# Patient Record
Sex: MALE | Race: WHITE | NOT HISPANIC OR LATINO | Employment: OTHER | URBAN - NONMETROPOLITAN AREA
[De-identification: names, ages, dates, MRNs, and addresses within clinical notes are randomized per-mention and may not be internally consistent; named-entity substitution may affect disease eponyms.]

---

## 2018-02-07 ENCOUNTER — OCCUPATIONAL MEDICINE (OUTPATIENT)
Dept: URGENT CARE | Facility: PHYSICIAN GROUP | Age: 53
End: 2018-02-07

## 2018-02-07 ENCOUNTER — APPOINTMENT (OUTPATIENT)
Dept: RADIOLOGY | Facility: IMAGING CENTER | Age: 53
End: 2018-02-07
Attending: PHYSICIAN ASSISTANT

## 2018-02-07 ENCOUNTER — HOSPITAL ENCOUNTER (OUTPATIENT)
Facility: MEDICAL CENTER | Age: 53
End: 2018-02-07
Attending: PHYSICIAN ASSISTANT
Payer: COMMERCIAL

## 2018-02-07 VITALS
HEART RATE: 92 BPM | HEIGHT: 75 IN | DIASTOLIC BLOOD PRESSURE: 72 MMHG | RESPIRATION RATE: 16 BRPM | WEIGHT: 270 LBS | OXYGEN SATURATION: 99 % | BODY MASS INDEX: 33.57 KG/M2 | SYSTOLIC BLOOD PRESSURE: 128 MMHG | TEMPERATURE: 97.9 F

## 2018-02-07 DIAGNOSIS — Z02.4 ENCOUNTER FOR CDL (COMMERCIAL DRIVING LICENSE) EXAM: ICD-10-CM

## 2018-02-07 DIAGNOSIS — Z02.1 PRE-EMPLOYMENT EXAMINATION: ICD-10-CM

## 2018-02-07 LAB
ALBUMIN SERPL BCP-MCNC: 4.1 G/DL (ref 3.2–4.9)
ALBUMIN/GLOB SERPL: 1.2 G/DL
ALP SERPL-CCNC: 68 U/L (ref 30–99)
ALT SERPL-CCNC: 30 U/L (ref 2–50)
ANION GAP SERPL CALC-SCNC: 9 MMOL/L (ref 0–11.9)
AST SERPL-CCNC: 32 U/L (ref 12–45)
BILIRUB SERPL-MCNC: 0.5 MG/DL (ref 0.1–1.5)
BUN SERPL-MCNC: 14 MG/DL (ref 8–22)
CALCIUM SERPL-MCNC: 9.3 MG/DL (ref 8.5–10.5)
CHLORIDE SERPL-SCNC: 103 MMOL/L (ref 96–112)
CO2 SERPL-SCNC: 28 MMOL/L (ref 20–33)
CREAT SERPL-MCNC: 0.8 MG/DL (ref 0.5–1.4)
ERYTHROCYTE [DISTWIDTH] IN BLOOD BY AUTOMATED COUNT: 43.8 FL (ref 35.9–50)
GLOBULIN SER CALC-MCNC: 3.3 G/DL (ref 1.9–3.5)
GLUCOSE SERPL-MCNC: 116 MG/DL (ref 65–99)
HCT VFR BLD AUTO: 43.4 % (ref 42–52)
HGB BLD-MCNC: 14.7 G/DL (ref 14–18)
MCH RBC QN AUTO: 31.3 PG (ref 27–33)
MCHC RBC AUTO-ENTMCNC: 33.9 G/DL (ref 33.7–35.3)
MCV RBC AUTO: 92.5 FL (ref 81.4–97.8)
PLATELET # BLD AUTO: 266 K/UL (ref 164–446)
PMV BLD AUTO: 11 FL (ref 9–12.9)
POTASSIUM SERPL-SCNC: 3.9 MMOL/L (ref 3.6–5.5)
PROT SERPL-MCNC: 7.4 G/DL (ref 6–8.2)
RBC # BLD AUTO: 4.69 M/UL (ref 4.7–6.1)
SODIUM SERPL-SCNC: 140 MMOL/L (ref 135–145)
WBC # BLD AUTO: 8.7 K/UL (ref 4.8–10.8)

## 2018-02-07 PROCEDURE — 7100 PR DOT PHYSICAL: Performed by: PHYSICIAN ASSISTANT

## 2018-02-07 PROCEDURE — 71046 X-RAY EXAM CHEST 2 VIEWS: CPT | Mod: TC,FY | Performed by: PHYSICIAN ASSISTANT

## 2018-02-07 ASSESSMENT — VISUAL ACUITY
OS_CC: 20/20
OD_CC: 20/20

## 2018-02-07 NOTE — PROGRESS NOTES
Patient is here for an employee physical, including a CDL exam. Patient was diagnosed with sleep apnea 6-8 months ago and has been on a CPAP since. He denies any excessive daytime somnolence and has not fallen asleep a little. He does not take any prescription medication. He denies chest pain, shortness of breath, orthopnea, PND, leg swelling, and syncopal episodes. All paperwork was reviewed. Chest x-ray within normal limits. Cleared for work. CDL for 2 years.

## 2018-05-14 ENCOUNTER — OFFICE VISIT (OUTPATIENT)
Dept: URGENT CARE | Facility: PHYSICIAN GROUP | Age: 53
End: 2018-05-14
Payer: COMMERCIAL

## 2018-05-14 VITALS
WEIGHT: 257 LBS | DIASTOLIC BLOOD PRESSURE: 90 MMHG | TEMPERATURE: 97.5 F | HEART RATE: 72 BPM | HEIGHT: 75 IN | OXYGEN SATURATION: 96 % | BODY MASS INDEX: 31.95 KG/M2 | SYSTOLIC BLOOD PRESSURE: 142 MMHG | RESPIRATION RATE: 14 BRPM

## 2018-05-14 DIAGNOSIS — J22 LOWER RESP. TRACT INFECTION: ICD-10-CM

## 2018-05-14 DIAGNOSIS — R06.02 SOB (SHORTNESS OF BREATH): ICD-10-CM

## 2018-05-14 PROCEDURE — 99214 OFFICE O/P EST MOD 30 MIN: CPT | Performed by: PHYSICIAN ASSISTANT

## 2018-05-14 RX ORDER — CODEINE PHOSPHATE AND GUAIFENESIN 10; 100 MG/5ML; MG/5ML
5 SOLUTION ORAL EVERY 12 HOURS PRN
Qty: 100 ML | Refills: 0 | Status: SHIPPED | OUTPATIENT
Start: 2018-05-14 | End: 2018-05-24

## 2018-05-14 RX ORDER — ALBUTEROL SULFATE 90 UG/1
2 AEROSOL, METERED RESPIRATORY (INHALATION) EVERY 6 HOURS PRN
Qty: 8.5 G | Refills: 0 | Status: SHIPPED | OUTPATIENT
Start: 2018-05-14 | End: 2018-11-13

## 2018-05-14 RX ORDER — DOXYCYCLINE HYCLATE 100 MG
100 TABLET ORAL 2 TIMES DAILY
Qty: 14 TAB | Refills: 0 | Status: SHIPPED | OUTPATIENT
Start: 2018-05-14 | End: 2018-05-21

## 2018-05-14 NOTE — PROGRESS NOTES
Chief Complaint   Patient presents with   • Congestion       HISTORY OF PRESENT ILLNESS: Patient is a 53 y.o. male who presents today for the following:    Cough x 1 week  + nasal congestion, new onset sweats, SOB, ST  Denies ear pain  OTC meds: dayquil/nyquil - helping only a little bit  Currently feeling a little worse - body aches     There are no active problems to display for this patient.      Allergies:Nkda [no known drug allergy]    Current Outpatient Prescriptions Ordered in [facility]   Medication Sig Dispense Refill   • doxycycline (VIBRAMYCIN) 100 MG Tab Take 1 Tab by mouth 2 times a day for 7 days. 14 Tab 0   • guaifenesin-codeine (ROBITUSSIN AC) Solution oral solution Take 5 mL by mouth every 12 hours as needed for Cough for up to 10 days. 100 mL 0   • albuterol 108 (90 Base) MCG/ACT Aero Soln inhalation aerosol Inhale 2 Puffs by mouth every 6 hours as needed. 8.5 g 0     No current Epic-ordered facility-administered medications on file.        No past medical history on file.    Social History   Substance Use Topics   • Smoking status: Former Smoker   • Smokeless tobacco: Never Used   • Alcohol use Yes      Comment: occasion       No family status information on file.   No family history on file.    Review of Systems:   Constitutional ROS: No unexpected change in weight, No weakness, No fatigue  Eye ROS: No recent significant change in vision, No eye pain, redness, discharge  Ear ROS: No drainage, No tinnitus or vertigo, No recent change in hearing  Mouth/Throat ROS: No teeth or gum problems, No bleeding gums, No tongue complaints  Neck ROS: No swollen glands, No significant pain in neck  Cardiovascular ROS: No diaphoresis, No edema, No palpitations  Gastrointestinal ROS: No change in bowel habits, No significant change in appetite, No nausea, vomiting, diarrhea, or constipation  Musculoskeletal/Extremities ROS: No peripheral edema, No pain, redness or swelling on the joints  Hematologic/Lymphatic ROS: No  "chills, No night sweats, No weight loss  Skin/Integumentary ROS: No edema, No evidence of rash, No itching      Exam:  Blood pressure 142/90, pulse 72, temperature 36.4 °C (97.5 °F), resp. rate 14, height 1.905 m (6' 3\"), weight 116.6 kg (257 lb), SpO2 96 %.  General: Well developed, well nourished. No distress.  Eye: PERRL/EOMI; conjunctivae clear, lids normal.  ENMT: Lips without lesions, good dentition. Oropharynx is clear. Bilateral TMs are within normal limits.  Neck: Trachea midline, no masses. No thyromegaly.  Pulmonary: Unlabored respiratory effort. Lungs clear to auscultation, no wheezes, no rhonchi.  Cardiovascular: Regular rate and rhythm without murmur. No edema.   Neurologic: Grossly nonfocal. No facial asymmetry noted.  Lymph: No cervical lymphadenopathy noted.  Skin: Warm, dry, good turgor. No rashes in visible areas.   Psych: Normal mood. Alert and oriented x3. Judgment and insight is normal.    Assessment/Plan:  Discussed possible viral etiology. Discussed appropriate over-the-counter symptomatic medication, and when to return to clinic. Take all medications as instructed. Contingent antibiotic prescription given to patient to fill upon meeting criteria of guidelines discussed.   1. Lower resp. tract infection  doxycycline (VIBRAMYCIN) 100 MG Tab    guaifenesin-codeine (ROBITUSSIN AC) Solution oral solution   2. SOB (shortness of breath)  albuterol 108 (90 Base) MCG/ACT Aero Soln inhalation aerosol       "

## 2018-05-14 NOTE — LETTER
May 14, 2018         Patient: Gurmeet Jo   YOB: 1965   Date of Visit: 5/14/2018           To Whom it May Concern:    Gurmeet Jo was seen in my clinic on 5/14/2018. He may return to work on 5/17/18.    If you have any questions or concerns, please don't hesitate to call.        Sincerely,           Nissa Adames P.A.-C.  Electronically Signed

## 2018-10-28 ENCOUNTER — OFFICE VISIT (OUTPATIENT)
Dept: URGENT CARE | Facility: PHYSICIAN GROUP | Age: 53
End: 2018-10-28
Payer: MEDICAID

## 2018-10-28 VITALS
SYSTOLIC BLOOD PRESSURE: 180 MMHG | OXYGEN SATURATION: 95 % | WEIGHT: 240 LBS | HEIGHT: 75 IN | TEMPERATURE: 98 F | RESPIRATION RATE: 18 BRPM | BODY MASS INDEX: 29.84 KG/M2 | DIASTOLIC BLOOD PRESSURE: 90 MMHG | HEART RATE: 92 BPM

## 2018-10-28 DIAGNOSIS — R16.0 HEPATOMEGALY: ICD-10-CM

## 2018-10-28 DIAGNOSIS — R10.13 EPIGASTRIC PAIN: ICD-10-CM

## 2018-10-28 DIAGNOSIS — K62.5 RECTAL BLEEDING: ICD-10-CM

## 2018-10-28 PROCEDURE — 99214 OFFICE O/P EST MOD 30 MIN: CPT | Performed by: PHYSICIAN ASSISTANT

## 2018-10-28 NOTE — LETTER
October 28, 2018         Patient: Gurmeet Jo   YOB: 1965   Date of Visit: 10/28/2018           To Whom it May Concern:    Gurmeet Jo was seen in my clinic on 10/28/2018. He may return to work on 10/30/18. Shruthi Jo will need to be with him. She may return 10/30/18.    If you have any questions or concerns, please don't hesitate to call.        Sincerely,           Nissa Adames P.A.-C.  Electronically Signed

## 2018-10-28 NOTE — PROGRESS NOTES
Chief Complaint   Patient presents with   • Abdominal Pain     Pt states he is feeling upper abdominal pain, Pt states he has noticed bleeding from rectum, unsure if it is due to hemorrhoid       HISTORY OF PRESENT ILLNESS: Patient is a 53 y.o. 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      Allergies:Nkda [no known drug allergy]    Current Outpatient Prescriptions Ordered in [facility]   Medication Sig Dispense Refill   • albuterol 108 (90 Base) MCG/ACT Aero Soln inhalation aerosol Inhale 2 Puffs by mouth every 6 hours as needed. 8.5 g 0     No current Epic-ordered facility-administered medications on file.        History reviewed. No pertinent past medical history.    Social History   Substance Use Topics   • Smoking status: Former Smoker   • Smokeless tobacco: Never Used   • Alcohol use Yes      Comment: occasion       No family status information on file.   History reviewed. No pertinent family history.    Review of Systems:   Constitutional ROS: No unexpected change in weight, No weakness, No fatigue  Eye ROS: No recent significant change in vision, No eye pain, redness, discharge  Ear ROS: No drainage, No tinnitus or vertigo, No recent change in hearing  Mouth/Throat ROS: No teeth or gum problems, No bleeding gums, No tongue complaints  Neck ROS: No swollen glands, No  "significant pain in neck  Pulmonary ROS: No chronic cough, sputum, or hemoptysis, No dyspnea on exertion, No wheezing  Cardiovascular ROS: No diaphoresis, No edema, No palpitations  Musculoskeletal/Extremities ROS: No peripheral edema, No pain, redness or swelling on the joints  Hematologic/Lymphatic ROS: No chills, No night sweats, No weight loss  Skin/Integumentary ROS: No edema, No evidence of rash, No itching      Exam:  Blood pressure (!) 180/90, pulse 92, temperature 36.7 °C (98 °F), temperature source Temporal, resp. rate 18, height 1.905 m (6' 3\"), weight 108.9 kg (240 lb), SpO2 95 %.  General: Well developed, well nourished. No distress.  Pulmonary: Unlabored respiratory effort. Lungs clear to auscultation, no wheezes, no rhonchi.  Cardiovascular: Regular rate and rhythm without murmur.    Abdomen: Soft, nondistended.  Tenderness noted in the epigastrium without guarding or rebound.  Abdominal wall veins are very noticeable but are not enlarged.  Hepatomegaly is noted.  Bowel sounds within normal limits.  Neurologic: Grossly nonfocal. No facial asymmetry noted.  Lymph: No cervical lymphadenopathy noted.  Skin: Warm, dry, good turgor. No rashes in visible areas.   Psych: Normal mood. Alert and oriented x3. Judgment and insight is normal.    Assessment/Plan:  Patient has been set up for ultrasound and labs tomorrow.  Will contact patient with results.  Referring patient to GI for rectal bleeding.  1. Hepatomegaly  US-ABDOMEN COMPLETE SURVEY    COMP METABOLIC PANEL    CBC WITH DIFFERENTIAL   2. Rectal bleeding  REFERRAL TO GASTROENTEROLOGY   3. Epigastric pain  US-ABDOMEN COMPLETE SURVEY       "

## 2018-10-29 ENCOUNTER — HOSPITAL ENCOUNTER (OUTPATIENT)
Dept: RADIOLOGY | Facility: MEDICAL CENTER | Age: 53
End: 2018-10-29
Attending: PHYSICIAN ASSISTANT
Payer: MEDICAID

## 2018-10-29 ENCOUNTER — TELEPHONE (OUTPATIENT)
Dept: URGENT CARE | Facility: PHYSICIAN GROUP | Age: 53
End: 2018-10-29

## 2018-10-29 ENCOUNTER — HOSPITAL ENCOUNTER (OUTPATIENT)
Dept: LAB | Facility: MEDICAL CENTER | Age: 53
End: 2018-10-29
Attending: PHYSICIAN ASSISTANT
Payer: MEDICAID

## 2018-10-29 DIAGNOSIS — R16.0 HEPATOMEGALY: ICD-10-CM

## 2018-10-29 DIAGNOSIS — R93.89 ABNORMAL ULTRASOUND: ICD-10-CM

## 2018-10-29 DIAGNOSIS — R93.89 ABNORMAL CT SCAN: ICD-10-CM

## 2018-10-29 DIAGNOSIS — R10.13 EPIGASTRIC PAIN: ICD-10-CM

## 2018-10-29 LAB
ALBUMIN SERPL BCP-MCNC: 3.7 G/DL (ref 3.2–4.9)
ALBUMIN/GLOB SERPL: 0.9 G/DL
ALP SERPL-CCNC: 186 U/L (ref 30–99)
ALT SERPL-CCNC: 36 U/L (ref 2–50)
ANION GAP SERPL CALC-SCNC: 9 MMOL/L (ref 0–11.9)
AST SERPL-CCNC: 85 U/L (ref 12–45)
BASOPHILS # BLD AUTO: 0.6 % (ref 0–1.8)
BASOPHILS # BLD: 0.07 K/UL (ref 0–0.12)
BILIRUB SERPL-MCNC: 1.4 MG/DL (ref 0.1–1.5)
BUN SERPL-MCNC: 13 MG/DL (ref 8–22)
CALCIUM SERPL-MCNC: 9.6 MG/DL (ref 8.5–10.5)
CHLORIDE SERPL-SCNC: 100 MMOL/L (ref 96–112)
CO2 SERPL-SCNC: 27 MMOL/L (ref 20–33)
CREAT SERPL-MCNC: 0.72 MG/DL (ref 0.5–1.4)
EOSINOPHIL # BLD AUTO: 0.06 K/UL (ref 0–0.51)
EOSINOPHIL NFR BLD: 0.5 % (ref 0–6.9)
ERYTHROCYTE [DISTWIDTH] IN BLOOD BY AUTOMATED COUNT: 45.2 FL (ref 35.9–50)
FASTING STATUS PATIENT QL REPORTED: NORMAL
GLOBULIN SER CALC-MCNC: 4.2 G/DL (ref 1.9–3.5)
GLUCOSE SERPL-MCNC: 115 MG/DL (ref 65–99)
HCT VFR BLD AUTO: 37.2 % (ref 42–52)
HGB BLD-MCNC: 12 G/DL (ref 14–18)
IMM GRANULOCYTES # BLD AUTO: 0.04 K/UL (ref 0–0.11)
IMM GRANULOCYTES NFR BLD AUTO: 0.4 % (ref 0–0.9)
LYMPHOCYTES # BLD AUTO: 1.38 K/UL (ref 1–4.8)
LYMPHOCYTES NFR BLD: 12.5 % (ref 22–41)
MCH RBC QN AUTO: 29 PG (ref 27–33)
MCHC RBC AUTO-ENTMCNC: 32.3 G/DL (ref 33.7–35.3)
MCV RBC AUTO: 89.9 FL (ref 81.4–97.8)
MONOCYTES # BLD AUTO: 1.08 K/UL (ref 0–0.85)
MONOCYTES NFR BLD AUTO: 9.8 % (ref 0–13.4)
NEUTROPHILS # BLD AUTO: 8.38 K/UL (ref 1.82–7.42)
NEUTROPHILS NFR BLD: 76.2 % (ref 44–72)
NRBC # BLD AUTO: 0 K/UL
NRBC BLD-RTO: 0 /100 WBC
PLATELET # BLD AUTO: 335 K/UL (ref 164–446)
PMV BLD AUTO: 10.6 FL (ref 9–12.9)
POTASSIUM SERPL-SCNC: 4.1 MMOL/L (ref 3.6–5.5)
PROT SERPL-MCNC: 7.9 G/DL (ref 6–8.2)
RBC # BLD AUTO: 4.14 M/UL (ref 4.7–6.1)
SODIUM SERPL-SCNC: 136 MMOL/L (ref 135–145)
WBC # BLD AUTO: 11 K/UL (ref 4.8–10.8)

## 2018-10-29 PROCEDURE — 36415 COLL VENOUS BLD VENIPUNCTURE: CPT

## 2018-10-29 PROCEDURE — 76700 US EXAM ABDOM COMPLETE: CPT

## 2018-10-29 PROCEDURE — 80053 COMPREHEN METABOLIC PANEL: CPT

## 2018-10-29 PROCEDURE — 85025 COMPLETE CBC W/AUTO DIFF WBC: CPT

## 2018-10-29 PROCEDURE — 700117 HCHG RX CONTRAST REV CODE 255: Performed by: PHYSICIAN ASSISTANT

## 2018-10-29 PROCEDURE — 74177 CT ABD & PELVIS W/CONTRAST: CPT

## 2018-10-29 RX ADMIN — IOHEXOL 50 ML: 240 INJECTION, SOLUTION INTRATHECAL; INTRAVASCULAR; INTRAVENOUS; ORAL at 16:45

## 2018-10-29 RX ADMIN — IOHEXOL 100 ML: 350 INJECTION, SOLUTION INTRAVENOUS at 16:45

## 2018-11-08 ENCOUNTER — HOSPITAL ENCOUNTER (OUTPATIENT)
Dept: LAB | Facility: MEDICAL CENTER | Age: 53
End: 2018-11-08
Attending: INTERNAL MEDICINE
Payer: MEDICAID

## 2018-11-08 LAB
CANCER AG19-9 SERPL-ACNC: 2411.6 U/ML (ref 0–35)
HBV SURFACE AG SER QL: NEGATIVE
HCV AB SER QL: NEGATIVE
INR PPP: 1.22 (ref 0.87–1.13)
PROTHROMBIN TIME: 15.5 SEC (ref 12–14.6)

## 2018-11-08 PROCEDURE — 87340 HEPATITIS B SURFACE AG IA: CPT

## 2018-11-08 PROCEDURE — 36415 COLL VENOUS BLD VENIPUNCTURE: CPT

## 2018-11-08 PROCEDURE — 86301 IMMUNOASSAY TUMOR CA 19-9: CPT

## 2018-11-08 PROCEDURE — 82378 CARCINOEMBRYONIC ANTIGEN: CPT

## 2018-11-08 PROCEDURE — 82784 ASSAY IGA/IGD/IGG/IGM EACH: CPT

## 2018-11-08 PROCEDURE — 86803 HEPATITIS C AB TEST: CPT

## 2018-11-08 PROCEDURE — 82105 ALPHA-FETOPROTEIN SERUM: CPT

## 2018-11-08 PROCEDURE — 85610 PROTHROMBIN TIME: CPT

## 2018-11-08 PROCEDURE — 83516 IMMUNOASSAY NONANTIBODY: CPT

## 2018-11-09 LAB — CEA SERPL-MCNC: 1592.7 NG/ML (ref 0–3)

## 2018-11-10 LAB — AFP-TM SERPL-MCNC: 4 NG/ML (ref 0–9)

## 2018-11-11 LAB
IGA SERPL-MCNC: 662 MG/DL (ref 68–408)
TTG IGA SER IA-ACNC: 1 U/ML (ref 0–3)

## 2018-11-13 ENCOUNTER — APPOINTMENT (OUTPATIENT)
Dept: ADMISSIONS | Facility: MEDICAL CENTER | Age: 53
End: 2018-11-13
Attending: INTERNAL MEDICINE
Payer: MEDICAID

## 2018-11-13 ENCOUNTER — APPOINTMENT (OUTPATIENT)
Dept: ADMISSIONS | Facility: MEDICAL CENTER | Age: 53
End: 2018-11-13
Payer: MEDICAID

## 2018-11-14 ENCOUNTER — APPOINTMENT (OUTPATIENT)
Dept: RADIOLOGY | Facility: MEDICAL CENTER | Age: 53
End: 2018-11-14
Attending: INTERNAL MEDICINE
Payer: MEDICAID

## 2018-11-14 ENCOUNTER — HOSPITAL ENCOUNTER (OUTPATIENT)
Facility: MEDICAL CENTER | Age: 53
End: 2018-11-14
Attending: INTERNAL MEDICINE | Admitting: INTERNAL MEDICINE
Payer: MEDICAID

## 2018-11-14 VITALS
TEMPERATURE: 98.9 F | WEIGHT: 232.81 LBS | SYSTOLIC BLOOD PRESSURE: 133 MMHG | HEIGHT: 74 IN | DIASTOLIC BLOOD PRESSURE: 86 MMHG | RESPIRATION RATE: 18 BRPM | HEART RATE: 83 BPM | OXYGEN SATURATION: 91 % | BODY MASS INDEX: 29.88 KG/M2

## 2018-11-14 DIAGNOSIS — K64.9 BLEEDING HEMORRHOIDS: ICD-10-CM

## 2018-11-14 DIAGNOSIS — R16.0 HEPATOMEGALY: ICD-10-CM

## 2018-11-14 DIAGNOSIS — R93.5 ABNORMAL ABDOMINAL CT SCAN: ICD-10-CM

## 2018-11-14 DIAGNOSIS — R12 HEARTBURN: ICD-10-CM

## 2018-11-14 DIAGNOSIS — I70.0 ATHEROSCLEROSIS OF AORTA (HCC): ICD-10-CM

## 2018-11-14 DIAGNOSIS — K76.9 LESION OF LIVER: ICD-10-CM

## 2018-11-14 DIAGNOSIS — Z87.891 EX-SMOKER: ICD-10-CM

## 2018-11-14 DIAGNOSIS — Z86.010 PERSONAL HISTORY OF COLONIC POLYPS: ICD-10-CM

## 2018-11-14 DIAGNOSIS — R11.0 NAUSEA: ICD-10-CM

## 2018-11-14 DIAGNOSIS — R10.13 EPIGASTRIC PAIN: ICD-10-CM

## 2018-11-14 DIAGNOSIS — N28.1 ACQUIRED CYST OF KIDNEY: ICD-10-CM

## 2018-11-14 DIAGNOSIS — R79.89 ABNORMAL LIVER FUNCTION TEST: ICD-10-CM

## 2018-11-14 DIAGNOSIS — D64.89 OTHER SPECIFIED ANEMIAS: ICD-10-CM

## 2018-11-14 DIAGNOSIS — R63.4 LOSS OF WEIGHT: ICD-10-CM

## 2018-11-14 DIAGNOSIS — I10 ESSENTIAL HYPERTENSION, MALIGNANT: ICD-10-CM

## 2018-11-14 LAB — PATHOLOGY CONSULT NOTE: NORMAL

## 2018-11-14 PROCEDURE — 700111 HCHG RX REV CODE 636 W/ 250 OVERRIDE (IP)

## 2018-11-14 PROCEDURE — 88313 SPECIAL STAINS GROUP 2: CPT

## 2018-11-14 PROCEDURE — 99153 MOD SED SAME PHYS/QHP EA: CPT

## 2018-11-14 PROCEDURE — 160002 HCHG RECOVERY MINUTES (STAT)

## 2018-11-14 PROCEDURE — 700111 HCHG RX REV CODE 636 W/ 250 OVERRIDE (IP): Performed by: RADIOLOGY

## 2018-11-14 PROCEDURE — 88341 IMHCHEM/IMCYTCHM EA ADD ANTB: CPT | Mod: 91

## 2018-11-14 PROCEDURE — 88307 TISSUE EXAM BY PATHOLOGIST: CPT

## 2018-11-14 PROCEDURE — 88342 IMHCHEM/IMCYTCHM 1ST ANTB: CPT

## 2018-11-14 RX ORDER — MIDAZOLAM HYDROCHLORIDE 1 MG/ML
.5-2 INJECTION INTRAMUSCULAR; INTRAVENOUS PRN
Status: DISCONTINUED | OUTPATIENT
Start: 2018-11-14 | End: 2018-11-14 | Stop reason: HOSPADM

## 2018-11-14 RX ORDER — SODIUM CHLORIDE 9 MG/ML
500 INJECTION, SOLUTION INTRAVENOUS
Status: DISCONTINUED | OUTPATIENT
Start: 2018-11-14 | End: 2018-11-14 | Stop reason: HOSPADM

## 2018-11-14 RX ORDER — MIDAZOLAM HYDROCHLORIDE 1 MG/ML
INJECTION INTRAMUSCULAR; INTRAVENOUS
Status: COMPLETED
Start: 2018-11-14 | End: 2018-11-14

## 2018-11-14 RX ORDER — OXYCODONE HYDROCHLORIDE 10 MG/1
10 TABLET ORAL
Status: DISCONTINUED | OUTPATIENT
Start: 2018-11-14 | End: 2018-11-14 | Stop reason: HOSPADM

## 2018-11-14 RX ORDER — NALOXONE HYDROCHLORIDE 0.4 MG/ML
INJECTION, SOLUTION INTRAMUSCULAR; INTRAVENOUS; SUBCUTANEOUS
Status: COMPLETED
Start: 2018-11-14 | End: 2018-11-14

## 2018-11-14 RX ORDER — MORPHINE SULFATE 4 MG/ML
4 INJECTION, SOLUTION INTRAMUSCULAR; INTRAVENOUS
Status: DISCONTINUED | OUTPATIENT
Start: 2018-11-14 | End: 2018-11-14 | Stop reason: HOSPADM

## 2018-11-14 RX ORDER — ONDANSETRON 2 MG/ML
4 INJECTION INTRAMUSCULAR; INTRAVENOUS PRN
Status: DISCONTINUED | OUTPATIENT
Start: 2018-11-14 | End: 2018-11-14 | Stop reason: HOSPADM

## 2018-11-14 RX ORDER — ONDANSETRON 2 MG/ML
4 INJECTION INTRAMUSCULAR; INTRAVENOUS EVERY 8 HOURS PRN
Status: DISCONTINUED | OUTPATIENT
Start: 2018-11-14 | End: 2018-11-14 | Stop reason: HOSPADM

## 2018-11-14 RX ORDER — OXYCODONE HYDROCHLORIDE 5 MG/1
5 TABLET ORAL
Status: DISCONTINUED | OUTPATIENT
Start: 2018-11-14 | End: 2018-11-14 | Stop reason: HOSPADM

## 2018-11-14 RX ADMIN — MIDAZOLAM 2 MG: 1 INJECTION INTRAMUSCULAR; INTRAVENOUS at 11:24

## 2018-11-14 RX ADMIN — MIDAZOLAM 2 MG: 1 INJECTION INTRAMUSCULAR; INTRAVENOUS at 11:29

## 2018-11-14 RX ADMIN — FENTANYL CITRATE 50 MCG: 50 INJECTION, SOLUTION INTRAMUSCULAR; INTRAVENOUS at 11:24

## 2018-11-14 RX ADMIN — MIDAZOLAM HYDROCHLORIDE 2 MG: 1 INJECTION, SOLUTION INTRAMUSCULAR; INTRAVENOUS at 11:24

## 2018-11-14 ASSESSMENT — PAIN SCALES - GENERAL
PAINLEVEL_OUTOF10: 0

## 2018-11-14 NOTE — DISCHARGE INSTRUCTIONS
ACTIVITY: Rest and take it easy for the first 24 hours.  A responsible adult is recommended to remain with you during that time.  It is normal to feel sleepy.  We encourage you to not do anything that requires balance, judgment or coordination.    MILD FLU-LIKE SYMPTOMS ARE NORMAL. YOU MAY EXPERIENCE GENERALIZED MUSCLE ACHES, THROAT IRRITATION, HEADACHE AND/OR SOME NAUSEA.    FOR 24 HOURS DO NOT:  Drive, operate machinery or run household appliances.  Drink beer or alcoholic beverages.   Make important decisions or sign legal documents.    DIET: To avoid nausea, slowly advance diet as tolerated, avoiding spicy or greasy foods for the first day.  Add more substantial food to your diet according to your physician's instructions.  Babies can be fed formula or breast milk as soon as they are hungry.  INCREASE FLUIDS AND FIBER TO AVOID CONSTIPATION.    SURGICAL DRESSING/BATHING: Keep dressing and incision dry and intact for 24 hours, may remove dressing and shower after 12 PM on 11/15, do not need to replace    FOLLOW-UP APPOINTMENT:  A follow-up appointment should be arranged with your doctor [phone]; call to schedule.    You should CALL YOUR PHYSICIAN if you develop:  Fever greater than 101 degrees F.  Pain not relieved by medication, or persistent nausea or vomiting.  Excessive bleeding (blood soaking through dressing) or unexpected drainage from the wound.  Extreme redness or swelling around the incision site, drainage of pus or foul smelling drainage.  Inability to urinate or empty your bladder within 8 hours.  Problems with breathing or chest pain.    You should call 911 if you develop problems with breathing or chest pain.  If you are unable to contact your doctor or surgical center, you should go to the nearest emergency room or urgent care center.  Physician's telephone #: [phone]    If any questions arise, call your doctor.  If your doctor is not available, please feel free to call the Surgical Center  at [phone].  The Center is open Monday through Friday from 7AM to 7PM.  You can also call the HEALTH HOTLINE open 24 hours/day, 7 days/week and speak to a nurse at [phone], or toll free at [phone].    A registered nurse may call you a few days after your surgery to see how you are doing after your procedure.    MEDICATIONS: Resume taking daily medication.  Take prescribed pain medication with food.  If no medication is prescribed, you may take non-aspirin pain medication if needed.  PAIN MEDICATION CAN BE VERY CONSTIPATING.  Take a stool softener or laxative such as senokot, pericolace, or milk of magnesia if needed.    If your physician has prescribed pain medication that includes Acetaminophen (Tylenol), do not take additional Acetaminophen (Tylenol) while taking the prescribed medication.    Depression / Suicide Risk    As you are discharged from this [facility] facility, it is important to learn how to keep safe from harming yourself.    Recognize the warning signs:  · Abrupt changes in personality, positive or negative- including increase in energy   · Giving away possessions  · Change in eating patterns- significant weight changes-  positive or negative  · Change in sleeping patterns- unable to sleep or sleeping all the time   · Unwillingness or inability to communicate  · Depression  · Unusual sadness, discouragement and loneliness  · Talk of wanting to die  · Neglect of personal appearance   · Rebelliousness- reckless behavior  · Withdrawal from people/activities they love  · Confusion- inability to concentrate     If you or a loved one observes any of these behaviors or has concerns about self-harm, here's what you can do:  · Talk about it- your feelings and reasons for harming yourself  · Remove any means that you might use to hurt yourself (examples: pills, rope, extension cords, firearm)  · Get professional help from the community (Mental Health, Substance Abuse, psychological  counseling)  · Do not be alone:Call your Safe Contact- someone whom you trust who will be there for you.  · Call your local CRISIS HOTLINE 831-8249 or 377-802-2383  · Call your local Children's Mobile Crisis Response Team [place] (240) 027-9293 or www.[employer]  · Call the toll free National Suicide Prevention Hotlines   · National Suicide Prevention Lifeline 694-973-AODT (6483)  · [employer] Line Network 800-SUICIDE (694-1068)      Liver Biopsy, Care After  These instructions give you information on caring for yourself after your procedure. Your doctor may also give you more specific instructions. Call your doctor if you have any problems or questions after your procedure.  HOME CARE  · Rest at home for 1-2 days or as told by your doctor.  · Have someone stay with you for at least 24 hours.  · Do not do these things in the first 24 hours:  ¨ Drive.  ¨ Use machinery.  ¨ Take care of other people.  ¨ Sign legal documents.  ¨ Take a bath or shower.  · There are many different ways to close and cover a cut (incision). For example, a cut can be closed with stitches, skin glue, or adhesive strips. Follow your doctor's instructions on:  ¨ Taking care of your cut.  ¨ Changing and removing your bandage (dressing).  ¨ Removing whatever was used to close your cut.  · Do not drink alcohol in the first week.  · Do not lift more than 5 pounds or play contact sports for the first 2 weeks.  · Take medicines only as told by your doctor. For 1 week, do not take medicine that has aspirin in it or medicines like ibuprofen.  · Get your test results.  GET HELP IF:  · A cut bleeds and leaves more than just a small spot of blood.  · A cut is red, puffs up (swells), or hurts more than before.  · Fluid or something else comes from a cut.  · A cut smells bad.  · You have a fever or chills.  GET HELP RIGHT AWAY IF:  · You have swelling, bloating, or pain in your belly (abdomen).  · You get dizzy or faint.  · You have a  rash.  · You feel sick to your stomach (nauseous) or throw up (vomit).  · You have trouble breathing, feel short of breath, or feel faint.  · Your chest hurts.  · You have problems talking or seeing.  · You have trouble balancing or moving your arms or legs.     This information is not intended to replace advice given to you by your health care provider. Make sure you discuss any questions you have with your health care provider.     Document Released: 09/26/2009 Document Revised: 01/08/2016 Document Reviewed: 02/13/2015  [employer] Interactive Patient Education ©2016 [employer]

## 2018-11-14 NOTE — OR SURGEON
Immediate Post- Operative Note        PostOp Diagnosis: Liver masses.       Procedure(s): CT guided liver biopsy.       Estimated Blood Loss: Less than 5 ml        Complications: None            11/14/2018     1142 AM     Dev Gorman

## 2018-11-14 NOTE — PROGRESS NOTES
IR CT RN note:    Site Confirmed with MD, patient and RN pre procedure   Ct guided liver BX by MD Gorman assisted by CT Tech Nader,Right mid abdomen access site;  x3  Cores sample sent to lab      End Tidal CO2 range 33-38 during procedure   Patient tolerated procedure, hemodynamically stable; pt awake and talking post procedure; report given to PPU LINDSAY sesay;   patient transported back to PPU via IR RN monitored then transferred care to report RN

## 2018-11-14 NOTE — OR NURSING
1154 Patient arrived to unit, arouses to voice.  Biopsy site to right mid abdomen clean, dry and soft.  Patient denies pain at this time.  Updated on plan of care, verbalized understanding.  1200 Wife to bedside, updated on plan of care.  1245 Access site clean, dry and soft.  Patient tolerating oral intake.  1340 Access site clean, dry and soft.  All lines and monitors discontinued. Reviewed discharge paperwork with pt and wife. Discussed diet, activity, medications, follow up care and worsening symptoms. No questions at this time.   1355 Pt discharged home with wife via wheelchair by volunteer.

## 2018-11-16 ENCOUNTER — TELEPHONE (OUTPATIENT)
Dept: SCHEDULING | Facility: IMAGING CENTER | Age: 53
End: 2018-11-16

## 2018-11-27 ENCOUNTER — PATIENT OUTREACH (OUTPATIENT)
Dept: OTHER | Facility: MEDICAL CENTER | Age: 53
End: 2018-11-27

## 2018-11-27 NOTE — PROGRESS NOTES
On 11/27/2018 at 1540 this ONN called patient. Introduced self and explained role of nurse navigator. Patient states he is currently at oncologist office, patient states he does not know the name of his oncologist, patient requesting that this ONN speak with patient's wife Joan. Patient handed phone to patient's wife Joan. Again, this ONN introduced self and explained role of nurse navigator. Patient's wife states they are currently waiting to see Dr. Ross. Patient's wife states they do not have a plan of care at this moment. Patient's wife took this ONN's name and number and this ONN agreed to call back later in the week. Joan requested this ONN call Joan's listed phone number.

## 2018-11-28 ENCOUNTER — HOSPITAL ENCOUNTER (OUTPATIENT)
Dept: LAB | Facility: MEDICAL CENTER | Age: 53
End: 2018-11-28
Attending: INTERNAL MEDICINE
Payer: MEDICAID

## 2018-11-28 LAB
ALBUMIN SERPL BCP-MCNC: 3.4 G/DL (ref 3.2–4.9)
ALBUMIN/GLOB SERPL: 0.8 G/DL
ALP SERPL-CCNC: 253 U/L (ref 30–99)
ALT SERPL-CCNC: 34 U/L (ref 2–50)
ANION GAP SERPL CALC-SCNC: 11 MMOL/L (ref 0–11.9)
AST SERPL-CCNC: 84 U/L (ref 12–45)
BASOPHILS # BLD AUTO: 0.6 % (ref 0–1.8)
BASOPHILS # BLD: 0.07 K/UL (ref 0–0.12)
BILIRUB SERPL-MCNC: 1 MG/DL (ref 0.1–1.5)
BUN SERPL-MCNC: 13 MG/DL (ref 8–22)
CALCIUM SERPL-MCNC: 9.2 MG/DL (ref 8.5–10.5)
CHLORIDE SERPL-SCNC: 100 MMOL/L (ref 96–112)
CO2 SERPL-SCNC: 24 MMOL/L (ref 20–33)
CREAT SERPL-MCNC: 0.63 MG/DL (ref 0.5–1.4)
EOSINOPHIL # BLD AUTO: 0.11 K/UL (ref 0–0.51)
EOSINOPHIL NFR BLD: 1 % (ref 0–6.9)
ERYTHROCYTE [DISTWIDTH] IN BLOOD BY AUTOMATED COUNT: 48.5 FL (ref 35.9–50)
GLOBULIN SER CALC-MCNC: 4.3 G/DL (ref 1.9–3.5)
GLUCOSE SERPL-MCNC: 208 MG/DL (ref 65–99)
HCT VFR BLD AUTO: 34.8 % (ref 42–52)
HGB BLD-MCNC: 10.8 G/DL (ref 14–18)
IMM GRANULOCYTES # BLD AUTO: 0.04 K/UL (ref 0–0.11)
IMM GRANULOCYTES NFR BLD AUTO: 0.4 % (ref 0–0.9)
LYMPHOCYTES # BLD AUTO: 1.43 K/UL (ref 1–4.8)
LYMPHOCYTES NFR BLD: 12.9 % (ref 22–41)
MCH RBC QN AUTO: 27.7 PG (ref 27–33)
MCHC RBC AUTO-ENTMCNC: 31 G/DL (ref 33.7–35.3)
MCV RBC AUTO: 89.2 FL (ref 81.4–97.8)
MONOCYTES # BLD AUTO: 1.03 K/UL (ref 0–0.85)
MONOCYTES NFR BLD AUTO: 9.3 % (ref 0–13.4)
NEUTROPHILS # BLD AUTO: 8.4 K/UL (ref 1.82–7.42)
NEUTROPHILS NFR BLD: 75.8 % (ref 44–72)
NRBC # BLD AUTO: 0 K/UL
NRBC BLD-RTO: 0 /100 WBC
PLATELET # BLD AUTO: 400 K/UL (ref 164–446)
PMV BLD AUTO: 10.8 FL (ref 9–12.9)
POTASSIUM SERPL-SCNC: 3.5 MMOL/L (ref 3.6–5.5)
PROT SERPL-MCNC: 7.7 G/DL (ref 6–8.2)
RBC # BLD AUTO: 3.9 M/UL (ref 4.7–6.1)
SODIUM SERPL-SCNC: 135 MMOL/L (ref 135–145)
WBC # BLD AUTO: 11.1 K/UL (ref 4.8–10.8)

## 2018-11-28 PROCEDURE — 36415 COLL VENOUS BLD VENIPUNCTURE: CPT

## 2018-11-28 PROCEDURE — 80053 COMPREHEN METABOLIC PANEL: CPT

## 2018-11-28 PROCEDURE — 85025 COMPLETE CBC W/AUTO DIFF WBC: CPT

## 2018-11-28 PROCEDURE — 82378 CARCINOEMBRYONIC ANTIGEN: CPT

## 2018-11-30 ENCOUNTER — PATIENT OUTREACH (OUTPATIENT)
Dept: OTHER | Facility: MEDICAL CENTER | Age: 53
End: 2018-11-30

## 2018-11-30 LAB — CEA SERPL-MCNC: >957 NG/ML (ref 0–3)

## 2018-11-30 NOTE — PROGRESS NOTES
On 11/30/2018 at 1528 this ONN called patient and patient's wife. Patient's wife stated that patient was uncomfortable in his abdominal area. Patient's wife stated patient was on flexeril and using a heating pad. This ONN encouraged smaller frequent meals. Patient agreeable to try. Patient states he is worried he may need a liver transplant. This ONN explained that right now liver transplant is not in patient's plan of care. Patient asking if during the colonoscopy cancer can be removed. This ONN explained that the colonoscopy would be diagnostic not a treatment modality. Patient and patient's wife had questions regarding chemotherapy medications and schedules and when to expect medications to arrive. This ONN explained that those questions should be directed to Dr. Ross's office. Patient states he has an upcoming chemo education class. Patient and patient's wife deny financial concerns and state that they have friends and family willing to help with transportation. Patient and patient's wife agreeable to receiving via email [employer] information on Road to Recovery. Patient denies other questions or concerns at this time.

## 2018-12-03 ENCOUNTER — HOSPITAL ENCOUNTER (OUTPATIENT)
Dept: LAB | Facility: MEDICAL CENTER | Age: 53
End: 2018-12-03
Attending: NURSE PRACTITIONER
Payer: MEDICAID

## 2018-12-03 LAB
INR PPP: 1.28 (ref 0.87–1.13)
PROTHROMBIN TIME: 16.1 SEC (ref 12–14.6)

## 2018-12-03 PROCEDURE — 85610 PROTHROMBIN TIME: CPT

## 2018-12-03 PROCEDURE — 36415 COLL VENOUS BLD VENIPUNCTURE: CPT

## 2018-12-03 RX ORDER — METHOCARBAMOL 500 MG/1
500 TABLET, FILM COATED ORAL 3 TIMES DAILY PRN
COMMUNITY
End: 2020-05-27

## 2018-12-03 RX ORDER — M-VIT,TX,IRON,MINS/CALC/FOLIC 27MG-0.4MG
1 TABLET ORAL DAILY
COMMUNITY

## 2018-12-05 ENCOUNTER — OFFICE VISIT (OUTPATIENT)
Dept: MEDICAL GROUP | Facility: CLINIC | Age: 53
End: 2018-12-05
Payer: MEDICAID

## 2018-12-05 ENCOUNTER — PATIENT OUTREACH (OUTPATIENT)
Dept: HEALTH INFORMATION MANAGEMENT | Facility: OTHER | Age: 53
End: 2018-12-05

## 2018-12-05 VITALS
SYSTOLIC BLOOD PRESSURE: 155 MMHG | TEMPERATURE: 99.7 F | OXYGEN SATURATION: 95 % | HEIGHT: 74 IN | DIASTOLIC BLOOD PRESSURE: 98 MMHG | WEIGHT: 231 LBS | HEART RATE: 102 BPM | BODY MASS INDEX: 29.65 KG/M2

## 2018-12-05 DIAGNOSIS — C78.7 METASTATIC COLON CANCER TO LIVER (HCC): ICD-10-CM

## 2018-12-05 DIAGNOSIS — Z23 NEED FOR VACCINATION: ICD-10-CM

## 2018-12-05 DIAGNOSIS — R03.0 ELEVATED BLOOD PRESSURE READING: ICD-10-CM

## 2018-12-05 DIAGNOSIS — C18.9 METASTATIC COLON CANCER TO LIVER (HCC): ICD-10-CM

## 2018-12-05 PROBLEM — C22.0 HEPATOCELLULAR CARCINOMA (HCC): Status: ACTIVE | Noted: 2018-12-05

## 2018-12-05 PROCEDURE — 90670 PCV13 VACCINE IM: CPT | Performed by: FAMILY MEDICINE

## 2018-12-05 PROCEDURE — 99213 OFFICE O/P EST LOW 20 MIN: CPT | Mod: 25 | Performed by: FAMILY MEDICINE

## 2018-12-05 PROCEDURE — 90471 IMMUNIZATION ADMIN: CPT | Performed by: FAMILY MEDICINE

## 2018-12-05 RX ORDER — BACLOFEN 10 MG/1
10 TABLET ORAL 3 TIMES DAILY PRN
COMMUNITY
End: 2023-05-31 | Stop reason: SDUPTHER

## 2018-12-05 RX ORDER — POLYETHYLENE GLYCOL-3350 AND ELECTROLYTES 236; 6.74; 5.86; 2.97; 22.74 G/274.31G; G/274.31G; G/274.31G; G/274.31G; G/274.31G
1 POWDER, FOR SOLUTION ORAL ONCE
Refills: 0 | COMMUNITY
Start: 2018-11-19 | End: 2018-12-17

## 2018-12-05 NOTE — PROGRESS NOTES
Complaint: Establish, requests vaccine     Subjective:     Gurmeet Jo is a 53 y.o. male here today to establish care. Used to nbe followed by Dr. Hawkins at [facility] clinic in [place].    Metastatic colon cancer to liver (HCC)  Diagnosed with metastatic colon cancer, set to start chemo this next week. Also scheduled to undergo colonoscopy to find primary tumor. Doing ok for time being. Wife very involved in his care plan. 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      No other concerns or complaints today.    Current medicines (including changes today)  Current Outpatient Prescriptions   Medication Sig Dispense Refill   • Esomeprazole Magnesium (NEXIUM PO) Take  by mouth.     • baclofen (LIORESAL) 10 MG Tab Take 10 mg by mouth 3 times a day.     • therapeutic multivitamin-minerals (THERAGRAN-M) Tab Take 1 Tab by mouth every day.     • methocarbamol (ROBAXIN) 500 MG Tab Take 500 mg by mouth 4 times a day.       No current facility-administered medications for this visit.      He  has a past medical history of Bowel habit changes; Cancer (HCC) (11/2018); Dental disorder; Hiatus hernia syndrome; Hypertension; Indigestion; Pain; Sleep apnea; and Snoring.    Health Maintenance: needs vaccinations      Allergies: Patient has no active allergies.    Current Outpatient Prescriptions Ordered in [facility]   Medication Sig Dispense Refill   • Esomeprazole Magnesium (NEXIUM PO) Take  by mouth.     • baclofen (LIORESAL) 10 MG Tab Take 10 mg by mouth 3 times a day.     • therapeutic multivitamin-minerals (THERAGRAN-M) Tab Take 1 Tab by mouth every day.     • methocarbamol (ROBAXIN) 500 MG Tab Take 500 mg by mouth 4 times a day.       No current [facility]-ordered facility-administered medications on file.        Past Medical History:   Diagnosis Date   • Bowel habit changes     constipation   • Cancer (HCC) 11/2018    Colon CA with Liver Mets   • Dental disorder     dentures    •  "Hiatus hernia syndrome    • Hypertension     No meds at this time   • Indigestion    • Pain     liver    • Sleep apnea     cpap    • Snoring        Past Surgical History:   Procedure Laterality Date   • COLONOSCOPY  2015       Social History   Substance Use Topics   • Smoking status: Former Smoker     Packs/day: 1.00     Years: 15.00     Quit date: 1/1/1998   • Smokeless tobacco: Never Used   • Alcohol use No       Social History     Social History Narrative   • No narrative on file       Family History   Problem Relation Age of Onset   • Hypertension Mother    • Hypertension Father    • Diabetes Father    • Diabetes Brother          ROS   Patient denies any fever, chills, unintentional weight gain/loss, fatigue, stroke symptoms, dizziness, headache, nasal congestion, sore-throat, cough, heartburn, chest pain, difficulty breathing, abdominal discomfort, diarrhea/constipation, burning with urination or frequency, joint or back pain, skin rashes, depression or anxiety.       Objective:     Blood pressure 155/98, pulse (!) 102, temperature 37.6 °C (99.7 °F), temperature source Temporal, height 1.88 m (6' 2\"), weight 104.8 kg (231 lb), SpO2 95 %. Body mass index is 29.66 kg/m².   Physical Exam:  Constitutional: Alert, no distress.  No formal exam  Psych: Alert and oriented x3, appropriate affect and mood.        Assessment and Plan:   The following treatment plan was discussed    1. Metastatic colon cancer to liver (HCC)  New problem to me. Due to social situation, will se if MSW can help. Also suggested [facility] account.  - REFERRAL TO COMPLEX CARE MANAGEMENT Services Requested: Care Manager to Evaluate and Recommend    2. Need for vaccination  Recommended by Dr. Thomas. Do not know if it will take due to proximity of chemo start.  - Pneumococcal Conjugate Vaccine 13-Valent    3. Elevated blood pressure reading  To discuss with oncologist, need med that does not interact with chemo.      Followup: Return if symptoms " worsen or fail to improve.    Please note that this dictation was created using voice recognition software. I have made every reasonable attempt to correct obvious errors, but I expect that there are errors of grammar and possibly content that I did not discover before finalizing the note.

## 2018-12-05 NOTE — ASSESSMENT & PLAN NOTE
Diagnosed with metastatic colon cancer, set to start chemo this next week. Also scheduled to undergo colonoscopy to find primary tumor. Doing ok for time being. Wife very invovled in his care plan. 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 Will

## 2018-12-07 ENCOUNTER — HOSPITAL ENCOUNTER (OUTPATIENT)
Dept: RADIOLOGY | Facility: MEDICAL CENTER | Age: 53
End: 2018-12-07
Attending: INTERNAL MEDICINE
Payer: MEDICAID

## 2018-12-07 DIAGNOSIS — C18.9 MALIGNANT NEOPLASM OF COLON, UNSPECIFIED PART OF COLON (HCC): ICD-10-CM

## 2018-12-07 PROCEDURE — 700117 HCHG RX CONTRAST REV CODE 255: Performed by: INTERNAL MEDICINE

## 2018-12-07 PROCEDURE — 71260 CT THORAX DX C+: CPT

## 2018-12-07 RX ADMIN — IOHEXOL 100 ML: 350 INJECTION, SOLUTION INTRAVENOUS at 09:01

## 2018-12-11 ENCOUNTER — HOSPITAL ENCOUNTER (OUTPATIENT)
Facility: MEDICAL CENTER | Age: 53
End: 2018-12-11
Attending: INTERNAL MEDICINE | Admitting: INTERNAL MEDICINE
Payer: MEDICAID

## 2018-12-11 ENCOUNTER — APPOINTMENT (OUTPATIENT)
Dept: RADIOLOGY | Facility: MEDICAL CENTER | Age: 53
End: 2018-12-11
Attending: INTERNAL MEDICINE
Payer: MEDICAID

## 2018-12-11 VITALS
WEIGHT: 233 LBS | DIASTOLIC BLOOD PRESSURE: 85 MMHG | BODY MASS INDEX: 29.9 KG/M2 | HEIGHT: 74 IN | OXYGEN SATURATION: 96 % | RESPIRATION RATE: 16 BRPM | HEART RATE: 88 BPM | SYSTOLIC BLOOD PRESSURE: 138 MMHG

## 2018-12-11 DIAGNOSIS — C18.9 MALIGNANT NEOPLASM OF COLON, UNSPECIFIED PART OF COLON (HCC): ICD-10-CM

## 2018-12-11 PROCEDURE — 700101 HCHG RX REV CODE 250

## 2018-12-11 PROCEDURE — 700111 HCHG RX REV CODE 636 W/ 250 OVERRIDE (IP): Performed by: RADIOLOGY

## 2018-12-11 PROCEDURE — 99153 MOD SED SAME PHYS/QHP EA: CPT

## 2018-12-11 PROCEDURE — 160002 HCHG RECOVERY MINUTES (STAT)

## 2018-12-11 PROCEDURE — 700111 HCHG RX REV CODE 636 W/ 250 OVERRIDE (IP)

## 2018-12-11 RX ORDER — SODIUM CHLORIDE 9 MG/ML
500 INJECTION, SOLUTION INTRAVENOUS
Status: DISCONTINUED | OUTPATIENT
Start: 2018-12-11 | End: 2018-12-11 | Stop reason: HOSPADM

## 2018-12-11 RX ORDER — CEFAZOLIN SODIUM 2 G/100ML
2 INJECTION, SOLUTION INTRAVENOUS ONCE
Status: COMPLETED | OUTPATIENT
Start: 2018-12-11 | End: 2018-12-11

## 2018-12-11 RX ORDER — 0.9 % SODIUM CHLORIDE 0.9 %
VIAL (ML) INJECTION PRN
Status: CANCELLED | OUTPATIENT
Start: 2018-12-18

## 2018-12-11 RX ORDER — ONDANSETRON 8 MG/1
8 TABLET, ORALLY DISINTEGRATING ORAL
Status: CANCELLED | OUTPATIENT
Start: 2018-12-18

## 2018-12-11 RX ORDER — ONDANSETRON 2 MG/ML
4 INJECTION INTRAMUSCULAR; INTRAVENOUS EVERY 8 HOURS PRN
Status: CANCELLED | OUTPATIENT
Start: 2018-12-11 | End: 2018-12-12

## 2018-12-11 RX ORDER — LIDOCAINE HYDROCHLORIDE AND EPINEPHRINE BITARTRATE 20; .01 MG/ML; MG/ML
INJECTION, SOLUTION SUBCUTANEOUS
Status: COMPLETED
Start: 2018-12-11 | End: 2018-12-11

## 2018-12-11 RX ORDER — PROCHLORPERAZINE MALEATE 10 MG
10 TABLET ORAL EVERY 6 HOURS PRN
Status: CANCELLED | OUTPATIENT
Start: 2018-12-18

## 2018-12-11 RX ORDER — CEFAZOLIN SODIUM 1 G/3ML
INJECTION, POWDER, FOR SOLUTION INTRAMUSCULAR; INTRAVENOUS
Status: COMPLETED
Start: 2018-12-11 | End: 2018-12-11

## 2018-12-11 RX ORDER — ONDANSETRON 2 MG/ML
4 INJECTION INTRAMUSCULAR; INTRAVENOUS
Status: CANCELLED | OUTPATIENT
Start: 2018-12-18

## 2018-12-11 RX ORDER — SODIUM CHLORIDE 9 MG/ML
INJECTION, SOLUTION INTRAVENOUS CONTINUOUS
Status: CANCELLED | OUTPATIENT
Start: 2018-12-18

## 2018-12-11 RX ORDER — MIDAZOLAM HYDROCHLORIDE 1 MG/ML
.5-2 INJECTION INTRAMUSCULAR; INTRAVENOUS PRN
Status: DISCONTINUED | OUTPATIENT
Start: 2018-12-11 | End: 2018-12-11 | Stop reason: HOSPADM

## 2018-12-11 RX ORDER — ONDANSETRON 2 MG/ML
4 INJECTION INTRAMUSCULAR; INTRAVENOUS PRN
Status: DISCONTINUED | OUTPATIENT
Start: 2018-12-11 | End: 2018-12-11 | Stop reason: HOSPADM

## 2018-12-11 RX ORDER — DEXTROSE MONOHYDRATE 50 MG/ML
INJECTION, SOLUTION INTRAVENOUS CONTINUOUS
Status: CANCELLED | OUTPATIENT
Start: 2018-12-18

## 2018-12-11 RX ORDER — 0.9 % SODIUM CHLORIDE 0.9 %
5 VIAL (ML) INJECTION PRN
Status: CANCELLED | OUTPATIENT
Start: 2018-12-18

## 2018-12-11 RX ORDER — OXYCODONE HYDROCHLORIDE 5 MG/1
2.5 TABLET ORAL
Status: CANCELLED | OUTPATIENT
Start: 2018-12-11 | End: 2018-12-12

## 2018-12-11 RX ORDER — HYDROMORPHONE HYDROCHLORIDE 2 MG/ML
0.25 INJECTION, SOLUTION INTRAMUSCULAR; INTRAVENOUS; SUBCUTANEOUS
Status: CANCELLED | OUTPATIENT
Start: 2018-12-11 | End: 2018-12-12

## 2018-12-11 RX ORDER — MIDAZOLAM HYDROCHLORIDE 1 MG/ML
INJECTION INTRAMUSCULAR; INTRAVENOUS
Status: COMPLETED
Start: 2018-12-11 | End: 2018-12-11

## 2018-12-11 RX ADMIN — HEPARIN 500 UNITS: 100 SYRINGE at 08:53

## 2018-12-11 RX ADMIN — CEFAZOLIN 2 G: 1 INJECTION, POWDER, FOR SOLUTION INTRAMUSCULAR; INTRAVENOUS at 08:25

## 2018-12-11 RX ADMIN — LIDOCAINE HYDROCHLORIDE,EPINEPHRINE BITARTRATE: 20; .01 INJECTION, SOLUTION INFILTRATION; PERINEURAL at 08:33

## 2018-12-11 RX ADMIN — MIDAZOLAM 2 MG: 1 INJECTION INTRAMUSCULAR; INTRAVENOUS at 08:32

## 2018-12-11 RX ADMIN — MIDAZOLAM 2 MG: 1 INJECTION INTRAMUSCULAR; INTRAVENOUS at 08:25

## 2018-12-11 RX ADMIN — FENTANYL CITRATE 50 MCG: 50 INJECTION, SOLUTION INTRAMUSCULAR; INTRAVENOUS at 08:24

## 2018-12-11 ASSESSMENT — PAIN SCALES - GENERAL: PAINLEVEL_OUTOF10: 0

## 2018-12-11 NOTE — PROGRESS NOTES
Discharge instructions reviewed with patient and family, all questions answered. IV removed and belongings returned.  Patient ambulated out in a stable condition.       ACTIVITY: Rest and take it easy for the first 24 hours.  A responsible adult is recommended to remain with you during that time.  It is normal to feel sleepy.  We encourage you to not do anything that requires balance, judgment or coordination.    MILD FLU-LIKE SYMPTOMS ARE NORMAL. YOU MAY EXPERIENCE GENERALIZED MUSCLE ACHES, THROAT IRRITATION, HEADACHE AND/OR SOME NAUSEA.    FOR 24 HOURS DO NOT:  Drive, operate machinery or run household appliances.  Drink beer or alcoholic beverages.   Make important decisions or sign legal documents.    SPECIAL INSTRUCTIONS: No shower or bath for 1 week. Sponge Bath only.  Keep surgical site clean dry and covered until fully healed. Do not lift right arm above head for 1 week, Do not lift over 10 lbs for 1 week with right arm.     DIET: To avoid nausea, slowly advance diet as tolerated, avoiding spicy or greasy foods for the first day.  Add more substantial food to your diet according to your physician's instructions.  Babies can be fed formula or breast milk as soon as they are hungry.  INCREASE FLUIDS AND FIBER TO AVOID CONSTIPATION.    SURGICAL DRESSING/BATHING: No shower or bath for 1 week. Sponge Bath only.  Keep surgical site clean dry and covered until fully healed.    FOLLOW-UP APPOINTMENT:  A follow-up appointment should be arranged with your doctor in 1 week; call to schedule.    You should CALL YOUR PHYSICIAN if you develop:  Fever greater than 101 degrees F.  Pain not relieved by medication, or persistent nausea or vomiting.  Excessive bleeding (blood soaking through dressing) or unexpected drainage from the wound.  Extreme redness or swelling around the incision site, drainage of pus or foul smelling drainage.  Inability to urinate or empty your bladder within 8 hours.  Problems with breathing or  chest pain.    You should call 911 if you develop problems with breathing or chest pain.  If you are unable to contact your doctor or surgical center, you should go to the nearest emergency room or urgent care center.     If any questions arise, call your doctor.  If your doctor is not available, please feel free to call the Surgical Center at [phone].  The Center is open Monday through Friday from 7AM to 7PM.  You can also call the HEALTH HOTLINE open 24 hours/day, 7 days/week and speak to a nurse at [phone], or toll free at [phone].    A registered nurse may call you a few days after your surgery to see how you are doing after your procedure.    MEDICATIONS: Resume taking daily medication.  Take prescribed pain medication with food.  If no medication is prescribed, you may take non-aspirin pain medication if needed.  PAIN MEDICATION CAN BE VERY CONSTIPATING.  Take a stool softener or laxative such as senokot, pericolace, or milk of magnesia if needed.      If your physician has prescribed pain medication that includes Acetaminophen (Tylenol), do not take additional Acetaminophen (Tylenol) while taking the prescribed medication.    Depression / Suicide Risk    As you are discharged from this [facility] facility, it is important to learn how to keep safe from harming yourself.    Recognize the warning signs:  · Abrupt changes in personality, positive or negative- including increase in energy   · Giving away possessions  · Change in eating patterns- significant weight changes-  positive or negative  · Change in sleeping patterns- unable to sleep or sleeping all the time   · Unwillingness or inability to communicate  · Depression  · Unusual sadness, discouragement and loneliness  · Talk of wanting to die  · Neglect of personal appearance   · Rebelliousness- reckless behavior  · Withdrawal from people/activities they love  · Confusion- inability to concentrate     If you or a loved one observes  any of these behaviors or has concerns about self-harm, here's what you can do:  · Talk about it- your feelings and reasons for harming yourself  · Remove any means that you might use to hurt yourself (examples: pills, rope, extension cords, firearm)  · Get professional help from the community (Mental Health, Substance Abuse, psychological counseling)  · Do not be alone:Call your Safe Contact- someone whom you trust who will be there for you.  · Call your local CRISIS HOTLINE 081-3192 or 312-180-4333  · Call your local Children's Mobile Crisis Response Team [place] (279) 518-5942 or www.[employer]  · Call the toll free National Suicide Prevention Hotlines   · National Suicide Prevention Lifeline 693-101-YZJF (4719)  · National Hope Line Network 800-SUICIDE (545-1646)      Implanted Port Home Guide  An implanted port is a type of central line that is placed under the skin. Central lines are used to provide IV access when treatment or nutrition needs to be given through a person's veins. Implanted ports are used for long-term IV access. An implanted port may be placed because:   · You need IV medicine that would be irritating to the small veins in your hands or arms.    · You need long-term IV medicines, such as antibiotics.    · You need IV nutrition for a long period.    · You need frequent blood draws for lab tests.    · You need dialysis.    Implanted ports are usually placed in the chest area, but they can also be placed in the upper arm, the abdomen, or the leg. An implanted port has two main parts:   · [place]. The reservoir is round and will appear as a small, raised area under your skin. The reservoir is the part where a needle is inserted to give medicines or draw blood.    · Catheter. The catheter is a thin, flexible tube that extends from the reservoir. The catheter is placed into a large vein. Medicine that is inserted into the reservoir goes into the catheter and then into the vein.    HOW  "WILL I CARE FOR MY INCISION SITE?  Do not get the incision site wet. Bathe or shower as directed by your health care provider.   HOW IS MY PORT ACCESSED?  Special steps must be taken to access the port:   · Before the port is accessed, a numbing cream can be placed on the skin. This helps numb the skin over the port site.    · Your health care provider uses a sterile technique to access the port.  ¨ Your health care provider must put on a mask and sterile gloves.  ¨ The skin over your port is cleaned carefully with an antiseptic and allowed to dry.  ¨ The port is gently pinched between sterile gloves, and a needle is inserted into the port.  · Only \"non-coring\" port needles should be used to access the port. Once the port is accessed, a blood return should be checked. This helps ensure that the port is in the vein and is not clogged.    · If your port needs to remain accessed for a constant infusion, a clear (transparent) bandage will be placed over the needle site. The bandage and needle will need to be changed every week, or as directed by your health care provider.    · Keep the bandage covering the needle clean and dry. Do not get it wet. Follow your health care provider's instructions on how to take a shower or bath while the port is accessed.    · If your port does not need to stay accessed, no bandage is needed over the port.    WHAT IS FLUSHING?  Flushing helps keep the port from getting clogged. Follow your health care provider's instructions on how and when to flush the port. Ports are usually flushed with saline solution or a medicine called heparin. The need for flushing will depend on how the port is used.   · If the port is used for intermittent medicines or blood draws, the port will need to be flushed:    ¨ After medicines have been given.    ¨ After blood has been drawn.    ¨ As part of routine maintenance.    · If a constant infusion is running, the port may not need to be flushed.    HOW LONG WILL " MY PORT STAY IMPLANTED?  The port can stay in for as long as your health care provider thinks it is needed. When it is time for the port to come out, surgery will be done to remove it. The procedure is similar to the one performed when the port was put in.   WHEN SHOULD I SEEK IMMEDIATE MEDICAL CARE?  When you have an implanted port, you should seek immediate medical care if:   · You notice a bad smell coming from the incision site.    · You have swelling, redness, or drainage at the incision site.    · You have more swelling or pain at the port site or the surrounding area.    · You have a fever that is not controlled with medicine.     This information is not intended to replace advice given to you by your health care provider. Make sure you discuss any questions you have with your health care provider.     Document Released: 12/18/2006 Document Revised: 10/08/2014 Document Reviewed: 08/25/2014  [employer] Interactive Patient Education ©2016 [employer]    Implanted Port Insertion, Care After  Refer to this sheet in the next few weeks. These instructions provide you with information on caring for yourself after your procedure. Your health care provider may also give you more specific instructions. Your treatment has been planned according to current medical practices, but problems sometimes occur. Call your health care provider if you have any problems or questions after your procedure.  WHAT TO EXPECT AFTER THE PROCEDURE  After your procedure, it is typical to have the following:   · Discomfort at the port insertion site. Ice packs to the area will help.  · Bruising on the skin over the port. This will subside in 3-4 days.  HOME CARE INSTRUCTIONS  · After your port is placed, you will get a 's information card. The card has information about your port. Keep this card with you at all times.    · Know what kind of port you have. There are many types of ports available.    · Wear a medical alert bracelet  in case of an emergency. This can help alert health care workers that you have a port.    · The port can stay in for as long as your health care provider believes it is necessary.    · A home health care nurse may give medicines and take care of the port.    · You or a family member can get special training and directions for giving medicine and taking care of the port at home.    SEEK MEDICAL CARE IF:   · Your port does not flush or you are unable to get a blood return.    · You have a fever or chills.  SEEK IMMEDIATE MEDICAL CARE IF:  · You have new fluid or pus coming from your incision.    · You notice a bad smell coming from your incision site.    · You have swelling, pain, or more redness at the incision or port site.    · You have chest pain or shortness of breath.     This information is not intended to replace advice given to you by your health care provider. Make sure you discuss any questions you have with your health care provider.     Document Released: 10/08/2014 Document Revised: 12/23/2014 Document Reviewed: 10/08/2014  [employer]vier Interactive Patient Education ©2016 [employer]

## 2018-12-11 NOTE — OR SURGEON
Immediate Post- Operative Note    PostOp Diagnosis: VENOUS ACCESS REQUIRED FOR CHEMOTHERAPY      Procedure(s): RIGHT INTERNAL JUGULAR POWER PORT VENOUS ACCESS PLACEMENT WITH U/S AND FLUOROSCOPIC GUIDANCE      Estimated Blood Loss: <5CC        Complications: NONE          12/11/2018     8:54 AM     Luis Goode

## 2018-12-11 NOTE — DISCHARGE INSTRUCTIONS
ACTIVITY: Rest and take it easy for the first 24 hours.  A responsible adult is recommended to remain with you during that time.  It is normal to feel sleepy.  We encourage you to not do anything that requires balance, judgment or coordination.    MILD FLU-LIKE SYMPTOMS ARE NORMAL. YOU MAY EXPERIENCE GENERALIZED MUSCLE ACHES, THROAT IRRITATION, HEADACHE AND/OR SOME NAUSEA.    FOR 24 HOURS DO NOT:  Drive, operate machinery or run household appliances.  Drink beer or alcoholic beverages.   Make important decisions or sign legal documents.    SPECIAL INSTRUCTIONS: No shower or bath for 1 week. Sponge Bath only.  Keep surgical site clean dry and covered until fully healed. Do not lift right arm above head for 1 week, Do not lift over 10 lbs for 1 week with right arm.     DIET: To avoid nausea, slowly advance diet as tolerated, avoiding spicy or greasy foods for the first day.  Add more substantial food to your diet according to your physician's instructions.  Babies can be fed formula or breast milk as soon as they are hungry.  INCREASE FLUIDS AND FIBER TO AVOID CONSTIPATION.    SURGICAL DRESSING/BATHING: No shower or bath for 1 week. Sponge Bath only.  Keep surgical site clean dry and covered until fully healed.    FOLLOW-UP APPOINTMENT:  A follow-up appointment should be arranged with your doctor in 1 week; call to schedule.    You should CALL YOUR PHYSICIAN if you develop:  Fever greater than 101 degrees F.  Pain not relieved by medication, or persistent nausea or vomiting.  Excessive bleeding (blood soaking through dressing) or unexpected drainage from the wound.  Extreme redness or swelling around the incision site, drainage of pus or foul smelling drainage.  Inability to urinate or empty your bladder within 8 hours.  Problems with breathing or chest pain.    You should call 911 if you develop problems with breathing or chest pain.  If you are unable to contact your doctor or surgical center, you should go to the  nearest emergency room or urgent care center.     If any questions arise, call your doctor.  If your doctor is not available, please feel free to call the Surgical Center at [phone].  The Center is open Monday through Friday from 7AM to 7PM.  You can also call the HEALTH HOTLINE open 24 hours/day, 7 days/week and speak to a nurse at [phone], or toll free at [phone].    A registered nurse may call you a few days after your surgery to see how you are doing after your procedure.    MEDICATIONS: Resume taking daily medication.  Take prescribed pain medication with food.  If no medication is prescribed, you may take non-aspirin pain medication if needed.  PAIN MEDICATION CAN BE VERY CONSTIPATING.  Take a stool softener or laxative such as senokot, pericolace, or milk of magnesia if needed.      If your physician has prescribed pain medication that includes Acetaminophen (Tylenol), do not take additional Acetaminophen (Tylenol) while taking the prescribed medication.    Depression / Suicide Risk    As you are discharged from this [facility] facility, it is important to learn how to keep safe from harming yourself.    Recognize the warning signs:  · Abrupt changes in personality, positive or negative- including increase in energy   · Giving away possessions  · Change in eating patterns- significant weight changes-  positive or negative  · Change in sleeping patterns- unable to sleep or sleeping all the time   · Unwillingness or inability to communicate  · Depression  · Unusual sadness, discouragement and loneliness  · Talk of wanting to die  · Neglect of personal appearance   · Rebelliousness- reckless behavior  · Withdrawal from people/activities they love  · Confusion- inability to concentrate     If you or a loved one observes any of these behaviors or has concerns about self-harm, here's what you can do:  · Talk about it- your feelings and reasons for harming yourself  · Remove any means that you  might use to hurt yourself (examples: pills, rope, extension cords, firearm)  · Get professional help from the community (Mental Health, Substance Abuse, psychological counseling)  · Do not be alone:Call your Safe Contact- someone whom you trust who will be there for you.  · Call your local CRISIS HOTLINE 520-7119 or 697-671-0038  · Call your local Children's Mobile Crisis Response Team [place] (527) 480-6557 or www.[employer]self  · Call the toll free National Suicide Prevention Hotlines   · National Suicide Prevention Lifeline 982-474-BRFH (5655)  · National Hope Line Network 800-SUICIDE (956-5950)      Implanted Port Home Guide  An implanted port is a type of central line that is placed under the skin. Central lines are used to provide IV access when treatment or nutrition needs to be given through a person's veins. Implanted ports are used for long-term IV access. An implanted port may be placed because:   · You need IV medicine that would be irritating to the small veins in your hands or arms.    · You need long-term IV medicines, such as antibiotics.    · You need IV nutrition for a long period.    · You need frequent blood draws for lab tests.    · You need dialysis.    Implanted ports are usually placed in the chest area, but they can also be placed in the upper arm, the abdomen, or the leg. An implanted port has two main parts:   · [place]. The reservoir is round and will appear as a small, raised area under your skin. The reservoir is the part where a needle is inserted to give medicines or draw blood.    · Catheter. The catheter is a thin, flexible tube that extends from the reservoir. The catheter is placed into a large vein. Medicine that is inserted into the reservoir goes into the catheter and then into the vein.    HOW WILL I CARE FOR MY INCISION SITE?  Do not get the incision site wet. Bathe or shower as directed by your health care provider.   HOW IS MY PORT ACCESSED?  Special steps must  "be taken to access the port:   · Before the port is accessed, a numbing cream can be placed on the skin. This helps numb the skin over the port site.    · Your health care provider uses a sterile technique to access the port.  ¨ Your health care provider must put on a mask and sterile gloves.  ¨ The skin over your port is cleaned carefully with an antiseptic and allowed to dry.  ¨ The port is gently pinched between sterile gloves, and a needle is inserted into the port.  · Only \"non-coring\" port needles should be used to access the port. Once the port is accessed, a blood return should be checked. This helps ensure that the port is in the vein and is not clogged.    · If your port needs to remain accessed for a constant infusion, a clear (transparent) bandage will be placed over the needle site. The bandage and needle will need to be changed every week, or as directed by your health care provider.    · Keep the bandage covering the needle clean and dry. Do not get it wet. Follow your health care provider's instructions on how to take a shower or bath while the port is accessed.    · If your port does not need to stay accessed, no bandage is needed over the port.    WHAT IS FLUSHING?  Flushing helps keep the port from getting clogged. Follow your health care provider's instructions on how and when to flush the port. Ports are usually flushed with saline solution or a medicine called heparin. The need for flushing will depend on how the port is used.   · If the port is used for intermittent medicines or blood draws, the port will need to be flushed:    ¨ After medicines have been given.    ¨ After blood has been drawn.    ¨ As part of routine maintenance.    · If a constant infusion is running, the port may not need to be flushed.    HOW LONG WILL MY PORT STAY IMPLANTED?  The port can stay in for as long as your health care provider thinks it is needed. When it is time for the port to come out, surgery will be done to " remove it. The procedure is similar to the one performed when the port was put in.   WHEN SHOULD I SEEK IMMEDIATE MEDICAL CARE?  When you have an implanted port, you should seek immediate medical care if:   · You notice a bad smell coming from the incision site.    · You have swelling, redness, or drainage at the incision site.    · You have more swelling or pain at the port site or the surrounding area.    · You have a fever that is not controlled with medicine.     This information is not intended to replace advice given to you by your health care provider. Make sure you discuss any questions you have with your health care provider.     Document Released: 12/18/2006 Document Revised: 10/08/2014 Document Reviewed: 08/25/2014  [employer] Interactive Patient Education ©2016 [employer]    Implanted Port Insertion, Care After  Refer to this sheet in the next few weeks. These instructions provide you with information on caring for yourself after your procedure. Your health care provider may also give you more specific instructions. Your treatment has been planned according to current medical practices, but problems sometimes occur. Call your health care provider if you have any problems or questions after your procedure.  WHAT TO EXPECT AFTER THE PROCEDURE  After your procedure, it is typical to have the following:   · Discomfort at the port insertion site. Ice packs to the area will help.  · Bruising on the skin over the port. This will subside in 3-4 days.  HOME CARE INSTRUCTIONS  · After your port is placed, you will get a 's information card. The card has information about your port. Keep this card with you at all times.    · Know what kind of port you have. There are many types of ports available.    · Wear a medical alert bracelet in case of an emergency. This can help alert health care workers that you have a port.    · The port can stay in for as long as your health care provider believes it is  necessary.    · A home health care nurse may give medicines and take care of the port.    · You or a family member can get special training and directions for giving medicine and taking care of the port at home.    SEEK MEDICAL CARE IF:   · Your port does not flush or you are unable to get a blood return.    · You have a fever or chills.  SEEK IMMEDIATE MEDICAL CARE IF:  · You have new fluid or pus coming from your incision.    · You notice a bad smell coming from your incision site.    · You have swelling, pain, or more redness at the incision or port site.    · You have chest pain or shortness of breath.     This information is not intended to replace advice given to you by your health care provider. Make sure you discuss any questions you have with your health care provider.     Document Released: 10/08/2014 Document Revised: 12/23/2014 Document Reviewed: 10/08/2014  [employer] Interactive Patient Education ©2016 [employer]

## 2018-12-11 NOTE — PROGRESS NOTES
OP IR RN note:    Site Confirmed with MD, patient and RN pre procedure   Tunneled Port Placement with Moderate Sedation by MD Goode assisted by RT Stevens, Right chest and IJ access site;  Port placement    BARD PowerPort implantable port 8 fr 21 cm open ended single lumen REF# [id number] LOT# [id number]   End tidal CO2 range 22-28 during procedure   Patient tolerated procedure, hemodynamically stable; pt awake and talking post procedure; see flow sheet for vitals and post op print out    patient transported back to OP IR pod 1 via IR RN monitored      Fluro time 0.3 min    sedation time 30 min      No bleeding or complications noted at this time

## 2018-12-16 NOTE — PROGRESS NOTES
"Pharmacy Chemotherapy calculation:    DX: metastatic colorectal cancer    Cycle 1  Previous treatment = none    Regimen: capeOx + bevacizumab  · Capecitabine (Xeloda) 850-1000 mg/m2 PO BID from the evening of day 1 to the morning of day 15 (28 doses per cycle)-  home prescription  ? Note: In Chad et al. 2008--the same study as Aura jaime al. 2008--capecitabine was described as being given 1000 mg/m2 PO BID on days 1 to 14  · Oxaliplatin (Eloxatin) 130 mg/m2 IV over 2 hours once on day 1, given second  · Bevacizumab (Avastin) 7.5 mg/kg IV over 30 to 90 minutes once on day 1, given first  ? HOLD cycle 1 due to recent port placement (12/11/18)  21-day cycles until DP/UT  NCCN guidelines for Colon Cancer V.2.2018  Aura PATRICK, et al - J Clin Oncol. 2008 Apr 20;26(12):2006-12.  Chad YUAN et al - J Clin Oncol. 2008 Apr 20;26(12):2013-9.     /88   Pulse (!) 103   Temp 36.4 °C (97.5 °F) (Temporal)   Resp 18   Ht 1.867 m (6' 1.5\")   Wt 101.8 kg (224 lb 6.9 oz)   SpO2 96%   BMI 29.21 kg/m²   Body surface area is 2.3 meters squared.     ANC~ 8700 Plt = 248k   Hgb = 10.2     SCr = 0.54mg/dL CrCl ~ >125mL/min (min Scr = 0.7)   LFT's = 107/30/316 TBili = 2.1 (no hepatic adj required)      Oxaliplatin (Eloxatin) 130 mg/m2 X 2.3m2 = 299mg    <5% difference, ok to treat with final dose = 299mg IV      RAHEEM Amador, Pharm.D.      "

## 2018-12-17 ENCOUNTER — OUTPATIENT INFUSION SERVICES (OUTPATIENT)
Dept: ONCOLOGY | Facility: MEDICAL CENTER | Age: 53
End: 2018-12-17
Attending: INTERNAL MEDICINE
Payer: MEDICAID

## 2018-12-17 ENCOUNTER — DOCUMENTATION (OUTPATIENT)
Dept: HEMATOLOGY ONCOLOGY | Facility: MEDICAL CENTER | Age: 53
End: 2018-12-17

## 2018-12-17 VITALS
TEMPERATURE: 97.5 F | RESPIRATION RATE: 18 BRPM | OXYGEN SATURATION: 96 % | SYSTOLIC BLOOD PRESSURE: 148 MMHG | BODY MASS INDEX: 28.8 KG/M2 | DIASTOLIC BLOOD PRESSURE: 88 MMHG | HEIGHT: 74 IN | WEIGHT: 224.43 LBS | HEART RATE: 103 BPM

## 2018-12-17 DIAGNOSIS — C78.7 METASTATIC COLON CANCER TO LIVER (HCC): ICD-10-CM

## 2018-12-17 DIAGNOSIS — C18.9 METASTATIC COLON CANCER TO LIVER (HCC): ICD-10-CM

## 2018-12-17 LAB
ALBUMIN SERPL BCP-MCNC: 3.3 G/DL (ref 3.2–4.9)
ALBUMIN/GLOB SERPL: 0.8 G/DL
ALP SERPL-CCNC: 316 U/L (ref 30–99)
ALT SERPL-CCNC: 30 U/L (ref 2–50)
ANION GAP SERPL CALC-SCNC: 9 MMOL/L (ref 0–11.9)
AST SERPL-CCNC: 107 U/L (ref 12–45)
BASOPHILS # BLD AUTO: 0.6 % (ref 0–1.8)
BASOPHILS # BLD: 0.07 K/UL (ref 0–0.12)
BILIRUB SERPL-MCNC: 2.1 MG/DL (ref 0.1–1.5)
BUN SERPL-MCNC: 9 MG/DL (ref 8–22)
CALCIUM SERPL-MCNC: 9.2 MG/DL (ref 8.5–10.5)
CEA SERPL-MCNC: 1783.2 NG/ML (ref 0–3)
CHLORIDE SERPL-SCNC: 97 MMOL/L (ref 96–112)
CO2 SERPL-SCNC: 27 MMOL/L (ref 20–33)
CREAT SERPL-MCNC: 0.54 MG/DL (ref 0.5–1.4)
EOSINOPHIL # BLD AUTO: 0.08 K/UL (ref 0–0.51)
EOSINOPHIL NFR BLD: 0.7 % (ref 0–6.9)
ERYTHROCYTE [DISTWIDTH] IN BLOOD BY AUTOMATED COUNT: 52.3 FL (ref 35.9–50)
GLOBULIN SER CALC-MCNC: 4.4 G/DL (ref 1.9–3.5)
GLUCOSE SERPL-MCNC: 117 MG/DL (ref 65–99)
HCT VFR BLD AUTO: 32.3 % (ref 42–52)
HGB BLD-MCNC: 10.2 G/DL (ref 14–18)
IMM GRANULOCYTES # BLD AUTO: 0.06 K/UL (ref 0–0.11)
IMM GRANULOCYTES NFR BLD AUTO: 0.5 % (ref 0–0.9)
LYMPHOCYTES # BLD AUTO: 1.48 K/UL (ref 1–4.8)
LYMPHOCYTES NFR BLD: 12.9 % (ref 22–41)
MAGNESIUM SERPL-MCNC: 1.7 MG/DL (ref 1.5–2.5)
MCH RBC QN AUTO: 27.7 PG (ref 27–33)
MCHC RBC AUTO-ENTMCNC: 31.6 G/DL (ref 33.7–35.3)
MCV RBC AUTO: 87.8 FL (ref 81.4–97.8)
MONOCYTES # BLD AUTO: 1.12 K/UL (ref 0–0.85)
MONOCYTES NFR BLD AUTO: 9.7 % (ref 0–13.4)
NEUTROPHILS # BLD AUTO: 8.7 K/UL (ref 1.82–7.42)
NEUTROPHILS NFR BLD: 75.6 % (ref 44–72)
NRBC # BLD AUTO: 0 K/UL
NRBC BLD-RTO: 0 /100 WBC
PLATELET # BLD AUTO: 348 K/UL (ref 164–446)
PMV BLD AUTO: 10.1 FL (ref 9–12.9)
POTASSIUM SERPL-SCNC: 3.3 MMOL/L (ref 3.6–5.5)
PROT SERPL-MCNC: 7.7 G/DL (ref 6–8.2)
RBC # BLD AUTO: 3.68 M/UL (ref 4.7–6.1)
SODIUM SERPL-SCNC: 133 MMOL/L (ref 135–145)
WBC # BLD AUTO: 11.5 K/UL (ref 4.8–10.8)

## 2018-12-17 PROCEDURE — 96367 TX/PROPH/DG ADDL SEQ IV INF: CPT

## 2018-12-17 PROCEDURE — 700105 HCHG RX REV CODE 258

## 2018-12-17 PROCEDURE — 96375 TX/PRO/DX INJ NEW DRUG ADDON: CPT

## 2018-12-17 PROCEDURE — A4212 NON CORING NEEDLE OR STYLET: HCPCS

## 2018-12-17 PROCEDURE — 96413 CHEMO IV INFUSION 1 HR: CPT

## 2018-12-17 PROCEDURE — 85025 COMPLETE CBC W/AUTO DIFF WBC: CPT

## 2018-12-17 PROCEDURE — 80053 COMPREHEN METABOLIC PANEL: CPT

## 2018-12-17 PROCEDURE — 82378 CARCINOEMBRYONIC ANTIGEN: CPT

## 2018-12-17 PROCEDURE — 96368 THER/DIAG CONCURRENT INF: CPT

## 2018-12-17 PROCEDURE — 700105 HCHG RX REV CODE 258: Performed by: INTERNAL MEDICINE

## 2018-12-17 PROCEDURE — 700102 HCHG RX REV CODE 250 W/ 637 OVERRIDE(OP): Performed by: INTERNAL MEDICINE

## 2018-12-17 PROCEDURE — 700111 HCHG RX REV CODE 636 W/ 250 OVERRIDE (IP): Mod: JW

## 2018-12-17 PROCEDURE — 83735 ASSAY OF MAGNESIUM: CPT

## 2018-12-17 PROCEDURE — A9270 NON-COVERED ITEM OR SERVICE: HCPCS | Performed by: INTERNAL MEDICINE

## 2018-12-17 PROCEDURE — 96415 CHEMO IV INFUSION ADDL HR: CPT

## 2018-12-17 PROCEDURE — 700111 HCHG RX REV CODE 636 W/ 250 OVERRIDE (IP): Performed by: INTERNAL MEDICINE

## 2018-12-17 RX ORDER — POTASSIUM CHLORIDE 20 MEQ/1
40 TABLET, EXTENDED RELEASE ORAL DAILY
Status: DISCONTINUED | OUTPATIENT
Start: 2018-12-17 | End: 2018-12-17 | Stop reason: HOSPADM

## 2018-12-17 RX ORDER — CAPECITABINE 500 MG/1
1250 TABLET, FILM COATED ORAL 2 TIMES DAILY
COMMUNITY
End: 2019-10-12

## 2018-12-17 RX ORDER — PROCHLORPERAZINE MALEATE 10 MG
10 TABLET ORAL EVERY 6 HOURS PRN
COMMUNITY
End: 2019-08-24

## 2018-12-17 RX ORDER — MAGNESIUM SULFATE 1 G/100ML
1 INJECTION INTRAVENOUS ONCE
Status: COMPLETED | OUTPATIENT
Start: 2018-12-17 | End: 2018-12-17

## 2018-12-17 RX ORDER — ONDANSETRON 8 MG/1
8 TABLET, ORALLY DISINTEGRATING ORAL EVERY 8 HOURS PRN
COMMUNITY
End: 2019-12-07

## 2018-12-17 RX ADMIN — POTASSIUM CHLORIDE 40 MEQ: 1500 TABLET, EXTENDED RELEASE ORAL at 14:09

## 2018-12-17 RX ADMIN — ONDANSETRON HYDROCHLORIDE 16 MG: 2 SOLUTION INTRAMUSCULAR; INTRAVENOUS at 12:00

## 2018-12-17 RX ADMIN — DEXTROSE MONOHYDRATE 299 MG: 50 INJECTION, SOLUTION INTRAVENOUS at 13:08

## 2018-12-17 RX ADMIN — SODIUM CHLORIDE 150 MG: 9 INJECTION, SOLUTION INTRAVENOUS at 12:28

## 2018-12-17 RX ADMIN — HEPARIN 500 UNITS: 100 SYRINGE at 15:28

## 2018-12-17 RX ADMIN — MAGNESIUM SULFATE HEPTAHYDRATE 1 G: 1 INJECTION, SOLUTION INTRAVENOUS at 14:09

## 2018-12-17 RX ADMIN — DEXAMETHASONE SODIUM PHOSPHATE 12 MG: 4 INJECTION, SOLUTION INTRAMUSCULAR; INTRAVENOUS at 11:49

## 2018-12-17 ASSESSMENT — PAIN SCALES - GENERAL: PAINLEVEL_OUTOF10: 0

## 2018-12-17 NOTE — PROGRESS NOTES
Chemotherapy Verification - SECONDARY RN       Height = 186.7 cm  Weight = 101.8 kg  BSA = 2.3 m2       Medication: Oxaliplatin  Dose: 130 mg/m2  Calculated Dose: 299 mg                             (In mg/m2, AUC, mg/kg)       I confirm that this process was performed independently.

## 2018-12-17 NOTE — PROGRESS NOTES
Chemotherapy Verification - PRIMARY RN      Height = 186.7cm  Weight = 101.8kg  BSA = 2.3m2       Medication: Oxaliplatin  Dose: 130mg/m2  Calculated Dose: 299mg                             (In mg/m2, AUC, mg/kg)     I confirm this process was performed independently with the BSA and all final chemotherapy dosing calculations congruent.  Any discrepancies of 5% or greater have been addressed with the chemotherapy pharmacist. The resolution of the discrepancy has been documented in the EPIC progress notes.

## 2018-12-17 NOTE — PROGRESS NOTES
"Nutrition Services: RD Consultation/ New Chemo Start  Met with pt during chemo infusion, accompanied by his significant other, to assess current intake, appetite, and nutritional status. Pt appears overall adequately nourished.  Pt reports his appetite has been good though he reports sandee satiety, stating \"when I eat a few bites, it feels like I ate an entire turkey dinner\". Pt reports he discussed this with his MD who said it is likely r/t hepatomegaly. He also reports constipation, stating he typically does 2x daily but lately he has to take stool softeners and his stools are typically dark brown and hard. Pt reports he drinks 2 glasses of water per day, occasionally has a powerade and iced tea. Pt report unintentional weight loss over the past 4 months, stating his UBW is ~250#. Pt did not express any further nutrition-related questions or concerns at this time.     Assessment:  - Primary Dx: Colon Cancer, Liver Mets  - PMHx: Hiatus Hernia Syndrome, HTN, Indigestion  - Height: 6' 1.5\"   -   Weight: 224#   -   BMI: 29.2 (overweight classification)  -Recent Weight Change: Pt has experienced 26#(10%) unintentional weight loss in the past 4 months, classified as Severe unintentional weight loss    Plan/Recommendations:  1. Provided and discussed handout regarding general nutrition guidelines as well as nutritional recommendations for patient's with Colon Cancer, including tips/tricks should side effects of tx occur.   2. Discussed importance of PO intake/ nutrition with increased protein/kcal needs 2' to the hypermetabolic effects of cancer in order to maintain weight and preserve lean body mass.   3. Provided and went over food lists including healthy snack ideas as well as foods high in protein. Encouraged protein at all meals and snacks.  4. We discussed and stressed the importance of hydration as pt's stools indicate dehydration and he is not drinking much fluids at baseline. We set goals for fluid intake and " went over tips to help pt reach these goals  5. Discussed goals to help with early satiety, went over nutrient-dense snack and small meal ideas      Pt reports understanding and was receptive. RD following and to make further recommendations as indicated.

## 2018-12-17 NOTE — PROGRESS NOTES
"Pharmacy Chemotherapy Verification Note:    Patient Name: Gurmeet Jo      Dx: metastatic colon cancer         Protocol: CapeOx + Avastin     Bevacizumab (Avastin) 7.5 mg/kg IV on day 1  -Holding Avastin for C1 for recent port placement  Oxaliplatin 130 mg/m2 IV over 2 hrs on Day 1  Capecitabine 850-1000 mg/m2 PO twice daily x 28 doses   21-day cycle until disease progression or unacceptable toxicity  NCCN Guidelines for Colon Cancer. V.2.2018.  Aura PATRICK, et al. JCO. 2008;26(12):2006-12.  Chad YUAN et al. JCO. 2008;26(12):2013-9.    Allergies:  Patient has no known allergies.     /88   Pulse (!) 103   Temp 36.4 °C (97.5 °F) (Temporal)   Resp 18   Ht 1.867 m (6' 1.5\")   Wt 101.8 kg (224 lb 6.9 oz)   SpO2 96%   BMI 29.21 kg/m²  Body surface area is 2.3 meters squared.    ANC~ 8700 Plt = 348 k Hgb = 10.2 SCr = 0.54 mg/dL CrCl >125 ml/min  AST/ALT/AP = 107/30/316  TBili = 2.1     Drug Order   (Drug name, dose, route, IV Fluid & volume, frequency, number of doses) Cycle: 1      Previous treatment: n/a     Medication = oxaliplatin  Base Dose = 130 mg/m2  Calc Dose: Base Dose x 2.3 m2 = 299 mg  Final Dose = 299 mg  Route = IV  Fluid & Volume = D5W 250 mL  Admin Duration = Over 2 hrs          <5% difference, okay to treat with final dose     By my signature below, I confirm this process was performed independently with the BSA and all final chemotherapy dosing calculations congruent. I have reviewed the above chemotherapy order and that my calculation of the final dose and BSA (when applicable) corroborate those calculations of the  pharmacist.     Elsy Londono, PharmD, BCOP     "

## 2018-12-17 NOTE — PROGRESS NOTES
"Patient arrived for Cycle 1 Day 1 Oxaliplatin; Avastin to begin Cycle 2.  Reviewed plan of care; medication information sheets given and reviewed to patient and significant other.  Emotional support provided.  Emla cream removed, port accessed in sterile field. Flushed and patent.  Labs drawn per order.  Results reviewed, and within parameters.  Additional electrolye replacement ordered per Dr. Ross.  Pre-meds given; oxaliplatin infused over 2 hours.  Pt reported feeling \"sweaty;\" did not display any s/s intolerance, or pain.  Pt states he is sweaty when he takes tylenol as well.  Believes may be due to liver mets.  Pt completed treatment without issue.  Reminded patient of cold sensitivity.  Port flushed per protocol, de-accessed.  Additional information given regarding foods with potassium/magnesium.  Confirmed pt's next appt. Reviewed worsening symptoms and chemo precautions. Confirmed pt has started his daily Xeloda.  Pt and family had no further questions at this time.  Discharged home in great spirits under no apparent distress.   "

## 2019-01-02 RX ORDER — 0.9 % SODIUM CHLORIDE 0.9 %
VIAL (ML) INJECTION PRN
Status: CANCELLED | OUTPATIENT
Start: 2019-01-08

## 2019-01-02 RX ORDER — ONDANSETRON 2 MG/ML
4 INJECTION INTRAMUSCULAR; INTRAVENOUS
Status: CANCELLED | OUTPATIENT
Start: 2019-01-08

## 2019-01-02 RX ORDER — PROCHLORPERAZINE MALEATE 10 MG
10 TABLET ORAL EVERY 6 HOURS PRN
Status: CANCELLED | OUTPATIENT
Start: 2019-01-08

## 2019-01-02 RX ORDER — DEXTROSE MONOHYDRATE 50 MG/ML
INJECTION, SOLUTION INTRAVENOUS CONTINUOUS
Status: CANCELLED | OUTPATIENT
Start: 2019-01-08

## 2019-01-02 RX ORDER — ONDANSETRON 8 MG/1
8 TABLET, ORALLY DISINTEGRATING ORAL
Status: CANCELLED | OUTPATIENT
Start: 2019-01-08

## 2019-01-02 RX ORDER — SODIUM CHLORIDE 9 MG/ML
INJECTION, SOLUTION INTRAVENOUS CONTINUOUS
Status: CANCELLED | OUTPATIENT
Start: 2019-01-08

## 2019-01-02 RX ORDER — 0.9 % SODIUM CHLORIDE 0.9 %
5 VIAL (ML) INJECTION PRN
Status: CANCELLED | OUTPATIENT
Start: 2019-01-08

## 2019-01-07 ENCOUNTER — HOSPITAL ENCOUNTER (OUTPATIENT)
Dept: LAB | Facility: MEDICAL CENTER | Age: 54
End: 2019-01-07
Attending: INTERNAL MEDICINE
Payer: MEDICAID

## 2019-01-07 ENCOUNTER — OUTPATIENT INFUSION SERVICES (OUTPATIENT)
Dept: ONCOLOGY | Facility: MEDICAL CENTER | Age: 54
End: 2019-01-07
Attending: INTERNAL MEDICINE
Payer: MEDICAID

## 2019-01-07 VITALS
RESPIRATION RATE: 18 BRPM | SYSTOLIC BLOOD PRESSURE: 140 MMHG | WEIGHT: 216.49 LBS | HEART RATE: 106 BPM | TEMPERATURE: 98.5 F | HEIGHT: 74 IN | BODY MASS INDEX: 27.78 KG/M2 | DIASTOLIC BLOOD PRESSURE: 84 MMHG | OXYGEN SATURATION: 95 %

## 2019-01-07 DIAGNOSIS — C18.9 METASTATIC COLON CANCER TO LIVER (HCC): ICD-10-CM

## 2019-01-07 DIAGNOSIS — C78.7 METASTATIC COLON CANCER TO LIVER (HCC): ICD-10-CM

## 2019-01-07 LAB
ALBUMIN SERPL BCP-MCNC: 3.3 G/DL (ref 3.2–4.9)
ALBUMIN/GLOB SERPL: 0.8 G/DL
ALP SERPL-CCNC: 198 U/L (ref 30–99)
ALT SERPL-CCNC: 19 U/L (ref 2–50)
ANION GAP SERPL CALC-SCNC: 12 MMOL/L (ref 0–11.9)
AST SERPL-CCNC: 46 U/L (ref 12–45)
BILIRUB SERPL-MCNC: 1 MG/DL (ref 0.1–1.5)
BUN SERPL-MCNC: 10 MG/DL (ref 8–22)
CALCIUM SERPL-MCNC: 9.5 MG/DL (ref 8.5–10.5)
CHLORIDE SERPL-SCNC: 101 MMOL/L (ref 96–112)
CO2 SERPL-SCNC: 23 MMOL/L (ref 20–33)
CREAT SERPL-MCNC: 0.47 MG/DL (ref 0.5–1.4)
FASTING STATUS PATIENT QL REPORTED: NORMAL
GLOBULIN SER CALC-MCNC: 4.3 G/DL (ref 1.9–3.5)
GLUCOSE SERPL-MCNC: 173 MG/DL (ref 65–99)
POTASSIUM SERPL-SCNC: 3.9 MMOL/L (ref 3.6–5.5)
PROT SERPL-MCNC: 7.6 G/DL (ref 6–8.2)
SODIUM SERPL-SCNC: 136 MMOL/L (ref 135–145)

## 2019-01-07 PROCEDURE — 700111 HCHG RX REV CODE 636 W/ 250 OVERRIDE (IP)

## 2019-01-07 PROCEDURE — 96413 CHEMO IV INFUSION 1 HR: CPT

## 2019-01-07 PROCEDURE — 96415 CHEMO IV INFUSION ADDL HR: CPT

## 2019-01-07 PROCEDURE — 96375 TX/PRO/DX INJ NEW DRUG ADDON: CPT

## 2019-01-07 PROCEDURE — 700105 HCHG RX REV CODE 258: Performed by: INTERNAL MEDICINE

## 2019-01-07 PROCEDURE — 82378 CARCINOEMBRYONIC ANTIGEN: CPT

## 2019-01-07 PROCEDURE — 96367 TX/PROPH/DG ADDL SEQ IV INF: CPT

## 2019-01-07 PROCEDURE — 36415 COLL VENOUS BLD VENIPUNCTURE: CPT

## 2019-01-07 PROCEDURE — 80053 COMPREHEN METABOLIC PANEL: CPT

## 2019-01-07 PROCEDURE — A4212 NON CORING NEEDLE OR STYLET: HCPCS

## 2019-01-07 PROCEDURE — 700105 HCHG RX REV CODE 258

## 2019-01-07 PROCEDURE — 700111 HCHG RX REV CODE 636 W/ 250 OVERRIDE (IP): Performed by: INTERNAL MEDICINE

## 2019-01-07 RX ADMIN — SODIUM CHLORIDE 150 MG: 9 INJECTION, SOLUTION INTRAVENOUS at 16:28

## 2019-01-07 RX ADMIN — HEPARIN 500 UNITS: 100 SYRINGE at 19:16

## 2019-01-07 RX ADMIN — DEXAMETHASONE SODIUM PHOSPHATE 12 MG: 4 INJECTION, SOLUTION INTRAMUSCULAR; INTRAVENOUS at 15:56

## 2019-01-07 RX ADMIN — ONDANSETRON HYDROCHLORIDE 16 MG: 2 SOLUTION INTRAMUSCULAR; INTRAVENOUS at 16:15

## 2019-01-07 RX ADMIN — DEXTROSE MONOHYDRATE 293.8 MG: 50 INJECTION, SOLUTION INTRAVENOUS at 16:54

## 2019-01-07 ASSESSMENT — PAIN SCALES - GENERAL: PAINLEVEL_OUTOF10: 5

## 2019-01-07 NOTE — PROGRESS NOTES
"Pharmacy Chemotherapy calculation:    DX: metastatic colorectal cancer    Cycle 2  Previous treatment = 12/17/18    Regimen: capeOx + bevacizumab  · Capecitabine (Xeloda) 850-1000 mg/m2 PO BID from the evening of day 1 to the morning of day 15 (28 doses per cycle)-  home prescription  ? Note: In Chad et al. 2008--the same study as Aura et al. 2008--capecitabine was described as being given 1000 mg/m2 PO BID on days 1 to 14  · Oxaliplatin (Eloxatin) 130 mg/m2 IV over 2 hours once on day 1, given second  · Bevacizumab (Avastin) 7.5 mg/kg IV over 30 to 90 minutes once on day 1, given first  ? HOLD cycle 1 due to recent port placement (12/11/18)  ? Continue to HOLD Cycle 2 per MD  21-day cycles until DP/UT  NCCN guidelines for Colon Cancer V.2.2018  Aura PATRICK et al - J Clin Oncol. 2008 Apr 20;26(12):2006-12.  Chad YUAN et al - J Clin Oncol. 2008 Apr 20;26(12):2013-9.     /84   Pulse (!) 106   Temp 36.9 °C (98.5 °F) (Temporal)   Resp 18   Ht 1.867 m (6' 1.5\")   Wt 98.2 kg (216 lb 7.9 oz)   SpO2 95%   BMI 28.17 kg/m²   Body surface area is 2.26 meters squared.     1/7/19- [facility]  ANC~ 6200 Plt = 303k   Hgb = 11.5    1/7/19- [facility]     SCr = 0.47mg/dL CrCl ~ >125mL/min (min Scr = 0.7)   LFT's = 46/19/198 TBili = 1       Oxaliplatin (Eloxatin) 130 mg/m2 X  2.26m2 = 293.8mg    <5% difference, ok to treat with final dose = 293.8mg IV      RAHEEM Amador Pharm.D.      "

## 2019-01-07 NOTE — PROGRESS NOTES
Chemotherapy Verification - PRIMARY RN      Height = 186.7 cm  Weight = 98.2 kg  BSA = 2.26 m2       Medication: oxaliplatin  Dose: 130 mg/m2  Calculated Dose: 293.37 mg                             (In mg/m2, AUC, mg/kg)     I confirm this process was performed independently with the BSA and all final chemotherapy dosing calculations congruent.  Any discrepancies of 5% or greater have been addressed with the chemotherapy pharmacist. The resolution of the discrepancy has been documented in the EPIC progress notes.

## 2019-01-07 NOTE — PROGRESS NOTES
"Pharmacy Chemotherapy Calculation Verification:    Patient Name: Gurmeet Jo      Dx: metastatic colon cancer         Protocol: CapeOx + Avastin     Bevacizumab (Avastin) 7.5 mg/kg IV on day 1   -Holding Avastin for C1 for recent port placement   -Continue to hold Avastin for C2 per Dr. Ross   Oxaliplatin 130 mg/m2 IV over 2 hrs on Day 1  Capecitabine 850-1000 mg/m2 PO twice daily x 28 doses   21-day cycle until disease progression or unacceptable toxicity  NCCN Guidelines for Colon Cancer. V.2.2018.  Aura PATRICK, et al. JCO. 2008;26(12):2006-12.  Chad YUAN et al. JCO. 2008;26(12):2013-9.    Allergies:  Patient has no known allergies.       /84   Pulse (!) 106   Temp 36.9 °C (98.5 °F) (Temporal)   Resp 18   Ht 1.867 m (6' 1.5\")   Wt 98.2 kg (216 lb 7.9 oz)   SpO2 95%   BMI 28.17 kg/m²  Body surface area is 2.26 meters squared.    Labs 01/07/19 [facility]  ANC~ 6100 Plt = 303k Hgb = 11.5     Labs 01/07/19 [facility]   SCr = 0.47 mg/dL CrCl >125 ml/min AST/ALT/AP = 46/19/198 TBili = 1     Drug Order   (Drug name, dose, route, IV Fluid & volume, frequency, number of doses) Cycle 2    Previous treatment: C1 = 12/18/18     Medication = oxaliplatin (Eloxatin)  Base Dose = 130 mg/m2  Calc Dose: Base Dose x 2.26 m2 = 293.8 mg  Final Dose = 293.8 mg  Route = IV  Fluid & Volume = D5W 250 mL  Admin Duration = Over 2 hrs          <5% difference, okay to treat with final dose     By my signature below, I confirm this process was performed independently with the BSA and all final chemotherapy dosing calculations congruent. I have reviewed the above chemotherapy order and that my calculation of the final dose and BSA (when applicable) corroborate those calculations of the  pharmacist.     Yosef Hook, PharmD, BCOP      "

## 2019-01-08 LAB — CEA SERPL-MCNC: 1518.8 NG/ML (ref 0–3)

## 2019-01-08 NOTE — PROGRESS NOTES
Chemotherapy Verification - SECONDARY RN       Height = 73.5in  Weight = 98.2kg  BSA = 2.25m2       Medication: Oxaliplatin  Dose: 130mg/m2  Calculated Dose: 292.5mg                             (In mg/m2, AUC, mg/kg)       I confirm that this process was performed independently.

## 2019-01-08 NOTE — PROGRESS NOTES
Pt scheduled for oxaliplatin D1 C2 (bevacizumab cancelled this cycle per Dr. Ross to ensure proper port healing). Pt arrived ambulatory, independent; accompanied by family. Pt endorsed moderate pain/discomfort to R-shoulder; denied s/sx infection, or other health changes. R-chest port accessed using sterile technique; easily flushed, brisk blood return noted. CMP & CEA completed 1/4/19 at [facility]; CBC completed 1/4/19 at MD office (results scanned into media); results w/i treatment parameters. Premeds given per orders. Infusion completed w/o adverse events. Port flushed, brisk blood return; hep locked, de-accessed. Treatment plan reviewed; pt verbalized knowledge of next appt; denied any unmet needs. Pt discharged ambulatory, independent, NAD.

## 2019-01-25 ENCOUNTER — HOSPITAL ENCOUNTER (OUTPATIENT)
Dept: LAB | Facility: MEDICAL CENTER | Age: 54
End: 2019-01-25
Attending: NURSE PRACTITIONER
Payer: MEDICAID

## 2019-01-25 LAB
ALBUMIN SERPL BCP-MCNC: 3.8 G/DL (ref 3.2–4.9)
ALBUMIN/GLOB SERPL: 1 G/DL
ALP SERPL-CCNC: 182 U/L (ref 30–99)
ALT SERPL-CCNC: 17 U/L (ref 2–50)
ANION GAP SERPL CALC-SCNC: 10 MMOL/L (ref 0–11.9)
AST SERPL-CCNC: 36 U/L (ref 12–45)
BILIRUB SERPL-MCNC: 0.6 MG/DL (ref 0.1–1.5)
BUN SERPL-MCNC: 10 MG/DL (ref 8–22)
CALCIUM SERPL-MCNC: 10.5 MG/DL (ref 8.5–10.5)
CHLORIDE SERPL-SCNC: 98 MMOL/L (ref 96–112)
CO2 SERPL-SCNC: 25 MMOL/L (ref 20–33)
CREAT SERPL-MCNC: 0.56 MG/DL (ref 0.5–1.4)
GLOBULIN SER CALC-MCNC: 4 G/DL (ref 1.9–3.5)
GLUCOSE SERPL-MCNC: 224 MG/DL (ref 65–99)
POTASSIUM SERPL-SCNC: 3.8 MMOL/L (ref 3.6–5.5)
PROT SERPL-MCNC: 7.8 G/DL (ref 6–8.2)
SODIUM SERPL-SCNC: 133 MMOL/L (ref 135–145)

## 2019-01-25 PROCEDURE — 36415 COLL VENOUS BLD VENIPUNCTURE: CPT

## 2019-01-25 PROCEDURE — 82378 CARCINOEMBRYONIC ANTIGEN: CPT

## 2019-01-25 PROCEDURE — 80053 COMPREHEN METABOLIC PANEL: CPT

## 2019-01-25 RX ORDER — PROCHLORPERAZINE MALEATE 10 MG
10 TABLET ORAL EVERY 6 HOURS PRN
Status: CANCELLED | OUTPATIENT
Start: 2019-01-26

## 2019-01-25 RX ORDER — 0.9 % SODIUM CHLORIDE 0.9 %
VIAL (ML) INJECTION PRN
Status: CANCELLED | OUTPATIENT
Start: 2019-01-26

## 2019-01-25 RX ORDER — SODIUM CHLORIDE 9 MG/ML
INJECTION, SOLUTION INTRAVENOUS CONTINUOUS
Status: CANCELLED | OUTPATIENT
Start: 2019-01-26

## 2019-01-25 RX ORDER — 0.9 % SODIUM CHLORIDE 0.9 %
5 VIAL (ML) INJECTION PRN
Status: CANCELLED | OUTPATIENT
Start: 2019-01-26

## 2019-01-25 RX ORDER — DEXTROSE MONOHYDRATE 50 MG/ML
INJECTION, SOLUTION INTRAVENOUS CONTINUOUS
Status: CANCELLED | OUTPATIENT
Start: 2019-01-26

## 2019-01-25 RX ORDER — ONDANSETRON 2 MG/ML
4 INJECTION INTRAMUSCULAR; INTRAVENOUS
Status: CANCELLED | OUTPATIENT
Start: 2019-01-26

## 2019-01-25 RX ORDER — ONDANSETRON 8 MG/1
8 TABLET, ORALLY DISINTEGRATING ORAL
Status: CANCELLED | OUTPATIENT
Start: 2019-01-26

## 2019-01-26 ENCOUNTER — OUTPATIENT INFUSION SERVICES (OUTPATIENT)
Dept: ONCOLOGY | Facility: MEDICAL CENTER | Age: 54
End: 2019-01-26
Attending: INTERNAL MEDICINE
Payer: MEDICAID

## 2019-01-26 VITALS
WEIGHT: 218.92 LBS | TEMPERATURE: 98.5 F | HEART RATE: 92 BPM | RESPIRATION RATE: 18 BRPM | SYSTOLIC BLOOD PRESSURE: 122 MMHG | BODY MASS INDEX: 28.1 KG/M2 | OXYGEN SATURATION: 95 % | HEIGHT: 74 IN | DIASTOLIC BLOOD PRESSURE: 68 MMHG

## 2019-01-26 DIAGNOSIS — C78.7 METASTATIC COLON CANCER TO LIVER (HCC): ICD-10-CM

## 2019-01-26 DIAGNOSIS — C18.9 METASTATIC COLON CANCER TO LIVER (HCC): ICD-10-CM

## 2019-01-26 LAB
ANISOCYTOSIS BLD QL SMEAR: ABNORMAL
APPEARANCE UR: CLEAR
BASOPHILS # BLD AUTO: 1.7 % (ref 0–1.8)
BASOPHILS # BLD: 0.14 K/UL (ref 0–0.12)
BILIRUB UR QL STRIP.AUTO: NEGATIVE
CEA SERPL-MCNC: 951.6 NG/ML (ref 0–3)
COLOR UR: YELLOW
EOSINOPHIL # BLD AUTO: 0.21 K/UL (ref 0–0.51)
EOSINOPHIL NFR BLD: 2.6 % (ref 0–6.9)
ERYTHROCYTE [DISTWIDTH] IN BLOOD BY AUTOMATED COUNT: 76.4 FL (ref 35.9–50)
GLUCOSE UR STRIP.AUTO-MCNC: 500 MG/DL
HCT VFR BLD AUTO: 35.4 % (ref 42–52)
HGB BLD-MCNC: 11.3 G/DL (ref 14–18)
KETONES UR STRIP.AUTO-MCNC: NEGATIVE MG/DL
LEUKOCYTE ESTERASE UR QL STRIP.AUTO: NEGATIVE
LG PLATELETS BLD QL SMEAR: NORMAL
LYMPHOCYTES # BLD AUTO: 1.13 K/UL (ref 1–4.8)
LYMPHOCYTES NFR BLD: 13.9 % (ref 22–41)
MACROCYTES BLD QL SMEAR: ABNORMAL
MAGNESIUM SERPL-MCNC: 1.9 MG/DL (ref 1.5–2.5)
MANUAL DIFF BLD: NORMAL
MCH RBC QN AUTO: 30.6 PG (ref 27–33)
MCHC RBC AUTO-ENTMCNC: 31.9 G/DL (ref 33.7–35.3)
MCV RBC AUTO: 95.9 FL (ref 81.4–97.8)
MICRO URNS: ABNORMAL
MICROCYTES BLD QL SMEAR: ABNORMAL
MONOCYTES # BLD AUTO: 0.63 K/UL (ref 0–0.85)
MONOCYTES NFR BLD AUTO: 7.8 % (ref 0–13.4)
MORPHOLOGY BLD-IMP: NORMAL
NEUTROPHILS # BLD AUTO: 5.99 K/UL (ref 1.82–7.42)
NEUTROPHILS NFR BLD: 73.1 % (ref 44–72)
NEUTS BAND NFR BLD MANUAL: 0.9 % (ref 0–10)
NITRITE UR QL STRIP.AUTO: NEGATIVE
NRBC # BLD AUTO: 0 K/UL
NRBC BLD-RTO: 0 /100 WBC
PH UR STRIP.AUTO: 5 [PH]
PLATELET # BLD AUTO: 257 K/UL (ref 164–446)
PLATELET BLD QL SMEAR: NORMAL
PMV BLD AUTO: 9.8 FL (ref 9–12.9)
PROT UR QL STRIP: NEGATIVE MG/DL
RBC # BLD AUTO: 3.69 M/UL (ref 4.7–6.1)
RBC BLD AUTO: PRESENT
RBC UR QL AUTO: NEGATIVE
SP GR UR STRIP.AUTO: 1.01
UROBILINOGEN UR STRIP.AUTO-MCNC: 0.2 MG/DL
WBC # BLD AUTO: 8.1 K/UL (ref 4.8–10.8)

## 2019-01-26 PROCEDURE — 96417 CHEMO IV INFUS EACH ADDL SEQ: CPT

## 2019-01-26 PROCEDURE — 700111 HCHG RX REV CODE 636 W/ 250 OVERRIDE (IP): Performed by: INTERNAL MEDICINE

## 2019-01-26 PROCEDURE — 81003 URINALYSIS AUTO W/O SCOPE: CPT

## 2019-01-26 PROCEDURE — 85027 COMPLETE CBC AUTOMATED: CPT

## 2019-01-26 PROCEDURE — 700105 HCHG RX REV CODE 258: Performed by: INTERNAL MEDICINE

## 2019-01-26 PROCEDURE — 85007 BL SMEAR W/DIFF WBC COUNT: CPT

## 2019-01-26 PROCEDURE — 96367 TX/PROPH/DG ADDL SEQ IV INF: CPT

## 2019-01-26 PROCEDURE — 83735 ASSAY OF MAGNESIUM: CPT

## 2019-01-26 PROCEDURE — A4212 NON CORING NEEDLE OR STYLET: HCPCS

## 2019-01-26 PROCEDURE — 700111 HCHG RX REV CODE 636 W/ 250 OVERRIDE (IP)

## 2019-01-26 PROCEDURE — 96413 CHEMO IV INFUSION 1 HR: CPT

## 2019-01-26 PROCEDURE — 96415 CHEMO IV INFUSION ADDL HR: CPT

## 2019-01-26 PROCEDURE — 96375 TX/PRO/DX INJ NEW DRUG ADDON: CPT

## 2019-01-26 PROCEDURE — 700105 HCHG RX REV CODE 258

## 2019-01-26 RX ORDER — LIDOCAINE HYDROCHLORIDE 10 MG/ML
INJECTION, SOLUTION EPIDURAL; INFILTRATION; INTRACAUDAL; PERINEURAL
Status: COMPLETED
Start: 2019-01-26 | End: 2019-01-26

## 2019-01-26 RX ORDER — DEXTROSE MONOHYDRATE 50 MG/ML
INJECTION, SOLUTION INTRAVENOUS CONTINUOUS
Status: DISCONTINUED | OUTPATIENT
Start: 2019-01-26 | End: 2019-01-26 | Stop reason: HOSPADM

## 2019-01-26 RX ORDER — SODIUM CHLORIDE 9 MG/ML
INJECTION, SOLUTION INTRAVENOUS CONTINUOUS
Status: DISCONTINUED | OUTPATIENT
Start: 2019-01-26 | End: 2019-01-26 | Stop reason: HOSPADM

## 2019-01-26 RX ADMIN — LIDOCAINE HYDROCHLORIDE 2 ML: 10 INJECTION, SOLUTION EPIDURAL; INFILTRATION; INTRACAUDAL; PERINEURAL at 11:24

## 2019-01-26 RX ADMIN — ONDANSETRON HYDROCHLORIDE 16 MG: 2 SOLUTION INTRAMUSCULAR; INTRAVENOUS at 12:43

## 2019-01-26 RX ADMIN — SODIUM CHLORIDE 150 MG: 9 INJECTION, SOLUTION INTRAVENOUS at 13:20

## 2019-01-26 RX ADMIN — DEXTROSE MONOHYDRATE: 50 INJECTION, SOLUTION INTRAVENOUS at 11:25

## 2019-01-26 RX ADMIN — DEXAMETHASONE SODIUM PHOSPHATE 12 MG: 4 INJECTION, SOLUTION INTRAMUSCULAR; INTRAVENOUS at 13:00

## 2019-01-26 RX ADMIN — SODIUM CHLORIDE: 9 INJECTION, SOLUTION INTRAVENOUS at 16:22

## 2019-01-26 RX ADMIN — Medication 500 UNITS: at 18:16

## 2019-01-26 RX ADMIN — BEVACIZUMAB 800 MG: 400 INJECTION, SOLUTION INTRAVENOUS at 16:25

## 2019-01-26 RX ADMIN — DEXTROSE MONOHYDRATE 295.1 MG: 50 INJECTION, SOLUTION INTRAVENOUS at 13:58

## 2019-01-26 NOTE — PROGRESS NOTES
"Pharmacy Chemotherapy Calculation Verification:    Patient Name: Gurmeet Jo      Dx: metastatic colon cancer         Protocol: CapeOx + Avastin     Bevacizumab (Avastin) 7.5 mg/kg IV on day 1   -Holding Avastin for C1 for recent port placement   -Continue to hold Avastin for C2 per Dr. Ross   Oxaliplatin 130 mg/m2 IV over 2 hrs on Day 1  Capecitabine 850-1000 mg/m2 PO twice daily x 28 doses   21-day cycle until disease progression or unacceptable toxicity  NCCN Guidelines for Colon Cancer. V.2.2018.  Aura PATRICK et al. JCO. 2008;26(12):2006-12.  barbara Padilla. JCO. 2008;26(12):2013-9.    Allergies:  Patient has no known allergies.     /68   Pulse 92   Temp 36.9 °C (98.5 °F) (Temporal)   Resp 18   Ht 1.867 m (6' 1.5\")   Wt 99.3 kg (218 lb 14.7 oz)   SpO2 95%   BMI 28.49 kg/m²  Body surface area is 2.27 meters squared.    Labs 1/26/19  ANC~ 5990 Plt = 257k   Hgb = 11.3   Urine protein = Negative BP = 122/68 Mg = 1.9    Labs 1/25/19    SCr = 0.56 mg/dL CrCl ~ >125 mL/min (minimum SCr of 0.7)   LFT's = 36/17/182 TBili = 0.6      Drug Order   (Drug name, dose, route, IV Fluid & volume, frequency, number of doses) Cycle: C3      Previous treatment: C2 on 1/7/19     Medication = Oxaliplatin  Base Dose= 130 mg/m2  Calc Dose: Base Dose x 2.27 m2 = 295.1 mg  Final Dose = 295.1 mg  Route = IV  Fluid & Volume = D5W 250 mL  Admin Duration = Over 2 hours          <5% difference, okay to treat with final dose   Medication = Bevacizumab  Base Dose= 7.5 mg/kg  Calc Dose: Base Dose x 99.3 kg = 744.75 mg  Final Dose = 800 mg  Route = IV  Fluid & Volume =  mL  Admin Duration = Over 90 minutes          <10% difference, okay to round to nearest vial size per P&T MAB protocol     By my signature below, I confirm this process was performed independently with the BSA and all final chemotherapy dosing calculations congruent. I have reviewed the above chemotherapy order and that my calculation of the final dose " and BSA (when applicable) corroborate those calculations of the  pharmacist.     Ruben Santamaria, PharmD

## 2019-01-26 NOTE — PROGRESS NOTES
"Pharmacy Chemotherapy calculation:    DX: metastatic colorectal cancer    Cycle 3  Previous treatment = 2019    Regimen: capeOx + bevacizumab  · Capecitabine (Xeloda) 850-1000 mg/m2 PO BID from the evening of day 1 to the morning of day 15 (28 doses per cycle)-  home prescription  ? Note: In Chad et al. 2008--the same study as Aura jaime al. 2008--capecitabine was described as being given 1000 mg/m2 PO BID on days 1 to 14  · Oxaliplatin (Eloxatin) 130 mg/m2 IV over 2 hours once on day 1, given second  · Bevacizumab (Avastin) 7.5 mg/kg IV over 30 to 90 minutes once on day 1, given first  ? HOLD cycle 1 due to recent port placement (18)  ? Continue to HOLD Cycle 2 per MD  21-day cycles until DP/UT  NCCN guidelines for Colon Cancer V.2.2018  Aura PATRICK et al - J Clin Oncol. 2008;26(12):-12.  Chad YUAN et al - J Clin Oncol. 2008;26(12):-.     /68   Pulse 92   Temp 36.9 °C (98.5 °F) (Temporal)   Resp 18   Ht 1.867 m (6' 1.5\")   Wt 99.3 kg (218 lb 14.7 oz)   SpO2 95%   BMI 28.49 kg/m²   Body surface area is 2.27 meters squared.     LABS 19-[facility]  ANC~ 5990 Plt = 257 k   Hgb = 11.3 M.9  BP: 122/68 Urine Protein: Negative    LABS 19- [facility]     SCr = 0.56mg/dL CrCl ~ >125mL/min (min Scr = 0.7)   LFT's = 36/17/182 TBili = 0.6       Oxaliplatin 130 mg/m2 x Body surface area is 2.27 meters squared. m2 = 295.1                        <5% difference, OK to treat with final dose = 295.1 mg IV     Bevacizumab (Avastin) 7.5 mg/kg x 99.3 kg = 744.75                       <10% difference, rounded to vial size per protocol, ok to treat with final written dose= 800 mg     DANIEL Gonzales, PharmD        "

## 2019-01-26 NOTE — PROGRESS NOTES
Chemotherapy Verification - SECONDARY RN       Height = 73.5 in  Weight = 99.3 kg  BSA = 2.27 m2       Medication: Oxaliplatin  Dose: 130 mg/m2  Calculated Dose: 295.1 mg                             (In mg/m2, AUC, mg/kg)     Medication: Avastin  Dose: 7.5 mg/kg  Calculated Dose: 744.75 mg                             (In mg/m2, AUC, mg/kg)    I confirm that this process was performed independently.

## 2019-01-27 NOTE — PROGRESS NOTES
Returns for Oxaliplatin and first Avastin.  Labs not sent from MD office.  Port accessed using sterile technique and lab drawn and sent along with urine.  Reports some fatigue, especially around day 3.  Did have nausea as well and he reviewed with MD and will stay on antiemetics.  States Compazine was out of stock at his pharmacy and on call MD contacted and called in to [facility].  Labs back and chemotherapy infused, does get some voice changes at end of oxaliplatin.  Avastin infusing.

## 2019-01-27 NOTE — PROGRESS NOTES
Avastin completed without issue.  Port saline and hep lock flushed post before de access.  DC to care of s.o.

## 2019-02-07 RX ORDER — 0.9 % SODIUM CHLORIDE 0.9 %
VIAL (ML) INJECTION PRN
Status: CANCELLED | OUTPATIENT
Start: 2019-02-16

## 2019-02-07 RX ORDER — DEXTROSE MONOHYDRATE 50 MG/ML
INJECTION, SOLUTION INTRAVENOUS CONTINUOUS
Status: CANCELLED | OUTPATIENT
Start: 2019-02-16

## 2019-02-07 RX ORDER — 0.9 % SODIUM CHLORIDE 0.9 %
5 VIAL (ML) INJECTION PRN
Status: CANCELLED | OUTPATIENT
Start: 2019-02-16

## 2019-02-07 RX ORDER — SODIUM CHLORIDE 9 MG/ML
INJECTION, SOLUTION INTRAVENOUS CONTINUOUS
Status: CANCELLED | OUTPATIENT
Start: 2019-02-16

## 2019-02-07 RX ORDER — PROCHLORPERAZINE MALEATE 10 MG
10 TABLET ORAL EVERY 6 HOURS PRN
Status: CANCELLED | OUTPATIENT
Start: 2019-02-16

## 2019-02-07 RX ORDER — ONDANSETRON 2 MG/ML
4 INJECTION INTRAMUSCULAR; INTRAVENOUS
Status: CANCELLED | OUTPATIENT
Start: 2019-02-16

## 2019-02-07 RX ORDER — ONDANSETRON 8 MG/1
8 TABLET, ORALLY DISINTEGRATING ORAL
Status: CANCELLED | OUTPATIENT
Start: 2019-02-16

## 2019-02-15 ENCOUNTER — HOSPITAL ENCOUNTER (OUTPATIENT)
Dept: LAB | Facility: MEDICAL CENTER | Age: 54
End: 2019-02-15
Attending: NURSE PRACTITIONER
Payer: MEDICAID

## 2019-02-15 LAB
ALBUMIN SERPL BCP-MCNC: 3.8 G/DL (ref 3.2–4.9)
ALBUMIN/GLOB SERPL: 1 G/DL
ALP SERPL-CCNC: 153 U/L (ref 30–99)
ALT SERPL-CCNC: 15 U/L (ref 2–50)
ANION GAP SERPL CALC-SCNC: 8 MMOL/L (ref 0–11.9)
ANISOCYTOSIS BLD QL SMEAR: ABNORMAL
AST SERPL-CCNC: 28 U/L (ref 12–45)
BASOPHILS # BLD AUTO: 0 % (ref 0–1.8)
BASOPHILS # BLD: 0 K/UL (ref 0–0.12)
BILIRUB SERPL-MCNC: 1.4 MG/DL (ref 0.1–1.5)
BUN SERPL-MCNC: 11 MG/DL (ref 8–22)
CALCIUM SERPL-MCNC: 9.6 MG/DL (ref 8.5–10.5)
CEA SERPL-MCNC: 300.5 NG/ML (ref 0–3)
CHLORIDE SERPL-SCNC: 99 MMOL/L (ref 96–112)
CO2 SERPL-SCNC: 25 MMOL/L (ref 20–33)
CREAT SERPL-MCNC: 0.62 MG/DL (ref 0.5–1.4)
EOSINOPHIL # BLD AUTO: 0 K/UL (ref 0–0.51)
EOSINOPHIL NFR BLD: 0 % (ref 0–6.9)
ERYTHROCYTE [DISTWIDTH] IN BLOOD BY AUTOMATED COUNT: 81 FL (ref 35.9–50)
GLOBULIN SER CALC-MCNC: 4 G/DL (ref 1.9–3.5)
GLUCOSE SERPL-MCNC: 152 MG/DL (ref 65–99)
HCT VFR BLD AUTO: 39.8 % (ref 42–52)
HGB BLD-MCNC: 12.8 G/DL (ref 14–18)
LYMPHOCYTES # BLD AUTO: 1.57 K/UL (ref 1–4.8)
LYMPHOCYTES NFR BLD: 16.4 % (ref 22–41)
MACROCYTES BLD QL SMEAR: ABNORMAL
MAGNESIUM SERPL-MCNC: 2.1 MG/DL (ref 1.5–2.5)
MANUAL DIFF BLD: NORMAL
MCH RBC QN AUTO: 33 PG (ref 27–33)
MCHC RBC AUTO-ENTMCNC: 32.2 G/DL (ref 33.7–35.3)
MCV RBC AUTO: 102.6 FL (ref 81.4–97.8)
MONOCYTES # BLD AUTO: 1.24 K/UL (ref 0–0.85)
MONOCYTES NFR BLD AUTO: 12.9 % (ref 0–13.4)
MORPHOLOGY BLD-IMP: NORMAL
NEUTROPHILS # BLD AUTO: 6.79 K/UL (ref 1.82–7.42)
NEUTROPHILS NFR BLD: 70.7 % (ref 44–72)
NRBC # BLD AUTO: 0 K/UL
NRBC BLD-RTO: 0 /100 WBC
PLATELET # BLD AUTO: 278 K/UL (ref 164–446)
PLATELET BLD QL SMEAR: NORMAL
PMV BLD AUTO: 11.1 FL (ref 9–12.9)
POTASSIUM SERPL-SCNC: 4.2 MMOL/L (ref 3.6–5.5)
PROT SERPL-MCNC: 7.8 G/DL (ref 6–8.2)
RBC # BLD AUTO: 3.88 M/UL (ref 4.7–6.1)
RBC BLD AUTO: PRESENT
SODIUM SERPL-SCNC: 132 MMOL/L (ref 135–145)
WBC # BLD AUTO: 9.6 K/UL (ref 4.8–10.8)

## 2019-02-15 PROCEDURE — 85007 BL SMEAR W/DIFF WBC COUNT: CPT

## 2019-02-15 PROCEDURE — 82378 CARCINOEMBRYONIC ANTIGEN: CPT

## 2019-02-15 PROCEDURE — 36415 COLL VENOUS BLD VENIPUNCTURE: CPT

## 2019-02-15 PROCEDURE — 80053 COMPREHEN METABOLIC PANEL: CPT

## 2019-02-15 PROCEDURE — 85027 COMPLETE CBC AUTOMATED: CPT

## 2019-02-15 PROCEDURE — 83735 ASSAY OF MAGNESIUM: CPT

## 2019-02-16 ENCOUNTER — OUTPATIENT INFUSION SERVICES (OUTPATIENT)
Dept: ONCOLOGY | Facility: MEDICAL CENTER | Age: 54
End: 2019-02-16
Attending: INTERNAL MEDICINE
Payer: MEDICAID

## 2019-02-16 VITALS
WEIGHT: 216.27 LBS | BODY MASS INDEX: 28.66 KG/M2 | RESPIRATION RATE: 18 BRPM | DIASTOLIC BLOOD PRESSURE: 76 MMHG | OXYGEN SATURATION: 97 % | SYSTOLIC BLOOD PRESSURE: 126 MMHG | HEART RATE: 105 BPM | TEMPERATURE: 97 F | HEIGHT: 73 IN

## 2019-02-16 DIAGNOSIS — C78.7 METASTATIC COLON CANCER TO LIVER (HCC): ICD-10-CM

## 2019-02-16 DIAGNOSIS — C18.9 METASTATIC COLON CANCER TO LIVER (HCC): ICD-10-CM

## 2019-02-16 LAB
APPEARANCE UR: CLEAR
BILIRUB UR QL STRIP.AUTO: NEGATIVE
COLOR UR: YELLOW
GLUCOSE UR STRIP.AUTO-MCNC: 250 MG/DL
KETONES UR STRIP.AUTO-MCNC: NEGATIVE MG/DL
LEUKOCYTE ESTERASE UR QL STRIP.AUTO: NEGATIVE
MICRO URNS: ABNORMAL
NITRITE UR QL STRIP.AUTO: NEGATIVE
PH UR STRIP.AUTO: 5.5 [PH]
PROT UR QL STRIP: NEGATIVE MG/DL
RBC UR QL AUTO: NEGATIVE
SP GR UR STRIP.AUTO: 1.02
UROBILINOGEN UR STRIP.AUTO-MCNC: 1 MG/DL

## 2019-02-16 PROCEDURE — 700105 HCHG RX REV CODE 258: Performed by: INTERNAL MEDICINE

## 2019-02-16 PROCEDURE — 700111 HCHG RX REV CODE 636 W/ 250 OVERRIDE (IP)

## 2019-02-16 PROCEDURE — A4212 NON CORING NEEDLE OR STYLET: HCPCS

## 2019-02-16 PROCEDURE — 96413 CHEMO IV INFUSION 1 HR: CPT

## 2019-02-16 PROCEDURE — 81003 URINALYSIS AUTO W/O SCOPE: CPT

## 2019-02-16 PROCEDURE — 700111 HCHG RX REV CODE 636 W/ 250 OVERRIDE (IP): Performed by: INTERNAL MEDICINE

## 2019-02-16 PROCEDURE — 96375 TX/PRO/DX INJ NEW DRUG ADDON: CPT

## 2019-02-16 PROCEDURE — 96415 CHEMO IV INFUSION ADDL HR: CPT

## 2019-02-16 PROCEDURE — 96367 TX/PROPH/DG ADDL SEQ IV INF: CPT

## 2019-02-16 PROCEDURE — 96417 CHEMO IV INFUS EACH ADDL SEQ: CPT

## 2019-02-16 PROCEDURE — 700105 HCHG RX REV CODE 258

## 2019-02-16 RX ORDER — DEXTROSE MONOHYDRATE 50 MG/ML
INJECTION, SOLUTION INTRAVENOUS CONTINUOUS
Status: DISCONTINUED | OUTPATIENT
Start: 2019-02-16 | End: 2019-02-16 | Stop reason: HOSPADM

## 2019-02-16 RX ORDER — SODIUM CHLORIDE 9 MG/ML
INJECTION, SOLUTION INTRAVENOUS CONTINUOUS
Status: DISCONTINUED | OUTPATIENT
Start: 2019-02-16 | End: 2019-02-16 | Stop reason: HOSPADM

## 2019-02-16 RX ADMIN — SODIUM CHLORIDE 150 MG: 9 INJECTION, SOLUTION INTRAVENOUS at 09:19

## 2019-02-16 RX ADMIN — ONDANSETRON HYDROCHLORIDE 16 MG: 2 SOLUTION INTRAMUSCULAR; INTRAVENOUS at 08:57

## 2019-02-16 RX ADMIN — DEXTROSE MONOHYDRATE: 50 INJECTION, SOLUTION INTRAVENOUS at 08:39

## 2019-02-16 RX ADMIN — DEXAMETHASONE SODIUM PHOSPHATE 12 MG: 4 INJECTION, SOLUTION INTRAMUSCULAR; INTRAVENOUS at 08:39

## 2019-02-16 RX ADMIN — DEXTROSE MONOHYDRATE 292.5 MG: 50 INJECTION, SOLUTION INTRAVENOUS at 10:00

## 2019-02-16 RX ADMIN — HEPARIN 500 UNITS: 100 SYRINGE at 13:39

## 2019-02-16 RX ADMIN — BEVACIZUMAB 800 MG: 400 INJECTION, SOLUTION INTRAVENOUS at 12:15

## 2019-02-16 RX ADMIN — SODIUM CHLORIDE: 9 INJECTION, SOLUTION INTRAVENOUS at 12:15

## 2019-02-16 NOTE — PROGRESS NOTES
"Pharmacy Chemotherapy calculation:    DX: metastatic colorectal cancer    Cycle 4  Previous treatment = 1/26/2019    Regimen: capeOx + bevacizumab  · Capecitabine (Xeloda) 850-1000 mg/m2 PO BID from the evening of day 1 to the morning of day 15 (28 doses per cycle)-  home prescription  ? Note: In Chad et al. 2008--the same study as Aura jaime al. 2008--capecitabine was described as being given 1000 mg/m2 PO BID on days 1 to 14  · Oxaliplatin (Eloxatin) 130 mg/m2 IV over 2 hours once on day 1, given second  · Bevacizumab (Avastin) 7.5 mg/kg IV over 30 to 90 minutes once on day 1, given first  ? HOLD cycle 1 due to recent port placement (12/11/18)  ? Continue to HOLD Cycle 2 per MD  21-day cycles until DP/UT  NCCN guidelines for Colon Cancer V.2.2018  Aura PATRICK et al - J Clin Oncol. 2008 Apr 20;26(12):2006-12.  Chad YUAN et al - J Clin Oncol. 2008 Apr 20;26(12):2013-9.     /76   Pulse (!) 105   Temp 36.1 °C (97 °F) (Temporal)   Resp 18   Ht 1.86 m (6' 1.23\")   Wt 98.1 kg (216 lb 4.3 oz)   SpO2 97%   BMI 28.36 kg/m²   Body surface area is 2.25 meters squared.     LABS 2/15/2019:  ANC: 03909      WBC: 9.6     Plt: 278k   Hgb/Hct: 12.8/39.8     SCr: 0.62mg/dL CrCl: >125 mL/min       K: 4.2  Na: 132          Glu: 152  LFT's: 28/15/153 TBili = 1.4     LABS 2/16/2019:  BP: 126/76  Urine Protein:Negative    Oxaliplatin 130 mg/m2 x Body surface area is 2.25 meters squared. m2 = 292.5                        <5% difference, OK to treat with final dose = 292.5 mg IV     Bevacizumab (Avastin) 7.5 mg/kg x 98.1 kg = 744.75                       <10% difference, rounded to vial size per protocol, ok to treat with final written dose= 800 mg     DANIEL Gonzales, PharmD        "

## 2019-02-16 NOTE — PROGRESS NOTES
Chemotherapy Verification - SECONDARY RN       Height = 73.23 in  Weight = 98.1 kg  BSA = 2.25 m2       Medication: Oxaliplatin  Dose: 130 mg/m2  Calculated Dose: 292.5 mg                             (In mg/m2, AUC, mg/kg)     Medication: Avastin  Dose: 7.5 mg/kg  Calculated Dose: 735.75 mg                             (In mg/m2, AUC, mg/kg)    I confirm that this process was performed independently.

## 2019-02-16 NOTE — PROGRESS NOTES
"Pharmacy Chemotherapy Calculation Verification:    Patient Name: Gurmeet Jo      Dx: metastatic colon cancer         Protocol: CapeOx + Avastin     Bevacizumab (Avastin) 7.5 mg/kg IV on day 1   -Holding Avastin for C1 for recent port placement   -Continue to hold Avastin for C2 per Dr. Ross   Oxaliplatin 130 mg/m2 IV over 2 hrs on Day 1  Capecitabine 850-1000 mg/m2 PO twice daily x 28 doses   21-day cycle until disease progression or unacceptable toxicity  NCCN Guidelines for Colon Cancer. V.2.2018.  Aura PATRICK et al. JCO. 2008;26(12):2006-12.  barbara Padilla. JCO. 2008;26(12):2013-9.    Allergies:  Patient has no known allergies.     /76   Pulse (!) 105   Temp 36.1 °C (97 °F) (Temporal)   Resp 18   Ht 1.86 m (6' 1.23\")   Wt 98.1 kg (216 lb 4.3 oz)   SpO2 97%   BMI 28.36 kg/m²  Body surface area is 2.25 meters squared.    Labs 2/15/19  ANC~ 6790 Plt = 278k   Hgb = 12.8     SCr = 0.62 mg/dL CrCl ~ >125 mL/min (minimum SCr of 0.7)   LFT's = 28/15/153 TBili = 1.4      Labs 2/16/19  Urine Protein = Negative BP = 126/76     Drug Order   (Drug name, dose, route, IV Fluid & volume, frequency, number of doses) Cycle: C4      Previous treatment: C3 on 1/26/19     Medication = Oxaliplatin  Base Dose= 130 mg/m2  Calc Dose: Base Dose x 2.25 m2 = 292.5 mg  Final Dose = 292.5 mg  Route = IV  Fluid & Volume = D5W 250 mL  Admin Duration = Over 2 hours          <5% difference, okay to treat with final dose   Medication = Bevacizumab  Base Dose= 7.5 mg/kg  Calc Dose: Base Dose x 98.1 kg = 735.75 mg  Final Dose = 800 mg  Route = IV  Fluid & Volume =  mL  Admin Duration = Over 60 minutes          <10% difference, okay to round to nearest vial size per P&T MAB protocol     By my signature below, I confirm this process was performed independently with the BSA and all final chemotherapy dosing calculations congruent. I have reviewed the above chemotherapy order and that my calculation of the final dose and BSA " (when applicable) corroborate those calculations of the  pharmacist.     Ruben Santamaria, PharmD

## 2019-02-16 NOTE — PROGRESS NOTES
Chemotherapy Verification - PRIMARY RN      Height = 73.23in  Weight = 98.1kg  BSA = 2.24m2       Medication: Oxaliplatin  Dose: 130mg/m2  Calculated Dose: 292.07mg                             (In mg/m2, AUC, mg/kg)     Medication: Avastin  Dose: 7.5mg/kg  Calculated Dose: 735.7mg                            (In mg/m2, AUC, mg/kg)      I confirm this process was performed independently with the BSA and all final chemotherapy dosing calculations congruent.  Any discrepancies of 5% or greater have been addressed with the chemotherapy pharmacist. The resolution of the discrepancy has been documented in the EPIC progress notes.

## 2019-02-17 NOTE — PROGRESS NOTES
Patient arrived ambulatory to the [facility] for Oxaliplatin/Avastin. Reviewed vital signs, labs, and physician order. UA collected and reviewed per MD order. Patient denies S&S of infection, or bleeding. Pt arrives with Emla cream applied to right chest port. Port accessed with sterile technique, visualized brisk blood return. Pre-treatment medications administered. Oxaliplatin administered, no adverse reaction observed. Avastin administered over 60 min's, no adverse reaction observed. Port flushed per protocol, glover needle removed with tip intact, gauze and coban dressing placed. Confirmed upcoming appointment date and time with patient. Patient left the [facility] ambulatory in no sign of distress.

## 2019-02-19 ENCOUNTER — TELEPHONE (OUTPATIENT)
Dept: HEMATOLOGY ONCOLOGY | Facility: MEDICAL CENTER | Age: 54
End: 2019-02-19

## 2019-02-19 NOTE — TELEPHONE ENCOUNTER
Nutrition Services: Follow-Up Attempt  Attempted follow-up phone call as pt's chemo's are on Saturday's Pt did not answer, left voicemail with callback information. Will re-attempt call should pt not call back. Available PRN [phone].

## 2019-03-07 ENCOUNTER — HOSPITAL ENCOUNTER (OUTPATIENT)
Dept: LAB | Facility: MEDICAL CENTER | Age: 54
End: 2019-03-07
Attending: INTERNAL MEDICINE
Payer: MEDICAID

## 2019-03-07 LAB
ALBUMIN SERPL BCP-MCNC: 3.3 G/DL (ref 3.2–4.9)
ALBUMIN/GLOB SERPL: 0.7 G/DL
ALP SERPL-CCNC: 152 U/L (ref 30–99)
ALT SERPL-CCNC: 15 U/L (ref 2–50)
ANION GAP SERPL CALC-SCNC: 7 MMOL/L (ref 0–11.9)
ANISOCYTOSIS BLD QL SMEAR: ABNORMAL
APPEARANCE UR: CLEAR
AST SERPL-CCNC: 28 U/L (ref 12–45)
BASOPHILS # BLD AUTO: 0.5 % (ref 0–1.8)
BASOPHILS # BLD: 0.04 K/UL (ref 0–0.12)
BILIRUB SERPL-MCNC: 0.6 MG/DL (ref 0.1–1.5)
BILIRUB UR QL STRIP.AUTO: NEGATIVE
BUN SERPL-MCNC: 9 MG/DL (ref 8–22)
CALCIUM SERPL-MCNC: 9.3 MG/DL (ref 8.5–10.5)
CHLORIDE SERPL-SCNC: 99 MMOL/L (ref 96–112)
CO2 SERPL-SCNC: 24 MMOL/L (ref 20–33)
COLOR UR: YELLOW
COMMENT 1642: NORMAL
CREAT SERPL-MCNC: 0.56 MG/DL (ref 0.5–1.4)
EOSINOPHIL # BLD AUTO: 0.06 K/UL (ref 0–0.51)
EOSINOPHIL NFR BLD: 0.7 % (ref 0–6.9)
ERYTHROCYTE [DISTWIDTH] IN BLOOD BY AUTOMATED COUNT: 73.5 FL (ref 35.9–50)
GLOBULIN SER CALC-MCNC: 4.7 G/DL (ref 1.9–3.5)
GLUCOSE SERPL-MCNC: 228 MG/DL (ref 65–99)
GLUCOSE UR STRIP.AUTO-MCNC: 500 MG/DL
HCT VFR BLD AUTO: 35.9 % (ref 42–52)
HGB BLD-MCNC: 11.5 G/DL (ref 14–18)
IMM GRANULOCYTES # BLD AUTO: 0.05 K/UL (ref 0–0.11)
IMM GRANULOCYTES NFR BLD AUTO: 0.6 % (ref 0–0.9)
KETONES UR STRIP.AUTO-MCNC: NEGATIVE MG/DL
LEUKOCYTE ESTERASE UR QL STRIP.AUTO: NEGATIVE
LYMPHOCYTES # BLD AUTO: 1.79 K/UL (ref 1–4.8)
LYMPHOCYTES NFR BLD: 21.7 % (ref 22–41)
MACROCYTES BLD QL SMEAR: ABNORMAL
MAGNESIUM SERPL-MCNC: 1.9 MG/DL (ref 1.5–2.5)
MCH RBC QN AUTO: 33.1 PG (ref 27–33)
MCHC RBC AUTO-ENTMCNC: 32 G/DL (ref 33.7–35.3)
MCV RBC AUTO: 103.5 FL (ref 81.4–97.8)
MICRO URNS: ABNORMAL
MICROCYTES BLD QL SMEAR: ABNORMAL
MONOCYTES # BLD AUTO: 1.34 K/UL (ref 0–0.85)
MONOCYTES NFR BLD AUTO: 16.3 % (ref 0–13.4)
MORPHOLOGY BLD-IMP: NORMAL
NEUTROPHILS # BLD AUTO: 4.96 K/UL (ref 1.82–7.42)
NEUTROPHILS NFR BLD: 60.2 % (ref 44–72)
NITRITE UR QL STRIP.AUTO: NEGATIVE
NRBC # BLD AUTO: 0 K/UL
NRBC BLD-RTO: 0 /100 WBC
PH UR STRIP.AUTO: 6 [PH]
PLATELET # BLD AUTO: 337 K/UL (ref 164–446)
PLATELET BLD QL SMEAR: NORMAL
PMV BLD AUTO: 10.1 FL (ref 9–12.9)
POTASSIUM SERPL-SCNC: 4.1 MMOL/L (ref 3.6–5.5)
PROT SERPL-MCNC: 8 G/DL (ref 6–8.2)
PROT UR QL STRIP: NEGATIVE MG/DL
RBC # BLD AUTO: 3.47 M/UL (ref 4.7–6.1)
RBC BLD AUTO: PRESENT
RBC UR QL AUTO: NEGATIVE
SODIUM SERPL-SCNC: 130 MMOL/L (ref 135–145)
SP GR UR STRIP.AUTO: 1.02
UROBILINOGEN UR STRIP.AUTO-MCNC: 1 MG/DL
WBC # BLD AUTO: 8.2 K/UL (ref 4.8–10.8)

## 2019-03-07 PROCEDURE — 80053 COMPREHEN METABOLIC PANEL: CPT

## 2019-03-07 PROCEDURE — 83735 ASSAY OF MAGNESIUM: CPT

## 2019-03-07 PROCEDURE — 36415 COLL VENOUS BLD VENIPUNCTURE: CPT

## 2019-03-07 PROCEDURE — 85025 COMPLETE CBC W/AUTO DIFF WBC: CPT

## 2019-03-07 PROCEDURE — 81003 URINALYSIS AUTO W/O SCOPE: CPT

## 2019-03-08 RX ORDER — ONDANSETRON 2 MG/ML
4 INJECTION INTRAMUSCULAR; INTRAVENOUS
Status: CANCELLED | OUTPATIENT
Start: 2019-03-09

## 2019-03-08 RX ORDER — 0.9 % SODIUM CHLORIDE 0.9 %
VIAL (ML) INJECTION PRN
Status: CANCELLED | OUTPATIENT
Start: 2019-03-30

## 2019-03-08 RX ORDER — 0.9 % SODIUM CHLORIDE 0.9 %
VIAL (ML) INJECTION PRN
Status: CANCELLED | OUTPATIENT
Start: 2019-03-09

## 2019-03-08 RX ORDER — SODIUM CHLORIDE 9 MG/ML
INJECTION, SOLUTION INTRAVENOUS CONTINUOUS
Status: CANCELLED | OUTPATIENT
Start: 2019-03-09

## 2019-03-08 RX ORDER — ONDANSETRON 2 MG/ML
4 INJECTION INTRAMUSCULAR; INTRAVENOUS
Status: CANCELLED | OUTPATIENT
Start: 2019-03-30

## 2019-03-08 RX ORDER — PROCHLORPERAZINE MALEATE 10 MG
10 TABLET ORAL EVERY 6 HOURS PRN
Status: CANCELLED | OUTPATIENT
Start: 2019-03-30

## 2019-03-08 RX ORDER — ONDANSETRON 8 MG/1
8 TABLET, ORALLY DISINTEGRATING ORAL
Status: CANCELLED | OUTPATIENT
Start: 2019-03-30

## 2019-03-08 RX ORDER — DEXTROSE MONOHYDRATE 50 MG/ML
INJECTION, SOLUTION INTRAVENOUS CONTINUOUS
Status: CANCELLED | OUTPATIENT
Start: 2019-03-09

## 2019-03-08 RX ORDER — SODIUM CHLORIDE 9 MG/ML
INJECTION, SOLUTION INTRAVENOUS CONTINUOUS
Status: CANCELLED | OUTPATIENT
Start: 2019-03-30

## 2019-03-08 RX ORDER — 0.9 % SODIUM CHLORIDE 0.9 %
5 VIAL (ML) INJECTION PRN
Status: CANCELLED | OUTPATIENT
Start: 2019-03-30

## 2019-03-08 RX ORDER — 0.9 % SODIUM CHLORIDE 0.9 %
5 VIAL (ML) INJECTION PRN
Status: CANCELLED | OUTPATIENT
Start: 2019-03-09

## 2019-03-08 RX ORDER — ONDANSETRON 8 MG/1
8 TABLET, ORALLY DISINTEGRATING ORAL
Status: CANCELLED | OUTPATIENT
Start: 2019-03-09

## 2019-03-08 RX ORDER — DEXTROSE MONOHYDRATE 50 MG/ML
INJECTION, SOLUTION INTRAVENOUS CONTINUOUS
Status: CANCELLED | OUTPATIENT
Start: 2019-03-30

## 2019-03-08 RX ORDER — PROCHLORPERAZINE MALEATE 10 MG
10 TABLET ORAL EVERY 6 HOURS PRN
Status: CANCELLED | OUTPATIENT
Start: 2019-03-09

## 2019-03-09 ENCOUNTER — OUTPATIENT INFUSION SERVICES (OUTPATIENT)
Dept: ONCOLOGY | Facility: MEDICAL CENTER | Age: 54
End: 2019-03-09
Attending: INTERNAL MEDICINE
Payer: MEDICAID

## 2019-03-09 VITALS
RESPIRATION RATE: 18 BRPM | WEIGHT: 214.07 LBS | HEART RATE: 98 BPM | DIASTOLIC BLOOD PRESSURE: 70 MMHG | HEIGHT: 73 IN | SYSTOLIC BLOOD PRESSURE: 123 MMHG | BODY MASS INDEX: 28.37 KG/M2 | TEMPERATURE: 97.7 F | OXYGEN SATURATION: 98 %

## 2019-03-09 DIAGNOSIS — C78.7 METASTATIC COLON CANCER TO LIVER (HCC): ICD-10-CM

## 2019-03-09 DIAGNOSIS — C18.9 METASTATIC COLON CANCER TO LIVER (HCC): ICD-10-CM

## 2019-03-09 PROCEDURE — 96375 TX/PRO/DX INJ NEW DRUG ADDON: CPT

## 2019-03-09 PROCEDURE — 96413 CHEMO IV INFUSION 1 HR: CPT

## 2019-03-09 PROCEDURE — 700105 HCHG RX REV CODE 258: Performed by: INTERNAL MEDICINE

## 2019-03-09 PROCEDURE — A4212 NON CORING NEEDLE OR STYLET: HCPCS

## 2019-03-09 PROCEDURE — 700111 HCHG RX REV CODE 636 W/ 250 OVERRIDE (IP): Mod: JW

## 2019-03-09 PROCEDURE — 700111 HCHG RX REV CODE 636 W/ 250 OVERRIDE (IP): Performed by: INTERNAL MEDICINE

## 2019-03-09 PROCEDURE — 96367 TX/PROPH/DG ADDL SEQ IV INF: CPT

## 2019-03-09 PROCEDURE — 700105 HCHG RX REV CODE 258

## 2019-03-09 PROCEDURE — 96415 CHEMO IV INFUSION ADDL HR: CPT

## 2019-03-09 PROCEDURE — 96417 CHEMO IV INFUS EACH ADDL SEQ: CPT

## 2019-03-09 PROCEDURE — 700111 HCHG RX REV CODE 636 W/ 250 OVERRIDE (IP)

## 2019-03-09 RX ORDER — DEXTROSE MONOHYDRATE 50 MG/ML
INJECTION, SOLUTION INTRAVENOUS CONTINUOUS
Status: DISCONTINUED | OUTPATIENT
Start: 2019-03-09 | End: 2019-03-09 | Stop reason: HOSPADM

## 2019-03-09 RX ADMIN — DEXTROSE MONOHYDRATE: 50 INJECTION, SOLUTION INTRAVENOUS at 10:36

## 2019-03-09 RX ADMIN — DEXAMETHASONE SODIUM PHOSPHATE 12 MG: 4 INJECTION, SOLUTION INTRAMUSCULAR; INTRAVENOUS at 10:36

## 2019-03-09 RX ADMIN — SODIUM CHLORIDE 700 MG: 9 INJECTION, SOLUTION INTRAVENOUS at 14:18

## 2019-03-09 RX ADMIN — HEPARIN 500 UNITS: 100 SYRINGE at 14:53

## 2019-03-09 RX ADMIN — ONDANSETRON HYDROCHLORIDE 16 MG: 2 SOLUTION INTRAMUSCULAR; INTRAVENOUS at 10:53

## 2019-03-09 RX ADMIN — DEXTROSE MONOHYDRATE 289.9 MG: 50 INJECTION, SOLUTION INTRAVENOUS at 11:53

## 2019-03-09 RX ADMIN — SODIUM CHLORIDE 150 MG: 9 INJECTION, SOLUTION INTRAVENOUS at 11:13

## 2019-03-09 NOTE — PROGRESS NOTES
Chemotherapy Verification - SECONDARY RN       Height = 185 cm  Weight = 97.1 kg  BSA = 2.233 m2       Medication: Oxaliplatin  Dose: 130 mg/m2  Calculated Dose: 290.39 mg                             (In mg/m2, AUC, mg/kg)     Medication: Avastin  Dose: 7.5 mg/kg  Calculated Dose: 728.25 mg round to vial                             (In mg/m2, AUC, mg/kg)    I confirm that this process was performed independently.

## 2019-03-09 NOTE — PROGRESS NOTES
"Pharmacy Chemotherapy Calculation Verification:    Patient Name: Gurmeet Jo      Dx: metastatic colon cancer         Protocol: CapeOx + Avastin     Bevacizumab (Avastin) 7.5 mg/kg IV on day 1   -Holding Avastin for C1 for recent port placement   -Continue to hold Avastin for C2 per Dr. Ross   Oxaliplatin 130 mg/m2 IV over 2 hrs on Day 1  Capecitabine 850-1000 mg/m2 PO twice daily x 28 doses   21-day cycle until disease progression or unacceptable toxicity  NCCN Guidelines for Colon Cancer. V.2.2018.  barbara Reyez. JCO. 2008;26(12):2006-12.  barbara Padilla. JCO. 2008;26(12):2013-9.    Allergies:  Patient has no known allergies.     /70   Pulse 98   Temp 36.5 °C (97.7 °F) (Temporal)   Resp 18   Ht 1.85 m (6' 0.83\")   Wt 97.1 kg (214 lb 1.1 oz)   SpO2 98%   BMI 28.37 kg/m²  Body surface area is 2.23 meters squared.    LABS 3/7/2019:  ANC: 4960     WBC: 8.2    Plt: 337k   Hgb/Hct: 11.5/35.9     SCr: 0.56 mg/dL CrCl: >125 mL/min (min SCr of 0.7)   K: 4.1  Na: 130          Glu: 228  LFT's: 28/15/152 TBili = 0.6   Urine Protein: Negative    3/9/2019:  BP:123/70       Drug Order   (Drug name, dose, route, IV Fluid & volume, frequency, number of doses) Cycle: C5     Previous treatment: C4 on 2/16/2019     Medication = Oxaliplatin  Base Dose= 130 mg/m2  Calc Dose: Base Dose x Body surface area is 2.23 meters squared. m2 = 289.9 mg  Final Dose = 289.9 mg  Route = IV  Fluid & Volume = D5W 250 mL  Admin Duration = Over 2 hours          <5% difference, okay to treat with final dose   Medication = Bevacizumab  Base Dose= 7.5 mg/kg  Calc Dose: Base Dose x 97.1 kg = 728.25 mg  Final Dose = 700 mg  Route = IV  Fluid & Volume =  mL  Admin Duration = Over 30 minutes          <10% difference, okay to round to nearest vial size per P&T MAB protocol     By my signature below, I confirm this process was performed independently with the BSA and all final chemotherapy dosing calculations congruent. I have " reviewed the above chemotherapy order and that my calculation of the final dose and BSA (when applicable) corroborate those calculations of the  pharmacist.     DANIEL Gonzales, PharmD

## 2019-03-09 NOTE — PROGRESS NOTES
Chemotherapy Verification - PRIMARY RN      Height = 72.83in  Weight = 97.1kg  BSA = 2.22m2       Medication: Oxaliplatin  Dose: 130mg/m2  Calculated Dose: 289.7mg                             (In mg/m2, AUC, mg/kg)     Medication: Avastin  Dose: 7.5mg/kg  Calculated Dose: 725.2mg                             (In mg/m2, AUC, mg/kg)      I confirm this process was performed independently with the BSA and all final chemotherapy dosing calculations congruent.  Any discrepancies of 5% or greater have been addressed with the chemotherapy pharmacist. The resolution of the discrepancy has been documented in the EPIC progress notes.

## 2019-03-09 NOTE — PROGRESS NOTES
Patient arrived ambulatory to the [facility] for Oxaliplatin/Avastin. Reviewed vital signs, labs, and physician order. Patient denies S&S of infection, or bleeding. Pt arrives with Emla cream applied to right chest port, port accessed with sterile technique,  visualized brisk blood return. Pre-treatment medications administered. Oxaliplatin administered, no adverse reaction observed. Tubing/fluids changed, Avastin administered over 30 min, no adverse reaction observed. Port flushed per protocol, glover needle  removed with tip intact, gauze and tape dressing placed. Confirmed upcoming appointment date and time with patient. Patient left the [facility] ambulatory in no sign of distress.

## 2019-03-09 NOTE — PROGRESS NOTES
"Pharmacy Chemotherapy calculation:    DX: metastatic colorectal cancer    Cycle 5  Previous treatment = C4 on 2/16/2019    Regimen: capeOx + bevacizumab  · Capecitabine (Xeloda) 850-1000 mg/m2 PO BID from the evening of day 1 to the morning of day 15 (28 doses per cycle)-  home prescription  ? Note: In Chad et al. 2008--the same study as Aura jaime al. 2008--capecitabine was described as being given 1000 mg/m2 PO BID on days 1 to 14  · Oxaliplatin (Eloxatin) 130 mg/m2 IV over 2 hours once on day 1, given second  · Bevacizumab (Avastin) 7.5 mg/kg IV over 30 to 90 minutes once on day 1, given first  ? HOLD cycle 1 due to recent port placement (12/11/18)  ? Continue to HOLD Cycle 2 per MD  21-day cycles until DP/UT  NCCN guidelines for Colon Cancer V.2.2018  Aura PATRICK et al - J Clin Oncol. 2008 Apr 20;26(12):2006-12.  Chad YUAN et al - J Clin Oncol. 2008 Apr 20;26(12):2013-9.     /70   Pulse 98   Temp 36.5 °C (97.7 °F) (Temporal)   Resp 18   Ht 1.85 m (6' 0.83\")   Wt 97.1 kg (214 lb 1.1 oz)   SpO2 98%   BMI 28.37 kg/m²   Body surface area is 2.23 meters squared.     All labs, urine protein, and BP within treatment plan parameters.      Oxaliplatin (Eloxatin) 130 mg/m2 x 2.23 m2 = 289.9 mg    <5% difference, OK to treat with final dose = 289.9 mg IV     Bevacizumab (Avastin) 7.5 mg/kg x 97.1 kg = 728 mg   Rounded to vial size (within 10%) per dose rounding protocol = 700 mg IV      Aura Guzman, PharmD  "

## 2019-03-11 ENCOUNTER — DOCUMENTATION (OUTPATIENT)
Dept: HEMATOLOGY ONCOLOGY | Facility: MEDICAL CENTER | Age: 54
End: 2019-03-11

## 2019-03-11 NOTE — PROGRESS NOTES
Encounter via telephone interview:     Nutrition Progress Note:  Patient's weight per stand up scale = 214 lbs on 3/9/19 (% wt change x3 months = 4.4, which is classified as insignificant loss, per guidelines) - weight continues to trend downward in the context of strong appetite, adequate PO intake likely r/t hypermetabolic disease state.   % wt change x7 months = 14.4, severe loss.    Labs (3/7): sodium 130, glucose 228, alk phos 152  GI: patient denies change in bowel habits, N/V at this time.    Current Interventions:   Per interview with dietitian, patient states very good appetite- tolerating all types and textures of PO diet.  He also states drinking plenty of water daily to prevent constipation.    Recommendations:   1) RD reviewed specific foods and snacks containing high protein for weight maintenance and preservation of LBM, optimal nutrition throughout cancer treatment.    -nutrition supplement worth minimum 350 kcal, 14 grams protein/8 oz serving   2) Reviewed with patient remedies to try for changes in taste, if this becomes an issue throughout treatment.    RD available at [phone] prn.

## 2019-03-28 ENCOUNTER — HOSPITAL ENCOUNTER (OUTPATIENT)
Dept: LAB | Facility: MEDICAL CENTER | Age: 54
End: 2019-03-28
Attending: INTERNAL MEDICINE
Payer: MEDICAID

## 2019-03-28 LAB
ALBUMIN SERPL BCP-MCNC: 3.3 G/DL (ref 3.2–4.9)
ALBUMIN/GLOB SERPL: 0.6 G/DL
ALP SERPL-CCNC: 147 U/L (ref 30–99)
ALT SERPL-CCNC: 23 U/L (ref 2–50)
ANION GAP SERPL CALC-SCNC: 11 MMOL/L (ref 0–11.9)
ANISOCYTOSIS BLD QL SMEAR: ABNORMAL
APPEARANCE UR: CLEAR
AST SERPL-CCNC: 32 U/L (ref 12–45)
BASOPHILS # BLD AUTO: 0 % (ref 0–1.8)
BASOPHILS # BLD: 0 K/UL (ref 0–0.12)
BILIRUB SERPL-MCNC: 1 MG/DL (ref 0.1–1.5)
BILIRUB UR QL STRIP.AUTO: NEGATIVE
BUN SERPL-MCNC: 8 MG/DL (ref 8–22)
CALCIUM SERPL-MCNC: 9.2 MG/DL (ref 8.5–10.5)
CEA SERPL-MCNC: 135.1 NG/ML (ref 0–3)
CHLORIDE SERPL-SCNC: 101 MMOL/L (ref 96–112)
CO2 SERPL-SCNC: 23 MMOL/L (ref 20–33)
COLOR UR: YELLOW
CREAT SERPL-MCNC: 0.56 MG/DL (ref 0.5–1.4)
EOSINOPHIL # BLD AUTO: 0.07 K/UL (ref 0–0.51)
EOSINOPHIL NFR BLD: 0.9 % (ref 0–6.9)
ERYTHROCYTE [DISTWIDTH] IN BLOOD BY AUTOMATED COUNT: 73.4 FL (ref 35.9–50)
GLOBULIN SER CALC-MCNC: 5.3 G/DL (ref 1.9–3.5)
GLUCOSE SERPL-MCNC: 190 MG/DL (ref 65–99)
GLUCOSE UR STRIP.AUTO-MCNC: NEGATIVE MG/DL
HCT VFR BLD AUTO: 38.5 % (ref 42–52)
HGB BLD-MCNC: 12.5 G/DL (ref 14–18)
KETONES UR STRIP.AUTO-MCNC: NEGATIVE MG/DL
LEUKOCYTE ESTERASE UR QL STRIP.AUTO: NEGATIVE
LYMPHOCYTES # BLD AUTO: 1.68 K/UL (ref 1–4.8)
LYMPHOCYTES NFR BLD: 22.4 % (ref 22–41)
MACROCYTES BLD QL SMEAR: ABNORMAL
MAGNESIUM SERPL-MCNC: 1.7 MG/DL (ref 1.5–2.5)
MANUAL DIFF BLD: NORMAL
MCH RBC QN AUTO: 32.8 PG (ref 27–33)
MCHC RBC AUTO-ENTMCNC: 32.5 G/DL (ref 33.7–35.3)
MCV RBC AUTO: 101 FL (ref 81.4–97.8)
MICRO URNS: NORMAL
MICROCYTES BLD QL SMEAR: ABNORMAL
MONOCYTES # BLD AUTO: 1.29 K/UL (ref 0–0.85)
MONOCYTES NFR BLD AUTO: 17.2 % (ref 0–13.4)
MORPHOLOGY BLD-IMP: NORMAL
NEUTROPHILS # BLD AUTO: 4.46 K/UL (ref 1.82–7.42)
NEUTROPHILS NFR BLD: 59.5 % (ref 44–72)
NITRITE UR QL STRIP.AUTO: NEGATIVE
NRBC # BLD AUTO: 0 K/UL
NRBC BLD-RTO: 0 /100 WBC
PH UR STRIP.AUTO: 6 [PH]
PLATELET # BLD AUTO: 210 K/UL (ref 164–446)
PLATELET BLD QL SMEAR: NORMAL
PMV BLD AUTO: 10.7 FL (ref 9–12.9)
POTASSIUM SERPL-SCNC: 4.1 MMOL/L (ref 3.6–5.5)
PROT SERPL-MCNC: 8.6 G/DL (ref 6–8.2)
PROT UR QL STRIP: NEGATIVE MG/DL
RBC # BLD AUTO: 3.81 M/UL (ref 4.7–6.1)
RBC BLD AUTO: PRESENT
RBC UR QL AUTO: NEGATIVE
SODIUM SERPL-SCNC: 135 MMOL/L (ref 135–145)
SP GR UR STRIP.AUTO: 1.02
UROBILINOGEN UR STRIP.AUTO-MCNC: 0.2 MG/DL
WBC # BLD AUTO: 7.5 K/UL (ref 4.8–10.8)

## 2019-03-28 PROCEDURE — 85007 BL SMEAR W/DIFF WBC COUNT: CPT

## 2019-03-28 PROCEDURE — 81003 URINALYSIS AUTO W/O SCOPE: CPT

## 2019-03-28 PROCEDURE — 83735 ASSAY OF MAGNESIUM: CPT

## 2019-03-28 PROCEDURE — 85027 COMPLETE CBC AUTOMATED: CPT

## 2019-03-28 PROCEDURE — 82378 CARCINOEMBRYONIC ANTIGEN: CPT

## 2019-03-28 PROCEDURE — 36415 COLL VENOUS BLD VENIPUNCTURE: CPT

## 2019-03-28 PROCEDURE — 80053 COMPREHEN METABOLIC PANEL: CPT

## 2019-03-30 ENCOUNTER — OUTPATIENT INFUSION SERVICES (OUTPATIENT)
Dept: ONCOLOGY | Facility: MEDICAL CENTER | Age: 54
End: 2019-03-30
Attending: INTERNAL MEDICINE
Payer: MEDICAID

## 2019-03-30 VITALS
BODY MASS INDEX: 28.98 KG/M2 | SYSTOLIC BLOOD PRESSURE: 118 MMHG | WEIGHT: 218.7 LBS | OXYGEN SATURATION: 98 % | HEART RATE: 100 BPM | RESPIRATION RATE: 18 BRPM | TEMPERATURE: 97.7 F | HEIGHT: 73 IN | DIASTOLIC BLOOD PRESSURE: 75 MMHG

## 2019-03-30 DIAGNOSIS — C78.7 METASTATIC COLON CANCER TO LIVER (HCC): ICD-10-CM

## 2019-03-30 DIAGNOSIS — C18.9 METASTATIC COLON CANCER TO LIVER (HCC): ICD-10-CM

## 2019-03-30 PROCEDURE — 700111 HCHG RX REV CODE 636 W/ 250 OVERRIDE (IP): Mod: JW

## 2019-03-30 PROCEDURE — 96415 CHEMO IV INFUSION ADDL HR: CPT

## 2019-03-30 PROCEDURE — 96368 THER/DIAG CONCURRENT INF: CPT

## 2019-03-30 PROCEDURE — 96417 CHEMO IV INFUS EACH ADDL SEQ: CPT

## 2019-03-30 PROCEDURE — 96413 CHEMO IV INFUSION 1 HR: CPT

## 2019-03-30 PROCEDURE — 700111 HCHG RX REV CODE 636 W/ 250 OVERRIDE (IP): Performed by: INTERNAL MEDICINE

## 2019-03-30 PROCEDURE — 96375 TX/PRO/DX INJ NEW DRUG ADDON: CPT

## 2019-03-30 PROCEDURE — 700105 HCHG RX REV CODE 258

## 2019-03-30 PROCEDURE — 700111 HCHG RX REV CODE 636 W/ 250 OVERRIDE (IP)

## 2019-03-30 PROCEDURE — A4212 NON CORING NEEDLE OR STYLET: HCPCS

## 2019-03-30 PROCEDURE — 700105 HCHG RX REV CODE 258: Performed by: INTERNAL MEDICINE

## 2019-03-30 PROCEDURE — 96367 TX/PROPH/DG ADDL SEQ IV INF: CPT

## 2019-03-30 RX ORDER — DEXTROSE MONOHYDRATE 50 MG/ML
INJECTION, SOLUTION INTRAVENOUS CONTINUOUS
Status: DISCONTINUED | OUTPATIENT
Start: 2019-03-30 | End: 2019-03-30 | Stop reason: HOSPADM

## 2019-03-30 RX ORDER — MAGNESIUM SULFATE 1 G/100ML
1 INJECTION INTRAVENOUS ONCE
Status: COMPLETED | OUTPATIENT
Start: 2019-03-30 | End: 2019-03-30

## 2019-03-30 RX ADMIN — SODIUM CHLORIDE 150 MG: 9 INJECTION, SOLUTION INTRAVENOUS at 10:00

## 2019-03-30 RX ADMIN — HEPARIN 500 UNITS: 100 SYRINGE at 13:38

## 2019-03-30 RX ADMIN — ONDANSETRON HYDROCHLORIDE 16 MG: 2 SOLUTION INTRAMUSCULAR; INTRAVENOUS at 09:40

## 2019-03-30 RX ADMIN — DEXTROSE MONOHYDRATE 293.8 MG: 50 INJECTION, SOLUTION INTRAVENOUS at 10:41

## 2019-03-30 RX ADMIN — DEXTROSE MONOHYDRATE: 50 INJECTION, SOLUTION INTRAVENOUS at 09:23

## 2019-03-30 RX ADMIN — MAGNESIUM SULFATE IN DEXTROSE 1 G: 10 INJECTION, SOLUTION INTRAVENOUS at 10:41

## 2019-03-30 RX ADMIN — SODIUM CHLORIDE 700 MG: 9 INJECTION, SOLUTION INTRAVENOUS at 12:59

## 2019-03-30 RX ADMIN — DEXAMETHASONE SODIUM PHOSPHATE 12 MG: 4 INJECTION, SOLUTION INTRAMUSCULAR; INTRAVENOUS at 09:23

## 2019-03-30 NOTE — PROGRESS NOTES
"Pharmacy Chemotherapy Verification  Patient Name: Gurmeet Jo  DX: metastatic colorectal cancer    Cycle 6  Previous treatment = C5 3/9/19    Regimen: CapeOx + Bevacizumab  · Capecitabine (Xeloda) 850-1000 mg/m2 PO BID from the evening of day 1 to the morning of day 15 (28 doses per cycle)-  home prescription  ? Note: In Chad et al. 2008--the same study as Aura jaime al. 2008--capecitabine was described as being given 1000 mg/m2 PO BID on days 1 to 14  · Oxaliplatin (Eloxatin) 130 mg/m2 IV over 2 hours once on day 1, given second  · Bevacizumab (Avastin) 7.5 mg/kg IV over 30 to 90 minutes once on day 1, given first  ? HOLD cycle 1 due to recent port placement (12/11/18)  ? HOLD Cycle 2 per MD  21-day cycles until DP/UT  NCCN guidelines for Colon Cancer V.2.2018  Aura PATRICK et al - J Clin Oncol. 2008 Apr 20;26(12):2006-12.  Chad YUAN et al - J Clin Oncol. 2008 Apr 20;26(12):2013-9.    Allergies:Patient has no known allergies.  /75   Pulse 100   Temp 36.5 °C (97.7 °F) (Temporal)   Resp 18   Ht 1.855 m (6' 1.03\") Comment: TOOK OFF SHOES   Wt 99.2 kg (218 lb 11.1 oz)   SpO2 98%   BMI 28.83 kg/m²   Body surface area is 2.26 meters squared.     Labs 3/28/19  ANC 4460 Plt 210k Hgb 12.5  SCr 0.56 CrCl >125 mL/min  AST/ALT/AP = 32/23/147 Tbili = 1  Mg 1.7 K 4.1  Urine Protein = Negative    OXALIplatin (Eloxatin) 130 mg/m2 x 2.26 m2 = 293.8 mg    <5% difference, OK to treat with final dose = 293.8 mg IV     Bevacizumab (Avastin) 7.5 mg/kg x 99.2 kg = 744 mg   Rounded to vial size (within 10%) per dose rounding protocol = 700 mg IV    Jeni Ponce, PharmD, BCOP    "

## 2019-03-30 NOTE — PROGRESS NOTES
Chemotherapy Verification - PRIMARY RN      Height = 73.03in  Weight = 99.2kg  BSA = 2.25m2       Medication: Oxaliplatin  Dose: 130mg/m2  Calculated Dose: 293.3mg                             (In mg/m2, AUC, mg/kg)     Medication: Avastin  Dose: 7.5mg/m2  Calculated Dose: 744mg                             (In mg/m2, AUC, mg/kg)        I confirm this process was performed independently with the BSA and all final chemotherapy dosing calculations congruent.  Any discrepancies of 5% or greater have been addressed with the chemotherapy pharmacist. The resolution of the discrepancy has been documented in the EPIC progress notes.

## 2019-03-30 NOTE — PROGRESS NOTES
"Pharmacy Chemotherapy Calculation Verification:    Patient Name: Gurmeet Jo      Dx: metastatic colon cancer         Protocol: CapeOx + Avastin     Bevacizumab (Avastin) 7.5 mg/kg IV on day 1   -Holding Avastin for C1 for recent port placement   -Continue to hold Avastin for C2 per Dr. Ross   Oxaliplatin 130 mg/m2 IV over 2 hrs on Day 1  Capecitabine 850-1000 mg/m2 PO twice daily x 28 doses   21-day cycle until disease progression or unacceptable toxicity  NCCN Guidelines for Colon Cancer. V.2.2018.  barbara Reyez. JCO. 2008;26(12):2006-12.  barbara Padilla. JCO. 2008;26(12):2013-9.    Allergies:  Patient has no known allergies.     /75   Pulse 100   Temp 36.5 °C (97.7 °F) (Temporal)   Resp 18   Ht 1.855 m (6' 1.03\") Comment: TOOK OFF SHOES   Wt 99.2 kg (218 lb 11.1 oz)   SpO2 98%   BMI 28.83 kg/m²  Body surface area is 2.26 meters squared.    LABS 3/28/2019:  ANC: 4460     WBC: 7.5    Plt: 210k   Hgb/Hct: 12.5/38.5     SCr: 0.56 mg/dL CrCl: >125 mL/min (min SCr of 0.7)   K: 4.1  Na: 135          Glu: 190  LFT's: 32/23/147 TBili = 1.0   Urine Protein: Negative    3/30/2019:  BP:123/70       Drug Order   (Drug name, dose, route, IV Fluid & volume, frequency, number of doses) Cycle: C6     Previous treatment: C5 on 3/9/2019     Medication = Oxaliplatin  Base Dose= 130 mg/m2  Calc Dose: Base Dose x Body surface area is 2.26 meters squared. m2 = 293.8 mg  Final Dose = 293.8 mg  Route = IV  Fluid & Volume = D5W 250 mL  Admin Duration = Over 2 hours          <5% difference, okay to treat with final dose   Medication = Bevacizumab  Base Dose= 7.5 mg/kg  Calc Dose: Base Dose x 99.2 kg = 744 mg  Final Dose = 700 mg  Route = IV  Fluid & Volume =  mL  Admin Duration = Over 30 minutes          <10% difference, okay to round to nearest vial size per P&T MAB protocol     By jian signature below, I confirm this process was performed independently with the BSA and all final chemotherapy dosing " calculations congruent. I have reviewed the above chemotherapy order and that my calculation of the final dose and BSA (when applicable) corroborate those calculations of the  pharmacist.     DANIEL Gonzales, PharmD

## 2019-03-30 NOTE — PROGRESS NOTES
Chemotherapy Verification - SECONDARY RN       Height = 185.5cm  Weight = 99.2kg  BSA = 2.26m2       Medication: Oxaliplatin  Dose: 130mg/m2  Calculated Dose: 293.8mg <5% difference                            (In mg/m2, AUC, mg/kg)     Medication: Bevacizumab  Dose: 7.5mg/kg  Calculated Dose: 744mg <10% difference                            (In mg/m2, AUC, mg/kg)      I confirm that this process was performed independently.

## 2019-03-30 NOTE — PROGRESS NOTES
Patient arrived ambulatory to the [facility] for C1D1 of Oxaliplatin/Avastin. Reviewed vital signs, labs, UA, BP and physician order. Patient denies S&S of infection, or bleeding. Magnesium level 1.7, call placed to Dr Rodriguez (MD on call for Dr Ross), order received to replace electrolytes per protocol.  Pt arrives with Emla cream applied to right chest port,  port accessed with sterile technique, visualized brisk blood return. Pre-treatment medications administered. Oxaliplatin and Magnesium administered, no adverse reaction observed. Avastin administered, no adverse reaction observed.  Port  flushed per protocol, glover needle removed with tip intact, gauze and tape dressing placed. Email sent to scheduling for upcoming apt, spouse provided scheduling phone number to confirm. Patient left the [facility] ambulatory in no sign of distress.

## 2019-04-12 ENCOUNTER — HOSPITAL ENCOUNTER (OUTPATIENT)
Dept: RADIOLOGY | Facility: MEDICAL CENTER | Age: 54
End: 2019-04-12
Attending: INTERNAL MEDICINE
Payer: MEDICAID

## 2019-04-12 DIAGNOSIS — C18.9 MALIGNANT NEOPLASM OF COLON, UNSPECIFIED PART OF COLON (HCC): ICD-10-CM

## 2019-04-12 PROCEDURE — 700117 HCHG RX CONTRAST REV CODE 255: Performed by: INTERNAL MEDICINE

## 2019-04-12 PROCEDURE — 71260 CT THORAX DX C+: CPT

## 2019-04-12 RX ADMIN — IOHEXOL 50 ML: 240 INJECTION, SOLUTION INTRATHECAL; INTRAVASCULAR; INTRAVENOUS; ORAL at 15:00

## 2019-04-12 RX ADMIN — IOHEXOL 100 ML: 350 INJECTION, SOLUTION INTRAVENOUS at 15:00

## 2019-04-18 ENCOUNTER — HOSPITAL ENCOUNTER (OUTPATIENT)
Dept: LAB | Facility: MEDICAL CENTER | Age: 54
End: 2019-04-18
Attending: INTERNAL MEDICINE
Payer: MEDICAID

## 2019-04-18 LAB
ALBUMIN SERPL BCP-MCNC: 3.4 G/DL (ref 3.2–4.9)
ALBUMIN/GLOB SERPL: 0.7 G/DL
ALP SERPL-CCNC: 128 U/L (ref 30–99)
ALT SERPL-CCNC: 21 U/L (ref 2–50)
ANION GAP SERPL CALC-SCNC: 5 MMOL/L (ref 0–11.9)
ANISOCYTOSIS BLD QL SMEAR: ABNORMAL
APPEARANCE UR: CLEAR
AST SERPL-CCNC: 37 U/L (ref 12–45)
BASOPHILS # BLD AUTO: 0.7 % (ref 0–1.8)
BASOPHILS # BLD: 0.04 K/UL (ref 0–0.12)
BILIRUB SERPL-MCNC: 0.4 MG/DL (ref 0.1–1.5)
BILIRUB UR QL STRIP.AUTO: NEGATIVE
BUN SERPL-MCNC: 9 MG/DL (ref 8–22)
CALCIUM SERPL-MCNC: 9 MG/DL (ref 8.5–10.5)
CEA SERPL-MCNC: 191.1 NG/ML (ref 0–3)
CHLORIDE SERPL-SCNC: 104 MMOL/L (ref 96–112)
CO2 SERPL-SCNC: 25 MMOL/L (ref 20–33)
COLOR UR: YELLOW
COMMENT 1642: NORMAL
CREAT SERPL-MCNC: 0.54 MG/DL (ref 0.5–1.4)
EOSINOPHIL # BLD AUTO: 0.07 K/UL (ref 0–0.51)
EOSINOPHIL NFR BLD: 1.3 % (ref 0–6.9)
ERYTHROCYTE [DISTWIDTH] IN BLOOD BY AUTOMATED COUNT: 78.3 FL (ref 35.9–50)
GLOBULIN SER CALC-MCNC: 4.6 G/DL (ref 1.9–3.5)
GLUCOSE SERPL-MCNC: 207 MG/DL (ref 65–99)
GLUCOSE UR STRIP.AUTO-MCNC: 500 MG/DL
HCT VFR BLD AUTO: 35.6 % (ref 42–52)
HGB BLD-MCNC: 11.7 G/DL (ref 14–18)
IMM GRANULOCYTES # BLD AUTO: 0.02 K/UL (ref 0–0.11)
IMM GRANULOCYTES NFR BLD AUTO: 0.4 % (ref 0–0.9)
KETONES UR STRIP.AUTO-MCNC: NEGATIVE MG/DL
LEUKOCYTE ESTERASE UR QL STRIP.AUTO: NEGATIVE
LYMPHOCYTES # BLD AUTO: 1.66 K/UL (ref 1–4.8)
LYMPHOCYTES NFR BLD: 30.7 % (ref 22–41)
MACROCYTES BLD QL SMEAR: ABNORMAL
MAGNESIUM SERPL-MCNC: 1.9 MG/DL (ref 1.5–2.5)
MCH RBC QN AUTO: 33.9 PG (ref 27–33)
MCHC RBC AUTO-ENTMCNC: 32.9 G/DL (ref 33.7–35.3)
MCV RBC AUTO: 103.2 FL (ref 81.4–97.8)
MICRO URNS: ABNORMAL
MONOCYTES # BLD AUTO: 0.81 K/UL (ref 0–0.85)
MONOCYTES NFR BLD AUTO: 15 % (ref 0–13.4)
MORPHOLOGY BLD-IMP: NORMAL
NEUTROPHILS # BLD AUTO: 2.81 K/UL (ref 1.82–7.42)
NEUTROPHILS NFR BLD: 51.9 % (ref 44–72)
NITRITE UR QL STRIP.AUTO: NEGATIVE
NRBC # BLD AUTO: 0 K/UL
NRBC BLD-RTO: 0 /100 WBC
PH UR STRIP.AUTO: 6 [PH]
PLATELET # BLD AUTO: 181 K/UL (ref 164–446)
PLATELET BLD QL SMEAR: NORMAL
PMV BLD AUTO: 9.9 FL (ref 9–12.9)
POTASSIUM SERPL-SCNC: 4 MMOL/L (ref 3.6–5.5)
PROT SERPL-MCNC: 8 G/DL (ref 6–8.2)
PROT UR QL STRIP: NEGATIVE MG/DL
RBC # BLD AUTO: 3.45 M/UL (ref 4.7–6.1)
RBC BLD AUTO: PRESENT
RBC UR QL AUTO: NEGATIVE
SODIUM SERPL-SCNC: 134 MMOL/L (ref 135–145)
SP GR UR STRIP.AUTO: 1.02
UROBILINOGEN UR STRIP.AUTO-MCNC: 0.2 MG/DL
WBC # BLD AUTO: 5.4 K/UL (ref 4.8–10.8)

## 2019-04-18 PROCEDURE — 81003 URINALYSIS AUTO W/O SCOPE: CPT

## 2019-04-18 PROCEDURE — 36415 COLL VENOUS BLD VENIPUNCTURE: CPT

## 2019-04-18 PROCEDURE — 80053 COMPREHEN METABOLIC PANEL: CPT

## 2019-04-18 PROCEDURE — 82378 CARCINOEMBRYONIC ANTIGEN: CPT

## 2019-04-18 PROCEDURE — 83735 ASSAY OF MAGNESIUM: CPT

## 2019-04-18 PROCEDURE — 85025 COMPLETE CBC W/AUTO DIFF WBC: CPT

## 2019-04-19 RX ORDER — 0.9 % SODIUM CHLORIDE 0.9 %
VIAL (ML) INJECTION PRN
Status: CANCELLED | OUTPATIENT
Start: 2019-04-20

## 2019-04-19 RX ORDER — SODIUM CHLORIDE 9 MG/ML
INJECTION, SOLUTION INTRAVENOUS CONTINUOUS
Status: CANCELLED | OUTPATIENT
Start: 2019-04-20

## 2019-04-19 RX ORDER — ONDANSETRON 2 MG/ML
4 INJECTION INTRAMUSCULAR; INTRAVENOUS
Status: CANCELLED | OUTPATIENT
Start: 2019-04-20

## 2019-04-19 RX ORDER — 0.9 % SODIUM CHLORIDE 0.9 %
5 VIAL (ML) INJECTION PRN
Status: CANCELLED | OUTPATIENT
Start: 2019-04-20

## 2019-04-19 RX ORDER — DEXTROSE MONOHYDRATE 50 MG/ML
INJECTION, SOLUTION INTRAVENOUS CONTINUOUS
Status: CANCELLED | OUTPATIENT
Start: 2019-04-20

## 2019-04-19 RX ORDER — ONDANSETRON 8 MG/1
8 TABLET, ORALLY DISINTEGRATING ORAL
Status: CANCELLED | OUTPATIENT
Start: 2019-04-20

## 2019-04-19 RX ORDER — PROCHLORPERAZINE MALEATE 10 MG
10 TABLET ORAL EVERY 6 HOURS PRN
Status: CANCELLED | OUTPATIENT
Start: 2019-04-20

## 2019-04-20 ENCOUNTER — OUTPATIENT INFUSION SERVICES (OUTPATIENT)
Dept: ONCOLOGY | Facility: MEDICAL CENTER | Age: 54
End: 2019-04-20
Attending: INTERNAL MEDICINE
Payer: MEDICAID

## 2019-04-20 VITALS
RESPIRATION RATE: 18 BRPM | TEMPERATURE: 97.8 F | BODY MASS INDEX: 29.83 KG/M2 | DIASTOLIC BLOOD PRESSURE: 84 MMHG | SYSTOLIC BLOOD PRESSURE: 148 MMHG | HEART RATE: 101 BPM | WEIGHT: 225.09 LBS | HEIGHT: 73 IN | OXYGEN SATURATION: 97 %

## 2019-04-20 DIAGNOSIS — C18.9 METASTATIC COLON CANCER TO LIVER (HCC): ICD-10-CM

## 2019-04-20 DIAGNOSIS — C78.7 METASTATIC COLON CANCER TO LIVER (HCC): ICD-10-CM

## 2019-04-20 PROCEDURE — 700111 HCHG RX REV CODE 636 W/ 250 OVERRIDE (IP)

## 2019-04-20 PROCEDURE — 96375 TX/PRO/DX INJ NEW DRUG ADDON: CPT

## 2019-04-20 PROCEDURE — 96415 CHEMO IV INFUSION ADDL HR: CPT

## 2019-04-20 PROCEDURE — 700105 HCHG RX REV CODE 258: Performed by: INTERNAL MEDICINE

## 2019-04-20 PROCEDURE — 96417 CHEMO IV INFUS EACH ADDL SEQ: CPT

## 2019-04-20 PROCEDURE — A4212 NON CORING NEEDLE OR STYLET: HCPCS

## 2019-04-20 PROCEDURE — 96367 TX/PROPH/DG ADDL SEQ IV INF: CPT

## 2019-04-20 PROCEDURE — 700105 HCHG RX REV CODE 258

## 2019-04-20 PROCEDURE — 96413 CHEMO IV INFUSION 1 HR: CPT

## 2019-04-20 PROCEDURE — 700111 HCHG RX REV CODE 636 W/ 250 OVERRIDE (IP): Performed by: INTERNAL MEDICINE

## 2019-04-20 RX ORDER — DEXTROSE MONOHYDRATE 50 MG/ML
INJECTION, SOLUTION INTRAVENOUS CONTINUOUS
Status: DISCONTINUED | OUTPATIENT
Start: 2019-04-20 | End: 2019-04-20 | Stop reason: HOSPADM

## 2019-04-20 RX ADMIN — DEXTROSE MONOHYDRATE: 50 INJECTION, SOLUTION INTRAVENOUS at 10:19

## 2019-04-20 RX ADMIN — ONDANSETRON HYDROCHLORIDE 16 MG: 2 SOLUTION INTRAMUSCULAR; INTRAVENOUS at 09:30

## 2019-04-20 RX ADMIN — SODIUM CHLORIDE 150 MG: 9 INJECTION, SOLUTION INTRAVENOUS at 09:46

## 2019-04-20 RX ADMIN — BEVACIZUMAB 800 MG: 400 INJECTION, SOLUTION INTRAVENOUS at 12:19

## 2019-04-20 RX ADMIN — DEXTROSE MONOHYDRATE 299 MG: 50 INJECTION, SOLUTION INTRAVENOUS at 10:19

## 2019-04-20 RX ADMIN — Medication 500 UNITS: at 13:05

## 2019-04-20 RX ADMIN — DEXAMETHASONE SODIUM PHOSPHATE 12 MG: 4 INJECTION, SOLUTION INTRAMUSCULAR; INTRAVENOUS at 09:15

## 2019-04-20 NOTE — PROGRESS NOTES
"Pt is here for his scheduled chemotherapy. Port accessed in sterile fashion (EMLA in use). Pt reports neuropathy - challenges with walking sometimes \"feels like rocks under my feet\" - he has reported this to MD Ross. Pt encouraged to communicate with his MD if the neuropathy affects QOL (quality of life) as this might be a permanent change. Pt verbalizes understanding. Premedicated as ordered after port accessed in sterile fashion (EMLA in use) followed by Oxaliplatin/Avastin. Treatment completed without an incident. Pt is aware of cold sensitivity precautions. discharged home to self care after port heparin locked and de-accessed. Discharged home to self care.  "

## 2019-04-20 NOTE — PROGRESS NOTES
Chemotherapy Verification - PRIMARY RN      Height = 186cm  Weight = 102.1kg  BSA = 2.3m2       Medication: Oxaliplatin  Dose: 130mg/m2  Calculated Dose: 299 mg                             (In mg/m2, AUC, mg/kg)     Medication: Avastin  Dose: 7.5mg/kg  Calculated Dose: 765.75mg                             (In mg/m2, AUC, mg/kg)        I confirm this process was performed independently with the BSA and all final chemotherapy dosing calculations congruent.  Any discrepancies of 5% or greater have been addressed with the chemotherapy pharmacist. The resolution of the discrepancy has been documented in the EPIC progress notes.

## 2019-04-20 NOTE — PROGRESS NOTES
"Pharmacy Chemotherapy Calculation Verification:    Patient Name: Gurmeet Jo      Dx: metastatic colon cancer         Protocol: CapeOx + Avastin     Bevacizumab (Avastin) 7.5 mg/kg IV on day 1   -Holding Avastin for C1 for recent port placement   -Continue to hold Avastin for C2 per Dr. Ross   Oxaliplatin 130 mg/m2 IV over 2 hrs on Day 1  Capecitabine 850-1000 mg/m2 PO twice daily x 28 doses   21-day cycle until disease progression or unacceptable toxicity  NCCN Guidelines for Colon Cancer. V.2.2018.  Aura PATRICK, et al. JCO. 2008;26(12):2006-12.  Chad YUAN et al. JCO. 2008;26(12):2013-9.    Allergies:  Patient has no known allergies.     /84   Pulse (!) 101   Temp 36.6 °C (97.8 °F) (Temporal)   Resp 18   Ht 1.86 m (6' 1.23\")   Wt 102.1 kg (225 lb 1.4 oz)   SpO2 97%   BMI 29.51 kg/m²  Body surface area is 2.3 meters squared.    Labs from 4/18/19 reviewed - all within treatment parameters including urine protein and BP.    Drug Order   (Drug name, dose, route, IV Fluid & volume, frequency, number of doses) Cycle: 7    Previous treatment: 3/30/19     Medication = Oxaliplatin  Base Dose= 130 mg/m2  Calc Dose: Base Dose x 2.3 m2 = 299 mg  Final Dose = 299 mg  Route = IV  Fluid & Volume = D5W 250 mL  Admin Duration = Over 2 hours          <5% difference, okay to treat with final dose   Medication = Bevacizumab  Base Dose= 7.5 mg/kg  Calc Dose: Base Dose x 102.1 kg = 765.8 mg  Final Dose = 800 mg  Route = IV  Fluid & Volume =  mL  Admin Duration = Over 30 minutes          rounded to vial size (within 10%) per RX protocol, okay for final dose       By my signature below, I confirm this process was performed independently with the BSA and all final chemotherapy dosing calculations congruent. I have reviewed the above chemotherapy order and that my calculation of the final dose and BSA (when applicable) corroborate those calculations of the  pharmacist.     Elsy Londono, PharmD, BCOP    "

## 2019-04-20 NOTE — PROGRESS NOTES
Chemotherapy Verification - SECONDARY RN       Height = 186 cm  Weight = 102.1 kg  BSA = 2.29 m2       Medication: Oxaliplatin  Dose: 130 mg/m2  Calculated Dose: 298.5 mg                             (In mg/m2, AUC, mg/kg)     Medication: Avastin  Dose: 7.5 mg/kg  Calculated Dose: 765.75 mg round to vial                              (In mg/m2, AUC, mg/kg)      I confirm that this process was performed independently.

## 2019-04-20 NOTE — PROGRESS NOTES
"Pharmacy Chemotherapy Verification    DX: metastatic colorectal cancer    Cycle 7  Previous treatment = C6 on 3/30/19    Regimen: CapeOx + Bevacizumab  · Capecitabine (Xeloda) 850-1000 mg/m2 PO BID from the evening of day 1 to the morning of day 15 (28 doses per cycle)-  home prescription  ? Note: In Chad et al. 2008--the same study as Aura jaime al. 2008--capecitabine was described as being given 1000 mg/m2 PO BID on days 1 to 14  · Oxaliplatin (Eloxatin) 130 mg/m2 IV over 2 hours once on day 1, given second  · Bevacizumab (Avastin) 7.5 mg/kg IV over 30 to 90 minutes once on day 1, given first  ? HOLD cycle 1 due to recent port placement (12/11/18)  ? HOLD Cycle 2 per MD  21-day cycles until DP/UT  NCCN guidelines for Colon Cancer V.2.2018  Aura PATRICK et al - J Clin Oncol. 2008 Apr 20;26(12):2006-12.  Chad YUAN et al - J Clin Oncol. 2008 Apr 20;26(12):2013-9.    Allergies:Patient has no known allergies.  /84   Pulse (!) 101   Temp 36.6 °C (97.8 °F) (Temporal)   Resp 18   Ht 1.86 m (6' 1.23\")   Wt 102.1 kg (225 lb 1.4 oz)   SpO2 97%   BMI 29.51 kg/m²   Body surface area is 2.3 meters squared.     All labs, urine protein, and BP within treatment plan parameters.      OXALIplatin (Eloxatin) 130 mg/m2 x 2.3 m2 = 299 mg    <5% difference, OK to treat with final dose = 299 mg IV     Bevacizumab (Avastin) 7.5 mg/kg x 102.1 kg = 766 mg   Rounded to vial size (within 10%) per dose rounding protocol = 800 mg IV      Aura Guzman, PharmD  "

## 2019-05-09 ENCOUNTER — HOSPITAL ENCOUNTER (OUTPATIENT)
Dept: LAB | Facility: MEDICAL CENTER | Age: 54
End: 2019-05-09
Attending: INTERNAL MEDICINE
Payer: MEDICAID

## 2019-05-09 LAB
ALBUMIN SERPL BCP-MCNC: 3.7 G/DL (ref 3.2–4.9)
ALBUMIN/GLOB SERPL: 0.9 G/DL
ALP SERPL-CCNC: 132 U/L (ref 30–99)
ALT SERPL-CCNC: 27 U/L (ref 2–50)
ANION GAP SERPL CALC-SCNC: 9 MMOL/L (ref 0–11.9)
ANISOCYTOSIS BLD QL SMEAR: ABNORMAL
APPEARANCE UR: CLEAR
AST SERPL-CCNC: 40 U/L (ref 12–45)
BASOPHILS # BLD AUTO: 0.9 % (ref 0–1.8)
BASOPHILS # BLD: 0.05 K/UL (ref 0–0.12)
BILIRUB SERPL-MCNC: 0.5 MG/DL (ref 0.1–1.5)
BILIRUB UR QL STRIP.AUTO: NEGATIVE
BUN SERPL-MCNC: 9 MG/DL (ref 8–22)
CALCIUM SERPL-MCNC: 9.3 MG/DL (ref 8.5–10.5)
CHLORIDE SERPL-SCNC: 104 MMOL/L (ref 96–112)
CO2 SERPL-SCNC: 25 MMOL/L (ref 20–33)
COLOR UR: YELLOW
CREAT SERPL-MCNC: 0.56 MG/DL (ref 0.5–1.4)
EOSINOPHIL # BLD AUTO: 0.1 K/UL (ref 0–0.51)
EOSINOPHIL NFR BLD: 1.8 % (ref 0–6.9)
ERYTHROCYTE [DISTWIDTH] IN BLOOD BY AUTOMATED COUNT: 79.7 FL (ref 35.9–50)
GLOBULIN SER CALC-MCNC: 4.3 G/DL (ref 1.9–3.5)
GLUCOSE SERPL-MCNC: 119 MG/DL (ref 65–99)
GLUCOSE UR STRIP.AUTO-MCNC: NEGATIVE MG/DL
HCT VFR BLD AUTO: 37.8 % (ref 42–52)
HGB BLD-MCNC: 12.2 G/DL (ref 14–18)
KETONES UR STRIP.AUTO-MCNC: NEGATIVE MG/DL
LEUKOCYTE ESTERASE UR QL STRIP.AUTO: NEGATIVE
LYMPHOCYTES # BLD AUTO: 1.34 K/UL (ref 1–4.8)
LYMPHOCYTES NFR BLD: 25.2 % (ref 22–41)
MACROCYTES BLD QL SMEAR: ABNORMAL
MAGNESIUM SERPL-MCNC: 1.7 MG/DL (ref 1.5–2.5)
MANUAL DIFF BLD: NORMAL
MCH RBC QN AUTO: 33.6 PG (ref 27–33)
MCHC RBC AUTO-ENTMCNC: 32.3 G/DL (ref 33.7–35.3)
MCV RBC AUTO: 104.1 FL (ref 81.4–97.8)
MICRO URNS: NORMAL
MICROCYTES BLD QL SMEAR: ABNORMAL
MONOCYTES # BLD AUTO: 0.57 K/UL (ref 0–0.85)
MONOCYTES NFR BLD AUTO: 10.8 % (ref 0–13.4)
MORPHOLOGY BLD-IMP: NORMAL
NEUTROPHILS # BLD AUTO: 3.25 K/UL (ref 1.82–7.42)
NEUTROPHILS NFR BLD: 61.3 % (ref 44–72)
NITRITE UR QL STRIP.AUTO: NEGATIVE
NRBC # BLD AUTO: 0 K/UL
NRBC BLD-RTO: 0 /100 WBC
PH UR STRIP.AUTO: 6 [PH]
PLATELET # BLD AUTO: 155 K/UL (ref 164–446)
PLATELET BLD QL SMEAR: NORMAL
PMV BLD AUTO: 9.6 FL (ref 9–12.9)
POTASSIUM SERPL-SCNC: 3.9 MMOL/L (ref 3.6–5.5)
PROT SERPL-MCNC: 8 G/DL (ref 6–8.2)
PROT UR QL STRIP: NEGATIVE MG/DL
RBC # BLD AUTO: 3.63 M/UL (ref 4.7–6.1)
RBC BLD AUTO: PRESENT
RBC UR QL AUTO: NEGATIVE
SODIUM SERPL-SCNC: 138 MMOL/L (ref 135–145)
SP GR UR STRIP.AUTO: 1.01
UROBILINOGEN UR STRIP.AUTO-MCNC: 0.2 MG/DL
WBC # BLD AUTO: 5.3 K/UL (ref 4.8–10.8)

## 2019-05-09 PROCEDURE — 85027 COMPLETE CBC AUTOMATED: CPT

## 2019-05-09 PROCEDURE — 83735 ASSAY OF MAGNESIUM: CPT

## 2019-05-09 PROCEDURE — 36415 COLL VENOUS BLD VENIPUNCTURE: CPT

## 2019-05-09 PROCEDURE — 81003 URINALYSIS AUTO W/O SCOPE: CPT

## 2019-05-09 PROCEDURE — 80053 COMPREHEN METABOLIC PANEL: CPT

## 2019-05-09 PROCEDURE — 85007 BL SMEAR W/DIFF WBC COUNT: CPT

## 2019-05-09 RX ORDER — 0.9 % SODIUM CHLORIDE 0.9 %
VIAL (ML) INJECTION PRN
Status: CANCELLED | OUTPATIENT
Start: 2019-05-11

## 2019-05-09 RX ORDER — PROCHLORPERAZINE MALEATE 10 MG
10 TABLET ORAL EVERY 6 HOURS PRN
Status: CANCELLED | OUTPATIENT
Start: 2019-05-11

## 2019-05-09 RX ORDER — 0.9 % SODIUM CHLORIDE 0.9 %
5 VIAL (ML) INJECTION PRN
Status: CANCELLED | OUTPATIENT
Start: 2019-05-11

## 2019-05-09 RX ORDER — ONDANSETRON 8 MG/1
8 TABLET, ORALLY DISINTEGRATING ORAL
Status: CANCELLED | OUTPATIENT
Start: 2019-05-11

## 2019-05-09 RX ORDER — ONDANSETRON 2 MG/ML
4 INJECTION INTRAMUSCULAR; INTRAVENOUS
Status: CANCELLED | OUTPATIENT
Start: 2019-05-11

## 2019-05-09 RX ORDER — DEXTROSE MONOHYDRATE 50 MG/ML
INJECTION, SOLUTION INTRAVENOUS CONTINUOUS
Status: CANCELLED | OUTPATIENT
Start: 2019-05-11

## 2019-05-09 RX ORDER — SODIUM CHLORIDE 9 MG/ML
INJECTION, SOLUTION INTRAVENOUS CONTINUOUS
Status: CANCELLED | OUTPATIENT
Start: 2019-05-11

## 2019-05-11 ENCOUNTER — OUTPATIENT INFUSION SERVICES (OUTPATIENT)
Dept: ONCOLOGY | Facility: MEDICAL CENTER | Age: 54
End: 2019-05-11
Attending: INTERNAL MEDICINE
Payer: MEDICAID

## 2019-05-11 VITALS
OXYGEN SATURATION: 99 % | WEIGHT: 233.47 LBS | HEIGHT: 73 IN | TEMPERATURE: 98.2 F | SYSTOLIC BLOOD PRESSURE: 133 MMHG | RESPIRATION RATE: 18 BRPM | DIASTOLIC BLOOD PRESSURE: 75 MMHG | HEART RATE: 80 BPM | BODY MASS INDEX: 30.94 KG/M2

## 2019-05-11 DIAGNOSIS — C18.9 METASTATIC COLON CANCER TO LIVER (HCC): ICD-10-CM

## 2019-05-11 DIAGNOSIS — C78.7 METASTATIC COLON CANCER TO LIVER (HCC): ICD-10-CM

## 2019-05-11 DIAGNOSIS — E83.42 HYPOMAGNESEMIA: ICD-10-CM

## 2019-05-11 LAB — CEA SERPL-MCNC: 216.2 NG/ML (ref 0–3)

## 2019-05-11 PROCEDURE — 96375 TX/PRO/DX INJ NEW DRUG ADDON: CPT | Performed by: CLINICAL NURSE SPECIALIST

## 2019-05-11 PROCEDURE — 700111 HCHG RX REV CODE 636 W/ 250 OVERRIDE (IP)

## 2019-05-11 PROCEDURE — 700105 HCHG RX REV CODE 258

## 2019-05-11 PROCEDURE — 700111 HCHG RX REV CODE 636 W/ 250 OVERRIDE (IP): Performed by: INTERNAL MEDICINE

## 2019-05-11 PROCEDURE — 96415 CHEMO IV INFUSION ADDL HR: CPT | Performed by: CLINICAL NURSE SPECIALIST

## 2019-05-11 PROCEDURE — 96367 TX/PROPH/DG ADDL SEQ IV INF: CPT | Performed by: CLINICAL NURSE SPECIALIST

## 2019-05-11 PROCEDURE — 96368 THER/DIAG CONCURRENT INF: CPT | Performed by: CLINICAL NURSE SPECIALIST

## 2019-05-11 PROCEDURE — 700105 HCHG RX REV CODE 258: Performed by: INTERNAL MEDICINE

## 2019-05-11 PROCEDURE — 96417 CHEMO IV INFUS EACH ADDL SEQ: CPT | Performed by: CLINICAL NURSE SPECIALIST

## 2019-05-11 PROCEDURE — 96413 CHEMO IV INFUSION 1 HR: CPT | Performed by: CLINICAL NURSE SPECIALIST

## 2019-05-11 PROCEDURE — 82378 CARCINOEMBRYONIC ANTIGEN: CPT

## 2019-05-11 PROCEDURE — A4212 NON CORING NEEDLE OR STYLET: HCPCS | Performed by: CLINICAL NURSE SPECIALIST

## 2019-05-11 RX ORDER — MAGNESIUM SULFATE 1 G/100ML
1 INJECTION INTRAVENOUS ONCE
Status: COMPLETED | OUTPATIENT
Start: 2019-05-11 | End: 2019-05-11

## 2019-05-11 RX ORDER — SODIUM CHLORIDE 9 MG/ML
INJECTION, SOLUTION INTRAVENOUS CONTINUOUS
Status: DISCONTINUED | OUTPATIENT
Start: 2019-05-11 | End: 2019-05-11 | Stop reason: HOSPADM

## 2019-05-11 RX ORDER — DEXTROSE MONOHYDRATE 50 MG/ML
INJECTION, SOLUTION INTRAVENOUS CONTINUOUS
Status: DISCONTINUED | OUTPATIENT
Start: 2019-05-11 | End: 2019-05-11 | Stop reason: HOSPADM

## 2019-05-11 RX ADMIN — SODIUM CHLORIDE: 9 INJECTION, SOLUTION INTRAVENOUS at 08:46

## 2019-05-11 RX ADMIN — DEXAMETHASONE SODIUM PHOSPHATE 12 MG: 4 INJECTION, SOLUTION INTRAMUSCULAR; INTRAVENOUS at 09:05

## 2019-05-11 RX ADMIN — DEXTROSE MONOHYDRATE: 50 INJECTION, SOLUTION INTRAVENOUS at 10:06

## 2019-05-11 RX ADMIN — HEPARIN 500 UNITS: 100 SYRINGE at 13:09

## 2019-05-11 RX ADMIN — BEVACIZUMAB 800 MG: 400 INJECTION, SOLUTION INTRAVENOUS at 12:26

## 2019-05-11 RX ADMIN — DEXTROSE MONOHYDRATE 243.36 MG: 50 INJECTION, SOLUTION INTRAVENOUS at 10:07

## 2019-05-11 RX ADMIN — SODIUM CHLORIDE 150 MG: 9 INJECTION, SOLUTION INTRAVENOUS at 09:27

## 2019-05-11 RX ADMIN — ONDANSETRON HYDROCHLORIDE 16 MG: 2 SOLUTION INTRAMUSCULAR; INTRAVENOUS at 08:47

## 2019-05-11 RX ADMIN — MAGNESIUM SULFATE 1 G: 1 INJECTION INTRAVENOUS at 10:06

## 2019-05-11 NOTE — PROGRESS NOTES
Chemotherapy Verification - PRIMARY RN      Height = 1.855kg  Weight = 105.9kg  BSA = 2.34m2       Medication: Oxaliplatin  Dose: 104mg/m2  Calculated Dose: 243.36mg (ordered 243.36)                             (In mg/m2, AUC, mg/kg)     Medication: Avastin  Dose: 7.5mg/kg  Calculated Dose:  794.25mg (rounded to 800mg)                            (In mg/m2, AUC, mg/kg)      I confirm this process was performed independently with the BSA and all final chemotherapy dosing calculations congruent.  Any discrepancies of 5% or greater have been addressed with the chemotherapy pharmacist. The resolution of the discrepancy has been documented in the EPIC progress notes.

## 2019-05-11 NOTE — PROGRESS NOTES
Chemotherapy Verification - SECONDARY RN       Height = 185.5cm  Weight = 105.9kg  BSA = 2.34m2       Medication: Oxaliplatin Dose: 104mg/m2  Calculated Dose: 243.36mg                             (In mg/m2, AUC, mg/kg)     Medication: Avastin  Dose: 7.5mg/kg  Calculated Dose: 794.25mg                             (In mg/m2, AUC, mg/kg)        I confirm that this process was performed independently.

## 2019-05-11 NOTE — PROGRESS NOTES
Patient to infusion for oxaliplatin and Avastin.  Patient taking Xeloda at home.  Magnesium 1.7.  Replaced per protocol per telephone order by Dr. Ross.  Patient states he has been experiencing increasing neuropathy.  Oxaliplatin dose reduced by 20% due to toxicity.  Patient has also been experiencing a runny nose.  Denies signs of active bleeding currently.  No other concerns or complaints.  CEA drawn and 216.2, elevated from 191.1 three weeks ago.  Patient to discuss these numbers with Dr. Ross.  Patient educated on chemotherapy and immunotherapy.      Port accessed with brisk blood return.  Patient tolerated all premedication, oxaliplatin and bevacizumab without incident. Port deaccessed and patient discharged to home in stable condition with supportive wife.  Provided patient with printout of schedule.

## 2019-05-11 NOTE — PROGRESS NOTES
"Pharmacy Chemotherapy Calculation Verification:    Patient Name: Gurmeet Jo      Dx: metastatic colon cancer         Protocol: CapeOx + Avastin     Bevacizumab (Avastin) 7.5 mg/kg IV on day 1   -Holding Avastin for C1 for recent port placement   -Continue to hold Avastin for C2 per Dr. Ross   Oxaliplatin  mg/m2 IV over 2 hrs on Day 1  5/11/2019: Dose reduced to 104 mg/m2 for neuropathy    Capecitabine 850-1000 mg/m2 PO twice daily x 28 doses   21-day cycle until disease progression or unacceptable toxicity  NCCN Guidelines for Colon Cancer. V.2.2018.  Aura PATRICK, et al. JCO. 2008;26(12):2006-12.  Chad YUAN, et al. JCO. 2008;26(12):2013-9.    Allergies:  Patient has no known allergies.     /75   Pulse 80   Temp 36.8 °C (98.2 °F) (Temporal)   Resp 18   Ht 1.855 m (6' 1.03\") Comment: took 3 cm off of shoes  Wt 105.9 kg (233 lb 7.5 oz)   SpO2 99%   BMI 30.78 kg/m²  Body surface area is 2.34 meters squared.    LABS:  WBC   Date Value Ref Range Status   05/09/2019 5.3 4.8 - 10.8 K/uL Final     Hemoglobin   Date Value Ref Range Status   05/09/2019 12.2 (L) 14.0 - 18.0 g/dL Final     Hematocrit   Date Value Ref Range Status   05/09/2019 37.8 (L) 42.0 - 52.0 % Final     Platelet Count   Date Value Ref Range Status   05/09/2019 155 (L) 164 - 446 K/uL Final     Neutrophils (Absolute)   Date Value Ref Range Status   05/09/2019 3.25 1.82 - 7.42 K/uL Final     Comment:     Includes immature neutrophils, if present.     Sodium   Date Value Ref Range Status   05/09/2019 138 135 - 145 mmol/L Final     Potassium   Date Value Ref Range Status   05/09/2019 3.9 3.6 - 5.5 mmol/L Final     Bun   Date Value Ref Range Status   05/09/2019 9 8 - 22 mg/dL Final     Creatinine   Date Value Ref Range Status   05/09/2019 0.56 0.50 - 1.40 mg/dL Final     Calcium   Date Value Ref Range Status   05/09/2019 9.3 8.5 - 10.5 mg/dL Final     Magnesium   Date Value Ref Range Status   05/09/2019 1.7 1.5 - 2.5 mg/dL Final     Glucose "   Date Value Ref Range Status   05/09/2019 119 (H) 65 - 99 mg/dL Final     AST(SGOT)   Date Value Ref Range Status   05/09/2019 40 12 - 45 U/L Final     ALT(SGPT)   Date Value Ref Range Status   05/09/2019 27 2 - 50 U/L Final     Alkaline Phosphatase   Date Value Ref Range Status   05/09/2019 132 (H) 30 - 99 U/L Final     Total Bilirubin   Date Value Ref Range Status   05/09/2019 0.5 0.1 - 1.5 mg/dL Final       estimated creatinine clearance is 190.6 mL/min (by C-G formula based on SCr of 0.56 mg/dL).    URINALYSIS:  Protein   Date Value Ref Range Status   05/09/2019 Negative Negative mg/dL Final     Impression:  BP ok, protein negative, all other labs within treatment parameters    Drug Order   (Drug name, dose, route, IV Fluid & volume, frequency, number of doses) Cycle: 8    Previous treatment: 4/20/2019     Medication = Oxaliplatin  Base Dose= 104 mg/m2  Calc Dose: Base Dose x Body surface area is 2.34 meters squared. m2 = 243.36 mg  Final Dose = 243.6 mg  Route = IV  Fluid & Volume = D5W 250 mL  Admin Duration = Over 2 hours          <5% difference, okay to treat with final dose   Medication = Bevacizumab  Base Dose= 7.5 mg/kg  Calc Dose: Base Dose x 105.9 kg = 794.25 mg  Final Dose = 800 mg  Route = IV  Fluid & Volume =  mL  Admin Duration = Over 30 minutes          rounded to vial size (within 10%) per RX protocol, okay for final dose       By my signature below, I confirm this process was performed independently with the BSA and all final chemotherapy dosing calculations congruent. I have reviewed the above chemotherapy order and that my calculation of the final dose and BSA (when applicable) corroborate those calculations of the  pharmacist.     DANIEL Gonzales, PharmD

## 2019-05-11 NOTE — PROGRESS NOTES
"Pharmacy Chemotherapy Verification    DX: metastatic colorectal cancer    Cycle 8  Previous treatment = C7 on 4/20/19    Regimen: CapeOx + Bevacizumab  · Capecitabine (Xeloda) 850-1000 mg/m2 PO BID from the evening of day 1 to the morning of day 15 (28 doses per cycle)-  home prescription  ? Note: In Chad et al. 2008--the same study as Aura jaime al. 2008--capecitabine was described as being given 1000 mg/m2 PO BID on days 1 to 14  · Oxaliplatin (Eloxatin)  IV over 2 hours once on day 1, given second  ? 5/11/19: dose decreased to 104 mg/m2 d/t neuropathy  · Bevacizumab (Avastin) 7.5 mg/kg IV over 30 to 90 minutes once on day 1, given first  ? HOLD cycle 1 due to recent port placement (12/11/18)  ? HOLD Cycle 2 per MD  21-day cycles until DP/UT  NCCN guidelines for Colon Cancer V.2.2018  Aura PATRICK et al - J Clin Oncol. 2008 Apr 20;26(12):2006-12.  Chad YUAN et al - J Clin Oncol. 2008 Apr 20;26(12):2013-9.    Allergies:Patient has no known allergies.  /75   Pulse 80   Temp 36.8 °C (98.2 °F) (Temporal)   Resp 18   Ht 1.855 m (6' 1.03\") Comment: took 3 cm off of shoes  Wt 105.9 kg (233 lb 7.5 oz)   SpO2 99%   BMI 30.78 kg/m²   Body surface area is 2.34 meters squared.     All labs (5/9/19), urine protein, and BP within treatment plan parameters.      OXALIplatin (Eloxatin) 104 mg/m2 x 2.34 m2 = 243.4 mg    <5% difference, OK to treat with final dose = 243.36 mg IV     Bevacizumab (Avastin) 7.5 mg/kg x 105.9 kg = 794 mg   Rounded to vial size (within 10%) per dose rounding protocol = 800 mg IV      Aura Guzman, PharmD  "

## 2019-05-30 ENCOUNTER — HOSPITAL ENCOUNTER (OUTPATIENT)
Dept: LAB | Facility: MEDICAL CENTER | Age: 54
End: 2019-05-30
Attending: INTERNAL MEDICINE
Payer: MEDICAID

## 2019-05-30 LAB
ALBUMIN SERPL BCP-MCNC: 3.7 G/DL (ref 3.2–4.9)
ALBUMIN/GLOB SERPL: 0.8 G/DL
ALP SERPL-CCNC: 140 U/L (ref 30–99)
ALT SERPL-CCNC: 24 U/L (ref 2–50)
ANION GAP SERPL CALC-SCNC: 6 MMOL/L (ref 0–11.9)
ANISOCYTOSIS BLD QL SMEAR: ABNORMAL
APPEARANCE UR: CLEAR
AST SERPL-CCNC: 34 U/L (ref 12–45)
BASOPHILS # BLD AUTO: 0.9 % (ref 0–1.8)
BASOPHILS # BLD: 0.05 K/UL (ref 0–0.12)
BILIRUB SERPL-MCNC: 0.5 MG/DL (ref 0.1–1.5)
BILIRUB UR QL STRIP.AUTO: NEGATIVE
BUN SERPL-MCNC: 13 MG/DL (ref 8–22)
CALCIUM SERPL-MCNC: 10 MG/DL (ref 8.5–10.5)
CEA SERPL-MCNC: 237.7 NG/ML (ref 0–3)
CHLORIDE SERPL-SCNC: 102 MMOL/L (ref 96–112)
CO2 SERPL-SCNC: 26 MMOL/L (ref 20–33)
COLOR UR: YELLOW
COMMENT 1642: NORMAL
CREAT SERPL-MCNC: 0.66 MG/DL (ref 0.5–1.4)
EOSINOPHIL # BLD AUTO: 0.07 K/UL (ref 0–0.51)
EOSINOPHIL NFR BLD: 1.2 % (ref 0–6.9)
ERYTHROCYTE [DISTWIDTH] IN BLOOD BY AUTOMATED COUNT: 74.6 FL (ref 35.9–50)
GLOBULIN SER CALC-MCNC: 4.6 G/DL (ref 1.9–3.5)
GLUCOSE SERPL-MCNC: 229 MG/DL (ref 65–99)
GLUCOSE UR STRIP.AUTO-MCNC: NEGATIVE MG/DL
HCT VFR BLD AUTO: 38.7 % (ref 42–52)
HGB BLD-MCNC: 13 G/DL (ref 14–18)
IMM GRANULOCYTES # BLD AUTO: 0.03 K/UL (ref 0–0.11)
IMM GRANULOCYTES NFR BLD AUTO: 0.5 % (ref 0–0.9)
KETONES UR STRIP.AUTO-MCNC: NEGATIVE MG/DL
LEUKOCYTE ESTERASE UR QL STRIP.AUTO: NEGATIVE
LYMPHOCYTES # BLD AUTO: 1.76 K/UL (ref 1–4.8)
LYMPHOCYTES NFR BLD: 31.2 % (ref 22–41)
MACROCYTES BLD QL SMEAR: ABNORMAL
MAGNESIUM SERPL-MCNC: 1.8 MG/DL (ref 1.5–2.5)
MCH RBC QN AUTO: 35.1 PG (ref 27–33)
MCHC RBC AUTO-ENTMCNC: 33.6 G/DL (ref 33.7–35.3)
MCV RBC AUTO: 104.6 FL (ref 81.4–97.8)
MICRO URNS: NORMAL
MONOCYTES # BLD AUTO: 0.64 K/UL (ref 0–0.85)
MONOCYTES NFR BLD AUTO: 11.3 % (ref 0–13.4)
MORPHOLOGY BLD-IMP: NORMAL
NEUTROPHILS # BLD AUTO: 3.1 K/UL (ref 1.82–7.42)
NEUTROPHILS NFR BLD: 54.9 % (ref 44–72)
NITRITE UR QL STRIP.AUTO: NEGATIVE
NRBC # BLD AUTO: 0 K/UL
NRBC BLD-RTO: 0 /100 WBC
PH UR STRIP.AUTO: 6 [PH]
PLATELET # BLD AUTO: 180 K/UL (ref 164–446)
PLATELET BLD QL SMEAR: NORMAL
PMV BLD AUTO: 10.3 FL (ref 9–12.9)
POTASSIUM SERPL-SCNC: 4.1 MMOL/L (ref 3.6–5.5)
PROT SERPL-MCNC: 8.3 G/DL (ref 6–8.2)
PROT UR QL STRIP: NEGATIVE MG/DL
RBC # BLD AUTO: 3.7 M/UL (ref 4.7–6.1)
RBC BLD AUTO: PRESENT
RBC UR QL AUTO: NEGATIVE
SODIUM SERPL-SCNC: 134 MMOL/L (ref 135–145)
SP GR UR STRIP.AUTO: 1.02
UROBILINOGEN UR STRIP.AUTO-MCNC: 0.2 MG/DL
WBC # BLD AUTO: 5.7 K/UL (ref 4.8–10.8)

## 2019-05-30 PROCEDURE — 83735 ASSAY OF MAGNESIUM: CPT

## 2019-05-30 PROCEDURE — 36415 COLL VENOUS BLD VENIPUNCTURE: CPT

## 2019-05-30 PROCEDURE — 81003 URINALYSIS AUTO W/O SCOPE: CPT

## 2019-05-30 PROCEDURE — 80053 COMPREHEN METABOLIC PANEL: CPT

## 2019-05-30 PROCEDURE — 82378 CARCINOEMBRYONIC ANTIGEN: CPT

## 2019-05-30 PROCEDURE — 85025 COMPLETE CBC W/AUTO DIFF WBC: CPT

## 2019-05-30 RX ORDER — DEXTROSE MONOHYDRATE 50 MG/ML
INJECTION, SOLUTION INTRAVENOUS CONTINUOUS
Status: CANCELLED | OUTPATIENT
Start: 2019-06-01

## 2019-05-30 RX ORDER — 0.9 % SODIUM CHLORIDE 0.9 %
VIAL (ML) INJECTION PRN
Status: CANCELLED | OUTPATIENT
Start: 2019-06-01

## 2019-05-30 RX ORDER — ONDANSETRON 8 MG/1
8 TABLET, ORALLY DISINTEGRATING ORAL
Status: CANCELLED | OUTPATIENT
Start: 2019-06-01

## 2019-05-30 RX ORDER — ONDANSETRON 2 MG/ML
4 INJECTION INTRAMUSCULAR; INTRAVENOUS
Status: CANCELLED | OUTPATIENT
Start: 2019-06-01

## 2019-05-30 RX ORDER — SODIUM CHLORIDE 9 MG/ML
INJECTION, SOLUTION INTRAVENOUS CONTINUOUS
Status: CANCELLED | OUTPATIENT
Start: 2019-06-01

## 2019-05-30 RX ORDER — 0.9 % SODIUM CHLORIDE 0.9 %
5 VIAL (ML) INJECTION PRN
Status: CANCELLED | OUTPATIENT
Start: 2019-06-01

## 2019-05-30 RX ORDER — PROCHLORPERAZINE MALEATE 10 MG
10 TABLET ORAL EVERY 6 HOURS PRN
Status: CANCELLED | OUTPATIENT
Start: 2019-06-01

## 2019-06-01 ENCOUNTER — OUTPATIENT INFUSION SERVICES (OUTPATIENT)
Dept: ONCOLOGY | Facility: MEDICAL CENTER | Age: 54
End: 2019-06-01
Attending: NURSE PRACTITIONER
Payer: MEDICAID

## 2019-06-01 VITALS
HEIGHT: 73 IN | SYSTOLIC BLOOD PRESSURE: 128 MMHG | RESPIRATION RATE: 18 BRPM | OXYGEN SATURATION: 98 % | HEART RATE: 95 BPM | DIASTOLIC BLOOD PRESSURE: 88 MMHG | TEMPERATURE: 97.3 F | WEIGHT: 236.33 LBS | BODY MASS INDEX: 31.32 KG/M2

## 2019-06-01 DIAGNOSIS — C18.9 METASTATIC COLON CANCER TO LIVER (HCC): ICD-10-CM

## 2019-06-01 DIAGNOSIS — C78.7 METASTATIC COLON CANCER TO LIVER (HCC): ICD-10-CM

## 2019-06-01 PROCEDURE — 96413 CHEMO IV INFUSION 1 HR: CPT

## 2019-06-01 PROCEDURE — A4212 NON CORING NEEDLE OR STYLET: HCPCS

## 2019-06-01 PROCEDURE — 700111 HCHG RX REV CODE 636 W/ 250 OVERRIDE (IP): Performed by: INTERNAL MEDICINE

## 2019-06-01 PROCEDURE — 96415 CHEMO IV INFUSION ADDL HR: CPT

## 2019-06-01 PROCEDURE — 700111 HCHG RX REV CODE 636 W/ 250 OVERRIDE (IP)

## 2019-06-01 PROCEDURE — 700105 HCHG RX REV CODE 258

## 2019-06-01 PROCEDURE — 96417 CHEMO IV INFUS EACH ADDL SEQ: CPT

## 2019-06-01 PROCEDURE — 96375 TX/PRO/DX INJ NEW DRUG ADDON: CPT

## 2019-06-01 PROCEDURE — 96367 TX/PROPH/DG ADDL SEQ IV INF: CPT

## 2019-06-01 PROCEDURE — 700105 HCHG RX REV CODE 258: Performed by: INTERNAL MEDICINE

## 2019-06-01 RX ADMIN — BEVACIZUMAB 800 MG: 400 INJECTION, SOLUTION INTRAVENOUS at 10:36

## 2019-06-01 RX ADMIN — DEXAMETHASONE SODIUM PHOSPHATE 12 MG: 4 INJECTION, SOLUTION INTRAMUSCULAR; INTRAVENOUS at 09:25

## 2019-06-01 RX ADMIN — DEXTROSE MONOHYDRATE 244.4 MG: 50 INJECTION, SOLUTION INTRAVENOUS at 11:24

## 2019-06-01 RX ADMIN — ONDANSETRON HYDROCHLORIDE 16 MG: 2 SOLUTION INTRAMUSCULAR; INTRAVENOUS at 09:37

## 2019-06-01 RX ADMIN — HEPARIN 500 UNITS: 100 SYRINGE at 13:35

## 2019-06-01 RX ADMIN — SODIUM CHLORIDE 150 MG: 9 INJECTION, SOLUTION INTRAVENOUS at 09:53

## 2019-06-01 NOTE — PROGRESS NOTES
"Pharmacy Chemotherapy Verification:    Patient Name: Gurmeet Jo      Dx: metastatic colon cancer         Protocol: CapeOx + Avastin     Bevacizumab (Avastin) 7.5 mg/kg IV on day 1   -Holding Avastin for C1 for recent port placement   -Continue to hold Avastin for C2 per Dr. Ross   Oxaliplatin 130 mg/m2 IV over 2 hrs on Day 1   -05/11/19: dose reduced to 104 mg/m2 for neuropathy per Dr. Ross   Capecitabine 850-1000 mg/m2 PO twice daily x 28 doses   21-day cycle until disease progression or unacceptable toxicity  NCCN Guidelines for Colon Cancer. V.2.2018.  Aura PATRICK et al. JCO. 2008;26(12):2006-12.  Chad YUAN et al. JCO. 2008;26(12):2013-9.    Allergies:  Patient has no known allergies.       /88   Pulse 95   Temp 36.3 °C (97.3 °F) (Temporal)   Resp 18   Ht 1.855 m (6' 1.03\") Comment: took 1inch off of shoes  Wt 107.2 kg (236 lb 5.3 oz)   SpO2 98%   BMI 31.15 kg/m²  Body surface area is 2.35 meters squared.    Labs 05/30/19:  ANC~3100 Plt = 180k Hgb = 13 SCr = 0.66 mg/dL CrCl>125 mL/min LFTs = WNL except alk phos 140 K = 4.1  Mg = 1.8 Urine protein = Negative  BP = 128/88    Drug Order   (Drug name, dose, route, IV Fluid & volume, frequency, number of doses) Cycle 9   Previous treatment: 05/11/19     Medication = Oxaliplatin (Eloxatin)  Base Dose= 104 mg/m2  Calc Dose: Base Dose x 2.35 m2 = 244.4 mg  Final Dose = 244.4 mg  Route = IV  Fluid & Volume = D5W 250 mL  Admin Duration = Over 2 hours          <10% difference, okay to treat with final dose   Medication = Bevacizumab (Avastin)  Base Dose= 7.5 mg/kg  Calc Dose: Base Dose x 107.2 kg = 804 mg  Final Dose = 800 mg  Route = IV  Fluid & Volume =  mL  Admin Duration = Over 30 minutes          Dose rounded to vial size (within 10%) per RX protocol       By my signature below, I confirm this process was performed independently with the BSA and all final chemotherapy dosing calculations congruent. I have reviewed the above chemotherapy " order and that my calculation of the final dose and BSA (when applicable) corroborate those calculations of the  pharmacist.     Yosef Hook, PharmD, BCOP

## 2019-06-01 NOTE — PROGRESS NOTES
"Pharmacy Chemotherapy Verification    Dx: metastatic colorectal cancer    Cycle 9  Previous treatment = C8 on 5/11/19    Regimen: CapeOx + Bevacizumab  · Capecitabine (Xeloda) 850-1000 mg/m2 PO BID from the evening of day 1 to the morning of day 15 (28 doses per cycle)-  home prescription  ? Note: In Chad et al. 2008--the same study as Aura jaime al. 2008--capecitabine was described as being given 1000 mg/m2 PO BID on days 1 to 14  · Oxaliplatin (Eloxatin)  IV over 2 hours once on day 1, given second  ? 5/11/19: dose decreased to 104 mg/m2 d/t neuropathy  · Bevacizumab (Avastin) 7.5 mg/kg IV over 30 to 90 minutes once on day 1, given first  ? HOLD cycle 1 due to recent port placement (12/11/18)  ? HOLD Cycle 2 per MD  21-day cycles until DP/UT  NCCN guidelines for Colon Cancer V.2.2018  Aura PATRICK et al - J Clin Oncol. 2008 Apr 20;26(12):2006-12.  Chad YUAN et al - J Clin Oncol. 2008 Apr 20;26(12):2013-9.    Allergies:Patient has no known allergies.  /88   Pulse 95   Temp 36.3 °C (97.3 °F) (Temporal)   Resp 18   Ht 1.855 m (6' 1.03\") Comment: took 1inch off of shoes  Wt 107.2 kg (236 lb 5.3 oz)   SpO2 98%   BMI 31.15 kg/m²   Body surface area is 2.35 meters squared.     All labs (5/30/19), urine protein, and BP within treatment plan parameters.      OXALIplatin (Eloxatin) 104 mg/m2 x 2.35 m2 = 244.4 mg    <10% difference, OK to treat with final dose = 244.4 mg IV     Bevacizumab (Avastin) 7.5 mg/kg x 107.2 kg = 804 mg   Rounded to vial size (within 10%) per dose rounding protocol = 800 mg IV      Aura Guzman, PharmD  "

## 2019-06-01 NOTE — PROGRESS NOTES
Chemotherapy Verification - PRIMARY RN      Height = 185.5 cm  Weight = 107.2 kg  BSA = 2.35 m^2       Medication: Bevacizumab  Dose: 7.5 mg/kg  Calculated Dose: 804 mg                             (In mg/m2, AUC, mg/kg)     Medication: Oxaliplatin  Dose: 104 mg/m^2  Calculated Dose: 244.4 mg                             (In mg/m2, AUC, mg/kg)        I confirm this process was performed independently with the BSA and all final chemotherapy dosing calculations congruent.  Any discrepancies of 10% or greater have been addressed with the chemotherapy pharmacist. The resolution of the discrepancy has been documented in the EPIC progress notes.

## 2019-06-01 NOTE — PROGRESS NOTES
Chemotherapy Verification - SECONDARY RN       Height = 185.5cm  Weight = 107.2kg  BSA = 2.35m2       Medication: Oxaliplatin  Dose: 104mg/m2  Calculated Dose: 244.4 mg                             (In mg/m2, AUC, mg/kg)     Medication: Avastin  Dose: 7.5mg/kg  Calculated Dose: 804mg                             (In mg/m2, AUC, mg/kg)      I confirm that this process was performed independently.

## 2019-06-01 NOTE — PROGRESS NOTES
Pt presented for cycle 9 Oxaliplatin/Avastin. Labs previously drawn and reviewed, OK for treatment. BP WNL, no recent surgery. Pt denied any s/s of infection or open wounds. Right chest port in place, EMLA removed. Accessed in sterile fashion, brisk blood return observed. Pre-meds given. Avastin and oxaliplatin infused without issue. Port flushed, heparinized and de-accessed with needle intact, gauze drsg placed. Left on foot with wife in good spirits. Next appt scheduled for 28 days, cycle should be 21, left message for pt to see if appt made in error if if delay is for a reason.

## 2019-06-17 RX ORDER — PROCHLORPERAZINE MALEATE 10 MG
10 TABLET ORAL EVERY 6 HOURS PRN
Status: CANCELLED | OUTPATIENT
Start: 2019-06-22

## 2019-06-17 RX ORDER — 0.9 % SODIUM CHLORIDE 0.9 %
VIAL (ML) INJECTION PRN
Status: CANCELLED | OUTPATIENT
Start: 2019-06-22

## 2019-06-17 RX ORDER — ONDANSETRON 8 MG/1
8 TABLET, ORALLY DISINTEGRATING ORAL
Status: CANCELLED | OUTPATIENT
Start: 2019-06-22

## 2019-06-17 RX ORDER — ONDANSETRON 2 MG/ML
4 INJECTION INTRAMUSCULAR; INTRAVENOUS
Status: CANCELLED | OUTPATIENT
Start: 2019-06-22

## 2019-06-17 RX ORDER — SODIUM CHLORIDE 9 MG/ML
INJECTION, SOLUTION INTRAVENOUS CONTINUOUS
Status: CANCELLED | OUTPATIENT
Start: 2019-06-22

## 2019-06-17 RX ORDER — DEXTROSE MONOHYDRATE 50 MG/ML
INJECTION, SOLUTION INTRAVENOUS CONTINUOUS
Status: CANCELLED | OUTPATIENT
Start: 2019-06-22

## 2019-06-17 RX ORDER — 0.9 % SODIUM CHLORIDE 0.9 %
5 VIAL (ML) INJECTION PRN
Status: CANCELLED | OUTPATIENT
Start: 2019-06-22

## 2019-06-21 ENCOUNTER — HOSPITAL ENCOUNTER (OUTPATIENT)
Dept: LAB | Facility: MEDICAL CENTER | Age: 54
End: 2019-06-21
Attending: INTERNAL MEDICINE
Payer: MEDICAID

## 2019-06-21 LAB
ALBUMIN SERPL BCP-MCNC: 3.6 G/DL (ref 3.2–4.9)
ALBUMIN/GLOB SERPL: 0.8 G/DL
ALP SERPL-CCNC: 124 U/L (ref 30–99)
ALT SERPL-CCNC: 29 U/L (ref 2–50)
ANION GAP SERPL CALC-SCNC: 9 MMOL/L (ref 0–11.9)
ANISOCYTOSIS BLD QL SMEAR: ABNORMAL
APPEARANCE UR: ABNORMAL
AST SERPL-CCNC: 38 U/L (ref 12–45)
BACTERIA #/AREA URNS HPF: NEGATIVE /HPF
BASOPHILS # BLD AUTO: 0 % (ref 0–1.8)
BASOPHILS # BLD: 0 K/UL (ref 0–0.12)
BILIRUB SERPL-MCNC: 0.6 MG/DL (ref 0.1–1.5)
BILIRUB UR QL STRIP.AUTO: NEGATIVE
BUN SERPL-MCNC: 12 MG/DL (ref 8–22)
CALCIUM SERPL-MCNC: 9.6 MG/DL (ref 8.5–10.5)
CEA SERPL-MCNC: 254.7 NG/ML (ref 0–3)
CHLORIDE SERPL-SCNC: 102 MMOL/L (ref 96–112)
CO2 SERPL-SCNC: 26 MMOL/L (ref 20–33)
COLOR UR: YELLOW
CREAT SERPL-MCNC: 0.79 MG/DL (ref 0.5–1.4)
EOSINOPHIL # BLD AUTO: 0.07 K/UL (ref 0–0.51)
EOSINOPHIL NFR BLD: 1.8 % (ref 0–6.9)
EPI CELLS #/AREA URNS HPF: NEGATIVE /HPF
ERYTHROCYTE [DISTWIDTH] IN BLOOD BY AUTOMATED COUNT: 69.8 FL (ref 35.9–50)
GLOBULIN SER CALC-MCNC: 4.3 G/DL (ref 1.9–3.5)
GLUCOSE SERPL-MCNC: 249 MG/DL (ref 65–99)
GLUCOSE UR STRIP.AUTO-MCNC: >=1000 MG/DL
HCT VFR BLD AUTO: 37.2 % (ref 42–52)
HGB BLD-MCNC: 12.5 G/DL (ref 14–18)
HYALINE CASTS #/AREA URNS LPF: ABNORMAL /LPF
KETONES UR STRIP.AUTO-MCNC: NEGATIVE MG/DL
LEUKOCYTE ESTERASE UR QL STRIP.AUTO: NEGATIVE
LYMPHOCYTES # BLD AUTO: 1.28 K/UL (ref 1–4.8)
LYMPHOCYTES NFR BLD: 33.6 % (ref 22–41)
MACROCYTES BLD QL SMEAR: ABNORMAL
MAGNESIUM SERPL-MCNC: 1.9 MG/DL (ref 1.5–2.5)
MANUAL DIFF BLD: NORMAL
MCH RBC QN AUTO: 35.5 PG (ref 27–33)
MCHC RBC AUTO-ENTMCNC: 33.6 G/DL (ref 33.7–35.3)
MCV RBC AUTO: 105.7 FL (ref 81.4–97.8)
MICRO URNS: ABNORMAL
MICROCYTES BLD QL SMEAR: ABNORMAL
MONOCYTES # BLD AUTO: 0.38 K/UL (ref 0–0.85)
MONOCYTES NFR BLD AUTO: 10 % (ref 0–13.4)
MORPHOLOGY BLD-IMP: NORMAL
NEUTROPHILS # BLD AUTO: 2.07 K/UL (ref 1.82–7.42)
NEUTROPHILS NFR BLD: 54.6 % (ref 44–72)
NITRITE UR QL STRIP.AUTO: NEGATIVE
NRBC # BLD AUTO: 0 K/UL
NRBC BLD-RTO: 0 /100 WBC
PH UR STRIP.AUTO: 5.5 [PH]
PLATELET # BLD AUTO: 106 K/UL (ref 164–446)
PLATELET BLD QL SMEAR: NORMAL
PMV BLD AUTO: 10.4 FL (ref 9–12.9)
POTASSIUM SERPL-SCNC: 3.8 MMOL/L (ref 3.6–5.5)
PROT SERPL-MCNC: 7.9 G/DL (ref 6–8.2)
PROT UR QL STRIP: NEGATIVE MG/DL
RBC # BLD AUTO: 3.52 M/UL (ref 4.7–6.1)
RBC # URNS HPF: ABNORMAL /HPF
RBC BLD AUTO: PRESENT
RBC UR QL AUTO: NEGATIVE
SODIUM SERPL-SCNC: 137 MMOL/L (ref 135–145)
SP GR UR STRIP.AUTO: >=1.045
UROBILINOGEN UR STRIP.AUTO-MCNC: 0.2 MG/DL
WBC # BLD AUTO: 3.8 K/UL (ref 4.8–10.8)
WBC #/AREA URNS HPF: ABNORMAL /HPF

## 2019-06-21 PROCEDURE — 83735 ASSAY OF MAGNESIUM: CPT

## 2019-06-21 PROCEDURE — 36415 COLL VENOUS BLD VENIPUNCTURE: CPT

## 2019-06-21 PROCEDURE — 85027 COMPLETE CBC AUTOMATED: CPT

## 2019-06-21 PROCEDURE — 82378 CARCINOEMBRYONIC ANTIGEN: CPT

## 2019-06-21 PROCEDURE — 80053 COMPREHEN METABOLIC PANEL: CPT

## 2019-06-21 PROCEDURE — 81001 URINALYSIS AUTO W/SCOPE: CPT

## 2019-06-21 PROCEDURE — 85007 BL SMEAR W/DIFF WBC COUNT: CPT

## 2019-06-22 ENCOUNTER — OUTPATIENT INFUSION SERVICES (OUTPATIENT)
Dept: ONCOLOGY | Facility: MEDICAL CENTER | Age: 54
End: 2019-06-22
Attending: NURSE PRACTITIONER
Payer: MEDICAID

## 2019-06-22 VITALS
WEIGHT: 245.15 LBS | TEMPERATURE: 98.1 F | RESPIRATION RATE: 18 BRPM | SYSTOLIC BLOOD PRESSURE: 135 MMHG | BODY MASS INDEX: 32.49 KG/M2 | HEART RATE: 83 BPM | DIASTOLIC BLOOD PRESSURE: 83 MMHG | OXYGEN SATURATION: 96 % | HEIGHT: 73 IN

## 2019-06-22 DIAGNOSIS — C18.9 METASTATIC COLON CANCER TO LIVER (HCC): ICD-10-CM

## 2019-06-22 DIAGNOSIS — C78.7 METASTATIC COLON CANCER TO LIVER (HCC): ICD-10-CM

## 2019-06-22 PROCEDURE — 700105 HCHG RX REV CODE 258

## 2019-06-22 PROCEDURE — 96415 CHEMO IV INFUSION ADDL HR: CPT

## 2019-06-22 PROCEDURE — 96417 CHEMO IV INFUS EACH ADDL SEQ: CPT

## 2019-06-22 PROCEDURE — A4212 NON CORING NEEDLE OR STYLET: HCPCS

## 2019-06-22 PROCEDURE — 700111 HCHG RX REV CODE 636 W/ 250 OVERRIDE (IP): Performed by: INTERNAL MEDICINE

## 2019-06-22 PROCEDURE — 96413 CHEMO IV INFUSION 1 HR: CPT

## 2019-06-22 PROCEDURE — 96367 TX/PROPH/DG ADDL SEQ IV INF: CPT

## 2019-06-22 PROCEDURE — 700111 HCHG RX REV CODE 636 W/ 250 OVERRIDE (IP)

## 2019-06-22 PROCEDURE — 700105 HCHG RX REV CODE 258: Performed by: INTERNAL MEDICINE

## 2019-06-22 PROCEDURE — 96375 TX/PRO/DX INJ NEW DRUG ADDON: CPT

## 2019-06-22 RX ORDER — SODIUM CHLORIDE 9 MG/ML
INJECTION, SOLUTION INTRAVENOUS CONTINUOUS
Status: DISCONTINUED | OUTPATIENT
Start: 2019-06-22 | End: 2019-06-22 | Stop reason: HOSPADM

## 2019-06-22 RX ORDER — DEXTROSE MONOHYDRATE 50 MG/ML
INJECTION, SOLUTION INTRAVENOUS CONTINUOUS
Status: DISCONTINUED | OUTPATIENT
Start: 2019-06-22 | End: 2019-06-22 | Stop reason: HOSPADM

## 2019-06-22 RX ADMIN — DEXAMETHASONE SODIUM PHOSPHATE 12 MG: 4 INJECTION, SOLUTION INTRAMUSCULAR; INTRAVENOUS at 11:05

## 2019-06-22 RX ADMIN — BEVACIZUMAB 800 MG: 400 INJECTION, SOLUTION INTRAVENOUS at 14:47

## 2019-06-22 RX ADMIN — DEXTROSE MONOHYDRATE: 50 INJECTION, SOLUTION INTRAVENOUS at 11:05

## 2019-06-22 RX ADMIN — HEPARIN 500 UNITS: 100 SYRINGE at 15:34

## 2019-06-22 RX ADMIN — SODIUM CHLORIDE 150 MG: 9 INJECTION, SOLUTION INTRAVENOUS at 11:45

## 2019-06-22 RX ADMIN — DEXTROSE MONOHYDRATE 248.56 MG: 50 INJECTION, SOLUTION INTRAVENOUS at 12:24

## 2019-06-22 RX ADMIN — ONDANSETRON HYDROCHLORIDE 16 MG: 2 SOLUTION INTRAMUSCULAR; INTRAVENOUS at 11:22

## 2019-06-22 RX ADMIN — SODIUM CHLORIDE: 9 INJECTION, SOLUTION INTRAVENOUS at 14:45

## 2019-06-22 NOTE — PROGRESS NOTES
"Pharmacy Chemotherapy Verification:    Patient Name: Gurmeet Jo      Dx: metastatic colon cancer         Protocol: CapeOx + Avastin     Bevacizumab (Avastin) 7.5 mg/kg IV on day 1   -Holding Avastin for C1 for recent port placement   -Continue to hold Avastin for C2 per Dr. Ross   Oxaliplatin 130 mg/m2 IV over 2 hrs on Day 1   -05/11/19: dose reduced to 104 mg/m2 for neuropathy per Dr. Ross   Capecitabine 850-1000 mg/m2 PO twice daily x 28 doses   21-day cycle until disease progression or unacceptable toxicity  NCCN Guidelines for Colon Cancer. V.2.2018.  Aura PATRICK et al. JCO. 2008;26(12):2006-12.  Chad YUAN et al. JCO. 2008;26(12):2013-9.    Allergies:  Patient has no known allergies.       /83   Pulse 83   Temp 36.7 °C (98.1 °F) (Temporal)   Resp 18   Ht 1.855 m (6' 1.03\") Comment: took 1inch off of shoes  Wt 111.2 kg (245 lb 2.4 oz)   SpO2 96%   BMI 32.32 kg/m²  Body surface area is 2.39 meters squared.    Labs 06/21/19:  ANC~2100 Plt = 106k Hgb = 12.5 SCr = 0.79 mg/dL CrCl>125 mL/min LFTs = WNL except alk phos 124 K = 3.8  Mg = 1.9 Urine protein = Negative  BP = 135/83    Drug Order   (Drug name, dose, route, IV Fluid & volume, frequency, number of doses) Cycle 10   Previous treatment: 06/01/19     Medication = Oxaliplatin (Eloxatin)  Base Dose= 104 mg/m2  Calc Dose: Base Dose x 2.39 m2 = 248.6 mg  Final Dose = 248.56 mg  Route = IV  Fluid & Volume = D5W 250 mL  Admin Duration = Over 2 hours          <10% difference, okay to treat with final dose   Medication = Bevacizumab (Avastin)  Base Dose= 7.5 mg/kg  Calc Dose: Base Dose x 111.2 kg = 834 mg  Final Dose = 800 mg  Route = IV  Fluid & Volume =  mL  Admin Duration = Over 30 minutes          Dose rounded to vial size (within 10%) per RX protocol       By my signature below, I confirm this process was performed independently with the BSA and all final chemotherapy dosing calculations congruent. I have reviewed the above chemotherapy " order and that my calculation of the final dose and BSA (when applicable) corroborate those calculations of the  pharmacist.     Yosef Hook, PharmD, BCOP

## 2019-06-22 NOTE — PROGRESS NOTES
"Pharmacy Chemotherapy Verification    Dx: metastatic colorectal cancer    Cycle 10  Previous treatment = C9 on 6/1/19    Regimen: CapeOx + Bevacizumab  · Capecitabine (Xeloda) 850-1000 mg/m2 PO BID from the evening of day 1 to the morning of day 15 (28 doses per cycle)-  home prescription  ? Note: In Chad et al. 2008--the same study as Aura jaime al. 2008--capecitabine was described as being given 1000 mg/m2 PO BID on days 1 to 14  · Oxaliplatin (Eloxatin)  IV over 2 hours once on day 1, given second  ? 5/11/19: dose decreased to 104 mg/m2 d/t neuropathy  · Bevacizumab (Avastin) 7.5 mg/kg IV over 30 to 90 minutes once on day 1, given first  ? HOLD cycle 1 due to recent port placement (12/11/18)  ? HOLD Cycle 2 per MD  21-day cycles until DP/UT  NCCN guidelines for Colon Cancer V.2.2018  Aura PATRICK et al - J Clin Oncol. 2008 Apr 20;26(12):2006-12.  Chad YUAN et al - J Clin Oncol. 2008 Apr 20;26(12):2013-9.    Allergies:Patient has no known allergies.  /83   Pulse 83   Temp 36.7 °C (98.1 °F) (Temporal)   Resp 18   Ht 1.855 m (6' 1.03\") Comment: took 1inch off of shoes  Wt 111.2 kg (245 lb 2.4 oz)   SpO2 96%   BMI 32.32 kg/m²   Body surface area is 2.39 meters squared.     All labs (6/21/19), urine protein, and BP within treatment plan parameters.      OXALIplatin (Eloxatin) 104 mg/m2 x 2.39 m2 = 248.6 mg    <10% difference, OK to treat with final dose = 248.56 mg IV     Bevacizumab (Avastin) 7.5 mg/kg x 111.2 kg = 834 mg   Rounded to vial size (within 10%) per dose rounding protocol = 800 mg IV      Aura Guzman, PharmD  "

## 2019-06-22 NOTE — PROGRESS NOTES
Patient arrived ambulatory to the [facility] for C10D1 of Oxaliplatin/Avastin. Reviewed vital signs, labs, and physician order. Patient denies S&S of infection, or bleeding. Pt arrives with Emla cream applied to right chest port, port accessed with sterile technique, visualized brisk blood return. Pre-treatment medications administered. Oxaliplatin administered over 2 hours, no adverse reaction observed. Avastin administered over 30 min, no adverse reaction observed. Port flushed per protocol, glover needle removed with tip intact, gauze and tape dressing placed. Confirmed upcoming appointment date and time with patient. Patient left the [facility] ambulatory in no sign of distress.

## 2019-06-22 NOTE — PROGRESS NOTES
Chemotherapy Verification - SECONDARY RN       Height = 73.03 in  Weight = 245 lb  BSA = 2.39 m2       Medication: Oxaliplatin  Dose: 104 mg/m2  Calculated Dose: 248.56 mg                             (In mg/m2, AUC, mg/kg)     Medication: Avastin  Dose: 7.5 mg/kg  Calculated Dose: 834 mg                             (In mg/m2, AUC, mg/kg)      I confirm that this process was performed independently.

## 2019-06-22 NOTE — PROGRESS NOTES
Chemotherapy Verification - PRIMARY RN      Height = 73.03in  Weight = 111.2kg  BSA = 2.38m2       Medication: Oxaliplatin  Dose: 104mg/m2  Calculated Dose: 248.4mg                             (In mg/m2, AUC, mg/kg)     Medication: Avastin  Dose: 7.5mg/kg  Calculated Dose: 834mg                             (In mg/m2, AUC, mg/kg)    I confirm this process was performed independently with the BSA and all final chemotherapy dosing calculations congruent.  Any discrepancies of 10% or greater have been addressed with the chemotherapy pharmacist. The resolution of the discrepancy has been documented in the EPIC progress notes.

## 2019-07-08 ENCOUNTER — HOSPITAL ENCOUNTER (OUTPATIENT)
Dept: RADIOLOGY | Facility: MEDICAL CENTER | Age: 54
End: 2019-07-08
Attending: INTERNAL MEDICINE
Payer: MEDICAID

## 2019-07-08 DIAGNOSIS — C78.7 SECONDARY MALIGNANT NEOPLASM OF LIVER (HCC): ICD-10-CM

## 2019-07-08 DIAGNOSIS — C18.9 MALIGNANT NEOPLASM OF COLON, UNSPECIFIED PART OF COLON (HCC): ICD-10-CM

## 2019-07-08 PROCEDURE — 71260 CT THORAX DX C+: CPT

## 2019-07-08 PROCEDURE — 700117 HCHG RX CONTRAST REV CODE 255: Performed by: INTERNAL MEDICINE

## 2019-07-08 RX ADMIN — IOHEXOL 50 ML: 240 INJECTION, SOLUTION INTRATHECAL; INTRAVASCULAR; INTRAVENOUS; ORAL at 11:33

## 2019-07-08 RX ADMIN — IOHEXOL 100 ML: 350 INJECTION, SOLUTION INTRAVENOUS at 11:33

## 2019-07-11 ENCOUNTER — HOSPITAL ENCOUNTER (OUTPATIENT)
Dept: LAB | Facility: MEDICAL CENTER | Age: 54
End: 2019-07-11
Attending: INTERNAL MEDICINE
Payer: MEDICAID

## 2019-07-11 LAB
ALBUMIN SERPL BCP-MCNC: 3.6 G/DL (ref 3.2–4.9)
ALBUMIN/GLOB SERPL: 0.8 G/DL
ALP SERPL-CCNC: 131 U/L (ref 30–99)
ALT SERPL-CCNC: 21 U/L (ref 2–50)
ANION GAP SERPL CALC-SCNC: 8 MMOL/L (ref 0–11.9)
ANISOCYTOSIS BLD QL SMEAR: ABNORMAL
APPEARANCE UR: CLEAR
AST SERPL-CCNC: 31 U/L (ref 12–45)
BASOPHILS # BLD AUTO: 0.7 % (ref 0–1.8)
BASOPHILS # BLD: 0.03 K/UL (ref 0–0.12)
BILIRUB SERPL-MCNC: 0.7 MG/DL (ref 0.1–1.5)
BILIRUB UR QL STRIP.AUTO: NEGATIVE
BUN SERPL-MCNC: 7 MG/DL (ref 8–22)
CALCIUM SERPL-MCNC: 9.1 MG/DL (ref 8.5–10.5)
CEA SERPL-MCNC: 265.3 NG/ML (ref 0–3)
CHLORIDE SERPL-SCNC: 102 MMOL/L (ref 96–112)
CO2 SERPL-SCNC: 27 MMOL/L (ref 20–33)
COLOR UR: YELLOW
COMMENT 1642: NORMAL
CREAT SERPL-MCNC: 0.7 MG/DL (ref 0.5–1.4)
EOSINOPHIL # BLD AUTO: 0.07 K/UL (ref 0–0.51)
EOSINOPHIL NFR BLD: 1.6 % (ref 0–6.9)
ERYTHROCYTE [DISTWIDTH] IN BLOOD BY AUTOMATED COUNT: 67.2 FL (ref 35.9–50)
GLOBULIN SER CALC-MCNC: 4.7 G/DL (ref 1.9–3.5)
GLUCOSE SERPL-MCNC: 204 MG/DL (ref 65–99)
GLUCOSE UR STRIP.AUTO-MCNC: NEGATIVE MG/DL
HCT VFR BLD AUTO: 37.4 % (ref 42–52)
HGB BLD-MCNC: 13.1 G/DL (ref 14–18)
IMM GRANULOCYTES # BLD AUTO: 0.01 K/UL (ref 0–0.11)
IMM GRANULOCYTES NFR BLD AUTO: 0.2 % (ref 0–0.9)
KETONES UR STRIP.AUTO-MCNC: NEGATIVE MG/DL
LEUKOCYTE ESTERASE UR QL STRIP.AUTO: NEGATIVE
LYMPHOCYTES # BLD AUTO: 1.26 K/UL (ref 1–4.8)
LYMPHOCYTES NFR BLD: 28.4 % (ref 22–41)
MACROCYTES BLD QL SMEAR: ABNORMAL
MAGNESIUM SERPL-MCNC: 1.9 MG/DL (ref 1.5–2.5)
MCH RBC QN AUTO: 36.7 PG (ref 27–33)
MCHC RBC AUTO-ENTMCNC: 35 G/DL (ref 33.7–35.3)
MCV RBC AUTO: 104.8 FL (ref 81.4–97.8)
MICRO URNS: NORMAL
MONOCYTES # BLD AUTO: 0.46 K/UL (ref 0–0.85)
MONOCYTES NFR BLD AUTO: 10.4 % (ref 0–13.4)
MORPHOLOGY BLD-IMP: NORMAL
NEUTROPHILS # BLD AUTO: 2.6 K/UL (ref 1.82–7.42)
NEUTROPHILS NFR BLD: 58.7 % (ref 44–72)
NITRITE UR QL STRIP.AUTO: NEGATIVE
NRBC # BLD AUTO: 0 K/UL
NRBC BLD-RTO: 0 /100 WBC
PH UR STRIP.AUTO: 6.5 [PH]
PLATELET # BLD AUTO: 143 K/UL (ref 164–446)
PLATELET BLD QL SMEAR: NORMAL
PMV BLD AUTO: 9.8 FL (ref 9–12.9)
POLYCHROMASIA BLD QL SMEAR: NORMAL
POTASSIUM SERPL-SCNC: 3.9 MMOL/L (ref 3.6–5.5)
PROT SERPL-MCNC: 8.3 G/DL (ref 6–8.2)
PROT UR QL STRIP: NEGATIVE MG/DL
RBC # BLD AUTO: 3.57 M/UL (ref 4.7–6.1)
RBC BLD AUTO: PRESENT
RBC UR QL AUTO: NEGATIVE
SODIUM SERPL-SCNC: 137 MMOL/L (ref 135–145)
SP GR UR STRIP.AUTO: 1.01
UROBILINOGEN UR STRIP.AUTO-MCNC: 0.2 MG/DL
WBC # BLD AUTO: 4.4 K/UL (ref 4.8–10.8)

## 2019-07-11 PROCEDURE — 83735 ASSAY OF MAGNESIUM: CPT

## 2019-07-11 PROCEDURE — 36415 COLL VENOUS BLD VENIPUNCTURE: CPT

## 2019-07-11 PROCEDURE — 80053 COMPREHEN METABOLIC PANEL: CPT

## 2019-07-11 PROCEDURE — 81003 URINALYSIS AUTO W/O SCOPE: CPT

## 2019-07-11 PROCEDURE — 85025 COMPLETE CBC W/AUTO DIFF WBC: CPT

## 2019-07-11 PROCEDURE — 82378 CARCINOEMBRYONIC ANTIGEN: CPT

## 2019-07-11 RX ORDER — ONDANSETRON 8 MG/1
8 TABLET, ORALLY DISINTEGRATING ORAL
Status: CANCELLED | OUTPATIENT
Start: 2019-08-03

## 2019-07-11 RX ORDER — SODIUM CHLORIDE 9 MG/ML
INJECTION, SOLUTION INTRAVENOUS CONTINUOUS
Status: CANCELLED | OUTPATIENT
Start: 2019-07-13

## 2019-07-11 RX ORDER — ONDANSETRON 2 MG/ML
4 INJECTION INTRAMUSCULAR; INTRAVENOUS
Status: CANCELLED | OUTPATIENT
Start: 2019-08-03

## 2019-07-11 RX ORDER — SODIUM CHLORIDE 9 MG/ML
INJECTION, SOLUTION INTRAVENOUS CONTINUOUS
Status: CANCELLED | OUTPATIENT
Start: 2019-08-03

## 2019-07-11 RX ORDER — PROCHLORPERAZINE MALEATE 10 MG
10 TABLET ORAL EVERY 6 HOURS PRN
Status: CANCELLED | OUTPATIENT
Start: 2019-08-03

## 2019-07-11 RX ORDER — PROCHLORPERAZINE MALEATE 10 MG
10 TABLET ORAL EVERY 6 HOURS PRN
Status: CANCELLED | OUTPATIENT
Start: 2019-07-13

## 2019-07-11 RX ORDER — 0.9 % SODIUM CHLORIDE 0.9 %
5 VIAL (ML) INJECTION PRN
Status: CANCELLED | OUTPATIENT
Start: 2019-07-13

## 2019-07-11 RX ORDER — 0.9 % SODIUM CHLORIDE 0.9 %
VIAL (ML) INJECTION PRN
Status: CANCELLED | OUTPATIENT
Start: 2019-08-03

## 2019-07-11 RX ORDER — ONDANSETRON 8 MG/1
8 TABLET, ORALLY DISINTEGRATING ORAL
Status: CANCELLED | OUTPATIENT
Start: 2019-07-13

## 2019-07-11 RX ORDER — DEXTROSE MONOHYDRATE 50 MG/ML
INJECTION, SOLUTION INTRAVENOUS CONTINUOUS
Status: CANCELLED | OUTPATIENT
Start: 2019-07-13

## 2019-07-11 RX ORDER — 0.9 % SODIUM CHLORIDE 0.9 %
VIAL (ML) INJECTION PRN
Status: CANCELLED | OUTPATIENT
Start: 2019-07-13

## 2019-07-11 RX ORDER — DEXTROSE MONOHYDRATE 50 MG/ML
INJECTION, SOLUTION INTRAVENOUS CONTINUOUS
Status: CANCELLED | OUTPATIENT
Start: 2019-08-03

## 2019-07-11 RX ORDER — 0.9 % SODIUM CHLORIDE 0.9 %
5 VIAL (ML) INJECTION PRN
Status: CANCELLED | OUTPATIENT
Start: 2019-08-03

## 2019-07-11 RX ORDER — ONDANSETRON 2 MG/ML
4 INJECTION INTRAMUSCULAR; INTRAVENOUS
Status: CANCELLED | OUTPATIENT
Start: 2019-07-13

## 2019-07-13 ENCOUNTER — OUTPATIENT INFUSION SERVICES (OUTPATIENT)
Dept: ONCOLOGY | Facility: MEDICAL CENTER | Age: 54
End: 2019-07-13
Attending: INTERNAL MEDICINE
Payer: MEDICAID

## 2019-07-13 VITALS
RESPIRATION RATE: 18 BRPM | HEIGHT: 72 IN | OXYGEN SATURATION: 97 % | BODY MASS INDEX: 32.76 KG/M2 | DIASTOLIC BLOOD PRESSURE: 86 MMHG | SYSTOLIC BLOOD PRESSURE: 135 MMHG | HEART RATE: 85 BPM | WEIGHT: 241.84 LBS | TEMPERATURE: 98.1 F

## 2019-07-13 DIAGNOSIS — C78.7 METASTATIC COLON CANCER TO LIVER (HCC): ICD-10-CM

## 2019-07-13 DIAGNOSIS — C18.9 METASTATIC COLON CANCER TO LIVER (HCC): ICD-10-CM

## 2019-07-13 PROCEDURE — 96417 CHEMO IV INFUS EACH ADDL SEQ: CPT

## 2019-07-13 PROCEDURE — 700111 HCHG RX REV CODE 636 W/ 250 OVERRIDE (IP): Mod: JW

## 2019-07-13 PROCEDURE — 700105 HCHG RX REV CODE 258: Performed by: INTERNAL MEDICINE

## 2019-07-13 PROCEDURE — 96367 TX/PROPH/DG ADDL SEQ IV INF: CPT

## 2019-07-13 PROCEDURE — A4212 NON CORING NEEDLE OR STYLET: HCPCS

## 2019-07-13 PROCEDURE — 96375 TX/PRO/DX INJ NEW DRUG ADDON: CPT

## 2019-07-13 PROCEDURE — 96415 CHEMO IV INFUSION ADDL HR: CPT

## 2019-07-13 PROCEDURE — 700105 HCHG RX REV CODE 258

## 2019-07-13 PROCEDURE — 96413 CHEMO IV INFUSION 1 HR: CPT

## 2019-07-13 PROCEDURE — 700111 HCHG RX REV CODE 636 W/ 250 OVERRIDE (IP)

## 2019-07-13 PROCEDURE — 700111 HCHG RX REV CODE 636 W/ 250 OVERRIDE (IP): Performed by: INTERNAL MEDICINE

## 2019-07-13 RX ORDER — DEXTROSE MONOHYDRATE 50 MG/ML
INJECTION, SOLUTION INTRAVENOUS CONTINUOUS
Status: DISCONTINUED | OUTPATIENT
Start: 2019-07-13 | End: 2019-07-13 | Stop reason: HOSPADM

## 2019-07-13 RX ORDER — SODIUM CHLORIDE 9 MG/ML
INJECTION, SOLUTION INTRAVENOUS CONTINUOUS
Status: DISCONTINUED | OUTPATIENT
Start: 2019-07-13 | End: 2019-07-13 | Stop reason: HOSPADM

## 2019-07-13 RX ADMIN — OXALIPLATIN 246.48 MG: 100 INJECTION, SOLUTION, CONCENTRATE INTRAVENOUS at 09:55

## 2019-07-13 RX ADMIN — BEVACIZUMAB 800 MG: 400 INJECTION, SOLUTION INTRAVENOUS at 12:21

## 2019-07-13 RX ADMIN — ONDANSETRON HYDROCHLORIDE 16 MG: 2 SOLUTION INTRAMUSCULAR; INTRAVENOUS at 08:56

## 2019-07-13 RX ADMIN — DEXTROSE MONOHYDRATE: 50 INJECTION, SOLUTION INTRAVENOUS at 08:37

## 2019-07-13 RX ADMIN — HEPARIN 500 UNITS: 100 SYRINGE at 13:02

## 2019-07-13 RX ADMIN — SODIUM CHLORIDE: 9 INJECTION, SOLUTION INTRAVENOUS at 12:20

## 2019-07-13 RX ADMIN — SODIUM CHLORIDE 150 MG: 9 INJECTION, SOLUTION INTRAVENOUS at 09:17

## 2019-07-13 RX ADMIN — DEXAMETHASONE SODIUM PHOSPHATE 12 MG: 4 INJECTION, SOLUTION INTRAMUSCULAR; INTRAVENOUS at 08:37

## 2019-07-13 NOTE — PROGRESS NOTES
Pt arrived ambulatory to [facility] for Chemo D1C11. Labs reviewed form 7/11/19, OK to proceed with treatment. Port accessed in sterile fashion, brisk blood return, flushed with NS. POC discussed with pt and he agrees with POC. Pt medicated per MAR. Pt tolerated treatment without s/s adverse reaction. Port de-accessed after infusion completed, brisk blood return, flushed with NS then heparin. Pt discharged to self care. Pt accompanied by family.

## 2019-07-13 NOTE — PROGRESS NOTES
Chemotherapy Verification - SECONDARY RN       Height = 72.44in  Weight = 109.7kg  BSA = 2.36m2       Medication: Oxaliplatin  Dose: 104mg/m2 (80% of original dose of 130mg/m2)  Calculated Dose: 245.44mg                             (In mg/m2, AUC, mg/kg)     Medication: Avastin  Dose: 7.5mg/kg  Calculated Dose: 822.75mg                             (In mg/m2, AUC, mg/kg)    Urine protein negative  /86    I confirm that this process was performed independently.

## 2019-07-13 NOTE — PROGRESS NOTES
"Pharmacy Chemotherapy Verification    Patient Name: Gurmeet Jo      Dx: metastatic colon cancer         Protocol: CapeOx + Avastin     Bevacizumab (Avastin) 7.5 mg/kg IV on day 1   -Holding Avastin for C1 for recent port placement   -Continue to hold Avastin for C2 per Dr. Ross   OXALIplatin 130 mg/m2 IV over 2 hrs on Day 1   -05/11/19: dose reduced to 104 mg/m2 for neuropathy per Dr. Ross   Capecitabine 850-1000 mg/m2 PO twice daily x 28 doses   21-day cycle until disease progression or unacceptable toxicity  NCCN Guidelines for Colon Cancer. V.2.2018.  Aura PATRICK et al. JCO. 2008;26(12):2006-12.  Chad YUAN et al. JCO. 2008;26(12):2013-9.    Allergies:  Patient has no known allergies.    /86   Pulse 85   Temp 36.7 °C (98.1 °F) (Temporal)   Resp 18   Ht 1.84 m (6' 0.44\") Comment: took 1 inch off of shoes   Wt 109.7 kg (241 lb 13.5 oz)   SpO2 97%   BMI 32.40 kg/m²  Body surface area is 2.37 meters squared.    Labs 7/11/19  ANC 2600 Hgb 13.1 Plt 143k  SCr 0.7 CrCl 55.2 mL/min   AST/ALT/AP = 31/21/131 Tbili = 0.7  Mg = 1.9  K = 3.9     7/11/2019 11:46   Protein Negative     Drug Order   (Drug name, dose, route, IV Fluid & volume, frequency, number of doses) Cycle 11   Previous treatment: 06/22/19     Medication = OXALIplatin (Eloxatin)  Base Dose = 104 mg/m2  Calc Dose: Base Dose x 2.37 m2 = 246.48 mg  Final Dose = 246.48 mg  Route = IV  Fluid & Volume = D5W 250 mL  Admin Duration = Over 2 hours          <10% difference, okay to treat with final dose   Medication = Bevacizumab (Avastin)  Base Dose = 7.5 mg/kg  Calc Dose: Base Dose x 109.7 kg = 822.75 mg  Final Dose = 800 mg  Route = IV  Fluid & Volume =  mL  Admin Duration = Over 30 minutes          Dose rounded to vial size (within 10%) per RX protocol       By my signature below, I confirm this process was performed independently with the BSA and all final chemotherapy dosing calculations congruent. I have reviewed the above chemotherapy " order and that my calculation of the final dose and BSA (when applicable) corroborate those calculations of the  pharmacist.     Jeni Ponce, PharmD, BCOP

## 2019-07-13 NOTE — PROGRESS NOTES
Chemotherapy Verification - PRIMARY RN    D1C11    Height = 184cm  Weight = 109.7  BSA = 2.37m2       Medication: Oxaliplatin  Dose: 104mg/m2  Calculated Dose: 246.48mg                                  Medication: Avastin  Dose: 7.5mg/kg  Calculated Dose: 822.75mg                                I confirm this process was performed independently with the BSA and all final chemotherapy dosing calculations congruent.  Any discrepancies of 10% or greater have been addressed with the chemotherapy pharmacist. The resolution of the discrepancy has been documented in the EPIC progress notes.

## 2019-07-13 NOTE — PROGRESS NOTES
"Pharmacy Chemotherapy Verification    Dx: metastatic colorectal cancer    Cycle 11  Previous treatment = C10 on 6/22/19    Regimen: CapeOx + Bevacizumab  · Capecitabine (Xeloda) 850-1000 mg/m2 PO BID from the evening of day 1 to the morning of day 15 (28 doses per cycle)-  home prescription  ? Note: In Chad et al. 2008--the same study as Aura jaime al. 2008--capecitabine was described as being given 1000 mg/m2 PO BID on days 1 to 14  · Oxaliplatin (Eloxatin)  IV over 2 hours once on day 1, given second  ? 5/11/19: dose decreased to 104 mg/m2 d/t neuropathy  · Bevacizumab (Avastin) 7.5 mg/kg IV over 30 to 90 minutes once on day 1, given first  ? HOLD cycle 1 due to recent port placement (12/11/18)  ? HOLD Cycle 2 per MD  21-day cycles until DP/UT  NCCN guidelines for Colon Cancer V.2.2018  Aura PATRICK et al - J Clin Oncol. 2008 Apr 20;26(12):2006-12.  Chad YUAN et al - J Clin Oncol. 2008 Apr 20;26(12):2013-9.    Allergies:Patient has no known allergies.  /86   Pulse 85   Temp 36.7 °C (98.1 °F) (Temporal)   Resp 18   Ht 1.84 m (6' 0.44\") Comment: took 1 inch off of shoes   Wt 109.7 kg (241 lb 13.5 oz)   SpO2 97%   BMI 32.40 kg/m²   Body surface area is 2.37 meters squared.     Labs 7/11/19  ANC~ 2600 Plt = 143k   Hgb = 13.1     SCr = 0.7 mg/dL CrCl ~ >125 mL/min   LFT's = 31/21/131 TBili = 0.7  Urine Protein = Negative    Labs 7/13/19  BP = 135/86    OXALIplatin (Eloxatin) 104 mg/m2 x 2.37 m2 = 246.48 mg    <10% difference, OK to treat with final dose = 246.48 mg IV     Bevacizumab (Avastin) 7.5 mg/kg x 109.7 kg = 822.75 mg   Rounded to vial size (within 10%) per dose rounding protocol = 800 mg IV    Ruben Santamaria, PharmD  "

## 2019-08-02 ENCOUNTER — HOSPITAL ENCOUNTER (OUTPATIENT)
Dept: LAB | Facility: MEDICAL CENTER | Age: 54
End: 2019-08-02
Attending: INTERNAL MEDICINE
Payer: MEDICAID

## 2019-08-02 LAB
ALBUMIN SERPL BCP-MCNC: 3.7 G/DL (ref 3.2–4.9)
ALBUMIN/GLOB SERPL: 0.8 G/DL
ALP SERPL-CCNC: 145 U/L (ref 30–99)
ALT SERPL-CCNC: 47 U/L (ref 2–50)
ANION GAP SERPL CALC-SCNC: 9 MMOL/L (ref 0–11.9)
ANISOCYTOSIS BLD QL SMEAR: ABNORMAL
APPEARANCE UR: CLEAR
AST SERPL-CCNC: 56 U/L (ref 12–45)
BASOPHILS # BLD AUTO: 0.7 % (ref 0–1.8)
BASOPHILS # BLD: 0.03 K/UL (ref 0–0.12)
BILIRUB SERPL-MCNC: 0.9 MG/DL (ref 0.1–1.5)
BILIRUB UR QL STRIP.AUTO: NEGATIVE
BUN SERPL-MCNC: 9 MG/DL (ref 8–22)
CALCIUM SERPL-MCNC: 9.1 MG/DL (ref 8.5–10.5)
CEA SERPL-MCNC: 374.5 NG/ML (ref 0–3)
CHLORIDE SERPL-SCNC: 103 MMOL/L (ref 96–112)
CO2 SERPL-SCNC: 23 MMOL/L (ref 20–33)
COLOR UR: ABNORMAL
COMMENT 1642: NORMAL
CREAT SERPL-MCNC: 0.68 MG/DL (ref 0.5–1.4)
EOSINOPHIL # BLD AUTO: 0.06 K/UL (ref 0–0.51)
EOSINOPHIL NFR BLD: 1.3 % (ref 0–6.9)
ERYTHROCYTE [DISTWIDTH] IN BLOOD BY AUTOMATED COUNT: 68.8 FL (ref 35.9–50)
GLOBULIN SER CALC-MCNC: 4.4 G/DL (ref 1.9–3.5)
GLUCOSE SERPL-MCNC: 203 MG/DL (ref 65–99)
GLUCOSE UR STRIP.AUTO-MCNC: 500 MG/DL
HCT VFR BLD AUTO: 38.1 % (ref 42–52)
HGB BLD-MCNC: 13 G/DL (ref 14–18)
IMM GRANULOCYTES # BLD AUTO: 0.02 K/UL (ref 0–0.11)
IMM GRANULOCYTES NFR BLD AUTO: 0.4 % (ref 0–0.9)
KETONES UR STRIP.AUTO-MCNC: NEGATIVE MG/DL
LEUKOCYTE ESTERASE UR QL STRIP.AUTO: NEGATIVE
LYMPHOCYTES # BLD AUTO: 1.27 K/UL (ref 1–4.8)
LYMPHOCYTES NFR BLD: 27.9 % (ref 22–41)
MACROCYTES BLD QL SMEAR: ABNORMAL
MAGNESIUM SERPL-MCNC: 1.8 MG/DL (ref 1.5–2.5)
MCH RBC QN AUTO: 35.7 PG (ref 27–33)
MCHC RBC AUTO-ENTMCNC: 34.1 G/DL (ref 33.7–35.3)
MCV RBC AUTO: 104.7 FL (ref 81.4–97.8)
MICRO URNS: ABNORMAL
MONOCYTES # BLD AUTO: 0.76 K/UL (ref 0–0.85)
MONOCYTES NFR BLD AUTO: 16.7 % (ref 0–13.4)
MORPHOLOGY BLD-IMP: NORMAL
NEUTROPHILS # BLD AUTO: 2.42 K/UL (ref 1.82–7.42)
NEUTROPHILS NFR BLD: 53 % (ref 44–72)
NITRITE UR QL STRIP.AUTO: NEGATIVE
NRBC # BLD AUTO: 0 K/UL
NRBC BLD-RTO: 0 /100 WBC
PH UR STRIP.AUTO: 6 [PH] (ref 5–8)
PLATELET # BLD AUTO: 102 K/UL (ref 164–446)
PLATELET BLD QL SMEAR: NORMAL
PMV BLD AUTO: 10.6 FL (ref 9–12.9)
POTASSIUM SERPL-SCNC: 3.9 MMOL/L (ref 3.6–5.5)
PROT SERPL-MCNC: 8.1 G/DL (ref 6–8.2)
PROT UR QL STRIP: NEGATIVE MG/DL
RBC # BLD AUTO: 3.64 M/UL (ref 4.7–6.1)
RBC BLD AUTO: PRESENT
RBC UR QL AUTO: NEGATIVE
SODIUM SERPL-SCNC: 135 MMOL/L (ref 135–145)
SP GR UR STRIP.AUTO: 1.02
UROBILINOGEN UR STRIP.AUTO-MCNC: 1 MG/DL
WBC # BLD AUTO: 4.6 K/UL (ref 4.8–10.8)

## 2019-08-02 PROCEDURE — 36415 COLL VENOUS BLD VENIPUNCTURE: CPT

## 2019-08-02 PROCEDURE — 80053 COMPREHEN METABOLIC PANEL: CPT

## 2019-08-02 PROCEDURE — 85025 COMPLETE CBC W/AUTO DIFF WBC: CPT

## 2019-08-02 PROCEDURE — 82378 CARCINOEMBRYONIC ANTIGEN: CPT

## 2019-08-02 PROCEDURE — 81003 URINALYSIS AUTO W/O SCOPE: CPT

## 2019-08-02 PROCEDURE — 83735 ASSAY OF MAGNESIUM: CPT

## 2019-08-03 ENCOUNTER — OUTPATIENT INFUSION SERVICES (OUTPATIENT)
Dept: ONCOLOGY | Facility: MEDICAL CENTER | Age: 54
End: 2019-08-03
Attending: INTERNAL MEDICINE
Payer: MEDICAID

## 2019-08-03 VITALS
OXYGEN SATURATION: 98 % | SYSTOLIC BLOOD PRESSURE: 135 MMHG | RESPIRATION RATE: 18 BRPM | TEMPERATURE: 97.1 F | DIASTOLIC BLOOD PRESSURE: 84 MMHG | WEIGHT: 245.15 LBS | HEART RATE: 79 BPM | BODY MASS INDEX: 33.2 KG/M2 | HEIGHT: 72 IN

## 2019-08-03 DIAGNOSIS — C78.7 METASTATIC COLON CANCER TO LIVER (HCC): ICD-10-CM

## 2019-08-03 DIAGNOSIS — C18.9 METASTATIC COLON CANCER TO LIVER (HCC): ICD-10-CM

## 2019-08-03 PROCEDURE — 96417 CHEMO IV INFUS EACH ADDL SEQ: CPT

## 2019-08-03 PROCEDURE — 700111 HCHG RX REV CODE 636 W/ 250 OVERRIDE (IP): Performed by: INTERNAL MEDICINE

## 2019-08-03 PROCEDURE — 700105 HCHG RX REV CODE 258

## 2019-08-03 PROCEDURE — 700105 HCHG RX REV CODE 258: Performed by: INTERNAL MEDICINE

## 2019-08-03 PROCEDURE — A4212 NON CORING NEEDLE OR STYLET: HCPCS

## 2019-08-03 PROCEDURE — 96375 TX/PRO/DX INJ NEW DRUG ADDON: CPT

## 2019-08-03 PROCEDURE — 700111 HCHG RX REV CODE 636 W/ 250 OVERRIDE (IP)

## 2019-08-03 PROCEDURE — 96415 CHEMO IV INFUSION ADDL HR: CPT

## 2019-08-03 PROCEDURE — 96413 CHEMO IV INFUSION 1 HR: CPT

## 2019-08-03 PROCEDURE — 96367 TX/PROPH/DG ADDL SEQ IV INF: CPT

## 2019-08-03 RX ORDER — DEXTROSE MONOHYDRATE 50 MG/ML
INJECTION, SOLUTION INTRAVENOUS CONTINUOUS
Status: DISCONTINUED | OUTPATIENT
Start: 2019-08-03 | End: 2019-08-03 | Stop reason: HOSPADM

## 2019-08-03 RX ORDER — SODIUM CHLORIDE 9 MG/ML
INJECTION, SOLUTION INTRAVENOUS CONTINUOUS
Status: DISCONTINUED | OUTPATIENT
Start: 2019-08-03 | End: 2019-08-03 | Stop reason: HOSPADM

## 2019-08-03 RX ADMIN — OXALIPLATIN 247.52 MG: 100 INJECTION, SOLUTION, CONCENTRATE INTRAVENOUS at 12:52

## 2019-08-03 RX ADMIN — ONDANSETRON HYDROCHLORIDE 16 MG: 2 SOLUTION INTRAMUSCULAR; INTRAVENOUS at 11:31

## 2019-08-03 RX ADMIN — SODIUM CHLORIDE 150 MG: 9 INJECTION, SOLUTION INTRAVENOUS at 12:06

## 2019-08-03 RX ADMIN — HEPARIN 500 UNITS: 100 SYRINGE at 15:50

## 2019-08-03 RX ADMIN — DEXTROSE MONOHYDRATE: 50 INJECTION, SOLUTION INTRAVENOUS at 12:51

## 2019-08-03 RX ADMIN — DEXAMETHASONE SODIUM PHOSPHATE 12 MG: 4 INJECTION, SOLUTION INTRAMUSCULAR; INTRAVENOUS at 11:49

## 2019-08-03 RX ADMIN — BEVACIZUMAB 800 MG: 400 INJECTION, SOLUTION INTRAVENOUS at 15:11

## 2019-08-03 RX ADMIN — SODIUM CHLORIDE: 9 INJECTION, SOLUTION INTRAVENOUS at 11:33

## 2019-08-03 NOTE — PROGRESS NOTES
Pt arrived to [facility] ambulatory, here with wife for C12 Avastin/Oxaliplatin for colon cancer. Pt with R chest port, EMLA applied at home. EMLA wiped from site and port accessed in sterile field. Brisk blood return observed. Pt had labs done ahead of appt, results reviewed and WNL for chemo to proceed. Premeds given as ordered. All chemo infused per MAR. Pt harriet well. Line flushed clear. Port flushed and blood return observed. Heparin instilled and needle removed, tip intact, gauze dressing applied. Pt knows when to return. Discharged home under care of wife in no apparent distress.

## 2019-08-03 NOTE — PROGRESS NOTES
Chemotherapy Verification - SECONDARY RN       Height = 72.05in  Weight = 111.2kg  BSA = 2.37m2       Medication: Oxaliplatin  Dose: 104mg/m2  Calculated Dose: 246.48mg                             (In mg/m2, AUC, mg/kg)     Medication: Avastin  Dose: 7.5mg/kg  Calculated Dose: 834mg                             (In mg/m2, AUC, mg/kg)    Urine protein negative     confirm that this process was performed independently.

## 2019-08-03 NOTE — PROGRESS NOTES
"Pharmacy Chemotherapy Verification    Patient Name: Gurmeet Jo      Dx: metastatic colon cancer         Protocol: CapeOx + Avastin     Bevacizumab (Avastin) 7.5 mg/kg IV on day 1   -Holding Avastin for C1 for recent port placement   -Continue to hold Avastin for C2 per Dr. Ross   OXALIplatin 130 mg/m2 IV over 2 hrs on Day 1   -05/11/19: dose reduced to 104 mg/m2 for neuropathy per Dr. Ross   Capecitabine 850-1000 mg/m2 PO twice daily x 28 doses   21-day cycle until disease progression or unacceptable toxicity  NCCN Guidelines for Colon Cancer. V.2.2018.  barbara Reyez. JCO. 2008;26(12):2006-12.  Chad YUAN et al. JCO. 2008;26(12):2013-9.    Allergies:  Patient has no known allergies.    /84   Pulse 79   Temp 36.2 °C (97.1 °F) (Temporal)   Resp 18   Ht 1.83 m (6' 0.05\") Comment: took 1 inch off of shoes   Wt 111.2 kg (245 lb 2.4 oz)   SpO2 98%   BMI 33.21 kg/m²  Body surface area is 2.38 meters squared.    Labs 8/2/19  ANC~ 2420 Plt = 102k   Hgb = 13     SCr = 0.68 mg/dL CrCl ~ >125 mL/min (minimum SCr of 0.7)   LFT's = 56/47/145 TBili = 0.9  Urine Protein = Negative    Vitals 8/3/19   BP = 135/84    Drug Order   (Drug name, dose, route, IV Fluid & volume, frequency, number of doses) Cycle 12   Previous treatment: C11 on 7/13/19     Medication = OXALIplatin (Eloxatin)  Base Dose = 104 mg/m2  Calc Dose: Base Dose x 2.38 m2 = 247.52 mg  Final Dose = 247.52 mg  Route = IV  Fluid & Volume = D5W 250 mL  Admin Duration = Over 2 hours          <10% difference, okay to treat with final dose   Medication = Bevacizumab (Avastin)  Base Dose = 7.5 mg/kg  Calc Dose: Base Dose x 111.2 kg = 834 mg  Final Dose = 800 mg  Route = IV  Fluid & Volume =  mL  Admin Duration = Over 30 minutes          Dose rounded to vial size (within 10%) per RX protocol       By my signature below, I confirm this process was performed independently with the BSA and all final chemotherapy dosing calculations congruent. I " have reviewed the above chemotherapy order and that my calculation of the final dose and BSA (when applicable) corroborate those calculations of the  pharmacist.     Ruben Santamaria, PharmD

## 2019-08-03 NOTE — PROGRESS NOTES
Chemotherapy Verification - PRIMARY RN    C12    Height = 183 cm  Weight = 111.2 kg  BSA = 2.38 m2       Medication: Oxaliplatin  Dose: 104 mg/m2  Calculated Dose: 247.52 mg                              Medication: Bevacizumab (Avastin)  Dose: 7.5 mg/kg  Calculated Dose: 834 mg - dose rounded to 800 mg per [facility] protocol                                I confirm this process was performed independently with the BSA and all final chemotherapy dosing calculations congruent.  Any discrepancies of 10% or greater have been addressed with the chemotherapy pharmacist. The resolution of the discrepancy has been documented in the EPIC progress notes.

## 2019-08-03 NOTE — PROGRESS NOTES
"Pharmacy Chemotherapy Verification  Patient Name: Gurmeet Jo  Dx: metastatic colorectal cancer    Cycle 12  Previous treatment = C11 on 7/13/19    Regimen: CapeOx + Bevacizumab  · Capecitabine (Xeloda) 850-1000 mg/m2 PO BID from the evening of day 1 to the morning of day 15 (28 doses per cycle)-  home prescription  ? Note: In Chad et al. 2008--the same study as Aura jaime al. 2008--capecitabine was described as being given 1000 mg/m2 PO BID on days 1 to 14  · Oxaliplatin (Eloxatin)  IV over 2 hours once on day 1, given second  ? 5/11/19: dose decreased to 104 mg/m2 d/t neuropathy  · Bevacizumab (Avastin) 7.5 mg/kg IV over 30 to 90 minutes once on day 1, given first  ? HOLD cycle 1 due to recent port placement (12/11/18)  ? HOLD Cycle 2 per MD  21-day cycles until DP/UT  NCCN guidelines for Colon Cancer V.2.2018  Aura PATRICK, et al - J Clin Oncol. 2008 Apr 20;26(12):2006-12.  barbara Padilla - J Clin Oncol. 2008 Apr 20;26(12):2013-9.    Allergies:Patient has no known allergies.  /84   Pulse 79   Temp 36.2 °C (97.1 °F) (Temporal)   Resp 18   Ht 1.83 m (6' 0.05\") Comment: took 1 inch off of shoes   Wt 111.2 kg (245 lb 2.4 oz)   SpO2 98%   BMI 33.21 kg/m²   Body surface area is 2.38 meters squared.     Labs 8/2/19  ANC 2420 Hgb 13  Plt 102k  SCr 0.68 CrCL >125 mL/min   AST/ALT/AP = 56/47/145 Tbili = 0.9  Mg = 1.8 K = 3.9     8/2/2019 09:12   Protein Negative     OXALIplatin (Eloxatin) 104 mg/m2 x 2.38 m2 = 247.52 mg    <10% difference, OK to treat with final dose = 247.52 mg IV     Bevacizumab (Avastin) 7.5 mg/kg x 111.2 kg = 834 mg   Rounded to vial size (within 10%) per dose rounding protocol = 800 mg IV    Jeni Ponce, PharmD, BCOP    "

## 2019-08-07 ENCOUNTER — OFFICE VISIT (OUTPATIENT)
Dept: MEDICAL GROUP | Facility: CLINIC | Age: 54
End: 2019-08-07
Payer: MEDICAID

## 2019-08-07 VITALS
OXYGEN SATURATION: 96 % | DIASTOLIC BLOOD PRESSURE: 84 MMHG | TEMPERATURE: 99.3 F | HEIGHT: 74 IN | SYSTOLIC BLOOD PRESSURE: 120 MMHG | HEART RATE: 94 BPM | WEIGHT: 243 LBS | BODY MASS INDEX: 31.18 KG/M2

## 2019-08-07 DIAGNOSIS — G24.01 TARDIVE DYSKINESIA: ICD-10-CM

## 2019-08-07 PROBLEM — R03.0 ELEVATED BLOOD PRESSURE READING: Status: RESOLVED | Noted: 2018-12-05 | Resolved: 2019-08-07

## 2019-08-07 PROBLEM — R25.3 TWITCHING: Status: ACTIVE | Noted: 2019-08-07

## 2019-08-07 PROCEDURE — 99213 OFFICE O/P EST LOW 20 MIN: CPT | Performed by: FAMILY MEDICINE

## 2019-08-07 ASSESSMENT — PATIENT HEALTH QUESTIONNAIRE - PHQ9: CLINICAL INTERPRETATION OF PHQ2 SCORE: 0

## 2019-08-07 NOTE — ASSESSMENT & PLAN NOTE
Currently undergoing chemotherapy for metastatic colon cancer. Has gone thru 12 course. Tumors have shrunk.   Telephone Encounter by Loraine Lima at 06/20/17 08:21 AM     Author:  Loraine Lima Service:  (none) Author Type:       Filed:  06/20/17 08:34 AM Encounter Date:  6/13/2017 Status:  Signed     :  Loraine Lima ()            Last OV notes from 11-2-16 faxed to Radha at [facility][KV1.1M]      Revision History        User Key Date/Time User Provider Type Action    > KV1.1 06/20/17 08:34 AM Loraine Lima  Sign    M - Manual

## 2019-08-07 NOTE — ASSESSMENT & PLAN NOTE
Fo past couple of months wife has noticed his mouth twitching, tongue sticking out. Reviewed meds list shows that he is taking Compazine 2x/day. Med know for causing such symptoms.    Concerned about Parkinson's, like his father.

## 2019-08-07 NOTE — PROGRESS NOTES
Complaint: Involuntary movements     Subjective:     Gurmeet Jo is a 54 y.o. male here today accompanied by his wife.    Metastatic colon cancer to liver (HCC)  Currently undergoing chemotherapy for metastatic colon cancer. Has gone thru 12 course. Tumors have shrunk.    Twitching  Fo past couple of months wife has noticed his mouth twitching, tongue sticking out. Reviewed meds list shows that he is taking Compazine 2x/day. Med know for causing such symptoms.    Concerned about Parkinson's, like his father.     No other concerns or complaints today.    On disability.    Current medicines (including changes today)  Current Outpatient Medications   Medication Sig Dispense Refill   • Bevacizumab (AVASTIN IV) by Intravenous route.     • OXALIPLATIN IV by Intravenous route.     • capecitabine (XELODA) 500 MG tablet Take 1,250 mg/m2 by mouth 2 Times a Day.     • ondansetron (ZOFRAN ODT) 8 MG TABLET DISPERSIBLE Take 8 mg by mouth every 8 hours as needed for Nausea.     • prochlorperazine (COMPAZINE) 10 MG Tab Take 10 mg by mouth every 6 hours as needed.     • baclofen (LIORESAL) 10 MG Tab Take 10 mg by mouth 3 times a day.     • methocarbamol (ROBAXIN) 500 MG Tab Take 500 mg by mouth 4 times a day.     • therapeutic multivitamin-minerals (THERAGRAN-M) Tab Take 1 Tab by mouth every day.       No current facility-administered medications for this visit.      He  has a past medical history of Bowel habit changes, Cancer (HCC) (11/2018), Dental disorder, Hiatus hernia syndrome, Hypertension, Indigestion, Pain, Sleep apnea, and Snoring.    Health Maintenance:       Allergies: Patient has no known allergies.    Current Outpatient Medications Ordered in Epic   Medication Sig Dispense Refill   • Bevacizumab (AVASTIN IV) by Intravenous route.     • OXALIPLATIN IV by Intravenous route.     • capecitabine (XELODA) 500 MG tablet Take 1,250 mg/m2 by mouth 2 Times a Day.     • ondansetron (ZOFRAN ODT) 8 MG TABLET DISPERSIBLE Take  "8 mg by mouth every 8 hours as needed for Nausea.     • prochlorperazine (COMPAZINE) 10 MG Tab Take 10 mg by mouth every 6 hours as needed.     • baclofen (LIORESAL) 10 MG Tab Take 10 mg by mouth 3 times a day.     • methocarbamol (ROBAXIN) 500 MG Tab Take 500 mg by mouth 4 times a day.     • therapeutic multivitamin-minerals (THERAGRAN-M) Tab Take 1 Tab by mouth every day.       No current [facility]-ordered facility-administered medications on file.        Past Medical History:   Diagnosis Date   • Bowel habit changes     constipation   • Cancer (HCC) 2018    Colon CA with Liver Mets   • Dental disorder     dentures    • Hiatus hernia syndrome    • Hypertension     No meds at this time   • Indigestion    • Pain     liver    • Sleep apnea     cpap    • Snoring        Past Surgical History:   Procedure Laterality Date   • COLONOSCOPY         Social History     Tobacco Use   • Smoking status: Former Smoker     Packs/day: 1.00     Years: 15.00     Pack years: 15.00     Last attempt to quit: 1998     Years since quittin.6   • Smokeless tobacco: Never Used   Substance Use Topics   • Alcohol use: No   • Drug use: No       Social History     Social History Narrative   • Not on file       Family History   Problem Relation Age of Onset   • Hypertension Mother    • Hypertension Father    • Diabetes Father    • Diabetes Brother          ROS Positive for involuntary movements of tongue and mouth.  Patient denies any fever, chills, unintentional weight gain/loss, fatigue, stroke symptoms, dizziness, headache, nasal congestion, sore-throat, cough, heartburn, chest pain, difficulty breathing, abdominal discomfort, diarrhea/constipation, burning with urination or frequency, joint or back pain, skin rashes, depression or anxiety.       Objective:     /84 (BP Location: Left arm, Patient Position: Sitting, BP Cuff Size: Large adult)   Pulse 94   Temp 37.4 °C (99.3 °F) (Temporal)   Ht 1.88 m (6' 2\")   Wt 110.2 kg " (243 lb)   SpO2 96%  Body mass index is 31.2 kg/m².   Physical Exam:  Constitutional: Alert, no distress.  No formal exam      Assessment and Plan:   The following treatment plan was discussed    1. Tardive dyskinesia  Explained to patient my suspicion. I sent email to Dr. Kay to see if we can D/c Compazine. Told patient sxs may be irreversible.      Followup: Return if symptoms worsen or fail to improve.    Please note that this dictation was created using voice recognition software. I have made every reasonable attempt to correct obvious errors, but I expect that there are errors of grammar and possibly content that I did not discover before finalizing the note.

## 2019-08-22 ENCOUNTER — HOSPITAL ENCOUNTER (OUTPATIENT)
Dept: LAB | Facility: MEDICAL CENTER | Age: 54
End: 2019-08-22
Attending: INTERNAL MEDICINE
Payer: MEDICAID

## 2019-08-22 LAB
ALBUMIN SERPL BCP-MCNC: 3.5 G/DL (ref 3.2–4.9)
ALBUMIN/GLOB SERPL: 0.8 G/DL
ALP SERPL-CCNC: 124 U/L (ref 30–99)
ALT SERPL-CCNC: 25 U/L (ref 2–50)
ANION GAP SERPL CALC-SCNC: 9 MMOL/L (ref 0–11.9)
ANISOCYTOSIS BLD QL SMEAR: ABNORMAL
APPEARANCE UR: CLEAR
AST SERPL-CCNC: 37 U/L (ref 12–45)
BASOPHILS # BLD AUTO: 0.9 % (ref 0–1.8)
BASOPHILS # BLD: 0.04 K/UL (ref 0–0.12)
BILIRUB SERPL-MCNC: 1.1 MG/DL (ref 0.1–1.5)
BILIRUB UR QL STRIP.AUTO: NEGATIVE
BUN SERPL-MCNC: 7 MG/DL (ref 8–22)
CALCIUM SERPL-MCNC: 9.2 MG/DL (ref 8.5–10.5)
CEA SERPL-MCNC: 364.5 NG/ML (ref 0–3)
CHLORIDE SERPL-SCNC: 101 MMOL/L (ref 96–112)
CO2 SERPL-SCNC: 25 MMOL/L (ref 20–33)
COLOR UR: ABNORMAL
COMMENT 1642: NORMAL
CREAT SERPL-MCNC: 0.65 MG/DL (ref 0.5–1.4)
EOSINOPHIL # BLD AUTO: 0.06 K/UL (ref 0–0.51)
EOSINOPHIL NFR BLD: 1.3 % (ref 0–6.9)
ERYTHROCYTE [DISTWIDTH] IN BLOOD BY AUTOMATED COUNT: 70.6 FL (ref 35.9–50)
GLOBULIN SER CALC-MCNC: 4.6 G/DL (ref 1.9–3.5)
GLUCOSE SERPL-MCNC: 196 MG/DL (ref 65–99)
GLUCOSE UR STRIP.AUTO-MCNC: 250 MG/DL
HCT VFR BLD AUTO: 37.2 % (ref 42–52)
HGB BLD-MCNC: 12.9 G/DL (ref 14–18)
IMM GRANULOCYTES # BLD AUTO: 0.01 K/UL (ref 0–0.11)
IMM GRANULOCYTES NFR BLD AUTO: 0.2 % (ref 0–0.9)
KETONES UR STRIP.AUTO-MCNC: NEGATIVE MG/DL
LEUKOCYTE ESTERASE UR QL STRIP.AUTO: NEGATIVE
LYMPHOCYTES # BLD AUTO: 1.43 K/UL (ref 1–4.8)
LYMPHOCYTES NFR BLD: 30.6 % (ref 22–41)
MACROCYTES BLD QL SMEAR: ABNORMAL
MAGNESIUM SERPL-MCNC: 1.8 MG/DL (ref 1.5–2.5)
MCH RBC QN AUTO: 36.4 PG (ref 27–33)
MCHC RBC AUTO-ENTMCNC: 34.7 G/DL (ref 33.7–35.3)
MCV RBC AUTO: 105.1 FL (ref 81.4–97.8)
MICRO URNS: ABNORMAL
MONOCYTES # BLD AUTO: 0.72 K/UL (ref 0–0.85)
MONOCYTES NFR BLD AUTO: 15.4 % (ref 0–13.4)
MORPHOLOGY BLD-IMP: NORMAL
NEUTROPHILS # BLD AUTO: 2.42 K/UL (ref 1.82–7.42)
NEUTROPHILS NFR BLD: 51.6 % (ref 44–72)
NITRITE UR QL STRIP.AUTO: NEGATIVE
NRBC # BLD AUTO: 0 K/UL
NRBC BLD-RTO: 0 /100 WBC
PH UR STRIP.AUTO: 5.5 [PH] (ref 5–8)
PLATELET # BLD AUTO: 127 K/UL (ref 164–446)
PLATELET BLD QL SMEAR: NORMAL
PMV BLD AUTO: 10.3 FL (ref 9–12.9)
POTASSIUM SERPL-SCNC: 3.9 MMOL/L (ref 3.6–5.5)
PROT SERPL-MCNC: 8.1 G/DL (ref 6–8.2)
PROT UR QL STRIP: NEGATIVE MG/DL
RBC # BLD AUTO: 3.54 M/UL (ref 4.7–6.1)
RBC BLD AUTO: PRESENT
RBC UR QL AUTO: NEGATIVE
SODIUM SERPL-SCNC: 135 MMOL/L (ref 135–145)
SP GR UR STRIP.AUTO: 1.02
UROBILINOGEN UR STRIP.AUTO-MCNC: 1 MG/DL
WBC # BLD AUTO: 4.7 K/UL (ref 4.8–10.8)

## 2019-08-22 PROCEDURE — 80053 COMPREHEN METABOLIC PANEL: CPT

## 2019-08-22 PROCEDURE — 81003 URINALYSIS AUTO W/O SCOPE: CPT

## 2019-08-22 PROCEDURE — 83735 ASSAY OF MAGNESIUM: CPT

## 2019-08-22 PROCEDURE — 36415 COLL VENOUS BLD VENIPUNCTURE: CPT

## 2019-08-22 PROCEDURE — 82378 CARCINOEMBRYONIC ANTIGEN: CPT

## 2019-08-22 PROCEDURE — 85025 COMPLETE CBC W/AUTO DIFF WBC: CPT

## 2019-08-22 RX ORDER — DEXTROSE MONOHYDRATE 50 MG/ML
INJECTION, SOLUTION INTRAVENOUS CONTINUOUS
Status: CANCELLED | OUTPATIENT
Start: 2019-08-24

## 2019-08-22 RX ORDER — ONDANSETRON 8 MG/1
8 TABLET, ORALLY DISINTEGRATING ORAL
Status: CANCELLED | OUTPATIENT
Start: 2019-08-24

## 2019-08-22 RX ORDER — ONDANSETRON 2 MG/ML
4 INJECTION INTRAMUSCULAR; INTRAVENOUS
Status: CANCELLED | OUTPATIENT
Start: 2019-08-24

## 2019-08-22 RX ORDER — 0.9 % SODIUM CHLORIDE 0.9 %
VIAL (ML) INJECTION PRN
Status: CANCELLED | OUTPATIENT
Start: 2019-08-24

## 2019-08-22 RX ORDER — PROCHLORPERAZINE MALEATE 10 MG
10 TABLET ORAL EVERY 6 HOURS PRN
Status: CANCELLED | OUTPATIENT
Start: 2019-08-24

## 2019-08-22 RX ORDER — SODIUM CHLORIDE 9 MG/ML
INJECTION, SOLUTION INTRAVENOUS CONTINUOUS
Status: CANCELLED | OUTPATIENT
Start: 2019-08-24

## 2019-08-22 RX ORDER — 0.9 % SODIUM CHLORIDE 0.9 %
5 VIAL (ML) INJECTION PRN
Status: CANCELLED | OUTPATIENT
Start: 2019-08-24

## 2019-08-24 ENCOUNTER — OUTPATIENT INFUSION SERVICES (OUTPATIENT)
Dept: ONCOLOGY | Facility: MEDICAL CENTER | Age: 54
End: 2019-08-24
Attending: INTERNAL MEDICINE
Payer: MEDICAID

## 2019-08-24 VITALS
TEMPERATURE: 97.2 F | HEIGHT: 72 IN | SYSTOLIC BLOOD PRESSURE: 134 MMHG | OXYGEN SATURATION: 97 % | DIASTOLIC BLOOD PRESSURE: 78 MMHG | RESPIRATION RATE: 18 BRPM | WEIGHT: 244.49 LBS | BODY MASS INDEX: 33.12 KG/M2 | HEART RATE: 97 BPM

## 2019-08-24 DIAGNOSIS — C18.9 METASTATIC COLON CANCER TO LIVER (HCC): ICD-10-CM

## 2019-08-24 DIAGNOSIS — C78.7 METASTATIC COLON CANCER TO LIVER (HCC): ICD-10-CM

## 2019-08-24 PROCEDURE — 700105 HCHG RX REV CODE 258

## 2019-08-24 PROCEDURE — 96375 TX/PRO/DX INJ NEW DRUG ADDON: CPT

## 2019-08-24 PROCEDURE — 700105 HCHG RX REV CODE 258: Performed by: INTERNAL MEDICINE

## 2019-08-24 PROCEDURE — 700111 HCHG RX REV CODE 636 W/ 250 OVERRIDE (IP)

## 2019-08-24 PROCEDURE — 96367 TX/PROPH/DG ADDL SEQ IV INF: CPT

## 2019-08-24 PROCEDURE — 700111 HCHG RX REV CODE 636 W/ 250 OVERRIDE (IP): Performed by: INTERNAL MEDICINE

## 2019-08-24 PROCEDURE — A4212 NON CORING NEEDLE OR STYLET: HCPCS

## 2019-08-24 PROCEDURE — 96415 CHEMO IV INFUSION ADDL HR: CPT

## 2019-08-24 PROCEDURE — 96413 CHEMO IV INFUSION 1 HR: CPT

## 2019-08-24 PROCEDURE — 96417 CHEMO IV INFUS EACH ADDL SEQ: CPT

## 2019-08-24 RX ORDER — DEXTROSE MONOHYDRATE 50 MG/ML
INJECTION, SOLUTION INTRAVENOUS CONTINUOUS
Status: DISCONTINUED | OUTPATIENT
Start: 2019-08-24 | End: 2019-08-24 | Stop reason: HOSPADM

## 2019-08-24 RX ORDER — LORAZEPAM 1 MG/1
1 TABLET ORAL
COMMUNITY

## 2019-08-24 RX ADMIN — SODIUM CHLORIDE 150 MG: 9 INJECTION, SOLUTION INTRAVENOUS at 08:58

## 2019-08-24 RX ADMIN — HEPARIN 500 UNITS: 100 SYRINGE at 13:00

## 2019-08-24 RX ADMIN — DEXAMETHASONE SODIUM PHOSPHATE 12 MG: 4 INJECTION, SOLUTION INTRAMUSCULAR; INTRAVENOUS at 08:18

## 2019-08-24 RX ADMIN — OXALIPLATIN 247.52 MG: 100 INJECTION, SOLUTION, CONCENTRATE INTRAVENOUS at 09:42

## 2019-08-24 RX ADMIN — BEVACIZUMAB 800 MG: 400 INJECTION, SOLUTION INTRAVENOUS at 12:14

## 2019-08-24 RX ADMIN — DEXTROSE MONOHYDRATE: 50 INJECTION, SOLUTION INTRAVENOUS at 08:17

## 2019-08-24 RX ADMIN — ONDANSETRON HYDROCHLORIDE 16 MG: 2 SOLUTION INTRAMUSCULAR; INTRAVENOUS at 08:35

## 2019-08-24 NOTE — PROGRESS NOTES
Chemotherapy Verification - PRIMARY RN      Height = 72.44in  Weight = 110.9kg  BSA = 2.37m2       Medication: Oxaliplatin  Dose: 104mg/m2  Calculated Dose: 247mg                             (In mg/m2, AUC, mg/kg)     Medication: Avastin  Dose: 7.5mg/kg  Calculated Dose: 831.7mg                             (In mg/m2, AUC, mg/kg)    I confirm this process was performed independently with the BSA and all final chemotherapy dosing calculations congruent.  Any discrepancies of 10% or greater have been addressed with the chemotherapy pharmacist. The resolution of the discrepancy has been documented in the EPIC progress notes.

## 2019-08-24 NOTE — PROGRESS NOTES
Chemotherapy Verification - SECONDARY RN       Height = 184 cm  Weight = 110.9 kg  BSA = 2.38 m2       Medication: Oxaliplatin  Dose: 104 mg/m2 dr  Calculated Dose: 247.52 mg                             (In mg/m2, AUC, mg/kg)     Medication: Avastin  Dose: 7.5 mg/kg  Calculated Dose: 831.75 mg round to vial                             (In mg/m2, AUC, mg/kg)    I confirm that this process was performed independently.

## 2019-08-24 NOTE — PROGRESS NOTES
Patient arrived ambulatory to the [facility] for C13D1 of Oxaliplatin/Avastin. Reviewed vital signs, labs, and physician order. Patient denies S&S of infection, or bleeding. Reports taking Xeloda at home. Pt arrives with Emla cream applied to right chest port, port accessed with sterile technique, visualized brisk blood return. Labs and vital signs within parameters to receive treatment. Pre-treament medications administered. Oxaliplatin administered, no adverse reaction observed. Avastin administered over 30 mins, no adverse reaction observed. Port flushed per protocol, glover needle removed with tip intact, gauze and tape dressing placed. Confirmed upcoming appointment date and time with patient. Patient left the [facility] ambulatory in no sign of distress.

## 2019-08-24 NOTE — PROGRESS NOTES
"Pharmacy Chemotherapy Verification  Patient Name: Gurmeet Jo  Dx: metastatic colorectal cancer    Cycle 13  Previous treatment = 8/3/19    Regimen: CapeOx + Bevacizumab  · Capecitabine (Xeloda) 850-1000 mg/m2 PO BID from the evening of day 1 to the morning of day 15 (28 doses per cycle)-  home prescription  ? Note: In Chad et al. 2008--the same study as Aura jaime al. 2008--capecitabine was described as being given 1000 mg/m2 PO BID on days 1 to 14  · Oxaliplatin (Eloxatin)  IV over 2 hours once on day 1, given second  ? 5/11/19: dose decreased to 104 mg/m2 d/t neuropathy  · Bevacizumab (Avastin) 7.5 mg/kg IV over 30 to 90 minutes once on day 1, given first  ? HOLD cycle 1 due to recent port placement (12/11/18)  ? HOLD Cycle 2 per MD  21-day cycles until DP/UT  NCCN guidelines for Colon Cancer V.2.2018  Aura PATRICK, et al - J Clin Oncol. 2008 Apr 20;26(12):2006-12.  barbara Padilla - J Clin Oncol. 2008 Apr 20;26(12):2013-9.    Allergies:Patient has no known allergies.  /78   Pulse 97   Temp 36.2 °C (97.2 °F) (Temporal)   Resp 18   Ht 1.84 m (6' 0.44\") Comment: took 1 inch off of shoes  Wt 110.9 kg (244 lb 7.8 oz)   SpO2 97%   BMI 32.76 kg/m²   Body surface area is 2.38 meters squared.     Labs from 8/22/19 reviewed - all within treatment parameters, including UA and BP.      OXALIplatin (Eloxatin) 104 mg/m2 x 2.38 m2 = 247.52 mg    <10% difference, OK to treat with final dose = 247.52 mg IV     Bevacizumab (Avastin) 7.5 mg/kg x 110.9 kg = 831.75 mg   Rounded to vial size (within 10%) per dose rounding protocol = 800 mg IV      Elsy Londono, PharmD, BCOP      "

## 2019-08-24 NOTE — PROGRESS NOTES
"Pharmacy Chemotherapy Verification    Patient Name: Gurmeet Jo      Dx: metastatic colon cancer         Protocol: CapeOx + Avastin     Bevacizumab (Avastin) 7.5 mg/kg IV on day 1   -Holding Avastin for C1 for recent port placement   -Continue to hold Avastin for C2 per Dr. Ross   OXALIplatin 130 mg/m2 IV over 2 hrs on Day 1   -05/11/19: dose reduced to 104 mg/m2 for neuropathy per Dr. Ross   Capecitabine 850-1000 mg/m2 PO twice daily x 28 doses   21-day cycle until disease progression or unacceptable toxicity  NCCN Guidelines for Colon Cancer. V.2.2018.  Aura PATRICK et al. JCO. 2008;26(12):2006-12.  Chad YUAN et al. JCO. 2008;26(12):2013-9.    Allergies:  Patient has no known allergies.    /78   Pulse 97   Temp 36.2 °C (97.2 °F) (Temporal)   Resp 18   Ht 1.84 m (6' 0.44\") Comment: took 1 inch off of shoes  Wt 110.9 kg (244 lb 7.8 oz)   SpO2 97%   BMI 32.76 kg/m²  Body surface area is 2.38 meters squared.    Labs 08/22/19  ANC~ 2400 Plt = 127k Hgb = 12.9   SCr = 0.65 mg/dL CrCl ~ >125 mL/min (minimum SCr of 0.7)  LFT's = 37/25/124 TBili = 1.1  Urine Protein = Negative      Drug Order   (Drug name, dose, route, IV Fluid & volume, frequency, number of doses) Cycle 13   Previous treatment: C12 = 08/03/19     Medication = OXALIplatin (Eloxatin)  Base Dose = 104 mg/m2  Calc Dose: Base Dose x 2.38 m2 = 247.52 mg  Final Dose = 247.52 mg  Route = IV  Fluid & Volume = D5W 250 mL  Admin Duration = Over 2 hours          <10% difference, okay to treat with final dose   Medication = Bevacizumab (Avastin)  Base Dose = 7.5 mg/kg  Calc Dose: Base Dose x 110.9 kg = 831.7 mg  Final Dose = 800 mg  Route = IV  Fluid & Volume =  mL  Admin Duration = Over 30 minutes          Dose rounded to vial size (within 10%) per RX protocol       By my signature below, I confirm this process was performed independently with the BSA and all final chemotherapy dosing calculations congruent. I have reviewed the above " chemotherapy order and that my calculation of the final dose and BSA (when applicable) corroborate those calculations of the  pharmacist.     Yosef Hook, PharmD, BCOP

## 2019-09-12 ENCOUNTER — HOSPITAL ENCOUNTER (OUTPATIENT)
Dept: LAB | Facility: MEDICAL CENTER | Age: 54
End: 2019-09-12
Attending: INTERNAL MEDICINE
Payer: MEDICAID

## 2019-09-12 LAB
ALBUMIN SERPL BCP-MCNC: 3.5 G/DL (ref 3.2–4.9)
ALBUMIN/GLOB SERPL: 0.7 G/DL
ALP SERPL-CCNC: 135 U/L (ref 30–99)
ALT SERPL-CCNC: 36 U/L (ref 2–50)
ANION GAP SERPL CALC-SCNC: 7 MMOL/L (ref 0–11.9)
ANISOCYTOSIS BLD QL SMEAR: ABNORMAL
APPEARANCE UR: CLEAR
AST SERPL-CCNC: 44 U/L (ref 12–45)
BASOPHILS # BLD AUTO: 0.8 % (ref 0–1.8)
BASOPHILS # BLD: 0.04 K/UL (ref 0–0.12)
BILIRUB SERPL-MCNC: 1 MG/DL (ref 0.1–1.5)
BILIRUB UR QL STRIP.AUTO: NEGATIVE
BUN SERPL-MCNC: 8 MG/DL (ref 8–22)
CALCIUM SERPL-MCNC: 9.5 MG/DL (ref 8.5–10.5)
CEA SERPL-MCNC: 513.4 NG/ML (ref 0–3)
CHLORIDE SERPL-SCNC: 103 MMOL/L (ref 96–112)
CO2 SERPL-SCNC: 25 MMOL/L (ref 20–33)
COLOR UR: YELLOW
COMMENT 1642: NORMAL
CREAT SERPL-MCNC: 0.66 MG/DL (ref 0.5–1.4)
EOSINOPHIL # BLD AUTO: 0.06 K/UL (ref 0–0.51)
EOSINOPHIL NFR BLD: 1.1 % (ref 0–6.9)
ERYTHROCYTE [DISTWIDTH] IN BLOOD BY AUTOMATED COUNT: 74.7 FL (ref 35.9–50)
GLOBULIN SER CALC-MCNC: 5.1 G/DL (ref 1.9–3.5)
GLUCOSE SERPL-MCNC: 176 MG/DL (ref 65–99)
GLUCOSE UR STRIP.AUTO-MCNC: 500 MG/DL
HCT VFR BLD AUTO: 40.7 % (ref 42–52)
HGB BLD-MCNC: 13.3 G/DL (ref 14–18)
IMM GRANULOCYTES # BLD AUTO: 0.01 K/UL (ref 0–0.11)
IMM GRANULOCYTES NFR BLD AUTO: 0.2 % (ref 0–0.9)
KETONES UR STRIP.AUTO-MCNC: NEGATIVE MG/DL
LEUKOCYTE ESTERASE UR QL STRIP.AUTO: NEGATIVE
LYMPHOCYTES # BLD AUTO: 1.51 K/UL (ref 1–4.8)
LYMPHOCYTES NFR BLD: 28.7 % (ref 22–41)
MACROCYTES BLD QL SMEAR: ABNORMAL
MAGNESIUM SERPL-MCNC: 1.9 MG/DL (ref 1.5–2.5)
MCH RBC QN AUTO: 35.4 PG (ref 27–33)
MCHC RBC AUTO-ENTMCNC: 32.7 G/DL (ref 33.7–35.3)
MCV RBC AUTO: 108.2 FL (ref 81.4–97.8)
MICRO URNS: ABNORMAL
MONOCYTES # BLD AUTO: 0.77 K/UL (ref 0–0.85)
MONOCYTES NFR BLD AUTO: 14.6 % (ref 0–13.4)
MORPHOLOGY BLD-IMP: NORMAL
NEUTROPHILS # BLD AUTO: 2.87 K/UL (ref 1.82–7.42)
NEUTROPHILS NFR BLD: 54.6 % (ref 44–72)
NITRITE UR QL STRIP.AUTO: NEGATIVE
NRBC # BLD AUTO: 0 K/UL
NRBC BLD-RTO: 0 /100 WBC
PH UR STRIP.AUTO: 6 [PH] (ref 5–8)
PLATELET # BLD AUTO: 109 K/UL (ref 164–446)
PLATELET BLD QL SMEAR: NORMAL
PMV BLD AUTO: 10.1 FL (ref 9–12.9)
POTASSIUM SERPL-SCNC: 4 MMOL/L (ref 3.6–5.5)
PROT SERPL-MCNC: 8.6 G/DL (ref 6–8.2)
PROT UR QL STRIP: NEGATIVE MG/DL
RBC # BLD AUTO: 3.76 M/UL (ref 4.7–6.1)
RBC BLD AUTO: PRESENT
RBC UR QL AUTO: NEGATIVE
SODIUM SERPL-SCNC: 135 MMOL/L (ref 135–145)
SP GR UR STRIP.AUTO: 1.02
UROBILINOGEN UR STRIP.AUTO-MCNC: 0.2 MG/DL
WBC # BLD AUTO: 5.3 K/UL (ref 4.8–10.8)

## 2019-09-12 PROCEDURE — 36415 COLL VENOUS BLD VENIPUNCTURE: CPT

## 2019-09-12 PROCEDURE — 85025 COMPLETE CBC W/AUTO DIFF WBC: CPT

## 2019-09-12 PROCEDURE — 82378 CARCINOEMBRYONIC ANTIGEN: CPT

## 2019-09-12 PROCEDURE — 80053 COMPREHEN METABOLIC PANEL: CPT

## 2019-09-12 PROCEDURE — 81003 URINALYSIS AUTO W/O SCOPE: CPT

## 2019-09-12 PROCEDURE — 83735 ASSAY OF MAGNESIUM: CPT

## 2019-09-13 RX ORDER — DEXTROSE MONOHYDRATE 50 MG/ML
INJECTION, SOLUTION INTRAVENOUS CONTINUOUS
Status: CANCELLED | OUTPATIENT
Start: 2019-09-14

## 2019-09-13 RX ORDER — SODIUM CHLORIDE 9 MG/ML
INJECTION, SOLUTION INTRAVENOUS CONTINUOUS
Status: CANCELLED | OUTPATIENT
Start: 2019-09-14

## 2019-09-13 RX ORDER — 0.9 % SODIUM CHLORIDE 0.9 %
5 VIAL (ML) INJECTION PRN
Status: CANCELLED | OUTPATIENT
Start: 2019-09-14

## 2019-09-13 RX ORDER — ONDANSETRON 8 MG/1
8 TABLET, ORALLY DISINTEGRATING ORAL
Status: CANCELLED | OUTPATIENT
Start: 2019-09-14

## 2019-09-13 RX ORDER — 0.9 % SODIUM CHLORIDE 0.9 %
VIAL (ML) INJECTION PRN
Status: CANCELLED | OUTPATIENT
Start: 2019-09-14

## 2019-09-13 RX ORDER — PROCHLORPERAZINE MALEATE 10 MG
10 TABLET ORAL EVERY 6 HOURS PRN
Status: CANCELLED | OUTPATIENT
Start: 2019-09-14

## 2019-09-13 RX ORDER — ONDANSETRON 2 MG/ML
4 INJECTION INTRAMUSCULAR; INTRAVENOUS
Status: CANCELLED | OUTPATIENT
Start: 2019-09-14

## 2019-09-14 ENCOUNTER — OUTPATIENT INFUSION SERVICES (OUTPATIENT)
Dept: ONCOLOGY | Facility: MEDICAL CENTER | Age: 54
End: 2019-09-14
Attending: INTERNAL MEDICINE
Payer: MEDICAID

## 2019-09-14 VITALS
BODY MASS INDEX: 32.25 KG/M2 | WEIGHT: 243.34 LBS | RESPIRATION RATE: 18 BRPM | HEART RATE: 93 BPM | HEIGHT: 73 IN | DIASTOLIC BLOOD PRESSURE: 83 MMHG | OXYGEN SATURATION: 98 % | TEMPERATURE: 97.4 F | SYSTOLIC BLOOD PRESSURE: 141 MMHG

## 2019-09-14 DIAGNOSIS — C78.7 METASTATIC COLON CANCER TO LIVER (HCC): ICD-10-CM

## 2019-09-14 DIAGNOSIS — C18.9 METASTATIC COLON CANCER TO LIVER (HCC): ICD-10-CM

## 2019-09-14 PROCEDURE — 96375 TX/PRO/DX INJ NEW DRUG ADDON: CPT

## 2019-09-14 PROCEDURE — 700105 HCHG RX REV CODE 258: Performed by: INTERNAL MEDICINE

## 2019-09-14 PROCEDURE — 96413 CHEMO IV INFUSION 1 HR: CPT

## 2019-09-14 PROCEDURE — 96367 TX/PROPH/DG ADDL SEQ IV INF: CPT

## 2019-09-14 PROCEDURE — 96415 CHEMO IV INFUSION ADDL HR: CPT

## 2019-09-14 PROCEDURE — A4212 NON CORING NEEDLE OR STYLET: HCPCS

## 2019-09-14 PROCEDURE — 700111 HCHG RX REV CODE 636 W/ 250 OVERRIDE (IP): Performed by: INTERNAL MEDICINE

## 2019-09-14 PROCEDURE — 700111 HCHG RX REV CODE 636 W/ 250 OVERRIDE (IP)

## 2019-09-14 PROCEDURE — 700105 HCHG RX REV CODE 258

## 2019-09-14 PROCEDURE — 96417 CHEMO IV INFUS EACH ADDL SEQ: CPT

## 2019-09-14 RX ADMIN — OXALIPLATIN 247.52 MG: 100 INJECTION, SOLUTION, CONCENTRATE INTRAVENOUS at 09:25

## 2019-09-14 RX ADMIN — ONDANSETRON HYDROCHLORIDE 16 MG: 2 SOLUTION INTRAMUSCULAR; INTRAVENOUS at 08:30

## 2019-09-14 RX ADMIN — BEVACIZUMAB 800 MG: 400 INJECTION, SOLUTION INTRAVENOUS at 11:50

## 2019-09-14 RX ADMIN — HEPARIN 500 UNITS: 100 SYRINGE at 12:35

## 2019-09-14 RX ADMIN — DEXAMETHASONE SODIUM PHOSPHATE 12 MG: 4 INJECTION, SOLUTION INTRAMUSCULAR; INTRAVENOUS at 08:15

## 2019-09-14 RX ADMIN — SODIUM CHLORIDE 150 MG: 9 INJECTION, SOLUTION INTRAVENOUS at 08:50

## 2019-09-14 NOTE — PROGRESS NOTES
Chemotherapy Verification - PRIMARY RN      Height = 72.84 in  Weight = 243 lb  BSA = 2.38 m2       Medication: Oxaliplatin  Dose: 104 mg/m2  Calculated Dose: 247.52 mg                             (In mg/m2, AUC, mg/kg)     Medication: Avastin  Dose: 7.5 mg/kg  Calculated Dose: 828 mg                             (In mg/m2, AUC, mg/kg)    I confirm this process was performed independently with the BSA and all final chemotherapy dosing calculations congruent.  Any discrepancies of 10% or greater have been addressed with the chemotherapy pharmacist. The resolution of the discrepancy has been documented in the EPIC progress notes.

## 2019-09-14 NOTE — PROGRESS NOTES
"Pharmacy Chemotherapy Verification Note:    Patient Name: Gurmeet Jo      Dx: Metastatic Colon CA      Protocol: CapeOX+bevacizumab       *Dosing Reference*  Bevacizumab (Avastin) 7.5 mg/kg IV on day 1              - Held for C1 and C2  OXALIplatin 130 mg/m² IV over 2 hrs on Day 1              - 05/11/19: dose reduced to 104 mg/m² for neuropathy per Dr. Ross   Capecitabine 850-1000 mg/m² PO twice daily x28 doses   21-day cycle until disease progression or unacceptable toxicity    NCCN Guidelines for Colon Cancer. V.2.2018.  Aura PATRICK, et al. JCO. 2008;26(12):2006-12.  Chad YUAN et al. JCO. 2008;26(12):2013-9.    Allergies:  Patient has no known allergies.     /83   Pulse 93   Temp 36.3 °C (97.4 °F) (Temporal)   Resp 18   Ht 1.85 m (6' 0.84\") Comment: took 1 inch off of shoes  Wt 110.4 kg (243 lb 5.5 oz)   SpO2 98%   BMI 32.25 kg/m²  Body surface area is 2.38 meters squared.   Labs from 9/12/19:  ANC~ 2870  Plt = 109 k  SCr = 0.66 mg/dL  CrCl = >125 mL/min (using min SCr 0.7 mg/dL and current weight)  LFT = WNL  TBili = 1  Mg = 1.9  Urine protein = negative     Drug Order   (Drug name, dose, route, IV Fluid & volume, frequency, number of doses) Cycle: 14      Previous treatment: C13 = 8/24/19     Medication = OXALIplatin (Eloxitan)  Base Dose = 104 mg/m²  Calc Dose: Base Dose x 2.38 m² = 248 mg  Final Dose = 247.52 mg  Route = IV  Fluid & Volume = D5W 250 mL  Admin Duration = Over 2 hours          <10% difference, ok to treat with final written dose   Medication = Bevacizu,ab (Avastin)   Base Dose = 7.5 mg/kg  Calc Dose: Base Dose x 110.4 kg = 828 mg  Final Dose = 800 mg  Route = IV  Fluid & Volume =  mL  Admin Duration = Over 30 minutes          <10% difference, rounded to vial size per protocol, ok to treat with final written dose     By my signature below, I confirm this process was performed independently with the BSA and all final chemotherapy dosing calculations congruent. I have " reviewed the above chemotherapy order and that my calculation of the final dose and BSA (when applicable) corroborate those calculations of the  pharmacist.     Ernesto Portillo, PharmD, BCPS

## 2019-09-14 NOTE — PROGRESS NOTES
into [facility] Cycle 14 of Oxaliplatin / Avastin for Colorectal Cancer. Pt denied having any new or acute complaints today, reports tolerating past treatments well. Port accessed in a sterile manner, had + blood return, flushed briskly. Pt given Chemotherapy as prescribed, tolerated well, denied having any complaints during or after infusion. Port had + blood return after, flushed per [facility] policy, de-accessed, needle intact, insertion site covered with sterile gauze and paper tape. Discharge home to self care in [facility]. Appointment confirm for next treatment.

## 2019-09-14 NOTE — PROGRESS NOTES
"Pharmacy Chemotherapy Verification  Patient Name: Gurmeet Jo  Dx: metastatic colorectal cancer    Cycle 14  Previous treatment = 8/24/19    Regimen: CapeOx + Bevacizumab  · Capecitabine (Xeloda) 850-1000 mg/m2 PO BID from the evening of day 1 to the morning of day 15 (28 doses per cycle)-  home prescription  ? Note: In Chad et al. 2008--the same study as Aura jaime al. 2008--capecitabine was described as being given 1000 mg/m2 PO BID on days 1 to 14  · Oxaliplatin (Eloxatin)  IV over 2 hours once on day 1, given second  ? 5/11/19: dose decreased to 104 mg/m2 d/t neuropathy  · Bevacizumab (Avastin) 7.5 mg/kg IV over 30 to 90 minutes once on day 1, given first  ? HOLD cycle 1 due to recent port placement (12/11/18)  ? HOLD Cycle 2 per MD  21-day cycles until DP/UT  NCCN guidelines for Colon Cancer V.2.2018  Aura PATRICK, et al - J Clin Oncol. 2008 Apr 20;26(12):2006-12.  barbara Padilla - J Clin Oncol. 2008 Apr 20;26(12):2013-9.    Allergies:Patient has no known allergies.  /83   Pulse 93   Temp 36.3 °C (97.4 °F) (Temporal)   Resp 18   Ht 1.85 m (6' 0.84\") Comment: took 1 inch off of shoes  Wt 110.4 kg (243 lb 5.5 oz)   SpO2 98%   BMI 32.25 kg/m²   Body surface area is 2.38 meters squared.     Labs from 9/12/19 reviewed - all within treatment parameters, including UA and BP.      OXALIplatin (Eloxatin) 104 mg/m2 x 2.38 m2 = 247.52 mg    <10% difference, OK to treat with final dose = 247.52 mg IV     Bevacizumab (Avastin) 7.5 mg/kg x 110.4 kg = 828 mg   Rounded to vial size (within 10%) per dose rounding protocol = 800 mg IV      Elsy Londono, PharmD, BCOP      "

## 2019-09-14 NOTE — PROGRESS NOTES
Chemotherapy Verification - SECONDARY RN       Height = 185 cm  Weight = 110.4 kg  BSA = 2.38 m2       Medication: Oxaliplatin  Dose: 104 mg/m2 dr Calculated Dose: 247.7 mg                             (In mg/m2, AUC, mg/kg)     Medication: Avastin  Dose: 7.5 mg/m2  Calculated Dose: 828 mg                             (In mg/m2, AUC, mg/kg)      I confirm that this process was performed independently.

## 2019-09-30 ENCOUNTER — HOSPITAL ENCOUNTER (OUTPATIENT)
Dept: RADIOLOGY | Facility: MEDICAL CENTER | Age: 54
End: 2019-09-30
Attending: INTERNAL MEDICINE
Payer: MEDICAID

## 2019-09-30 DIAGNOSIS — C18.9 MALIGNANT NEOPLASM OF COLON, UNSPECIFIED PART OF COLON (HCC): ICD-10-CM

## 2019-09-30 PROCEDURE — 71260 CT THORAX DX C+: CPT

## 2019-09-30 PROCEDURE — 700117 HCHG RX CONTRAST REV CODE 255: Performed by: INTERNAL MEDICINE

## 2019-09-30 RX ADMIN — IOHEXOL 25 ML: 240 INJECTION, SOLUTION INTRATHECAL; INTRAVASCULAR; INTRAVENOUS; ORAL at 13:22

## 2019-09-30 RX ADMIN — IOHEXOL 100 ML: 350 INJECTION, SOLUTION INTRAVENOUS at 13:22

## 2019-10-04 RX ORDER — 0.9 % SODIUM CHLORIDE 0.9 %
VIAL (ML) INJECTION PRN
Status: CANCELLED | OUTPATIENT
Start: 2019-10-19

## 2019-10-04 RX ORDER — ONDANSETRON 8 MG/1
8 TABLET, ORALLY DISINTEGRATING ORAL PRN
Status: CANCELLED | OUTPATIENT
Start: 2019-10-19

## 2019-10-04 RX ORDER — 0.9 % SODIUM CHLORIDE 0.9 %
VIAL (ML) INJECTION PRN
Status: CANCELLED | OUTPATIENT
Start: 2019-10-11

## 2019-10-04 RX ORDER — 0.9 % SODIUM CHLORIDE 0.9 %
5 VIAL (ML) INJECTION PRN
Status: CANCELLED | OUTPATIENT
Start: 2019-10-11

## 2019-10-04 RX ORDER — 0.9 % SODIUM CHLORIDE 0.9 %
20 VIAL (ML) INJECTION PRN
Status: CANCELLED | OUTPATIENT
Start: 2019-10-21

## 2019-10-04 RX ORDER — DEXTROSE MONOHYDRATE 50 MG/ML
INJECTION, SOLUTION INTRAVENOUS CONTINUOUS
Status: CANCELLED | OUTPATIENT
Start: 2019-10-19

## 2019-10-04 RX ORDER — 0.9 % SODIUM CHLORIDE 0.9 %
5 VIAL (ML) INJECTION PRN
Status: CANCELLED | OUTPATIENT
Start: 2019-10-19

## 2019-10-04 RX ORDER — LOPERAMIDE HYDROCHLORIDE 2 MG/1
2 CAPSULE ORAL
Status: CANCELLED | OUTPATIENT
Start: 2019-10-19

## 2019-10-04 RX ORDER — LOPERAMIDE HYDROCHLORIDE 2 MG/1
4 CAPSULE ORAL PRN
Status: CANCELLED | OUTPATIENT
Start: 2019-10-19

## 2019-10-04 RX ORDER — SODIUM CHLORIDE 9 MG/ML
INJECTION, SOLUTION INTRAVENOUS CONTINUOUS
Status: CANCELLED | OUTPATIENT
Start: 2019-10-19

## 2019-10-04 RX ORDER — ONDANSETRON 2 MG/ML
4 INJECTION INTRAMUSCULAR; INTRAVENOUS PRN
Status: CANCELLED | OUTPATIENT
Start: 2019-10-19

## 2019-10-04 RX ORDER — PROCHLORPERAZINE MALEATE 10 MG
10 TABLET ORAL EVERY 6 HOURS PRN
Status: CANCELLED | OUTPATIENT
Start: 2019-10-19

## 2019-10-05 ENCOUNTER — APPOINTMENT (OUTPATIENT)
Dept: ONCOLOGY | Facility: MEDICAL CENTER | Age: 54
End: 2019-10-05
Attending: INTERNAL MEDICINE
Payer: MEDICAID

## 2019-10-10 ENCOUNTER — HOSPITAL ENCOUNTER (OUTPATIENT)
Dept: LAB | Facility: MEDICAL CENTER | Age: 54
End: 2019-10-10
Attending: INTERNAL MEDICINE
Payer: MEDICAID

## 2019-10-10 LAB
ALBUMIN SERPL BCP-MCNC: 3.5 G/DL (ref 3.2–4.9)
ALBUMIN/GLOB SERPL: 0.7 G/DL
ALP SERPL-CCNC: 180 U/L (ref 30–99)
ALT SERPL-CCNC: 33 U/L (ref 2–50)
AMORPH CRY #/AREA URNS HPF: PRESENT /HPF
ANION GAP SERPL CALC-SCNC: 7 MMOL/L (ref 0–11.9)
APPEARANCE UR: ABNORMAL
AST SERPL-CCNC: 48 U/L (ref 12–45)
BACTERIA #/AREA URNS HPF: ABNORMAL /HPF
BASOPHILS # BLD AUTO: 1 % (ref 0–1.8)
BASOPHILS # BLD: 0.05 K/UL (ref 0–0.12)
BILIRUB SERPL-MCNC: 1 MG/DL (ref 0.1–1.5)
BILIRUB UR QL STRIP.AUTO: ABNORMAL
BUN SERPL-MCNC: 8 MG/DL (ref 8–22)
CALCIUM SERPL-MCNC: 9.3 MG/DL (ref 8.5–10.5)
CEA SERPL-MCNC: 616.3 NG/ML (ref 0–3)
CHLORIDE SERPL-SCNC: 101 MMOL/L (ref 96–112)
CO2 SERPL-SCNC: 27 MMOL/L (ref 20–33)
COLOR UR: YELLOW
CREAT SERPL-MCNC: 0.68 MG/DL (ref 0.5–1.4)
EOSINOPHIL # BLD AUTO: 0.07 K/UL (ref 0–0.51)
EOSINOPHIL NFR BLD: 1.3 % (ref 0–6.9)
EPI CELLS #/AREA URNS HPF: ABNORMAL /HPF
ERYTHROCYTE [DISTWIDTH] IN BLOOD BY AUTOMATED COUNT: 64.1 FL (ref 35.9–50)
GLOBULIN SER CALC-MCNC: 4.8 G/DL (ref 1.9–3.5)
GLUCOSE SERPL-MCNC: 234 MG/DL (ref 65–99)
GLUCOSE UR STRIP.AUTO-MCNC: 500 MG/DL
HCT VFR BLD AUTO: 40.4 % (ref 42–52)
HGB BLD-MCNC: 13.3 G/DL (ref 14–18)
IMM GRANULOCYTES # BLD AUTO: 0.02 K/UL (ref 0–0.11)
IMM GRANULOCYTES NFR BLD AUTO: 0.4 % (ref 0–0.9)
KETONES UR STRIP.AUTO-MCNC: NEGATIVE MG/DL
LEUKOCYTE ESTERASE UR QL STRIP.AUTO: NEGATIVE
LYMPHOCYTES # BLD AUTO: 1.42 K/UL (ref 1–4.8)
LYMPHOCYTES NFR BLD: 27.1 % (ref 22–41)
MAGNESIUM SERPL-MCNC: 1.8 MG/DL (ref 1.5–2.5)
MCH RBC QN AUTO: 34.3 PG (ref 27–33)
MCHC RBC AUTO-ENTMCNC: 32.9 G/DL (ref 33.7–35.3)
MCV RBC AUTO: 104.1 FL (ref 81.4–97.8)
MICRO URNS: ABNORMAL
MONOCYTES # BLD AUTO: 0.93 K/UL (ref 0–0.85)
MONOCYTES NFR BLD AUTO: 17.7 % (ref 0–13.4)
MUCOUS THREADS #/AREA URNS HPF: ABNORMAL /HPF
NEUTROPHILS # BLD AUTO: 2.75 K/UL (ref 1.82–7.42)
NEUTROPHILS NFR BLD: 52.5 % (ref 44–72)
NITRITE UR QL STRIP.AUTO: NEGATIVE
NRBC # BLD AUTO: 0 K/UL
NRBC BLD-RTO: 0 /100 WBC
PH UR STRIP.AUTO: 6 [PH] (ref 5–8)
PLATELET # BLD AUTO: 87 K/UL (ref 164–446)
PMV BLD AUTO: 11.3 FL (ref 9–12.9)
POTASSIUM SERPL-SCNC: 3.9 MMOL/L (ref 3.6–5.5)
PROT SERPL-MCNC: 8.3 G/DL (ref 6–8.2)
PROT UR QL STRIP: NEGATIVE MG/DL
RBC # BLD AUTO: 3.88 M/UL (ref 4.7–6.1)
RBC # URNS HPF: ABNORMAL /HPF
RBC UR QL AUTO: NEGATIVE
SODIUM SERPL-SCNC: 135 MMOL/L (ref 135–145)
SP GR UR STRIP.AUTO: 1.02
UROBILINOGEN UR STRIP.AUTO-MCNC: 1 MG/DL
WBC # BLD AUTO: 5.2 K/UL (ref 4.8–10.8)
WBC #/AREA URNS HPF: ABNORMAL /HPF

## 2019-10-10 PROCEDURE — 36415 COLL VENOUS BLD VENIPUNCTURE: CPT

## 2019-10-10 PROCEDURE — 85025 COMPLETE CBC W/AUTO DIFF WBC: CPT

## 2019-10-10 PROCEDURE — 81001 URINALYSIS AUTO W/SCOPE: CPT

## 2019-10-10 PROCEDURE — 83735 ASSAY OF MAGNESIUM: CPT

## 2019-10-10 PROCEDURE — 82378 CARCINOEMBRYONIC ANTIGEN: CPT

## 2019-10-10 PROCEDURE — 80053 COMPREHEN METABOLIC PANEL: CPT

## 2019-10-12 ENCOUNTER — OUTPATIENT INFUSION SERVICES (OUTPATIENT)
Dept: ONCOLOGY | Facility: MEDICAL CENTER | Age: 54
End: 2019-10-12
Attending: INTERNAL MEDICINE
Payer: MEDICAID

## 2019-10-12 VITALS
WEIGHT: 243.17 LBS | OXYGEN SATURATION: 98 % | SYSTOLIC BLOOD PRESSURE: 132 MMHG | DIASTOLIC BLOOD PRESSURE: 75 MMHG | HEIGHT: 73 IN | TEMPERATURE: 97.1 F | HEART RATE: 95 BPM | RESPIRATION RATE: 18 BRPM | BODY MASS INDEX: 32.23 KG/M2

## 2019-10-12 DIAGNOSIS — C18.9 METASTATIC COLON CANCER TO LIVER (HCC): ICD-10-CM

## 2019-10-12 DIAGNOSIS — C78.7 METASTATIC COLON CANCER TO LIVER (HCC): ICD-10-CM

## 2019-10-12 LAB
BASOPHILS # BLD AUTO: 0.7 % (ref 0–1.8)
BASOPHILS # BLD: 0.04 K/UL (ref 0–0.12)
EOSINOPHIL # BLD AUTO: 0.09 K/UL (ref 0–0.51)
EOSINOPHIL NFR BLD: 1.6 % (ref 0–6.9)
ERYTHROCYTE [DISTWIDTH] IN BLOOD BY AUTOMATED COUNT: 63.1 FL (ref 35.9–50)
HCT VFR BLD AUTO: 38.2 % (ref 42–52)
HGB BLD-MCNC: 13.2 G/DL (ref 14–18)
IMM GRANULOCYTES # BLD AUTO: 0.01 K/UL (ref 0–0.11)
IMM GRANULOCYTES NFR BLD AUTO: 0.2 % (ref 0–0.9)
LYMPHOCYTES # BLD AUTO: 1.17 K/UL (ref 1–4.8)
LYMPHOCYTES NFR BLD: 20.9 % (ref 22–41)
MCH RBC QN AUTO: 35.9 PG (ref 27–33)
MCHC RBC AUTO-ENTMCNC: 34.6 G/DL (ref 33.7–35.3)
MCV RBC AUTO: 103.8 FL (ref 81.4–97.8)
MONOCYTES # BLD AUTO: 0.85 K/UL (ref 0–0.85)
MONOCYTES NFR BLD AUTO: 15.2 % (ref 0–13.4)
NEUTROPHILS # BLD AUTO: 3.44 K/UL (ref 1.82–7.42)
NEUTROPHILS NFR BLD: 61.4 % (ref 44–72)
NRBC # BLD AUTO: 0 K/UL
NRBC BLD-RTO: 0 /100 WBC
PLATELET # BLD AUTO: 77 K/UL (ref 164–446)
PMV BLD AUTO: 10.4 FL (ref 9–12.9)
RBC # BLD AUTO: 3.68 M/UL (ref 4.7–6.1)
WBC # BLD AUTO: 5.6 K/UL (ref 4.8–10.8)

## 2019-10-12 PROCEDURE — A4212 NON CORING NEEDLE OR STYLET: HCPCS

## 2019-10-12 PROCEDURE — 85025 COMPLETE CBC W/AUTO DIFF WBC: CPT

## 2019-10-12 PROCEDURE — 700111 HCHG RX REV CODE 636 W/ 250 OVERRIDE (IP)

## 2019-10-12 PROCEDURE — 36591 DRAW BLOOD OFF VENOUS DEVICE: CPT

## 2019-10-12 RX ORDER — ACETAMINOPHEN 500 MG
500-1000 TABLET ORAL EVERY 6 HOURS PRN
COMMUNITY

## 2019-10-12 RX ADMIN — HEPARIN 500 UNITS: 100 SYRINGE at 11:28

## 2019-10-12 NOTE — PROGRESS NOTES
"Patient Name: Gurmeet Jo   Dx: Metastatic Colorectal CA       DELAYED 1 WEEK for LOW PLTS    Protocol: FOLFIRI + Bevacizumab   *Dosing Reference*  Irinotecan 180 mg/m2 IV over 30-90 min on Day 1  Leucovorin 400 mg/m2 IV over 30-90 min on Day 1  Fluorouracil 400 mg/m2 IV push on Day 1 followed by   Fluorouracil 2400 mg/m2 IV over 46-48 hours  Bevacizumab 5 mg/kg IV on Day 1   Repeat every 14 days until DP or UT     NCCN Guidelines for Colon Cancer V.3.2019.  Naa V, et al. Lancet Oncol. 2014;15(10):1065-75.  Akash BIRD, et al. J Clin Oncol.2007;25(30):4581-71.    Allergies:  Patient has no known allergies.     /90   Pulse 82   Temp 36.2 °C (97.1 °F) (Temporal)   Resp 18   Ht 1.85 m (6' 0.84\")   Wt 110.3 kg (243 lb 2.7 oz)   SpO2 98%   BMI 32.23 kg/m²  Body surface area is 2.38 meters squared.    Labs 10/12/19:  ANC~ 3440 Plt = 77k   Hgb = 13.2    Labs 10/10/19:  SCr = 0.68mg/dL CrCl > 125 mL/min (min Scr 0.7)   AST/ALT/AP = 48/33/180 TBili = 1  Mag = 1.8  K+ = 3.9  Urine protein = negative         Drug Order   (Drug name, dose, route, IV Fluid & volume, frequency, number of doses) Cycle:   DELAYED 1 WEEK for LOW PLTSPrevious treatment: s/p 14 cycles of CapeOx + bevacizumab last 9/14/19       Admin Duration = Over 30 min          Rounded to vial size (within 10%) per dose rounding protocol.            Infuse concurrently with leucovorin       < 10 % difference, ok to treat with final  dose        Infuse concurrently with irinotecan       < 10 % difference, ok to treat with final  dose             < 10 % difference, ok to treat with final  dose             < 10 % difference, ok to treat with final  dose     By my signature below, I confirm this process was performed independently with the BSA and all final chemotherapy dosing calculations congruent. I have reviewed the above chemotherapy order and that my calculation of the final dose and BSA (when applicable) corroborate those calculations of " the  pharmacist. Discrepancies of 5% or greater in the written dose have been addressed and documented within the EPIC Progress notes.    Signature: Chandrika Robles, PharmD, BCOP

## 2019-10-12 NOTE — PROGRESS NOTES
"Pharmacy Chemotherapy Calculations  Chemotherapy deferred one week due to platelets    Dx: Metastatic colorectal cancer    Previous treatment = CapeOx + Bevacizumab C14 = 9/14/19    Regimen and Dosing Reference  FOLFiri + bevacizumab  Irinotecan 180mg/m2 IV on day 1  Leucovorin 400 mg/m2 iv on day 1 concurrently with irinotecan  5- mg/m2 iv bolus d1 followed by 2400 mg/m2 iv over 46 hrs  bevacizumab 5mg/kg IV on day 1  q14 days for 8-12 cycles  NCCN Guidelines for Colon Cancer V.2.2015  Leonel T et al - Eur J Cancer. 1999 Sep;35(9):1343-7.  Akash BIRD et al - J Clin Oncol. 2007 Oct 20;25(30):7650-68    /90   Pulse 82   Temp 36.2 °C (97.1 °F) (Temporal)   Resp 18   Ht 1.85 m (6' 0.84\")   Wt 110.3 kg (243 lb 2.7 oz)   SpO2 98%   BMI 32.23 kg/m²  Body surface area is 2.38 meters squared.     10/12/19:  ANC ~ 3440     Plt = 77 k Hgb = 13.2    10/10/19:  Scr =  0.68      Crcl ~>125 ml/min      LFTs/TBili = 48/33/180/1.0  Urine protein = negative      Man Anderson PharmD    "

## 2019-10-12 NOTE — PROGRESS NOTES
Pt to \[facility]\"" for C1D1 FOLFIRI + Avastin. Lab results from 10/10/19 reviewed. Platelet count 87,000. Right chest wall port accessed without difficulty. Flushed easily, ashley briskly. Repeat CBC done, platelet count 77,000. Dr Goldman on call for Dr Ross, ordered for treatment to be deferred for 1 week. Treatment plan reviewed with patient his spouse. Pt to have pre-treatment labs re-drawn in [place] on 10/18, with treatment re-scheduled for 10/19/19.  Pt verbalized an understanding. Port flushed with NS and Heparin per protocol, then de-accessed. Pt left \[facility]\"" in NAD.  St[facility] next appointment time.

## 2019-10-14 ENCOUNTER — APPOINTMENT (OUTPATIENT)
Dept: RADIOLOGY | Facility: MEDICAL CENTER | Age: 54
End: 2019-10-14
Attending: EMERGENCY MEDICINE
Payer: MEDICAID

## 2019-10-14 ENCOUNTER — HOSPITAL ENCOUNTER (EMERGENCY)
Facility: MEDICAL CENTER | Age: 54
End: 2019-10-14
Attending: EMERGENCY MEDICINE
Payer: MEDICAID

## 2019-10-14 VITALS
SYSTOLIC BLOOD PRESSURE: 152 MMHG | HEIGHT: 74 IN | BODY MASS INDEX: 31.6 KG/M2 | OXYGEN SATURATION: 96 % | HEART RATE: 81 BPM | TEMPERATURE: 97 F | WEIGHT: 246.25 LBS | RESPIRATION RATE: 15 BRPM | DIASTOLIC BLOOD PRESSURE: 96 MMHG

## 2019-10-14 DIAGNOSIS — R07.81 PLEURITIC CHEST PAIN: ICD-10-CM

## 2019-10-14 DIAGNOSIS — C22.9 MALIGNANT NEOPLASM OF LIVER, UNSPECIFIED LIVER MALIGNANCY TYPE (HCC): ICD-10-CM

## 2019-10-14 DIAGNOSIS — M54.2 NECK PAIN: ICD-10-CM

## 2019-10-14 LAB
ALBUMIN SERPL BCP-MCNC: 3.3 G/DL (ref 3.2–4.9)
ALBUMIN/GLOB SERPL: 0.7 G/DL
ALP SERPL-CCNC: 174 U/L (ref 30–99)
ALT SERPL-CCNC: 25 U/L (ref 2–50)
ANION GAP SERPL CALC-SCNC: 9 MMOL/L (ref 0–11.9)
AST SERPL-CCNC: 44 U/L (ref 12–45)
BASOPHILS # BLD AUTO: 0.6 % (ref 0–1.8)
BASOPHILS # BLD: 0.03 K/UL (ref 0–0.12)
BILIRUB SERPL-MCNC: 1.1 MG/DL (ref 0.1–1.5)
BUN SERPL-MCNC: 7 MG/DL (ref 8–22)
CALCIUM SERPL-MCNC: 8.9 MG/DL (ref 8.5–10.5)
CHLORIDE SERPL-SCNC: 102 MMOL/L (ref 96–112)
CO2 SERPL-SCNC: 24 MMOL/L (ref 20–33)
CREAT SERPL-MCNC: 0.62 MG/DL (ref 0.5–1.4)
EKG IMPRESSION: NORMAL
EOSINOPHIL # BLD AUTO: 0.06 K/UL (ref 0–0.51)
EOSINOPHIL NFR BLD: 1.1 % (ref 0–6.9)
ERYTHROCYTE [DISTWIDTH] IN BLOOD BY AUTOMATED COUNT: 60.1 FL (ref 35.9–50)
GLOBULIN SER CALC-MCNC: 4.5 G/DL (ref 1.9–3.5)
GLUCOSE SERPL-MCNC: 170 MG/DL (ref 65–99)
HCT VFR BLD AUTO: 37.3 % (ref 42–52)
HGB BLD-MCNC: 12.6 G/DL (ref 14–18)
IMM GRANULOCYTES # BLD AUTO: 0.03 K/UL (ref 0–0.11)
IMM GRANULOCYTES NFR BLD AUTO: 0.6 % (ref 0–0.9)
LYMPHOCYTES # BLD AUTO: 1.21 K/UL (ref 1–4.8)
LYMPHOCYTES NFR BLD: 22.5 % (ref 22–41)
MCH RBC QN AUTO: 34.7 PG (ref 27–33)
MCHC RBC AUTO-ENTMCNC: 33.8 G/DL (ref 33.7–35.3)
MCV RBC AUTO: 102.8 FL (ref 81.4–97.8)
MONOCYTES # BLD AUTO: 0.8 K/UL (ref 0–0.85)
MONOCYTES NFR BLD AUTO: 14.9 % (ref 0–13.4)
NEUTROPHILS # BLD AUTO: 3.24 K/UL (ref 1.82–7.42)
NEUTROPHILS NFR BLD: 60.3 % (ref 44–72)
NRBC # BLD AUTO: 0 K/UL
NRBC BLD-RTO: 0 /100 WBC
PLATELET # BLD AUTO: 88 K/UL (ref 164–446)
PMV BLD AUTO: 9.7 FL (ref 9–12.9)
POTASSIUM SERPL-SCNC: 3.5 MMOL/L (ref 3.6–5.5)
PROT SERPL-MCNC: 7.8 G/DL (ref 6–8.2)
RBC # BLD AUTO: 3.63 M/UL (ref 4.7–6.1)
SODIUM SERPL-SCNC: 135 MMOL/L (ref 135–145)
TROPONIN T SERPL-MCNC: <6 NG/L (ref 6–19)
WBC # BLD AUTO: 5.4 K/UL (ref 4.8–10.8)

## 2019-10-14 PROCEDURE — 80053 COMPREHEN METABOLIC PANEL: CPT

## 2019-10-14 PROCEDURE — 99284 EMERGENCY DEPT VISIT MOD MDM: CPT

## 2019-10-14 PROCEDURE — 71045 X-RAY EXAM CHEST 1 VIEW: CPT

## 2019-10-14 PROCEDURE — 700111 HCHG RX REV CODE 636 W/ 250 OVERRIDE (IP): Performed by: EMERGENCY MEDICINE

## 2019-10-14 PROCEDURE — 700117 HCHG RX CONTRAST REV CODE 255: Performed by: EMERGENCY MEDICINE

## 2019-10-14 PROCEDURE — 85025 COMPLETE CBC W/AUTO DIFF WBC: CPT

## 2019-10-14 PROCEDURE — 93005 ELECTROCARDIOGRAM TRACING: CPT

## 2019-10-14 PROCEDURE — 70491 CT SOFT TISSUE NECK W/DYE: CPT

## 2019-10-14 PROCEDURE — 71275 CT ANGIOGRAPHY CHEST: CPT

## 2019-10-14 PROCEDURE — 36415 COLL VENOUS BLD VENIPUNCTURE: CPT

## 2019-10-14 PROCEDURE — 93005 ELECTROCARDIOGRAM TRACING: CPT | Performed by: EMERGENCY MEDICINE

## 2019-10-14 PROCEDURE — 84484 ASSAY OF TROPONIN QUANT: CPT

## 2019-10-14 RX ADMIN — HEPARIN 500 UNITS: 100 SYRINGE at 13:39

## 2019-10-14 RX ADMIN — IOHEXOL 140 ML: 350 INJECTION, SOLUTION INTRAVENOUS at 11:37

## 2019-10-14 ASSESSMENT — PAIN DESCRIPTION - DESCRIPTORS
DESCRIPTORS: PRESSURE
DESCRIPTORS: PRESSURE

## 2019-10-14 ASSESSMENT — LIFESTYLE VARIABLES: DO YOU DRINK ALCOHOL: NO

## 2019-10-14 NOTE — ED PROVIDER NOTES
"ED Provider Note    CHIEF COMPLAINT  Chief Complaint   Patient presents with   • Sent by MD   • Shoulder Pain     x 2 days constant    • Chest Pain     left chest worse with deep breath       HPI  Gurmeet Jo is a 54 y.o. male who presents complaining of chest pain.  Patient has had discomfort in his neck for the last week to week and a half.  In the anterior chest the left of center.  He cannot really put a finger on it.  Over the last few days he has had some pain that has settled over his left breast area.  He describes a \"pleurisy\" sensation hurts to take a deep breath or cough.  He is not however short of breath.  He does not describe any other radiation.  Nothing else makes it better or worse.  There is no belly pain.  No change in bowel or bladder.  No leg pain or swelling.  No sick contacts.  The patient is being followed by Dr. Ross for colon cancer with metastases to the liver.  He presents here for further evaluation.  There is no other complaint.    PAST MEDICAL HISTORY  Past Medical History:   Diagnosis Date   • Bowel habit changes     constipation   • Cancer (HCC) 2018    Colon CA with Liver Mets   • Dental disorder     dentures    • Hiatus hernia syndrome    • Hypertension     No meds at this time   • Indigestion    • Pain     liver    • Sleep apnea     cpap    • Snoring        FAMILY HISTORY  Family History   Problem Relation Age of Onset   • Hypertension Mother    • Hypertension Father    • Diabetes Father    • Diabetes Brother        SOCIAL HISTORY  Social History     Tobacco Use   • Smoking status: Former Smoker     Packs/day: 1.00     Years: 15.00     Pack years: 15.00     Last attempt to quit: 1998     Years since quittin.7   • Smokeless tobacco: Never Used   Substance Use Topics   • Alcohol use: No   • Drug use: No         SURGICAL HISTORY  Past Surgical History:   Procedure Laterality Date   • COLONOSCOPY         CURRENT MEDICATIONS  Home Medications     Reviewed by " "Mai SILVESTRENiyah King, PhT (Pharmacy Tech) on 10/14/19 at 1054  Med List Status: Complete   Medication Last Dose Status   acetaminophen (TYLENOL) 500 MG Tab 10/12/2019 Active   baclofen (LIORESAL) 10 MG Tab PRN Active   LORazepam (ATIVAN) 1 MG Tab PRN Active   methocarbamol (ROBAXIN) 500 MG Tab PRN Active   ondansetron (ZOFRAN ODT) 8 MG TABLET DISPERSIBLE PRN Active   therapeutic multivitamin-minerals (THERAGRAN-M) Tab 10/14/2019 Active                I have reviewed the nurses notes and/or the list brought with the patient.    ALLERGIES  No Known Allergies    REVIEW OF SYSTEMS  See HPI for further details. Review of systems as above, otherwise all other systems are negative.     PHYSICAL EXAM  VITAL SIGNS: /96   Pulse 81   Temp 36.1 °C (97 °F) (Temporal)   Resp 15   Ht 1.88 m (6' 2\")   Wt 111.7 kg (246 lb 4.1 oz)   SpO2 96%   BMI 31.62 kg/m²     Constitutional: Well appearing patient in no acute distress.  Not toxic, nor ill in appearance.  HENT: Mucus membranes moist.  Oropharynx is clear.  Eyes: Pupils equally round.  No scleral icterus.   Neck: Full nontender range of motion.  Lymphatic: No cervical lymphadenopathy noted.  No masses are appreciated.  Cardiovascular: Regular heart rate and rhythm.  No murmurs, rubs, nor gallop appreciated.   Thorax & Lungs: Chest is nontender.  Port is nontender.  I do not appreciate any masses in his chest.  Lungs are clear to auscultation with good air movement bilaterally.  No wheeze, rhonchi, nor rales.   Abdomen: Soft, with no tenderness, rebound nor guarding.  No mass, pulsatile mass, nor hepatosplenomegaly appreciated.  Skin: No purpura nor petechia noted.  Extremities/Musculoskeletal: No sign of trauma.  Calves are nontender with no cords nor edema.  No Jason's sign.  Pulses are intact all around.   Neurologic: Alert & oriented.  Strength and sensation is intact all around.  Gait is normal.  Psychiatric: Normal affect appropriate for the clinical situation.    EKG  I " interpreted this EKG myself.  This is a 12-lead study.  The rhythm is sinus with a rate of 76.  There are no ST segment nor T wave abnormalities.  Interpretation: No ST segment elevation myocardial infarction.    LABS  Labs Reviewed   CBC WITH DIFFERENTIAL - Abnormal; Notable for the following components:       Result Value    RBC 3.63 (*)     Hemoglobin 12.6 (*)     Hematocrit 37.3 (*)     .8 (*)     MCH 34.7 (*)     RDW 60.1 (*)     Platelet Count 88 (*)     Monocytes 14.90 (*)     All other components within normal limits   COMP METABOLIC PANEL - Abnormal; Notable for the following components:    Potassium 3.5 (*)     Glucose 170 (*)     Bun 7 (*)     Alkaline Phosphatase 174 (*)     Globulin 4.5 (*)     All other components within normal limits   TROPONIN   ESTIMATED GFR         RADIOLOGY/PROCEDURES  I have reviewed the patient's film interpretations myself, and they are read out by the radiologist as:   CT-CTA CHEST PULMONARY ARTERY W/ RECONS   Final Result      1.  Negative for pulmonary embolism or other acute thoracic abnormality      2.  Multiple hepatic metastases without detectable change      3.  Severe splenomegaly      4.  Probable cirrhosis            CT-SOFT TISSUE NECK WITH   Final Result      Upper airway appears to be intact. No soft tissue masses or adenopathy.      DX-CHEST-PORTABLE (1 VIEW)   Final Result      No acute cardiopulmonary abnormality identified.        .    MEDICAL RECORD  I have reviewed patient's medical record and pertinent results are listed above.    COURSE & MEDICAL DECISION MAKING  I have reviewed any medical record information, laboratory studies and radiographic results as noted above.  Patient presents with some pain in his neck as well as pleuritic pain in his chest.  His examination is benign.  I did discuss his work-up with Dr. Ross who agrees with what we have done today.  His laboratories show a negative troponin.  I have a low suspicion this is cardiac  etiology, and with his days of symptoms I feel that this is excluded.  Chest x-ray and CT showed no evidence of infiltrate.  CT scan was obtained with the obvious concern for pulmonary embolism which she does not have.  We have run through this scan at the bedside, showing him and his wife and later his brother.  Liver lesions are noted to have no change from his prior scan.  He does have some uptake over the breasts bilaterally.  I talked this over with Dr. Xiao who had reviewed his study from 2 weeks ago.  He feels this is simply gynecomastia not a malignant process.  Similarly with his neck there is no evidence of any adenopathy or soft tissue mass.  At this point I think he can be discharged home.  He should have close follow-up with Dr. Ross.  I did point out as well his blood sugars have been elevated he was last several blood draws, however nobody has talked to him about this.  I have asked him to make sure that they are paying attention to this.  Return to the ER for new symptoms or any turn for the worse otherwise I recommend occasional ibuprofen for pain, which is preferred, or Tylenol.  Appropriate discharge instructions.    FINAL IMPRESSION  1. Pleuritic chest pain    2. Neck pain    3. Malignant neoplasm of liver, unspecified liver malignancy type (HCC)           This dictation was created using voice recognition software.    Electronically signed by: Keo Briggs, 10/14/2019 3:43 PM

## 2019-10-14 NOTE — ED NOTES
Med Rec completed per patient's wife  Allergies reviewed  No ORAL antibiotics in last 14 days    Patient goes to the [facility]. His last visit was 3 weeks ago. Family unsure of what meds he gets while there.

## 2019-10-14 NOTE — ED NOTES
Discharge education provided to pt. Discharge paperwork signed and given to pt. All questions answered. All belongings with pt. Pt ambulated to lobby unassisted.

## 2019-10-14 NOTE — DISCHARGE INSTRUCTIONS
Close follow up with Dr Ross.  Occasional ibuprofen (preferred) or tylenol for pain.  Make sure they are following your blood sugars.

## 2019-10-14 NOTE — ED TRIAGE NOTES
.  Chief Complaint   Patient presents with   • Sent by MD   • Shoulder Pain     x 2 days constant    • Chest Pain     left chest worse with deep breath     Pt has been having left chest pain and neck pain x 2 days worse with taking a inhale. Pain 8/10. Pt also sent by Dr. Ross due to enlarged spleen and low platelets.  would like to be notified of pt.  Charge notified of pt. Pt to triage for EKG

## 2019-10-17 ENCOUNTER — HOSPITAL ENCOUNTER (OUTPATIENT)
Dept: LAB | Facility: MEDICAL CENTER | Age: 54
End: 2019-10-17
Attending: INTERNAL MEDICINE
Payer: MEDICAID

## 2019-10-17 LAB
ALBUMIN SERPL BCP-MCNC: 3.8 G/DL (ref 3.2–4.9)
ALBUMIN/GLOB SERPL: 0.7 G/DL
ALP SERPL-CCNC: 188 U/L (ref 30–99)
ALT SERPL-CCNC: 21 U/L (ref 2–50)
ANION GAP SERPL CALC-SCNC: 8 MMOL/L (ref 0–11.9)
APPEARANCE UR: CLEAR
AST SERPL-CCNC: 46 U/L (ref 12–45)
BASOPHILS # BLD AUTO: 0.6 % (ref 0–1.8)
BASOPHILS # BLD: 0.04 K/UL (ref 0–0.12)
BILIRUB SERPL-MCNC: 1.3 MG/DL (ref 0.1–1.5)
BILIRUB UR QL STRIP.AUTO: NEGATIVE
BUN SERPL-MCNC: 9 MG/DL (ref 8–22)
CALCIUM SERPL-MCNC: 9.7 MG/DL (ref 8.5–10.5)
CEA SERPL-MCNC: 699.9 NG/ML (ref 0–3)
CHLORIDE SERPL-SCNC: 100 MMOL/L (ref 96–112)
CO2 SERPL-SCNC: 25 MMOL/L (ref 20–33)
COLOR UR: YELLOW
CREAT SERPL-MCNC: 0.62 MG/DL (ref 0.5–1.4)
EOSINOPHIL # BLD AUTO: 0.11 K/UL (ref 0–0.51)
EOSINOPHIL NFR BLD: 1.6 % (ref 0–6.9)
ERYTHROCYTE [DISTWIDTH] IN BLOOD BY AUTOMATED COUNT: 59.5 FL (ref 35.9–50)
GLOBULIN SER CALC-MCNC: 5.4 G/DL (ref 1.9–3.5)
GLUCOSE SERPL-MCNC: 118 MG/DL (ref 65–99)
GLUCOSE UR STRIP.AUTO-MCNC: NEGATIVE MG/DL
HCT VFR BLD AUTO: 42.2 % (ref 42–52)
HGB BLD-MCNC: 14.1 G/DL (ref 14–18)
IMM GRANULOCYTES # BLD AUTO: 0.03 K/UL (ref 0–0.11)
IMM GRANULOCYTES NFR BLD AUTO: 0.4 % (ref 0–0.9)
KETONES UR STRIP.AUTO-MCNC: NEGATIVE MG/DL
LEUKOCYTE ESTERASE UR QL STRIP.AUTO: NEGATIVE
LYMPHOCYTES # BLD AUTO: 1.5 K/UL (ref 1–4.8)
LYMPHOCYTES NFR BLD: 21.4 % (ref 22–41)
MAGNESIUM SERPL-MCNC: 1.9 MG/DL (ref 1.5–2.5)
MCH RBC QN AUTO: 35 PG (ref 27–33)
MCHC RBC AUTO-ENTMCNC: 33.4 G/DL (ref 33.7–35.3)
MCV RBC AUTO: 104.7 FL (ref 81.4–97.8)
MICRO URNS: NORMAL
MONOCYTES # BLD AUTO: 0.91 K/UL (ref 0–0.85)
MONOCYTES NFR BLD AUTO: 13 % (ref 0–13.4)
NEUTROPHILS # BLD AUTO: 4.42 K/UL (ref 1.82–7.42)
NEUTROPHILS NFR BLD: 63 % (ref 44–72)
NITRITE UR QL STRIP.AUTO: NEGATIVE
NRBC # BLD AUTO: 0 K/UL
NRBC BLD-RTO: 0 /100 WBC
PH UR STRIP.AUTO: 6 [PH] (ref 5–8)
PLATELET # BLD AUTO: 113 K/UL (ref 164–446)
PMV BLD AUTO: 10.5 FL (ref 9–12.9)
POTASSIUM SERPL-SCNC: 3.7 MMOL/L (ref 3.6–5.5)
PROT SERPL-MCNC: 9.2 G/DL (ref 6–8.2)
PROT UR QL STRIP: NEGATIVE MG/DL
RBC # BLD AUTO: 4.03 M/UL (ref 4.7–6.1)
RBC UR QL AUTO: NEGATIVE
SODIUM SERPL-SCNC: 133 MMOL/L (ref 135–145)
SP GR UR STRIP.AUTO: 1.01
UROBILINOGEN UR STRIP.AUTO-MCNC: 0.2 MG/DL
WBC # BLD AUTO: 7 K/UL (ref 4.8–10.8)

## 2019-10-17 PROCEDURE — 36415 COLL VENOUS BLD VENIPUNCTURE: CPT

## 2019-10-17 PROCEDURE — 85025 COMPLETE CBC W/AUTO DIFF WBC: CPT

## 2019-10-17 PROCEDURE — 83735 ASSAY OF MAGNESIUM: CPT

## 2019-10-17 PROCEDURE — 82378 CARCINOEMBRYONIC ANTIGEN: CPT

## 2019-10-17 PROCEDURE — 80053 COMPREHEN METABOLIC PANEL: CPT

## 2019-10-17 PROCEDURE — 81003 URINALYSIS AUTO W/O SCOPE: CPT

## 2019-10-19 ENCOUNTER — OUTPATIENT INFUSION SERVICES (OUTPATIENT)
Dept: ONCOLOGY | Facility: MEDICAL CENTER | Age: 54
End: 2019-10-19
Attending: INTERNAL MEDICINE
Payer: MEDICAID

## 2019-10-19 VITALS
OXYGEN SATURATION: 94 % | SYSTOLIC BLOOD PRESSURE: 128 MMHG | BODY MASS INDEX: 32.49 KG/M2 | HEART RATE: 83 BPM | WEIGHT: 245.15 LBS | DIASTOLIC BLOOD PRESSURE: 73 MMHG | RESPIRATION RATE: 18 BRPM | TEMPERATURE: 97.2 F | HEIGHT: 73 IN

## 2019-10-19 DIAGNOSIS — C78.7 METASTATIC COLON CANCER TO LIVER (HCC): ICD-10-CM

## 2019-10-19 DIAGNOSIS — C18.9 METASTATIC COLON CANCER TO LIVER (HCC): ICD-10-CM

## 2019-10-19 PROCEDURE — 96411 CHEMO IV PUSH ADDL DRUG: CPT

## 2019-10-19 PROCEDURE — 96417 CHEMO IV INFUS EACH ADDL SEQ: CPT

## 2019-10-19 PROCEDURE — 96375 TX/PRO/DX INJ NEW DRUG ADDON: CPT

## 2019-10-19 PROCEDURE — 96368 THER/DIAG CONCURRENT INF: CPT

## 2019-10-19 PROCEDURE — G0498 CHEMO EXTEND IV INFUS W/PUMP: HCPCS

## 2019-10-19 PROCEDURE — 700111 HCHG RX REV CODE 636 W/ 250 OVERRIDE (IP)

## 2019-10-19 PROCEDURE — 96374 THER/PROPH/DIAG INJ IV PUSH: CPT

## 2019-10-19 PROCEDURE — 700105 HCHG RX REV CODE 258

## 2019-10-19 PROCEDURE — 96415 CHEMO IV INFUSION ADDL HR: CPT

## 2019-10-19 PROCEDURE — 700111 HCHG RX REV CODE 636 W/ 250 OVERRIDE (IP): Performed by: INTERNAL MEDICINE

## 2019-10-19 PROCEDURE — 700105 HCHG RX REV CODE 258: Performed by: INTERNAL MEDICINE

## 2019-10-19 PROCEDURE — 96413 CHEMO IV INFUSION 1 HR: CPT

## 2019-10-19 RX ORDER — DEXTROSE MONOHYDRATE 50 MG/ML
INJECTION, SOLUTION INTRAVENOUS CONTINUOUS
Status: DISCONTINUED | OUTPATIENT
Start: 2019-10-19 | End: 2019-10-19 | Stop reason: HOSPADM

## 2019-10-19 RX ORDER — SODIUM CHLORIDE 9 MG/ML
INJECTION, SOLUTION INTRAVENOUS CONTINUOUS
Status: DISCONTINUED | OUTPATIENT
Start: 2019-10-19 | End: 2019-10-19 | Stop reason: HOSPADM

## 2019-10-19 RX ADMIN — BEVACIZUMAB 600 MG: 400 INJECTION, SOLUTION INTRAVENOUS at 13:48

## 2019-10-19 RX ADMIN — FLUOROURACIL 5735 MG: 50 INJECTION, SOLUTION INTRAVENOUS at 15:10

## 2019-10-19 RX ADMIN — ATROPINE SULFATE 0.5 MG: 1 INJECTION, SOLUTION INTRAMUSCULAR; INTRAVENOUS; SUBCUTANEOUS at 11:37

## 2019-10-19 RX ADMIN — ONDANSETRON HYDROCHLORIDE 16 MG: 2 SOLUTION INTRAMUSCULAR; INTRAVENOUS at 10:14

## 2019-10-19 RX ADMIN — LEUCOVORIN CALCIUM 956 MG: 350 INJECTION, POWDER, LYOPHILIZED, FOR SUSPENSION INTRAMUSCULAR; INTRAVENOUS at 11:09

## 2019-10-19 RX ADMIN — DEXTROSE MONOHYDRATE 430.2 MG: 50 INJECTION, SOLUTION INTRAVENOUS at 11:10

## 2019-10-19 RX ADMIN — DEXAMETHASONE SODIUM PHOSPHATE 12 MG: 4 INJECTION, SOLUTION INTRAMUSCULAR; INTRAVENOUS at 10:00

## 2019-10-19 RX ADMIN — SODIUM CHLORIDE: 9 INJECTION, SOLUTION INTRAVENOUS at 09:47

## 2019-10-19 RX ADMIN — FLUOROURACIL 955 MG: 50 INJECTION, SOLUTION INTRAVENOUS at 14:36

## 2019-10-19 NOTE — PROGRESS NOTES
Chemotherapy Verification - PRIMARY RN      Height = 1.855  Weight = 111.2  BSA = 2.39       Medication: Irinotecan  Dose: 180mg/m2  Calculated Dose: 430.2mg                                Medication: Leucovorin  Dose: 400mg/m2  Calculated Dose: 956mg                                 Medication: Fluorouracil  Dose: 400mg/m2  Calculated Dose: 956mg                              Medication: Bevacizumab  Dose: 5mg/kg  Calculated Dose: 556mg                                           I confirm this process was performed independently with the BSA and all final chemotherapy dosing calculations congruent.  Any discrepancies of 10% or greater have been addressed with the chemotherapy pharmacist. The resolution of the discrepancy has been documented in the EPIC progress notes.

## 2019-10-19 NOTE — PROGRESS NOTES
Chemotherapy Verification - SECONDARY RN     D1C1    Height = 185.5 cm  Weight = 111.2 kg  BSA = 2.39 m2       Medication: Irinotecan  Dose: 180 mg/m2  Calculated Dose: 430.2 mg                                Medication: Fluorouracil (5FU) push  Dose: 400 mg/m2  Calculated Dose: 956 mg                                Medication: Bevacizumab (Avastin)  Dose: 5 mg/kg  Calculated Dose: 556 mg                                 Medication: Fluorouracil (5FU) CADD pump  Dose: 2400 mg/m2  Calculated Dose: 5736 mg over 46 hours                              I confirm that this process was performed independently.

## 2019-10-19 NOTE — PROGRESS NOTES
"Pharmacy Chemotherapy Calculations    Dx: Metastatic colorectal cancer  Cycle: 1, Day 1, start delayed one week due to thrombocytopenia  Previous treatment = CapeOx + Bevacizumab C14 = 9/14/19    Regimen and Dosing Reference  FOLFiri + bevacizumab  Irinotecan 180mg/m2 IV on day 1  Leucovorin 400 mg/m2 iv on day 1 concurrently with irinotecan  5- mg/m2 iv bolus d1 followed by 2400 mg/m2 iv over 46 hrs  bevacizumab 5mg/kg IV on day 1  q14 days for 8-12 cycles  NCCN Guidelines for Colon Cancer V.2.2015  Leonel T et al - Eur J Cancer. 1999 Sep;35(9):1343-7.  barbara Martinez - J Clin Oncol. 2007 Oct 20;25(30):4779-84    /73   Pulse 83   Temp 36.2 °C (97.2 °F) (Temporal)   Resp 18   Ht 1.855 m (6' 1.03\")   Wt 111.2 kg (245 lb 2.4 oz)   SpO2 94%   BMI 32.32 kg/m²  Body surface area is 2.39 meters squared.     10/17/19:  ANC ~ 4420     Plt = 113 k Hgb = 14.1    10/17/19:  Scr =  0.62      Crcl ~>125 ml/min   (min Cr 0.7)  LFTs/TBili = 46/21/188/1.3  Urine protein = negative  /86 on first check; recheck WNL above    Irinotecan 180 mg/m2 x 2.39 m2 = 430 mg   <10% difference, OK to treat with final dose = 430.2 mg IV    Leucovorin 400 mg/m2 x 2.39 m2 = 956 mg   <10% difference, OK to treat with final dose = 956 mg IV    Fluorouracil 400 mg/m2 x 2.39 m2 = 956 mg   <10% difference, OK to treat with final dose = 955 mg IV    Bevacizumab 5 mg/kg x 111.2 kg = 556 mg   <10% difference, OK to treat with final dose = 600 mg IV over 30 minutes OK    Fluorouracil 2400 mg/m2 x 2.39 m2 = 5736 mg   <10% difference, OK to treat with final dose = 5735 mg CIVI over 46 hours via home infusion pump at 2.5 ml/hr    Man Anderson PharmD    "

## 2019-10-19 NOTE — PROGRESS NOTES
"Pharmacy Chemotherapy Verification Note:    Patient Name: Gurmeet Jo      Dx: Metastatic Colorectal CA      Protocol: FOLFIRI+Avastin       *Dosing Reference*  Irinotecan 180 mg/m² IV over 30-90 minutes on Day 1  Leucovorin 400 mg/m² IV over 30-90 minutes on Day 1, to run concurrently with Irinotecan  Fluorouracil 400 mg/m² IV push on Day 1, followed by   Fluorouracil 2400 mg/m² CIIV over 46-48 hours  Bevacizumab 5 mg/kg IV on Day 1   Every 14 days until disease progression or unacceptable toxicity    NCCN Guidelines for Colon Cancer. V.3.2019.  Naa V et al. Lancet Oncol. 2014;15(10):1065-75.  Akash CS, et al. J Clin Oncol.2007;25(30):4119-58.    Allergies:  Patient has no known allergies.     /73   Pulse 83   Temp 36.2 °C (97.2 °F) (Temporal)   Resp 18   Ht 1.855 m (6' 1.03\")   Wt 111.2 kg (245 lb 2.4 oz)   SpO2 94%   BMI 32.32 kg/m²  Body surface area is 2.39 meters squared.  Labs from 10/17/19:  ANC~ 4420  Plt = 113 k  SCr = 0.62 mg/dL  CrCl = >125 mL/min (using min SCr 0.7 mg/dL and current weight)  LFT = 46/21/188 TBili = 1.3  Urine protein = negative       Drug Order   (Drug name, dose, route, IV Fluid & volume, frequency, number of doses) Cycle: 1      Previous treatment: C14 CapeOX+Avastin = 9/14/19     Medication = Irinotecan (Camptosar)  Base Dose = 180 mg/m²  Calc Dose: Base Dose x 2.39 m² = 430 mg  Final Dose = 430.2 mg  Route = IV  Fluid & Volume = D5W 250 mL  Admin Duration = Over 120 minutes          <10% difference, ok to treat with final written dose   Medication = Leucovorin (Wellcovorin)  Base Dose = 400 mg/m²  Calc Dose: Base Dose x 2.39 m² = 956 mg  Final Dose = 956 mg  Route = IV  Fluid & Volume = D5W 250 mL  Admin Duration = Over 120 minutes          <10% difference, ok to treat with final written dose   Medication = Fluorouracil (5-FU)  Base Dose = 400 mg/m²  Calc Dose:Base Dose x 2.39 m² = 956 mg  Final Dose = 955 mg  Route = IVP  Fluid & Volume = 19.1 mL in " syringe  Conc = 50 mg/mL  Admin Duration = Over 5-10 minutes          <10% difference, ok to treat with final written dose   Medication = Fluorouracil (5-FU)  Base Dose = 2400 mg/m²  Calc Dose: Base Dose x 2.39 m² = 5736 mg  Final Dose = 5735 mg  Route = CIVI via CADD pump  Fluid & Volume = 114.7 mL (+3 mL overfill = 117.7 mL)  Admin Duration = Over 46 hours (to run at 2.5 mL/hour)          <10% difference, ok to treat with final written dose   Medication = Bevacizumab (Avastin)  Base Dose = 5 mg/kg  Calc Dose: Base Dose x 111.2 kg = 556 mg  Final Dose = 600 mg  Route = IV  Fluid & Volume =  mL  Admin Duration = Over 30 minutes          <10% difference, rounded to vial size per protocol, ok to treat with final written dose     By my signature below, I confirm this process was performed independently with the BSA and all final chemotherapy dosing calculations congruent. I have reviewed the above chemotherapy order and that my calculation of the final dose and BSA (when applicable) corroborate those calculations of the  pharmacist.     Ernesto Portillo, PharmD, BCPS

## 2019-10-20 NOTE — PROGRESS NOTES
Pt arrived to [facility] for C1D1 of Foliri + Avastin. Discussed plan of care and possible side effects with patient.  Pt is accompanied by his wife.  Right chest Port with no s/sx of infection.  Pt has Lidocaine cream applied to site.  Port accessed in sterile fashion, flushed with NS and brisk blood return noted.  Labs reviewed and pt meets parameters for treatment.  Pre-medications given.  Port checked for brisk blood return prior to start of Irinotecan/Leucovorin, Avastin and 5FU. Pt tolerated Irinotecan/Leucovorin without adverse effects.  Pt tolerated Avastin without issue.  Pt watched video and signed consent form for CADD Pump. Educated pt and wife on how to use spill kit, contact number for possible problems and how to turn off pump.  CADD Pump set up; dsg clean,dry and intact.  Site covered with gauze. Pt has appt on 10/21 for CADD d/c. Pt dc home with wife in stable condition.

## 2019-10-21 ENCOUNTER — OUTPATIENT INFUSION SERVICES (OUTPATIENT)
Dept: ONCOLOGY | Facility: MEDICAL CENTER | Age: 54
End: 2019-10-21
Attending: INTERNAL MEDICINE
Payer: MEDICAID

## 2019-10-21 VITALS
HEART RATE: 85 BPM | BODY MASS INDEX: 32.23 KG/M2 | DIASTOLIC BLOOD PRESSURE: 74 MMHG | HEIGHT: 73 IN | RESPIRATION RATE: 18 BRPM | SYSTOLIC BLOOD PRESSURE: 132 MMHG | WEIGHT: 243.17 LBS | TEMPERATURE: 97.5 F | OXYGEN SATURATION: 95 %

## 2019-10-21 DIAGNOSIS — C18.9 METASTATIC COLON CANCER TO LIVER (HCC): ICD-10-CM

## 2019-10-21 DIAGNOSIS — C78.7 METASTATIC COLON CANCER TO LIVER (HCC): ICD-10-CM

## 2019-10-21 PROCEDURE — 700111 HCHG RX REV CODE 636 W/ 250 OVERRIDE (IP)

## 2019-10-21 PROCEDURE — 96523 IRRIG DRUG DELIVERY DEVICE: CPT

## 2019-10-21 PROCEDURE — A4212 NON CORING NEEDLE OR STYLET: HCPCS

## 2019-10-21 RX ADMIN — HEPARIN 500 UNITS: 100 SYRINGE at 15:07

## 2019-10-21 NOTE — PROGRESS NOTES
Pt to infusion services ambulatory per self and accompanied by significant other.  Pt here for scheduled disconnect of CIVI of 5FU via CADD pump.  Plan of care reviewed.  Pt verbalizes understanding.  Pt reports no issues or concerns with pump.  Pump settings observed; reservoir volume = 0.0 mL and amount given = 115.5 mL.  Pump tubing clamped and pump turned off.  Pump disconnected from patient.  Port flushed with normal saline and heparin per policy.  Port flushes easily; +brisk blood return verified.  Port flushed with normal saline and heparin per policy.  Port de-accessed; needle intact.  Pressure dressing applied.  Pt left infusion services in no apparent distress after completion of treatment, ambulatory.  Next appointments scheduled and list of appointments provided to patient.

## 2019-10-23 ENCOUNTER — HOSPITAL ENCOUNTER (OUTPATIENT)
Dept: LAB | Facility: MEDICAL CENTER | Age: 54
End: 2019-10-23
Payer: MEDICAID

## 2019-10-23 LAB
ALBUMIN SERPL BCP-MCNC: 3.3 G/DL (ref 3.2–4.9)
ALBUMIN/GLOB SERPL: 0.8 G/DL
ALP SERPL-CCNC: 158 U/L (ref 30–99)
ALT SERPL-CCNC: 22 U/L (ref 2–50)
ANION GAP SERPL CALC-SCNC: 9 MMOL/L (ref 0–11.9)
AST SERPL-CCNC: 40 U/L (ref 12–45)
BILIRUB SERPL-MCNC: 1.5 MG/DL (ref 0.1–1.5)
BUN SERPL-MCNC: 15 MG/DL (ref 8–22)
CALCIUM SERPL-MCNC: 9 MG/DL (ref 8.5–10.5)
CHLORIDE SERPL-SCNC: 100 MMOL/L (ref 96–112)
CO2 SERPL-SCNC: 24 MMOL/L (ref 20–33)
CREAT SERPL-MCNC: 0.57 MG/DL (ref 0.5–1.4)
GLOBULIN SER CALC-MCNC: 4.1 G/DL (ref 1.9–3.5)
GLUCOSE SERPL-MCNC: 190 MG/DL (ref 65–99)
POTASSIUM SERPL-SCNC: 3.9 MMOL/L (ref 3.6–5.5)
PROT SERPL-MCNC: 7.4 G/DL (ref 6–8.2)
SODIUM SERPL-SCNC: 133 MMOL/L (ref 135–145)

## 2019-10-23 PROCEDURE — 80053 COMPREHEN METABOLIC PANEL: CPT

## 2019-10-26 ENCOUNTER — APPOINTMENT (OUTPATIENT)
Dept: ONCOLOGY | Facility: MEDICAL CENTER | Age: 54
End: 2019-10-26
Attending: INTERNAL MEDICINE
Payer: MEDICAID

## 2019-10-31 ENCOUNTER — HOSPITAL ENCOUNTER (OUTPATIENT)
Dept: LAB | Facility: MEDICAL CENTER | Age: 54
End: 2019-10-31
Attending: INTERNAL MEDICINE
Payer: MEDICAID

## 2019-10-31 LAB
ALBUMIN SERPL BCP-MCNC: 3.9 G/DL (ref 3.2–4.9)
ALBUMIN/GLOB SERPL: 1 G/DL
ALP SERPL-CCNC: 180 U/L (ref 30–99)
ALT SERPL-CCNC: 34 U/L (ref 2–50)
ANION GAP SERPL CALC-SCNC: 9 MMOL/L (ref 0–11.9)
APPEARANCE UR: CLEAR
AST SERPL-CCNC: 41 U/L (ref 12–45)
BASOPHILS # BLD AUTO: 0.9 % (ref 0–1.8)
BASOPHILS # BLD: 0.04 K/UL (ref 0–0.12)
BILIRUB SERPL-MCNC: 0.9 MG/DL (ref 0.1–1.5)
BILIRUB UR QL STRIP.AUTO: NEGATIVE
BUN SERPL-MCNC: 7 MG/DL (ref 8–22)
CALCIUM SERPL-MCNC: 9.2 MG/DL (ref 8.5–10.5)
CEA SERPL-MCNC: 779 NG/ML (ref 0–3)
CHLORIDE SERPL-SCNC: 103 MMOL/L (ref 96–112)
CO2 SERPL-SCNC: 26 MMOL/L (ref 20–33)
COLOR UR: YELLOW
CREAT SERPL-MCNC: 0.64 MG/DL (ref 0.5–1.4)
EOSINOPHIL # BLD AUTO: 0.08 K/UL (ref 0–0.51)
EOSINOPHIL NFR BLD: 1.8 % (ref 0–6.9)
ERYTHROCYTE [DISTWIDTH] IN BLOOD BY AUTOMATED COUNT: 57.2 FL (ref 35.9–50)
GLOBULIN SER CALC-MCNC: 3.8 G/DL (ref 1.9–3.5)
GLUCOSE SERPL-MCNC: 145 MG/DL (ref 65–99)
GLUCOSE UR STRIP.AUTO-MCNC: NEGATIVE MG/DL
HCT VFR BLD AUTO: 38.8 % (ref 42–52)
HGB BLD-MCNC: 12.6 G/DL (ref 14–18)
IMM GRANULOCYTES # BLD AUTO: 0.01 K/UL (ref 0–0.11)
IMM GRANULOCYTES NFR BLD AUTO: 0.2 % (ref 0–0.9)
KETONES UR STRIP.AUTO-MCNC: NEGATIVE MG/DL
LEUKOCYTE ESTERASE UR QL STRIP.AUTO: NEGATIVE
LYMPHOCYTES # BLD AUTO: 1.2 K/UL (ref 1–4.8)
LYMPHOCYTES NFR BLD: 27 % (ref 22–41)
MAGNESIUM SERPL-MCNC: 1.8 MG/DL (ref 1.5–2.5)
MCH RBC QN AUTO: 33.2 PG (ref 27–33)
MCHC RBC AUTO-ENTMCNC: 32.5 G/DL (ref 33.7–35.3)
MCV RBC AUTO: 102.1 FL (ref 81.4–97.8)
MICRO URNS: NORMAL
MONOCYTES # BLD AUTO: 0.58 K/UL (ref 0–0.85)
MONOCYTES NFR BLD AUTO: 13.1 % (ref 0–13.4)
NEUTROPHILS # BLD AUTO: 2.53 K/UL (ref 1.82–7.42)
NEUTROPHILS NFR BLD: 57 % (ref 44–72)
NITRITE UR QL STRIP.AUTO: NEGATIVE
NRBC # BLD AUTO: 0 K/UL
NRBC BLD-RTO: 0 /100 WBC
PH UR STRIP.AUTO: 6 [PH] (ref 5–8)
PLATELET # BLD AUTO: 137 K/UL (ref 164–446)
PMV BLD AUTO: 10.1 FL (ref 9–12.9)
POTASSIUM SERPL-SCNC: 3.8 MMOL/L (ref 3.6–5.5)
PROT SERPL-MCNC: 7.7 G/DL (ref 6–8.2)
PROT UR QL STRIP: NEGATIVE MG/DL
RBC # BLD AUTO: 3.8 M/UL (ref 4.7–6.1)
RBC UR QL AUTO: NEGATIVE
SODIUM SERPL-SCNC: 138 MMOL/L (ref 135–145)
SP GR UR STRIP.AUTO: 1.01
UROBILINOGEN UR STRIP.AUTO-MCNC: 0.2 MG/DL
WBC # BLD AUTO: 4.4 K/UL (ref 4.8–10.8)

## 2019-10-31 PROCEDURE — 81003 URINALYSIS AUTO W/O SCOPE: CPT

## 2019-10-31 PROCEDURE — 85025 COMPLETE CBC W/AUTO DIFF WBC: CPT

## 2019-10-31 PROCEDURE — 80053 COMPREHEN METABOLIC PANEL: CPT

## 2019-10-31 PROCEDURE — 83735 ASSAY OF MAGNESIUM: CPT

## 2019-10-31 PROCEDURE — 82378 CARCINOEMBRYONIC ANTIGEN: CPT

## 2019-10-31 PROCEDURE — 36415 COLL VENOUS BLD VENIPUNCTURE: CPT

## 2019-11-02 ENCOUNTER — OUTPATIENT INFUSION SERVICES (OUTPATIENT)
Dept: ONCOLOGY | Facility: MEDICAL CENTER | Age: 54
End: 2019-11-02
Attending: INTERNAL MEDICINE
Payer: MEDICAID

## 2019-11-02 VITALS
TEMPERATURE: 97.3 F | DIASTOLIC BLOOD PRESSURE: 77 MMHG | SYSTOLIC BLOOD PRESSURE: 130 MMHG | BODY MASS INDEX: 31.79 KG/M2 | RESPIRATION RATE: 18 BRPM | WEIGHT: 239.86 LBS | HEART RATE: 76 BPM | HEIGHT: 73 IN | OXYGEN SATURATION: 97 %

## 2019-11-02 DIAGNOSIS — C78.7 METASTATIC COLON CANCER TO LIVER (HCC): ICD-10-CM

## 2019-11-02 DIAGNOSIS — C18.9 METASTATIC COLON CANCER TO LIVER (HCC): ICD-10-CM

## 2019-11-02 PROCEDURE — 700111 HCHG RX REV CODE 636 W/ 250 OVERRIDE (IP)

## 2019-11-02 PROCEDURE — 96413 CHEMO IV INFUSION 1 HR: CPT

## 2019-11-02 PROCEDURE — 96415 CHEMO IV INFUSION ADDL HR: CPT

## 2019-11-02 PROCEDURE — G0498 CHEMO EXTEND IV INFUS W/PUMP: HCPCS

## 2019-11-02 PROCEDURE — 96417 CHEMO IV INFUS EACH ADDL SEQ: CPT

## 2019-11-02 PROCEDURE — 700105 HCHG RX REV CODE 258: Performed by: INTERNAL MEDICINE

## 2019-11-02 PROCEDURE — 700111 HCHG RX REV CODE 636 W/ 250 OVERRIDE (IP): Performed by: INTERNAL MEDICINE

## 2019-11-02 PROCEDURE — A4212 NON CORING NEEDLE OR STYLET: HCPCS

## 2019-11-02 PROCEDURE — 96375 TX/PRO/DX INJ NEW DRUG ADDON: CPT

## 2019-11-02 PROCEDURE — 700105 HCHG RX REV CODE 258

## 2019-11-02 PROCEDURE — 96368 THER/DIAG CONCURRENT INF: CPT

## 2019-11-02 PROCEDURE — 96411 CHEMO IV PUSH ADDL DRUG: CPT

## 2019-11-02 RX ORDER — ONDANSETRON 2 MG/ML
4 INJECTION INTRAMUSCULAR; INTRAVENOUS PRN
Status: CANCELLED | OUTPATIENT
Start: 2019-11-02

## 2019-11-02 RX ORDER — 0.9 % SODIUM CHLORIDE 0.9 %
VIAL (ML) INJECTION PRN
Status: CANCELLED | OUTPATIENT
Start: 2019-11-02

## 2019-11-02 RX ORDER — 0.9 % SODIUM CHLORIDE 0.9 %
20 VIAL (ML) INJECTION PRN
Status: CANCELLED | OUTPATIENT
Start: 2019-11-04

## 2019-11-02 RX ORDER — PROCHLORPERAZINE MALEATE 10 MG
10 TABLET ORAL EVERY 6 HOURS PRN
Status: CANCELLED | OUTPATIENT
Start: 2019-11-02

## 2019-11-02 RX ORDER — SODIUM CHLORIDE 9 MG/ML
INJECTION, SOLUTION INTRAVENOUS CONTINUOUS
Status: CANCELLED | OUTPATIENT
Start: 2019-11-02

## 2019-11-02 RX ORDER — 0.9 % SODIUM CHLORIDE 0.9 %
5 VIAL (ML) INJECTION PRN
Status: CANCELLED | OUTPATIENT
Start: 2019-11-02

## 2019-11-02 RX ORDER — DEXTROSE MONOHYDRATE 50 MG/ML
INJECTION, SOLUTION INTRAVENOUS CONTINUOUS
Status: CANCELLED | OUTPATIENT
Start: 2019-11-02

## 2019-11-02 RX ORDER — SODIUM CHLORIDE 9 MG/ML
INJECTION, SOLUTION INTRAVENOUS CONTINUOUS
Status: DISCONTINUED | OUTPATIENT
Start: 2019-11-02 | End: 2019-11-02 | Stop reason: HOSPADM

## 2019-11-02 RX ORDER — DEXTROSE MONOHYDRATE 50 MG/ML
INJECTION, SOLUTION INTRAVENOUS CONTINUOUS
Status: DISCONTINUED | OUTPATIENT
Start: 2019-11-02 | End: 2019-11-02 | Stop reason: HOSPADM

## 2019-11-02 RX ORDER — LOPERAMIDE HYDROCHLORIDE 2 MG/1
4 CAPSULE ORAL PRN
Status: CANCELLED | OUTPATIENT
Start: 2019-11-02

## 2019-11-02 RX ORDER — LOPERAMIDE HYDROCHLORIDE 2 MG/1
2 CAPSULE ORAL
Status: CANCELLED | OUTPATIENT
Start: 2019-11-02

## 2019-11-02 RX ORDER — ONDANSETRON 8 MG/1
8 TABLET, ORALLY DISINTEGRATING ORAL PRN
Status: CANCELLED | OUTPATIENT
Start: 2019-11-02

## 2019-11-02 RX ADMIN — DEXTROSE MONOHYDRATE: 50 INJECTION, SOLUTION INTRAVENOUS at 11:32

## 2019-11-02 RX ADMIN — DEXAMETHASONE SODIUM PHOSPHATE 12 MG: 4 INJECTION, SOLUTION INTRAMUSCULAR; INTRAVENOUS at 11:34

## 2019-11-02 RX ADMIN — ONDANSETRON HYDROCHLORIDE 16 MG: 2 SOLUTION INTRAMUSCULAR; INTRAVENOUS at 11:50

## 2019-11-02 RX ADMIN — LEUCOVORIN CALCIUM 944 MG: 350 INJECTION, POWDER, LYOPHILIZED, FOR SUSPENSION INTRAMUSCULAR; INTRAVENOUS at 12:24

## 2019-11-02 RX ADMIN — FLUOROURACIL 945 MG: 50 INJECTION, SOLUTION INTRAVENOUS at 16:05

## 2019-11-02 RX ADMIN — BEVACIZUMAB 500 MG: 400 INJECTION, SOLUTION INTRAVENOUS at 15:16

## 2019-11-02 RX ADMIN — FLUOROURACIL 5665 MG: 50 INJECTION, SOLUTION INTRAVENOUS at 16:10

## 2019-11-02 RX ADMIN — IRINOTECAN HYDROCHLORIDE 424.8 MG: 20 INJECTION, SOLUTION INTRAVENOUS at 12:29

## 2019-11-02 RX ADMIN — SODIUM CHLORIDE: 9 INJECTION, SOLUTION INTRAVENOUS at 15:20

## 2019-11-02 RX ADMIN — ATROPINE SULFATE 0.5 MG: 1 INJECTION, SOLUTION INTRAMUSCULAR; INTRAVENOUS; SUBCUTANEOUS at 12:21

## 2019-11-02 NOTE — PROGRESS NOTES
Chemotherapy Verification - PRIMARY RN    D1C2    Height = 184cm  Weight = 108.8kg  BSA = 2.36m2       Medication: Irinotecan (Camptosar)  Dose: 180mg/m2  Calculated Dose: 424.8mg                                  Medication: Leucovorin  Dose: 400mg/m2  Calculated Dose: 944mg                                Medication: Bevacivumab (Avastin)  Dose: 5mg/kg  Calculated Dose: 544mg                                Medication: Fluorouracil (Adrucil)  Dose: 400mg/m2  Calculated Dose: 944mg                               Medication: Fluorouracil (Adrucil)  Dose: 2400/m2  Calculated Dose: 5664mg CADD Pump over 46 hours                                I confirm this process was performed independently with the BSA and all final chemotherapy dosing calculations congruent.  Any discrepancies of 10% or greater have been addressed with the chemotherapy pharmacist. The resolution of the discrepancy has been documented in the EPIC progress notes.

## 2019-11-02 NOTE — PROGRESS NOTES
"Pharmacy Chemotherapy Calculations    Dx: Metastatic colorectal cancer  Cycle: 2  Previous treatment = C1 on 10/19/19; s/p CapeOx + Bevacizumab C14 = 9/14/19    Regimen and Dosing Reference  FOLFiri + bevacizumab  Irinotecan 180 mg/m2 IV on day 1  Leucovorin 400 mg/m2 iv on day 1 concurrently with irinotecan  5- mg/m2 iv bolus d1 followed by 2400 mg/m2 iv over 46 hrs  Bevacizumab 5 mg/kg IV on day 1  q14 days for 8-12 cycles  NCCN Guidelines for Colon Cancer V.2.2015  Leonel T et al - Eur J Cancer. 1999 Sep;35(9):1343-7.  barbara Martinez - J Clin Oncol. 2007 Oct 20;25(30):7292-35    Allergies: Patient has no known allergies.  /77   Pulse 76   Temp 36.3 °C (97.3 °F) (Temporal)   Resp 18   Ht 1.845 m (6' 0.64\") Comment: took 1 inch off for shoes  Wt 108.8 kg (239 lb 13.8 oz)   SpO2 97%   BMI 31.96 kg/m²  Body surface area is 2.36 meters squared.     Vitals 11/2/19  BP = 130/77    Labs 10/31/19  ANC~ 2530 Plt = 137k   Hgb = 12.6     SCr = 0.64 mg/dL CrCl ~ >125 mL/min (minimum SCr of 0.7)   LFT's = 41/34/180 TBili = 0.9   Urine Protein = Negative    Irinotecan 180 mg/m2 x 2.36 m2 = 424.8 mg   <10% difference, OK to treat with final dose = 424.8 mg IV    Leucovorin 400 mg/m2 x 2.36 m2 = 944 mg   <10% difference, OK to treat with final dose = 944 mg IV    Fluorouracil 400 mg/m2 x 2.36 m2 = 944 mg   <10% difference, OK to treat with final dose = 945 mg IV    Bevacizumab 5 mg/kg x 108.8 kg = 544 mg   <10% difference, OK to treat with final dose = 500 mg IV over 30 minutes OK    Fluorouracil 2400 mg/m2 x 2.36 m2 = 5664 mg   <10% difference, OK to treat with final dose = 5665 mg CIVI over 46 hours via home infusion pump at 2.5 ml/hr    Ruben Santamaria, PharmD    "

## 2019-11-02 NOTE — PROGRESS NOTES
Chemotherapy Verification - SECONDARY RN     C2    Height = 184.5 mg  Weight = 108.8 kg  BSA = 2.36 m2       Medication: Irinotecan  Dose: 180 mg/m2  Calculated Dose: 424.8 mg                               Medication: Fluorouracil (5FU) push  Dose: 400 mg/m2  Calculated Dose: 944 mg                                 Medication: Fluorouracil (5FU) CADD pump  Dose: 2400 mg/m2  Calculated Dose: 5664 mg over 46 hours                                 Medication: Bevacizumab (Avastin)  Dose: 5 mg/m2  Calculated Dose: 544 mg - dose rounded to 500mg per [facility] protocol                                I confirm that this process was performed independently.

## 2019-11-02 NOTE — PROGRESS NOTES
"Pharmacy Chemotherapy Verification Note:    Patient Name: Gurmeet Jo      Dx: Metastatic Colorectal CA      Protocol: FOLFIRI+Avastin       *Dosing Reference*  Irinotecan 180 mg/m² IV over 30-90 minutes on Day 1  Leucovorin 400 mg/m² IV over 30-90 minutes on Day 1, to run concurrently with Irinotecan  Fluorouracil 400 mg/m² IV push on Day 1, followed by   Fluorouracil 2400 mg/m² CIIV over 46-48 hours  Bevacizumab 5 mg/kg IV on Day 1   Every 14 days until disease progression or unacceptable toxicity    NCCN Guidelines for Colon Cancer. V.3.2019.  Naa V et al. Lancet Oncol. 2014;15(10):1065-75.  Akash CS, et al. J Clin Oncol.2007;25(30):8629-28.    Allergies:  Patient has no known allergies.     /77   Pulse 76   Temp 36.3 °C (97.3 °F) (Temporal)   Resp 18   Ht 1.845 m (6' 0.64\") Comment: took 1 inch off for shoes  Wt 108.8 kg (239 lb 13.8 oz)   SpO2 97%   BMI 31.96 kg/m²  Body surface area is 2.36 meters squared.     Labs from 10/31/19:  ANC~ 2530 Plt = 137 k Hgb = 12.6  SCr = 0.64 mg/dL CrCl = >125 mL/min (min SCr 0.7 mg/dL)  AST/ALT/AP = 41/34/180 TBili = 0.9  Urine protein = negative     Drug Order   (Drug name, dose, route, IV Fluid & volume, frequency, number of doses) Cycle: 2   Previous treatment: C1 = 10/19/19     Medication = Irinotecan (Camptosar)  Base Dose = 180 mg/m²  Calc Dose: Base Dose x 2.36 m² = 425 mg  Final Dose = 424.8 mg  Route = IV  Fluid & Volume = D5W 250 mL  Admin Duration = Over 120 minutes          <10% difference, ok to treat with final written dose   Medication = Leucovorin (Wellcovorin)  Base Dose = 400 mg/m²  Calc Dose: Base Dose x 2.36 m² = 944 mg  Final Dose = 944 mg  Route = IV  Fluid & Volume = D5W 250 mL  Admin Duration = Over 120 minutes          <10% difference, ok to treat with final written dose   Medication = Fluorouracil (5-FU)  Base Dose = 400 mg/m²  Calc Dose:Base Dose x 2.36 m² = 944 mg  Final Dose = 945 mg  Route = IVP  Fluid & Volume = 18.9 mL " in syringe  Conc = 50 mg/mL  Admin Duration = Over 5-10 minutes          <10% difference, ok to treat with final written dose   Medication = Fluorouracil (5-FU)  Base Dose = 2400 mg/m²  Calc Dose: Base Dose x 2.36 m² = 5664 mg  Final Dose = 5665 mg  Route = CIVI via CADD pump  Fluid & Volume = 113.3 mL (+3 mL overfill = 116.3 mL)  Admin Duration = Over 46 hours (to run at 2.5 mL/hour)          <10% difference, ok to treat with final written dose   Medication = Bevacizumab (Avastin)  Base Dose = 5 mg/kg  Calc Dose: Base Dose x 108.8 kg = 544 mg  Final Dose = 500 mg  Route = IV  Fluid & Volume =  mL  Admin Duration = Over 30 minutes          <10% difference, rounded to vial size per protocol, ok to treat with final written dose     By my signature below, I confirm this process was performed independently with the BSA and all final chemotherapy dosing calculations congruent. I have reviewed the above chemotherapy order and that my calculation of the final dose and BSA (when applicable) corroborate those calculations of the  pharmacist.     Man Anderson, PharmD

## 2019-11-03 NOTE — PROGRESS NOTES
Pt arrived ambulatory to [facility] for chemo treatment. POC discussed with pt and he agrees with plan. Pt's labs reviewed from 10/31/19, ok to proceed with treatment. Dr Pinzon, on call MD, contacted r/t pt's treatment plan not signed by Dr Ross. Pt's port accessed in sterile fashion, brisk blood return noted. Pt's orders now signed by Dr Pinzon. Pt medicated per MAR. Pt tolerated treatment without s/s adverse reaction. Pt with CADD pump in place upon discharge. Pt aware of next appt 11/4/19 for pump de-access. Pt discharged to self care, NAD. Pt accompanied by his wife.

## 2019-11-04 ENCOUNTER — APPOINTMENT (OUTPATIENT)
Dept: ONCOLOGY | Facility: MEDICAL CENTER | Age: 54
End: 2019-11-04
Attending: INTERNAL MEDICINE
Payer: MEDICAID

## 2019-11-04 ENCOUNTER — OUTPATIENT INFUSION SERVICES (OUTPATIENT)
Dept: ONCOLOGY | Facility: MEDICAL CENTER | Age: 54
End: 2019-11-04
Attending: NURSE PRACTITIONER
Payer: MEDICAID

## 2019-11-04 VITALS
HEART RATE: 75 BPM | BODY MASS INDEX: 31.99 KG/M2 | DIASTOLIC BLOOD PRESSURE: 73 MMHG | HEIGHT: 73 IN | SYSTOLIC BLOOD PRESSURE: 114 MMHG | WEIGHT: 241.4 LBS | OXYGEN SATURATION: 95 % | RESPIRATION RATE: 18 BRPM | TEMPERATURE: 97 F

## 2019-11-04 DIAGNOSIS — C78.7 METASTATIC COLON CANCER TO LIVER (HCC): ICD-10-CM

## 2019-11-04 DIAGNOSIS — C18.9 METASTATIC COLON CANCER TO LIVER (HCC): ICD-10-CM

## 2019-11-04 PROCEDURE — 96523 IRRIG DRUG DELIVERY DEVICE: CPT

## 2019-11-04 PROCEDURE — 700111 HCHG RX REV CODE 636 W/ 250 OVERRIDE (IP): Performed by: INTERNAL MEDICINE

## 2019-11-04 RX ADMIN — HEPARIN 500 UNITS: 100 SYRINGE at 14:25

## 2019-11-04 NOTE — PROGRESS NOTES
Patient arrived to [facility] for C2D3 5FU CADD pump de-access.  Pump stopped with 0ml remaining in reservoir, 114.1 mLs administered.  Disconnected pump, Port flushed per protocol with brisk blood return noted, heparinized, de-accessed and gauze dressing applied.  Pump cleaned and placed in pharmacy drawer.  Confirmed pt's next appt. Pt discharged home in [facility].

## 2019-11-13 RX ORDER — LOPERAMIDE HYDROCHLORIDE 2 MG/1
4 CAPSULE ORAL PRN
Status: CANCELLED | OUTPATIENT
Start: 2019-11-23

## 2019-11-13 RX ORDER — 0.9 % SODIUM CHLORIDE 0.9 %
VIAL (ML) INJECTION PRN
Status: CANCELLED | OUTPATIENT
Start: 2019-11-15

## 2019-11-13 RX ORDER — 0.9 % SODIUM CHLORIDE 0.9 %
5 VIAL (ML) INJECTION PRN
Status: CANCELLED | OUTPATIENT
Start: 2019-11-23

## 2019-11-13 RX ORDER — 0.9 % SODIUM CHLORIDE 0.9 %
VIAL (ML) INJECTION PRN
Status: CANCELLED | OUTPATIENT
Start: 2019-11-23

## 2019-11-13 RX ORDER — ONDANSETRON 2 MG/ML
4 INJECTION INTRAMUSCULAR; INTRAVENOUS PRN
Status: CANCELLED | OUTPATIENT
Start: 2019-11-23

## 2019-11-13 RX ORDER — PROCHLORPERAZINE MALEATE 10 MG
10 TABLET ORAL EVERY 6 HOURS PRN
Status: CANCELLED | OUTPATIENT
Start: 2019-11-23

## 2019-11-13 RX ORDER — DEXTROSE MONOHYDRATE 50 MG/ML
INJECTION, SOLUTION INTRAVENOUS CONTINUOUS
Status: CANCELLED | OUTPATIENT
Start: 2019-11-23

## 2019-11-13 RX ORDER — 0.9 % SODIUM CHLORIDE 0.9 %
20 VIAL (ML) INJECTION PRN
Status: CANCELLED | OUTPATIENT
Start: 2019-11-26

## 2019-11-13 RX ORDER — ONDANSETRON 8 MG/1
8 TABLET, ORALLY DISINTEGRATING ORAL PRN
Status: CANCELLED | OUTPATIENT
Start: 2019-11-23

## 2019-11-13 RX ORDER — SODIUM CHLORIDE 9 MG/ML
INJECTION, SOLUTION INTRAVENOUS CONTINUOUS
Status: CANCELLED | OUTPATIENT
Start: 2019-11-23

## 2019-11-13 RX ORDER — 0.9 % SODIUM CHLORIDE 0.9 %
5 VIAL (ML) INJECTION PRN
Status: CANCELLED | OUTPATIENT
Start: 2019-11-15

## 2019-11-13 RX ORDER — LOPERAMIDE HYDROCHLORIDE 2 MG/1
2 CAPSULE ORAL
Status: CANCELLED | OUTPATIENT
Start: 2019-11-23

## 2019-11-14 ENCOUNTER — HOSPITAL ENCOUNTER (OUTPATIENT)
Dept: LAB | Facility: MEDICAL CENTER | Age: 54
End: 2019-11-14
Attending: INTERNAL MEDICINE
Payer: MEDICAID

## 2019-11-14 LAB
ALBUMIN SERPL BCP-MCNC: 3.6 G/DL (ref 3.2–4.9)
ALBUMIN/GLOB SERPL: 1 G/DL
ALP SERPL-CCNC: 163 U/L (ref 30–99)
ALT SERPL-CCNC: 31 U/L (ref 2–50)
ANION GAP SERPL CALC-SCNC: 9 MMOL/L (ref 0–11.9)
APPEARANCE UR: CLEAR
AST SERPL-CCNC: 36 U/L (ref 12–45)
BASOPHILS # BLD AUTO: 0.7 % (ref 0–1.8)
BASOPHILS # BLD: 0.02 K/UL (ref 0–0.12)
BILIRUB SERPL-MCNC: 0.9 MG/DL (ref 0.1–1.5)
BILIRUB UR QL STRIP.AUTO: NEGATIVE
BUN SERPL-MCNC: 9 MG/DL (ref 8–22)
CALCIUM SERPL-MCNC: 9.1 MG/DL (ref 8.5–10.5)
CHLORIDE SERPL-SCNC: 103 MMOL/L (ref 96–112)
CO2 SERPL-SCNC: 25 MMOL/L (ref 20–33)
COLOR UR: YELLOW
CREAT SERPL-MCNC: 0.72 MG/DL (ref 0.5–1.4)
EOSINOPHIL # BLD AUTO: 0.04 K/UL (ref 0–0.51)
EOSINOPHIL NFR BLD: 1.3 % (ref 0–6.9)
ERYTHROCYTE [DISTWIDTH] IN BLOOD BY AUTOMATED COUNT: 59.7 FL (ref 35.9–50)
GLOBULIN SER CALC-MCNC: 3.6 G/DL (ref 1.9–3.5)
GLUCOSE SERPL-MCNC: 242 MG/DL (ref 65–99)
GLUCOSE UR STRIP.AUTO-MCNC: NEGATIVE MG/DL
HCT VFR BLD AUTO: 38 % (ref 42–52)
HGB BLD-MCNC: 12.5 G/DL (ref 14–18)
IMM GRANULOCYTES # BLD AUTO: 0.01 K/UL (ref 0–0.11)
IMM GRANULOCYTES NFR BLD AUTO: 0.3 % (ref 0–0.9)
KETONES UR STRIP.AUTO-MCNC: NEGATIVE MG/DL
LEUKOCYTE ESTERASE UR QL STRIP.AUTO: NEGATIVE
LYMPHOCYTES # BLD AUTO: 0.93 K/UL (ref 1–4.8)
LYMPHOCYTES NFR BLD: 30.6 % (ref 22–41)
MAGNESIUM SERPL-MCNC: 1.8 MG/DL (ref 1.5–2.5)
MCH RBC QN AUTO: 33.2 PG (ref 27–33)
MCHC RBC AUTO-ENTMCNC: 32.9 G/DL (ref 33.7–35.3)
MCV RBC AUTO: 100.8 FL (ref 81.4–97.8)
MICRO URNS: NORMAL
MONOCYTES # BLD AUTO: 0.33 K/UL (ref 0–0.85)
MONOCYTES NFR BLD AUTO: 10.9 % (ref 0–13.4)
NEUTROPHILS # BLD AUTO: 1.71 K/UL (ref 1.82–7.42)
NEUTROPHILS NFR BLD: 56.2 % (ref 44–72)
NITRITE UR QL STRIP.AUTO: NEGATIVE
NRBC # BLD AUTO: 0 K/UL
NRBC BLD-RTO: 0 /100 WBC
PH UR STRIP.AUTO: 6 [PH] (ref 5–8)
PLATELET # BLD AUTO: 98 K/UL (ref 164–446)
PMV BLD AUTO: 10.3 FL (ref 9–12.9)
POTASSIUM SERPL-SCNC: 3.8 MMOL/L (ref 3.6–5.5)
PROT SERPL-MCNC: 7.2 G/DL (ref 6–8.2)
PROT UR QL STRIP: NEGATIVE MG/DL
RBC # BLD AUTO: 3.77 M/UL (ref 4.7–6.1)
RBC UR QL AUTO: NEGATIVE
SODIUM SERPL-SCNC: 137 MMOL/L (ref 135–145)
SP GR UR STRIP.AUTO: 1.02
URATE SERPL-MCNC: 5.4 MG/DL (ref 2.5–8.3)
UROBILINOGEN UR STRIP.AUTO-MCNC: 0.2 MG/DL
WBC # BLD AUTO: 3 K/UL (ref 4.8–10.8)

## 2019-11-14 PROCEDURE — 83735 ASSAY OF MAGNESIUM: CPT

## 2019-11-14 PROCEDURE — 84550 ASSAY OF BLOOD/URIC ACID: CPT

## 2019-11-14 PROCEDURE — 80053 COMPREHEN METABOLIC PANEL: CPT

## 2019-11-14 PROCEDURE — 81003 URINALYSIS AUTO W/O SCOPE: CPT

## 2019-11-14 PROCEDURE — 85025 COMPLETE CBC W/AUTO DIFF WBC: CPT

## 2019-11-14 PROCEDURE — 36415 COLL VENOUS BLD VENIPUNCTURE: CPT

## 2019-11-16 ENCOUNTER — OUTPATIENT INFUSION SERVICES (OUTPATIENT)
Dept: ONCOLOGY | Facility: MEDICAL CENTER | Age: 54
End: 2019-11-16
Attending: INTERNAL MEDICINE
Payer: MEDICAID

## 2019-11-16 VITALS
HEART RATE: 76 BPM | RESPIRATION RATE: 18 BRPM | DIASTOLIC BLOOD PRESSURE: 85 MMHG | HEIGHT: 73 IN | TEMPERATURE: 97 F | BODY MASS INDEX: 31.61 KG/M2 | SYSTOLIC BLOOD PRESSURE: 137 MMHG | WEIGHT: 238.54 LBS | OXYGEN SATURATION: 98 %

## 2019-11-16 DIAGNOSIS — C78.7 METASTATIC COLON CANCER TO LIVER (HCC): ICD-10-CM

## 2019-11-16 DIAGNOSIS — C18.9 METASTATIC COLON CANCER TO LIVER (HCC): ICD-10-CM

## 2019-11-16 LAB
BASOPHILS # BLD AUTO: 0.8 % (ref 0–1.8)
BASOPHILS # BLD: 0.02 K/UL (ref 0–0.12)
EOSINOPHIL # BLD AUTO: 0.04 K/UL (ref 0–0.51)
EOSINOPHIL NFR BLD: 1.6 % (ref 0–6.9)
ERYTHROCYTE [DISTWIDTH] IN BLOOD BY AUTOMATED COUNT: 58.9 FL (ref 35.9–50)
HCT VFR BLD AUTO: 36 % (ref 42–52)
HGB BLD-MCNC: 12 G/DL (ref 14–18)
IMM GRANULOCYTES # BLD AUTO: 0 K/UL (ref 0–0.11)
IMM GRANULOCYTES NFR BLD AUTO: 0 % (ref 0–0.9)
LYMPHOCYTES # BLD AUTO: 0.96 K/UL (ref 1–4.8)
LYMPHOCYTES NFR BLD: 38.7 % (ref 22–41)
MCH RBC QN AUTO: 32.7 PG (ref 27–33)
MCHC RBC AUTO-ENTMCNC: 33.3 G/DL (ref 33.7–35.3)
MCV RBC AUTO: 98.1 FL (ref 81.4–97.8)
MONOCYTES # BLD AUTO: 0.51 K/UL (ref 0–0.85)
MONOCYTES NFR BLD AUTO: 20.6 % (ref 0–13.4)
NEUTROPHILS # BLD AUTO: 0.95 K/UL (ref 1.82–7.42)
NEUTROPHILS NFR BLD: 38.3 % (ref 44–72)
NRBC # BLD AUTO: 0 K/UL
NRBC BLD-RTO: 0 /100 WBC
PLATELET # BLD AUTO: 81 K/UL (ref 164–446)
PMV BLD AUTO: 9.9 FL (ref 9–12.9)
RBC # BLD AUTO: 3.67 M/UL (ref 4.7–6.1)
WBC # BLD AUTO: 2.5 K/UL (ref 4.8–10.8)

## 2019-11-16 PROCEDURE — 306780 HCHG STAT FOR TRANSFUSION PER CASE

## 2019-11-16 PROCEDURE — 36591 DRAW BLOOD OFF VENOUS DEVICE: CPT

## 2019-11-16 PROCEDURE — 700111 HCHG RX REV CODE 636 W/ 250 OVERRIDE (IP)

## 2019-11-16 PROCEDURE — 85025 COMPLETE CBC W/AUTO DIFF WBC: CPT

## 2019-11-16 PROCEDURE — A4212 NON CORING NEEDLE OR STYLET: HCPCS

## 2019-11-16 RX ORDER — 0.9 % SODIUM CHLORIDE 0.9 %
VIAL (ML) INJECTION PRN
Status: CANCELLED | OUTPATIENT
Start: 2019-11-22

## 2019-11-16 RX ORDER — 0.9 % SODIUM CHLORIDE 0.9 %
5 VIAL (ML) INJECTION PRN
Status: CANCELLED | OUTPATIENT
Start: 2019-11-22

## 2019-11-16 RX ADMIN — HEPARIN 500 UNITS: 100 SYRINGE at 09:55

## 2019-11-16 NOTE — PROGRESS NOTES
"Pharmacy Chemotherapy Calculations  Treatment deferred for counts  Dx: Metastatic colorectal cancer    Previous treatment = C2 on 11/2/19; s/p CapeOx + Bevacizumab C14 = 9/14/19    Regimen and Dosing Reference  FOLFiri + bevacizumab  Irinotecan 180 mg/m2 IV on day 1  Leucovorin 400 mg/m2 iv on day 1 concurrently with irinotecan  5- mg/m2 iv bolus d1 followed by 2400 mg/m2 iv over 46 hrs  Bevacizumab 5 mg/kg IV on day 1  q14 days for 8-12 cycles  NCCN Guidelines for Colon Cancer V.2.2015  Leonel T, et al - Eur J Cancer. 1999 Sep;35(9):1343-7.  Akash BIRD et al - J Clin Oncol. 2007 Oct 20;25(30):5091-18    Allergies: Patient has no known allergies.  /85   Pulse 76   Temp 36.1 °C (97 °F) (Temporal)   Resp 18   Ht 1.85 m (6' 0.84\")   Wt 108.2 kg (238 lb 8.6 oz)   SpO2 98%   BMI 31.61 kg/m²  Body surface area is 2.36 meters squared.     Labs from 11/14 and 11/16/19 reviewed - Plts 81k and , defer tx per MD Elsy Londono, PharmD, BCOP    "

## 2019-11-16 NOTE — PROGRESS NOTES
Patient arrived ambulatory to the [facility] for C3D1 of Folfiri/Avastin. Reviewed vital signs, labs, and physician order. Patient denies S&S of infection, or bleeding. Lab results reviewed with pharmacy, and charge RN, CBC to be re-checked due to platelets of 98,000 on 10/14/19. Pt arrives with Emla cream applied to right chest port, port accessed with sterile technique, visualized brisk blood return, CBC collected per MD order. Critical WBC 2.5, , platelets 81,000, reports by lab, Dr Chisholm (MD on call for Dr Ross)  Ordered treatment to be deferred, confirmed 1 week ok per MD. Reviewed POC with patient, provided handout about neutropenia, and thrombocytopenia. Right chest port flushed per protocol, needle removed with tip intact, gauze and tape dressing placed. Central scheduling notified of need to adjust future apts for chemotherapy. Confirmed upcoming appointment date and time with patient via phone. Patient left the [facility] ambulatory in no sign of distress.

## 2019-11-18 ENCOUNTER — APPOINTMENT (OUTPATIENT)
Dept: ONCOLOGY | Facility: MEDICAL CENTER | Age: 54
End: 2019-11-18
Attending: INTERNAL MEDICINE
Payer: MEDICAID

## 2019-11-23 ENCOUNTER — OUTPATIENT INFUSION SERVICES (OUTPATIENT)
Dept: ONCOLOGY | Facility: MEDICAL CENTER | Age: 54
End: 2019-11-23
Attending: INTERNAL MEDICINE
Payer: MEDICAID

## 2019-11-23 VITALS
WEIGHT: 236.11 LBS | DIASTOLIC BLOOD PRESSURE: 77 MMHG | HEART RATE: 85 BPM | OXYGEN SATURATION: 95 % | TEMPERATURE: 97.9 F | RESPIRATION RATE: 18 BRPM | SYSTOLIC BLOOD PRESSURE: 138 MMHG | HEIGHT: 73 IN | BODY MASS INDEX: 31.29 KG/M2

## 2019-11-23 DIAGNOSIS — C78.7 METASTATIC COLON CANCER TO LIVER (HCC): ICD-10-CM

## 2019-11-23 DIAGNOSIS — C18.9 METASTATIC COLON CANCER TO LIVER (HCC): ICD-10-CM

## 2019-11-23 LAB
ALBUMIN SERPL BCP-MCNC: 3.8 G/DL (ref 3.2–4.9)
ALBUMIN/GLOB SERPL: 1.2 G/DL
ALP SERPL-CCNC: 170 U/L (ref 30–99)
ALT SERPL-CCNC: 27 U/L (ref 2–50)
ANION GAP SERPL CALC-SCNC: 9 MMOL/L (ref 0–11.9)
APPEARANCE UR: CLEAR
AST SERPL-CCNC: 40 U/L (ref 12–45)
BASOPHILS # BLD AUTO: 0.8 % (ref 0–1.8)
BASOPHILS # BLD: 0.03 K/UL (ref 0–0.12)
BILIRUB SERPL-MCNC: 1.2 MG/DL (ref 0.1–1.5)
BUN SERPL-MCNC: 10 MG/DL (ref 8–22)
CALCIUM SERPL-MCNC: 9.2 MG/DL (ref 8.5–10.5)
CHLORIDE SERPL-SCNC: 105 MMOL/L (ref 96–112)
CO2 SERPL-SCNC: 24 MMOL/L (ref 20–33)
COLOR UR AUTO: YELLOW
CREAT SERPL-MCNC: 0.63 MG/DL (ref 0.5–1.4)
EOSINOPHIL # BLD AUTO: 0.05 K/UL (ref 0–0.51)
EOSINOPHIL NFR BLD: 1.3 % (ref 0–6.9)
ERYTHROCYTE [DISTWIDTH] IN BLOOD BY AUTOMATED COUNT: 61.3 FL (ref 35.9–50)
GLOBULIN SER CALC-MCNC: 3.3 G/DL (ref 1.9–3.5)
GLUCOSE SERPL-MCNC: 141 MG/DL (ref 65–99)
GLUCOSE UR QL STRIP.AUTO: NEGATIVE MG/DL
HCT VFR BLD AUTO: 38 % (ref 42–52)
HGB BLD-MCNC: 12.5 G/DL (ref 14–18)
IMM GRANULOCYTES # BLD AUTO: 0.02 K/UL (ref 0–0.11)
IMM GRANULOCYTES NFR BLD AUTO: 0.5 % (ref 0–0.9)
KETONES UR QL STRIP.AUTO: ABNORMAL MG/DL
LEUKOCYTE ESTERASE UR QL STRIP.AUTO: NEGATIVE
LYMPHOCYTES # BLD AUTO: 1.22 K/UL (ref 1–4.8)
LYMPHOCYTES NFR BLD: 31 % (ref 22–41)
MCH RBC QN AUTO: 32.5 PG (ref 27–33)
MCHC RBC AUTO-ENTMCNC: 32.9 G/DL (ref 33.7–35.3)
MCV RBC AUTO: 98.7 FL (ref 81.4–97.8)
MONOCYTES # BLD AUTO: 0.84 K/UL (ref 0–0.85)
MONOCYTES NFR BLD AUTO: 21.4 % (ref 0–13.4)
NEUTROPHILS # BLD AUTO: 1.77 K/UL (ref 1.82–7.42)
NEUTROPHILS NFR BLD: 45 % (ref 44–72)
NITRITE UR QL STRIP.AUTO: NEGATIVE
NRBC # BLD AUTO: 0 K/UL
NRBC BLD-RTO: 0 /100 WBC
PH UR STRIP.AUTO: 5.5 [PH] (ref 5–8)
PLATELET # BLD AUTO: 110 K/UL (ref 164–446)
PMV BLD AUTO: 10.1 FL (ref 9–12.9)
POTASSIUM SERPL-SCNC: 3.7 MMOL/L (ref 3.6–5.5)
PROT SERPL-MCNC: 7.1 G/DL (ref 6–8.2)
PROT UR QL STRIP: 100 MG/DL
RBC # BLD AUTO: 3.85 M/UL (ref 4.7–6.1)
RBC UR QL AUTO: NEGATIVE
SODIUM SERPL-SCNC: 138 MMOL/L (ref 135–145)
SP GR UR: 1.02 (ref 1–1.03)
WBC # BLD AUTO: 3.9 K/UL (ref 4.8–10.8)

## 2019-11-23 PROCEDURE — 81002 URINALYSIS NONAUTO W/O SCOPE: CPT

## 2019-11-23 PROCEDURE — 700105 HCHG RX REV CODE 258: Performed by: INTERNAL MEDICINE

## 2019-11-23 PROCEDURE — 96413 CHEMO IV INFUSION 1 HR: CPT

## 2019-11-23 PROCEDURE — A4212 NON CORING NEEDLE OR STYLET: HCPCS

## 2019-11-23 PROCEDURE — 700111 HCHG RX REV CODE 636 W/ 250 OVERRIDE (IP): Performed by: INTERNAL MEDICINE

## 2019-11-23 PROCEDURE — 85025 COMPLETE CBC W/AUTO DIFF WBC: CPT

## 2019-11-23 PROCEDURE — 96409 CHEMO IV PUSH SNGL DRUG: CPT

## 2019-11-23 PROCEDURE — 96375 TX/PRO/DX INJ NEW DRUG ADDON: CPT

## 2019-11-23 PROCEDURE — 96368 THER/DIAG CONCURRENT INF: CPT

## 2019-11-23 PROCEDURE — 96415 CHEMO IV INFUSION ADDL HR: CPT

## 2019-11-23 PROCEDURE — G0498 CHEMO EXTEND IV INFUS W/PUMP: HCPCS

## 2019-11-23 PROCEDURE — 80053 COMPREHEN METABOLIC PANEL: CPT

## 2019-11-23 PROCEDURE — 96411 CHEMO IV PUSH ADDL DRUG: CPT

## 2019-11-23 RX ORDER — SODIUM CHLORIDE 9 MG/ML
INJECTION, SOLUTION INTRAVENOUS CONTINUOUS
Status: DISCONTINUED | OUTPATIENT
Start: 2019-11-23 | End: 2019-11-23 | Stop reason: HOSPADM

## 2019-11-23 RX ORDER — DEXTROSE MONOHYDRATE 50 MG/ML
INJECTION, SOLUTION INTRAVENOUS CONTINUOUS
Status: DISCONTINUED | OUTPATIENT
Start: 2019-11-23 | End: 2019-11-23 | Stop reason: HOSPADM

## 2019-11-23 RX ADMIN — LEUCOVORIN CALCIUM 846 MG: 350 INJECTION, POWDER, LYOPHILIZED, FOR SUSPENSION INTRAMUSCULAR; INTRAVENOUS at 12:57

## 2019-11-23 RX ADMIN — IRINOTECAN HYDROCHLORIDE 380.8 MG: 20 INJECTION, SOLUTION INTRAVENOUS at 12:57

## 2019-11-23 RX ADMIN — DEXAMETHASONE SODIUM PHOSPHATE 12 MG: 4 INJECTION, SOLUTION INTRAMUSCULAR; INTRAVENOUS at 11:50

## 2019-11-23 RX ADMIN — ATROPINE SULFATE 0.5 MG: 1 INJECTION, SOLUTION INTRAMUSCULAR; INTRAVENOUS; SUBCUTANEOUS at 12:54

## 2019-11-23 RX ADMIN — DEXTROSE MONOHYDRATE: 50 INJECTION, SOLUTION INTRAVENOUS at 11:44

## 2019-11-23 RX ADMIN — FLUOROURACIL 5075 MG: 50 INJECTION, SOLUTION INTRAVENOUS at 15:25

## 2019-11-23 RX ADMIN — FLUOROURACIL 845 MG: 50 INJECTION, SOLUTION INTRAVENOUS at 15:16

## 2019-11-23 RX ADMIN — ONDANSETRON HYDROCHLORIDE 16 MG: 2 SOLUTION INTRAMUSCULAR; INTRAVENOUS at 12:00

## 2019-11-23 NOTE — PROGRESS NOTES
Patient here today for folfirii and avastin. Denies any complaints. Port accessed using sterile technique. Brisk blood return obtained. Labs sent. Awaiting urine sample and lab results. Continue to monitor.

## 2019-11-23 NOTE — PROGRESS NOTES
"Pharmacy Chemotherapy Verification Note:    Patient Name: Gurmeet Jo      Dx: Metastatic Colorectal CA      Protocol: FOLFIRI+Avastin       *Dosing Reference*  Irinotecan 180 mg/m² IV over 30-90 minutes on Day 1  11/23/19 Dose reduced to 162 mg/m2 for tolerability per Dr. Ross  Leucovorin 400 mg/m² IV over 30-90 minutes on Day 1, to run concurrently with Irinotecan  11/23/19 Dose reduced to 360 mg/m2 for tolerability per Dr. Ross  Fluorouracil 400 mg/m² IV push on Day 1, followed by   11/23/19 Dose reduced to 360 mg/m2 for tolerability per Dr. Ross  Fluorouracil 2400 mg/m² CIIV over 46-48 hours  11/23/19 Dose reduced to 2160 mg/m2 for tolerability per Dr. Ross  Bevacizumab 5 mg/kg IV on Day 1   11/23/19 Dose held for 2+ proteinuria per Dr. Goldman   Every 14 days until disease progression or unacceptable toxicity    NCCN Guidelines for Colon Cancer. V.3.2019.  Naa V, et al. Lancet Oncol. 2014;15(10):1065-75.  Akash BIRD, et al. J Clin Oncol.2007;25(30):4039-02.    Allergies:  Patient has no known allergies.     /77   Pulse 85   Temp 36.6 °C (97.9 °F) (Temporal)   Resp 18   Ht 1.85 m (6' 0.84\") Comment: took 1 inch off of shoes  Wt 107.1 kg (236 lb 1.8 oz)   SpO2 95%   BMI 31.29 kg/m²  Body surface area is 2.35 meters squared.     Labs 11/23/19:  ANC~ 1770 Plt = 110k   Hgb = 12.5     SCr = 0.63 mg/dL CrCl > 125 mL/min (min Scr 0.7)  AST/ALT/AP = 40/27/170 TBili = 1.2    Urine protein = 100 or 2+ - HOLD Avastin today proceed with FOLFIRI        Drug Order   (Drug name, dose, route, IV Fluid & volume, frequency, number of doses) Cycle: 3 -delayed 1 wk for low ANC  Previous treatment: C2 = 11/2/19     Medication = Irinotecan (Camptosar)  Base Dose = 162 mg/m²  Calc Dose: Base Dose x 2.35 m² = 380.7 mg  Final Dose = 380.8 mg  Route = IV  Fluid & Volume = D5W 500 mL  Admin Duration = Over 120 minutes   Infuse concurrently with LCV       <10% difference, ok to treat with final written dose "   Medication = Leucovorin (Wellcovorin)  Base Dose = 360 mg/m²  Calc Dose: Base Dose x 2.35 m² = 846 mg  Final Dose = 846 mg  Route = IV  Fluid & Volume = D5W 250 mL  Admin Duration = Over 120 minutes   Infuse concurrently with irinotecan       <10% difference, ok to treat with final written dose   Medication = Fluorouracil (5-FU)  Base Dose = 360 mg/m²  Calc Dose:Base Dose x 2.35 m² = 846 mg  Final Dose = 845 mg  Route = IVP  Fluid & Volume = 16.9 mL in syringe  Conc = 50 mg/mL  Admin Duration = Over 5 minutes          <10% difference, ok to treat with final written dose   Medication = Fluorouracil (5-FU)  Base Dose = 2160 mg/m²  Calc Dose: Base Dose x 2.35 m² = 5076 mg  Final Dose = 5075 mg  Route = CIVI via CADD pump  Fluid & Volume = 101.5 mL (+3 mL overfill = 104.5 mL)  Admin Duration = Over 46 hours (to run at 2.2 mL/hour)          <10% difference, ok to treat with final written dose      Hold today for urine protein 2+       <10% difference, rounded to vial size per protocol, ok to treat with final written dose     By my signature below, I confirm this process was performed independently with the BSA and all final chemotherapy dosing calculations congruent. I have reviewed the above chemotherapy order and that my calculation of the final dose and BSA (when applicable) corroborate those calculations of the  pharmacist.     Chandrika Robles, PharmD. BCOP

## 2019-11-23 NOTE — PROGRESS NOTES
Chemotherapy Verification - SECONDARY RN       Height = 185 cm  Weight = 107.1 kg  BSA = 2.346 m2       Medication: irinotecan  Dose: 162 mg/m2 dr  Calculated Dose: 380.05 mg                             (In mg/m2, AUC, mg/kg)     Medication: 5FU  Dose: 360 mg/m2 dr  Calculated Dose: 844.56 mg                             (In mg/m2, AUC, mg/kg)    Medication: Home infusion 5FU  Dose: 2160 mg/m2 dr  Calculated Dose: 5067.36 mg                             (In mg/m2, AUC, mg/kg)    I confirm that this process was performed independently.

## 2019-11-23 NOTE — PROGRESS NOTES
This RN back from lunch. Premedications finished. Awaiting chemotherapy from pharmacy. No avastin today per MD order due to positive urine protein.

## 2019-11-23 NOTE — PROGRESS NOTES
"Pharmacy Chemotherapy Calculations  Dx: Metastatic colorectal cancer  Cycle: 3, delayed 1 week for counts  Previous treatment = C2 on 11/2/19; s/p CapeOx + Bevacizumab C14 = 9/14/19    Regimen: FOLFiri + bevacizumab  Irinotecan  IV on day 1   - 11/23/19 Doses reduced 10% for cytopenias = 162 mg/m2  Leucovorin  iv on day 1 concurrently with irinotecan   - 11/23/19 Doses reduced 10% for cytopenias = 360 mg/m2  5-FU 2 iv bolus d1 followed by  iv over 46 hrs   - 11/23/19 Doses reduced 10% for cytopenias = 360 mg/m2 IV push, followed by 2160 mg/m2 CIVI  Bevacizumab 5 mg/kg IV on day 1  q14 days for 8-12 cycles  NCCN Guidelines for Colon Cancer V.2.2015  Leonel T, et al - Eur J Cancer. 1999 Sep;35(9):1343-7.  barbara Martinez - J Clin Oncol. 2007 Oct 20;25(30):1903-35    Allergies: Patient has no known allergies.  /77   Pulse 85   Temp 36.6 °C (97.9 °F) (Temporal)   Resp 18   Ht 1.85 m (6' 0.84\") Comment: took 1 inch off of shoes  Wt 107.1 kg (236 lb 1.8 oz)   SpO2 95%   BMI 31.29 kg/m²  Body surface area is 2.35 meters squared.     Labs from 11/23/19 reviewed - all within treatment parameters. Urine Protein = 2+, ok to proceed with FOLFIRI and hold Avastin per MD on call Dr Goldman      Irinotecan 162 mg/m2 x 2.35 m2 = 380.7 mg   <10% difference, OK to treat with final dose = 380.6 mg IV    Leucovorin 360 mg/m2 x 2.36 m2 = 846 mg   <10% difference, OK to treat with final dose = 846 mg IV    Fluorouracil 360 mg/m2 x 2.35 m2 = 846 mg   <10% difference, OK to treat with final dose = 845 mg IV        Fluorouracil 2160 mg/m2 x 2.35 m2 = 5076 mg   <10% difference, OK to treat with final dose = 5075 mg CIVI over 46 hours    Elsy Londono, PharmD, BCOP    "

## 2019-11-23 NOTE — PROGRESS NOTES
Labs WNL. Urine positive for 2+ protein. Report to Mai MONTOYA whom will call MD and cover this RN for lunch.

## 2019-11-23 NOTE — PROGRESS NOTES
Irinotecan and leucovorin initiated after atropine administration. Continue to monitor and support.

## 2019-11-23 NOTE — PROGRESS NOTES
Tolerated chemotherapy infusion with no reactions. Port flushed with NS. Brisk blood return obtained. 5FU push administered over 5 minutes as ordered. 5fu pump connected after to run over 46 hours. Discharged home to self care in no distress at this time. Has 24 hour urine container so if  orders this when he sees him next week he can obtain a 24 hour urine. Next appointment confirmed and in place.

## 2019-11-23 NOTE — PROGRESS NOTES
Chemotherapy Verification - PRIMARY RN      Height = 185cm  Weight = 107.1kg  BSA = 2.35m2       Medication: Irontecan/camptosar  Dose: 162mg/m2  Calculated Dose:  380.7mg                            (In mg/m2, AUC, mg/kg)     Medication: Leucovorin  Dose: 360mg/m2  Calculated Dose:  846mg                            (In mg/m2, AUC, mg/kg)    Medication: Fluorouracil  Dose: 360mg/m2  Calculated Dose:846mg                              (In mg/m2, AUC, mg/kg)    Medication: Fluorouracil  Dose: 2160mg/m2  Calculated Dose:5076mg                            (In mg/m2, AUC, mg/kg)    avastin held due to urine protein of 2+ per md order      I confirm this process was performed independently with the BSA and all final chemotherapy dosing calculations congruent.  Any discrepancies of 10% or greater have been addressed with the chemotherapy pharmacist. The resolution of the discrepancy has been documented in the EPIC progress notes.

## 2019-11-24 ENCOUNTER — HOSPITAL ENCOUNTER (EMERGENCY)
Facility: MEDICAL CENTER | Age: 54
End: 2019-11-24
Attending: EMERGENCY MEDICINE
Payer: MEDICAID

## 2019-11-24 VITALS
SYSTOLIC BLOOD PRESSURE: 131 MMHG | RESPIRATION RATE: 18 BRPM | WEIGHT: 240.52 LBS | HEART RATE: 86 BPM | DIASTOLIC BLOOD PRESSURE: 80 MMHG | HEIGHT: 76 IN | TEMPERATURE: 96.8 F | BODY MASS INDEX: 29.29 KG/M2 | OXYGEN SATURATION: 96 %

## 2019-11-24 PROCEDURE — 99284 EMERGENCY DEPT VISIT MOD MDM: CPT

## 2019-11-24 PROCEDURE — 700111 HCHG RX REV CODE 636 W/ 250 OVERRIDE (IP): Performed by: EMERGENCY MEDICINE

## 2019-11-24 RX ADMIN — HEPARIN 500 UNITS: 100 SYRINGE at 01:42

## 2019-11-24 NOTE — ED NOTES
Patient left prior to receiving discharge paperwork. Paperwork not signed by patient prior to leaving. Patient was aware of need to follow up with oncology as soon as possible to resume chemo. MD spoke with patient prior to discharge.

## 2019-11-24 NOTE — ED PROVIDER NOTES
ED Provider Note      Primary care provider: Alejandro Dyer M.D.    CHIEF COMPLAINT  Chief Complaint   Patient presents with   • Central Line Care     Pt reports that the hub on his central line came apart earlier this evening touching the ground. in triage the port was flushed to insure patency above point of contamination. Pt is currently receiving treatment for colon cancer and has a home infusion of Fluorouracil attatched, but not running.        HPI  Gurmeet Jo is a 54 y.o. male who presents to the Emergency Department with chief complaint of central line evaluation.  Patient was having infusion of fluorouracil for his colon cancer through his access port.  He reports that his infusion pump apparatus became disconnected from the hub and the hub fell to the ground.  Patient stated that he picked it up quickly reattached and then clamped off all the clamps on the port.  Patient was aware of possible infectious risk and presented here for for further direction on what to do.  He is had no fevers no chills no nausea no vomiting he is otherwise asymptomatic at this time.    REVIEW OF SYSTEMS  Positive for medical equipment malfunction negative for fevers chills nausea vomiting    PAST MEDICAL HISTORY   has a past medical history of Bowel habit changes, Cancer (HCC) (2018), Dental disorder, Hiatus hernia syndrome, Hypertension, Indigestion, Pain, Sleep apnea, and Snoring.    SURGICAL HISTORY   has a past surgical history that includes colonoscopy ().    SOCIAL HISTORY  Social History     Tobacco Use   • Smoking status: Former Smoker     Packs/day: 1.00     Years: 15.00     Pack years: 15.00     Last attempt to quit: 1998     Years since quittin.9   • Smokeless tobacco: Never Used   Substance Use Topics   • Alcohol use: No   • Drug use: Yes     Types: Inhaled     Comment: THC 1x appx 1 week ago.       Social History     Substance and Sexual Activity   Drug Use Yes   • Types: Inhaled     "Comment: THC 1x appx 1 week ago.        FAMILY HISTORY  Non-Contributory    CURRENT MEDICATIONS  See MAR includes current chemotherapy    ALLERGIES  No Known Allergies    PHYSICAL EXAM  VITAL SIGNS: /80   Pulse 86   Temp 36 °C (96.8 °F) (Oral)   Resp 18   Ht 1.93 m (6' 4\")   Wt 109.1 kg (240 lb 8.4 oz)   SpO2 96%   BMI 29.28 kg/m²   Pulse ox interpretation: I interpret this pulse ox as normal.  Constitutional: Alert and oriented x 3, no acute distress    Cardiovascular: Regular rate and rhythm no murmur rub or gallop 2+ pulses peripherally x4  Thorax & Lungs: No respiratory distress, no wheezes rales or rhonchi, No chest tenderness.   GI: Soft nontender nondistended positive bowel sounds, no peritoneal signs    Skin: Right anterior chest port accessed with chemotherapy infusion pump attached  Musculoskeletal: Moving all extremities with full range and 5 of 5 strength, no acute  deformity  Neurologic: Cranial nerves III through XII are grossly intact, no sensory deficit, no cerebellar dysfunction   Psychiatric: Appropriate affect for situation at this time          COURSE & MEDICAL DECISION MAKING  Pertinent Labs & Imaging studies reviewed. (See chart for details)    6:14 AM - Patient seen and examined at bedside.     Patient noted to have slightly elevated blood pressure likely circumstantial secondary to presenting complaint. Referred to primary care physician for further evaluation.    Medical Decision Making: Patient with very real infectious risks to central access device.  Patient clamped this device off proximal to the area of concern and presented here shortly after the event.  I discussed with patient that I felt as of the best way to combat this possible infectious risk would be to completely de-accessed the port and remove all equipment that may be compromised.  Patient is in agreement with this I also discussed this with oncologist  who is on-call for the patient's oncologist  " "Cody.  The port was de-accessed of the chemotherapy and the chemotherapy infusion pump will be taken back to the infusion center with the patient to determine the percentage of the infusion at the patient received.  Patient received approximately 10 hours of a 48-hour infusion by my calculations approximately 21% of the infusion.  Patient had his port reaccessed under sterile conditions to flush with heparin.  I discussed this multistep process with patient prior to completing as I did not feel as though it was appropriate to flush with heparin through the attached hardware.  Patient tolerated this with no acute complications or concerns.  Patient understands to return to the emergency department immediately for any fevers chills nausea vomiting any other acute symptoms or concerns otherwise discharged in stable and improved condition.    /80   Pulse 86   Temp 36 °C (96.8 °F) (Oral)   Resp 18   Ht 1.93 m (6' 4\")   Wt 109.1 kg (240 lb 8.4 oz)   SpO2 96%   BMI 29.28 kg/m²     Juan Ross M.D.  [address] [place] [id number]  [phone]          Infusion Services  [address] [place] [id number]  [phone]    call fist thing monday morning    [facility], Emergency Dept  [address] [place] [id number] [phone]    immediately if symptoms worsen      Discharge Medication List as of 11/24/2019  2:28 AM          FINAL IMPRESSION  Medical equipment malfunction      This dictation has been created using voice recognition software and/or scribes. The accuracy of the dictation is limited by the abilities of the software and the expertise of the scribes. I expect there may be some errors of grammar and possibly content. I made every attempt to manually correct the errors within my dictation. However, errors related to voice recognition software and/or scribes may still exist and should be interpreted within the appropriate context.            "

## 2019-11-24 NOTE — ED NOTES
Patient contaminated port de-accessed, contaminated port was not flushed. Port re-accessed using aseptic technique, flushed with saline and then flushed with 5 ml heparin per order. Port then de-accessed.

## 2019-11-24 NOTE — ED TRIAGE NOTES
"Gurmeet Jo  Chief Complaint   Patient presents with   • Central Line Care     Pt reports that the hub on his central line came apart earlier this evening touching the ground. in triage the port was flushed to insure patency above point of contamination. Pt is currently receiving treatment for colon cancer and has a home infusion of Fluorouracil attatched, but not running.      Pt ambulatory to triage with above complaint.     /87   Pulse 89   Temp 36 °C (96.8 °F) (Oral)   Resp 18   Ht 1.93 m (6' 4\")   Wt 109.1 kg (240 lb 8.4 oz)   SpO2 92%   BMI 29.28 kg/m²     Pt informed of triage process and encouraged to notify staff of any changes or concerns. Pt verbalized understanding of instructions. Pt placed in presidents [place].  "

## 2019-11-24 NOTE — ED NOTES
Patient to RED 4. Agree with triage note. Patient was seen today for infusion here at [facility]. Last set of labs done on 11/23.

## 2019-11-25 ENCOUNTER — OUTPATIENT INFUSION SERVICES (OUTPATIENT)
Dept: ONCOLOGY | Facility: MEDICAL CENTER | Age: 54
End: 2019-11-25
Attending: INTERNAL MEDICINE
Payer: MEDICAID

## 2019-11-25 VITALS
HEIGHT: 73 IN | BODY MASS INDEX: 31.61 KG/M2 | HEART RATE: 81 BPM | DIASTOLIC BLOOD PRESSURE: 76 MMHG | SYSTOLIC BLOOD PRESSURE: 132 MMHG | WEIGHT: 238.54 LBS | TEMPERATURE: 97.4 F | OXYGEN SATURATION: 97 % | RESPIRATION RATE: 18 BRPM

## 2019-11-25 DIAGNOSIS — C78.7 METASTATIC COLON CANCER TO LIVER (HCC): ICD-10-CM

## 2019-11-25 DIAGNOSIS — C18.9 METASTATIC COLON CANCER TO LIVER (HCC): ICD-10-CM

## 2019-11-25 PROCEDURE — G0498 CHEMO EXTEND IV INFUS W/PUMP: HCPCS | Performed by: CLINICAL NURSE SPECIALIST

## 2019-11-25 PROCEDURE — 700111 HCHG RX REV CODE 636 W/ 250 OVERRIDE (IP): Performed by: INTERNAL MEDICINE

## 2019-11-25 RX ADMIN — FLUOROURACIL 4175 MG: 50 INJECTION, SOLUTION INTRAVENOUS at 14:23

## 2019-11-25 NOTE — PROGRESS NOTES
Chemotherapy Verification - PRIMARY RN      Height = 1.86m  Weight = 108.2kg  BSA = 2.36m2       Medication: fluorouracil  Dose: 2160mg/m2  Calculated Dose: 5097.6mg                             (In mg/m2, AUC, mg/kg)     Patient already received 18mL of drug before pump accidentally disconnected.  Per pharmacy, concentration is 50mg/mL, so 900mg given.  New bag will be 5097.6mg-462kz=4453.6mg.      I confirm this process was performed independently with the BSA and all final chemotherapy dosing calculations congruent.  Any discrepancies of 10% or greater have been addressed with the chemotherapy pharmacist. The resolution of the discrepancy has been documented in the EPIC progress notes.

## 2019-11-25 NOTE — PROGRESS NOTES
"Pharmacy Chemotherapy Verification Note:    Patient Name: Gurmeet Jo      Dx: Metastatic Colorectal CA      Protocol: FOLFIRI+Avastin       *Dosing Reference*  Irinotecan 180 mg/m² IV over 30-90 minutes on Day 1  11/23/19 Dose reduced to 162 mg/m2 for tolerability per Dr. Ross  Leucovorin 400 mg/m² IV over 30-90 minutes on Day 1, to run concurrently with Irinotecan  11/23/19 Dose reduced to 360 mg/m2 for tolerability per Dr. Ross  Fluorouracil 400 mg/m² IV push on Day 1, followed by   11/23/19 Dose reduced to 360 mg/m2 for tolerability per Dr. Ross  Fluorouracil 2400 mg/m² CIIV over 46-48 hours  11/23/19 Dose reduced to 2160 mg/m2 for tolerability per Dr. Ross  Bevacizumab 5 mg/kg IV on Day 1   11/23/19 Dose held for 2+ proteinuria per Dr. Goldman   Every 14 days until disease progression or unacceptable toxicity    NCCN Guidelines for Colon Cancer. V.3.2019.  Naa V, et al. Lancet Oncol. 2014;15(10):1065-75.  Akash BIRD, et al. J Clin Oncol.2007;25(30):5770-71.    Allergies:  Patient has no known allergies.     /76   Pulse 81   Temp 36.3 °C (97.4 °F) (Temporal)   Resp 18   Ht 1.86 m (6' 1.23\")   Wt 108.2 kg (238 lb 8.6 oz)   SpO2 97%   BMI 31.28 kg/m²  Body surface area is 2.36 meters squared.     Labs 11/23/19:  ANC~ 1770 Plt = 110k   Hgb = 12.5     SCr = 0.63 mg/dL CrCl > 125 mL/min (min Scr 0.7)  AST/ALT/AP = 40/27/170 TBili = 1.2    Urine protein = 100 or 2+ - HOLD Avastin today proceed with FOLFIRI        Drug Order   (Drug name, dose, route, IV Fluid & volume, frequency, number of doses) Cycle: 3 -reconnect 11/25/19  Previous treatment: C2 = 11/2/19     Medication = Fluorouracil (5-FU)  Base Dose = 2160 mg/m²  Calc Dose: Base Dose x 2.35 m² = 5076 mg  Final Dose = 5075 mg - 900mg = 4175mg  Route = CIVI via CADD pump  Fluid & Volume = 83.5mL (+3 mL)  Admin Duration = Over 46 hours (to run at 1.8mL/hour) Pump became disconnected. Per Dr. Ross, reconnect pump to finish infusion. " New pump to be made as previous pump access line was contaminated when dropped to the floor. Infusion dose adjusted for amount pt already received.    <10% difference, ok to treat with final written dose     By my signature below, I confirm this process was performed independently with the BSA and all final chemotherapy dosing calculations congruent. I have reviewed the above chemotherapy order and that my calculation of the final dose and BSA (when applicable) corroborate those calculations of the  pharmacist.     Shari Amador, PharmD

## 2019-11-25 NOTE — PROGRESS NOTES
"Patient to infusion for 5FU pump reconnect.  Patient states the pump disconnected near the dripless tubing connection while at home and the line hit the floor.  He initially reconnected and went to the ER.  ER deaccessed his port d/t potential line contamination then reaccessed and heparin locked the port.  Patient returns today for pump reconnection and completion of remainder of medication.  No new concerns or complaints.  Mediport accessed using 1\" low profile needle and Biopatch with brisk blood return.  Per pharmacy, 18mL (50mg/mL) 5FU already administered.  New bag of 5FU made minus 900mg already infused and pump reconnected.  Connection points reinforced.  Patient supplied with extra batteries and  in case of disconnection again. Patient confirms he has chemo spill kit at home.  Patient discharged to home ambulatory in stable condition with pump running.  Appointment made for disconnect at 12:45pm on 11/27.     "

## 2019-11-25 NOTE — PROGRESS NOTES
Dr Ross updated that pt had tubing become disconnected in the middle of the night and presented to the ER where his pump was de accessed. MD would like pt to receive remaining chemotherapy and be reconnected today. Pt will be here are 1330 to be re-accessed and connected to infusion.

## 2019-11-25 NOTE — PROGRESS NOTES
Chemotherapy Verification - SECONDARY RN     Pt received 900mg =18 ml before CADD pump was accidentally disconnected.  per pharmacy concentration 50mg/ml.    Height = 186 cm  Weight = 108.2 kg  BSA = 2.36 m2       Medication: Fluorouracil CADD pump  Dose: 2160mg/m2  Calculated Dose: 5097.6mg - 900mg (amount pt already received)= 4197.6 mg                            (In mg/m2, AUC, mg/kg)     I confirm that this process was performed independently.

## 2019-11-27 ENCOUNTER — OUTPATIENT INFUSION SERVICES (OUTPATIENT)
Dept: ONCOLOGY | Facility: MEDICAL CENTER | Age: 54
End: 2019-11-27
Attending: INTERNAL MEDICINE
Payer: MEDICAID

## 2019-11-27 VITALS
SYSTOLIC BLOOD PRESSURE: 125 MMHG | HEART RATE: 91 BPM | DIASTOLIC BLOOD PRESSURE: 74 MMHG | TEMPERATURE: 97.5 F | HEIGHT: 73 IN | OXYGEN SATURATION: 98 % | RESPIRATION RATE: 18 BRPM | BODY MASS INDEX: 31.73 KG/M2 | WEIGHT: 239.42 LBS

## 2019-11-27 DIAGNOSIS — C18.9 METASTATIC COLON CANCER TO LIVER (HCC): ICD-10-CM

## 2019-11-27 DIAGNOSIS — C78.7 METASTATIC COLON CANCER TO LIVER (HCC): ICD-10-CM

## 2019-11-27 PROCEDURE — 96523 IRRIG DRUG DELIVERY DEVICE: CPT

## 2019-11-27 PROCEDURE — 700111 HCHG RX REV CODE 636 W/ 250 OVERRIDE (IP)

## 2019-11-27 RX ORDER — LIDOCAINE AND PRILOCAINE 25; 25 MG/G; MG/G
CREAM TOPICAL
COMMUNITY
Start: 2019-11-25 | End: 2023-06-21 | Stop reason: SDUPTHER

## 2019-11-27 RX ADMIN — HEPARIN 500 UNITS: 100 SYRINGE at 13:25

## 2019-12-06 ENCOUNTER — HOSPITAL ENCOUNTER (OUTPATIENT)
Dept: LAB | Facility: MEDICAL CENTER | Age: 54
End: 2019-12-06
Attending: INTERNAL MEDICINE
Payer: MEDICAID

## 2019-12-06 LAB
ALBUMIN SERPL BCP-MCNC: 4 G/DL (ref 3.2–4.9)
ALBUMIN/GLOB SERPL: 1.1 G/DL
ALP SERPL-CCNC: 194 U/L (ref 30–99)
ALT SERPL-CCNC: 38 U/L (ref 2–50)
ANION GAP SERPL CALC-SCNC: 11 MMOL/L (ref 0–11.9)
APPEARANCE UR: CLEAR
AST SERPL-CCNC: 40 U/L (ref 12–45)
BASOPHILS # BLD AUTO: 0.7 % (ref 0–1.8)
BASOPHILS # BLD: 0.03 K/UL (ref 0–0.12)
BILIRUB SERPL-MCNC: 1 MG/DL (ref 0.1–1.5)
BILIRUB UR QL STRIP.AUTO: NEGATIVE
BUN SERPL-MCNC: 9 MG/DL (ref 8–22)
CALCIUM SERPL-MCNC: 9.4 MG/DL (ref 8.5–10.5)
CHLORIDE SERPL-SCNC: 103 MMOL/L (ref 96–112)
CO2 SERPL-SCNC: 25 MMOL/L (ref 20–33)
COLOR UR: NORMAL
CREAT SERPL-MCNC: 0.75 MG/DL (ref 0.5–1.4)
EOSINOPHIL # BLD AUTO: 0.07 K/UL (ref 0–0.51)
EOSINOPHIL NFR BLD: 1.7 % (ref 0–6.9)
ERYTHROCYTE [DISTWIDTH] IN BLOOD BY AUTOMATED COUNT: 59.7 FL (ref 35.9–50)
GLOBULIN SER CALC-MCNC: 3.6 G/DL (ref 1.9–3.5)
GLUCOSE SERPL-MCNC: 245 MG/DL (ref 65–99)
GLUCOSE UR STRIP.AUTO-MCNC: NEGATIVE MG/DL
HCT VFR BLD AUTO: 38.8 % (ref 42–52)
HGB BLD-MCNC: 12.5 G/DL (ref 14–18)
IMM GRANULOCYTES # BLD AUTO: 0.01 K/UL (ref 0–0.11)
IMM GRANULOCYTES NFR BLD AUTO: 0.2 % (ref 0–0.9)
KETONES UR STRIP.AUTO-MCNC: NEGATIVE MG/DL
LEUKOCYTE ESTERASE UR QL STRIP.AUTO: NEGATIVE
LYMPHOCYTES # BLD AUTO: 1.1 K/UL (ref 1–4.8)
LYMPHOCYTES NFR BLD: 27.4 % (ref 22–41)
MAGNESIUM SERPL-MCNC: 1.9 MG/DL (ref 1.5–2.5)
MCH RBC QN AUTO: 31.7 PG (ref 27–33)
MCHC RBC AUTO-ENTMCNC: 32.2 G/DL (ref 33.7–35.3)
MCV RBC AUTO: 98.5 FL (ref 81.4–97.8)
MICRO URNS: NORMAL
MONOCYTES # BLD AUTO: 0.56 K/UL (ref 0–0.85)
MONOCYTES NFR BLD AUTO: 14 % (ref 0–13.4)
NEUTROPHILS # BLD AUTO: 2.24 K/UL (ref 1.82–7.42)
NEUTROPHILS NFR BLD: 56 % (ref 44–72)
NITRITE UR QL STRIP.AUTO: NEGATIVE
NRBC # BLD AUTO: 0 K/UL
NRBC BLD-RTO: 0 /100 WBC
PH UR STRIP.AUTO: 5.5 [PH] (ref 5–8)
PLATELET # BLD AUTO: 128 K/UL (ref 164–446)
PMV BLD AUTO: 10.5 FL (ref 9–12.9)
POTASSIUM SERPL-SCNC: 4.3 MMOL/L (ref 3.6–5.5)
PROT SERPL-MCNC: 7.6 G/DL (ref 6–8.2)
PROT UR QL STRIP: NEGATIVE MG/DL
RBC # BLD AUTO: 3.94 M/UL (ref 4.7–6.1)
RBC UR QL AUTO: NEGATIVE
SODIUM SERPL-SCNC: 139 MMOL/L (ref 135–145)
SP GR UR STRIP.AUTO: 1.02
UROBILINOGEN UR STRIP.AUTO-MCNC: 0.2 MG/DL
WBC # BLD AUTO: 4 K/UL (ref 4.8–10.8)

## 2019-12-06 PROCEDURE — 82378 CARCINOEMBRYONIC ANTIGEN: CPT

## 2019-12-06 PROCEDURE — 85025 COMPLETE CBC W/AUTO DIFF WBC: CPT

## 2019-12-06 PROCEDURE — 80053 COMPREHEN METABOLIC PANEL: CPT

## 2019-12-06 PROCEDURE — 83735 ASSAY OF MAGNESIUM: CPT

## 2019-12-06 PROCEDURE — 36415 COLL VENOUS BLD VENIPUNCTURE: CPT

## 2019-12-06 PROCEDURE — 81003 URINALYSIS AUTO W/O SCOPE: CPT

## 2019-12-07 ENCOUNTER — OUTPATIENT INFUSION SERVICES (OUTPATIENT)
Dept: ONCOLOGY | Facility: MEDICAL CENTER | Age: 54
End: 2019-12-07
Attending: INTERNAL MEDICINE
Payer: MEDICAID

## 2019-12-07 VITALS
HEIGHT: 73 IN | SYSTOLIC BLOOD PRESSURE: 147 MMHG | OXYGEN SATURATION: 96 % | HEART RATE: 88 BPM | WEIGHT: 238.76 LBS | DIASTOLIC BLOOD PRESSURE: 84 MMHG | TEMPERATURE: 97 F | BODY MASS INDEX: 31.64 KG/M2 | RESPIRATION RATE: 18 BRPM

## 2019-12-07 DIAGNOSIS — C78.7 METASTATIC COLON CANCER TO LIVER (HCC): ICD-10-CM

## 2019-12-07 DIAGNOSIS — C18.9 METASTATIC COLON CANCER TO LIVER (HCC): ICD-10-CM

## 2019-12-07 LAB — CEA SERPL-MCNC: 712.9 NG/ML (ref 0–3)

## 2019-12-07 PROCEDURE — 96375 TX/PRO/DX INJ NEW DRUG ADDON: CPT

## 2019-12-07 PROCEDURE — 96409 CHEMO IV PUSH SNGL DRUG: CPT

## 2019-12-07 PROCEDURE — 96411 CHEMO IV PUSH ADDL DRUG: CPT

## 2019-12-07 PROCEDURE — A4212 NON CORING NEEDLE OR STYLET: HCPCS

## 2019-12-07 PROCEDURE — 96413 CHEMO IV INFUSION 1 HR: CPT

## 2019-12-07 PROCEDURE — 96368 THER/DIAG CONCURRENT INF: CPT

## 2019-12-07 PROCEDURE — 700105 HCHG RX REV CODE 258: Performed by: INTERNAL MEDICINE

## 2019-12-07 PROCEDURE — 96417 CHEMO IV INFUS EACH ADDL SEQ: CPT

## 2019-12-07 PROCEDURE — 96415 CHEMO IV INFUSION ADDL HR: CPT

## 2019-12-07 PROCEDURE — G0498 CHEMO EXTEND IV INFUS W/PUMP: HCPCS

## 2019-12-07 PROCEDURE — 700111 HCHG RX REV CODE 636 W/ 250 OVERRIDE (IP): Performed by: INTERNAL MEDICINE

## 2019-12-07 RX ORDER — 0.9 % SODIUM CHLORIDE 0.9 %
20 VIAL (ML) INJECTION PRN
Status: CANCELLED | OUTPATIENT
Start: 2019-12-10

## 2019-12-07 RX ORDER — ONDANSETRON 2 MG/ML
4 INJECTION INTRAMUSCULAR; INTRAVENOUS PRN
Status: CANCELLED | OUTPATIENT
Start: 2019-12-08

## 2019-12-07 RX ORDER — 0.9 % SODIUM CHLORIDE 0.9 %
VIAL (ML) INJECTION PRN
Status: CANCELLED | OUTPATIENT
Start: 2019-12-08

## 2019-12-07 RX ORDER — DEXTROSE MONOHYDRATE 50 MG/ML
INJECTION, SOLUTION INTRAVENOUS CONTINUOUS
Status: CANCELLED | OUTPATIENT
Start: 2019-12-08

## 2019-12-07 RX ORDER — PROCHLORPERAZINE MALEATE 10 MG
10 TABLET ORAL EVERY 6 HOURS PRN
Status: CANCELLED | OUTPATIENT
Start: 2019-12-08

## 2019-12-07 RX ORDER — ONDANSETRON 8 MG/1
8 TABLET, ORALLY DISINTEGRATING ORAL PRN
Status: CANCELLED | OUTPATIENT
Start: 2019-12-08

## 2019-12-07 RX ORDER — 0.9 % SODIUM CHLORIDE 0.9 %
5 VIAL (ML) INJECTION PRN
Status: CANCELLED | OUTPATIENT
Start: 2019-12-08

## 2019-12-07 RX ORDER — 0.9 % SODIUM CHLORIDE 0.9 %
5 VIAL (ML) INJECTION PRN
Status: CANCELLED | OUTPATIENT
Start: 2019-12-07

## 2019-12-07 RX ORDER — SODIUM CHLORIDE 9 MG/ML
INJECTION, SOLUTION INTRAVENOUS CONTINUOUS
Status: CANCELLED | OUTPATIENT
Start: 2019-12-08

## 2019-12-07 RX ORDER — LOPERAMIDE HYDROCHLORIDE 2 MG/1
4 CAPSULE ORAL PRN
Status: CANCELLED | OUTPATIENT
Start: 2019-12-08

## 2019-12-07 RX ORDER — LOPERAMIDE HYDROCHLORIDE 2 MG/1
2 CAPSULE ORAL
Status: CANCELLED | OUTPATIENT
Start: 2019-12-08

## 2019-12-07 RX ORDER — 0.9 % SODIUM CHLORIDE 0.9 %
VIAL (ML) INJECTION PRN
Status: CANCELLED | OUTPATIENT
Start: 2019-12-07

## 2019-12-07 RX ADMIN — ONDANSETRON HYDROCHLORIDE 16 MG: 2 SOLUTION INTRAMUSCULAR; INTRAVENOUS at 11:30

## 2019-12-07 RX ADMIN — FLUOROURACIL 5120 MG: 50 INJECTION, SOLUTION INTRAVENOUS at 15:30

## 2019-12-07 RX ADMIN — ATROPINE SULFATE 0.5 MG: 1 INJECTION, SOLUTION INTRAMUSCULAR; INTRAVENOUS; SUBCUTANEOUS at 12:03

## 2019-12-07 RX ADMIN — FLUOROURACIL 855 MG: 50 INJECTION, SOLUTION INTRAVENOUS at 15:24

## 2019-12-07 RX ADMIN — DEXAMETHASONE SODIUM PHOSPHATE 12 MG: 4 INJECTION, SOLUTION INTRAMUSCULAR; INTRAVENOUS at 11:20

## 2019-12-07 RX ADMIN — BEVACIZUMAB 500 MG: 400 INJECTION, SOLUTION INTRAVENOUS at 14:39

## 2019-12-07 RX ADMIN — IRINOTECAN HYDROCHLORIDE 384 MG: 20 INJECTION, SOLUTION INTRAVENOUS at 12:03

## 2019-12-07 RX ADMIN — LEUCOVORIN CALCIUM 853.2 MG: 350 INJECTION, POWDER, LYOPHILIZED, FOR SUSPENSION INTRAMUSCULAR; INTRAVENOUS at 12:03

## 2019-12-07 NOTE — PROGRESS NOTES
"Pharmacy Chemotherapy Calculations    Dx: Metastatic colorectal cancer    Cycle 4  Previous treatment = C3 on 11/23/19    Regimen: FOLFiri + bevacizumab  *Dosing Reference*  Irinotecan  IV on day 1    - 11/23/19 Doses reduced 10% for cytopenias = 162 mg/m2  Leucovorin  iv on day 1 concurrently with irinotecan    - 11/23/19 Doses reduced 10% for cytopenias = 360 mg/m2  5-FU 2 iv bolus d1 followed by  iv over 46 hrs   - 11/23/19 Doses reduced 10% for cytopenias = 360 mg/m2 IV push, followed by 2160 mg/m2 CIVI  Bevacizumab 5 mg/kg IV on day 1  q14 days for 8-12 cycles  NCCN Guidelines for Colon Cancer V.2.2015  Leonel T, et al - Eur J Cancer. 1999 Sep;35(9):1343-7.  Akash BIRD et al - J Clin Oncol. 2007 Oct 20;25(30):4308-92    Allergies: Patient has no known allergies.  /84   Pulse 88   Temp 36.1 °C (97 °F) (Temporal)   Resp 18   Ht 1.86 m (6' 1.23\")   Wt 108.3 kg (238 lb 12.1 oz)   SpO2 96%   BMI 31.30 kg/m²  Body surface area is 2.37 meters squared.     All labs (12/6/19), urine protein, and BP within treatment plan parameters.      Irinotecan (Camptosar) 162 mg/m2 x 2.37 m2 = 383.9 mg   <10% difference, OK to treat with final dose = 384 mg IV    Leucovorin 360 mg/m2 x 2.37 m2 = 853.2 mg   <10% difference, OK to treat with final dose = 853.2 mg IV    Fluorouracil (Adrucil) 360 mg/m2 x 2.37 m2 = 853.2 mg   <10% difference, OK to treat with final dose = 855 mg IV    Bevacizumab (Avastin) 5 mg/kg x 108.3 kg = 542 mg   Rounded down to vial size (within 10%) per dose rounding protocol = 500 mg IV    Fluorouracil (Adrucil) 2160 mg/m2 x 2.37 m2 = 5119.2 mg   <10% difference, OK to treat with final dose = 5120 mg CIVI over 46 hours      Aura Guzman, PharmD  "

## 2019-12-07 NOTE — PROGRESS NOTES
Chemotherapy Verification - SECONDARY RN       Height = 186 cm  Weight = 108.3 kg  BSA = 2.365 m2       Medication: Irinotecan  Dose: 162 mg/m2 dr  Calculated Dose: 283.2 mg                             (In mg/m2, AUC, mg/kg)     Medication: Avastin  Dose: 5 mg/kg  Calculated Dose: 541.5 mg round to vial                             (In mg/m2, AUC, mg/kg)    Medication: 5FU  Dose: 360 mg/m2 dr  Calculated Dose: 851.4 mg                             (In mg/m2, AUC, mg/kg)    Medication: Home infusion 5FU  Dose: 2160 mg/m2 dr Calculated Dose: 5108.4 mg over 46 hours                             (In mg/m2, AUC, mg/kg)      I confirm that this process was performed independently.

## 2019-12-07 NOTE — PROGRESS NOTES
Chemotherapy Verification - PRIMARY RN      Height = 1.86 m  Weight = 108.3 kg  BSA = 2.37 m2       Medication: irinotecan  Dose: 162 mg/m2  Calculated Dose: 383.94 mg                             (In mg/m2, AUC, mg/kg)     Medication: bevacizumab  Dose: 5 mg/kg  Calculated Dose: 541.5 mg                             (In mg/m2, AUC, mg/kg)    Medication: fluorouracil bolus  Dose: 360 mg/m2  Calculated Dose: 853.2 mg                             (In mg/m2, AUC, mg/kg)    Medication: fluorouracil CADD pump  Dose: 2160 mg/m2  Calculated Dose: 5119.2 mg                             (In mg/m2, AUC, mg/kg)      I confirm this process was performed independently with the BSA and all final chemotherapy dosing calculations congruent.  Any discrepancies of 10% or greater have been addressed with the chemotherapy pharmacist. The resolution of the discrepancy has been documented in the EPIC progress notes.

## 2019-12-07 NOTE — PROGRESS NOTES
Pt ambulatory w/ wife to [facility] for C4 D1 of FOLFIRI plus Avastin for colon cancer w/ liver mets.  Pt w/ no s/s of infection, pt has no complaints at this time.  Pt PORT accessed using sterile technique, brisk blood return noted, flushed per protocol.  Pt had labs drawn 12-6, verified results w/n parameters for tx today.  Pre-meds given w/ no adverse effects.  Irinotecan and leucovorin administered concurrently w/ no adverse reactions.  Avastin infused w/ no adverse reactions.  5FU bolus administered by IVP over 5 minutes w/ blood return monitored q3 mLs.  CADD pump connected to pt's PORT, all clamps opened, pump confirmed to be in RUN mode, all connections secured, pump placed in luis pack w/ extra batteries.  Pt left on foot in NAD.  Confirmed pt's disconnect appt.

## 2019-12-07 NOTE — PROGRESS NOTES
"Pharmacy Chemotherapy Verification Note:    Patient Name: Gurmeet Jo      Dx: Metastatic Colorectal CA      Protocol: FOLFIRI + Avastin       *Dosing Reference*  Irinotecan 180 mg/m² IV over 30-90 minutes on Day 1    - 11/23/19 Dose reduced to 162 mg/m² for tolerability per Dr. Ross  Leucovorin 400 mg/m² IV over 30-90 minutes on Day 1, to run concurrently with Irinotecan   - 11/23/19 Dose reduced to 360 mg/m² for tolerability per Dr. Ross  Fluorouracil 400 mg/m² IV push on Day 1, followed by    - 11/23/19 Dose reduced to 360 mg/m² for tolerability per Dr. Ross  Fluorouracil 2400 mg/m² CIVI over 46-48 hours   - 11/23/19 Dose reduced to 2160 mg/m² for tolerability per Dr. Ross  Bevacizumab 5 mg/kg IV on Day 1   Every 14 days until disease progression or unacceptable toxicity    NCCN Guidelines for Colon Cancer. V.3.2019.  Naa V, et al. Lancet Oncol. 2014;15(10):1065-75.  Akash BIRD, et al. J Clin Oncol.2007;25(30):6555-02.    Allergies:  Patient has no known allergies.     /84   Pulse 88   Temp 36.1 °C (97 °F) (Temporal)   Resp 18   Ht 1.86 m (6' 1.23\")   Wt 108.3 kg (238 lb 12.1 oz)   SpO2 96%   BMI 31.30 kg/m²  Body surface area is 2.37 meters squared.  Labs from 12/6/19:  ANC~ 2240  Plt = 128 k  SCr = 0.75 mg/dL  CrCl = >125 mL/min   LFT = WNL  TBili = 1  Urine protein = negative       Drug Order   (Drug name, dose, route, IV Fluid & volume, frequency, number of doses) Cycle: 4     Previous treatment: C3 = 11/25/19     Medication = Irinotecan (Camptosar)  Base Dose = 162 mg/m²  Calc Dose: Base Dose x 2.37 m² = 384 mg  Final Dose = 384 mg  Route = IV  Fluid & Volume = D5W 250 mL  Admin Duration = Over 120 minutes          <10% difference, ok to treat with final written dose   Medication = Leucovorin (Wellcovorin)  Base Dose = 360 mg/m²  Calc Dose: Base Dose x 2.37 m² = 853 mg  Final Dose = 853.2 mg  Route = IV  Fluid & Volume = D5W 250 mL  Admin Duration = Over 120 minutes          " <10% difference, ok to treat with final written dose   Medication = Fluorouracil (5-FU)  Base Dose = 360 mg/m²  Calc Dose:Base Dose x 2.37 m² = 853 mg  Final Dose = 855 mg  Route = IVP  Fluid & Volume = 17.1 mL in syringe  Conc = 50 mg/mL  Admin Duration = Over 5-10 minutes          <10% difference, ok to treat with final written dose   Medication = Fluorouracil (5-FU)  Base Dose = 2160 mg/m²  Calc Dose: Base Dose x 2.37 m² = 5119 mg  Final Dose = 5120 mg  Route = CIVI via CADD pump  Fluid & Volume = 102.4 mL (+3 mL overfill = 105.4 mL)  Admin Duration = Over 46 hours (to run at 2.2 mL/hour)          <10% difference, ok to treat with final written dose   Medication = Bevacizumab (Avastin)  Base Dose = 5 mg/kg  Calc Dose: Base Dose x 108.3 kg = 541 mg  Final Dose = 500 mg  Route = IV  Fluid & Volume =  mL  Admin Duration = Over 30 minutes          <10% difference, rounded to vial size per protocol, ok to treat with final written dose     By my signature below, I confirm this process was performed independently with the BSA and all final chemotherapy dosing calculations congruent. I have reviewed the above chemotherapy order and that my calculation of the final dose and BSA (when applicable) corroborate those calculations of the  pharmacist.     Ernesto Portillo, PharmD, BCPS

## 2019-12-09 ENCOUNTER — OUTPATIENT INFUSION SERVICES (OUTPATIENT)
Dept: ONCOLOGY | Facility: MEDICAL CENTER | Age: 54
End: 2019-12-09
Attending: INTERNAL MEDICINE
Payer: MEDICAID

## 2019-12-09 VITALS
BODY MASS INDEX: 31.56 KG/M2 | SYSTOLIC BLOOD PRESSURE: 122 MMHG | TEMPERATURE: 98 F | OXYGEN SATURATION: 97 % | HEART RATE: 86 BPM | WEIGHT: 238.1 LBS | HEIGHT: 73 IN | RESPIRATION RATE: 18 BRPM | DIASTOLIC BLOOD PRESSURE: 73 MMHG

## 2019-12-09 PROCEDURE — 700111 HCHG RX REV CODE 636 W/ 250 OVERRIDE (IP)

## 2019-12-09 PROCEDURE — 96523 IRRIG DRUG DELIVERY DEVICE: CPT

## 2019-12-09 RX ADMIN — HEPARIN 500 UNITS: 100 SYRINGE at 15:26

## 2019-12-10 NOTE — PROGRESS NOTES
Here for 5 FU pump disconnect after 46 hrs of continuous infusion. See chemotherapy flow sheet for volume / dose administration. Port flushed per protocol, site d-accessed, needle intact compression dressing applied. Patient and wife requesting that we teach wife how to de-access patient when finished since they live far and weather has been unpredictable. Spoke with charge, patient needs to get prescriptions and the ok from oncologist before we can proceed. Wife agrees to do this and update us on next visit.   Patient discharge home with caregiver in no distress at this time. Next appointment confirmed.

## 2019-12-14 ENCOUNTER — APPOINTMENT (OUTPATIENT)
Dept: ONCOLOGY | Facility: MEDICAL CENTER | Age: 54
End: 2019-12-14
Attending: INTERNAL MEDICINE
Payer: MEDICAID

## 2019-12-16 ENCOUNTER — APPOINTMENT (OUTPATIENT)
Dept: ONCOLOGY | Facility: MEDICAL CENTER | Age: 54
End: 2019-12-16
Attending: INTERNAL MEDICINE
Payer: MEDICAID

## 2019-12-19 RX ORDER — 0.9 % SODIUM CHLORIDE 0.9 %
VIAL (ML) INJECTION PRN
Status: CANCELLED | OUTPATIENT
Start: 2019-12-21

## 2019-12-19 RX ORDER — 0.9 % SODIUM CHLORIDE 0.9 %
5 VIAL (ML) INJECTION PRN
Status: CANCELLED | OUTPATIENT
Start: 2019-12-21

## 2019-12-19 RX ORDER — SODIUM CHLORIDE 9 MG/ML
INJECTION, SOLUTION INTRAVENOUS CONTINUOUS
Status: CANCELLED | OUTPATIENT
Start: 2019-12-21

## 2019-12-19 RX ORDER — PROCHLORPERAZINE MALEATE 10 MG
10 TABLET ORAL EVERY 6 HOURS PRN
Status: CANCELLED | OUTPATIENT
Start: 2019-12-21

## 2019-12-19 RX ORDER — DEXTROSE MONOHYDRATE 50 MG/ML
INJECTION, SOLUTION INTRAVENOUS CONTINUOUS
Status: CANCELLED | OUTPATIENT
Start: 2019-12-21

## 2019-12-19 RX ORDER — 0.9 % SODIUM CHLORIDE 0.9 %
20 VIAL (ML) INJECTION PRN
Status: CANCELLED | OUTPATIENT
Start: 2019-12-23

## 2019-12-19 RX ORDER — ONDANSETRON 2 MG/ML
4 INJECTION INTRAMUSCULAR; INTRAVENOUS PRN
Status: CANCELLED | OUTPATIENT
Start: 2019-12-21

## 2019-12-19 RX ORDER — LOPERAMIDE HYDROCHLORIDE 2 MG/1
2 CAPSULE ORAL
Status: CANCELLED | OUTPATIENT
Start: 2019-12-21

## 2019-12-19 RX ORDER — LOPERAMIDE HYDROCHLORIDE 2 MG/1
4 CAPSULE ORAL PRN
Status: CANCELLED | OUTPATIENT
Start: 2019-12-21

## 2019-12-19 RX ORDER — ONDANSETRON 8 MG/1
8 TABLET, ORALLY DISINTEGRATING ORAL PRN
Status: CANCELLED | OUTPATIENT
Start: 2019-12-21

## 2019-12-20 ENCOUNTER — HOSPITAL ENCOUNTER (OUTPATIENT)
Dept: LAB | Facility: MEDICAL CENTER | Age: 54
End: 2019-12-20
Attending: INTERNAL MEDICINE
Payer: MEDICAID

## 2019-12-20 LAB
ALBUMIN SERPL BCP-MCNC: 3.9 G/DL (ref 3.2–4.9)
ALBUMIN/GLOB SERPL: 1.2 G/DL
ALP SERPL-CCNC: 192 U/L (ref 30–99)
ALT SERPL-CCNC: 44 U/L (ref 2–50)
ANION GAP SERPL CALC-SCNC: 8 MMOL/L (ref 0–11.9)
APPEARANCE UR: CLEAR
AST SERPL-CCNC: 47 U/L (ref 12–45)
BASOPHILS # BLD AUTO: 0.8 % (ref 0–1.8)
BASOPHILS # BLD: 0.03 K/UL (ref 0–0.12)
BILIRUB SERPL-MCNC: 1 MG/DL (ref 0.1–1.5)
BILIRUB UR QL STRIP.AUTO: NEGATIVE
BUN SERPL-MCNC: 6 MG/DL (ref 8–22)
CALCIUM SERPL-MCNC: 9.2 MG/DL (ref 8.5–10.5)
CEA SERPL-MCNC: 910.5 NG/ML (ref 0–3)
CHLORIDE SERPL-SCNC: 101 MMOL/L (ref 96–112)
CO2 SERPL-SCNC: 25 MMOL/L (ref 20–33)
COLOR UR: YELLOW
CREAT SERPL-MCNC: 0.56 MG/DL (ref 0.5–1.4)
EOSINOPHIL # BLD AUTO: 0.07 K/UL (ref 0–0.51)
EOSINOPHIL NFR BLD: 1.9 % (ref 0–6.9)
ERYTHROCYTE [DISTWIDTH] IN BLOOD BY AUTOMATED COUNT: 61.6 FL (ref 35.9–50)
GLOBULIN SER CALC-MCNC: 3.3 G/DL (ref 1.9–3.5)
GLUCOSE SERPL-MCNC: 211 MG/DL (ref 65–99)
GLUCOSE UR STRIP.AUTO-MCNC: NEGATIVE MG/DL
HCT VFR BLD AUTO: 36.7 % (ref 42–52)
HGB BLD-MCNC: 12.1 G/DL (ref 14–18)
IMM GRANULOCYTES # BLD AUTO: 0.01 K/UL (ref 0–0.11)
IMM GRANULOCYTES NFR BLD AUTO: 0.3 % (ref 0–0.9)
KETONES UR STRIP.AUTO-MCNC: NEGATIVE MG/DL
LEUKOCYTE ESTERASE UR QL STRIP.AUTO: NEGATIVE
LYMPHOCYTES # BLD AUTO: 0.98 K/UL (ref 1–4.8)
LYMPHOCYTES NFR BLD: 26.6 % (ref 22–41)
MAGNESIUM SERPL-MCNC: 2 MG/DL (ref 1.5–2.5)
MCH RBC QN AUTO: 31.7 PG (ref 27–33)
MCHC RBC AUTO-ENTMCNC: 33 G/DL (ref 33.7–35.3)
MCV RBC AUTO: 96.1 FL (ref 81.4–97.8)
MICRO URNS: NORMAL
MONOCYTES # BLD AUTO: 0.53 K/UL (ref 0–0.85)
MONOCYTES NFR BLD AUTO: 14.4 % (ref 0–13.4)
NEUTROPHILS # BLD AUTO: 2.06 K/UL (ref 1.82–7.42)
NEUTROPHILS NFR BLD: 56 % (ref 44–72)
NITRITE UR QL STRIP.AUTO: NEGATIVE
NRBC # BLD AUTO: 0 K/UL
NRBC BLD-RTO: 0 /100 WBC
PH UR STRIP.AUTO: 6.5 [PH] (ref 5–8)
PLATELET # BLD AUTO: 105 K/UL (ref 164–446)
PMV BLD AUTO: 10.6 FL (ref 9–12.9)
POTASSIUM SERPL-SCNC: 3.6 MMOL/L (ref 3.6–5.5)
PROT SERPL-MCNC: 7.2 G/DL (ref 6–8.2)
PROT UR QL STRIP: NEGATIVE MG/DL
RBC # BLD AUTO: 3.82 M/UL (ref 4.7–6.1)
RBC UR QL AUTO: NEGATIVE
SODIUM SERPL-SCNC: 134 MMOL/L (ref 135–145)
SP GR UR STRIP.AUTO: 1.01
UROBILINOGEN UR STRIP.AUTO-MCNC: 0.2 MG/DL
WBC # BLD AUTO: 3.7 K/UL (ref 4.8–10.8)

## 2019-12-20 PROCEDURE — 83735 ASSAY OF MAGNESIUM: CPT

## 2019-12-20 PROCEDURE — 82378 CARCINOEMBRYONIC ANTIGEN: CPT

## 2019-12-20 PROCEDURE — 85025 COMPLETE CBC W/AUTO DIFF WBC: CPT

## 2019-12-20 PROCEDURE — 80053 COMPREHEN METABOLIC PANEL: CPT

## 2019-12-20 PROCEDURE — 81003 URINALYSIS AUTO W/O SCOPE: CPT

## 2019-12-20 PROCEDURE — 36415 COLL VENOUS BLD VENIPUNCTURE: CPT

## 2019-12-21 ENCOUNTER — OUTPATIENT INFUSION SERVICES (OUTPATIENT)
Dept: ONCOLOGY | Facility: MEDICAL CENTER | Age: 54
End: 2019-12-21
Attending: INTERNAL MEDICINE
Payer: MEDICAID

## 2019-12-21 VITALS
SYSTOLIC BLOOD PRESSURE: 130 MMHG | OXYGEN SATURATION: 97 % | BODY MASS INDEX: 31.61 KG/M2 | RESPIRATION RATE: 18 BRPM | DIASTOLIC BLOOD PRESSURE: 85 MMHG | HEIGHT: 73 IN | WEIGHT: 238.54 LBS | TEMPERATURE: 97.4 F | HEART RATE: 91 BPM

## 2019-12-21 DIAGNOSIS — C78.7 METASTATIC COLON CANCER TO LIVER (HCC): ICD-10-CM

## 2019-12-21 DIAGNOSIS — C18.9 METASTATIC COLON CANCER TO LIVER (HCC): ICD-10-CM

## 2019-12-21 PROCEDURE — 96413 CHEMO IV INFUSION 1 HR: CPT

## 2019-12-21 PROCEDURE — G0498 CHEMO EXTEND IV INFUS W/PUMP: HCPCS

## 2019-12-21 PROCEDURE — 96415 CHEMO IV INFUSION ADDL HR: CPT

## 2019-12-21 PROCEDURE — 96368 THER/DIAG CONCURRENT INF: CPT

## 2019-12-21 PROCEDURE — 306780 HCHG STAT FOR TRANSFUSION PER CASE

## 2019-12-21 PROCEDURE — 700105 HCHG RX REV CODE 258: Performed by: INTERNAL MEDICINE

## 2019-12-21 PROCEDURE — 700111 HCHG RX REV CODE 636 W/ 250 OVERRIDE (IP): Performed by: INTERNAL MEDICINE

## 2019-12-21 PROCEDURE — 96417 CHEMO IV INFUS EACH ADDL SEQ: CPT

## 2019-12-21 PROCEDURE — 96411 CHEMO IV PUSH ADDL DRUG: CPT

## 2019-12-21 PROCEDURE — 96375 TX/PRO/DX INJ NEW DRUG ADDON: CPT

## 2019-12-21 RX ORDER — DEXTROSE MONOHYDRATE 50 MG/ML
INJECTION, SOLUTION INTRAVENOUS CONTINUOUS
Status: DISCONTINUED | OUTPATIENT
Start: 2019-12-21 | End: 2019-12-21 | Stop reason: HOSPADM

## 2019-12-21 RX ORDER — SODIUM CHLORIDE 9 MG/ML
INJECTION, SOLUTION INTRAVENOUS CONTINUOUS
Status: DISCONTINUED | OUTPATIENT
Start: 2019-12-21 | End: 2019-12-21 | Stop reason: HOSPADM

## 2019-12-21 RX ADMIN — FLUOROURACIL 850 MG: 50 INJECTION, SOLUTION INTRAVENOUS at 15:41

## 2019-12-21 RX ADMIN — BEVACIZUMAB 500 MG: 400 INJECTION, SOLUTION INTRAVENOUS at 15:01

## 2019-12-21 RX ADMIN — LEUCOVORIN CALCIUM 849.6 MG: 350 INJECTION, POWDER, LYOPHILIZED, FOR SUSPENSION INTRAMUSCULAR; INTRAVENOUS at 12:19

## 2019-12-21 RX ADMIN — DEXAMETHASONE SODIUM PHOSPHATE 12 MG: 4 INJECTION, SOLUTION INTRAMUSCULAR; INTRAVENOUS at 11:03

## 2019-12-21 RX ADMIN — IRINOTECAN HYDROCHLORIDE 382.4 MG: 20 INJECTION, SOLUTION INTRAVENOUS at 12:19

## 2019-12-21 RX ADMIN — FLUOROURACIL 5100 MG: 50 INJECTION, SOLUTION INTRAVENOUS at 15:41

## 2019-12-21 RX ADMIN — DEXTROSE MONOHYDRATE: 50 INJECTION, SOLUTION INTRAVENOUS at 11:03

## 2019-12-21 RX ADMIN — ONDANSETRON HYDROCHLORIDE 16 MG: 2 SOLUTION INTRAMUSCULAR; INTRAVENOUS at 11:23

## 2019-12-21 RX ADMIN — SODIUM CHLORIDE: 9 INJECTION, SOLUTION INTRAVENOUS at 14:59

## 2019-12-21 RX ADMIN — ATROPINE SULFATE 0.5 MG: 1 INJECTION, SOLUTION INTRAMUSCULAR; INTRAVENOUS; SUBCUTANEOUS at 12:14

## 2019-12-21 NOTE — PROGRESS NOTES
"Pharmacy Chemotherapy Verification Note:    Patient Name: Gurmeet Jo      Dx: Metastatic Colorectal CA      Protocol: FOLFIRI + Avastin       *Dosing Reference*  Irinotecan 180 mg/m² IV over 30-90 minutes on Day 1    - 11/23/19 Dose reduced to 162 mg/m² for tolerability per Dr. Ross  Leucovorin 400 mg/m² IV over 30-90 minutes on Day 1, to run concurrently with Irinotecan   - 11/23/19 Dose reduced to 360 mg/m² for tolerability per Dr. Ross  Fluorouracil 400 mg/m² IV push on Day 1, followed by    - 11/23/19 Dose reduced to 360 mg/m² for tolerability per Dr. Ross  Fluorouracil 2400 mg/m² CIVI over 46-48 hours   - 11/23/19 Dose reduced to 2160 mg/m² for tolerability per Dr. Ross  Bevacizumab 5 mg/kg IV on Day 1   Every 14 days until disease progression or unacceptable toxicity    NCCN Guidelines for Colon Cancer. V.3.2019.  Naa V, et al. Lancet Oncol. 2014;15(10):1065-75.  Akash BIRD, et al. J Clin Oncol.2007;25(30):0167-85.    Allergies:  Patient has no known allergies.     /85   Pulse 91   Temp 36.3 °C (97.4 °F) (Temporal)   Resp 18   Ht 1.855 m (6' 1.03\")   Wt 108.2 kg (238 lb 8.6 oz)   SpO2 97%   BMI 31.44 kg/m²  Body surface area is 2.36 meters squared.  Labs from 12/20/19:  ANC~ 2060  Plt = 105 k  SCr = 0.56 mg/dL  CrCl = >125 mL/min (using min SCr 0.7 mg/dL and current weight)  LFT = 47/44/192 TBili = 1  Urine protein = negative      Drug Order   (Drug name, dose, route, IV Fluid & volume, frequency, number of doses) Cycle: 5     Previous treatment: C4 = 12/7/19     Medication = Irinotecan (Camptosar)  Base Dose = 162 mg/m²  Calc Dose: Base Dose x 2.36 m² = 382 mg  Final Dose = 382.4 mg  Route = IV  Fluid & Volume = D5W 250 mL  Admin Duration = Over 120 minutes          <10% difference, ok to treat with final written dose   Medication = Leucovorin (Wellcovorin)  Base Dose = 360 mg/m²  Calc Dose: Base Dose x 2.36 m² = 850 mg  Final Dose = 849.6 mg  Route = IV  Fluid & Volume = D5W " 250 mL  Admin Duration = Over 120 minutes          <10% difference, ok to treat with final written dose   Medication = Fluorouracil (5-FU)  Base Dose = 360 mg/m²  Calc Dose:Base Dose x 2.36 m² = 850 mg  Final Dose = 850 mg  Route = IVP  Fluid & Volume = 17 mL in syringe  Conc = 50 mg/mL  Admin Duration = Over 5-10 minutes          <10% difference, ok to treat with final written dose   Medication = Fluorouracil (5-FU)  Base Dose = 2160 mg/m²  Calc Dose: Base Dose x 2.36 m² = 5098 mg  Final Dose = 5100 mg  Route = CIVI via CADD pump  Fluid & Volume = 102 mL (+3 mL overfill = 105 mL)  Admin Duration = Over 46 hours (to run at 2.2 mL/hour)          <10% difference, ok to treat with final written dose   Medication = Bevacizumab (Avastin)  Base Dose = 5 mg/kg  Calc Dose: Base Dose x 108.2 kg = 541 mg  Final Dose = 500 mg  Route = IV  Fluid & Volume =  mL  Admin Duration = Over 30 minutes          <10% difference, rounded to vial size per protocol, ok to treat with final written dose     By my signature below, I confirm this process was performed independently with the BSA and all final chemotherapy dosing calculations congruent. I have reviewed the above chemotherapy order and that my calculation of the final dose and BSA (when applicable) corroborate those calculations of the  pharmacist.     Ernesto Portillo, PharmD, BCPS

## 2019-12-21 NOTE — PROGRESS NOTES
Chemotherapy Verification - SECONDARY RN       Height = 1.855 m  Weight = 108.2 kg  BSA = 2.36 m2      Medication: Irinotecan  Dose: 162 mg/m2  Calculated Dose: 382.32 mg                            (In mg/m2, AUC, mg/kg)     Medication: Leucovorin  Dose: 360 mg/m2  Calculated Dose: 849.6 mg                            (In mg/m2, AUC, mg/kg)    Medication: Fluorouracil Syringe Dose: 360 mg/m2  Calculated Dose: 849.6 mg                            (In mg/m2, AUC, mg/kg)    Medication: Avastin  Dose: 5 mg/kg  Calculated Dose: 541 mg                            (In mg/m2, AUC, mg/kg)    Medication: Fluorouracil Pump  Dose: 2160 mg/m2  Calculated Dose: 5097.6 mg                            (In mg/m2, AUC, mg/kg)      I confirm that this process was performed independently.

## 2019-12-21 NOTE — PROGRESS NOTES
Patient arrived ambulatory to the [facility] for FOLFIRI/Avastin. Reviewed vital signs, labs, and physician order. Patient denies S&S of infection, or bleeding. Pt arrived with Emla cream applied to right chest port. Right chest port accessed with sterile technique, visualized brisk blood return. Pre-treatment medications administered, 30 min observed. Chemotherapy administered as directed, no adverse reaction observed. 5FU pump and pump settings reviewed with Mai MONTOYA, and the pt. Port flushed per protocol, connected to 5FU pump, pump running. Confirmed upcoming appointment date and time with patient. Patient left the [facility] ambulatory in no sign of distress.

## 2019-12-21 NOTE — PROGRESS NOTES
Chemotherapy Verification - PRIMARY RN      Height = 73.03in  Weight = 108.2kg  BSA = 2.35m2       Medication: Irinotecan  Dose: 162mg/m2  Calculated Dose: 381.7mg                             (In mg/m2, AUC, mg/kg)     Medication: Fluorouracil  Dose: 360mg/m2  Calculated Dose: 846mg                             (In mg/m2, AUC, mg/kg)    Medication: Fluorouracil  Dose: 2160mg/m2  Calculated Dose: 5076mg over 46 hours                             (In mg/m2, AUC, mg/kg)    Medication: Avastin  Dose: 5mg/kg  Calculated Dose: 541mg                             (In mg/m2, AUC, mg/kg)    I confirm this process was performed independently with the BSA and all final chemotherapy dosing calculations congruent.  Any discrepancies of 10% or greater have been addressed with the chemotherapy pharmacist. The resolution of the discrepancy has been documented in the EPIC progress notes.

## 2019-12-21 NOTE — PROGRESS NOTES
"Pharmacy Chemotherapy Calculations    Dx: Metastatic colorectal cancer    Cycle 5  Previous treatment = C4 on 12/7/19    Regimen: FOLFiri + bevacizumab  *Dosing Reference*  Irinotecan  IV on day 1    - 11/23/19 Doses reduced 10% for cytopenias = 162 mg/m2  Leucovorin  iv on day 1 concurrently with irinotecan    - 11/23/19 Doses reduced 10% for cytopenias = 360 mg/m2  5-FU 2 iv bolus d1 followed by  iv over 46 hrs   - 11/23/19 Doses reduced 10% for cytopenias = 360 mg/m2 IV push, followed by 2160 mg/m2 CIVI  Bevacizumab 5 mg/kg IV on day 1  q14 days for 8-12 cycles  NCCN Guidelines for Colon Cancer V.2.2015  Leonel T, et al - Eur J Cancer. 1999 Sep;35(9):1343-7.  Akash BIRD et al - J Clin Oncol. 2007 Oct 20;25(30):3979-67    Allergies: Patient has no known allergies.  /85   Pulse 91   Temp 36.3 °C (97.4 °F) (Temporal)   Resp 18   Ht 1.855 m (6' 1.03\")   Wt 108.2 kg (238 lb 8.6 oz)   SpO2 97%   BMI 31.44 kg/m²  Body surface area is 2.36 meters squared.     All labs (12/20/19), urine protein, and BP within treatment plan parameters.      Irinotecan (Camptosar) 162 mg/m2 x 2.36 m2 = 382.3 mg   <10% difference, OK to treat with final dose = 382.4 mg IV    Leucovorin 360 mg/m2 x 2.36 m2 = 849.6 mg   <10% difference, OK to treat with final dose = 849.6 mg IV    Fluorouracil (Adrucil) 360 mg/m2 x 2.36 m2 = 849.6 mg   <10% difference, OK to treat with final dose = 850 mg IV    Bevacizumab (Avastin) 5 mg/kg x 108.2 kg = 541 mg   Rounded down to vial size (within 10%) per dose rounding protocol = 500 mg IV    Fluorouracil (Adrucil) 2160 mg/m2 x 2.36 m2 = 5097.6 mg   <10% difference, OK to treat with final dose = 5100 mg CIVI over 46 hours      Aura Guzman, PharmD  "

## 2019-12-23 ENCOUNTER — OUTPATIENT INFUSION SERVICES (OUTPATIENT)
Dept: ONCOLOGY | Facility: MEDICAL CENTER | Age: 54
End: 2019-12-23
Attending: INTERNAL MEDICINE
Payer: MEDICAID

## 2019-12-23 VITALS
TEMPERATURE: 97 F | SYSTOLIC BLOOD PRESSURE: 127 MMHG | HEART RATE: 82 BPM | HEIGHT: 73 IN | WEIGHT: 239.86 LBS | BODY MASS INDEX: 31.79 KG/M2 | DIASTOLIC BLOOD PRESSURE: 75 MMHG | OXYGEN SATURATION: 98 % | RESPIRATION RATE: 18 BRPM

## 2019-12-23 PROCEDURE — 700111 HCHG RX REV CODE 636 W/ 250 OVERRIDE (IP)

## 2019-12-23 PROCEDURE — 96523 IRRIG DRUG DELIVERY DEVICE: CPT

## 2019-12-23 RX ADMIN — HEPARIN 500 UNITS: 100 SYRINGE at 14:16

## 2019-12-23 NOTE — PROGRESS NOTES
Pt arrived ambulatory to [facility]  For CADD pump disconnect. Pump volume=me reads zero. Pump disconnected. Port with brisk blood return and flushed with NS then heparin. Port needle intact, gauze dressing applied. Pt discharged to self care, [facility].

## 2019-12-28 ENCOUNTER — APPOINTMENT (OUTPATIENT)
Dept: ONCOLOGY | Facility: MEDICAL CENTER | Age: 54
End: 2019-12-28
Attending: INTERNAL MEDICINE
Payer: MEDICAID

## 2019-12-30 ENCOUNTER — APPOINTMENT (OUTPATIENT)
Dept: ONCOLOGY | Facility: MEDICAL CENTER | Age: 54
End: 2019-12-30
Attending: INTERNAL MEDICINE
Payer: MEDICAID

## 2020-01-03 ENCOUNTER — HOSPITAL ENCOUNTER (OUTPATIENT)
Dept: LAB | Facility: MEDICAL CENTER | Age: 55
End: 2020-01-03
Attending: INTERNAL MEDICINE
Payer: MEDICAID

## 2020-01-03 LAB
ALBUMIN SERPL BCP-MCNC: 3.8 G/DL (ref 3.2–4.9)
ALBUMIN/GLOB SERPL: 1.1 G/DL
ALP SERPL-CCNC: 174 U/L (ref 30–99)
ALT SERPL-CCNC: 31 U/L (ref 2–50)
ANION GAP SERPL CALC-SCNC: 9 MMOL/L (ref 0–11.9)
APPEARANCE UR: CLEAR
AST SERPL-CCNC: 31 U/L (ref 12–45)
BASOPHILS # BLD AUTO: 0.6 % (ref 0–1.8)
BASOPHILS # BLD: 0.02 K/UL (ref 0–0.12)
BILIRUB SERPL-MCNC: 0.9 MG/DL (ref 0.1–1.5)
BILIRUB UR QL STRIP.AUTO: NEGATIVE
BUN SERPL-MCNC: 4 MG/DL (ref 8–22)
CALCIUM SERPL-MCNC: 9.2 MG/DL (ref 8.5–10.5)
CHLORIDE SERPL-SCNC: 100 MMOL/L (ref 96–112)
CO2 SERPL-SCNC: 28 MMOL/L (ref 20–33)
COLOR UR: YELLOW
CREAT SERPL-MCNC: 0.67 MG/DL (ref 0.5–1.4)
EOSINOPHIL # BLD AUTO: 0.07 K/UL (ref 0–0.51)
EOSINOPHIL NFR BLD: 2.1 % (ref 0–6.9)
ERYTHROCYTE [DISTWIDTH] IN BLOOD BY AUTOMATED COUNT: 61.7 FL (ref 35.9–50)
GLOBULIN SER CALC-MCNC: 3.4 G/DL (ref 1.9–3.5)
GLUCOSE SERPL-MCNC: 235 MG/DL (ref 65–99)
GLUCOSE UR STRIP.AUTO-MCNC: NEGATIVE MG/DL
HCT VFR BLD AUTO: 36.5 % (ref 42–52)
HGB BLD-MCNC: 11.9 G/DL (ref 14–18)
IMM GRANULOCYTES # BLD AUTO: 0 K/UL (ref 0–0.11)
IMM GRANULOCYTES NFR BLD AUTO: 0 % (ref 0–0.9)
KETONES UR STRIP.AUTO-MCNC: NEGATIVE MG/DL
LEUKOCYTE ESTERASE UR QL STRIP.AUTO: NEGATIVE
LYMPHOCYTES # BLD AUTO: 0.9 K/UL (ref 1–4.8)
LYMPHOCYTES NFR BLD: 27.6 % (ref 22–41)
MAGNESIUM SERPL-MCNC: 1.9 MG/DL (ref 1.5–2.5)
MCH RBC QN AUTO: 31.9 PG (ref 27–33)
MCHC RBC AUTO-ENTMCNC: 32.6 G/DL (ref 33.7–35.3)
MCV RBC AUTO: 97.9 FL (ref 81.4–97.8)
MICRO URNS: NORMAL
MONOCYTES # BLD AUTO: 0.45 K/UL (ref 0–0.85)
MONOCYTES NFR BLD AUTO: 13.8 % (ref 0–13.4)
NEUTROPHILS # BLD AUTO: 1.82 K/UL (ref 1.82–7.42)
NEUTROPHILS NFR BLD: 55.9 % (ref 44–72)
NITRITE UR QL STRIP.AUTO: NEGATIVE
NRBC # BLD AUTO: 0 K/UL
NRBC BLD-RTO: 0 /100 WBC
PH UR STRIP.AUTO: 6.5 [PH] (ref 5–8)
PLATELET # BLD AUTO: 81 K/UL (ref 164–446)
PMV BLD AUTO: 10.9 FL (ref 9–12.9)
POTASSIUM SERPL-SCNC: 3.9 MMOL/L (ref 3.6–5.5)
PROT SERPL-MCNC: 7.2 G/DL (ref 6–8.2)
PROT UR QL STRIP: NEGATIVE MG/DL
RBC # BLD AUTO: 3.73 M/UL (ref 4.7–6.1)
RBC UR QL AUTO: NEGATIVE
SODIUM SERPL-SCNC: 137 MMOL/L (ref 135–145)
SP GR UR STRIP.AUTO: 1
UROBILINOGEN UR STRIP.AUTO-MCNC: 0.2 MG/DL
WBC # BLD AUTO: 3.3 K/UL (ref 4.8–10.8)

## 2020-01-03 PROCEDURE — 81003 URINALYSIS AUTO W/O SCOPE: CPT

## 2020-01-03 PROCEDURE — 80053 COMPREHEN METABOLIC PANEL: CPT

## 2020-01-03 PROCEDURE — 36415 COLL VENOUS BLD VENIPUNCTURE: CPT

## 2020-01-03 PROCEDURE — 85025 COMPLETE CBC W/AUTO DIFF WBC: CPT

## 2020-01-03 PROCEDURE — 83735 ASSAY OF MAGNESIUM: CPT

## 2020-01-03 PROCEDURE — 82378 CARCINOEMBRYONIC ANTIGEN: CPT

## 2020-01-03 RX ORDER — SODIUM CHLORIDE 9 MG/ML
INJECTION, SOLUTION INTRAVENOUS CONTINUOUS
Status: CANCELLED | OUTPATIENT
Start: 2020-01-04

## 2020-01-03 RX ORDER — 0.9 % SODIUM CHLORIDE 0.9 %
5 VIAL (ML) INJECTION PRN
Status: CANCELLED | OUTPATIENT
Start: 2020-01-03

## 2020-01-03 RX ORDER — ONDANSETRON 8 MG/1
8 TABLET, ORALLY DISINTEGRATING ORAL PRN
Status: CANCELLED | OUTPATIENT
Start: 2020-01-04

## 2020-01-03 RX ORDER — ONDANSETRON 2 MG/ML
4 INJECTION INTRAMUSCULAR; INTRAVENOUS PRN
Status: CANCELLED | OUTPATIENT
Start: 2020-01-04

## 2020-01-03 RX ORDER — 0.9 % SODIUM CHLORIDE 0.9 %
5 VIAL (ML) INJECTION PRN
Status: CANCELLED | OUTPATIENT
Start: 2020-01-04

## 2020-01-03 RX ORDER — LOPERAMIDE HYDROCHLORIDE 2 MG/1
2 CAPSULE ORAL
Status: CANCELLED | OUTPATIENT
Start: 2020-01-04

## 2020-01-03 RX ORDER — 0.9 % SODIUM CHLORIDE 0.9 %
VIAL (ML) INJECTION PRN
Status: CANCELLED | OUTPATIENT
Start: 2020-01-03

## 2020-01-03 RX ORDER — PROCHLORPERAZINE MALEATE 10 MG
10 TABLET ORAL EVERY 6 HOURS PRN
Status: CANCELLED | OUTPATIENT
Start: 2020-01-04

## 2020-01-03 RX ORDER — DEXTROSE MONOHYDRATE 50 MG/ML
INJECTION, SOLUTION INTRAVENOUS CONTINUOUS
Status: CANCELLED | OUTPATIENT
Start: 2020-01-04

## 2020-01-03 RX ORDER — 0.9 % SODIUM CHLORIDE 0.9 %
VIAL (ML) INJECTION PRN
Status: CANCELLED | OUTPATIENT
Start: 2020-01-04

## 2020-01-03 RX ORDER — LOPERAMIDE HYDROCHLORIDE 2 MG/1
4 CAPSULE ORAL PRN
Status: CANCELLED | OUTPATIENT
Start: 2020-01-04

## 2020-01-03 RX ORDER — 0.9 % SODIUM CHLORIDE 0.9 %
20 VIAL (ML) INJECTION PRN
Status: CANCELLED | OUTPATIENT
Start: 2020-01-06

## 2020-01-04 ENCOUNTER — OUTPATIENT INFUSION SERVICES (OUTPATIENT)
Dept: ONCOLOGY | Facility: MEDICAL CENTER | Age: 55
End: 2020-01-04
Attending: INTERNAL MEDICINE
Payer: MEDICAID

## 2020-01-04 NOTE — PROGRESS NOTES
Per Dr Goldman hold chemotherapy today, 1/4/2020. Pt's platelets 81K. Pt does not meet parameters for treatment. Pt contacted via telephone by this RN about treatment hold. Pt instructed to follow up with Dr Ross next week. Pt verbalized understanding. E-mail to be sent to Teresa MONTOYA as well.

## 2020-01-06 ENCOUNTER — APPOINTMENT (OUTPATIENT)
Dept: ONCOLOGY | Facility: MEDICAL CENTER | Age: 55
End: 2020-01-06
Attending: INTERNAL MEDICINE
Payer: MEDICAID

## 2020-01-08 LAB — CEA SERPL-MCNC: 679.6 NG/ML (ref 0–3)

## 2020-01-13 ENCOUNTER — HOSPITAL ENCOUNTER (OUTPATIENT)
Dept: RADIOLOGY | Facility: MEDICAL CENTER | Age: 55
End: 2020-01-13
Attending: INTERNAL MEDICINE
Payer: MEDICAID

## 2020-01-13 DIAGNOSIS — C18.9 MALIGNANT NEOPLASM OF COLON, UNSPECIFIED PART OF COLON (HCC): ICD-10-CM

## 2020-01-13 PROCEDURE — 71260 CT THORAX DX C+: CPT

## 2020-01-13 PROCEDURE — 700117 HCHG RX CONTRAST REV CODE 255: Performed by: INTERNAL MEDICINE

## 2020-01-13 RX ADMIN — IOHEXOL 25 ML: 240 INJECTION, SOLUTION INTRATHECAL; INTRAVASCULAR; INTRAVENOUS; ORAL at 16:29

## 2020-01-13 RX ADMIN — IOHEXOL 100 ML: 350 INJECTION, SOLUTION INTRAVENOUS at 16:48

## 2020-01-18 ENCOUNTER — APPOINTMENT (OUTPATIENT)
Dept: ONCOLOGY | Facility: MEDICAL CENTER | Age: 55
End: 2020-01-18
Attending: INTERNAL MEDICINE
Payer: MEDICAID

## 2020-01-20 ENCOUNTER — APPOINTMENT (OUTPATIENT)
Dept: ONCOLOGY | Facility: MEDICAL CENTER | Age: 55
End: 2020-01-20
Attending: INTERNAL MEDICINE
Payer: MEDICAID

## 2020-02-01 ENCOUNTER — APPOINTMENT (OUTPATIENT)
Dept: ONCOLOGY | Facility: MEDICAL CENTER | Age: 55
End: 2020-02-01
Attending: INTERNAL MEDICINE
Payer: MEDICAID

## 2020-02-01 ENCOUNTER — APPOINTMENT (OUTPATIENT)
Dept: RADIOLOGY | Facility: MEDICAL CENTER | Age: 55
End: 2020-02-01
Payer: MEDICAID

## 2020-02-01 ENCOUNTER — APPOINTMENT (OUTPATIENT)
Dept: RADIOLOGY | Facility: MEDICAL CENTER | Age: 55
End: 2020-02-01
Attending: EMERGENCY MEDICINE
Payer: MEDICAID

## 2020-02-01 ENCOUNTER — HOSPITAL ENCOUNTER (EMERGENCY)
Facility: MEDICAL CENTER | Age: 55
End: 2020-02-02
Attending: EMERGENCY MEDICINE
Payer: MEDICAID

## 2020-02-01 VITALS
HEIGHT: 74 IN | DIASTOLIC BLOOD PRESSURE: 93 MMHG | SYSTOLIC BLOOD PRESSURE: 144 MMHG | TEMPERATURE: 98.6 F | HEART RATE: 105 BPM | WEIGHT: 236.55 LBS | BODY MASS INDEX: 30.36 KG/M2 | OXYGEN SATURATION: 94 % | RESPIRATION RATE: 16 BRPM

## 2020-02-01 DIAGNOSIS — R10.9 ABDOMINAL CRAMPING: ICD-10-CM

## 2020-02-01 DIAGNOSIS — R53.83 FATIGUE, UNSPECIFIED TYPE: ICD-10-CM

## 2020-02-01 DIAGNOSIS — Z87.898 HX OF FEVER: ICD-10-CM

## 2020-02-01 LAB
ALBUMIN SERPL BCP-MCNC: 3.7 G/DL (ref 3.2–4.9)
ALBUMIN/GLOB SERPL: 1 G/DL
ALP SERPL-CCNC: 183 U/L (ref 30–99)
ALT SERPL-CCNC: 27 U/L (ref 2–50)
ANION GAP SERPL CALC-SCNC: 10 MMOL/L (ref 0–11.9)
AST SERPL-CCNC: 51 U/L (ref 12–45)
BASOPHILS # BLD AUTO: 0.3 % (ref 0–1.8)
BASOPHILS # BLD: 0.03 K/UL (ref 0–0.12)
BILIRUB SERPL-MCNC: 1.9 MG/DL (ref 0.1–1.5)
BUN SERPL-MCNC: 11 MG/DL (ref 8–22)
CALCIUM SERPL-MCNC: 9.3 MG/DL (ref 8.5–10.5)
CHLORIDE SERPL-SCNC: 101 MMOL/L (ref 96–112)
CO2 SERPL-SCNC: 21 MMOL/L (ref 20–33)
CREAT SERPL-MCNC: 0.6 MG/DL (ref 0.5–1.4)
EKG IMPRESSION: NORMAL
EOSINOPHIL # BLD AUTO: 0.08 K/UL (ref 0–0.51)
EOSINOPHIL NFR BLD: 0.8 % (ref 0–6.9)
ERYTHROCYTE [DISTWIDTH] IN BLOOD BY AUTOMATED COUNT: 53.1 FL (ref 35.9–50)
GLOBULIN SER CALC-MCNC: 3.8 G/DL (ref 1.9–3.5)
GLUCOSE SERPL-MCNC: 152 MG/DL (ref 65–99)
HCT VFR BLD AUTO: 33.8 % (ref 42–52)
HGB BLD-MCNC: 11.4 G/DL (ref 14–18)
IMM GRANULOCYTES # BLD AUTO: 0.03 K/UL (ref 0–0.11)
IMM GRANULOCYTES NFR BLD AUTO: 0.3 % (ref 0–0.9)
LACTATE BLD-SCNC: 1.4 MMOL/L (ref 0.5–2)
LYMPHOCYTES # BLD AUTO: 1.5 K/UL (ref 1–4.8)
LYMPHOCYTES NFR BLD: 14.6 % (ref 22–41)
MCH RBC QN AUTO: 31.1 PG (ref 27–33)
MCHC RBC AUTO-ENTMCNC: 33.7 G/DL (ref 33.7–35.3)
MCV RBC AUTO: 92.3 FL (ref 81.4–97.8)
MONOCYTES # BLD AUTO: 1.64 K/UL (ref 0–0.85)
MONOCYTES NFR BLD AUTO: 16 % (ref 0–13.4)
NEUTROPHILS # BLD AUTO: 6.96 K/UL (ref 1.82–7.42)
NEUTROPHILS NFR BLD: 68 % (ref 44–72)
NRBC # BLD AUTO: 0 K/UL
NRBC BLD-RTO: 0 /100 WBC
PLATELET # BLD AUTO: 105 K/UL (ref 164–446)
PMV BLD AUTO: 10.2 FL (ref 9–12.9)
POTASSIUM SERPL-SCNC: 3.7 MMOL/L (ref 3.6–5.5)
PROT SERPL-MCNC: 7.5 G/DL (ref 6–8.2)
RBC # BLD AUTO: 3.66 M/UL (ref 4.7–6.1)
SODIUM SERPL-SCNC: 132 MMOL/L (ref 135–145)
WBC # BLD AUTO: 10.2 K/UL (ref 4.8–10.8)

## 2020-02-01 PROCEDURE — 700111 HCHG RX REV CODE 636 W/ 250 OVERRIDE (IP): Performed by: EMERGENCY MEDICINE

## 2020-02-01 PROCEDURE — 96374 THER/PROPH/DIAG INJ IV PUSH: CPT | Mod: XU

## 2020-02-01 PROCEDURE — 87086 URINE CULTURE/COLONY COUNT: CPT

## 2020-02-01 PROCEDURE — 99285 EMERGENCY DEPT VISIT HI MDM: CPT

## 2020-02-01 PROCEDURE — 93005 ELECTROCARDIOGRAM TRACING: CPT | Performed by: EMERGENCY MEDICINE

## 2020-02-01 PROCEDURE — 83605 ASSAY OF LACTIC ACID: CPT

## 2020-02-01 PROCEDURE — 71045 X-RAY EXAM CHEST 1 VIEW: CPT

## 2020-02-01 PROCEDURE — 87040 BLOOD CULTURE FOR BACTERIA: CPT

## 2020-02-01 PROCEDURE — 93005 ELECTROCARDIOGRAM TRACING: CPT

## 2020-02-01 PROCEDURE — 74177 CT ABD & PELVIS W/CONTRAST: CPT

## 2020-02-01 PROCEDURE — 85025 COMPLETE CBC W/AUTO DIFF WBC: CPT

## 2020-02-01 PROCEDURE — 81003 URINALYSIS AUTO W/O SCOPE: CPT

## 2020-02-01 PROCEDURE — 80053 COMPREHEN METABOLIC PANEL: CPT

## 2020-02-01 PROCEDURE — 700117 HCHG RX CONTRAST REV CODE 255: Performed by: EMERGENCY MEDICINE

## 2020-02-01 RX ORDER — ONDANSETRON 4 MG/1
4 TABLET, ORALLY DISINTEGRATING ORAL EVERY 8 HOURS PRN
Qty: 10 TAB | Refills: 0 | Status: SHIPPED | OUTPATIENT
Start: 2020-02-01 | End: 2020-04-03

## 2020-02-01 RX ORDER — ONDANSETRON 2 MG/ML
4 INJECTION INTRAMUSCULAR; INTRAVENOUS ONCE
Status: COMPLETED | OUTPATIENT
Start: 2020-02-01 | End: 2020-02-01

## 2020-02-01 RX ADMIN — ONDANSETRON 4 MG: 2 INJECTION INTRAMUSCULAR; INTRAVENOUS at 23:14

## 2020-02-01 RX ADMIN — IOHEXOL 100 ML: 350 INJECTION, SOLUTION INTRAVENOUS at 23:12

## 2020-02-02 LAB
APPEARANCE UR: CLEAR
BILIRUB UR QL STRIP.AUTO: NEGATIVE
COLOR UR: YELLOW
GLUCOSE UR STRIP.AUTO-MCNC: NEGATIVE MG/DL
KETONES UR STRIP.AUTO-MCNC: NEGATIVE MG/DL
LEUKOCYTE ESTERASE UR QL STRIP.AUTO: NEGATIVE
MICRO URNS: NORMAL
NITRITE UR QL STRIP.AUTO: NEGATIVE
PH UR STRIP.AUTO: 5.5 [PH] (ref 5–8)
PROT UR QL STRIP: NEGATIVE MG/DL
RBC UR QL AUTO: NEGATIVE
SP GR UR REFRACTOMETRY: 1.05
UROBILINOGEN UR STRIP.AUTO-MCNC: 1 MG/DL

## 2020-02-02 NOTE — ED TRIAGE NOTES
"Chief Complaint   Patient presents with   • Flu Like Symptoms     since last night pt has been exhibiting fever of 101.4 F about an hour ago, chills, body aches and fullness in his belly. Pt has hx of stage 4 colon cancer, last chemo tx was 12/28. Pt denies n/v/d       /93   Pulse (!) 105   Temp 37 °C (98.6 °F) (Oral)   Resp 16   Ht 1.88 m (6' 2\")   Wt 107.3 kg (236 lb 8.9 oz)   SpO2 94%   BMI 30.37 kg/m²     Pt informed his wife took his oral temp an hour ago, has not taken any tyenol in the last hour, but took 1000mg this morning. Pt has hx of neutropenia. Mask placed on pt.    Charge informed. Pt placed in lobby at this time.    "

## 2020-02-02 NOTE — ED NOTES
Discharge instructions and prescriptions discussed with pt. Pt verbalized understanding. Pt discharged ambulatory with wife.

## 2020-02-02 NOTE — ED PROVIDER NOTES
ED Provider Note    CHIEF COMPLAINT  Chief Complaint   Patient presents with   • Flu Like Symptoms     since last night pt has been exhibiting fever of 101.4 F about an hour ago, chills, body aches and fullness in his belly. Pt has hx of stage 4 colon cancer, last chemo tx was 12/28. Pt denies n/v/d       HPI  Gurmeet Jo is a 54 y.o. male who presents for evaluation of fever, chills, body aches, and a bloated sensation.  He endorses occasional episodes of cramping, bordering on pain.  He notes this is mostly in the left upper quadrant but also in the periumbilical region.  He had no nausea, vomiting, or diarrhea.  He notes he has been passing gas and has had a normal bowel movement recently.  There is been no blood in the stool or melena.    REVIEW OF SYSTEMS  Constitutional: Fevers, chills, fatigue  Skin: No rashes, abrasions, lacerations, or pruritus  HEENT: No ear pain, ringing in ears, or decreased hearing. No sore throat, runny nose, sores, trouble swallowing, trouble speaking.  Neck: No neck pain, stiffness, or masses.  Chest: No pain or rashes  Pulm: No shortness of breath, cough, wheezing, stridor, or pain with inspiration/expiration  Gastrointestinal: No nausea, vomiting, diarrhea, constipation, bloating, melena, or hematochezia  Genitourinary: No dysuria or hematuria  Musculoskeletal: No recent trauma, pain, swelling, weakness  Neurologic: No sensory or motor changes. No confusion or disorientation.  Heme: No bleeding or bruising problems.   Immuno: Metastatic colon cancer with last dose of chemo in late December      PAST MEDICAL HISTORY   has a past medical history of Bowel habit changes, Cancer (HCC) (11/2018), Dental disorder, Hiatus hernia syndrome, Hypertension, Indigestion, Pain, Sleep apnea, and Snoring.    SOCIAL HISTORY  Social History     Tobacco Use   • Smoking status: Former Smoker     Packs/day: 1.00     Years: 15.00     Pack years: 15.00     Last attempt to quit: 1/1/1998     Years  "since quittin.1   • Smokeless tobacco: Never Used   Substance and Sexual Activity   • Alcohol use: No   • Drug use: Not Currently     Types: Inhaled     Comment: THC 1x appx 1 week ago.    • Sexual activity: Not on file       SURGICAL HISTORY   has a past surgical history that includes colonoscopy ().    CURRENT MEDICATIONS  Home Medications     Reviewed by Mary Anne Fitzgerald R.N. (Registered Nurse) on 20 at   Med List Status: Partial   Medication Last Dose Status   acetaminophen (TYLENOL) 500 MG Tab 2020 Active   baclofen (LIORESAL) 10 MG Tab  Active   lidocaine-prilocaine (EMLA) 2.5-2.5 % Cream  Active   LORazepam (ATIVAN) 1 MG Tab  Active   methocarbamol (ROBAXIN) 500 MG Tab 2020 Active   therapeutic multivitamin-minerals (THERAGRAN-M) Tab  Active                ALLERGIES  No Known Allergies    PHYSICAL EXAM  VITAL SIGNS: /93   Pulse (!) 105   Temp 37 °C (98.6 °F) (Oral)   Resp 16   Ht 1.88 m (6' 2\")   Wt 107.3 kg (236 lb 8.9 oz)   SpO2 94%   BMI 30.37 kg/m²    Gen: Appears tired, otherwise alert and in no distress  HEENT: No signs of trauma, Bilateral external ears normal, Nose normal. Conjunctiva normal, Non-icteric.   Neck:  No tenderness, Supple, No masses  Lymphatic: No cervical lymphadenopathy noted.   Cardiovascular: Mild tachycardia.  Capillary refill less than 3 seconds to all extremities, 2+ distal pulses.  Thorax & Lungs: Normal breath sounds, No respiratory distress, No wheezing bilateral chest rise  Abdomen: Bowel sounds normal, Soft, mild discomfort in the left upper quadrant, liver fullness noted, No pulsatile masses. No Guarding or rebound  Skin: Warm, Dry, No erythema, No rash to exposed areas.   Extremities: Intact distal pulses, No edema  Neurologic: Alert , no facial droop, grossly normal coordination and strength  Psychiatric: Affect normal, Judgment normal, Mood normal.       LABS  Results for orders placed or performed during the hospital encounter of " 02/01/20   Lactic acid (lactate)   Result Value Ref Range    Lactic Acid 1.4 0.5 - 2.0 mmol/L   CBC WITH DIFFERENTIAL   Result Value Ref Range    WBC 10.2 4.8 - 10.8 K/uL    RBC 3.66 (L) 4.70 - 6.10 M/uL    Hemoglobin 11.4 (L) 14.0 - 18.0 g/dL    Hematocrit 33.8 (L) 42.0 - 52.0 %    MCV 92.3 81.4 - 97.8 fL    MCH 31.1 27.0 - 33.0 pg    MCHC 33.7 33.7 - 35.3 g/dL    RDW 53.1 (H) 35.9 - 50.0 fL    Platelet Count 105 (L) 164 - 446 K/uL    MPV 10.2 9.0 - 12.9 fL    Neutrophils-Polys 68.00 44.00 - 72.00 %    Lymphocytes 14.60 (L) 22.00 - 41.00 %    Monocytes 16.00 (H) 0.00 - 13.40 %    Eosinophils 0.80 0.00 - 6.90 %    Basophils 0.30 0.00 - 1.80 %    Immature Granulocytes 0.30 0.00 - 0.90 %    Nucleated RBC 0.00 /100 WBC    Neutrophils (Absolute) 6.96 1.82 - 7.42 K/uL    Lymphs (Absolute) 1.50 1.00 - 4.80 K/uL    Monos (Absolute) 1.64 (H) 0.00 - 0.85 K/uL    Eos (Absolute) 0.08 0.00 - 0.51 K/uL    Baso (Absolute) 0.03 0.00 - 0.12 K/uL    Immature Granulocytes (abs) 0.03 0.00 - 0.11 K/uL    NRBC (Absolute) 0.00 K/uL   COMP METABOLIC PANEL   Result Value Ref Range    Sodium 132 (L) 135 - 145 mmol/L    Potassium 3.7 3.6 - 5.5 mmol/L    Chloride 101 96 - 112 mmol/L    Co2 21 20 - 33 mmol/L    Anion Gap 10.0 0.0 - 11.9    Glucose 152 (H) 65 - 99 mg/dL    Bun 11 8 - 22 mg/dL    Creatinine 0.60 0.50 - 1.40 mg/dL    Calcium 9.3 8.5 - 10.5 mg/dL    AST(SGOT) 51 (H) 12 - 45 U/L    ALT(SGPT) 27 2 - 50 U/L    Alkaline Phosphatase 183 (H) 30 - 99 U/L    Total Bilirubin 1.9 (H) 0.1 - 1.5 mg/dL    Albumin 3.7 3.2 - 4.9 g/dL    Total Protein 7.5 6.0 - 8.2 g/dL    Globulin 3.8 (H) 1.9 - 3.5 g/dL    A-G Ratio 1.0 g/dL   ESTIMATED GFR   Result Value Ref Range    GFR If African American >60 >60 mL/min/1.73 m 2    GFR If Non African American >60 >60 mL/min/1.73 m 2   EKG (NOW)   Result Value Ref Range    Report       [facility]    Test Date:  2020-02-01  Pt Name:    EVIE CHAKRABORTY                  Department:  ER  MRN:        [id number]                      Room:  Gender:     Male                         Technician: [id number]  :        1965                   Requested By:ER TRIAGE PROTOCOL  Order #:    [id number]                    Reading MD: Maynor Gorman MD    Measurements  Intervals                                Axis  Rate:       96                           P:          57  ND:         136                          QRS:        10  QRSD:       88                           T:          22  QT:         348  QTc:        440    Interpretive Statements  SINUS RHYTHM  Compared to ECG 10/14/2019 10:25:40  No significant changes  Electronically Signed On 2020 23:55:58 PST by Maynor Gorman MD         RADIOLOGY  CT-ABDOMEN-PELVIS WITH   Final Result         1.  Scattered heterogeneous metastatic hepatic lesions, overall appears similar compared to prior study.   2.  Atherosclerosis   3.  Diverticulosis   4.  Splenomegaly      DX-CHEST-PORTABLE (1 VIEW)   Final Result         1.  No acute cardiopulmonary disease.            COURSE & MEDICAL DECISION MAKING  Pertinent Labs & Imaging studies reviewed. (See chart for details)  Patient arrives for evaluation of what appears to be a fever measured at home with fatigue and abdominal discomfort in the setting of metastatic colon cancer.  Patient is not currently taking immunotherapy or chemotherapy but is scheduled to later this month.  He does not appear septic or toxic on my evaluation but he did have a mildly elevated heart rate initially.  Given his chronic medical issues I feel a broad screening evaluation including serum and urine as well as CT imaging is necessary to rule out a significant source of systemic and/or intra-abdominal pelvic pathology.    11:53 PM  Patient has no convincing findings today to suggest emergent pathology as the source of his symptomology however he stated clear understanding that bacteremia and early bacterial infectious process cannot be ruled  out at this time.  Given that he was no longer tachycardic and otherwise hemodynamically stable, I felt he was safe for discharge provided he could stay hydrated at home.  I did discuss admission for observation as the fever prior to arrival was concerning however he had no measured fever while in the emergency department and did not appear septic.  He did not have any interval worsening of pain and I did not feel emergent surgical consultation was necessary.  Given the option of going home versus staying in the hospital overnight for observation, the patient felt more comfortable going home as he could be watched closely and would be able to return if his symptoms worsen or change.  I felt this was a safer option than inpatient observation due to the possibility of nosocomial infection if he were admitted.  He stated understanding this as did his wife.  He was comfortable with the plan for discharge and will be treated supportively with Zofran at home.  He will follow-up with his primary care physician next week or return if he worsens in any way  FINAL IMPRESSION  1. Abdominal cramping    2. Fatigue, unspecified type    3. Hx of fever        Electronically signed by: Maynor Gorman M.D., 2/1/2020 10:13 PM

## 2020-02-04 LAB
BACTERIA UR CULT: NORMAL
SIGNIFICANT IND 70042: NORMAL
SITE SITE: NORMAL
SOURCE SOURCE: NORMAL

## 2020-02-07 ENCOUNTER — HOSPITAL ENCOUNTER (OUTPATIENT)
Dept: CARDIOLOGY | Facility: MEDICAL CENTER | Age: 55
End: 2020-02-07
Attending: INTERNAL MEDICINE
Payer: MEDICAID

## 2020-02-07 DIAGNOSIS — C18.9 MALIGNANT NEOPLASM OF COLON, UNSPECIFIED PART OF COLON (HCC): ICD-10-CM

## 2020-02-07 LAB
BACTERIA BLD CULT: NORMAL
BACTERIA BLD CULT: NORMAL
LV EJECT FRACT  99904: 60
LV EJECT FRACT MOD 2C 99903: 68.64
LV EJECT FRACT MOD 4C 99902: 47.71
LV EJECT FRACT MOD BP 99901: 54.4
SIGNIFICANT IND 70042: NORMAL
SIGNIFICANT IND 70042: NORMAL
SITE SITE: NORMAL
SITE SITE: NORMAL
SOURCE SOURCE: NORMAL
SOURCE SOURCE: NORMAL

## 2020-02-07 PROCEDURE — 93306 TTE W/DOPPLER COMPLETE: CPT

## 2020-02-07 PROCEDURE — 93306 TTE W/DOPPLER COMPLETE: CPT | Mod: 26 | Performed by: INTERNAL MEDICINE

## 2020-02-11 RX ORDER — 0.9 % SODIUM CHLORIDE 0.9 %
10 VIAL (ML) INJECTION PRN
Status: CANCELLED | OUTPATIENT
Start: 2020-02-15

## 2020-02-11 RX ORDER — 0.9 % SODIUM CHLORIDE 0.9 %
10 VIAL (ML) INJECTION PRN
Status: CANCELLED | OUTPATIENT
Start: 2020-02-21

## 2020-02-11 RX ORDER — DIPHENHYDRAMINE HYDROCHLORIDE 50 MG/ML
50 INJECTION INTRAMUSCULAR; INTRAVENOUS PRN
Status: CANCELLED | OUTPATIENT
Start: 2020-02-15

## 2020-02-11 RX ORDER — ONDANSETRON 2 MG/ML
4 INJECTION INTRAMUSCULAR; INTRAVENOUS PRN
Status: CANCELLED | OUTPATIENT
Start: 2020-02-21

## 2020-02-11 RX ORDER — 0.9 % SODIUM CHLORIDE 0.9 %
VIAL (ML) INJECTION PRN
Status: CANCELLED | OUTPATIENT
Start: 2020-02-15

## 2020-02-11 RX ORDER — ONDANSETRON 8 MG/1
8 TABLET, ORALLY DISINTEGRATING ORAL PRN
Status: CANCELLED | OUTPATIENT
Start: 2020-02-21

## 2020-02-11 RX ORDER — SODIUM CHLORIDE 9 MG/ML
INJECTION, SOLUTION INTRAVENOUS CONTINUOUS
Status: CANCELLED | OUTPATIENT
Start: 2020-02-21

## 2020-02-11 RX ORDER — ONDANSETRON 2 MG/ML
4 INJECTION INTRAMUSCULAR; INTRAVENOUS PRN
Status: CANCELLED | OUTPATIENT
Start: 2020-02-15

## 2020-02-11 RX ORDER — PROCHLORPERAZINE MALEATE 10 MG
10 TABLET ORAL EVERY 6 HOURS PRN
Status: CANCELLED | OUTPATIENT
Start: 2020-02-15

## 2020-02-11 RX ORDER — 0.9 % SODIUM CHLORIDE 0.9 %
VIAL (ML) INJECTION PRN
Status: CANCELLED | OUTPATIENT
Start: 2020-02-21

## 2020-02-11 RX ORDER — ONDANSETRON 8 MG/1
8 TABLET, ORALLY DISINTEGRATING ORAL PRN
Status: CANCELLED | OUTPATIENT
Start: 2020-02-15

## 2020-02-11 RX ORDER — DEXTROSE MONOHYDRATE 50 MG/ML
INJECTION, SOLUTION INTRAVENOUS CONTINUOUS
Status: CANCELLED | OUTPATIENT
Start: 2020-02-15

## 2020-02-11 RX ORDER — 0.9 % SODIUM CHLORIDE 0.9 %
3 VIAL (ML) INJECTION PRN
Status: CANCELLED | OUTPATIENT
Start: 2020-02-15

## 2020-02-11 RX ORDER — PROCHLORPERAZINE MALEATE 10 MG
10 TABLET ORAL EVERY 6 HOURS PRN
Status: CANCELLED | OUTPATIENT
Start: 2020-02-21

## 2020-02-11 RX ORDER — EPINEPHRINE 1 MG/ML(1)
0.5 AMPUL (ML) INJECTION PRN
Status: CANCELLED | OUTPATIENT
Start: 2020-02-15

## 2020-02-11 RX ORDER — 0.9 % SODIUM CHLORIDE 0.9 %
20 VIAL (ML) INJECTION PRN
Status: CANCELLED | OUTPATIENT
Start: 2020-02-17

## 2020-02-11 RX ORDER — 0.9 % SODIUM CHLORIDE 0.9 %
3 VIAL (ML) INJECTION PRN
Status: CANCELLED | OUTPATIENT
Start: 2020-02-21

## 2020-02-11 RX ORDER — METHYLPREDNISOLONE SODIUM SUCCINATE 125 MG/2ML
125 INJECTION, POWDER, LYOPHILIZED, FOR SOLUTION INTRAMUSCULAR; INTRAVENOUS PRN
Status: CANCELLED | OUTPATIENT
Start: 2020-02-15

## 2020-02-13 ENCOUNTER — HOSPITAL ENCOUNTER (OUTPATIENT)
Dept: LAB | Facility: MEDICAL CENTER | Age: 55
End: 2020-02-13
Attending: INTERNAL MEDICINE
Payer: MEDICAID

## 2020-02-13 LAB
ALBUMIN SERPL BCP-MCNC: 3.8 G/DL (ref 3.2–4.9)
ALBUMIN/GLOB SERPL: 0.9 G/DL
ALP SERPL-CCNC: 271 U/L (ref 30–99)
ALT SERPL-CCNC: 41 U/L (ref 2–50)
ANION GAP SERPL CALC-SCNC: 14 MMOL/L (ref 0–11.9)
APPEARANCE UR: CLEAR
AST SERPL-CCNC: 97 U/L (ref 12–45)
BASOPHILS # BLD AUTO: 0.6 % (ref 0–1.8)
BASOPHILS # BLD: 0.07 K/UL (ref 0–0.12)
BILIRUB SERPL-MCNC: 2.2 MG/DL (ref 0.1–1.5)
BILIRUB UR QL STRIP.AUTO: NEGATIVE
BUN SERPL-MCNC: 9 MG/DL (ref 8–22)
CALCIUM SERPL-MCNC: 9.2 MG/DL (ref 8.5–10.5)
CHLORIDE SERPL-SCNC: 98 MMOL/L (ref 96–112)
CO2 SERPL-SCNC: 24 MMOL/L (ref 20–33)
COLOR UR: YELLOW
CREAT SERPL-MCNC: 0.7 MG/DL (ref 0.5–1.4)
EOSINOPHIL # BLD AUTO: 0.04 K/UL (ref 0–0.51)
EOSINOPHIL NFR BLD: 0.3 % (ref 0–6.9)
ERYTHROCYTE [DISTWIDTH] IN BLOOD BY AUTOMATED COUNT: 53.4 FL (ref 35.9–50)
GLOBULIN SER CALC-MCNC: 4.2 G/DL (ref 1.9–3.5)
GLUCOSE SERPL-MCNC: 151 MG/DL (ref 65–99)
GLUCOSE UR STRIP.AUTO-MCNC: NEGATIVE MG/DL
HCT VFR BLD AUTO: 38.4 % (ref 42–52)
HGB BLD-MCNC: 12.4 G/DL (ref 14–18)
IMM GRANULOCYTES # BLD AUTO: 0.07 K/UL (ref 0–0.11)
IMM GRANULOCYTES NFR BLD AUTO: 0.6 % (ref 0–0.9)
KETONES UR STRIP.AUTO-MCNC: NEGATIVE MG/DL
LEUKOCYTE ESTERASE UR QL STRIP.AUTO: NEGATIVE
LYMPHOCYTES # BLD AUTO: 1.42 K/UL (ref 1–4.8)
LYMPHOCYTES NFR BLD: 12.3 % (ref 22–41)
MAGNESIUM SERPL-MCNC: 1.9 MG/DL (ref 1.5–2.5)
MCH RBC QN AUTO: 30.2 PG (ref 27–33)
MCHC RBC AUTO-ENTMCNC: 32.3 G/DL (ref 33.7–35.3)
MCV RBC AUTO: 93.7 FL (ref 81.4–97.8)
MICRO URNS: NORMAL
MONOCYTES # BLD AUTO: 1.48 K/UL (ref 0–0.85)
MONOCYTES NFR BLD AUTO: 12.8 % (ref 0–13.4)
NEUTROPHILS # BLD AUTO: 8.49 K/UL (ref 1.82–7.42)
NEUTROPHILS NFR BLD: 73.4 % (ref 44–72)
NITRITE UR QL STRIP.AUTO: NEGATIVE
NRBC # BLD AUTO: 0 K/UL
NRBC BLD-RTO: 0 /100 WBC
PH UR STRIP.AUTO: 6.5 [PH] (ref 5–8)
PLATELET # BLD AUTO: 149 K/UL (ref 164–446)
PMV BLD AUTO: 10.8 FL (ref 9–12.9)
POTASSIUM SERPL-SCNC: 3.8 MMOL/L (ref 3.6–5.5)
PROT SERPL-MCNC: 8 G/DL (ref 6–8.2)
PROT UR QL STRIP: NEGATIVE MG/DL
RBC # BLD AUTO: 4.1 M/UL (ref 4.7–6.1)
RBC UR QL AUTO: NEGATIVE
SODIUM SERPL-SCNC: 136 MMOL/L (ref 135–145)
SP GR UR STRIP.AUTO: 1.01
UROBILINOGEN UR STRIP.AUTO-MCNC: 0.2 MG/DL
WBC # BLD AUTO: 11.6 K/UL (ref 4.8–10.8)

## 2020-02-13 PROCEDURE — 80053 COMPREHEN METABOLIC PANEL: CPT

## 2020-02-13 PROCEDURE — 36415 COLL VENOUS BLD VENIPUNCTURE: CPT

## 2020-02-13 PROCEDURE — 81003 URINALYSIS AUTO W/O SCOPE: CPT

## 2020-02-13 PROCEDURE — 85025 COMPLETE CBC W/AUTO DIFF WBC: CPT

## 2020-02-13 PROCEDURE — 83735 ASSAY OF MAGNESIUM: CPT

## 2020-02-13 PROCEDURE — 82378 CARCINOEMBRYONIC ANTIGEN: CPT

## 2020-02-14 LAB — CEA SERPL-MCNC: 166.3 NG/ML (ref 0–3)

## 2020-02-15 ENCOUNTER — OUTPATIENT INFUSION SERVICES (OUTPATIENT)
Dept: ONCOLOGY | Facility: MEDICAL CENTER | Age: 55
End: 2020-02-15
Attending: INTERNAL MEDICINE
Payer: MEDICAID

## 2020-02-15 VITALS
BODY MASS INDEX: 31.18 KG/M2 | HEART RATE: 80 BPM | RESPIRATION RATE: 18 BRPM | DIASTOLIC BLOOD PRESSURE: 83 MMHG | WEIGHT: 235.23 LBS | HEIGHT: 73 IN | OXYGEN SATURATION: 95 % | TEMPERATURE: 97.7 F | SYSTOLIC BLOOD PRESSURE: 134 MMHG

## 2020-02-15 DIAGNOSIS — C18.9 METASTATIC COLON CANCER TO LIVER (HCC): ICD-10-CM

## 2020-02-15 DIAGNOSIS — C78.7 METASTATIC COLON CANCER TO LIVER (HCC): ICD-10-CM

## 2020-02-15 PROCEDURE — 96368 THER/DIAG CONCURRENT INF: CPT

## 2020-02-15 PROCEDURE — 700105 HCHG RX REV CODE 258: Performed by: INTERNAL MEDICINE

## 2020-02-15 PROCEDURE — 96413 CHEMO IV INFUSION 1 HR: CPT

## 2020-02-15 PROCEDURE — 96417 CHEMO IV INFUS EACH ADDL SEQ: CPT

## 2020-02-15 PROCEDURE — 306780 HCHG STAT FOR TRANSFUSION PER CASE

## 2020-02-15 PROCEDURE — 96375 TX/PRO/DX INJ NEW DRUG ADDON: CPT

## 2020-02-15 PROCEDURE — G0498 CHEMO EXTEND IV INFUS W/PUMP: HCPCS

## 2020-02-15 PROCEDURE — A4212 NON CORING NEEDLE OR STYLET: HCPCS

## 2020-02-15 PROCEDURE — 700111 HCHG RX REV CODE 636 W/ 250 OVERRIDE (IP): Performed by: INTERNAL MEDICINE

## 2020-02-15 PROCEDURE — 96411 CHEMO IV PUSH ADDL DRUG: CPT

## 2020-02-15 PROCEDURE — 96415 CHEMO IV INFUSION ADDL HR: CPT

## 2020-02-15 RX ORDER — SODIUM CHLORIDE 9 MG/ML
INJECTION, SOLUTION INTRAVENOUS CONTINUOUS
Status: DISCONTINUED | OUTPATIENT
Start: 2020-02-15 | End: 2020-02-15 | Stop reason: HOSPADM

## 2020-02-15 RX ORDER — DEXTROSE MONOHYDRATE 50 MG/ML
INJECTION, SOLUTION INTRAVENOUS CONTINUOUS
Status: DISCONTINUED | OUTPATIENT
Start: 2020-02-15 | End: 2020-02-15 | Stop reason: HOSPADM

## 2020-02-15 RX ADMIN — LEUCOVORIN CALCIUM 940 MG: 200 INJECTION, POWDER, LYOPHILIZED, FOR SOLUTION INTRAMUSCULAR; INTRAVENOUS at 15:21

## 2020-02-15 RX ADMIN — DEXAMETHASONE SODIUM PHOSPHATE 12 MG: 4 INJECTION, SOLUTION INTRAMUSCULAR; INTRAVENOUS at 09:32

## 2020-02-15 RX ADMIN — CETUXIMAB 900 MG: 2 SOLUTION INTRAVENOUS at 12:18

## 2020-02-15 RX ADMIN — DIPHENHYDRAMINE HYDROCHLORIDE 50 MG: 50 INJECTION INTRAMUSCULAR; INTRAVENOUS at 10:16

## 2020-02-15 RX ADMIN — DEXTROSE MONOHYDRATE: 50 INJECTION, SOLUTION INTRAVENOUS at 15:20

## 2020-02-15 RX ADMIN — SODIUM CHLORIDE: 9 INJECTION, SOLUTION INTRAVENOUS at 09:32

## 2020-02-15 RX ADMIN — FLUOROURACIL 5075 MG: 50 INJECTION, SOLUTION INTRAVENOUS at 17:45

## 2020-02-15 RX ADMIN — TRASTUZUMAB 600 MG: 150 INJECTION, POWDER, LYOPHILIZED, FOR SOLUTION INTRAVENOUS at 10:38

## 2020-02-15 RX ADMIN — OXALIPLATIN 159.8 MG: 100 INJECTION, SOLUTION, CONCENTRATE INTRAVENOUS at 15:20

## 2020-02-15 RX ADMIN — FLUOROURACIL 845 MG: 50 INJECTION, SOLUTION INTRAVENOUS at 17:38

## 2020-02-15 RX ADMIN — ONDANSETRON HYDROCHLORIDE 16 MG: 2 SOLUTION INTRAMUSCULAR; INTRAVENOUS at 09:49

## 2020-02-15 NOTE — PROGRESS NOTES
Patient arrived ambulatory to the [facility] for C1 Herceptin, Erbitux, FOLFOX. Reviewed vital signs, labs, and physician order. Patient denies S&S of infection, or bleeding. Right chest port accessed with sterile technique, visualized brisk blood return. Treatment plan, medications, dose, frequency, and need for electrolyte replacement protocol orders received by Pharmacist Fernandez from Dr. Ross, treatment plan updated. Pre-treatment medications administered. Herceptin administered over 90 mins, no adverse reaction observed. Erbitux administered over 2 hours with a filter, no adverse reaction observed, 1 hour observed. Oxaliplatin and Leucovorin administered over 2 hours, followed by 5FU push, and connected to 5FU continuous pump over 46 hours, pump settings verified with Nicholas RN, and the patient. Confirmed upcoming appointment date and time with patient. Patient left the [facility] ambulatory in no sign of distress.

## 2020-02-15 NOTE — PROGRESS NOTES
Chemotherapy Verification - SECONDARY RN       Height = 186 cm  Weight = 106.7 kg BSA = 2.347 m2       Medication: Herceptin  Dose: 6 mg/kg  Calculated Dose: 640.2 mg round to vial                            (In mg/m2, AUC, mg/kg)     Medication: Erbitux  Dose: 400 mg/m2  Calculated Dose: 938.8 mg round to vial                             (In mg/m2, AUC, mg/kg)    Medication: Oxaliplatin  Dose: 68 mg/m2 dr 159.59 mgCalculated Dose: 199.49 mg                             (In mg/m2, AUC, mg/kg)    Medication: 5FU  Dose: 360 mg/m2 dr Calculated Dose: 844.9 mg                             (In mg/m2, AUC, mg/kg)    Medication: Home infusion 5 FUDose: 2160 mg/m2  Calculated Dose: 5069.5 mg over 46 hours                             (In mg/m2, AUC, mg/kg)      I confirm that this process was performed independently.

## 2020-02-15 NOTE — PROGRESS NOTES
"Pharmacy Chemotherapy Calculations    Dx: Metastatic colorectal cancer [KRAS/NRAS wild type, ERBB2 (HER2) amplification]    Cycle 1, Day 1  Previous treatment = XELOX +/- Ariadne and 5 cycles of FOLFIRI + Ariadne through 12/21/19    Regimen: mFOLFOX + Erbitux + Herceptin  Trastuzumab 6 mg/kg IV loading dose C1D1, then 4 mg/kg IV Day 1 of subsequent cycles [confirmed \"every 2 weeks\", per MD; progress note: \"He also had an ERBB2 amplification which might suggest response to treatments like Herceptin\"]  Cetuximab 400 mg/m2 IV Cycle 1, Day 1, then 250 mg/m2 IV Days 1 and 8 (confirmed \"weekly\" per MD)  Oxaliplatin 85 mg/m2 IV Day 1 Cody LEO reduced dose by 20% to 68 mg/m2 starting with C1D1 due to anticipated tolerance   concurrent with  Leucovorin 400 mg/m2 IV Day 1 followed by  Fluorouracil 400 mg/m2 IV Day 1 Cody LEO reduced dose by 10% to 360 mg/m2 starting with C1D1 due to anticipated tolerance   followed by  Fluorouracil 2400 mg/m2 CIVI over 46-48 hours Cody LEO reduced dose by 10% to 2160 mg/m2 starting with C1D1 due to anticipated tolerance   14 day cycle until DP or UT  NCCN guidelines for colon cancer V.2.2019  Venook AP et al, OBEY 2017;317(23):8008-3433  Kayleen RINALDI, et al. Lancet Oncol. 2019 Apr;20(4):518-530.    Allergies: Patient has no known allergies.  /83   Pulse 80   Temp 36.5 °C (97.7 °F) (Temporal)   Resp 18   Ht 1.86 m (6' 1.23\")   Wt 106.7 kg (235 lb 3.7 oz)   SpO2 95%   BMI 30.84 kg/m²  Body surface area is 2.35 meters squared.     All labs (2/13/20), within treatment plan parameters.  TBili = 2.2, MD aware, no dose adjustments recommended if < 5    2/7/20 LVEF by Echo ~ 60%    Trastuzumab (Herceptin) 6 mg/kg x 106.7 kg = 640 mg   <10% difference, OK to treat with final dose = 600 mg IV over 90 minutes first dose    Cetuximab (Erbitux) 400 mg/m2 x 2.35 m2 = 940 mg   <10% difference, OK to treat with final dose = 900 mg IV over 2 hours first dose (maximum rate = 10 " mg/min)    Oxaliplatin 68 mg/m2 x 2.35 m2 = 160 mg   <10% difference, OK to treat with final dose = 159.8 mg IV    Leucovorin 400 mg/m2 x 2.35 m2 = 940 mg   <10% difference, OK to treat with final dose = 940 mg IV    Fluorouracil (Adrucil) 360 mg/m2 x 2.35 m2 = 846 mg   <10% difference, OK to treat with final dose = 845 mg IV    Fluorouracil (Adrucil) 2160 mg/m2 x 2.35 m2 = 5076 mg   <10% difference, OK to treat with final dose = 5075 mg CIVI over 46 hours via home infusion pump at 2.2 ml/hr      Abran HernandesD

## 2020-02-15 NOTE — PROGRESS NOTES
"Pharmacy Chemotherapy Verification Note:    Patient Name: Gurmeet Jo      Dx: Metastatic Colorectal CA (KRAS/NRAS wild type, and ERBB2 [a HER2 family receptor] amplification)      Protocol: mFOLFOX+cetuximab+trastuzumab       *Dosing Reference*  Cetuximab 400 mg/m² IV over 2 hours on Day 1 of Week 1, followed by  Cetuximab 250 mg/m² IV over 60 minutes on Day 1 beginning with Week 2  Weekly cycle until disease progression or unacceptable toxicity  ~concurrent with~  OXALIplatin 85 mg/m² IV over 2 hours on Day 1   - dose reduced to 68 mg/m² starting C1 for tolerance  Leucovorin 400 mg/m² IV over 2 hours on Day 1, to run concurrent with OXALIplatin, followed by  Fluorouracil 400 mg/m² IVP on Day 1, followed by   - dose reduced to 360 mg/m² starting C1 for tolerance  Fluorouracil 2400 mg/m² CIVI over 46-48 hours   - dose reduced to 2160 mg/m² starting C1 for tolerance   Trastuzumab 6 mg/kg IV over 90 minutes on Day 1 of Cycle 1, followed by  Trastuzumab 4 mg/kg IV over 30-60 minutes on Day 1 of Cycle 2 - confirmed with Dr. Ross trastuzumab every 2 weeks  14-day cycle until disease progression or unacceptable toxicity    NCCN Guidelines for Colon Cancer. V.2.2019.  Christine AP, et al. OBEY. 2017;317(23):7359-8494.    Herceptin has limited data in colorectal cancer, but two regimens exist neither at the same loading dose or interval as Dr. Ross has chosen. Per [facility] progress note: Patient has undergone further [employer] testing of his tumor. He was found to be KRAS/NRAS wild type, which suggest he has an EGFR mutation and would be a candidate for EGFR targeted therapy. He also had an ERBB2 amplification which might suggest response to treatments like Herceptin.      Allergies:  Patient has no known allergies.     /83   Pulse 80   Temp 36.5 °C (97.7 °F) (Temporal)   Resp 18   Ht 1.86 m (6' 1.23\")   Wt 106.7 kg (235 lb 3.7 oz)   SpO2 95%   BMI 30.84 kg/m²  Body surface area is 2.35 meters squared. "   Labs from 2/13/2020:  ANC~ 8490  Plt = 149 k  SCr = 0.7 mg/dL  CrCl = >125 mL/min  LFT = 97/41/271 TBili = 2.2  Mg = 1.9    2/7/2020 ECHO: LVEF 60%     Drug Order   (Drug name, dose, route, IV Fluid & volume, frequency, number of doses) Cycle: 1      Previous treatment: C5 FOLFIRI+AVASTIN = 12/21/19   Medication = cetuximab (ERBitux)  Base Dose = 400 mg/m²  Calc Dose: Base Dose x 2.35 m² = 940 mg  Final Dose = 900 mg  Route = IV  Fluid & Volume = 450 mL in empty bag undiluted drug  Conc = 2 mg/mL  Admin Duration = Over 2 hours          <10% difference, rounded to vial size per protocol, ok to treat with final written dose   Medication = trastuzumab (herceptin)  Base Dose = 6 mg/kg  Calc Dose: Base Dose x 106.7 kg = 640 mg  Final Dose = 600 mg  Route = IV  Fluid & Volume =  mL  Admin Duration = Over 90 minutes          <10% difference, rounded to vial size per protocol, ok to treat with final written dose   Medication = OXALIplatin (Eloxitan)  Base Dose = 68 mg/m²  Calc Dose:Base Dose x 2.35 m² = 160 mg  Final Dose = 159.8 mg  Route = IV  Fluid & Volume = D5W 250 mL  Admin Duration = Over 2 hours   To run with LCV       <10% difference, ok to treat with final written dose     Medication = Leucovorin (Wellcovorin)  Base Dose = 400 mg/m²  Calc Dose: Base Dose x 2.35 m² = 940 mg  Final Dose = 940 mg  Route = IV  Fluid & Volume = D5W 250 mL  Admin Duration = Over 2 hours   To run with OXALIplatin       <10% difference, ok to treat with final written dose   Medication = Fluorouracil (5-FU)  Base Dose = 360 mg/m²  Calc Dose: Base Dose x 2.35 m² = 846 mg  Final Dose = 845 mg  Route = IVP  Fluid & Volume = 16.9 mL in syringe  Conc = 50 mg/mL  Admin Duration = Over 5-10 minutes          <10% difference, ok to treat with final written dose   Medication = Fluorouracil (5-FU)  Base Dose = 2160 mg/m²  Calc Dose:Base Dose x 2.35 m² = 5076 mg  Final Dose = 5075 mg  Route = CIVI via CADD pump  Fluid & Volume = 101.5 mL  (+3mL overfill = 104.5 mL)  Admin Duration = Over 46-48 hours (to run at 2.2 mL/hour)          <10% difference, ok to treat with final written dose     By my signature below, I confirm this process was performed independently with the BSA and all final chemotherapy dosing calculations congruent. I have reviewed the above chemotherapy order and that my calculation of the final dose and BSA (when applicable) corroborate those calculations of the  pharmacist.     Ernesto Portillo, PharmD, BCPS

## 2020-02-15 NOTE — PROGRESS NOTES
Chemotherapy Verification - PRIMARY RN      Height = 73.23in  Weight = 106.7kg  BSA = 2.34m2       Medication: Herceptin  Dose: 6mg/kg  Calculated Dose: 640.2mg                             (In mg/m2, AUC, mg/kg)     Medication: Erbitux  Dose: 400mg/m2  Calculated Dose: 937.2mg                             (In mg/m2, AUC, mg/kg)    Medication: Oxaliplatin  Dose: 68mg/m2  Calculated Dose: 159.1mg                             (In mg/m2, AUC, mg/kg)    Medication: Fluorouracil  Dose: 360mg/m2  Calculated Dose: 842.4mg                             (In mg/m2, AUC, mg/kg)    Medication: Fluorouracil continuous pump over 46 hours  Dose: 2160mg/m2  Calculated Dose: 5054.4mg                             (In mg/m2, AUC, mg/kg)        I confirm this process was performed independently with the BSA and all final chemotherapy dosing calculations congruent.  Any discrepancies of 10% or greater have been addressed with the chemotherapy pharmacist. The resolution of the discrepancy has been documented in the EPIC progress notes.

## 2020-02-17 ENCOUNTER — OUTPATIENT INFUSION SERVICES (OUTPATIENT)
Dept: ONCOLOGY | Facility: MEDICAL CENTER | Age: 55
End: 2020-02-17
Attending: INTERNAL MEDICINE
Payer: MEDICAID

## 2020-02-17 VITALS
DIASTOLIC BLOOD PRESSURE: 90 MMHG | TEMPERATURE: 98 F | WEIGHT: 241.4 LBS | BODY MASS INDEX: 31.99 KG/M2 | OXYGEN SATURATION: 98 % | RESPIRATION RATE: 18 BRPM | SYSTOLIC BLOOD PRESSURE: 124 MMHG | HEIGHT: 73 IN | HEART RATE: 88 BPM

## 2020-02-17 DIAGNOSIS — C78.7 METASTATIC COLON CANCER TO LIVER (HCC): ICD-10-CM

## 2020-02-17 DIAGNOSIS — C18.9 METASTATIC COLON CANCER TO LIVER (HCC): ICD-10-CM

## 2020-02-17 PROCEDURE — 96523 IRRIG DRUG DELIVERY DEVICE: CPT

## 2020-02-17 PROCEDURE — 700111 HCHG RX REV CODE 636 W/ 250 OVERRIDE (IP)

## 2020-02-17 RX ADMIN — HEPARIN 500 UNITS: 100 SYRINGE at 16:10

## 2020-02-18 NOTE — PROGRESS NOTES
Patient to [facility] for CADD pump disconnect and PORT flush. Patients minimum 5FU amount to infuse is 101.5ml. 101.75ml infused the patient declined to stay for the last 1.5ml. Pump settings reviewed. Pump turned off. Pump was disconnected. SASH performed on PORT and PORT was deaccessed. Patient has future appt. Patient to home in care of self.

## 2020-02-21 ENCOUNTER — HOSPITAL ENCOUNTER (OUTPATIENT)
Dept: LAB | Facility: MEDICAL CENTER | Age: 55
End: 2020-02-21
Attending: INTERNAL MEDICINE
Payer: MEDICAID

## 2020-02-21 LAB
ALBUMIN SERPL BCP-MCNC: 3.8 G/DL (ref 3.2–4.9)
ALBUMIN/GLOB SERPL: 0.9 G/DL
ALP SERPL-CCNC: 237 U/L (ref 30–99)
ALT SERPL-CCNC: 32 U/L (ref 2–50)
ANION GAP SERPL CALC-SCNC: 11 MMOL/L (ref 0–11.9)
APPEARANCE UR: CLEAR
AST SERPL-CCNC: 40 U/L (ref 12–45)
BASOPHILS # BLD AUTO: 0.4 % (ref 0–1.8)
BASOPHILS # BLD: 0.02 K/UL (ref 0–0.12)
BILIRUB SERPL-MCNC: 0.9 MG/DL (ref 0.1–1.5)
BILIRUB UR QL STRIP.AUTO: NEGATIVE
BUN SERPL-MCNC: 11 MG/DL (ref 8–22)
CALCIUM SERPL-MCNC: 9.5 MG/DL (ref 8.5–10.5)
CEA SERPL-MCNC: 1346.3 NG/ML (ref 0–3)
CHLORIDE SERPL-SCNC: 97 MMOL/L (ref 96–112)
CO2 SERPL-SCNC: 25 MMOL/L (ref 20–33)
COLOR UR: YELLOW
CREAT SERPL-MCNC: 0.61 MG/DL (ref 0.5–1.4)
EOSINOPHIL # BLD AUTO: 0.13 K/UL (ref 0–0.51)
EOSINOPHIL NFR BLD: 2.4 % (ref 0–6.9)
ERYTHROCYTE [DISTWIDTH] IN BLOOD BY AUTOMATED COUNT: 50.5 FL (ref 35.9–50)
GLOBULIN SER CALC-MCNC: 4.2 G/DL (ref 1.9–3.5)
GLUCOSE SERPL-MCNC: 157 MG/DL (ref 65–99)
GLUCOSE UR STRIP.AUTO-MCNC: NEGATIVE MG/DL
HCT VFR BLD AUTO: 37.8 % (ref 42–52)
HGB BLD-MCNC: 12 G/DL (ref 14–18)
IMM GRANULOCYTES # BLD AUTO: 0.02 K/UL (ref 0–0.11)
IMM GRANULOCYTES NFR BLD AUTO: 0.4 % (ref 0–0.9)
KETONES UR STRIP.AUTO-MCNC: NEGATIVE MG/DL
LEUKOCYTE ESTERASE UR QL STRIP.AUTO: NEGATIVE
LYMPHOCYTES # BLD AUTO: 0.9 K/UL (ref 1–4.8)
LYMPHOCYTES NFR BLD: 16.4 % (ref 22–41)
MAGNESIUM SERPL-MCNC: 2 MG/DL (ref 1.5–2.5)
MCH RBC QN AUTO: 29.6 PG (ref 27–33)
MCHC RBC AUTO-ENTMCNC: 31.7 G/DL (ref 33.7–35.3)
MCV RBC AUTO: 93.3 FL (ref 81.4–97.8)
MICRO URNS: NORMAL
MONOCYTES # BLD AUTO: 0.43 K/UL (ref 0–0.85)
MONOCYTES NFR BLD AUTO: 7.8 % (ref 0–13.4)
NEUTROPHILS # BLD AUTO: 4 K/UL (ref 1.82–7.42)
NEUTROPHILS NFR BLD: 72.6 % (ref 44–72)
NITRITE UR QL STRIP.AUTO: NEGATIVE
NRBC # BLD AUTO: 0 K/UL
NRBC BLD-RTO: 0 /100 WBC
PH UR STRIP.AUTO: 6.5 [PH] (ref 5–8)
PLATELET # BLD AUTO: 164 K/UL (ref 164–446)
PMV BLD AUTO: 10.9 FL (ref 9–12.9)
POTASSIUM SERPL-SCNC: 3.9 MMOL/L (ref 3.6–5.5)
PROT SERPL-MCNC: 8 G/DL (ref 6–8.2)
PROT UR QL STRIP: NEGATIVE MG/DL
RBC # BLD AUTO: 4.05 M/UL (ref 4.7–6.1)
RBC UR QL AUTO: NEGATIVE
SODIUM SERPL-SCNC: 133 MMOL/L (ref 135–145)
SP GR UR STRIP.AUTO: 1.02
UROBILINOGEN UR STRIP.AUTO-MCNC: 1 MG/DL
WBC # BLD AUTO: 5.5 K/UL (ref 4.8–10.8)

## 2020-02-21 PROCEDURE — 80053 COMPREHEN METABOLIC PANEL: CPT

## 2020-02-21 PROCEDURE — 85025 COMPLETE CBC W/AUTO DIFF WBC: CPT

## 2020-02-21 PROCEDURE — 81003 URINALYSIS AUTO W/O SCOPE: CPT

## 2020-02-21 PROCEDURE — 36415 COLL VENOUS BLD VENIPUNCTURE: CPT

## 2020-02-21 PROCEDURE — 82378 CARCINOEMBRYONIC ANTIGEN: CPT

## 2020-02-21 PROCEDURE — 83735 ASSAY OF MAGNESIUM: CPT

## 2020-02-22 ENCOUNTER — OUTPATIENT INFUSION SERVICES (OUTPATIENT)
Dept: ONCOLOGY | Facility: MEDICAL CENTER | Age: 55
End: 2020-02-22
Attending: INTERNAL MEDICINE
Payer: MEDICAID

## 2020-02-22 VITALS
HEIGHT: 73 IN | BODY MASS INDEX: 30.5 KG/M2 | TEMPERATURE: 97.6 F | WEIGHT: 230.16 LBS | SYSTOLIC BLOOD PRESSURE: 126 MMHG | RESPIRATION RATE: 18 BRPM | HEART RATE: 95 BPM | DIASTOLIC BLOOD PRESSURE: 71 MMHG | OXYGEN SATURATION: 100 %

## 2020-02-22 DIAGNOSIS — C18.9 METASTATIC COLON CANCER TO LIVER (HCC): ICD-10-CM

## 2020-02-22 DIAGNOSIS — C78.7 METASTATIC COLON CANCER TO LIVER (HCC): ICD-10-CM

## 2020-02-22 PROCEDURE — 96375 TX/PRO/DX INJ NEW DRUG ADDON: CPT

## 2020-02-22 PROCEDURE — 96413 CHEMO IV INFUSION 1 HR: CPT

## 2020-02-22 PROCEDURE — A4212 NON CORING NEEDLE OR STYLET: HCPCS

## 2020-02-22 PROCEDURE — 700111 HCHG RX REV CODE 636 W/ 250 OVERRIDE (IP): Performed by: INTERNAL MEDICINE

## 2020-02-22 PROCEDURE — 700105 HCHG RX REV CODE 258: Performed by: INTERNAL MEDICINE

## 2020-02-22 PROCEDURE — 306780 HCHG STAT FOR TRANSFUSION PER CASE

## 2020-02-22 PROCEDURE — 700111 HCHG RX REV CODE 636 W/ 250 OVERRIDE (IP)

## 2020-02-22 RX ORDER — SODIUM CHLORIDE 9 MG/ML
INJECTION, SOLUTION INTRAVENOUS CONTINUOUS
Status: DISCONTINUED | OUTPATIENT
Start: 2020-02-22 | End: 2020-02-22 | Stop reason: HOSPADM

## 2020-02-22 RX ADMIN — SODIUM CHLORIDE: 9 INJECTION, SOLUTION INTRAVENOUS at 15:06

## 2020-02-22 RX ADMIN — CETUXIMAB 600 MG: 2 SOLUTION INTRAVENOUS at 15:47

## 2020-02-22 RX ADMIN — DIPHENHYDRAMINE HYDROCHLORIDE 50 MG: 50 INJECTION INTRAMUSCULAR; INTRAVENOUS at 15:07

## 2020-02-22 RX ADMIN — HEPARIN 500 UNITS: 100 SYRINGE at 17:54

## 2020-02-22 NOTE — PROGRESS NOTES
"Pharmacy Chemotherapy Calculations    Dx: Metastatic colorectal cancer [KRAS/NRAS wild type, ERBB2 (HER2) amplification]    Cycle 1, Day 8  Previous treatment = C1D1 on 2/15/20    Regimen: mFOLFOX + Erbitux + Herceptin  *Dosing Reference*  Trastuzumab 6 mg/kg IV loading dose C1D1, then 4 mg/kg IV Day 1 of subsequent cycles [confirmed \"every 2 weeks\", per MD; progress note: \"He also had an ERBB2 amplification which might suggest response to treatments like Herceptin\"]  Cetuximab 400 mg/m2 IV Cycle 1, Day 1, then 250 mg/m2 IV Days 1 and 8 (confirmed \"weekly\" per MD)  Oxaliplatin  IV Day 1    -- Cody LEO reduced dose by 20% to 68 mg/m2 starting with C1D1 due to anticipated tolerance   concurrent with  Leucovorin 400 mg/m2 IV Day 1 followed by  Fluorouracil  IV Day 1    -- Cody LEO reduced dose by 10% to 360 mg/m2 starting with C1D1 due to anticipated tolerance   followed by  Fluorouracil  CIVI over 46-48 hours    -- Cody LEO reduced dose by 10% to 2160 mg/m2 starting with C1D1 due to anticipated tolerance   14 day cycle until DP or UT  NCCN guidelines for colon cancer V.2.2019  Venook AP et al, OBEY 2017;317(23):7298-1701  Clark-Jeffrey F, et al. Lancet Oncol. 2019 Apr;20(4):518-530.    Allergies: Patient has no known allergies.  /71   Pulse 95   Temp 36.4 °C (97.6 °F) (Temporal)   Resp 18   Ht 1.865 m (6' 1.43\")   Wt 104.4 kg (230 lb 2.6 oz)   SpO2 100%   BMI 30.02 kg/m²  Body surface area is 2.33 meters squared.     2/7/20 LVEF by Echo ~ 60%    No hold parameters for D8.    Cetuximab (Erbitux) 250 mg/m2 x 2.33 m2 = 583 mg   Rounded to vial size (within 10%) per dose rounding protocol = 600 mg IV      Aura Guzman, PharmD  "

## 2020-02-22 NOTE — PROGRESS NOTES
Chemotherapy Verification - SECONDARY RN       Height = 186.5 cm  Weight = 104.4 kg  BSA = 2.32 m2       Medication: Erbitux  Dose: 250 mg/m2  Calculated Dose: 581.4 mg round to vial                            (In mg/m2, AUC, mg/kg)       I confirm that this process was performed independently.

## 2020-02-22 NOTE — PROGRESS NOTES
"Pharmacy Chemotherapy Verification Note:    Patient Name: Gurmeet Jo      Dx: Metastatic Colorectal CA (KRAS/NRAS wild type, and ERBB2 [a HER2 family receptor] amplification)      Protocol: mFOLFOX+cetuximab+trastuzumab       *Dosing Reference*  Cetuximab 400 mg/m² IV over 2 hours on Day 1 of Week 1, followed by  Cetuximab 250 mg/m² IV over 60 minutes on Day 1 beginning with Week 2  Weekly cycle until disease progression or unacceptable toxicity  ~concurrent with~  OXALIplatin 85 mg/m² IV over 2 hours on Day 1   - dose reduced to 68 mg/m² starting C1 for tolerance  Leucovorin 400 mg/m² IV over 2 hours on Day 1, to run concurrent with OXALIplatin, followed by  Fluorouracil 400 mg/m² IVP on Day 1, followed by   - dose reduced to 360 mg/m² starting C1 for tolerance  Fluorouracil 2400 mg/m² CIVI over 46-48 hours   - dose reduced to 2160 mg/m² starting C1 for tolerance   Trastuzumab 6 mg/kg IV over 90 minutes on Day 1 of Cycle 1, followed by  Trastuzumab 4 mg/kg IV over 30-60 minutes on Day 1 of Cycle 2 - confirmed with Dr. Ross trastuzumab every 2 weeks  14-day cycle until disease progression or unacceptable toxicity    NCCN Guidelines for Colon Cancer. V.2.2019.  Christine AP, et al. OBEY. 2017;317(23):7988-5237.    Herceptin has limited data in colorectal cancer, but two regimens exist neither at the same loading dose or interval as Dr. Ross has chosen. Per [facility] progress note: Patient has undergone further [employer] testing of his tumor. He was found to be KRAS/NRAS wild type, which suggest he has an EGFR mutation and would be a candidate for EGFR targeted therapy. He also had an ERBB2 amplification which might suggest response to treatments like Herceptin.      Allergies:  Patient has no known allergies.     /71   Pulse 95   Temp 36.4 °C (97.6 °F) (Temporal)   Resp 18   Ht 1.865 m (6' 1.43\")   Wt 104.4 kg (230 lb 2.6 oz)   SpO2 100%   BMI 30.02 kg/m²  Body surface area is 2.33 meters squared. "     Labs from 2/21/20:  ANC~ 4000 Plt = 164k   Hgb = 12     SCr = 0.61 mg/dL CrCl > 125 mL/min   AST/ALT/ALK Phos= 40/32/237 TBili = 0.9   Mag = 2    2/7/2020 ECHO: LVEF 60%     Drug Order   (Drug name, dose, route, IV Fluid & volume, frequency, number of doses) Cycle 1 Day 8    Previous treatment: C1D1 2/15/20   Medication = cetuximab (ERBitux)  Base Dose = 250 mg/m²  Calc Dose: Base Dose x 2.33 m² = 582.5 mg  Final Dose = 600 mg  Route = IV  Fluid & Volume = 300 mL in empty bag undiluted drug  Conc = 2 mg/mL  Admin Duration = Over 1 hour           Rounded to nearest vial size (<10% difference) per protocol, okay to treat with final dose       By my signature below, I confirm this process was performed independently with the BSA and all final chemotherapy dosing calculations congruent. I have reviewed the above chemotherapy order and that my calculation of the final dose and BSA (when applicable) corroborate those calculations of the  pharmacist.     José Marvin, PharmD

## 2020-02-22 NOTE — PROGRESS NOTES
Here for Erbitux today. Denies any complaints. Port accessed using sterile technique. Brisk blood return obtained. Labs drawn previously and are WNL. Chart to pharmacy. Awaiting medications. Continue to monitor.

## 2020-02-22 NOTE — PROGRESS NOTES
Chemotherapy Verification - PRIMARY RN      Height = 186.5cm  Weight = 104.4kg  BSA = 2.33m2       Medication: Cetiximab/Erbitux  Dose: 250mg/m2  Calculated Dose: 528.5mg                             (In mg/m2, AUC, mg/kg)         I confirm this process was performed independently with the BSA and all final chemotherapy dosing calculations congruent.  Any discrepancies of 10% or greater have been addressed with the chemotherapy pharmacist. The resolution of the discrepancy has been documented in the EPIC progress notes.

## 2020-02-23 NOTE — PROGRESS NOTES
No reactions during monitoring period. Port flushed and heparin locked per protocol. Site de-accessed and covered with gauze and tape. Discharged home to self care in no distress at this time. Next appointment in place.

## 2020-02-27 RX ORDER — 0.9 % SODIUM CHLORIDE 0.9 %
VIAL (ML) INJECTION PRN
Status: CANCELLED | OUTPATIENT
Start: 2020-02-27

## 2020-02-27 RX ORDER — 0.9 % SODIUM CHLORIDE 0.9 %
10 VIAL (ML) INJECTION PRN
Status: CANCELLED | OUTPATIENT
Start: 2020-02-27

## 2020-02-27 RX ORDER — 0.9 % SODIUM CHLORIDE 0.9 %
3 VIAL (ML) INJECTION PRN
Status: CANCELLED | OUTPATIENT
Start: 2020-02-27

## 2020-02-27 RX ORDER — SODIUM CHLORIDE 9 MG/ML
INJECTION, SOLUTION INTRAVENOUS CONTINUOUS
Status: CANCELLED
Start: 2020-02-29

## 2020-02-27 RX ORDER — ONDANSETRON 8 MG/1
8 TABLET, ORALLY DISINTEGRATING ORAL PRN
Status: CANCELLED | OUTPATIENT
Start: 2020-02-29

## 2020-02-27 RX ORDER — EPINEPHRINE 1 MG/ML(1)
0.5 AMPUL (ML) INJECTION PRN
Status: CANCELLED | OUTPATIENT
Start: 2020-02-29

## 2020-02-27 RX ORDER — PROCHLORPERAZINE MALEATE 10 MG
10 TABLET ORAL EVERY 6 HOURS PRN
Status: CANCELLED | OUTPATIENT
Start: 2020-03-07

## 2020-02-27 RX ORDER — 0.9 % SODIUM CHLORIDE 0.9 %
VIAL (ML) INJECTION PRN
Status: CANCELLED | OUTPATIENT
Start: 2020-02-29

## 2020-02-27 RX ORDER — 0.9 % SODIUM CHLORIDE 0.9 %
10 VIAL (ML) INJECTION PRN
Status: CANCELLED | OUTPATIENT
Start: 2020-03-07

## 2020-02-27 RX ORDER — PROCHLORPERAZINE MALEATE 10 MG
10 TABLET ORAL EVERY 6 HOURS PRN
Status: CANCELLED | OUTPATIENT
Start: 2020-02-29

## 2020-02-27 RX ORDER — SODIUM CHLORIDE 9 MG/ML
INJECTION, SOLUTION INTRAVENOUS CONTINUOUS
Status: CANCELLED | OUTPATIENT
Start: 2020-03-07

## 2020-02-27 RX ORDER — 0.9 % SODIUM CHLORIDE 0.9 %
3 VIAL (ML) INJECTION PRN
Status: CANCELLED | OUTPATIENT
Start: 2020-03-07

## 2020-02-27 RX ORDER — ONDANSETRON 2 MG/ML
4 INJECTION INTRAMUSCULAR; INTRAVENOUS PRN
Status: CANCELLED | OUTPATIENT
Start: 2020-02-29

## 2020-02-27 RX ORDER — METHYLPREDNISOLONE SODIUM SUCCINATE 125 MG/2ML
125 INJECTION, POWDER, LYOPHILIZED, FOR SOLUTION INTRAMUSCULAR; INTRAVENOUS PRN
Status: CANCELLED | OUTPATIENT
Start: 2020-02-29

## 2020-02-27 RX ORDER — 0.9 % SODIUM CHLORIDE 0.9 %
10 VIAL (ML) INJECTION PRN
Status: CANCELLED | OUTPATIENT
Start: 2020-02-29

## 2020-02-27 RX ORDER — 0.9 % SODIUM CHLORIDE 0.9 %
VIAL (ML) INJECTION PRN
Status: CANCELLED | OUTPATIENT
Start: 2020-03-07

## 2020-02-27 RX ORDER — ONDANSETRON 8 MG/1
8 TABLET, ORALLY DISINTEGRATING ORAL PRN
Status: CANCELLED | OUTPATIENT
Start: 2020-03-07

## 2020-02-27 RX ORDER — DIPHENHYDRAMINE HYDROCHLORIDE 50 MG/ML
50 INJECTION INTRAMUSCULAR; INTRAVENOUS PRN
Status: CANCELLED | OUTPATIENT
Start: 2020-02-29

## 2020-02-27 RX ORDER — DEXTROSE MONOHYDRATE 50 MG/ML
INJECTION, SOLUTION INTRAVENOUS CONTINUOUS
Status: CANCELLED | OUTPATIENT
Start: 2020-02-29

## 2020-02-27 RX ORDER — ONDANSETRON 2 MG/ML
4 INJECTION INTRAMUSCULAR; INTRAVENOUS PRN
Status: CANCELLED | OUTPATIENT
Start: 2020-03-07

## 2020-02-27 RX ORDER — 0.9 % SODIUM CHLORIDE 0.9 %
3 VIAL (ML) INJECTION PRN
Status: CANCELLED | OUTPATIENT
Start: 2020-02-29

## 2020-02-27 RX ORDER — 0.9 % SODIUM CHLORIDE 0.9 %
20 VIAL (ML) INJECTION PRN
Status: CANCELLED | OUTPATIENT
Start: 2020-03-02

## 2020-02-28 ENCOUNTER — HOSPITAL ENCOUNTER (OUTPATIENT)
Dept: LAB | Facility: MEDICAL CENTER | Age: 55
End: 2020-02-28
Attending: INTERNAL MEDICINE
Payer: MEDICAID

## 2020-02-28 LAB
ALBUMIN SERPL BCP-MCNC: 3.9 G/DL (ref 3.2–4.9)
ALBUMIN/GLOB SERPL: 1 G/DL
ALP SERPL-CCNC: 234 U/L (ref 30–99)
ALT SERPL-CCNC: 32 U/L (ref 2–50)
ANION GAP SERPL CALC-SCNC: 9 MMOL/L (ref 0–11.9)
APPEARANCE UR: CLEAR
AST SERPL-CCNC: 40 U/L (ref 12–45)
BASOPHILS # BLD AUTO: 1.2 % (ref 0–1.8)
BASOPHILS # BLD: 0.06 K/UL (ref 0–0.12)
BILIRUB SERPL-MCNC: 0.7 MG/DL (ref 0.1–1.5)
BILIRUB UR QL STRIP.AUTO: NEGATIVE
BUN SERPL-MCNC: 12 MG/DL (ref 8–22)
CALCIUM SERPL-MCNC: 9.6 MG/DL (ref 8.5–10.5)
CHLORIDE SERPL-SCNC: 101 MMOL/L (ref 96–112)
CO2 SERPL-SCNC: 25 MMOL/L (ref 20–33)
COLOR UR: YELLOW
CREAT SERPL-MCNC: 0.62 MG/DL (ref 0.5–1.4)
EOSINOPHIL # BLD AUTO: 0.14 K/UL (ref 0–0.51)
EOSINOPHIL NFR BLD: 2.8 % (ref 0–6.9)
ERYTHROCYTE [DISTWIDTH] IN BLOOD BY AUTOMATED COUNT: 53.8 FL (ref 35.9–50)
GLOBULIN SER CALC-MCNC: 3.8 G/DL (ref 1.9–3.5)
GLUCOSE SERPL-MCNC: 202 MG/DL (ref 65–99)
GLUCOSE UR STRIP.AUTO-MCNC: NEGATIVE MG/DL
HCT VFR BLD AUTO: 40.4 % (ref 42–52)
HGB BLD-MCNC: 12.5 G/DL (ref 14–18)
IMM GRANULOCYTES # BLD AUTO: 0.02 K/UL (ref 0–0.11)
IMM GRANULOCYTES NFR BLD AUTO: 0.4 % (ref 0–0.9)
KETONES UR STRIP.AUTO-MCNC: NEGATIVE MG/DL
LEUKOCYTE ESTERASE UR QL STRIP.AUTO: NEGATIVE
LYMPHOCYTES # BLD AUTO: 0.9 K/UL (ref 1–4.8)
LYMPHOCYTES NFR BLD: 17.9 % (ref 22–41)
MAGNESIUM SERPL-MCNC: 1.8 MG/DL (ref 1.5–2.5)
MCH RBC QN AUTO: 29.2 PG (ref 27–33)
MCHC RBC AUTO-ENTMCNC: 30.9 G/DL (ref 33.7–35.3)
MCV RBC AUTO: 94.4 FL (ref 81.4–97.8)
MICRO URNS: NORMAL
MONOCYTES # BLD AUTO: 0.5 K/UL (ref 0–0.85)
MONOCYTES NFR BLD AUTO: 9.9 % (ref 0–13.4)
NEUTROPHILS # BLD AUTO: 3.42 K/UL (ref 1.82–7.42)
NEUTROPHILS NFR BLD: 67.8 % (ref 44–72)
NITRITE UR QL STRIP.AUTO: NEGATIVE
NRBC # BLD AUTO: 0 K/UL
NRBC BLD-RTO: 0 /100 WBC
PH UR STRIP.AUTO: 6 [PH] (ref 5–8)
PLATELET # BLD AUTO: 117 K/UL (ref 164–446)
PMV BLD AUTO: 11 FL (ref 9–12.9)
POTASSIUM SERPL-SCNC: 3.6 MMOL/L (ref 3.6–5.5)
PROT SERPL-MCNC: 7.7 G/DL (ref 6–8.2)
PROT UR QL STRIP: NEGATIVE MG/DL
RBC # BLD AUTO: 4.28 M/UL (ref 4.7–6.1)
RBC UR QL AUTO: NEGATIVE
SODIUM SERPL-SCNC: 135 MMOL/L (ref 135–145)
SP GR UR STRIP.AUTO: 1.02
UROBILINOGEN UR STRIP.AUTO-MCNC: 0.2 MG/DL
WBC # BLD AUTO: 5 K/UL (ref 4.8–10.8)

## 2020-02-28 PROCEDURE — 81003 URINALYSIS AUTO W/O SCOPE: CPT

## 2020-02-28 PROCEDURE — 82378 CARCINOEMBRYONIC ANTIGEN: CPT

## 2020-02-28 PROCEDURE — 36415 COLL VENOUS BLD VENIPUNCTURE: CPT

## 2020-02-28 PROCEDURE — 85025 COMPLETE CBC W/AUTO DIFF WBC: CPT

## 2020-02-28 PROCEDURE — 83735 ASSAY OF MAGNESIUM: CPT

## 2020-02-28 PROCEDURE — 80053 COMPREHEN METABOLIC PANEL: CPT

## 2020-02-29 ENCOUNTER — OUTPATIENT INFUSION SERVICES (OUTPATIENT)
Dept: ONCOLOGY | Facility: MEDICAL CENTER | Age: 55
End: 2020-02-29
Attending: INTERNAL MEDICINE
Payer: MEDICAID

## 2020-02-29 VITALS
HEIGHT: 73 IN | OXYGEN SATURATION: 94 % | BODY MASS INDEX: 30.91 KG/M2 | HEART RATE: 108 BPM | WEIGHT: 233.25 LBS | RESPIRATION RATE: 18 BRPM | DIASTOLIC BLOOD PRESSURE: 84 MMHG | SYSTOLIC BLOOD PRESSURE: 140 MMHG | TEMPERATURE: 97 F

## 2020-02-29 DIAGNOSIS — C18.9 METASTATIC COLON CANCER TO LIVER (HCC): ICD-10-CM

## 2020-02-29 DIAGNOSIS — C78.7 METASTATIC COLON CANCER TO LIVER (HCC): ICD-10-CM

## 2020-02-29 LAB — CEA SERPL-MCNC: 1189 NG/ML (ref 0–3)

## 2020-02-29 PROCEDURE — 700111 HCHG RX REV CODE 636 W/ 250 OVERRIDE (IP): Performed by: INTERNAL MEDICINE

## 2020-02-29 PROCEDURE — 96411 CHEMO IV PUSH ADDL DRUG: CPT

## 2020-02-29 PROCEDURE — 96376 TX/PRO/DX INJ SAME DRUG ADON: CPT

## 2020-02-29 PROCEDURE — 96368 THER/DIAG CONCURRENT INF: CPT

## 2020-02-29 PROCEDURE — 96417 CHEMO IV INFUS EACH ADDL SEQ: CPT

## 2020-02-29 PROCEDURE — G0498 CHEMO EXTEND IV INFUS W/PUMP: HCPCS

## 2020-02-29 PROCEDURE — 96375 TX/PRO/DX INJ NEW DRUG ADDON: CPT

## 2020-02-29 PROCEDURE — 96413 CHEMO IV INFUSION 1 HR: CPT

## 2020-02-29 PROCEDURE — 306780 HCHG STAT FOR TRANSFUSION PER CASE

## 2020-02-29 PROCEDURE — A4212 NON CORING NEEDLE OR STYLET: HCPCS

## 2020-02-29 PROCEDURE — 700105 HCHG RX REV CODE 258: Performed by: INTERNAL MEDICINE

## 2020-02-29 RX ORDER — ONDANSETRON 2 MG/ML
4 INJECTION INTRAMUSCULAR; INTRAVENOUS PRN
Status: COMPLETED | OUTPATIENT
Start: 2020-02-29 | End: 2020-02-29

## 2020-02-29 RX ORDER — SODIUM CHLORIDE 9 MG/ML
INJECTION, SOLUTION INTRAVENOUS CONTINUOUS
Status: DISCONTINUED | OUTPATIENT
Start: 2020-02-29 | End: 2020-02-29 | Stop reason: HOSPADM

## 2020-02-29 RX ORDER — DIPHENHYDRAMINE HCL 25 MG
25 TABLET ORAL
COMMUNITY
End: 2020-04-03

## 2020-02-29 RX ORDER — METHYLPREDNISOLONE SODIUM SUCCINATE 125 MG/2ML
125 INJECTION, POWDER, LYOPHILIZED, FOR SOLUTION INTRAMUSCULAR; INTRAVENOUS PRN
Status: COMPLETED | OUTPATIENT
Start: 2020-02-29 | End: 2020-02-29

## 2020-02-29 RX ORDER — DIPHENHYDRAMINE HYDROCHLORIDE 50 MG/ML
50 INJECTION INTRAMUSCULAR; INTRAVENOUS PRN
Status: COMPLETED | OUTPATIENT
Start: 2020-02-29 | End: 2020-02-29

## 2020-02-29 RX ORDER — DEXTROSE MONOHYDRATE 50 MG/ML
INJECTION, SOLUTION INTRAVENOUS CONTINUOUS
Status: DISCONTINUED | OUTPATIENT
Start: 2020-02-29 | End: 2020-02-29 | Stop reason: HOSPADM

## 2020-02-29 RX ORDER — DIAPER,BRIEF,INFANT-TODD,DISP
EACH MISCELLANEOUS DAILY
COMMUNITY
End: 2020-08-22

## 2020-02-29 RX ADMIN — SODIUM CHLORIDE: 9 INJECTION, SOLUTION INTRAVENOUS at 09:55

## 2020-02-29 RX ADMIN — DEXTROSE MONOHYDRATE: 50 INJECTION, SOLUTION INTRAVENOUS at 14:30

## 2020-02-29 RX ADMIN — ONDANSETRON 4 MG: 2 INJECTION INTRAMUSCULAR; INTRAVENOUS at 15:47

## 2020-02-29 RX ADMIN — TRASTUZUMAB 450 MG: 150 INJECTION, POWDER, LYOPHILIZED, FOR SOLUTION INTRAVENOUS at 10:59

## 2020-02-29 RX ADMIN — DIPHENHYDRAMINE HYDROCHLORIDE 50 MG: 50 INJECTION INTRAMUSCULAR; INTRAVENOUS at 16:10

## 2020-02-29 RX ADMIN — DIPHENHYDRAMINE HYDROCHLORIDE 50 MG: 50 INJECTION INTRAMUSCULAR; INTRAVENOUS at 10:38

## 2020-02-29 RX ADMIN — ONDANSETRON HYDROCHLORIDE 16 MG: 2 SOLUTION INTRAMUSCULAR; INTRAVENOUS at 10:12

## 2020-02-29 RX ADMIN — FLUOROURACIL 5055 MG: 50 INJECTION, SOLUTION INTRAVENOUS at 17:17

## 2020-02-29 RX ADMIN — METHYLPREDNISOLONE SODIUM SUCCINATE 125 MG: 125 INJECTION, POWDER, FOR SOLUTION INTRAMUSCULAR; INTRAVENOUS at 16:47

## 2020-02-29 RX ADMIN — CETUXIMAB 600 MG: 2 SOLUTION INTRAVENOUS at 12:18

## 2020-02-29 RX ADMIN — FLUOROURACIL 840 MG: 50 INJECTION, SOLUTION INTRAVENOUS at 17:12

## 2020-02-29 RX ADMIN — OXALIPLATIN 159.12 MG: 100 INJECTION, SOLUTION, CONCENTRATE INTRAVENOUS at 14:39

## 2020-02-29 RX ADMIN — DEXAMETHASONE SODIUM PHOSPHATE 12 MG: 4 INJECTION, SOLUTION INTRAMUSCULAR; INTRAVENOUS at 09:55

## 2020-02-29 RX ADMIN — LEUCOVORIN CALCIUM 936 MG: 350 INJECTION, POWDER, LYOPHILIZED, FOR SUSPENSION INTRAMUSCULAR; INTRAVENOUS at 14:39

## 2020-02-29 ASSESSMENT — FIBROSIS 4 INDEX: FIB4 SCORE: 3.32

## 2020-02-29 NOTE — PROGRESS NOTES
"Pharmacy Chemotherapy Verification Note:    Patient Name: Gurmeet Jo      Dx: Metastatic Colorectal CA (KRAS/NRAS wild type, and ERBB2 [a HER2 family receptor] amplification)      Protocol: mFOLFOX+cetuximab+trastuzumab     *Dosing Reference*  Cetuximab 400 mg/m² IV over 2 hours on Day 1 of Week 1, followed by  Cetuximab 250 mg/m² IV over 60 minutes on Day 1 beginning with Week 2  Weekly cycle until disease progression or unacceptable toxicity  ~concurrent with~  OXALIplatin IV over 2 hours on Day 1   - dose reduced to 68 mg/m² starting C1 for tolerance  Leucovorin 400 mg/m² IV over 2 hours on Day 1, to run concurrent with OXALIplatin, followed by  Fluorouracil on Day 1, followed by   - dose reduced to 360 mg/m² starting C1 for tolerance  Fluorouracil CIVI over 46-48 hours   - dose reduced to 2160 mg/m² starting C1 for tolerance   Trastuzumab 6 mg/kg IV over 90 minutes on Day 1 of Cycle 1, followed by  Trastuzumab 4 mg/kg IV over 30-60 minutes on Day 1 of Cycle 2    - confirmed with Dr. Ross trastuzumab every 2 weeks  14-day cycle until disease progression or unacceptable toxicity    NCCN Guidelines for Colon Cancer. V.2.2019.  Christine AP, et al. OBEY. 2017;317(23):6688-1666.    Herceptin has limited data in colorectal cancer, but two regimens exist neither at the same loading dose or interval as Dr. Ross has chosen. Per [facility] progress note: Patient has undergone further [employer] testing of his tumor. He was found to be KRAS/NRAS wild type, which suggest he has an EGFR mutation and would be a candidate for EGFR targeted therapy. He also had an ERBB2 amplification which might suggest response to treatments like Herceptin.    Allergies:  Patient has no known allergies.     /74   Pulse 79   Temp 36.1 °C (97 °F) (Temporal)   Resp 18   Ht 1.865 m (6' 1.43\")   Wt 105.8 kg (233 lb 4 oz)   SpO2 96%   BMI 30.42 kg/m²  Body surface area is 2.34 meters squared.     Labs from 2/28/20  ANC~ 3420 Plt = " 117k   Hgb = 12.5     SCr = 0.62 mg/dL CrCl > 125 mL/min   AST/ALT/ALK Phos = 40/32/234 TBili = 0.7   Mg = 1.8    2/7/2020 ECHO: LVEF 60%    Addendum 2/29/2020 at 1643:  -Patient started to experience adverse reaction to infusions (rash, N/V, O2 sat 88%, tachycardia). Patient received ~50% of dose of the oxaliplatin and leucovorin. Per Dr. Ojeda stop oxaliplatin and leucovorin. Administer 5-FU bolus and CIVI.      Drug Order   (Drug name, dose, route, IV Fluid & volume, frequency, number of doses) Cycle 2 Day 1      Previous treatment: C1D8 2/22/20   Medication = cetuximab (ERBitux)  Base Dose = 250 mg/m²  Calc Dose: Base Dose x 2.34  m² = 585 mg  Final Dose = 600 mg  Route = IV  Fluid & Volume = 300 mL in empty bag undiluted drug  Conc = 2 mg/mL  Admin Duration = Over 60 minutes          <10% difference, rounded to vial size per protocol, ok to treat with final written dose   Medication = trastuzumab (herceptin)  Base Dose = 4 mg/kg  Calc Dose: Base Dose x 105.8 kg = 423.2 mg  Final Dose = 450 mg  Route = IV  Fluid & Volume =  mL  Admin Duration = Over 30 minutes          <10% difference, rounded to vial size per protocol, ok to treat with final written dose   Medication = OXALIplatin (Eloxitan)  Base Dose = 68 mg/m²  Calc Dose:Base Dose x 2.34 m² = 159.12 mg  Final Dose = 159.12 mg**  Route = IV  Fluid & Volume = D5W 250 mL  Admin Duration = Over 2 hours   To run with Leucovorin      <10% difference, ok to treat with final written dose     Medication = Leucovorin (Wellcovorin)  Base Dose = 400 mg/m²  Calc Dose: Base Dose x 2.34 m² = 936 mg  Final Dose = 936 mg**  Route = IV  Fluid & Volume = D5W 250 mL  Admin Duration = Over 2 hours   To run with OXALIplatin       <10% difference, ok to treat with final written dose   Medication = Fluorouracil (5-FU)  Base Dose = 360 mg/m²  Calc Dose: Base Dose x 2.34 m² = 842.4 mg  Final Dose = 840 mg  Route = IVP  Fluid & Volume = 16.8 mL in syringe  Conc = 50  mg/mL  Admin Duration = Over 5-10 minutes          <10% difference, ok to treat with final written dose   Medication = Fluorouracil (5-FU)  Base Dose = 2160 mg/m²  Calc Dose:Base Dose x 2.34 m² = 5054.4 mg  Final Dose = 5055 mg  Route = CIVI via CADD pump  Fluid & Volume = 101.1 mL (+3mL overfill = 104.1 mL)  Admin Duration = Over 46 hours to run at 2.2 mL/hour          <10% difference, ok to treat with final written dose     By my signature below, I confirm this process was performed independently with the BSA and all final chemotherapy dosing calculations congruent. I have reviewed the above chemotherapy order and that my calculation of the final dose and BSA (when applicable) corroborate those calculations of the  pharmacist.     José Marvin, PharmD

## 2020-02-29 NOTE — PROGRESS NOTES
"Pharmacy Chemotherapy Calculations    Dx: Metastatic colorectal cancer [KRAS/NRAS wild type, ERBB2 (HER2) amplification]    Cycle 2, Day 1  Previous treatment = C1D8 on 2/22/20; s/p XELOX +/- Ariadne and 5 cycles of FOLFIRI + Ariadne through 12/21/19    Regimen: mFOLFOX + Erbitux + Herceptin  Trastuzumab 6 mg/kg IV loading dose C1D1, then 4 mg/kg IV Day 1 of subsequent cycles [confirmed \"every 2 weeks\", per MD; progress note: \"He also had an ERBB2 amplification which might suggest response to treatments like Herceptin\"]  Cetuximab 400 mg/m2 IV Cycle 1, Day 1, then 250 mg/m2 IV Days 1 and 8 (confirmed \"weekly\" per MD)  Oxaliplatin 85 mg/m2 IV Day 1    Cody LEO reduced dose by 20% to 68 mg/m2 starting with C1D1 due to anticipated tolerance   concurrent with  Leucovorin 400 mg/m2 IV Day 1 followed by  Fluorouracil 400 mg/m2 IV Day 1    Cody LEO reduced dose by 10% to 360 mg/m2 starting with C1D1 due to anticipated tolerance   followed by  Fluorouracil 2400 mg/m2 CIVI over 46-48 hours    Cody LEO reduced dose by 10% to 2160 mg/m2 starting with C1D1 due to anticipated tolerance   14 day cycle until DP or UT  NCCN guidelines for colon cancer V.2.2019  Venook AP et al, OBEY 2017;317(23):3756-3554  Kayleen RINALDI, et al. Lancet Oncol. 2019 Apr;20(4):518-530.    Allergies: Patient has no known allergies.  /74   Pulse 79   Temp 36.1 °C (97 °F) (Temporal)   Resp 18   Ht 1.865 m (6' 1.43\")   Wt 105.8 kg (233 lb 4 oz)   SpO2 96%   BMI 30.42 kg/m²  Body surface area is 2.34 meters squared.     Labs 2/28/20  ANC~ 3420 Plt = 117k   Hgb = 12.5     SCr = 0.62 mg/dL CrCl ~ >125 mL/min (minimum SCr of 0.7)   LFT's = 40/32/234 TBili = 0.7 Mg = 1.8     2/7/20 LVEF by Echo ~ 60%    Trastuzumab (Herceptin) 4 mg/kg x 105.8 kg = 423.2 mg   <10% difference, OK to treat with final dose = 450 mg IV over 30 minutes first dose    Cetuximab (Erbitux) 250 mg/m2 x 2.34 m2 = 585 mg   <10% difference, OK to treat with final dose = 600 mg IV " over 1 hour    Oxaliplatin 68 mg/m2 x 2.34 m2 = 159.12 mg   <10% difference, OK to treat with final dose = 159.12 mg IV  Addendum 2/29/2020 at 1643 - Patient started experiencing adverse reaction to infusion (rash on neck/body, severe nausea, decreased O2 saturation to 88%, tachycardia). Pt received approximately 50% of oxaliplatin and leucovorin infusion. Per Dr. Ojeda, stop oxaliplatin and leucovorin, administer 5-FU bolus and 5-FU CIVI.    Leucovorin 400 mg/m2 x 2.34 m2 = 936 mg   <10% difference, OK to treat with final dose = 936 mg IV  Addendum 2/29/2020 at 1643 - Patient started experiencing adverse reaction to infusion (rash on neck/body, severe nausea, decreased O2 saturation to 88%, tachycardia). Pt received approximately 50% of oxaliplatin and leucovorin infusion. Per Dr. Ojeda, stop oxaliplatin and leucovorin, administer 5-FU bolus and 5-FU CIVI.    Fluorouracil (Adrucil) 360 mg/m2 x 2.34 m2 = 842.4 mg   <10% difference, OK to treat with final dose = 840 mg IV    Fluorouracil (Adrucil) 2160 mg/m2 x 2.34 m2 = 5054.4 mg   <10% difference, OK to treat with final dose = 5055 mg CIVI over 46 hours via home infusion pump at 2.2 ml/hr    Ruben Santamaria, PharmD

## 2020-02-29 NOTE — PROGRESS NOTES
Chemotherapy Verification - PRIMARY RN      Height = 186.5 cm  Weight = 105.8 kg  BSA = 2.34 m2       Medication: Trastuzumab  Dose: 4mg/kg  Calculated Dose: 423.8 mg                             Medication: Cetuximab  Dose: 250 mg/m2  Calculated Dose: 585 mg                             Medication: Oxaliplatin  Dose: 68 mg/m2  Calculated Dose: 159.12 mg                              Medication: Leucovorin  Dose: 400 mg/m2  Calculated Dose: 936 mg                               Medication: Fluorouracil  Dose: 360 mg/m2  Calculated Dose: 842.4 mg                              Medication: Fluorouracil  Dose: 2160 mg/m2  Calculated Dose: 5054.4 mg     I confirm this process was performed independently with the BSA and all final chemotherapy dosing calculations congruent.  Any discrepancies of 10% or greater have been addressed with the chemotherapy pharmacist. The resolution of the discrepancy has been documented in the EPIC progress notes.

## 2020-02-29 NOTE — PROGRESS NOTES
Patient arrived to the infusion center with wife for FOLFOX+cetuximab+trastuzumab. Patient had a complaint of his mouth and lips being raw. Discussed saline rinses. Port-a-cath accessed by Estelita MONTOYA. Premedications of decadron, zofran and benadryl given. Trastuzumab infused over 60 minutes without issues. Cetuximab infused over 60 minutes with a one hour observation and no reaction noted. Oxaliplatin and Leucovorin started. About 50 minutes after it was started the patient started complaining of nausea. Then his face and next started turning red and a rash started developing throughout the torso and arms and hands and c/o some itching. Stopped Oxaliplatin and Leucovorin, about 110-120 ml. infused. Gave Zofran 4 mg and Benadryl 50 mg. Spoke with Dr. Ojeda and her orders were to stop the Oxaliplatin and Leucovorin, give Solumedrol 125 mg and give 5 FU push and CADD pump. Read back orders. After Zofran, Benadryl and Solumedrol given for allergic reaction the patient stated he was feeling much better, and nausea had disappeared. 5 FU push given and patient attached to CADD pump. His rash had disappeared by the time he left the infusion center and patient states he is feeling much better and left infusion center with no apparent distress.

## 2020-02-29 NOTE — PROGRESS NOTES
Chemotherapy Verification - SECONDARY RN     D1C2    Height = 186.5 cm  Weight = 105.8 kg  BSA = 2.34 m2 - LVEF 60% done on 2/7/2020      Medication: Trastuzumab (Herceptin)  Dose: 4 mg/kg  Calculated Dose: 423.2 mg - dose rounded to 450 mg per [facility] protocol                            Medication: Cetuximab (Erbitux)  Dose: 250 mg/m2  Calculated Dose: 585 mg - dose rounded to 600 mg per [facility] protocol                                Medication: Oxaliplatin  Dose: 68 mg/m2  Calculated Dose: 159.12 mg                               Medication: Fluorouracil (5FU) PUSH  Dose: 360 mg/m2  Calculated Dose: 842.4 mg push                                Medication: Fluorouracil (5FU) CADD pump  Dose: 2160 mg/m2  Calculated Dose: 5054.5 mg over 46 hours                            I confirm that this process was performed independently.

## 2020-03-02 ENCOUNTER — OUTPATIENT INFUSION SERVICES (OUTPATIENT)
Dept: ONCOLOGY | Facility: MEDICAL CENTER | Age: 55
End: 2020-03-02
Attending: INTERNAL MEDICINE
Payer: MEDICAID

## 2020-03-02 VITALS
HEIGHT: 74 IN | DIASTOLIC BLOOD PRESSURE: 80 MMHG | TEMPERATURE: 97 F | OXYGEN SATURATION: 95 % | RESPIRATION RATE: 18 BRPM | WEIGHT: 231.48 LBS | BODY MASS INDEX: 29.71 KG/M2 | SYSTOLIC BLOOD PRESSURE: 143 MMHG | HEART RATE: 102 BPM

## 2020-03-02 DIAGNOSIS — C78.7 METASTATIC COLON CANCER TO LIVER (HCC): ICD-10-CM

## 2020-03-02 DIAGNOSIS — C18.9 METASTATIC COLON CANCER TO LIVER (HCC): ICD-10-CM

## 2020-03-02 PROCEDURE — 96523 IRRIG DRUG DELIVERY DEVICE: CPT

## 2020-03-02 PROCEDURE — 700111 HCHG RX REV CODE 636 W/ 250 OVERRIDE (IP): Performed by: INTERNAL MEDICINE

## 2020-03-02 RX ADMIN — HEPARIN 500 UNITS: 100 SYRINGE at 15:25

## 2020-03-02 ASSESSMENT — FIBROSIS 4 INDEX: FIB4 SCORE: 3.32

## 2020-03-02 NOTE — PROGRESS NOTES
Patient arrived to [facility] for C2D3 5FU CADD pump de-access.  Pump stopped with 1.3 mls remaining in reservoir, 101.1 mLs administered.  Disconnected pump, Port flushed per protocol with brisk blood return noted, heparinized, de-accessed and gauze dressing applied.  Pump cleaned and placed in pharmacy drawer.  Confirmed pt's next appt. Pt discharged home in [facility].

## 2020-03-03 NOTE — PROGRESS NOTES
Pt seen in MD office by Miriam GARZA on 3/3/20. Call received and it has been determined that pt is allergic to Oxaliplatin, per Dr. Ross and Miriam GARZA. Orders received to add to allergy list and to take Oxaliplatin out of future cycles of treatment. Provider confirmed that pt will still receive leucovorin and 5FU bolus/pump with the the removal of this medication from treatment plan.

## 2020-03-07 ENCOUNTER — OUTPATIENT INFUSION SERVICES (OUTPATIENT)
Dept: ONCOLOGY | Facility: MEDICAL CENTER | Age: 55
End: 2020-03-07
Attending: INTERNAL MEDICINE
Payer: MEDICAID

## 2020-03-07 VITALS
SYSTOLIC BLOOD PRESSURE: 118 MMHG | DIASTOLIC BLOOD PRESSURE: 79 MMHG | RESPIRATION RATE: 18 BRPM | OXYGEN SATURATION: 95 % | TEMPERATURE: 97.4 F | HEIGHT: 73 IN | WEIGHT: 233.47 LBS | HEART RATE: 75 BPM | BODY MASS INDEX: 30.94 KG/M2

## 2020-03-07 DIAGNOSIS — C18.9 METASTATIC COLON CANCER TO LIVER (HCC): ICD-10-CM

## 2020-03-07 DIAGNOSIS — C78.7 METASTATIC COLON CANCER TO LIVER (HCC): ICD-10-CM

## 2020-03-07 LAB
ANION GAP SERPL CALC-SCNC: 7 MMOL/L (ref 0–11.9)
BUN SERPL-MCNC: 9 MG/DL (ref 8–22)
CALCIUM SERPL-MCNC: 9.7 MG/DL (ref 8.5–10.5)
CHLORIDE SERPL-SCNC: 98 MMOL/L (ref 96–112)
CO2 SERPL-SCNC: 27 MMOL/L (ref 20–33)
CREAT SERPL-MCNC: 0.59 MG/DL (ref 0.5–1.4)
GLUCOSE SERPL-MCNC: 386 MG/DL (ref 65–99)
MAGNESIUM SERPL-MCNC: 1.8 MG/DL (ref 1.5–2.5)
POTASSIUM SERPL-SCNC: 3.6 MMOL/L (ref 3.6–5.5)
SODIUM SERPL-SCNC: 132 MMOL/L (ref 135–145)

## 2020-03-07 PROCEDURE — 700111 HCHG RX REV CODE 636 W/ 250 OVERRIDE (IP): Performed by: INTERNAL MEDICINE

## 2020-03-07 PROCEDURE — 83735 ASSAY OF MAGNESIUM: CPT

## 2020-03-07 PROCEDURE — A4212 NON CORING NEEDLE OR STYLET: HCPCS

## 2020-03-07 PROCEDURE — 96375 TX/PRO/DX INJ NEW DRUG ADDON: CPT

## 2020-03-07 PROCEDURE — 80048 BASIC METABOLIC PNL TOTAL CA: CPT

## 2020-03-07 PROCEDURE — 96365 THER/PROPH/DIAG IV INF INIT: CPT

## 2020-03-07 PROCEDURE — 96413 CHEMO IV INFUSION 1 HR: CPT

## 2020-03-07 PROCEDURE — 306780 HCHG STAT FOR TRANSFUSION PER CASE

## 2020-03-07 RX ADMIN — HEPARIN 500 UNITS: 100 SYRINGE at 17:18

## 2020-03-07 RX ADMIN — DIPHENHYDRAMINE HYDROCHLORIDE 50 MG: 50 INJECTION INTRAMUSCULAR; INTRAVENOUS at 14:19

## 2020-03-07 RX ADMIN — CETUXIMAB 600 MG: 2 SOLUTION INTRAVENOUS at 14:59

## 2020-03-07 RX ADMIN — HYDROCORTISONE SODIUM SUCCINATE 100 MG: 100 INJECTION, POWDER, FOR SOLUTION INTRAMUSCULAR; INTRAVENOUS at 16:21

## 2020-03-07 ASSESSMENT — FIBROSIS 4 INDEX: FIB4 SCORE: 3.32

## 2020-03-07 NOTE — PROGRESS NOTES
"Pharmacy Chemotherapy Calculations    Dx: Metastatic colorectal cancer [KRAS/NRAS wild type, ERBB2 (HER2) amplification]    Cycle 2, Day 8  Previous treatment = C2D1 on 2/29/20  s/p XELOX +/- Ariadne and 5 cycles of FOLFIRI + Ariadne through 12/21/19    Regimen: mFOLFOX + Erbitux + Herceptin  Trastuzumab 6 mg/kg IV loading dose C1D1, then 4 mg/kg IV Day 1 of subsequent cycles [confirmed \"every 2 weeks\", per MD; progress note: \"He also had an ERBB2 amplification which might suggest response to treatments like Herceptin\"]  Cetuximab 400 mg/m2 IV Cycle 1, Day 1, then 250 mg/m2 IV Days 1 and 8 (confirmed \"weekly\" per MD)  Oxaliplatin 85 mg/m2 IV Day 1    Cody LEO reduced dose by 20% to 68 mg/m2 starting with C1D1 due to anticipated tolerance   concurrent with  Leucovorin 400 mg/m2 IV Day 1 followed by  Fluorouracil 400 mg/m2 IV Day 1    Cody LEO reduced dose by 10% to 360 mg/m2 starting with C1D1 due to anticipated tolerance   followed by  Fluorouracil 2400 mg/m2 CIVI over 46-48 hours    Cody LEO reduced dose by 10% to 2160 mg/m2 starting with C1D1 due to anticipated tolerance   14 day cycle until DP or UT  NCCN guidelines for colon cancer V.2.2019  Venook AP et al, OBEY 2017;317(23):8680-7510  Kayleen RINALDI, et al. Lancet Oncol. 2019 Apr;20(4):518-530.    Allergies: Patient has no known allergies.  /77   Pulse 83   Temp 36.3 °C (97.4 °F) (Temporal)   Resp 18   Ht 1.865 m (6' 1.43\")   Wt 105.9 kg (233 lb 7.5 oz)   SpO2 93%   BMI 30.45 kg/m²  Body surface area is 2.34 meters squared.     Labs 2/28/20  ANC~ 3420 Plt = 117k   Hgb = 12.5       3/7/20:  SCr = 0.59 mg/dL CrCl ~ >125 mL/min (minimum SCr of 0.7)   Mg = 1.8 K = 3.6    2/7/20 LVEF by Echo ~ 60%    Cetuximab (Erbitux) 250 mg/m2 x 2.34 m2 = 585 mg   <10% difference, OK to treat with final dose = 600 mg IV over 1 hour    Man Anderson, PharmD  "

## 2020-03-07 NOTE — PROGRESS NOTES
"Pharmacy Chemotherapy Verification Note:    Patient Name: Gurmeet Jo      Dx: Metastatic Colorectal CA (KRAS/NRAS wild type, and ERBB2 [a HER2 family receptor] amplification)      Protocol: mFOLFOX+cetuximab+trastuzumab     *Dosing Reference*  Cetuximab 400 mg/m² IV over 2 hours on Day 1 of Week 1, followed by  Cetuximab 250 mg/m² IV over 60 minutes on Day 1 beginning with Week 2  Weekly cycle until disease progression or unacceptable toxicity  ~concurrent with~  OXALIplatin IV over 2 hours on Day 1   - dose reduced to 68 mg/m² starting C1 for tolerance  Leucovorin 400 mg/m² IV over 2 hours on Day 1, to run concurrent with OXALIplatin, followed by  Fluorouracil on Day 1, followed by   - dose reduced to 360 mg/m² starting C1 for tolerance  Fluorouracil CIVI over 46-48 hours   - dose reduced to 2160 mg/m² starting C1 for tolerance   Trastuzumab 6 mg/kg IV over 90 minutes on Day 1 of Cycle 1, followed by  Trastuzumab 4 mg/kg IV over 30-60 minutes on Day 1 of Cycle 2    - confirmed with Dr. Ross trastuzumab every 2 weeks  14-day cycle until disease progression or unacceptable toxicity    NCCN Guidelines for Colon Cancer. V.2.2019.  Christine AP, et al. OBEY. 2017;317(23):0625-5488.    Herceptin has limited data in colorectal cancer, but two regimens exist neither at the same loading dose or interval as Dr. Ross has chosen. Per [facility] progress note: Patient has undergone further [employer] testing of his tumor. He was found to be KRAS/NRAS wild type, which suggest he has an EGFR mutation and would be a candidate for EGFR targeted therapy. He also had an ERBB2 amplification which might suggest response to treatments like Herceptin.    Allergies:  Patient has no known allergies.     /77   Pulse 83   Temp 36.3 °C (97.4 °F) (Temporal)   Resp 18   Ht 1.865 m (6' 1.43\")   Wt 105.9 kg (233 lb 7.5 oz)   SpO2 93%   BMI 30.45 kg/m²  Body surface area is 2.34 meters squared.     No hold parameters for Day 8  Mg " = pending (to be replaced per protocol)    2/7/2020 ECHO: LVEF 60%    Drug Order   (Drug name, dose, route, IV Fluid & volume, frequency, number of doses) Cycle 2 Day 8      Previous treatment: C2D1 on 2/29/20   Medication = Cetuximab (ERBitux)  Base Dose = 250 mg/m²  Calc Dose: Base Dose x 2.34 m² = 585 mg  Final Dose = 600 mg  Route = IV  Fluid & Volume = 300 mL in empty bag   Conc = 2 mg/mL  Admin Duration = Over 60 minutes     Rounded to nearest vial size (<10% difference) per protocol, okay to treat with final dose     By my signature below, I confirm this process was performed independently with the BSA and all final chemotherapy dosing calculations congruent. I have reviewed the above chemotherapy order and that my calculation of the final dose and BSA (when applicable) corroborate those calculations of the  pharmacist.     José Marvin, PharmD

## 2020-03-07 NOTE — PROGRESS NOTES
Chemotherapy Verification - PRIMARY RN      Height = 186.5 cm  Weight = 105.9kg  BSA = 2.34m2       Medication: Erbitux  Dose: 250 mg/m2  Calculated Dose: 585mg                             (In mg/m2, AUC, mg/kg)       I confirm this process was performed independently with the BSA and all final chemotherapy dosing calculations congruent.  Any discrepancies of 10% or greater have been addressed with the chemotherapy pharmacist. The resolution of the discrepancy has been documented in the EPIC progress notes.

## 2020-03-07 NOTE — PROGRESS NOTES
Chemotherapy Verification - SECONDARY RN       Height = 186.5 cm  Weight = 105.9 kg  BSA = 2.34 m2       Medication: Erbitux  Dose: 250 mg/m2  Calculated Dose: 585.5 mg round to vial                             (In mg/m2, AUC, mg/kg)       I confirm that this process was performed independently.

## 2020-03-07 NOTE — PROGRESS NOTES
Pt ambulatory to [facility] for Cycle 2, Day 8 Cetuximab infusion.  POC discussed with patient and his wife for today, they both agree to POC. Pt denies any current illness or infections, mouth and lip sores improving with saline rinses.    Port accessed utilizing sterile technique, flushes easily with brisk blood return.  Labs sent and reviewed.  Premeds administered per MD orders, Cetuximab infused per MD order at 3pm.  At 4pm, Pt c/o pruritis to lower back and eyes.  Red raised rash noted to upper chest.  Infusion stopped momentarily, then resumed.  Dr Chisholm notified and order received to administer hydrocortisone 100mg IV.  Solu-Cortef given,  Pt monitored for 1 hour post infusion.  Pt stated he felt less pruritis and rash on chest resolving.  Port flushed with NS and heparinized per protocol and de-accessed.  Gauze and coban placed over site.  Pt discharged to self care with spouse in [place].  Next appointment scheduled for 03/14/2020, pt aware.

## 2020-03-13 ENCOUNTER — HOSPITAL ENCOUNTER (OUTPATIENT)
Dept: LAB | Facility: MEDICAL CENTER | Age: 55
End: 2020-03-13
Attending: INTERNAL MEDICINE
Payer: MEDICAID

## 2020-03-13 ENCOUNTER — TELEPHONE (OUTPATIENT)
Dept: ONCOLOGY | Facility: MEDICAL CENTER | Age: 55
End: 2020-03-13

## 2020-03-13 LAB
ALBUMIN SERPL BCP-MCNC: 3.5 G/DL (ref 3.2–4.9)
ALBUMIN/GLOB SERPL: 0.9 G/DL
ALP SERPL-CCNC: 182 U/L (ref 30–99)
ALT SERPL-CCNC: 56 U/L (ref 2–50)
ANION GAP SERPL CALC-SCNC: 10 MMOL/L (ref 7–16)
APPEARANCE UR: CLEAR
AST SERPL-CCNC: 46 U/L (ref 12–45)
BASOPHILS # BLD AUTO: 1 % (ref 0–1.8)
BASOPHILS # BLD: 0.06 K/UL (ref 0–0.12)
BILIRUB SERPL-MCNC: 0.9 MG/DL (ref 0.1–1.5)
BILIRUB UR QL STRIP.AUTO: NEGATIVE
BUN SERPL-MCNC: 8 MG/DL (ref 8–22)
CALCIUM SERPL-MCNC: 9.5 MG/DL (ref 8.5–10.5)
CEA SERPL-MCNC: 798.4 NG/ML (ref 0–3)
CHLORIDE SERPL-SCNC: 102 MMOL/L (ref 96–112)
CO2 SERPL-SCNC: 24 MMOL/L (ref 20–33)
COLOR UR: ABNORMAL
CREAT SERPL-MCNC: 0.62 MG/DL (ref 0.5–1.4)
EOSINOPHIL # BLD AUTO: 0.24 K/UL (ref 0–0.51)
EOSINOPHIL NFR BLD: 4.1 % (ref 0–6.9)
ERYTHROCYTE [DISTWIDTH] IN BLOOD BY AUTOMATED COUNT: 60.9 FL (ref 35.9–50)
GLOBULIN SER CALC-MCNC: 3.9 G/DL (ref 1.9–3.5)
GLUCOSE SERPL-MCNC: 290 MG/DL (ref 65–99)
GLUCOSE UR STRIP.AUTO-MCNC: >=1000 MG/DL
HCT VFR BLD AUTO: 41.2 % (ref 42–52)
HGB BLD-MCNC: 13.1 G/DL (ref 14–18)
IMM GRANULOCYTES # BLD AUTO: 0.02 K/UL (ref 0–0.11)
IMM GRANULOCYTES NFR BLD AUTO: 0.3 % (ref 0–0.9)
KETONES UR STRIP.AUTO-MCNC: NEGATIVE MG/DL
LEUKOCYTE ESTERASE UR QL STRIP.AUTO: NEGATIVE
LYMPHOCYTES # BLD AUTO: 1 K/UL (ref 1–4.8)
LYMPHOCYTES NFR BLD: 17.1 % (ref 22–41)
MAGNESIUM SERPL-MCNC: 1.7 MG/DL (ref 1.5–2.5)
MCH RBC QN AUTO: 30 PG (ref 27–33)
MCHC RBC AUTO-ENTMCNC: 31.8 G/DL (ref 33.7–35.3)
MCV RBC AUTO: 94.3 FL (ref 81.4–97.8)
MICRO URNS: ABNORMAL
MONOCYTES # BLD AUTO: 0.63 K/UL (ref 0–0.85)
MONOCYTES NFR BLD AUTO: 10.8 % (ref 0–13.4)
NEUTROPHILS # BLD AUTO: 3.89 K/UL (ref 1.82–7.42)
NEUTROPHILS NFR BLD: 66.7 % (ref 44–72)
NITRITE UR QL STRIP.AUTO: NEGATIVE
NRBC # BLD AUTO: 0 K/UL
NRBC BLD-RTO: 0 /100 WBC
PH UR STRIP.AUTO: 5.5 [PH] (ref 5–8)
PLATELET # BLD AUTO: 74 K/UL (ref 164–446)
PMV BLD AUTO: 11.7 FL (ref 9–12.9)
POTASSIUM SERPL-SCNC: 3.6 MMOL/L (ref 3.6–5.5)
PROT SERPL-MCNC: 7.4 G/DL (ref 6–8.2)
PROT UR QL STRIP: NEGATIVE MG/DL
RBC # BLD AUTO: 4.37 M/UL (ref 4.7–6.1)
RBC UR QL AUTO: NEGATIVE
SODIUM SERPL-SCNC: 136 MMOL/L (ref 135–145)
SP GR UR STRIP.AUTO: 1.04
UROBILINOGEN UR STRIP.AUTO-MCNC: 0.2 MG/DL
WBC # BLD AUTO: 5.8 K/UL (ref 4.8–10.8)

## 2020-03-13 PROCEDURE — 83735 ASSAY OF MAGNESIUM: CPT

## 2020-03-13 PROCEDURE — 82378 CARCINOEMBRYONIC ANTIGEN: CPT

## 2020-03-13 PROCEDURE — 85025 COMPLETE CBC W/AUTO DIFF WBC: CPT

## 2020-03-13 PROCEDURE — 80053 COMPREHEN METABOLIC PANEL: CPT

## 2020-03-13 PROCEDURE — 81003 URINALYSIS AUTO W/O SCOPE: CPT

## 2020-03-13 PROCEDURE — 36415 COLL VENOUS BLD VENIPUNCTURE: CPT

## 2020-03-13 RX ORDER — 0.9 % SODIUM CHLORIDE 0.9 %
10 VIAL (ML) INJECTION PRN
Status: CANCELLED | OUTPATIENT
Start: 2020-03-21

## 2020-03-13 RX ORDER — ONDANSETRON 8 MG/1
8 TABLET, ORALLY DISINTEGRATING ORAL PRN
Status: CANCELLED | OUTPATIENT
Start: 2020-03-21

## 2020-03-13 RX ORDER — 0.9 % SODIUM CHLORIDE 0.9 %
3 VIAL (ML) INJECTION PRN
Status: CANCELLED | OUTPATIENT
Start: 2020-03-14

## 2020-03-13 RX ORDER — DIPHENHYDRAMINE HYDROCHLORIDE 50 MG/ML
50 INJECTION INTRAMUSCULAR; INTRAVENOUS PRN
Status: CANCELLED | OUTPATIENT
Start: 2020-03-14

## 2020-03-13 RX ORDER — EPINEPHRINE 1 MG/ML(1)
0.5 AMPUL (ML) INJECTION PRN
Status: CANCELLED | OUTPATIENT
Start: 2020-03-14

## 2020-03-13 RX ORDER — PROCHLORPERAZINE MALEATE 10 MG
10 TABLET ORAL EVERY 6 HOURS PRN
Status: CANCELLED | OUTPATIENT
Start: 2020-03-21

## 2020-03-13 RX ORDER — DEXTROSE MONOHYDRATE 50 MG/ML
INJECTION, SOLUTION INTRAVENOUS CONTINUOUS
Status: CANCELLED | OUTPATIENT
Start: 2020-03-14

## 2020-03-13 RX ORDER — 0.9 % SODIUM CHLORIDE 0.9 %
3 VIAL (ML) INJECTION PRN
Status: CANCELLED | OUTPATIENT
Start: 2020-03-21

## 2020-03-13 RX ORDER — 0.9 % SODIUM CHLORIDE 0.9 %
VIAL (ML) INJECTION PRN
Status: CANCELLED | OUTPATIENT
Start: 2020-03-14

## 2020-03-13 RX ORDER — 0.9 % SODIUM CHLORIDE 0.9 %
20 VIAL (ML) INJECTION PRN
Status: CANCELLED | OUTPATIENT
Start: 2020-03-16

## 2020-03-13 RX ORDER — SODIUM CHLORIDE 9 MG/ML
INJECTION, SOLUTION INTRAVENOUS CONTINUOUS
Status: CANCELLED
Start: 2020-03-14

## 2020-03-13 RX ORDER — ONDANSETRON 8 MG/1
8 TABLET, ORALLY DISINTEGRATING ORAL PRN
Status: CANCELLED | OUTPATIENT
Start: 2020-03-14

## 2020-03-13 RX ORDER — METHYLPREDNISOLONE SODIUM SUCCINATE 125 MG/2ML
125 INJECTION, POWDER, LYOPHILIZED, FOR SOLUTION INTRAMUSCULAR; INTRAVENOUS PRN
Status: CANCELLED | OUTPATIENT
Start: 2020-03-14

## 2020-03-13 RX ORDER — ONDANSETRON 2 MG/ML
4 INJECTION INTRAMUSCULAR; INTRAVENOUS PRN
Status: CANCELLED | OUTPATIENT
Start: 2020-03-21

## 2020-03-13 RX ORDER — 0.9 % SODIUM CHLORIDE 0.9 %
10 VIAL (ML) INJECTION PRN
Status: CANCELLED | OUTPATIENT
Start: 2020-03-13

## 2020-03-13 RX ORDER — 0.9 % SODIUM CHLORIDE 0.9 %
VIAL (ML) INJECTION PRN
Status: CANCELLED | OUTPATIENT
Start: 2020-03-13

## 2020-03-13 RX ORDER — SODIUM CHLORIDE 9 MG/ML
INJECTION, SOLUTION INTRAVENOUS CONTINUOUS
Status: CANCELLED | OUTPATIENT
Start: 2020-03-21

## 2020-03-13 RX ORDER — 0.9 % SODIUM CHLORIDE 0.9 %
3 VIAL (ML) INJECTION PRN
Status: CANCELLED | OUTPATIENT
Start: 2020-03-13

## 2020-03-13 RX ORDER — 0.9 % SODIUM CHLORIDE 0.9 %
VIAL (ML) INJECTION PRN
Status: CANCELLED | OUTPATIENT
Start: 2020-03-21

## 2020-03-13 RX ORDER — 0.9 % SODIUM CHLORIDE 0.9 %
10 VIAL (ML) INJECTION PRN
Status: CANCELLED | OUTPATIENT
Start: 2020-03-14

## 2020-03-13 RX ORDER — PROCHLORPERAZINE MALEATE 10 MG
10 TABLET ORAL EVERY 6 HOURS PRN
Status: CANCELLED | OUTPATIENT
Start: 2020-03-14

## 2020-03-13 RX ORDER — ONDANSETRON 2 MG/ML
4 INJECTION INTRAMUSCULAR; INTRAVENOUS PRN
Status: CANCELLED | OUTPATIENT
Start: 2020-03-14

## 2020-03-14 ENCOUNTER — OUTPATIENT INFUSION SERVICES (OUTPATIENT)
Dept: ONCOLOGY | Facility: MEDICAL CENTER | Age: 55
End: 2020-03-14
Attending: INTERNAL MEDICINE
Payer: MEDICAID

## 2020-03-14 VITALS
BODY MASS INDEX: 31.32 KG/M2 | DIASTOLIC BLOOD PRESSURE: 78 MMHG | WEIGHT: 236.33 LBS | TEMPERATURE: 98.5 F | HEIGHT: 73 IN | SYSTOLIC BLOOD PRESSURE: 125 MMHG | RESPIRATION RATE: 18 BRPM | HEART RATE: 88 BPM | OXYGEN SATURATION: 93 %

## 2020-03-14 DIAGNOSIS — C78.7 METASTATIC COLON CANCER TO LIVER (HCC): ICD-10-CM

## 2020-03-14 DIAGNOSIS — C18.9 METASTATIC COLON CANCER TO LIVER (HCC): ICD-10-CM

## 2020-03-14 PROCEDURE — 96413 CHEMO IV INFUSION 1 HR: CPT

## 2020-03-14 PROCEDURE — 96411 CHEMO IV PUSH ADDL DRUG: CPT

## 2020-03-14 PROCEDURE — 96375 TX/PRO/DX INJ NEW DRUG ADDON: CPT

## 2020-03-14 PROCEDURE — G0498 CHEMO EXTEND IV INFUS W/PUMP: HCPCS

## 2020-03-14 PROCEDURE — 96417 CHEMO IV INFUS EACH ADDL SEQ: CPT

## 2020-03-14 PROCEDURE — 700111 HCHG RX REV CODE 636 W/ 250 OVERRIDE (IP): Performed by: INTERNAL MEDICINE

## 2020-03-14 PROCEDURE — 96415 CHEMO IV INFUSION ADDL HR: CPT

## 2020-03-14 PROCEDURE — A4212 NON CORING NEEDLE OR STYLET: HCPCS

## 2020-03-14 PROCEDURE — 96368 THER/DIAG CONCURRENT INF: CPT

## 2020-03-14 PROCEDURE — 700105 HCHG RX REV CODE 258: Performed by: INTERNAL MEDICINE

## 2020-03-14 RX ORDER — MAGNESIUM SULFATE 1 G/100ML
1 INJECTION INTRAVENOUS ONCE
Status: COMPLETED | OUTPATIENT
Start: 2020-03-14 | End: 2020-03-14

## 2020-03-14 RX ADMIN — ONDANSETRON HYDROCHLORIDE 16 MG: 2 SOLUTION INTRAMUSCULAR; INTRAVENOUS at 09:29

## 2020-03-14 RX ADMIN — LEUCOVORIN CALCIUM 944 MG: 350 INJECTION, POWDER, LYOPHILIZED, FOR SUSPENSION INTRAMUSCULAR; INTRAVENOUS at 12:10

## 2020-03-14 RX ADMIN — DEXAMETHASONE SODIUM PHOSPHATE 20 MG: 4 INJECTION, SOLUTION INTRAMUSCULAR; INTRAVENOUS at 10:02

## 2020-03-14 RX ADMIN — FLUOROURACIL 5100 MG: 50 INJECTION, SOLUTION INTRAVENOUS at 14:20

## 2020-03-14 RX ADMIN — FLUOROURACIL 850 MG: 50 INJECTION, SOLUTION INTRAVENOUS at 14:15

## 2020-03-14 RX ADMIN — TRASTUZUMAB 450 MG: 150 INJECTION, POWDER, LYOPHILIZED, FOR SOLUTION INTRAVENOUS at 10:21

## 2020-03-14 RX ADMIN — CETUXIMAB 600 MG: 2 SOLUTION INTRAVENOUS at 10:56

## 2020-03-14 RX ADMIN — MAGNESIUM SULFATE 1 G: 1 INJECTION INTRAVENOUS at 12:10

## 2020-03-14 RX ADMIN — DIPHENHYDRAMINE HYDROCHLORIDE 50 MG: 50 INJECTION INTRAMUSCULAR; INTRAVENOUS at 09:45

## 2020-03-14 ASSESSMENT — FIBROSIS 4 INDEX: FIB4 SCORE: 4.57

## 2020-03-14 NOTE — PROGRESS NOTES
"Pharmacy Chemotherapy Calculations    Dx: Metastatic colorectal cancer [KRAS/NRAS wild type, ERBB2 (HER2) amplification]    Cycle 3, Day 1  Previous treatment = C2D8 on 3/7/20    Regimen: mFOLFOX + Erbitux + Herceptin  *Dosing Reference*  Trastuzumab 6 mg/kg IV loading dose C1D1, then 4 mg/kg IV Day 1 of subsequent cycles [confirmed \"every 2 weeks\", per MD; progress note: \"He also had an ERBB2 amplification which might suggest response to treatments like Herceptin\"]  Cetuximab 400 mg/m2 IV Cycle 1, Day 1, then 250 mg/m2 IV Days 1 and 8 (confirmed \"weekly\" per MD)     -- Cody LEO reduced dose by 20% to  starting with C1D1 due to anticipated tolerance    -- 3/14/20: Oxaliplatin discontinued from treatment  Leucovorin 400 mg/m2 IV Day 1 followed by  Fluorouracil  IV Day 1 followed by   Cody LEO reduced dose by 10% to 360 mg/m2 starting with C1D1 due to anticipated tolerance   Fluorouracil  CIVI over 46-48 hours    Cody LEO reduced dose by 10% to 2160 mg/m2 starting with C1D1 due to anticipated tolerance   14-day cycle until DP or UT  NCCN guidelines for colon cancer V.2.2019  Venook AP et al, OBEY 2017;317(23):0185-5933  Clark-Jeffrey F, et al. Lancet Oncol. 2019 Apr;20(4):518-530.    Allergies: Patient has no known allergies.  /78   Pulse 88   Temp 36.9 °C (98.5 °F) (Temporal)   Resp 18   Ht 1.865 m (6' 1.43\")   Wt 107.2 kg (236 lb 5.3 oz)   SpO2 93%   BMI 30.82 kg/m²  Body surface area is 2.36 meters squared.     2/7/20 LVEF by Echo ~ 60%    All labs (3/13/20) within treatment plan parameters, except: Plt 74k  **MD aware of current labs.  Okay to proceed with treatment 3/14/20 per MD.    Trastuzumab (Herceptin) 4 mg/kg x 107.2 kg = 429 mg   Rounded to vial size (within 10%) per dose rounding protocol = 450 mg IV    Cetuximab (Erbitux) 250 mg/m² x 2.36 m² = 590 mg   Rounded to vial size (within 10%) per dose rounding protocol = 600 mg IV over 1 hour    Leucovorin 400 mg/m² x 2.36 m² = 944 mg   <10% " difference, okay to treat with final dose = 944 mg IV    Fluorouracil (Adrucil) 360 mg/m² x 2.36 m² = 849.6 mg   <10% difference, okay to treat with final dose = 850 mg IV    Fluorouracil (Adrucil) 2160 mg/m² x 2.36 m² = 5097.6 mg   <10% difference, okay to treat with final dose = 5100 mg IV      Aura Guzman, PharmD

## 2020-03-14 NOTE — PROGRESS NOTES
Patient arrived to [facility] for C3D1 Herceptin/Erbitux/5FU.  Pt denies any nausea/vomiting, s/s of infection, or fevers.  Rash noted to face and chest.  Labs drawn yesterday.  Platelet count 74,000.  Spoke with Dr. Ross, informed him of platelet count and urine glucose level.  MD acknowledged and OK given to proceed with treatment today.  Also informed Dr. Ross of patient reaction of itching and rash during Erbitux infusion last week.  20mg IV Decadron ordered as a premedication for Erbitux on Day 1 and 8.    Right chest port accessed with sterile technique, brisk blood return noted.  Pre medications given and Herceptin and Erbitux infused per order.  Pt tolerated well.  Pt monitored for 1 hour post Erbitux at which time Leucovorin and Mg replacement given.  No reactions noted.  5FU push and CADD pump connected, setting verified to RUN.  Confirmed next appointment and pt ambulated out of clinic in no apparent distress.

## 2020-03-14 NOTE — PROGRESS NOTES
"Pharmacy Chemotherapy Verification Note:    Patient Name: Gurmeet Jo      Dx: Metastatic Colorectal CA (KRAS/NRAS wild type, and ERBB2 [a HER2 family receptor] amplification)      Protocol: mFOLFOX+cetuximab+trastuzumab     *Dosing Reference*  Cetuximab 400 mg/m² IV over 2 hours on Day 1 of Week 1, followed by  Cetuximab 250 mg/m² IV over 60 minutes on Day 1 beginning with Week 2  Weekly cycle until disease progression or unacceptable toxicity  ~concurrent with~     - dose reduced to 68 mg/m² starting C1 for tolerance   3/3/20 - Removed from treatment plan d/t oxaliplatin allergy per Harman VARGAS.  Leucovorin 400 mg/m² IV over 2 hours on Day 1, to run concurrent with OXALIplatin, followed by  Fluorouracil on Day 1, followed by   - dose reduced to 360 mg/m² starting C1 for tolerance  Fluorouracil CIVI over 46-48 hours   - dose reduced to 2160 mg/m² starting C1 for tolerance   Trastuzumab 6 mg/kg IV over 90 minutes on Day 1 of Cycle 1, followed by  Trastuzumab 4 mg/kg IV over 30-60 minutes on Day 1 of Cycle 2    - confirmed with Dr. Ross trastuzumab every 2 weeks  14-day cycle until disease progression or unacceptable toxicity  NCCN Guidelines for Colon Cancer. V.2.2019.  Christine AP, et al. OBEY. 2017;317(23):4643-9032.  Herceptin has limited data in colorectal cancer, but two regimens exist neither at the same loading dose or interval as Dr. Ross has chosen. Per [facility] progress note: Patient has undergone further [employer] testing of his tumor. He was found to be KRAS/NRAS wild type, which suggest he has an EGFR mutation and would be a candidate for EGFR targeted therapy. He also had an ERBB2 amplification which might suggest response to treatments like Herceptin.    Allergies:  Patient has no known allergies.     /78   Pulse 88   Temp 36.9 °C (98.5 °F) (Temporal)   Resp 18   Ht 1.865 m (6' 1.43\")   Wt 107.2 kg (236 lb 5.3 oz)   SpO2 93%   BMI 30.82 kg/m²  Body surface area is 2.36 meters " squared.     Labs 3/13/20  ANC~ 3890 Plt = 74k   Hgb = 13.1     SCr = 0.62 mg/dL CrCl ~ >125 mL/min (minimum SCr of 0.7)   LFT's = 46/56/182 TBili = 0.9   Dr. Ross aware, okay to proceed with treatment today    2/7/2020 ECHO: LVEF 60%     Drug Order   (Drug name, dose, route, IV Fluid & volume, frequency, number of doses) Cycle 3 Day 1      Previous treatment: C2D8 on 3/7/20   Medication = cetuximab (ERBitux)  Base Dose = 250 mg/m²  Calc Dose: Base Dose x 2.36 m² = 590 mg  Final Dose = 600 mg  Route = IV  Fluid & Volume = 300 mL in empty bag undiluted drug  Conc = 2 mg/mL  Admin Duration = Over 60 minutes          <10% difference, rounded to vial size per protocol, ok to treat with final written dose   Medication = trastuzumab (herceptin)  Base Dose = 4 mg/kg  Calc Dose: Base Dose x 107.2 kg = 428.8 mg  Final Dose = 450 mg  Route = IV  Fluid & Volume =  mL  Admin Duration = Over 30 minutes          <10% difference, rounded to vial size per protocol, ok to treat with final written dose     Medication = Leucovorin (Wellcovorin)  Base Dose = 400 mg/m²  Calc Dose: Base Dose x 2.36 m² = 944 mg  Final Dose = 944 mg  Route = IV  Fluid & Volume = D5W 250 mL  Admin Duration = Over 2 hours         <10% difference, ok to treat with final written dose   Medication = Fluorouracil (5-FU)  Base Dose = 360 mg/m²  Calc Dose: Base Dose x 2.36 m² = 849.6 mg  Final Dose = 850 mg  Route = IVP  Fluid & Volume = 17 mL in syringe  Conc = 50 mg/mL  Admin Duration = Over 5 minutes          <10% difference, ok to treat with final written dose   Medication = Fluorouracil (5-FU)  Base Dose = 2160 mg/m²  Calc Dose:Base Dose x 2.36 m² = 5097.6 mg  Final Dose = 5100 mg  Route = CIVI via CADD pump  Fluid & Volume = 102 mL (+3mL overfill = 105 mL)  Admin Duration = Over 46 hours to run at 2.2 mL/hour          <10% difference, ok to treat with final written dose     By my signature below, I confirm this process was performed independently  with the BSA and all final chemotherapy dosing calculations congruent. I have reviewed the above chemotherapy order and that my calculation of the final dose and BSA (when applicable) corroborate those calculations of the  pharmacist.     Ruben Santamaria, PharmD

## 2020-03-14 NOTE — PROGRESS NOTES
Chemotherapy Verification - PRIMARY RN      Height = 186.5 cm  Weight = 107.2 kg  BSA = 2.36 m2       Medication: Herceptin  Dose: 4 mg/kg    Calculated Dose: 428.8 mg                             (In mg/m2, AUC, mg/kg)     Medication: Erbitux  Dose: 250 mg/m2    Calculated Dose: 590 mg                             (In mg/m2, AUC, mg/kg)    Medication: Fluourouracil bolus  Dose: 360 mg/m2  Calculated Dose: 849.6 mg                             (In mg/m2, AUC, mg/kg)    Medication: Fluorouracil  Dose: 2160 mg/m2 over 46 hours  Calculated Dose: 5097.6 mg                             (In mg/m2, AUC, mg/kg)        I confirm this process was performed independently with the BSA and all final chemotherapy dosing calculations congruent.  Any discrepancies of 10% or greater have been addressed with the chemotherapy pharmacist. The resolution of the discrepancy has been documented in the EPIC progress notes.

## 2020-03-14 NOTE — PROGRESS NOTES
Chemotherapy Verification - SECONDARY RN       Height = 186.5 cm  Weight = 107.2 kg  BSA = 2.36 m^2       Medication: Trastuzumab (Herceptin)  Dose: 4 mg/kg  Calculated Dose: 428.8 mg                             (In mg/m2, AUC, mg/kg)     Medication: Cetuximab (Erbitux)  Dose: 250 mg/m^2  Calculated Dose: 590 mg                             (In mg/m2, AUC, mg/kg)    Medication: Fluouracil bolus Dose: 360 mg/m^2  Calculated Dose: 849.6 mg                            (In mg/m2, AUC, mg/kg)    Medication: Fluouracil  Dose: 2160 mg/m^2  Calculated Dose: 5097.6 mg over 46 hours via CADD pump                            (In mg/m2, AUC, mg/kg)    I confirm that this process was performed independently.

## 2020-03-16 ENCOUNTER — OUTPATIENT INFUSION SERVICES (OUTPATIENT)
Dept: ONCOLOGY | Facility: MEDICAL CENTER | Age: 55
End: 2020-03-16
Attending: INTERNAL MEDICINE
Payer: MEDICAID

## 2020-03-16 VITALS
WEIGHT: 232.37 LBS | RESPIRATION RATE: 18 BRPM | TEMPERATURE: 98 F | HEART RATE: 86 BPM | SYSTOLIC BLOOD PRESSURE: 132 MMHG | BODY MASS INDEX: 30.8 KG/M2 | OXYGEN SATURATION: 94 % | HEIGHT: 73 IN | DIASTOLIC BLOOD PRESSURE: 67 MMHG

## 2020-03-16 DIAGNOSIS — C18.9 METASTATIC COLON CANCER TO LIVER (HCC): ICD-10-CM

## 2020-03-16 DIAGNOSIS — C78.7 METASTATIC COLON CANCER TO LIVER (HCC): ICD-10-CM

## 2020-03-16 PROCEDURE — 700111 HCHG RX REV CODE 636 W/ 250 OVERRIDE (IP): Performed by: INTERNAL MEDICINE

## 2020-03-16 PROCEDURE — 96523 IRRIG DRUG DELIVERY DEVICE: CPT

## 2020-03-16 RX ADMIN — HEPARIN 500 UNITS: 100 SYRINGE at 14:59

## 2020-03-16 ASSESSMENT — FIBROSIS 4 INDEX: FIB4 SCORE: 4.57

## 2020-03-17 NOTE — PROGRESS NOTES
Returns for port flush de access at completion of home 5FU infusion.  Pump verified as empty.  Reports no changes over last visit.  Port saline and hep lock flush before de acces. DC to care of wife.

## 2020-03-20 ENCOUNTER — HOSPITAL ENCOUNTER (OUTPATIENT)
Dept: LAB | Facility: MEDICAL CENTER | Age: 55
End: 2020-03-20
Attending: NURSE PRACTITIONER
Payer: MEDICAID

## 2020-03-20 LAB
ALBUMIN SERPL BCP-MCNC: 3.9 G/DL (ref 3.2–4.9)
ALBUMIN/GLOB SERPL: 1.3 G/DL
ALP SERPL-CCNC: 172 U/L (ref 30–99)
ALT SERPL-CCNC: 40 U/L (ref 2–50)
ANION GAP SERPL CALC-SCNC: 17 MMOL/L (ref 7–16)
AST SERPL-CCNC: 31 U/L (ref 12–45)
BILIRUB SERPL-MCNC: 0.8 MG/DL (ref 0.1–1.5)
BUN SERPL-MCNC: 18 MG/DL (ref 8–22)
CALCIUM SERPL-MCNC: 9.4 MG/DL (ref 8.5–10.5)
CHLORIDE SERPL-SCNC: 100 MMOL/L (ref 96–112)
CO2 SERPL-SCNC: 22 MMOL/L (ref 20–33)
CREAT SERPL-MCNC: 0.56 MG/DL (ref 0.5–1.4)
GLOBULIN SER CALC-MCNC: 3.1 G/DL (ref 1.9–3.5)
GLUCOSE SERPL-MCNC: 355 MG/DL (ref 65–99)
POTASSIUM SERPL-SCNC: 3.9 MMOL/L (ref 3.6–5.5)
PROT SERPL-MCNC: 7 G/DL (ref 6–8.2)
SODIUM SERPL-SCNC: 139 MMOL/L (ref 135–145)

## 2020-03-20 PROCEDURE — 80053 COMPREHEN METABOLIC PANEL: CPT

## 2020-03-21 ENCOUNTER — APPOINTMENT (OUTPATIENT)
Dept: ONCOLOGY | Facility: MEDICAL CENTER | Age: 55
End: 2020-03-21
Attending: INTERNAL MEDICINE
Payer: MEDICAID

## 2020-03-24 NOTE — PROGRESS NOTES
Pt's treatment on hold until he has f/u with Dr. Ross next week. Confirmed this plan of care with pt's wife.

## 2020-03-27 ENCOUNTER — HOSPITAL ENCOUNTER (OUTPATIENT)
Dept: LAB | Facility: MEDICAL CENTER | Age: 55
End: 2020-03-27
Attending: INTERNAL MEDICINE
Payer: MEDICAID

## 2020-03-27 LAB
ALBUMIN SERPL BCP-MCNC: 3.8 G/DL (ref 3.2–4.9)
ALBUMIN/GLOB SERPL: 1.3 G/DL
ALP SERPL-CCNC: 166 U/L (ref 30–99)
ALT SERPL-CCNC: 47 U/L (ref 2–50)
ANION GAP SERPL CALC-SCNC: 13 MMOL/L (ref 7–16)
APPEARANCE UR: CLEAR
AST SERPL-CCNC: 27 U/L (ref 12–45)
BASOPHILS # BLD AUTO: 0.2 % (ref 0–1.8)
BASOPHILS # BLD: 0.02 K/UL (ref 0–0.12)
BILIRUB SERPL-MCNC: 1.1 MG/DL (ref 0.1–1.5)
BILIRUB UR QL STRIP.AUTO: NEGATIVE
BUN SERPL-MCNC: 17 MG/DL (ref 8–22)
CALCIUM SERPL-MCNC: 9.7 MG/DL (ref 8.5–10.5)
CEA SERPL-MCNC: 393 NG/ML (ref 0–3)
CHLORIDE SERPL-SCNC: 98 MMOL/L (ref 96–112)
CO2 SERPL-SCNC: 19 MMOL/L (ref 20–33)
COLOR UR: YELLOW
CREAT SERPL-MCNC: 0.53 MG/DL (ref 0.5–1.4)
EOSINOPHIL # BLD AUTO: 0.01 K/UL (ref 0–0.51)
EOSINOPHIL NFR BLD: 0.1 % (ref 0–6.9)
ERYTHROCYTE [DISTWIDTH] IN BLOOD BY AUTOMATED COUNT: 62 FL (ref 35.9–50)
GLOBULIN SER CALC-MCNC: 3 G/DL (ref 1.9–3.5)
GLUCOSE SERPL-MCNC: 459 MG/DL (ref 65–99)
GLUCOSE UR STRIP.AUTO-MCNC: >=1000 MG/DL
HCT VFR BLD AUTO: 42.1 % (ref 42–52)
HGB BLD-MCNC: 13.9 G/DL (ref 14–18)
IMM GRANULOCYTES # BLD AUTO: 0.05 K/UL (ref 0–0.11)
IMM GRANULOCYTES NFR BLD AUTO: 0.4 % (ref 0–0.9)
KETONES UR STRIP.AUTO-MCNC: ABNORMAL MG/DL
LEUKOCYTE ESTERASE UR QL STRIP.AUTO: NEGATIVE
LYMPHOCYTES # BLD AUTO: 0.62 K/UL (ref 1–4.8)
LYMPHOCYTES NFR BLD: 5.1 % (ref 22–41)
MAGNESIUM SERPL-MCNC: 2 MG/DL (ref 1.5–2.5)
MCH RBC QN AUTO: 30.3 PG (ref 27–33)
MCHC RBC AUTO-ENTMCNC: 33 G/DL (ref 33.7–35.3)
MCV RBC AUTO: 91.9 FL (ref 81.4–97.8)
MICRO URNS: ABNORMAL
MONOCYTES # BLD AUTO: 0.56 K/UL (ref 0–0.85)
MONOCYTES NFR BLD AUTO: 4.6 % (ref 0–13.4)
NEUTROPHILS # BLD AUTO: 10.97 K/UL (ref 1.82–7.42)
NEUTROPHILS NFR BLD: 89.6 % (ref 44–72)
NITRITE UR QL STRIP.AUTO: NEGATIVE
NRBC # BLD AUTO: 0 K/UL
NRBC BLD-RTO: 0 /100 WBC
PH UR STRIP.AUTO: 6 [PH] (ref 5–8)
PLATELET # BLD AUTO: 81 K/UL (ref 164–446)
PMV BLD AUTO: 12.1 FL (ref 9–12.9)
POTASSIUM SERPL-SCNC: 4.2 MMOL/L (ref 3.6–5.5)
PROT SERPL-MCNC: 6.8 G/DL (ref 6–8.2)
PROT UR QL STRIP: NEGATIVE MG/DL
RBC # BLD AUTO: 4.58 M/UL (ref 4.7–6.1)
RBC UR QL AUTO: NEGATIVE
SODIUM SERPL-SCNC: 130 MMOL/L (ref 135–145)
SP GR UR REFRACTOMETRY: 1.04
UROBILINOGEN UR STRIP.AUTO-MCNC: 0.2 MG/DL
WBC # BLD AUTO: 12.2 K/UL (ref 4.8–10.8)

## 2020-03-27 PROCEDURE — 83735 ASSAY OF MAGNESIUM: CPT

## 2020-03-27 PROCEDURE — 36415 COLL VENOUS BLD VENIPUNCTURE: CPT

## 2020-03-27 PROCEDURE — 85025 COMPLETE CBC W/AUTO DIFF WBC: CPT

## 2020-03-27 PROCEDURE — 82378 CARCINOEMBRYONIC ANTIGEN: CPT

## 2020-03-27 PROCEDURE — 81003 URINALYSIS AUTO W/O SCOPE: CPT

## 2020-03-27 PROCEDURE — 80053 COMPREHEN METABOLIC PANEL: CPT

## 2020-03-28 ENCOUNTER — APPOINTMENT (OUTPATIENT)
Dept: ONCOLOGY | Facility: MEDICAL CENTER | Age: 55
End: 2020-03-28
Attending: INTERNAL MEDICINE
Payer: MEDICAID

## 2020-03-30 ENCOUNTER — APPOINTMENT (OUTPATIENT)
Dept: ONCOLOGY | Facility: MEDICAL CENTER | Age: 55
End: 2020-03-30
Attending: INTERNAL MEDICINE
Payer: MEDICAID

## 2020-03-31 RX ORDER — 0.9 % SODIUM CHLORIDE 0.9 %
10 VIAL (ML) INJECTION PRN
Status: CANCELLED | OUTPATIENT
Start: 2020-04-11

## 2020-03-31 RX ORDER — PROCHLORPERAZINE MALEATE 10 MG
10 TABLET ORAL EVERY 6 HOURS PRN
Status: CANCELLED | OUTPATIENT
Start: 2020-04-11

## 2020-03-31 RX ORDER — EPINEPHRINE 1 MG/ML(1)
0.5 AMPUL (ML) INJECTION PRN
Status: CANCELLED | OUTPATIENT
Start: 2020-04-11

## 2020-03-31 RX ORDER — 0.9 % SODIUM CHLORIDE 0.9 %
VIAL (ML) INJECTION PRN
Status: CANCELLED | OUTPATIENT
Start: 2020-04-11

## 2020-03-31 RX ORDER — 0.9 % SODIUM CHLORIDE 0.9 %
10 VIAL (ML) INJECTION PRN
Status: CANCELLED | OUTPATIENT
Start: 2020-04-03

## 2020-03-31 RX ORDER — DEXTROSE MONOHYDRATE 50 MG/ML
INJECTION, SOLUTION INTRAVENOUS CONTINUOUS
Status: CANCELLED | OUTPATIENT
Start: 2020-04-11

## 2020-03-31 RX ORDER — DIPHENHYDRAMINE HYDROCHLORIDE 50 MG/ML
50 INJECTION INTRAMUSCULAR; INTRAVENOUS PRN
Status: CANCELLED | OUTPATIENT
Start: 2020-04-11

## 2020-03-31 RX ORDER — 0.9 % SODIUM CHLORIDE 0.9 %
3 VIAL (ML) INJECTION PRN
Status: CANCELLED | OUTPATIENT
Start: 2020-04-03

## 2020-03-31 RX ORDER — 0.9 % SODIUM CHLORIDE 0.9 %
20 VIAL (ML) INJECTION PRN
Status: CANCELLED | OUTPATIENT
Start: 2020-04-13

## 2020-03-31 RX ORDER — METHYLPREDNISOLONE SODIUM SUCCINATE 125 MG/2ML
125 INJECTION, POWDER, LYOPHILIZED, FOR SOLUTION INTRAMUSCULAR; INTRAVENOUS PRN
Status: CANCELLED | OUTPATIENT
Start: 2020-04-11

## 2020-03-31 RX ORDER — 0.9 % SODIUM CHLORIDE 0.9 %
3 VIAL (ML) INJECTION PRN
Status: CANCELLED | OUTPATIENT
Start: 2020-04-11

## 2020-03-31 RX ORDER — 0.9 % SODIUM CHLORIDE 0.9 %
VIAL (ML) INJECTION PRN
Status: CANCELLED | OUTPATIENT
Start: 2020-04-03

## 2020-03-31 RX ORDER — ONDANSETRON 2 MG/ML
4 INJECTION INTRAMUSCULAR; INTRAVENOUS PRN
Status: CANCELLED | OUTPATIENT
Start: 2020-04-11

## 2020-03-31 RX ORDER — SODIUM CHLORIDE 9 MG/ML
INJECTION, SOLUTION INTRAVENOUS CONTINUOUS
Status: CANCELLED
Start: 2020-04-11

## 2020-03-31 RX ORDER — ONDANSETRON 8 MG/1
8 TABLET, ORALLY DISINTEGRATING ORAL PRN
Status: CANCELLED | OUTPATIENT
Start: 2020-04-11

## 2020-04-03 ENCOUNTER — HOSPITAL ENCOUNTER (OUTPATIENT)
Dept: LAB | Facility: MEDICAL CENTER | Age: 55
End: 2020-04-03
Attending: INTERNAL MEDICINE
Payer: MEDICAID

## 2020-04-03 ENCOUNTER — OFFICE VISIT (OUTPATIENT)
Dept: MEDICAL GROUP | Facility: CLINIC | Age: 55
End: 2020-04-03
Payer: MEDICAID

## 2020-04-03 VITALS
HEIGHT: 72 IN | BODY MASS INDEX: 31.02 KG/M2 | TEMPERATURE: 98 F | HEART RATE: 95 BPM | SYSTOLIC BLOOD PRESSURE: 124 MMHG | OXYGEN SATURATION: 98 % | WEIGHT: 229 LBS | RESPIRATION RATE: 14 BRPM | DIASTOLIC BLOOD PRESSURE: 82 MMHG

## 2020-04-03 DIAGNOSIS — L27.0 DRUG-INDUCED SKIN RASH: ICD-10-CM

## 2020-04-03 LAB
ALBUMIN SERPL BCP-MCNC: 3.9 G/DL (ref 3.2–4.9)
ALBUMIN/GLOB SERPL: 1.3 G/DL
ALP SERPL-CCNC: 220 U/L (ref 30–99)
ALT SERPL-CCNC: 66 U/L (ref 2–50)
ANION GAP SERPL CALC-SCNC: 14 MMOL/L (ref 7–16)
APPEARANCE UR: CLEAR
AST SERPL-CCNC: 63 U/L (ref 12–45)
BASOPHILS # BLD AUTO: 0.1 % (ref 0–1.8)
BASOPHILS # BLD: 0.01 K/UL (ref 0–0.12)
BILIRUB SERPL-MCNC: 1.1 MG/DL (ref 0.1–1.5)
BILIRUB UR QL STRIP.AUTO: NEGATIVE
BUN SERPL-MCNC: 14 MG/DL (ref 8–22)
CALCIUM SERPL-MCNC: 9.3 MG/DL (ref 8.5–10.5)
CEA SERPL-MCNC: 519 NG/ML (ref 0–3)
CHLORIDE SERPL-SCNC: 96 MMOL/L (ref 96–112)
CO2 SERPL-SCNC: 21 MMOL/L (ref 20–33)
COLOR UR: YELLOW
CREAT SERPL-MCNC: 0.6 MG/DL (ref 0.5–1.4)
EOSINOPHIL # BLD AUTO: 0.01 K/UL (ref 0–0.51)
EOSINOPHIL NFR BLD: 0.1 % (ref 0–6.9)
ERYTHROCYTE [DISTWIDTH] IN BLOOD BY AUTOMATED COUNT: 62.3 FL (ref 35.9–50)
GLOBULIN SER CALC-MCNC: 3 G/DL (ref 1.9–3.5)
GLUCOSE SERPL-MCNC: 361 MG/DL (ref 65–99)
GLUCOSE UR STRIP.AUTO-MCNC: >=1000 MG/DL
HCT VFR BLD AUTO: 43.3 % (ref 42–52)
HGB BLD-MCNC: 13.9 G/DL (ref 14–18)
IMM GRANULOCYTES # BLD AUTO: 0.1 K/UL (ref 0–0.11)
IMM GRANULOCYTES NFR BLD AUTO: 1.4 % (ref 0–0.9)
KETONES UR STRIP.AUTO-MCNC: 15 MG/DL
LEUKOCYTE ESTERASE UR QL STRIP.AUTO: NEGATIVE
LYMPHOCYTES # BLD AUTO: 0.51 K/UL (ref 1–4.8)
LYMPHOCYTES NFR BLD: 7.1 % (ref 22–41)
MAGNESIUM SERPL-MCNC: 2 MG/DL (ref 1.5–2.5)
MCH RBC QN AUTO: 30.2 PG (ref 27–33)
MCHC RBC AUTO-ENTMCNC: 32.1 G/DL (ref 33.7–35.3)
MCV RBC AUTO: 93.9 FL (ref 81.4–97.8)
MICRO URNS: ABNORMAL
MONOCYTES # BLD AUTO: 0.99 K/UL (ref 0–0.85)
MONOCYTES NFR BLD AUTO: 13.9 % (ref 0–13.4)
NEUTROPHILS # BLD AUTO: 5.52 K/UL (ref 1.82–7.42)
NEUTROPHILS NFR BLD: 77.4 % (ref 44–72)
NITRITE UR QL STRIP.AUTO: NEGATIVE
NRBC # BLD AUTO: 0 K/UL
NRBC BLD-RTO: 0 /100 WBC
PH UR STRIP.AUTO: 6 [PH] (ref 5–8)
PLATELET # BLD AUTO: 83 K/UL (ref 164–446)
PMV BLD AUTO: 12 FL (ref 9–12.9)
POTASSIUM SERPL-SCNC: 4.1 MMOL/L (ref 3.6–5.5)
PROT SERPL-MCNC: 6.9 G/DL (ref 6–8.2)
PROT UR QL STRIP: NEGATIVE MG/DL
RBC # BLD AUTO: 4.61 M/UL (ref 4.7–6.1)
RBC UR QL AUTO: NEGATIVE
SODIUM SERPL-SCNC: 131 MMOL/L (ref 135–145)
SP GR UR REFRACTOMETRY: >1.045
UROBILINOGEN UR STRIP.AUTO-MCNC: 1 MG/DL
WBC # BLD AUTO: 7.1 K/UL (ref 4.8–10.8)

## 2020-04-03 PROCEDURE — 83735 ASSAY OF MAGNESIUM: CPT

## 2020-04-03 PROCEDURE — 80053 COMPREHEN METABOLIC PANEL: CPT

## 2020-04-03 PROCEDURE — 85025 COMPLETE CBC W/AUTO DIFF WBC: CPT

## 2020-04-03 PROCEDURE — 82378 CARCINOEMBRYONIC ANTIGEN: CPT

## 2020-04-03 PROCEDURE — 81003 URINALYSIS AUTO W/O SCOPE: CPT

## 2020-04-03 PROCEDURE — 36415 COLL VENOUS BLD VENIPUNCTURE: CPT

## 2020-04-03 PROCEDURE — 99213 OFFICE O/P EST LOW 20 MIN: CPT | Performed by: FAMILY MEDICINE

## 2020-04-03 RX ORDER — PREDNISONE 20 MG/1
20 TABLET ORAL DAILY
COMMUNITY
End: 2020-05-27

## 2020-04-03 RX ORDER — HYDROXYZINE PAMOATE 50 MG/1
50 CAPSULE ORAL 3 TIMES DAILY PRN
Qty: 90 CAP | Refills: 1 | Status: SHIPPED | OUTPATIENT
Start: 2020-04-03 | End: 2020-05-27

## 2020-04-03 RX ORDER — TRIAMCINOLONE ACETONIDE 1 MG/G
1 CREAM TOPICAL 2 TIMES DAILY
Qty: 472 G | Refills: 2 | Status: SHIPPED | OUTPATIENT
Start: 2020-04-03 | End: 2020-09-19

## 2020-04-03 ASSESSMENT — FIBROSIS 4 INDEX: FIB4 SCORE: 2.67

## 2020-04-03 NOTE — PROGRESS NOTES
Complaint: Rash     Subjective:     Gurmeet Jo is a 54 y.o. male here today accompanied by his wife    Drug-induced skin rash  Has developed a bumpy purple rash that burns, on his arms, legs flanks. Appeared after initial rash caused by Erbitux. Has tried Benadryl and topical hydrocortisone cream without much relief, fading in spots, spreading in others.     Scheduled for another round of chemotherapy tomorrow.    Current medicines (including changes today)  Current Outpatient Medications   Medication Sig Dispense Refill   • predniSONE (DELTASONE) 20 MG Tab Take 20 mg by mouth every day.     • cetuximab (ERBITUX) 100 MG/50ML Solution 1 Dose by Intravenous route every 7 days.     • triamcinolone acetonide (KENALOG) 0.1 % Cream Apply 1 Application to affected area(s) 2 times a day. 472 g 2   • hydrOXYzine pamoate (VISTARIL) 50 MG Cap Take 1 Cap by mouth 3 times a day as needed for Itching. 90 Cap 1   • hydrocortisone 0.5 % Cream Apply  to affected area(s) every day. Indications: rash to face     • lidocaine-prilocaine (EMLA) 2.5-2.5 % Cream      • acetaminophen (TYLENOL) 500 MG Tab Take 500-1,000 mg by mouth every 6 hours as needed.     • LORazepam (ATIVAN) 1 MG Tab Take 1 mg by mouth 1 time daily as needed for Anxiety.     • baclofen (LIORESAL) 10 MG Tab Take 10 mg by mouth 3 times a day as needed.     • methocarbamol (ROBAXIN) 500 MG Tab Take 500 mg by mouth 3 times a day as needed.     • therapeutic multivitamin-minerals (THERAGRAN-M) Tab Take 1 Tab by mouth every day.       No current facility-administered medications for this visit.      He  has a past medical history of Bowel habit changes, Cancer (HCC) (11/2018), Dental disorder, Hiatus hernia syndrome, Hypertension, Indigestion, Pain, Sleep apnea, and Snoring.    Health Maintenance:       Allergies: Compazine and Oxaliplatin    Current Outpatient Medications Ordered in Epic   Medication Sig Dispense Refill   • predniSONE (DELTASONE) 20 MG Tab Take 20 mg  by mouth every day.     • cetuximab (ERBITUX) 100 MG/50ML Solution 1 Dose by Intravenous route every 7 days.     • triamcinolone acetonide (KENALOG) 0.1 % Cream Apply 1 Application to affected area(s) 2 times a day. 472 g 2   • hydrOXYzine pamoate (VISTARIL) 50 MG Cap Take 1 Cap by mouth 3 times a day as needed for Itching. 90 Cap 1   • hydrocortisone 0.5 % Cream Apply  to affected area(s) every day. Indications: rash to face     • lidocaine-prilocaine (EMLA) 2.5-2.5 % Cream      • acetaminophen (TYLENOL) 500 MG Tab Take 500-1,000 mg by mouth every 6 hours as needed.     • LORazepam (ATIVAN) 1 MG Tab Take 1 mg by mouth 1 time daily as needed for Anxiety.     • baclofen (LIORESAL) 10 MG Tab Take 10 mg by mouth 3 times a day as needed.     • methocarbamol (ROBAXIN) 500 MG Tab Take 500 mg by mouth 3 times a day as needed.     • therapeutic multivitamin-minerals (THERAGRAN-M) Tab Take 1 Tab by mouth every day.       No current [facility]-ordered facility-administered medications on file.        Past Medical History:   Diagnosis Date   • Bowel habit changes     constipation   • Cancer (HCC) 2018    Colon CA with Liver Mets   • Dental disorder     dentures    • Hiatus hernia syndrome    • Hypertension     No meds at this time   • Indigestion    • Pain     liver    • Sleep apnea     cpap    • Snoring        Past Surgical History:   Procedure Laterality Date   • COLONOSCOPY         Social History     Tobacco Use   • Smoking status: Former Smoker     Packs/day: 1.00     Years: 15.00     Pack years: 15.00     Last attempt to quit: 1998     Years since quittin.2   • Smokeless tobacco: Never Used   Substance Use Topics   • Alcohol use: No   • Drug use: Not Currently     Types: Inhaled     Comment: THC 1x appx 1 week ago.        Social History     Social History Narrative   • Not on file       Family History   Problem Relation Age of Onset   • Hypertension Mother    • Hypertension Father    • Diabetes Father    •  Diabetes Brother          ROS Positive for burning rash all over  Patient denies any fever, chills, unintentional weight gain/loss, fatigue, stroke symptoms, dizziness, headache, nasal congestion, sore-throat, cough, heartburn, chest pain, difficulty breathing, abdominal discomfort, diarrhea/constipation, burning with urination or frequency, joint or back pain, skin rashes, depression or anxiety.       Objective:     /82   Pulse 95   Temp 36.7 °C (98 °F) (Temporal)   Resp 14   Ht 1.829 m (6')   Wt 103.9 kg (229 lb)   SpO2 98%  Body mass index is 31.06 kg/m².   Physical Exam:  Constitutional: Alert, no distress.  Skin: Warm, dry, good turgor; exanthema of purple papules coalescing on various sites as above.    Assessment and Plan:   The following treatment plan was discussed    1. Drug-induced skin rash  I spoke to Dr. Ross at [facility]. Doesn't really have any suggestions about how to treat this rash, thinks it is allergic reaction. I will try different meds. Patients' wife to report back on Monday.  - triamcinolone acetonide (KENALOG) 0.1 % Cream; Apply 1 Application to affected area(s) 2 times a day.  Dispense: 472 g; Refill: 2  - hydrOXYzine pamoate (VISTARIL) 50 MG Cap; Take 1 Cap by mouth 3 times a day as needed for Itching.  Dispense: 90 Cap; Refill: 1      Followup: Return if symptoms worsen or fail to improve.    Please note that this dictation was created using voice recognition software. I have made every reasonable attempt to correct obvious errors, but I expect that there are errors of grammar and possibly content that I did not discover before finalizing the note.

## 2020-04-03 NOTE — ASSESSMENT & PLAN NOTE
Has developed a bumpy purple rash that burns, on his arms, legs flanks. Appeared after initial rash caused by Erbitux. Has tried Benadryl and topical hydrocortisone cream without much relief, fading in spots, spreading in others.

## 2020-04-04 ENCOUNTER — OUTPATIENT INFUSION SERVICES (OUTPATIENT)
Dept: ONCOLOGY | Facility: MEDICAL CENTER | Age: 55
End: 2020-04-04
Attending: INTERNAL MEDICINE
Payer: MEDICAID

## 2020-04-04 VITALS
RESPIRATION RATE: 18 BRPM | HEART RATE: 72 BPM | DIASTOLIC BLOOD PRESSURE: 95 MMHG | WEIGHT: 227.07 LBS | TEMPERATURE: 97.2 F | HEIGHT: 73 IN | OXYGEN SATURATION: 94 % | BODY MASS INDEX: 30.09 KG/M2 | SYSTOLIC BLOOD PRESSURE: 144 MMHG

## 2020-04-04 PROCEDURE — 306780 HCHG STAT FOR TRANSFUSION PER CASE

## 2020-04-04 ASSESSMENT — FIBROSIS 4 INDEX: FIB4 SCORE: 5.14

## 2020-04-04 NOTE — PROGRESS NOTES
Patient to infusion for Herceptin/5FU.  Rash from Erbitux improved on face but patient felt body rash was worsening yesterday. Saw PCP who prescribed new cream yesterday.  Patient unaware of the name.  Labs drawn yesterday. Platelets 83.  Last week 81.  Dr. Pinzon, on call provider, notified and ordered defer for one week.  Patient informed and rescheduled.  Discharged ambulatory to home in stable condition.

## 2020-04-06 ENCOUNTER — APPOINTMENT (OUTPATIENT)
Dept: ONCOLOGY | Facility: MEDICAL CENTER | Age: 55
End: 2020-04-06
Attending: INTERNAL MEDICINE
Payer: MEDICAID

## 2020-04-10 ENCOUNTER — TELEPHONE (OUTPATIENT)
Dept: ONCOLOGY | Facility: MEDICAL CENTER | Age: 55
End: 2020-04-10

## 2020-04-10 ENCOUNTER — HOSPITAL ENCOUNTER (OUTPATIENT)
Dept: LAB | Facility: MEDICAL CENTER | Age: 55
End: 2020-04-10
Attending: INTERNAL MEDICINE
Payer: MEDICAID

## 2020-04-10 LAB
ALBUMIN SERPL BCP-MCNC: 3.2 G/DL (ref 3.2–4.9)
ALBUMIN/GLOB SERPL: 0.9 G/DL
ALP SERPL-CCNC: 247 U/L (ref 30–99)
ALT SERPL-CCNC: 40 U/L (ref 2–50)
ANION GAP SERPL CALC-SCNC: 13 MMOL/L (ref 7–16)
APPEARANCE UR: ABNORMAL
AST SERPL-CCNC: 39 U/L (ref 12–45)
BACTERIA #/AREA URNS HPF: NEGATIVE /HPF
BASOPHILS # BLD AUTO: 0.7 % (ref 0–1.8)
BASOPHILS # BLD: 0.05 K/UL (ref 0–0.12)
BILIRUB SERPL-MCNC: 1 MG/DL (ref 0.1–1.5)
BILIRUB UR QL STRIP.AUTO: NEGATIVE
BUN SERPL-MCNC: 10 MG/DL (ref 8–22)
CALCIUM SERPL-MCNC: 9.1 MG/DL (ref 8.5–10.5)
CEA SERPL-MCNC: 554 NG/ML (ref 0–3)
CHLORIDE SERPL-SCNC: 96 MMOL/L (ref 96–112)
CO2 SERPL-SCNC: 24 MMOL/L (ref 20–33)
COLOR UR: ABNORMAL
CREAT SERPL-MCNC: 0.61 MG/DL (ref 0.5–1.4)
EOSINOPHIL # BLD AUTO: 0.24 K/UL (ref 0–0.51)
EOSINOPHIL NFR BLD: 3.5 % (ref 0–6.9)
EPI CELLS #/AREA URNS HPF: NEGATIVE /HPF
ERYTHROCYTE [DISTWIDTH] IN BLOOD BY AUTOMATED COUNT: 61.1 FL (ref 35.9–50)
GLOBULIN SER CALC-MCNC: 3.7 G/DL (ref 1.9–3.5)
GLUCOSE SERPL-MCNC: 435 MG/DL (ref 65–99)
GLUCOSE UR STRIP.AUTO-MCNC: >=1000 MG/DL
HCT VFR BLD AUTO: 38.6 % (ref 42–52)
HGB BLD-MCNC: 12.5 G/DL (ref 14–18)
HYALINE CASTS #/AREA URNS LPF: ABNORMAL /LPF
IMM GRANULOCYTES # BLD AUTO: 0.05 K/UL (ref 0–0.11)
IMM GRANULOCYTES NFR BLD AUTO: 0.7 % (ref 0–0.9)
KETONES UR STRIP.AUTO-MCNC: NEGATIVE MG/DL
LEUKOCYTE ESTERASE UR QL STRIP.AUTO: NEGATIVE
LYMPHOCYTES # BLD AUTO: 1 K/UL (ref 1–4.8)
LYMPHOCYTES NFR BLD: 14.8 % (ref 22–41)
MAGNESIUM SERPL-MCNC: 1.7 MG/DL (ref 1.5–2.5)
MCH RBC QN AUTO: 30.9 PG (ref 27–33)
MCHC RBC AUTO-ENTMCNC: 32.4 G/DL (ref 33.7–35.3)
MCV RBC AUTO: 95.3 FL (ref 81.4–97.8)
MICRO URNS: ABNORMAL
MONOCYTES # BLD AUTO: 0.62 K/UL (ref 0–0.85)
MONOCYTES NFR BLD AUTO: 9.2 % (ref 0–13.4)
NEUTROPHILS # BLD AUTO: 4.81 K/UL (ref 1.82–7.42)
NEUTROPHILS NFR BLD: 71.1 % (ref 44–72)
NITRITE UR QL STRIP.AUTO: NEGATIVE
NRBC # BLD AUTO: 0 K/UL
NRBC BLD-RTO: 0 /100 WBC
PH UR STRIP.AUTO: 5.5 [PH] (ref 5–8)
PLATELET # BLD AUTO: 80 K/UL (ref 164–446)
PMV BLD AUTO: 12.2 FL (ref 9–12.9)
POTASSIUM SERPL-SCNC: 4 MMOL/L (ref 3.6–5.5)
PROT SERPL-MCNC: 6.9 G/DL (ref 6–8.2)
PROT UR QL STRIP: NEGATIVE MG/DL
RBC # BLD AUTO: 4.05 M/UL (ref 4.7–6.1)
RBC # URNS HPF: ABNORMAL /HPF
RBC UR QL AUTO: NEGATIVE
SODIUM SERPL-SCNC: 133 MMOL/L (ref 135–145)
SP GR UR STRIP.AUTO: 1.03
UROBILINOGEN UR STRIP.AUTO-MCNC: 1 MG/DL
WBC # BLD AUTO: 6.8 K/UL (ref 4.8–10.8)
WBC #/AREA URNS HPF: ABNORMAL /HPF

## 2020-04-10 PROCEDURE — 80053 COMPREHEN METABOLIC PANEL: CPT

## 2020-04-10 PROCEDURE — 81001 URINALYSIS AUTO W/SCOPE: CPT

## 2020-04-10 PROCEDURE — 85025 COMPLETE CBC W/AUTO DIFF WBC: CPT

## 2020-04-10 PROCEDURE — 83735 ASSAY OF MAGNESIUM: CPT

## 2020-04-10 PROCEDURE — 82378 CARCINOEMBRYONIC ANTIGEN: CPT

## 2020-04-10 PROCEDURE — 36415 COLL VENOUS BLD VENIPUNCTURE: CPT

## 2020-04-11 ENCOUNTER — OUTPATIENT INFUSION SERVICES (OUTPATIENT)
Dept: ONCOLOGY | Facility: MEDICAL CENTER | Age: 55
End: 2020-04-11
Attending: INTERNAL MEDICINE
Payer: MEDICAID

## 2020-04-11 VITALS
RESPIRATION RATE: 18 BRPM | SYSTOLIC BLOOD PRESSURE: 146 MMHG | HEIGHT: 73 IN | BODY MASS INDEX: 30.83 KG/M2 | DIASTOLIC BLOOD PRESSURE: 82 MMHG | HEART RATE: 89 BPM | WEIGHT: 232.59 LBS | TEMPERATURE: 97.2 F | OXYGEN SATURATION: 97 %

## 2020-04-11 DIAGNOSIS — C78.7 METASTATIC COLON CANCER TO LIVER (HCC): ICD-10-CM

## 2020-04-11 DIAGNOSIS — C18.9 METASTATIC COLON CANCER TO LIVER (HCC): ICD-10-CM

## 2020-04-11 PROCEDURE — 96368 THER/DIAG CONCURRENT INF: CPT

## 2020-04-11 PROCEDURE — A4212 NON CORING NEEDLE OR STYLET: HCPCS

## 2020-04-11 PROCEDURE — 700105 HCHG RX REV CODE 258: Performed by: INTERNAL MEDICINE

## 2020-04-11 PROCEDURE — 700111 HCHG RX REV CODE 636 W/ 250 OVERRIDE (IP): Performed by: INTERNAL MEDICINE

## 2020-04-11 PROCEDURE — G0498 CHEMO EXTEND IV INFUS W/PUMP: HCPCS

## 2020-04-11 PROCEDURE — 96417 CHEMO IV INFUS EACH ADDL SEQ: CPT

## 2020-04-11 PROCEDURE — 96375 TX/PRO/DX INJ NEW DRUG ADDON: CPT

## 2020-04-11 PROCEDURE — 96415 CHEMO IV INFUSION ADDL HR: CPT

## 2020-04-11 PROCEDURE — 96413 CHEMO IV INFUSION 1 HR: CPT

## 2020-04-11 PROCEDURE — 96411 CHEMO IV PUSH ADDL DRUG: CPT

## 2020-04-11 RX ORDER — MAGNESIUM SULFATE 1 G/100ML
1 INJECTION INTRAVENOUS ONCE
Status: COMPLETED | OUTPATIENT
Start: 2020-04-11 | End: 2020-04-11

## 2020-04-11 RX ADMIN — LEUCOVORIN CALCIUM 932 MG: 350 INJECTION, POWDER, LYOPHILIZED, FOR SUSPENSION INTRAMUSCULAR; INTRAVENOUS at 10:36

## 2020-04-11 RX ADMIN — TRASTUZUMAB 600 MG: 150 INJECTION, POWDER, LYOPHILIZED, FOR SOLUTION INTRAVENOUS at 12:52

## 2020-04-11 RX ADMIN — FLUOROURACIL 5000 MG: 50 INJECTION, SOLUTION INTRAVENOUS at 13:39

## 2020-04-11 RX ADMIN — DEXAMETHASONE SODIUM PHOSPHATE 20 MG: 4 INJECTION, SOLUTION INTRAMUSCULAR; INTRAVENOUS at 09:59

## 2020-04-11 RX ADMIN — FLUOROURACIL 850 MG: 50 INJECTION, SOLUTION INTRAVENOUS at 13:39

## 2020-04-11 RX ADMIN — MAGNESIUM SULFATE 1 G: 1 INJECTION INTRAVENOUS at 10:37

## 2020-04-11 RX ADMIN — DIPHENHYDRAMINE HYDROCHLORIDE 50 MG: 50 INJECTION, SOLUTION INTRAMUSCULAR; INTRAVENOUS at 09:40

## 2020-04-11 RX ADMIN — ONDANSETRON 16 MG: 2 INJECTION INTRAMUSCULAR; INTRAVENOUS at 09:22

## 2020-04-11 ASSESSMENT — FIBROSIS 4 INDEX: FIB4 SCORE: 4.24

## 2020-04-11 NOTE — PROGRESS NOTES
Chemotherapy Verification - SECONDARY RN       Height = 185.5 cm  Weight = 105.5 kg  BSA = 2.33 m2       Medication: 5FU  Dose: 400 mg/m2  Calculated Dose: 932.6 mg                             (In mg/m2, AUC, mg/kg)     Medication: Home infusion 5FU  Dose: 2400 mg/m2  Calculated Dose: 5592 mg over 46 hours                             (In mg/m2, AUC, mg/kg)    Medication: Herceptin  Dose: 6 mg/kg  Calculated Dose: 633 mg round to vial                             (In mg/m2, AUC, mg/kg)    I confirm that this process was performed independently.

## 2020-04-11 NOTE — PROGRESS NOTES
"Pharmacy Chemotherapy Verification Note:    Patient Name: Gurmeet Jo      Dx: Metastatic Colorectal CA (KRAS/NRAS wild type, and ERBB2 [a HER2 family receptor] amplification)      Protocol: mFOLFOX+cetuximab+trastuzumab     *Dosing Reference*  Cetuximab 400 mg/m² IV over 2 hours on Day 1 of Week 1, followed by     4/11/20 - Removed from treatment plan d/t rash  ~concurrent with~     - dose reduced to 68 mg/m² starting C1 for tolerance   3/3/20 - Removed from treatment plan d/t oxaliplatin allergy per Harman VARGAS.  Leucovorin 400 mg/m² IV over 2 hours on Day 1, to run concurrent with OXALIplatin, followed by  Fluorouracil on Day 1, followed by   - dose reduced to 360 mg/m² starting C1 for tolerance  Fluorouracil CIVI over 46-48 hours   - dose reduced to 2160 mg/m² starting C1 for tolerance   Trastuzumab 6 mg/kg IV over 90 minutes on Day 1 of Cycle 1, followed by  Trastuzumab 4 mg/kg IV over 30-60 minutes on Day 1 of Cycle 2    - confirmed with Dr. Ross trastuzumab every 2 weeks   4/11/20 - Reload with 6 mg/kg per Dr. Chisholm  14-day cycle until disease progression or unacceptable toxicity  NCCN Guidelines for Colon Cancer. V.2.2019.  Christine AP, et al. OBEY. 2017;317():7345-9282.  Herceptin has limited data in colorectal cancer, but two regimens exist neither at the same loading dose or interval as Dr. Ross has chosen. Per [facility] progress note: Patient has undergone further [employer] testing of his tumor. He was found to be KRAS/NRAS wild type, which suggest he has an EGFR mutation and would be a candidate for EGFR targeted therapy. He also had an ERBB2 amplification which might suggest response to treatments like Herceptin.    Allergies:  Patient has no known allergies.     /82   Pulse 89   Temp 36.2 °C (97.2 °F) (Temporal)   Resp 18   Ht 1.855 m (6' 1.03\")   Wt 105.5 kg (232 lb 9.4 oz)   SpO2 97%   BMI 30.66 kg/m²  Body surface area is 2.33 meters squared.     Labs 4/10/20  ANC~ 4810 Plt = " 80k   Hgb = 12.5     SCr = 0.61 mg/dL CrCl ~ >125 mL/min (minimum SCr of 0.7)   LFT's = 39/40/247 TBili = 1   Dr. Ross/Dr. Chisholm aware, okay to proceed with treatment today    2/7/2020 ECHO: LVEF 60%     Drug Order   (Drug name, dose, route, IV Fluid & volume, frequency, number of doses) Cycle 4 (delayed 2 weeks for low platelets)    Previous treatment: C3D1 on 3/14/20   Medication = trastuzumab (herceptin)  Base Dose = 6 mg/kg  Calc Dose: Base Dose x 105.5 kg = 633 mg  Final Dose = 600 mg  Route = IV  Fluid & Volume =  mL  Admin Duration = Over 30-90 minutes   Reload today per Dr. Chisholm       <10% difference, rounded to vial size per protocol, ok to treat with final written dose     Medication = Leucovorin (Wellcovorin)  Base Dose = 400 mg/m²  Calc Dose: Base Dose x 2.33 m² = 932 mg  Final Dose = 932 mg  Route = IV  Fluid & Volume = D5W 250 mL  Admin Duration = Over 2 hours         <10% difference, ok to treat with final written dose   Medication = Fluorouracil (5-FU)  Base Dose = 360 mg/m²  Calc Dose: Base Dose x 2.33 m² = 838.8 mg  Final Dose = 850 mg  Route = IVP  Fluid & Volume = 17 mL in syringe  Conc = 50 mg/mL  Admin Duration = Over 5 minutes          <10% difference, ok to treat with final written dose   Medication = Fluorouracil (5-FU)  Base Dose = 2160 mg/m²  Calc Dose:Base Dose x 2.33 m² = 5032.8 mg  Final Dose = 5000 mg  Route = CIVI via CADD pump  Fluid & Volume = 100 mL (+3 mL overfill = 103 mL)  Admin Duration = Over 46 hours to run at 2.2 mL/hour          <10% difference, ok to treat with final written dose     By my signature below, I confirm this process was performed independently with the BSA and all final chemotherapy dosing calculations congruent. I have reviewed the above chemotherapy order and that my calculation of the final dose and BSA (when applicable) corroborate those calculations of the  pharmacist.     Ruben Santamaria, AbranD

## 2020-04-11 NOTE — PROGRESS NOTES
Pt arrived ambulatory to [facility] for cycle 4 5FU/Herceptin for colon ca.  POC discussed.  Lab results reviewed and reported to Dr. Chisholm who is on-call today for Dr. Ross.  Received ok to proceed with treatment today.  Pt also required re-loading of herceptin due to missed cycle.  Orders given to pharmacist and treatment plan updated.  R chest port in place with EMLA cream, accessed in sterile field.  Brisk blood return noted.  Premedications given.  Leucovorin infused concurrently with Magnesium to replace Mag level of 1.7.  Remainder of treatment completed followed by 5FU bolus and CADD pump connection.  Pt tolerated treatment well.  Follow up appointment confirmed.  Pt discharged from [facility] in NAD under self care.

## 2020-04-11 NOTE — PROGRESS NOTES
"Pharmacy Chemotherapy Calculations    Dx: Metastatic colorectal cancer [KRAS/NRAS wild type, ERBB2 (HER2) amplification]    Cycle 4, Day 1  Previous treatment = 3/14/20  *treatment has been cancelled (C3D8: drug-related rash) and delayed (C4D1: thrombocytopenia) recently    Regimen: mFOLFOX + Erbitux + Herceptin  *Dosing Reference*  Trastuzumab 6 mg/kg IV loading dose C1D1, then 4 mg/kg IV Day 1 of subsequent cycles [confirmed \"every 2 weeks\", per MD; progress note: \"He also had an ERBB2 amplification which might suggest response to treatments like Herceptin\"]  Cetuximab 400 mg/m2 IV Cycle 1, Day 1, then 250 mg/m2 IV Days 1 and 8 (confirmed \"weekly\" per MD)   C4 * * * cetuximab has been omitted * * *     -- Cody LEO reduced dose by 20% to  starting with C1D1 due to anticipated tolerance    -- 3/14/20: Oxaliplatin discontinued from treatment  Leucovorin 400 mg/m2 IV Day 1 followed by  Fluorouracil  IV Day 1 followed by   Cody LEO reduced dose by 10% to 360 mg/m2 starting with C1D1 due to anticipated tolerance   Fluorouracil  CIVI over 46-48 hours    Cody LEO reduced dose by 10% to 2160 mg/m2 starting with C1D1 due to anticipated tolerance   14-day cycle until DP or UT  NCCN guidelines for colon cancer V.2.2019  (cetuximab + chemo) Ven[place] AP et al, OBEY 2017;317(23):4504-2281  (HER2 tx in HER2+ CRC) Kayleen RINALDI, et al. Lancet Oncol. 2019 Apr;20(4):518-530.    Allergies: Patient has no known allergies.  /82   Pulse 89   Temp 36.2 °C (97.2 °F) (Temporal)   Resp 18   Ht 1.855 m (6' 1.03\")   Wt 105.5 kg (232 lb 9.4 oz)   SpO2 97%   BMI 30.66 kg/m²  Body surface area is 2.33 meters squared.     2/7/20 LVEF by Echo ~ 60%    Labs: 4/10/20  PLTs 80 - OK to treat per MD; T.O. entered / signed in treatment plan  All other values meet treatment parameters  * Na+ remains low    Trastuzumab (Herceptin) *reload* 6 mg/kg x 105.5 kg = 633 mg   Rounded to vial size (within 10%) per dose rounding protocol = 600 mg " IV    Cetuximab (Erbitux) 250 mg/m² x __ m² = __ mg   Rounded to vial size (within 10%) per dose rounding protocol = x mg IV over 1 hour    Leucovorin 400 mg/m² x 2.33 m² = 932 mg   <10% difference, okay to treat with final dose = 932 mg IV    Fluorouracil (Adrucil) 360 mg/m² x 2.33 m² = 840 mg   <10% difference, okay to treat with final dose = 850 mg IV    Fluorouracil (Adrucil) 2160 mg/m² x 2.33 m² = 5032.8 mg   <10% difference, okay to treat with final dose = 5000 mg IV      Jamar Andersen, PharmD

## 2020-04-11 NOTE — PROGRESS NOTES
Chemotherapy Verification - PRIMARY RN      Height = 185.5 cm  Weight = 105.5 kg  BSA = 2.33 m2       Medication: Herceptin  Dose: 6 mg/kg (reloading per MD)  Calculated Dose: 633 mg                             (In mg/m2, AUC, mg/kg)     Medication: 5FU bolus inj  Dose: 360 mg/m2  Calculated Dose: 838.8 mg                             (In mg/m2, AUC, mg/kg)    Medication: 5FU CADD Dose: 2160 mg/m2  Calculated Dose: 5032.8 mg                             (In mg/m2, AUC, mg/kg)    I confirm this process was performed independently with the BSA and all final chemotherapy dosing calculations congruent.  Any discrepancies of 10% or greater have been addressed with the chemotherapy pharmacist. The resolution of the discrepancy has been documented in the EPIC progress notes.

## 2020-04-13 ENCOUNTER — OUTPATIENT INFUSION SERVICES (OUTPATIENT)
Dept: ONCOLOGY | Facility: MEDICAL CENTER | Age: 55
End: 2020-04-13
Attending: INTERNAL MEDICINE
Payer: MEDICAID

## 2020-04-13 VITALS
SYSTOLIC BLOOD PRESSURE: 120 MMHG | DIASTOLIC BLOOD PRESSURE: 68 MMHG | BODY MASS INDEX: 30.74 KG/M2 | TEMPERATURE: 98.8 F | HEIGHT: 73 IN | HEART RATE: 80 BPM | RESPIRATION RATE: 18 BRPM | WEIGHT: 231.92 LBS | OXYGEN SATURATION: 94 %

## 2020-04-13 DIAGNOSIS — C18.9 METASTATIC COLON CANCER TO LIVER (HCC): ICD-10-CM

## 2020-04-13 DIAGNOSIS — C78.7 METASTATIC COLON CANCER TO LIVER (HCC): ICD-10-CM

## 2020-04-13 PROCEDURE — 96523 IRRIG DRUG DELIVERY DEVICE: CPT

## 2020-04-13 PROCEDURE — 700111 HCHG RX REV CODE 636 W/ 250 OVERRIDE (IP)

## 2020-04-13 RX ADMIN — HEPARIN 500 UNITS: 100 SYRINGE at 13:53

## 2020-04-13 ASSESSMENT — FIBROSIS 4 INDEX: FIB4 SCORE: 4.24

## 2020-04-13 NOTE — PROGRESS NOTES
Patient arrived ambulatory to the [facility] for 5FU pump disconnect. Reviewed vital signs, labs, and physician order. Patient denies S&S of infection, or bleeding. Reports minor resolution to rash on upper body, and trunk. Pt arrives with right chest port accessed and connected to 5FU pump. Pump indicates 5FU pump infusion complete, reservoir volume. Port flushed per protocol, glover needle removed with tip intact, gauze, and tape dressing placed. Confirmed upcoming appointment date and time with patient. Patient left the [facility] ambulatory in no sign of distress.

## 2020-04-15 RX ORDER — SODIUM CHLORIDE 9 MG/ML
INJECTION, SOLUTION INTRAVENOUS CONTINUOUS
Status: CANCELLED
Start: 2020-04-25

## 2020-04-15 RX ORDER — DIPHENHYDRAMINE HYDROCHLORIDE 50 MG/ML
50 INJECTION INTRAMUSCULAR; INTRAVENOUS PRN
Status: CANCELLED | OUTPATIENT
Start: 2020-04-25

## 2020-04-15 RX ORDER — DEXTROSE MONOHYDRATE 50 MG/ML
INJECTION, SOLUTION INTRAVENOUS CONTINUOUS
Status: CANCELLED | OUTPATIENT
Start: 2020-04-25

## 2020-04-15 RX ORDER — ONDANSETRON 8 MG/1
8 TABLET, ORALLY DISINTEGRATING ORAL PRN
Status: CANCELLED | OUTPATIENT
Start: 2020-04-25

## 2020-04-15 RX ORDER — 0.9 % SODIUM CHLORIDE 0.9 %
10 VIAL (ML) INJECTION PRN
Status: CANCELLED | OUTPATIENT
Start: 2020-04-25

## 2020-04-15 RX ORDER — 0.9 % SODIUM CHLORIDE 0.9 %
20 VIAL (ML) INJECTION PRN
Status: CANCELLED | OUTPATIENT
Start: 2020-04-27

## 2020-04-15 RX ORDER — PROCHLORPERAZINE MALEATE 10 MG
10 TABLET ORAL EVERY 6 HOURS PRN
Status: CANCELLED | OUTPATIENT
Start: 2020-04-25

## 2020-04-15 RX ORDER — 0.9 % SODIUM CHLORIDE 0.9 %
3 VIAL (ML) INJECTION PRN
Status: CANCELLED | OUTPATIENT
Start: 2020-04-24

## 2020-04-15 RX ORDER — 0.9 % SODIUM CHLORIDE 0.9 %
VIAL (ML) INJECTION PRN
Status: CANCELLED | OUTPATIENT
Start: 2020-04-25

## 2020-04-15 RX ORDER — 0.9 % SODIUM CHLORIDE 0.9 %
10 VIAL (ML) INJECTION PRN
Status: CANCELLED | OUTPATIENT
Start: 2020-04-24

## 2020-04-15 RX ORDER — METHYLPREDNISOLONE SODIUM SUCCINATE 125 MG/2ML
125 INJECTION, POWDER, LYOPHILIZED, FOR SOLUTION INTRAMUSCULAR; INTRAVENOUS PRN
Status: CANCELLED | OUTPATIENT
Start: 2020-04-25

## 2020-04-15 RX ORDER — EPINEPHRINE 1 MG/ML(1)
0.5 AMPUL (ML) INJECTION PRN
Status: CANCELLED | OUTPATIENT
Start: 2020-04-25

## 2020-04-15 RX ORDER — ONDANSETRON 2 MG/ML
4 INJECTION INTRAMUSCULAR; INTRAVENOUS PRN
Status: CANCELLED | OUTPATIENT
Start: 2020-04-25

## 2020-04-15 RX ORDER — 0.9 % SODIUM CHLORIDE 0.9 %
3 VIAL (ML) INJECTION PRN
Status: CANCELLED | OUTPATIENT
Start: 2020-04-25

## 2020-04-15 RX ORDER — 0.9 % SODIUM CHLORIDE 0.9 %
VIAL (ML) INJECTION PRN
Status: CANCELLED | OUTPATIENT
Start: 2020-04-24

## 2020-04-18 ENCOUNTER — APPOINTMENT (OUTPATIENT)
Dept: ONCOLOGY | Facility: MEDICAL CENTER | Age: 55
End: 2020-04-18
Attending: INTERNAL MEDICINE
Payer: MEDICAID

## 2020-04-20 ENCOUNTER — HOSPITAL ENCOUNTER (OUTPATIENT)
Dept: RADIOLOGY | Facility: MEDICAL CENTER | Age: 55
End: 2020-04-20
Attending: INTERNAL MEDICINE
Payer: MEDICAID

## 2020-04-20 DIAGNOSIS — C18.8 OVERLAPPING MALIGNANT NEOPLASM OF COLON (HCC): ICD-10-CM

## 2020-04-20 PROCEDURE — 700117 HCHG RX CONTRAST REV CODE 255: Performed by: INTERNAL MEDICINE

## 2020-04-20 PROCEDURE — 71260 CT THORAX DX C+: CPT

## 2020-04-20 RX ADMIN — IOHEXOL 100 ML: 350 INJECTION, SOLUTION INTRAVENOUS at 11:32

## 2020-04-20 RX ADMIN — IOHEXOL 25 ML: 240 INJECTION, SOLUTION INTRATHECAL; INTRAVASCULAR; INTRAVENOUS; ORAL at 11:31

## 2020-04-24 ENCOUNTER — HOSPITAL ENCOUNTER (OUTPATIENT)
Dept: LAB | Facility: MEDICAL CENTER | Age: 55
End: 2020-04-24
Attending: INTERNAL MEDICINE
Payer: MEDICAID

## 2020-04-24 LAB
ALBUMIN SERPL BCP-MCNC: 3.8 G/DL (ref 3.2–4.9)
ALBUMIN/GLOB SERPL: 1.2 G/DL
ALP SERPL-CCNC: 237 U/L (ref 30–99)
ALT SERPL-CCNC: 31 U/L (ref 2–50)
ANION GAP SERPL CALC-SCNC: 13 MMOL/L (ref 7–16)
APPEARANCE UR: CLEAR
AST SERPL-CCNC: 32 U/L (ref 12–45)
BASOPHILS # BLD AUTO: 0.6 % (ref 0–1.8)
BASOPHILS # BLD: 0.03 K/UL (ref 0–0.12)
BILIRUB SERPL-MCNC: 0.9 MG/DL (ref 0.1–1.5)
BILIRUB UR QL STRIP.AUTO: NEGATIVE
BUN SERPL-MCNC: 5 MG/DL (ref 8–22)
CALCIUM SERPL-MCNC: 8.9 MG/DL (ref 8.5–10.5)
CEA SERPL-MCNC: 542 NG/ML (ref 0–3)
CHLORIDE SERPL-SCNC: 103 MMOL/L (ref 96–112)
CO2 SERPL-SCNC: 24 MMOL/L (ref 20–33)
COLOR UR: YELLOW
CREAT SERPL-MCNC: 0.55 MG/DL (ref 0.5–1.4)
EOSINOPHIL # BLD AUTO: 0.16 K/UL (ref 0–0.51)
EOSINOPHIL NFR BLD: 3.2 % (ref 0–6.9)
ERYTHROCYTE [DISTWIDTH] IN BLOOD BY AUTOMATED COUNT: 59 FL (ref 35.9–50)
GLOBULIN SER CALC-MCNC: 3.2 G/DL (ref 1.9–3.5)
GLUCOSE SERPL-MCNC: 175 MG/DL (ref 65–99)
GLUCOSE UR STRIP.AUTO-MCNC: NEGATIVE MG/DL
HCT VFR BLD AUTO: 37.4 % (ref 42–52)
HGB BLD-MCNC: 11.9 G/DL (ref 14–18)
IMM GRANULOCYTES # BLD AUTO: 0.01 K/UL (ref 0–0.11)
IMM GRANULOCYTES NFR BLD AUTO: 0.2 % (ref 0–0.9)
KETONES UR STRIP.AUTO-MCNC: NEGATIVE MG/DL
LEUKOCYTE ESTERASE UR QL STRIP.AUTO: NEGATIVE
LYMPHOCYTES # BLD AUTO: 1.13 K/UL (ref 1–4.8)
LYMPHOCYTES NFR BLD: 22.6 % (ref 22–41)
MAGNESIUM SERPL-MCNC: 1.7 MG/DL (ref 1.5–2.5)
MCH RBC QN AUTO: 30 PG (ref 27–33)
MCHC RBC AUTO-ENTMCNC: 31.8 G/DL (ref 33.7–35.3)
MCV RBC AUTO: 94.2 FL (ref 81.4–97.8)
MICRO URNS: NORMAL
MONOCYTES # BLD AUTO: 0.48 K/UL (ref 0–0.85)
MONOCYTES NFR BLD AUTO: 9.6 % (ref 0–13.4)
NEUTROPHILS # BLD AUTO: 3.19 K/UL (ref 1.82–7.42)
NEUTROPHILS NFR BLD: 63.8 % (ref 44–72)
NITRITE UR QL STRIP.AUTO: NEGATIVE
NRBC # BLD AUTO: 0 K/UL
NRBC BLD-RTO: 0 /100 WBC
PH UR STRIP.AUTO: 6.5 [PH] (ref 5–8)
PLATELET # BLD AUTO: 99 K/UL (ref 164–446)
PMV BLD AUTO: 10.7 FL (ref 9–12.9)
POTASSIUM SERPL-SCNC: 3.4 MMOL/L (ref 3.6–5.5)
PROT SERPL-MCNC: 7 G/DL (ref 6–8.2)
PROT UR QL STRIP: NEGATIVE MG/DL
RBC # BLD AUTO: 3.97 M/UL (ref 4.7–6.1)
RBC UR QL AUTO: NEGATIVE
SODIUM SERPL-SCNC: 140 MMOL/L (ref 135–145)
SP GR UR STRIP.AUTO: 1.01
UROBILINOGEN UR STRIP.AUTO-MCNC: 0.2 MG/DL
WBC # BLD AUTO: 5 K/UL (ref 4.8–10.8)

## 2020-04-24 PROCEDURE — 80053 COMPREHEN METABOLIC PANEL: CPT

## 2020-04-24 PROCEDURE — 81003 URINALYSIS AUTO W/O SCOPE: CPT

## 2020-04-24 PROCEDURE — 83735 ASSAY OF MAGNESIUM: CPT

## 2020-04-24 PROCEDURE — 82378 CARCINOEMBRYONIC ANTIGEN: CPT

## 2020-04-24 PROCEDURE — 85025 COMPLETE CBC W/AUTO DIFF WBC: CPT

## 2020-04-24 PROCEDURE — 36415 COLL VENOUS BLD VENIPUNCTURE: CPT

## 2020-04-24 NOTE — PROGRESS NOTES
"Pharmacy Chemotherapy Verification Note:    Patient Name: Gurmeet Jo      Dx: Metastatic Colorectal CA (KRAS/NRAS wild type, and ERBB2 [a HER2 family receptor] amplification)      Protocol: mFOLFOX+Panitumumab +trastuzumab     *Dosing Reference*  Panitumumab 6 mg/kg IV over 60 minutes on Day 1    4/25/20 added to treatment plan dose reduced to 4.5 mg/kg for tolerance  ~Followed by~     - dose reduced to 68 mg/m² starting C1 for tolerance   3/3/20 - Removed from treatment plan d/t oxaliplatin allergy per Harman VARGAS.  Leucovorin 400 mg/m² IV over 2 hours on Day 1,  followed by  Fluorouracil on Day 1, followed by   - dose reduced to 360 mg/m² starting C1 for tolerance  Fluorouracil CIVI over 46-48 hours   - dose reduced to 2160 mg/m² starting C1 for tolerance   Trastuzumab 6 mg/kg IV over 90 minutes on Day 1 of Cycle 1, followed by  Trastuzumab 4 mg/kg IV over 30-60 minutes on Day 1 of Cycle 2    - confirmed with Dr. Ross trastuzumab every 2 weeks   4/11/20 - Reload with 6 mg/kg per Dr. Chisholm  14-day cycle until disease progression or unacceptable toxicity  NCCN Guidelines for Colon Cancer. V.2.2019.  Rohith TAVERAS, et al. J Clin Oncol. 2010;28(31):4697-705  Aura J et al. J Clin Oncol. 2008;28(12):2006-12  Luisa RODRIGEZ, et al. Br J Cancer. 2002;87(4):393-9    Herceptin has limited data in colorectal cancer, but two regimens exist neither at the same loading dose or interval as Dr. Ross has chosen. Per [facility] progress note: Patient has undergone further [employer] testing of his tumor. He was found to be KRAS/NRAS wild type, which suggest he has an EGFR mutation and would be a candidate for EGFR targeted therapy. He also had an ERBB2 amplification which might suggest response to treatments like Herceptin.    Allergies:  Patient has no known allergies.     /79   Pulse 89   Temp 36.8 °C (98.3 °F) (Temporal)   Resp 17   Ht 1.855 m (6' 1.03\")   Wt 102.9 kg (226 lb 13.7 oz)   SpO2 95%   BMI 29.90 " kg/m²  Body surface area is 2.3 meters squared.     Labs 4/24/20  ANC~ 3190 Plt = 99k   Hgb = 11.9     SCr = 0.55 mg/dL CrCl >125 mL/min   AST/ALT/ALK = 32/31/237 TBili = 0.9   K = 3.4  Mag = 1.7 (electrolytes replaced per protocol)     2/7/2020 ECHO: LVEF 60%     Drug Order   (Drug name, dose, route, IV Fluid & volume, frequency, number of doses) Cycle 5      Previous treatment: C4 4/11/20     Medication = Panitumumab (Vectibix)  Base Dose= 4.5 mg/kg  Calc Dose: Base Dose x 102.9 kg = 463.05 mg  Final Dose = 400 mg  Route = IV  Fluid & Volume =  mL  Admin Duration = Over 60 minutes          >10% difference, rounded to nearest vial size per RODRIGOONiyah Ojeda    Medication = Trastuzumab (herceptin)  Base Dose = 4 mg/kg  Calc Dose: Base Dose x 102.9 kg = 411.2 mg  Final Dose = 450 mg  Route = IV  Fluid & Volume =  mL  Admin Duration = Over 30 minutes          Rounded to nearest vial size (within 10%) per rounding protocol, okay to treat with final dose   Medication = Leucovorin (Wellcovorin)  Base Dose = 400 mg/m²  Calc Dose: Base Dose x 2.3 m² = 920 mg  Final Dose = 900 mg  Route = IV  Fluid & Volume = D5W 250 mL  Admin Duration = Over 2 hours          <10% difference, okay to treat with final dose   Medication = Fluorouracil (5-FU)  Base Dose = 360 mg/m²  Calc Dose: Base Dose x 2.3 m² = 828 mg  Final Dose = 800 mg  Route = IVP  Fluid & Volume = 16 mL in syringe  Conc = 50 mg/mL  Admin Duration = Over 5 minutes          <10% difference, okay to treat with final dose   Medication = Fluorouracil (5-FU)  Base Dose = 2160 mg/m²  Calc Dose:Base Dose x 2.3 m² = 4968 mg  Final Dose = 5000 mg  Route = CIVI   Fluid & Volume = 100 mL (+3 mL overfill =   103 mL)  Admin Duration = Over 46 hours to run at   2.2 mL/hour    Via CADD pump for home infusion      <10% difference, okay to treat with final dose     By my signature below, I confirm this process was performed independently with the BSA and all final  chemotherapy dosing calculations congruent. I have reviewed the above chemotherapy order and that my calculation of the final dose and BSA (when applicable) corroborate those calculations of the  pharmacist. Discrepancies of 10% or greater in the written dose have been addressed and documented within the EPIC Progress notes.    José Marvin, PharmD

## 2020-04-25 ENCOUNTER — OUTPATIENT INFUSION SERVICES (OUTPATIENT)
Dept: ONCOLOGY | Facility: MEDICAL CENTER | Age: 55
End: 2020-04-25
Attending: INTERNAL MEDICINE
Payer: MEDICAID

## 2020-04-25 VITALS
OXYGEN SATURATION: 95 % | WEIGHT: 226.85 LBS | HEIGHT: 73 IN | TEMPERATURE: 98.3 F | SYSTOLIC BLOOD PRESSURE: 139 MMHG | RESPIRATION RATE: 17 BRPM | HEART RATE: 89 BPM | BODY MASS INDEX: 30.07 KG/M2 | DIASTOLIC BLOOD PRESSURE: 79 MMHG

## 2020-04-25 DIAGNOSIS — C78.7 METASTATIC COLON CANCER TO LIVER (HCC): ICD-10-CM

## 2020-04-25 DIAGNOSIS — C18.9 METASTATIC COLON CANCER TO LIVER (HCC): ICD-10-CM

## 2020-04-25 PROCEDURE — 700102 HCHG RX REV CODE 250 W/ 637 OVERRIDE(OP): Performed by: INTERNAL MEDICINE

## 2020-04-25 PROCEDURE — A9270 NON-COVERED ITEM OR SERVICE: HCPCS | Performed by: INTERNAL MEDICINE

## 2020-04-25 PROCEDURE — 96413 CHEMO IV INFUSION 1 HR: CPT

## 2020-04-25 PROCEDURE — 96368 THER/DIAG CONCURRENT INF: CPT

## 2020-04-25 PROCEDURE — 96417 CHEMO IV INFUS EACH ADDL SEQ: CPT

## 2020-04-25 PROCEDURE — 96411 CHEMO IV PUSH ADDL DRUG: CPT

## 2020-04-25 PROCEDURE — 96375 TX/PRO/DX INJ NEW DRUG ADDON: CPT

## 2020-04-25 PROCEDURE — A4212 NON CORING NEEDLE OR STYLET: HCPCS

## 2020-04-25 PROCEDURE — 700105 HCHG RX REV CODE 258: Performed by: INTERNAL MEDICINE

## 2020-04-25 PROCEDURE — G0498 CHEMO EXTEND IV INFUS W/PUMP: HCPCS

## 2020-04-25 PROCEDURE — 700111 HCHG RX REV CODE 636 W/ 250 OVERRIDE (IP): Performed by: INTERNAL MEDICINE

## 2020-04-25 RX ORDER — ONDANSETRON 4 MG/1
4 TABLET, ORALLY DISINTEGRATING ORAL EVERY 6 HOURS PRN
COMMUNITY
End: 2022-05-26

## 2020-04-25 RX ORDER — POTASSIUM CHLORIDE 20 MEQ/1
40 TABLET, EXTENDED RELEASE ORAL ONCE
Status: COMPLETED | OUTPATIENT
Start: 2020-04-25 | End: 2020-04-25

## 2020-04-25 RX ORDER — MAGNESIUM SULFATE 1 G/100ML
1 INJECTION INTRAVENOUS ONCE
Status: COMPLETED | OUTPATIENT
Start: 2020-04-25 | End: 2020-04-25

## 2020-04-25 RX ADMIN — POTASSIUM CHLORIDE 40 MEQ: 1500 TABLET, EXTENDED RELEASE ORAL at 08:30

## 2020-04-25 RX ADMIN — MAGNESIUM SULFATE 1 G: 1 INJECTION INTRAVENOUS at 11:27

## 2020-04-25 RX ADMIN — FLUOROURACIL 5000 MG: 50 INJECTION, SOLUTION INTRAVENOUS at 12:50

## 2020-04-25 RX ADMIN — DIPHENHYDRAMINE HYDROCHLORIDE 50 MG: 50 INJECTION, SOLUTION INTRAMUSCULAR; INTRAVENOUS at 08:30

## 2020-04-25 RX ADMIN — ONDANSETRON 16 MG: 2 INJECTION INTRAMUSCULAR; INTRAVENOUS at 08:45

## 2020-04-25 RX ADMIN — FLUOROURACIL 800 MG: 50 INJECTION, SOLUTION INTRAVENOUS at 12:41

## 2020-04-25 RX ADMIN — LEUCOVORIN CALCIUM 900 MG: 350 INJECTION, POWDER, LYOPHILIZED, FOR SUSPENSION INTRAMUSCULAR; INTRAVENOUS at 11:26

## 2020-04-25 RX ADMIN — PANITUMUMAB 400 MG: 400 SOLUTION INTRAVENOUS at 09:30

## 2020-04-25 RX ADMIN — TRASTUZUMAB 450 MG: 150 INJECTION, POWDER, LYOPHILIZED, FOR SOLUTION INTRAVENOUS at 10:40

## 2020-04-25 RX ADMIN — DEXAMETHASONE SODIUM PHOSPHATE 20 MG: 4 INJECTION, SOLUTION INTRAMUSCULAR; INTRAVENOUS at 09:07

## 2020-04-25 ASSESSMENT — FIBROSIS 4 INDEX: FIB4 SCORE: 3.19

## 2020-04-25 NOTE — PROGRESS NOTES
Chemotherapy Verification - PRIMARY RN      Height = 185.5 cm  Weight = 102.9 kg  BSA = 2.3 m2       Medication: Vectibix  Dose: 4.5 mg/kg  Calculated Dose: 463.05 mg (rounded to vial size per MD, 400 mg final dose)                            (In mg/m2, AUC, mg/kg)     Medication: Herceptin  Dose: 4 mg/kg  Calculated Dose: 411.6 mg                             (In mg/m2, AUC, mg/kg)    Medication: Fluorouracil  Dose: 360 mg/m2  Calculated Dose: 828 mg                             (In mg/m2, AUC, mg/kg)    Medication: Fluorouracil  Dose: 2,160 mg/m2  Calculated Dose: 4,968 mg (in CADD pump over 46 hrs)                            (In mg/m2, AUC, mg/kg)      I confirm this process was performed independently with the BSA and all final chemotherapy dosing calculations congruent.  Any discrepancies of 10% or greater have been addressed with the chemotherapy pharmacist. The resolution of the discrepancy has been documented in the EPIC progress notes.

## 2020-04-25 NOTE — PROGRESS NOTES
"Pharmacy Chemotherapy Calculations    Dx: Metastatic colorectal cancer [KRAS/NRAS wild type, ERBB2 (HER2) amplification]    Cycle 5, Day 1  Previous treatment = 4/11/20  *C3D8: treatment cancelled d/t drug-related rash  *C4D1: delayed d/t thrombocytopenia    Regimen: mFOLFOX + Erbitux Vectibix + Herceptin  *Dosing Reference*  Trastuzumab 6 mg/kg IV loading dose C1D1, then 4 mg/kg IV Day 1 of subsequent cycles [confirmed \"every 2 weeks\", per MD; progress note: \"He also had an ERBB2 amplification which might suggest response to treatments like Herceptin\"]  Cetuximab 400 mg/m2 IV Cycle 1, Day 1, then 250 mg/m2 IV Days 1 and 8 (confirmed \"weekly\" per MD)   C4 * * * cetuximab has been omitted * * *   C5 * * * cetuximab replaced with panitumumab * * *      > Cody LEO reduced dose by 20% to  starting with C1D1 due to anticipated tolerance    > 3/14/20: Oxaliplatin discontinued from treatment  Panitumumab (Vectibix) 6 mg/kg IV D1 over 60 min   + added; change of therapy d/t cetuximab skin tox   > 4/25/20, initial dose reduction to 4.5mg/kg d/t previous EGFR cutaneous toxicities    > OK to round < 15% to 400mg (available vial size) x1 dose (order in treatment plan)  Leucovorin 400 mg/m2 IV Day 1 followed by  Fluorouracil  IV Day 1 followed by   > Cody LEO reduced dose by 10% to 360 mg/m2 starting with C1D1 due to anticipated tolerance   Fluorouracil  CIVI over 46-48 hours    > Cody LEO reduced dose by 10% to 2160 mg/m2 starting with C1D1 due to anticipated tolerance   14-day cycle until DP or UT  NCCN guidelines for colon cancer V.2.2019  (cetuximab + chemo) [place] AP et al, OBEY 2017;317(23):4312-9851  (HER2 tx in HER2+ CRC) Kayleen RINALDI et al. Lancet Oncol. 2019 Apr;20(4):518-530.    Allergies: Patient has no known allergies.  /79   Pulse 89   Temp 36.8 °C (98.3 °F) (Temporal)   Resp 17   Ht 1.855 m (6' 1.03\")   Wt 102.9 kg (226 lb 13.7 oz)   SpO2 95%   BMI 29.90 kg/m²  Body surface area is 2.3 meters " squared.     2/7/20 LVEF by Echo ~ 60%    Labs: 4/24/20  Meet treatment parameters  * Na+ returned to WNL  * K+ 3.4 - PO replacement    Trastuzumab (Herceptin) 4 mg/kg x 102.9 kg = 412 mg   Rounded to vial size (within 10%) per dose rounding protocol = 450 mg IV    Cetuximab (Erbitux) 250 mg/m² x __ m² = __ mg   Rounded to vial size (within 10%) per dose rounding protocol = x mg IV over 1 hour    Panitumumab (Vectibix) 4.5 mg/kg x 102.9 kg = 463 mg   Rounded to vial size within 15% per MD ok x1 dose = 400 mg IV    Leucovorin 400 mg/m² x 2.3 m² = 920 mg   <10% difference, okay to treat with final dose = 900 mg IV    Fluorouracil (Adrucil) 360 mg/m² x 2.3 m² = 828 mg   <10% difference, okay to treat with final dose = 800 mg IV    Fluorouracil (Adrucil) 2160 mg/m² x 2.3 m² = 4968 mg   <10% difference, okay to treat with final dose = 5000 mg IV      Jamar Andersen, PharmD

## 2020-04-25 NOTE — PROGRESS NOTES
Pt arrived to [facility], ambulatory, for Vectibix, Herceptin, 5FU. Pt voices no complaints. Pt switched from Erbitux to Vectibix d/t significant rash with Erbitux - pt currently on Doxycycline for rash, which is improving per pt. Labs reviewed from 4/24, pt within parameters to treat today. Port accessed in sterile manner, positive blood return noted. Pre-medications administered with no s/sx of adverse reaction. Vectibix, herceptin, and leucovorin infused with no s/sx of adverse reactions. Potassium and magnesium replaced per protocol. 5FU bolus administered with no complications. 5FU CADD pump connected with no s/sx of adverse reaction. Pt left [facility] with no s/sx of distress. Follow up appointment confirmed.

## 2020-04-25 NOTE — PROGRESS NOTES
Chemotherapy Verification - SECONDARY RN       Height = 185.5 cm  Weight = 102.9 kg  BSA = 2.3 m2       Medication: Vectibix  Dose: 4.5 mg/kg dr  Calculated Dose: 463.05 mg rounded to vial ok per md                            (In mg/m2, AUC, mg/kg)     Medication: Herceptin  Dose: 4 mg/kg  Calculated Dose: 411.6 mg round to vial                            (In mg/m2, AUC, mg/kg)    Medication: 5FU  Dose: 360 mg/m2 dr  Calculated Dose: 828.95 mg                              (In mg/m2, AUC, mg/kg)    Medication: Home infusion 5FU  Dose: 2160 mg/m2 dr  Calculated Dose: 4968 mg over 46 hours                             (In mg/m2, AUC, mg/kg)      I confirm that this process was performed independently.

## 2020-04-27 ENCOUNTER — OUTPATIENT INFUSION SERVICES (OUTPATIENT)
Dept: ONCOLOGY | Facility: MEDICAL CENTER | Age: 55
End: 2020-04-27
Attending: INTERNAL MEDICINE
Payer: MEDICAID

## 2020-04-27 VITALS
HEIGHT: 73 IN | DIASTOLIC BLOOD PRESSURE: 75 MMHG | RESPIRATION RATE: 18 BRPM | OXYGEN SATURATION: 95 % | TEMPERATURE: 98.1 F | HEART RATE: 83 BPM | BODY MASS INDEX: 29.72 KG/M2 | SYSTOLIC BLOOD PRESSURE: 126 MMHG | WEIGHT: 224.21 LBS

## 2020-04-27 DIAGNOSIS — C78.7 METASTATIC COLON CANCER TO LIVER (HCC): ICD-10-CM

## 2020-04-27 DIAGNOSIS — C18.9 METASTATIC COLON CANCER TO LIVER (HCC): ICD-10-CM

## 2020-04-27 PROCEDURE — 96523 IRRIG DRUG DELIVERY DEVICE: CPT

## 2020-04-27 PROCEDURE — 700111 HCHG RX REV CODE 636 W/ 250 OVERRIDE (IP): Performed by: INTERNAL MEDICINE

## 2020-04-27 RX ADMIN — HEPARIN 500 UNITS: 100 SYRINGE at 14:55

## 2020-04-27 ASSESSMENT — FIBROSIS 4 INDEX: FIB4 SCORE: 3.19

## 2020-04-27 NOTE — PROGRESS NOTES
Patient arrived to \[facility]\"" for CADD pump disconnect and port de-access.  Pt denies any acute changes.  CADD pump with 0ml volume and 101.3 ml given.  CADD pump disconnected, port flushed with brisk blood return noted.  Heparin instilled and de-accessed per protocol.  Gauze/tape dressing applied.  Confirmed next appointment and pt ambulated out of clinic in no apparent distress.

## 2020-05-05 ENCOUNTER — HOSPITAL ENCOUNTER (OUTPATIENT)
Dept: LAB | Facility: MEDICAL CENTER | Age: 55
End: 2020-05-05
Attending: INTERNAL MEDICINE
Payer: MEDICAID

## 2020-05-05 LAB
ALBUMIN SERPL BCP-MCNC: 3.8 G/DL (ref 3.2–4.9)
ALBUMIN/GLOB SERPL: 1.3 G/DL
ALP SERPL-CCNC: 203 U/L (ref 30–99)
ALT SERPL-CCNC: 43 U/L (ref 2–50)
ANION GAP SERPL CALC-SCNC: 14 MMOL/L (ref 7–16)
APPEARANCE UR: CLEAR
AST SERPL-CCNC: 36 U/L (ref 12–45)
BASOPHILS # BLD AUTO: 0.9 % (ref 0–1.8)
BASOPHILS # BLD: 0.03 K/UL (ref 0–0.12)
BILIRUB SERPL-MCNC: 1 MG/DL (ref 0.1–1.5)
BILIRUB UR QL STRIP.AUTO: NEGATIVE
BUN SERPL-MCNC: 10 MG/DL (ref 8–22)
CALCIUM SERPL-MCNC: 8.8 MG/DL (ref 8.5–10.5)
CEA SERPL-MCNC: 451 NG/ML (ref 0–3)
CHLORIDE SERPL-SCNC: 97 MMOL/L (ref 96–112)
CO2 SERPL-SCNC: 22 MMOL/L (ref 20–33)
COLOR UR: YELLOW
CREAT SERPL-MCNC: 0.46 MG/DL (ref 0.5–1.4)
EOSINOPHIL # BLD AUTO: 0.06 K/UL (ref 0–0.51)
EOSINOPHIL NFR BLD: 1.8 % (ref 0–6.9)
ERYTHROCYTE [DISTWIDTH] IN BLOOD BY AUTOMATED COUNT: 59.8 FL (ref 35.9–50)
GLOBULIN SER CALC-MCNC: 2.9 G/DL (ref 1.9–3.5)
GLUCOSE SERPL-MCNC: 351 MG/DL (ref 65–99)
GLUCOSE UR STRIP.AUTO-MCNC: >=1000 MG/DL
HCT VFR BLD AUTO: 36.3 % (ref 42–52)
HGB BLD-MCNC: 12.3 G/DL (ref 14–18)
IMM GRANULOCYTES # BLD AUTO: 0.02 K/UL (ref 0–0.11)
IMM GRANULOCYTES NFR BLD AUTO: 0.6 % (ref 0–0.9)
KETONES UR STRIP.AUTO-MCNC: NEGATIVE MG/DL
LEUKOCYTE ESTERASE UR QL STRIP.AUTO: NEGATIVE
LYMPHOCYTES # BLD AUTO: 0.92 K/UL (ref 1–4.8)
LYMPHOCYTES NFR BLD: 28.2 % (ref 22–41)
MAGNESIUM SERPL-MCNC: 1.6 MG/DL (ref 1.5–2.5)
MCH RBC QN AUTO: 31.4 PG (ref 27–33)
MCHC RBC AUTO-ENTMCNC: 33.9 G/DL (ref 33.7–35.3)
MCV RBC AUTO: 92.6 FL (ref 81.4–97.8)
MICRO URNS: ABNORMAL
MONOCYTES # BLD AUTO: 0.43 K/UL (ref 0–0.85)
MONOCYTES NFR BLD AUTO: 13.2 % (ref 0–13.4)
NEUTROPHILS # BLD AUTO: 1.8 K/UL (ref 1.82–7.42)
NEUTROPHILS NFR BLD: 55.3 % (ref 44–72)
NITRITE UR QL STRIP.AUTO: NEGATIVE
NRBC # BLD AUTO: 0 K/UL
NRBC BLD-RTO: 0 /100 WBC
PH UR STRIP.AUTO: 5.5 [PH] (ref 5–8)
PLATELET # BLD AUTO: 87 K/UL (ref 164–446)
PMV BLD AUTO: 10.5 FL (ref 9–12.9)
POTASSIUM SERPL-SCNC: 3.4 MMOL/L (ref 3.6–5.5)
PROT SERPL-MCNC: 6.7 G/DL (ref 6–8.2)
PROT UR QL STRIP: NEGATIVE MG/DL
RBC # BLD AUTO: 3.92 M/UL (ref 4.7–6.1)
RBC UR QL AUTO: NEGATIVE
SODIUM SERPL-SCNC: 133 MMOL/L (ref 135–145)
SP GR UR REFRACTOMETRY: >1.045
UROBILINOGEN UR STRIP.AUTO-MCNC: 0.2 MG/DL
WBC # BLD AUTO: 3.3 K/UL (ref 4.8–10.8)

## 2020-05-05 PROCEDURE — 83735 ASSAY OF MAGNESIUM: CPT

## 2020-05-05 PROCEDURE — 81003 URINALYSIS AUTO W/O SCOPE: CPT

## 2020-05-05 PROCEDURE — 36415 COLL VENOUS BLD VENIPUNCTURE: CPT

## 2020-05-05 PROCEDURE — 85025 COMPLETE CBC W/AUTO DIFF WBC: CPT

## 2020-05-05 PROCEDURE — 82378 CARCINOEMBRYONIC ANTIGEN: CPT

## 2020-05-05 PROCEDURE — 80053 COMPREHEN METABOLIC PANEL: CPT

## 2020-05-07 RX ORDER — DEXTROSE MONOHYDRATE 50 MG/ML
INJECTION, SOLUTION INTRAVENOUS CONTINUOUS
Status: CANCELLED | OUTPATIENT
Start: 2020-05-09

## 2020-05-07 RX ORDER — 0.9 % SODIUM CHLORIDE 0.9 %
10 VIAL (ML) INJECTION PRN
Status: CANCELLED | OUTPATIENT
Start: 2020-05-08

## 2020-05-07 RX ORDER — 0.9 % SODIUM CHLORIDE 0.9 %
VIAL (ML) INJECTION PRN
Status: CANCELLED | OUTPATIENT
Start: 2020-05-09

## 2020-05-07 RX ORDER — 0.9 % SODIUM CHLORIDE 0.9 %
10 VIAL (ML) INJECTION PRN
Status: CANCELLED | OUTPATIENT
Start: 2020-05-09

## 2020-05-07 RX ORDER — 0.9 % SODIUM CHLORIDE 0.9 %
3 VIAL (ML) INJECTION PRN
Status: CANCELLED | OUTPATIENT
Start: 2020-05-09

## 2020-05-07 RX ORDER — SODIUM CHLORIDE 9 MG/ML
INJECTION, SOLUTION INTRAVENOUS CONTINUOUS
Status: CANCELLED
Start: 2020-05-09

## 2020-05-07 RX ORDER — EPINEPHRINE 1 MG/ML(1)
0.5 AMPUL (ML) INJECTION PRN
Status: CANCELLED | OUTPATIENT
Start: 2020-05-09

## 2020-05-07 RX ORDER — METHYLPREDNISOLONE SODIUM SUCCINATE 125 MG/2ML
125 INJECTION, POWDER, LYOPHILIZED, FOR SOLUTION INTRAMUSCULAR; INTRAVENOUS PRN
Status: CANCELLED | OUTPATIENT
Start: 2020-05-09

## 2020-05-07 RX ORDER — PROCHLORPERAZINE MALEATE 10 MG
10 TABLET ORAL EVERY 6 HOURS PRN
Status: CANCELLED | OUTPATIENT
Start: 2020-05-09

## 2020-05-07 RX ORDER — DIPHENHYDRAMINE HYDROCHLORIDE 50 MG/ML
50 INJECTION INTRAMUSCULAR; INTRAVENOUS PRN
Status: CANCELLED | OUTPATIENT
Start: 2020-05-09

## 2020-05-07 RX ORDER — ONDANSETRON 8 MG/1
8 TABLET, ORALLY DISINTEGRATING ORAL PRN
Status: CANCELLED | OUTPATIENT
Start: 2020-05-09

## 2020-05-07 RX ORDER — 0.9 % SODIUM CHLORIDE 0.9 %
20 VIAL (ML) INJECTION PRN
Status: CANCELLED | OUTPATIENT
Start: 2020-05-11

## 2020-05-07 RX ORDER — 0.9 % SODIUM CHLORIDE 0.9 %
3 VIAL (ML) INJECTION PRN
Status: CANCELLED | OUTPATIENT
Start: 2020-05-08

## 2020-05-07 RX ORDER — ONDANSETRON 2 MG/ML
4 INJECTION INTRAMUSCULAR; INTRAVENOUS PRN
Status: CANCELLED | OUTPATIENT
Start: 2020-05-09

## 2020-05-07 RX ORDER — 0.9 % SODIUM CHLORIDE 0.9 %
VIAL (ML) INJECTION PRN
Status: CANCELLED | OUTPATIENT
Start: 2020-05-08

## 2020-05-09 ENCOUNTER — OUTPATIENT INFUSION SERVICES (OUTPATIENT)
Dept: ONCOLOGY | Facility: MEDICAL CENTER | Age: 55
End: 2020-05-09
Attending: INTERNAL MEDICINE
Payer: MEDICAID

## 2020-05-09 VITALS
SYSTOLIC BLOOD PRESSURE: 133 MMHG | DIASTOLIC BLOOD PRESSURE: 74 MMHG | OXYGEN SATURATION: 97 % | HEIGHT: 73 IN | HEART RATE: 86 BPM | WEIGHT: 223.55 LBS | BODY MASS INDEX: 29.63 KG/M2 | TEMPERATURE: 97.5 F | RESPIRATION RATE: 18 BRPM

## 2020-05-09 DIAGNOSIS — C78.7 METASTATIC COLON CANCER TO LIVER (HCC): ICD-10-CM

## 2020-05-09 DIAGNOSIS — C18.9 METASTATIC COLON CANCER TO LIVER (HCC): ICD-10-CM

## 2020-05-09 PROCEDURE — G0498 CHEMO EXTEND IV INFUS W/PUMP: HCPCS

## 2020-05-09 PROCEDURE — 96417 CHEMO IV INFUS EACH ADDL SEQ: CPT

## 2020-05-09 PROCEDURE — 96413 CHEMO IV INFUSION 1 HR: CPT

## 2020-05-09 PROCEDURE — 700102 HCHG RX REV CODE 250 W/ 637 OVERRIDE(OP): Performed by: INTERNAL MEDICINE

## 2020-05-09 PROCEDURE — 700111 HCHG RX REV CODE 636 W/ 250 OVERRIDE (IP): Performed by: INTERNAL MEDICINE

## 2020-05-09 PROCEDURE — 96415 CHEMO IV INFUSION ADDL HR: CPT

## 2020-05-09 PROCEDURE — A4212 NON CORING NEEDLE OR STYLET: HCPCS

## 2020-05-09 PROCEDURE — 96411 CHEMO IV PUSH ADDL DRUG: CPT

## 2020-05-09 PROCEDURE — 96375 TX/PRO/DX INJ NEW DRUG ADDON: CPT

## 2020-05-09 PROCEDURE — A9270 NON-COVERED ITEM OR SERVICE: HCPCS | Performed by: INTERNAL MEDICINE

## 2020-05-09 PROCEDURE — 96368 THER/DIAG CONCURRENT INF: CPT

## 2020-05-09 PROCEDURE — 700105 HCHG RX REV CODE 258: Performed by: INTERNAL MEDICINE

## 2020-05-09 RX ORDER — POTASSIUM CHLORIDE 750 MG/1
CAPSULE, EXTENDED RELEASE ORAL DAILY
COMMUNITY
Start: 2020-05-07 | End: 2020-09-19

## 2020-05-09 RX ORDER — POTASSIUM CHLORIDE 20 MEQ/1
40 TABLET, EXTENDED RELEASE ORAL ONCE
Status: COMPLETED | OUTPATIENT
Start: 2020-05-09 | End: 2020-05-09

## 2020-05-09 RX ORDER — MAGNESIUM SULFATE 1 G/100ML
1 INJECTION INTRAVENOUS ONCE
Status: COMPLETED | OUTPATIENT
Start: 2020-05-09 | End: 2020-05-09

## 2020-05-09 RX ADMIN — PANITUMUMAB 500 MG: 400 SOLUTION INTRAVENOUS at 10:33

## 2020-05-09 RX ADMIN — DIPHENHYDRAMINE HYDROCHLORIDE 50 MG: 50 INJECTION, SOLUTION INTRAMUSCULAR; INTRAVENOUS at 09:20

## 2020-05-09 RX ADMIN — FLUOROURACIL 820 MG: 50 INJECTION, SOLUTION INTRAVENOUS at 15:19

## 2020-05-09 RX ADMIN — MAGNESIUM SULFATE 1 G: 1 INJECTION INTRAVENOUS at 12:52

## 2020-05-09 RX ADMIN — DEXAMETHASONE SODIUM PHOSPHATE 20 MG: 4 INJECTION, SOLUTION INTRAMUSCULAR; INTRAVENOUS at 09:52

## 2020-05-09 RX ADMIN — ONDANSETRON 16 MG: 2 INJECTION INTRAMUSCULAR; INTRAVENOUS at 09:35

## 2020-05-09 RX ADMIN — LEUCOVORIN CALCIUM 912 MG: 350 INJECTION, POWDER, LYOPHILIZED, FOR SUSPENSION INTRAMUSCULAR; INTRAVENOUS at 12:51

## 2020-05-09 RX ADMIN — FLUOROURACIL 4925 MG: 50 INJECTION, SOLUTION INTRAVENOUS at 15:24

## 2020-05-09 RX ADMIN — TRASTUZUMAB 406 MG: 150 INJECTION, POWDER, LYOPHILIZED, FOR SOLUTION INTRAVENOUS at 12:02

## 2020-05-09 RX ADMIN — POTASSIUM CHLORIDE 40 MEQ: 1500 TABLET, EXTENDED RELEASE ORAL at 09:19

## 2020-05-09 ASSESSMENT — FIBROSIS 4 INDEX: FIB4 SCORE: 3.53

## 2020-05-09 NOTE — PROGRESS NOTES
[facility]meet [facility] for Day [facility]/ Cycle 1 of vectibix, herceptin, leucovorin, adrucil push, and CADD pump. Pt denied having any new or acute complaints today. Port accessed in a sterile manner with low profile needle, had + blood return, flushed briskly. Hypoallergenic dressing used per pt request. Pt given pre-meds, vectibix, herceptin, leucovorin, and electrolytes replaced as prescribed, tolerated well, denied having any complaints during or after infusion. Port had + blood return after each infusion. Adrucil push given as ordered, blood return noted before, during, and after push. Patient then attached to CADD pump for 46 hour infusion. Patient denies having questions about pump. Next appointment scheduled. Patient discharged home to self care.

## 2020-05-09 NOTE — PROGRESS NOTES
"Pharmacy Chemotherapy Calculations    Dx: Metastatic colorectal cancer [KRAS/NRAS wild type, ERBB2 (HER2) amplification]    Cycle 6  Previous treatment = 4/25/20  *C3D8: treatment cancelled d/t drug-related rash  *C4D1: delayed d/t thrombocytopenia    Regimen: mFOLFOX + Erbitux Vectibix + Herceptin  *Dosing Reference*  Trastuzumab 6 mg/kg IV loading dose C1D1, then 4 mg/kg IV Day 1 of subsequent cycles [confirmed \"every 2 weeks\", per MD; progress note: \"He also had an ERBB2 amplification which might suggest response to treatments like Herceptin\"]  Cetuximab 400 mg/m2 IV Cycle 1, Day 1, then 250 mg/m2 IV Days 1 and 8 (confirmed \"weekly\" per MD)   > C4 * * * cetuximab has been omitted * * *   > C5 * * * cetuximab replaced with panitumumab * * *      > Cody LEO reduced dose by 20% to  starting with C1D1 due to anticipated tolerance    > 3/14/20: Oxaliplatin discontinued from treatment  Panitumumab (Vectibix)  IV D1 over 60 min   + added; change of therapy d/t cetuximab skin tox   > 4/25/20, initial dose reduction to 4.5 mg/kg d/t previous EGFR cutaneous toxicities    > OK to round < 15% to 400mg (available vial size) x1 dose (order in treatment plan)  Leucovorin 400 mg/m2 IV Day 1 followed by  Fluorouracil  IV Day 1 followed by   > Cody LEO reduced dose by 10% to 360 mg/m2 starting with C1D1 due to anticipated tolerance   Fluorouracil  CIVI over 46-48 hours    > Cody LEO reduced dose by 10% to 2160 mg/m2 starting with C1D1 due to anticipated tolerance   14-day cycle until DP or UT  NCCN guidelines for colon cancer V.2.2019  (cetuximab + chemo) [place] AP et al, OBEY 2017;317(23):7375-5602  (HER2 tx in HER2+ CRC) Kayleen F, et al. Lancet Oncol. 2019 Apr;20(4):518-530.    Allergies: Patient has no known allergies.  /74   Pulse 86   Temp 36.4 °C (97.5 °F) (Temporal)   Resp 18   Ht 1.85 m (6' 0.84\")   Wt 101.4 kg (223 lb 8.7 oz)   SpO2 97%   BMI 29.63 kg/m²  Body surface area is 2.28 meters squared. "     2/7/20 LVEF by Echo ~ 60%    All labs (5/5/20) within treatment plan parameters.      Panitumumab (Vectibix) 4.5 mg/kg x 101.4 kg = 456.3 mg   Rounded to vial size (within 10%) per dose rounding protocol = 500 mg IV    Trastuzumab (Herceptin) 4 mg/kg x 101.4 kg = 405.6 mg   <10% difference, okay to treat with final dose (unable to round to vial size as it would exceed 10% difference) = 406 mg IV    Leucovorin 400 mg/m² x 2.28 m² = 912 mg   <10% difference, okay to treat with final dose = 912 mg IV    Fluorouracil (Adrucil) 360 mg/m² x 2.28 m² = 820.8 mg   <10% difference, okay to treat with final dose = 820 mg IV    Fluorouracil (Adrucil) 2160 mg/m² x 2.28 m² = 4925 mg   <10% difference, okay to treat with final dose = 4925 mg IV      Aura Guzman, PharmD

## 2020-05-09 NOTE — PROGRESS NOTES
Chemotherapy Verification - SECONDARY RN     D1C6    Height = 185 cm  Weight = 101.4 kg  BSA = 2.28 m2 - EF 60%, done 2/7/2020      Medication: Panitumumab (Vectibix)  Dose: 4.5 mg/kg  Calculated Dose: 456.3 mg                                 Medication: Trastuzumab (Herceptin)  Dose: 4 mg/kg  Calculated Dose: 405.6 mg                                Medication: Fluorouracil (5FU) push  Dose: 360 mg/m2  Calculated Dose: 820.8 mg                                 Medication: Fluorouracil (5FU) CADD pump  Dose: 2160 mg/m2  Calculated Dose: 4924.8 mg                              I confirm that this process was performed independently.

## 2020-05-09 NOTE — PROGRESS NOTES
Chemotherapy Verification - PRIMARY RN      Height = 1.85 m  Weight = 101.4 kg  BSA = 2.28 m^2       Medication: panitumumab  Dose: 4.5 mg/kg  Calculated Dose: 456.3 mg                             (In mg/m2, AUC, mg/kg)     Medication: trastuzumab  Dose: 4 mg/kg  Calculated Dose: 405.6                             (In mg/m2, AUC, mg/kg)    Medication: fluorouracil BOLUS  Dose: 360 mg/m^2  Calculated Dose: 820.8 mg                             (In mg/m2, AUC, mg/kg)    Medication: fluorouracil OVER 46 HOURS Dose: 2160 mg/m^2  Calculated Dose: 4924.8 mg                            (In mg/m2, AUC, mg/kg)          I confirm this process was performed independently with the BSA and all final chemotherapy dosing calculations congruent.  Any discrepancies of 10% or greater have been addressed with the chemotherapy pharmacist. The resolution of the discrepancy has been documented in the EPIC progress notes.

## 2020-05-09 NOTE — PROGRESS NOTES
"Pharmacy Chemotherapy Verification Note:    Patient Name: Gurmeet Jo      Dx: Metastatic Colorectal CA (KRAS/NRAS wild type, and ERBB2 [a HER2 family receptor] amplification)      Protocol: mFOLFOX+Panitumumab +trastuzumab     *Dosing Reference*  Panitumumab 6 mg/kg IV over 60 minutes on Day 1    4/25/20 added to treatment plan dose reduced to 4.5 mg/kg for tolerance  ~Followed by~     - dose reduced to 68 mg/m² starting C1 for tolerance   3/3/20 - Removed from treatment plan d/t oxaliplatin allergy per Harman VARGAS.  Leucovorin 400 mg/m² IV over 2 hours on Day 1,  followed by  Fluorouracil on Day 1, followed by   - dose reduced to 360 mg/m² starting C1 for tolerance  Fluorouracil CIVI over 46-48 hours   - dose reduced to 2160 mg/m² starting C1 for tolerance   Trastuzumab 6 mg/kg IV over 90 minutes on Day 1 of Cycle 1, followed by  Trastuzumab 4 mg/kg IV over 30-60 minutes on Day 1 of Cycle 2    - confirmed with Dr. Ross trastuzumab every 2 weeks   4/11/20 - Reload with 6 mg/kg per Dr. Chisholm  14-day cycle until disease progression or unacceptable toxicity  NCCN Guidelines for Colon Cancer. V.2.2019.  Rohith TAVERAS, et al. J Clin Oncol. 2010;28(31):4697-705  Aura J et al. J Clin Oncol. 2008;28(12):2006-12  Luisa RODRIGEZ, et al. Br J Cancer. 2002;87(4):393-9    Herceptin has limited data in colorectal cancer, but two regimens exist neither at the same loading dose or interval as Dr. Ross has chosen. Per [facility] progress note: Patient has undergone further [employer] testing of his tumor. He was found to be KRAS/NRAS wild type, which suggest he has an EGFR mutation and would be a candidate for EGFR targeted therapy. He also had an ERBB2 amplification which might suggest response to treatments like Herceptin.    Allergies:  Patient has no known allergies.     /74   Pulse 86   Temp 36.4 °C (97.5 °F) (Temporal)   Resp 18   Ht 1.85 m (6' 0.84\")   Wt 101.4 kg (223 lb 8.7 oz)   SpO2 97%   BMI 29.63 " kg/m²  Body surface area is 2.28 meters squared.     Labs 5/5/20:  ANC~ 1800 Plt = 87k   Hgb = 12.3     SCr = 0.46 mg/dL CrCl > 125 mL/min   AST/ALT/ALK = 36/43/203 TBili = 1   K = 3.4  Mag = 1.6 (electrolytes replaced per protocol)    2/7/2020 ECHO: LVEF 60%     Drug Order   (Drug name, dose, route, IV Fluid & volume, frequency, number of doses) Cycle 6      Previous treatment: C5 4/25/20     Medication = Panitumumab (Vectibix)  Base Dose= 4.5 mg/kg  Calc Dose: Base Dose x 101.4 kg = 456.3 mg  Final Dose = 500 mg  Route = IV  Fluid & Volume =  mL  Admin Duration = Over 60 minutes          Rounded to nearest vial size (within 10%) per rounding protocol, okay to treat with final dose   Medication = Trastuzumab (herceptin)  Base Dose = 4 mg/kg  Calc Dose: Base Dose x 101.4 kg = 405.6 mg  Final Dose = 406 mg  Route = IV  Fluid & Volume =  mL  Admin Duration = Over 30 minutes          Unable to round to nearest vial size, okay to treat with final dose   Medication = Leucovorin (Wellcovorin)  Base Dose = 400 mg/m²  Calc Dose: Base Dose x 2.28 m² = 912 mg  Final Dose = 912 mg  Route = IV  Fluid & Volume = D5W 250 mL  Admin Duration = Over 2 hours          <10% difference, okay to treat with final dose   Medication = Fluorouracil (5-FU)  Base Dose = 360 mg/m²  Calc Dose: Base Dose x 2.28 m² = 820.8 mg  Final Dose = 820 mg  Route = IVP  Fluid & Volume = 16.4 mL in syringe  Conc = 50 mg/mL  Admin Duration = Over 5 minutes          <10% difference, okay to treat with final dose   Medication = Fluorouracil (5-FU)  Base Dose = 2160 mg/m²  Calc Dose:Base Dose x 2.28 m² = 4924.8 mg  Final Dose = 4925 mg  Route = CIVI   Fluid & Volume = 98.5 mL (+3 mL overfill =   101.5 mL)  Admin Duration = Over 46 hours to run at   2.1 mL/hour    Via CADD pump for home infusion      <10% difference, okay to treat with final dose     By my signature below, I confirm this process was performed independently with the BSA and all final  chemotherapy dosing calculations congruent. I have reviewed the above chemotherapy order and that my calculation of the final dose and BSA (when applicable) corroborate those calculations of the  pharmacist. Discrepancies of 10% or greater in the written dose have been addressed and documented within the EPIC Progress notes.    José Marvin, PharmD

## 2020-05-11 ENCOUNTER — OUTPATIENT INFUSION SERVICES (OUTPATIENT)
Dept: ONCOLOGY | Facility: MEDICAL CENTER | Age: 55
End: 2020-05-11
Attending: INTERNAL MEDICINE
Payer: MEDICAID

## 2020-05-11 VITALS
SYSTOLIC BLOOD PRESSURE: 125 MMHG | RESPIRATION RATE: 18 BRPM | HEART RATE: 83 BPM | HEIGHT: 73 IN | OXYGEN SATURATION: 96 % | BODY MASS INDEX: 29.51 KG/M2 | WEIGHT: 222.66 LBS | DIASTOLIC BLOOD PRESSURE: 70 MMHG | TEMPERATURE: 97.8 F

## 2020-05-11 DIAGNOSIS — C18.9 METASTATIC COLON CANCER TO LIVER (HCC): ICD-10-CM

## 2020-05-11 DIAGNOSIS — C78.7 METASTATIC COLON CANCER TO LIVER (HCC): ICD-10-CM

## 2020-05-11 PROCEDURE — 96523 IRRIG DRUG DELIVERY DEVICE: CPT

## 2020-05-11 PROCEDURE — 700111 HCHG RX REV CODE 636 W/ 250 OVERRIDE (IP)

## 2020-05-11 RX ADMIN — HEPARIN 500 UNITS: 100 SYRINGE at 14:48

## 2020-05-11 ASSESSMENT — FIBROSIS 4 INDEX: FIB4 SCORE: 3.53

## 2020-05-11 NOTE — PROGRESS NOTES
Patient to hospitals for CADD pump disconnect and PORT flush with deacess. CADD pump was still running. 99.1ml was given, 98.5ml was the minimum. 1.2ml left in pump. Patient declined to let pump finish. PORT flushed with + blood return. [facility] performed and deacessed. Gauze and paper tape applied as a dressing. Patient has future appointments. Patient to home in care of self.

## 2020-05-21 ENCOUNTER — HOSPITAL ENCOUNTER (OUTPATIENT)
Dept: LAB | Facility: MEDICAL CENTER | Age: 55
End: 2020-05-21
Attending: INTERNAL MEDICINE
Payer: MEDICAID

## 2020-05-21 LAB
ALBUMIN SERPL BCP-MCNC: 4.3 G/DL (ref 3.2–4.9)
ALBUMIN/GLOB SERPL: 1.5 G/DL
ALP SERPL-CCNC: 178 U/L (ref 30–99)
ALT SERPL-CCNC: 37 U/L (ref 2–50)
ANION GAP SERPL CALC-SCNC: 13 MMOL/L (ref 7–16)
APPEARANCE UR: CLEAR
AST SERPL-CCNC: 30 U/L (ref 12–45)
BASOPHILS # BLD AUTO: 0.8 % (ref 0–1.8)
BASOPHILS # BLD: 0.04 K/UL (ref 0–0.12)
BILIRUB SERPL-MCNC: 1.2 MG/DL (ref 0.1–1.5)
BILIRUB UR QL STRIP.AUTO: NEGATIVE
BUN SERPL-MCNC: 10 MG/DL (ref 8–22)
CALCIUM SERPL-MCNC: 9.5 MG/DL (ref 8.5–10.5)
CEA SERPL-MCNC: 257 NG/ML (ref 0–3)
CHLORIDE SERPL-SCNC: 97 MMOL/L (ref 96–112)
CO2 SERPL-SCNC: 24 MMOL/L (ref 20–33)
COLOR UR: YELLOW
CREAT SERPL-MCNC: 0.58 MG/DL (ref 0.5–1.4)
EOSINOPHIL # BLD AUTO: 0.13 K/UL (ref 0–0.51)
EOSINOPHIL NFR BLD: 2.6 % (ref 0–6.9)
ERYTHROCYTE [DISTWIDTH] IN BLOOD BY AUTOMATED COUNT: 61.9 FL (ref 35.9–50)
GLOBULIN SER CALC-MCNC: 2.9 G/DL (ref 1.9–3.5)
GLUCOSE SERPL-MCNC: 530 MG/DL (ref 65–99)
GLUCOSE UR STRIP.AUTO-MCNC: >=1000 MG/DL
HCT VFR BLD AUTO: 43.1 % (ref 42–52)
HGB BLD-MCNC: 14.3 G/DL (ref 14–18)
IMM GRANULOCYTES # BLD AUTO: 0.02 K/UL (ref 0–0.11)
IMM GRANULOCYTES NFR BLD AUTO: 0.4 % (ref 0–0.9)
KETONES UR STRIP.AUTO-MCNC: NEGATIVE MG/DL
LEUKOCYTE ESTERASE UR QL STRIP.AUTO: NEGATIVE
LYMPHOCYTES # BLD AUTO: 0.89 K/UL (ref 1–4.8)
LYMPHOCYTES NFR BLD: 17.8 % (ref 22–41)
MAGNESIUM SERPL-MCNC: 1.5 MG/DL (ref 1.5–2.5)
MCH RBC QN AUTO: 31.6 PG (ref 27–33)
MCHC RBC AUTO-ENTMCNC: 33.2 G/DL (ref 33.7–35.3)
MCV RBC AUTO: 95.4 FL (ref 81.4–97.8)
MICRO URNS: ABNORMAL
MONOCYTES # BLD AUTO: 0.43 K/UL (ref 0–0.85)
MONOCYTES NFR BLD AUTO: 8.6 % (ref 0–13.4)
MORPHOLOGY BLD-IMP: NORMAL
NEUTROPHILS # BLD AUTO: 3.49 K/UL (ref 1.82–7.42)
NEUTROPHILS NFR BLD: 69.8 % (ref 44–72)
NITRITE UR QL STRIP.AUTO: NEGATIVE
NRBC # BLD AUTO: 0 K/UL
NRBC BLD-RTO: 0 /100 WBC
PH UR STRIP.AUTO: 5.5 [PH] (ref 5–8)
PLATELET # BLD AUTO: 84 K/UL (ref 164–446)
PMV BLD AUTO: 11.3 FL (ref 9–12.9)
POTASSIUM SERPL-SCNC: 3.8 MMOL/L (ref 3.6–5.5)
PROT SERPL-MCNC: 7.2 G/DL (ref 6–8.2)
PROT UR QL STRIP: NEGATIVE MG/DL
RBC # BLD AUTO: 4.52 M/UL (ref 4.7–6.1)
RBC UR QL AUTO: NEGATIVE
SODIUM SERPL-SCNC: 134 MMOL/L (ref 135–145)
SP GR UR STRIP.AUTO: 1.04
UROBILINOGEN UR STRIP.AUTO-MCNC: 0.2 MG/DL
WBC # BLD AUTO: 5 K/UL (ref 4.8–10.8)

## 2020-05-21 PROCEDURE — 85025 COMPLETE CBC W/AUTO DIFF WBC: CPT

## 2020-05-21 PROCEDURE — 82378 CARCINOEMBRYONIC ANTIGEN: CPT

## 2020-05-21 PROCEDURE — 36415 COLL VENOUS BLD VENIPUNCTURE: CPT

## 2020-05-21 PROCEDURE — 83735 ASSAY OF MAGNESIUM: CPT

## 2020-05-21 PROCEDURE — 81003 URINALYSIS AUTO W/O SCOPE: CPT

## 2020-05-21 PROCEDURE — 80053 COMPREHEN METABOLIC PANEL: CPT

## 2020-05-21 RX ORDER — SODIUM CHLORIDE 9 MG/ML
INJECTION, SOLUTION INTRAVENOUS CONTINUOUS
Status: CANCELLED
Start: 2020-05-23

## 2020-05-21 RX ORDER — 0.9 % SODIUM CHLORIDE 0.9 %
10 VIAL (ML) INJECTION PRN
Status: CANCELLED | OUTPATIENT
Start: 2020-05-23

## 2020-05-21 RX ORDER — DEXTROSE MONOHYDRATE 50 MG/ML
INJECTION, SOLUTION INTRAVENOUS CONTINUOUS
Status: CANCELLED | OUTPATIENT
Start: 2020-05-23

## 2020-05-21 RX ORDER — 0.9 % SODIUM CHLORIDE 0.9 %
VIAL (ML) INJECTION PRN
Status: CANCELLED | OUTPATIENT
Start: 2020-05-23

## 2020-05-21 RX ORDER — 0.9 % SODIUM CHLORIDE 0.9 %
10 VIAL (ML) INJECTION PRN
Status: CANCELLED | OUTPATIENT
Start: 2020-05-22

## 2020-05-21 RX ORDER — ONDANSETRON 2 MG/ML
4 INJECTION INTRAMUSCULAR; INTRAVENOUS PRN
Status: CANCELLED | OUTPATIENT
Start: 2020-05-23

## 2020-05-21 RX ORDER — DIPHENHYDRAMINE HYDROCHLORIDE 50 MG/ML
50 INJECTION INTRAMUSCULAR; INTRAVENOUS PRN
Status: CANCELLED | OUTPATIENT
Start: 2020-05-23

## 2020-05-21 RX ORDER — 0.9 % SODIUM CHLORIDE 0.9 %
3 VIAL (ML) INJECTION PRN
Status: CANCELLED | OUTPATIENT
Start: 2020-05-23

## 2020-05-21 RX ORDER — 0.9 % SODIUM CHLORIDE 0.9 %
3 VIAL (ML) INJECTION PRN
Status: CANCELLED | OUTPATIENT
Start: 2020-05-22

## 2020-05-21 RX ORDER — 0.9 % SODIUM CHLORIDE 0.9 %
20 VIAL (ML) INJECTION PRN
Status: CANCELLED | OUTPATIENT
Start: 2020-05-25

## 2020-05-21 RX ORDER — ONDANSETRON 8 MG/1
8 TABLET, ORALLY DISINTEGRATING ORAL PRN
Status: CANCELLED | OUTPATIENT
Start: 2020-05-23

## 2020-05-21 RX ORDER — EPINEPHRINE 1 MG/ML(1)
0.5 AMPUL (ML) INJECTION PRN
Status: CANCELLED | OUTPATIENT
Start: 2020-05-23

## 2020-05-21 RX ORDER — PROCHLORPERAZINE MALEATE 10 MG
10 TABLET ORAL EVERY 6 HOURS PRN
Status: CANCELLED | OUTPATIENT
Start: 2020-05-23

## 2020-05-21 RX ORDER — METHYLPREDNISOLONE SODIUM SUCCINATE 125 MG/2ML
125 INJECTION, POWDER, LYOPHILIZED, FOR SOLUTION INTRAMUSCULAR; INTRAVENOUS PRN
Status: CANCELLED | OUTPATIENT
Start: 2020-05-23

## 2020-05-21 RX ORDER — 0.9 % SODIUM CHLORIDE 0.9 %
VIAL (ML) INJECTION PRN
Status: CANCELLED | OUTPATIENT
Start: 2020-05-22

## 2020-05-23 ENCOUNTER — OUTPATIENT INFUSION SERVICES (OUTPATIENT)
Dept: ONCOLOGY | Facility: MEDICAL CENTER | Age: 55
End: 2020-05-23
Attending: INTERNAL MEDICINE
Payer: MEDICAID

## 2020-05-23 VITALS
TEMPERATURE: 97 F | HEART RATE: 80 BPM | BODY MASS INDEX: 29.36 KG/M2 | OXYGEN SATURATION: 96 % | WEIGHT: 221.56 LBS | HEIGHT: 73 IN | RESPIRATION RATE: 18 BRPM | DIASTOLIC BLOOD PRESSURE: 80 MMHG | SYSTOLIC BLOOD PRESSURE: 134 MMHG

## 2020-05-23 DIAGNOSIS — C18.9 METASTATIC COLON CANCER TO LIVER (HCC): ICD-10-CM

## 2020-05-23 DIAGNOSIS — E83.42 HYPOMAGNESEMIA: ICD-10-CM

## 2020-05-23 DIAGNOSIS — C78.7 METASTATIC COLON CANCER TO LIVER (HCC): ICD-10-CM

## 2020-05-23 PROCEDURE — 700111 HCHG RX REV CODE 636 W/ 250 OVERRIDE (IP): Performed by: INTERNAL MEDICINE

## 2020-05-23 PROCEDURE — 96413 CHEMO IV INFUSION 1 HR: CPT

## 2020-05-23 PROCEDURE — A4212 NON CORING NEEDLE OR STYLET: HCPCS

## 2020-05-23 PROCEDURE — 96366 THER/PROPH/DIAG IV INF ADDON: CPT

## 2020-05-23 PROCEDURE — 96368 THER/DIAG CONCURRENT INF: CPT

## 2020-05-23 PROCEDURE — 96411 CHEMO IV PUSH ADDL DRUG: CPT

## 2020-05-23 PROCEDURE — 96417 CHEMO IV INFUS EACH ADDL SEQ: CPT

## 2020-05-23 PROCEDURE — 96367 TX/PROPH/DG ADDL SEQ IV INF: CPT

## 2020-05-23 PROCEDURE — 96415 CHEMO IV INFUSION ADDL HR: CPT

## 2020-05-23 PROCEDURE — 700105 HCHG RX REV CODE 258: Performed by: INTERNAL MEDICINE

## 2020-05-23 PROCEDURE — G0498 CHEMO EXTEND IV INFUS W/PUMP: HCPCS

## 2020-05-23 RX ORDER — MAGNESIUM SULFATE HEPTAHYDRATE 40 MG/ML
2 INJECTION, SOLUTION INTRAVENOUS ONCE
Status: COMPLETED | OUTPATIENT
Start: 2020-05-23 | End: 2020-05-23

## 2020-05-23 RX ADMIN — FLUOROURACIL 820 MG: 50 INJECTION, SOLUTION INTRAVENOUS at 14:19

## 2020-05-23 RX ADMIN — MAGNESIUM SULFATE IN WATER 2 G: 40 INJECTION, SOLUTION INTRAVENOUS at 11:50

## 2020-05-23 RX ADMIN — ONDANSETRON 16 MG: 2 INJECTION INTRAMUSCULAR; INTRAVENOUS at 09:35

## 2020-05-23 RX ADMIN — DEXAMETHASONE SODIUM PHOSPHATE 20 MG: 4 INJECTION, SOLUTION INTRAMUSCULAR; INTRAVENOUS at 10:00

## 2020-05-23 RX ADMIN — PANITUMUMAB 500 MG: 400 SOLUTION INTRAVENOUS at 10:26

## 2020-05-23 RX ADMIN — FLUOROURACIL 4925 MG: 50 INJECTION, SOLUTION INTRAVENOUS at 14:25

## 2020-05-23 RX ADMIN — LEUCOVORIN CALCIUM 912 MG: 350 INJECTION, POWDER, LYOPHILIZED, FOR SUSPENSION INTRAMUSCULAR; INTRAVENOUS at 12:17

## 2020-05-23 RX ADMIN — DIPHENHYDRAMINE HYDROCHLORIDE 50 MG: 50 INJECTION, SOLUTION INTRAMUSCULAR; INTRAVENOUS at 09:20

## 2020-05-23 RX ADMIN — TRASTUZUMAB 402 MG: 150 INJECTION, POWDER, LYOPHILIZED, FOR SOLUTION INTRAVENOUS at 11:45

## 2020-05-23 ASSESSMENT — FIBROSIS 4 INDEX: FIB4 SCORE: 3.29

## 2020-05-23 NOTE — PROGRESS NOTES
Pharmacy Chemotherapy Verification Note:    Patient Name: Gurmeet Jo      Dx: Metastatic Colorectal CA (KRAS/NRAS wild type, and ERBB2 [a HER2 family receptor] amplification)      Protocol: mFOLFOX+Panitumumab +trastuzumab       Panitumumab 6 mg/kg IV over 60 minutes on Day 1    4/25/20 added to treatment plan dose reduced to 4.5 mg/kg for tolerance  ~Followed by~  OXALIplatin 85 mg/m² IV over 2 hours on Day 1   - dose reduced to 68 mg/m² starting C1 for tolerance   3/3/20 - oxali removed from treatment plan d/t oxaliplatin allergy per Harman VARGAS.  Leucovorin 400 mg/m² IV over 2 hours on Day 1, to run concurrent with OXALIplatin, followed by  Fluorouracil 400 mg/m² IVP on Day 1, followed by   C1 dose reduced to 360 mg/m² for tolerance  Fluorouracil 2400 mg/m² CIVI over 46-48 hours   C1 dose reduced to 2160 mg/m² for tolerance   Trastuzumab 6 mg/kg IV over 90 minutes on Day 1 of Cycle 1, followed by  Trastuzumab 4 mg/kg IV over 30-60 minutes on Day 1 of Cycle 2    Confirmed: per Dr. Ross: Q2 week dosing   4/11/20 - Reload with 6 mg/kg per Dr. Chisholm  14-day cycle until disease progression or unacceptable toxicity    *Dosing Reference*  NCCN Guidelines for Colon Cancer. V.2.2019.  Rohith TAVERAS et al. J Clin Oncol. 2010;28(31):4697-705  Aura PATRICK et al. J Clin Oncol. 2008;28(12):2006-12  Luisa RODRIGEZ, et al. Br J Cancer. 2002;87(4):393-9    Herceptin has limited data in colorectal cancer, but two regimens exist neither at the same loading dose or interval as Dr. Ross has chosen. Per [facility] progress note: Patient has undergone further [employer] testing of his tumor. He was found to be KRAS/NRAS wild type, which suggest he has an EGFR mutation and would be a candidate for EGFR targeted therapy. He also had an ERBB2 amplification which might suggest response to treatments like Herceptin.    Allergies:  Patient has no known allergies.     /80   Pulse 80   Temp 36.1 °C (97 °F) (Temporal)   Resp 18    "Ht 1.855 m (6' 1.03\")   Wt 100.5 kg (221 lb 9 oz)   SpO2 96%   BMI 29.21 kg/m²  Body surface area is 2.28 meters squared.     Labs 5/21/20:  Meets treatment parameters  BP ok  Urine protein negative    2/7/2020 ECHO: LVEF 60%     Drug Order   (Drug name, dose, route, IV Fluid & volume, frequency, number of doses) Cycle 7      Previous treatment: 5/9/20     Medication = Panitumumab (Vectibix)  Base Dose= 4.5 mg/kg  Calc Dose: Base Dose x 100.5 kg = 452.25 mg  Final Dose = 500 mg  Route = IV  Fluid & Volume =  mL  Admin Duration = Over 60 minutes          Rounded to nearest vial size (within 10%) per rounding protocol, okay to treat with final dose   Medication = Trastuzumab (herceptin)  Base Dose = 4 mg/kg  Calc Dose: Base Dose x 100.5 kg = 402 mg  Final Dose = 406 mg  Route = IV  Fluid & Volume =  mL  Admin Duration = Over 30 minutes          Unable to round to nearest vial size, okay to treat with final dose   Medication = Leucovorin (Wellcovorin)  Base Dose = 400 mg/m²  Calc Dose: Base Dose x 2.28 m² = 912 mg  Final Dose = 912 mg  Route = IV  Fluid & Volume = D5W 250 mL  Admin Duration = Over 2 hours          <10% difference, okay to treat with final dose   Medication = Fluorouracil (5-FU)  Base Dose = 360 mg/m²  Calc Dose: Base Dose x 2.28 m² = 820.8 mg  Final Dose = 820 mg  Route = IVP  Fluid & Volume = 16.4 mL in syringe  Conc = 50 mg/mL  Admin Duration = Over 5 minutes          <10% difference, okay to treat with final dose   Medication = Fluorouracil (5-FU)  Base Dose = 2160 mg/m²  Calc Dose:Base Dose x 2.28 m² = 4924.8 mg  Final Dose = 4925 mg  Route = CIVI   Fluid & Volume = 98.5 mL (+3 mL overfill =   101.5 mL)  Admin Duration = Over 46 hours to run at   2.1 mL/hour    Via CADD pump for home infusion      <10% difference, okay to treat with final dose         "

## 2020-05-23 NOTE — PROGRESS NOTES
Chemotherapy Verification - PRIMARY RN      Height = 185.5 cm  Weight = 100.5 kg  BSA = 2.28 m2       Medication: Vectibix  Dose: 4.5 mg/kg    Calculated Dose: 452.25 mg                             (In mg/m2, AUC, mg/kg)     Medication: Herceptin  Dose: 4 mg/kg    Calculated Dose: 402 mg                             (In mg/m2, AUC, mg/kg)    Medication: 5FU push  Dose: 360 mg/m2    Calculated Dose: 820.8 mg                             (In mg/m2, AUC, mg/kg)    Medication: 5FU   Dose: 2160 mg/m2 over 46 hours  Calculated Dose: 4924.8mg                             (In mg/m2, AUC, mg/kg)        I confirm this process was performed independently with the BSA and all final chemotherapy dosing calculations congruent.  Any discrepancies of 10% or greater have been addressed with the chemotherapy pharmacist. The resolution of the discrepancy has been documented in the EPIC progress notes.

## 2020-05-23 NOTE — PROGRESS NOTES
Chemotherapy Verification - SECONDARY RN     D1C7  Height = 185.5 cm  Weight = 100.5 kg  BSA = 2.28 m2       Medication: Panitumumab (Vectibix)  Dose: 4.5 mg/kg  Calculated Dose: 452.25 mg                                Medication: Trastuzumab (Herceptin)  Dose: 4 mg/kg  Calculated Dose: 402 mg                                Medication: Fluorouracil (5FU) push  Dose: 360 mg/m2  Calculated Dose: 820.8 mg                               Medication: Fluorouracil (5FU) CADD pump  Dose: 2160 mg/m2  Calculated Dose: 4924.8 mg over 46 hours                               I confirm that this process was performed independently.

## 2020-05-23 NOTE — PROGRESS NOTES
"Pharmacy Chemotherapy Calculations    Dx: Metastatic colorectal cancer [KRAS/NRAS wild type, ERBB2 (HER2) amplification]    Cycle 7  Previous treatment = 5/9/20  *C3D8: treatment cancelled d/t drug-related rash  *C4D1: delayed d/t thrombocytopenia    Regimen: mFOLFOX + Erbitux Vectibix + Herceptin  *Dosing Reference*  Trastuzumab 6 mg/kg IV loading dose C1D1, then 4 mg/kg IV Day 1 of subsequent cycles [confirmed \"every 2 weeks\", per MD; progress note: \"He also had an ERBB2 amplification which might suggest response to treatments like Herceptin\"]  Cetuximab 400 mg/m2 IV Cycle 1, Day 1, then 250 mg/m2 IV Days 1 and 8 (confirmed \"weekly\" per MD)   > C4 * * * cetuximab has been omitted * * *   > C5 * * * cetuximab replaced with panitumumab * * *      > Cody LEO reduced dose by 20% to  starting with C1D1 due to anticipated tolerance    > 3/14/20: Oxaliplatin discontinued from treatment  Panitumumab (Vectibix)  IV D1 over 60 min   + added; change of therapy d/t cetuximab skin tox   > 4/25/20, initial dose reduction to 4.5 mg/kg d/t previous EGFR cutaneous toxicities    > OK to round < 15% to 400mg (available vial size) x1 dose (order in treatment plan)  Leucovorin 400 mg/m2 IV Day 1 followed by  Fluorouracil  IV Day 1 followed by   > Cody LEO reduced dose by 10% to 360 mg/m2 starting with C1D1 due to anticipated tolerance   Fluorouracil  CIVI over 46-48 hours    > Cody LEO reduced dose by 10% to 2160 mg/m2 starting with C1D1 due to anticipated tolerance   14-day cycle until DP or UT  NCCN guidelines for colon cancer V.2.2019  (cetuximab + chemo) Venoonando AP et al, OBEY 2017;317(23):9161-5262  (HER2 tx in HER2+ CRC) Kayleen F, et al. Lancet Oncol. 2019 Apr;20(4):518-530.    Allergies: Patient has no known allergies.  /80   Pulse 80   Temp 36.1 °C (97 °F) (Temporal)   Resp 18   Ht 1.855 m (6' 1.03\")   Wt 100.5 kg (221 lb 9 oz)   SpO2 96%   BMI 29.21 kg/m²  Body surface area is 2.28 meters squared. "     2/7/20 LVEF by Echo ~ 60%    All labs (5/21/20) within treatment plan parameters. [platelets OK if > 75K]   BP OK, Urine protein negative    Panitumumab (Vectibix) 4.5 mg/kg x 100.5 kg = 452.25 mg   Continue rounding today to 500 mg per Reganti MD order = 500 mg IV    Trastuzumab (Herceptin) 4 mg/kg x 100.5 kg = 402 mg   <10% difference, okay to treat with final dose (unable to round to vial size as it would exceed 10% difference) = 402 mg IV    Leucovorin 400 mg/m² x 2.28 m² = 912 mg   <10% difference, okay to treat with final dose = 912 mg IV    Fluorouracil (Adrucil) 360 mg/m² x 2.28 m² = 821 mg   <10% difference, okay to treat with final dose = 820 mg IV    Fluorouracil (Adrucil) 2160 mg/m² x 2.28 m² = 4925 mg   <10% difference, okay to treat with final dose = 4925 mg IV over 46 hours at 2.1 ml/hr via CADD pump    Man Anderson, AbranD

## 2020-05-23 NOTE — PROGRESS NOTES
Patient arrived ambulatory to [facility] for C7 Vectibix/Herceptin/5FU.  He denies any s/s of infection or fevers.  Port accessed using sterile technique, brisk blood return noted.  Pre medications given and chemo infused per order, pt tolerated well.  2g IV Mg replacement given.  CADD pump connected, settings verified.  Confirmed next appointment and pt ambulated out of clinic in no apparent distress.

## 2020-05-25 ENCOUNTER — OUTPATIENT INFUSION SERVICES (OUTPATIENT)
Dept: ONCOLOGY | Facility: MEDICAL CENTER | Age: 55
End: 2020-05-25
Attending: INTERNAL MEDICINE
Payer: MEDICAID

## 2020-05-25 VITALS
HEART RATE: 84 BPM | OXYGEN SATURATION: 96 % | TEMPERATURE: 97 F | WEIGHT: 217.37 LBS | RESPIRATION RATE: 18 BRPM | BODY MASS INDEX: 28.81 KG/M2 | SYSTOLIC BLOOD PRESSURE: 121 MMHG | DIASTOLIC BLOOD PRESSURE: 70 MMHG | HEIGHT: 73 IN

## 2020-05-25 DIAGNOSIS — C18.9 METASTATIC COLON CANCER TO LIVER (HCC): ICD-10-CM

## 2020-05-25 DIAGNOSIS — C78.7 METASTATIC COLON CANCER TO LIVER (HCC): ICD-10-CM

## 2020-05-25 PROCEDURE — 700111 HCHG RX REV CODE 636 W/ 250 OVERRIDE (IP): Performed by: INTERNAL MEDICINE

## 2020-05-25 PROCEDURE — 96523 IRRIG DRUG DELIVERY DEVICE: CPT

## 2020-05-25 RX ADMIN — HEPARIN 500 UNITS: 100 SYRINGE at 13:50

## 2020-05-25 ASSESSMENT — FIBROSIS 4 INDEX: FIB4 SCORE: 3.29

## 2020-05-25 NOTE — PROGRESS NOTES
Here for 5 FU pump disconnect after 46 hrs of continuous infusion. See chemotherapy flow sheet for volume / dose administration. Port flushed per protocol, site d-accessed, needle intact, and gauze dressing applied. Patient discharge home to self care, print out of appointments provided.

## 2020-05-27 ENCOUNTER — OFFICE VISIT (OUTPATIENT)
Dept: MEDICAL GROUP | Facility: CLINIC | Age: 55
End: 2020-05-27
Payer: MEDICAID

## 2020-05-27 VITALS
DIASTOLIC BLOOD PRESSURE: 82 MMHG | OXYGEN SATURATION: 95 % | HEART RATE: 80 BPM | WEIGHT: 214 LBS | TEMPERATURE: 98.1 F | HEIGHT: 73 IN | RESPIRATION RATE: 18 BRPM | BODY MASS INDEX: 28.36 KG/M2 | SYSTOLIC BLOOD PRESSURE: 120 MMHG

## 2020-05-27 DIAGNOSIS — E11.69 TYPE 2 DIABETES MELLITUS WITH OTHER SPECIFIED COMPLICATION, WITH LONG-TERM CURRENT USE OF INSULIN (HCC): ICD-10-CM

## 2020-05-27 DIAGNOSIS — Z79.4 TYPE 2 DIABETES MELLITUS WITH OTHER SPECIFIED COMPLICATION, WITH LONG-TERM CURRENT USE OF INSULIN (HCC): ICD-10-CM

## 2020-05-27 PROBLEM — R73.9 HYPERGLYCEMIA: Status: ACTIVE | Noted: 2020-05-27

## 2020-05-27 PROBLEM — E11.9 TYPE 2 DIABETES MELLITUS, WITH LONG-TERM CURRENT USE OF INSULIN (HCC): Status: ACTIVE | Noted: 2020-05-27

## 2020-05-27 LAB
APPEARANCE UR: CLEAR
BILIRUB UR STRIP-MCNC: NEGATIVE MG/DL
COLOR UR AUTO: NORMAL
GLUCOSE UR STRIP.AUTO-MCNC: >=1000 MG/DL
KETONES UR STRIP.AUTO-MCNC: NORMAL MG/DL
LEUKOCYTE ESTERASE UR QL STRIP.AUTO: NEGATIVE
NITRITE UR QL STRIP.AUTO: NEGATIVE
PH UR STRIP.AUTO: 6 [PH] (ref 5–8)
PROT UR QL STRIP: NEGATIVE MG/DL
RBC UR QL AUTO: NEGATIVE
SP GR UR STRIP.AUTO: 1.01
UROBILINOGEN UR STRIP-MCNC: 1 MG/DL

## 2020-05-27 PROCEDURE — 99213 OFFICE O/P EST LOW 20 MIN: CPT | Mod: 25 | Performed by: FAMILY MEDICINE

## 2020-05-27 PROCEDURE — 81002 URINALYSIS NONAUTO W/O SCOPE: CPT | Performed by: FAMILY MEDICINE

## 2020-05-27 RX ORDER — INSULIN GLARGINE 100 [IU]/ML
10 INJECTION, SOLUTION SUBCUTANEOUS EVERY EVENING
Qty: 3 PEN | Refills: 2 | Status: SHIPPED | OUTPATIENT
Start: 2020-05-27 | End: 2020-06-05

## 2020-05-27 RX ORDER — LANCETS 30 GAUGE
EACH MISCELLANEOUS
Qty: 100 EACH | Refills: 2 | Status: SHIPPED | OUTPATIENT
Start: 2020-05-27 | End: 2020-08-17 | Stop reason: SDUPTHER

## 2020-05-27 RX ORDER — PEN NEEDLE, DIABETIC 31 G X1/4"
NEEDLE, DISPOSABLE MISCELLANEOUS
Qty: 1 EACH | Refills: 2 | Status: SHIPPED | OUTPATIENT
Start: 2020-05-27 | End: 2022-04-28

## 2020-05-27 ASSESSMENT — FIBROSIS 4 INDEX: FIB4 SCORE: 3.29

## 2020-05-27 NOTE — ASSESSMENT & PLAN NOTE
For past month of so BS running high as noted in CMP, most recent result was over 500. States he is voiding and drinking more fluids.Might be related to his cancer treatment. Just finished course of oral steroids.

## 2020-05-27 NOTE — PROGRESS NOTES
Complaint: High blood sugar     Subjective:     Gurmeet Jo is a 55 y.o. male here today for a new issue.    Hyperglycemia  For past month of so BS running high as noted in CMP, most recent result was over 500. States he is voiding and drinking more fluids.Might be related to his cancer treatment. Just finished course of oral steroids.      Rash waxes and wanes. Tired.    Current medicines (including changes today)  Current Outpatient Medications   Medication Sig Dispense Refill   • Blood Glucose Test Strips Test strips order: Test strips for  meter. Sig: use bid and prn ssx high or low sugar #100 RF x 3 100 Strip 2   • Blood Glucose Monitoring Suppl Device Meter: Dispense Device of Insurance Preference. Sig. Use as directed for blood sugar monitoring. #1. NR. 1 Device 0   • Lancets Lancets order: Lancets for  meter. Sig: use bid and prn ssx high or low sugar. #100 RF x 3 100 Each 2   • insulin glargine (LANTUS SOLOSTAR) 100 UNIT/ML Solution Pen-injector injection Inject 10 Units as instructed every evening. 3 PEN 2   • potassium chloride (MICRO-K) 10 MEQ capsule Take  by mouth every day. Take 1 capsule by mouth every day     • nystatin (MYCOSTATIN) 157950 UNIT/ML Suspension SWISH AND SWALLOW 5 MILLILITERS PO QID     • ondansetron (ZOFRAN ODT) 4 MG TABLET DISPERSIBLE Take 4 mg by mouth every 6 hours as needed for Nausea.     • DOXYCYCLINE PO Take  by mouth.     • triamcinolone acetonide (KENALOG) 0.1 % Cream Apply 1 Application to affected area(s) 2 times a day. 472 g 2   • hydrocortisone 0.5 % Cream Apply  to affected area(s) every day. Indications: rash to face     • lidocaine-prilocaine (EMLA) 2.5-2.5 % Cream      • acetaminophen (TYLENOL) 500 MG Tab Take 500-1,000 mg by mouth every 6 hours as needed.     • LORazepam (ATIVAN) 1 MG Tab Take 1 mg by mouth 1 time daily as needed for Anxiety.     • baclofen (LIORESAL) 10 MG Tab Take 10 mg by mouth 3 times a day as needed.     • therapeutic multivitamin-minerals  (THERAGRAN-M) Tab Take 1 Tab by mouth every day.       No current facility-administered medications for this visit.      He  has a past medical history of Bowel habit changes, Cancer (HCC) (11/2018), Dental disorder, Hiatus hernia syndrome, Hypertension, Indigestion, Pain, Sleep apnea, and Snoring.    Health Maintenance:       Allergies: Compazine and Oxaliplatin    Current Outpatient Medications Ordered in Epic   Medication Sig Dispense Refill   • Blood Glucose Test Strips Test strips order: Test strips for  meter. Sig: use bid and prn ssx high or low sugar #100 RF x 3 100 Strip 2   • Blood Glucose Monitoring Suppl Device Meter: Dispense Device of Insurance Preference. Sig. Use as directed for blood sugar monitoring. #1. NR. 1 Device 0   • Lancets Lancets order: Lancets for  meter. Sig: use bid and prn ssx high or low sugar. #100 RF x 3 100 Each 2   • insulin glargine (LANTUS SOLOSTAR) 100 UNIT/ML Solution Pen-injector injection Inject 10 Units as instructed every evening. 3 PEN 2   • potassium chloride (MICRO-K) 10 MEQ capsule Take  by mouth every day. Take 1 capsule by mouth every day     • nystatin (MYCOSTATIN) 247152 UNIT/ML Suspension SWISH AND SWALLOW 5 MILLILITERS PO QID     • ondansetron (ZOFRAN ODT) 4 MG TABLET DISPERSIBLE Take 4 mg by mouth every 6 hours as needed for Nausea.     • DOXYCYCLINE PO Take  by mouth.     • triamcinolone acetonide (KENALOG) 0.1 % Cream Apply 1 Application to affected area(s) 2 times a day. 472 g 2   • hydrocortisone 0.5 % Cream Apply  to affected area(s) every day. Indications: rash to face     • lidocaine-prilocaine (EMLA) 2.5-2.5 % Cream      • acetaminophen (TYLENOL) 500 MG Tab Take 500-1,000 mg by mouth every 6 hours as needed.     • LORazepam (ATIVAN) 1 MG Tab Take 1 mg by mouth 1 time daily as needed for Anxiety.     • baclofen (LIORESAL) 10 MG Tab Take 10 mg by mouth 3 times a day as needed.     • therapeutic multivitamin-minerals (THERAGRAN-M) Tab Take 1 Tab by mouth  "every day.       No current [facility]-ordered facility-administered medications on file.        Past Medical History:   Diagnosis Date   • Bowel habit changes     constipation   • Cancer (HCC) 2018    Colon CA with Liver Mets   • Dental disorder     dentures    • Hiatus hernia syndrome    • Hypertension     No meds at this time   • Indigestion    • Pain     liver    • Sleep apnea     cpap    • Snoring        Past Surgical History:   Procedure Laterality Date   • COLONOSCOPY         Social History     Tobacco Use   • Smoking status: Former Smoker     Packs/day: 1.00     Years: 15.00     Pack years: 15.00     Last attempt to quit: 1998     Years since quittin.4   • Smokeless tobacco: Never Used   Substance Use Topics   • Alcohol use: No   • Drug use: Yes     Types: Inhaled, Marijuana     Comment: somtimes       Social History     Social History Narrative   • Not on file       Family History   Problem Relation Age of Onset   • Hypertension Mother    • Hypertension Father    • Diabetes Father    • Diabetes Brother          ROS Positive for fatigue, weight loss, rash  Patient denies any fever, chills, troke symptoms, dizziness, headache, nasal congestion, sore-throat, cough, heartburn, chest pain, difficulty breathing, abdominal discomfort, diarrhea/constipation, burning with urination or frequency, joint or back pain, depression or anxiety.       Objective:     /82 (BP Location: Left arm, Patient Position: Sitting, BP Cuff Size: Adult)   Pulse 80   Temp 36.7 °C (98.1 °F) (Temporal)   Resp 18   Ht 1.85 m (6' 0.84\")   Wt 97.1 kg (214 lb)   SpO2 95%  Body mass index is 28.36 kg/m².   Physical Exam:  Constitutional: Alert, no distress.  Psych: Alert and oriented x3, appropriate affect and mood.        Assessment and Plan:   The following treatment plan was discussed    1. Type 2 diabetes mellitus with other specified complication, with long-term current use of insulin (HCC)  Patient given glucometer. " Will return in PM with wife to demonstrate use. To check bid. Will start him on low dose Lantus in PM depending on BS.   - POCT Urinalysis      Followup: Return in about 1 week (around 6/3/2020) for hyperglycemia f/u.    Please note that this dictation was created using voice recognition software. I have made every reasonable attempt to correct obvious errors, but I expect that there are errors of grammar and possibly content that I did not discover before finalizing the note.    Addendum: met with patient & wife. Demonstrated use of glucometer and supplies, administration of Lantus.

## 2020-06-04 ENCOUNTER — HOSPITAL ENCOUNTER (OUTPATIENT)
Dept: LAB | Facility: MEDICAL CENTER | Age: 55
End: 2020-06-04
Attending: INTERNAL MEDICINE
Payer: MEDICAID

## 2020-06-04 LAB
ALBUMIN SERPL BCP-MCNC: 4.2 G/DL (ref 3.2–4.9)
ALBUMIN/GLOB SERPL: 1.5 G/DL
ALP SERPL-CCNC: 163 U/L (ref 30–99)
ALT SERPL-CCNC: 36 U/L (ref 2–50)
ANION GAP SERPL CALC-SCNC: 10 MMOL/L (ref 7–16)
APPEARANCE UR: CLEAR
AST SERPL-CCNC: 29 U/L (ref 12–45)
BASOPHILS # BLD AUTO: 0.7 % (ref 0–1.8)
BASOPHILS # BLD: 0.04 K/UL (ref 0–0.12)
BILIRUB SERPL-MCNC: 0.8 MG/DL (ref 0.1–1.5)
BILIRUB UR QL STRIP.AUTO: NEGATIVE
BUN SERPL-MCNC: 11 MG/DL (ref 8–22)
CALCIUM SERPL-MCNC: 10.4 MG/DL (ref 8.5–10.5)
CEA SERPL-MCNC: 141 NG/ML (ref 0–3)
CHLORIDE SERPL-SCNC: 101 MMOL/L (ref 96–112)
CO2 SERPL-SCNC: 24 MMOL/L (ref 20–33)
COLOR UR: YELLOW
CREAT SERPL-MCNC: 0.58 MG/DL (ref 0.5–1.4)
EOSINOPHIL # BLD AUTO: 0.06 K/UL (ref 0–0.51)
EOSINOPHIL NFR BLD: 1 % (ref 0–6.9)
ERYTHROCYTE [DISTWIDTH] IN BLOOD BY AUTOMATED COUNT: 59.8 FL (ref 35.9–50)
GLOBULIN SER CALC-MCNC: 2.8 G/DL (ref 1.9–3.5)
GLUCOSE SERPL-MCNC: 466 MG/DL (ref 65–99)
GLUCOSE UR STRIP.AUTO-MCNC: >=1000 MG/DL
HCT VFR BLD AUTO: 44.4 % (ref 42–52)
HGB BLD-MCNC: 14.4 G/DL (ref 14–18)
IMM GRANULOCYTES # BLD AUTO: 0.03 K/UL (ref 0–0.11)
IMM GRANULOCYTES NFR BLD AUTO: 0.5 % (ref 0–0.9)
KETONES UR STRIP.AUTO-MCNC: NEGATIVE MG/DL
LEUKOCYTE ESTERASE UR QL STRIP.AUTO: NEGATIVE
LYMPHOCYTES # BLD AUTO: 0.8 K/UL (ref 1–4.8)
LYMPHOCYTES NFR BLD: 13.1 % (ref 22–41)
MAGNESIUM SERPL-MCNC: 1.9 MG/DL (ref 1.5–2.5)
MCH RBC QN AUTO: 31.6 PG (ref 27–33)
MCHC RBC AUTO-ENTMCNC: 32.4 G/DL (ref 33.7–35.3)
MCV RBC AUTO: 97.6 FL (ref 81.4–97.8)
MICRO URNS: ABNORMAL
MONOCYTES # BLD AUTO: 0.51 K/UL (ref 0–0.85)
MONOCYTES NFR BLD AUTO: 8.4 % (ref 0–13.4)
NEUTROPHILS # BLD AUTO: 4.66 K/UL (ref 1.82–7.42)
NEUTROPHILS NFR BLD: 76.3 % (ref 44–72)
NITRITE UR QL STRIP.AUTO: NEGATIVE
NRBC # BLD AUTO: 0 K/UL
NRBC BLD-RTO: 0 /100 WBC
PH UR STRIP.AUTO: 6 [PH] (ref 5–8)
PLATELET # BLD AUTO: 94 K/UL (ref 164–446)
PMV BLD AUTO: 10.9 FL (ref 9–12.9)
POTASSIUM SERPL-SCNC: 4.2 MMOL/L (ref 3.6–5.5)
PROT SERPL-MCNC: 7 G/DL (ref 6–8.2)
PROT UR QL STRIP: NEGATIVE MG/DL
RBC # BLD AUTO: 4.55 M/UL (ref 4.7–6.1)
RBC UR QL AUTO: NEGATIVE
SODIUM SERPL-SCNC: 135 MMOL/L (ref 135–145)
SP GR UR STRIP.AUTO: 1.04
UROBILINOGEN UR STRIP.AUTO-MCNC: 0.2 MG/DL
WBC # BLD AUTO: 6.1 K/UL (ref 4.8–10.8)

## 2020-06-04 PROCEDURE — 81003 URINALYSIS AUTO W/O SCOPE: CPT

## 2020-06-04 PROCEDURE — 36415 COLL VENOUS BLD VENIPUNCTURE: CPT

## 2020-06-04 PROCEDURE — 85025 COMPLETE CBC W/AUTO DIFF WBC: CPT

## 2020-06-04 PROCEDURE — 80053 COMPREHEN METABOLIC PANEL: CPT

## 2020-06-04 PROCEDURE — 83735 ASSAY OF MAGNESIUM: CPT

## 2020-06-04 PROCEDURE — 82378 CARCINOEMBRYONIC ANTIGEN: CPT

## 2020-06-05 ENCOUNTER — OFFICE VISIT (OUTPATIENT)
Dept: MEDICAL GROUP | Facility: CLINIC | Age: 55
End: 2020-06-05
Payer: MEDICAID

## 2020-06-05 VITALS
SYSTOLIC BLOOD PRESSURE: 130 MMHG | BODY MASS INDEX: 28.36 KG/M2 | WEIGHT: 214 LBS | HEIGHT: 73 IN | TEMPERATURE: 98.4 F | RESPIRATION RATE: 16 BRPM | HEART RATE: 76 BPM | OXYGEN SATURATION: 97 % | DIASTOLIC BLOOD PRESSURE: 82 MMHG

## 2020-06-05 DIAGNOSIS — Z79.4 TYPE 2 DIABETES MELLITUS WITH OTHER SPECIFIED COMPLICATION, WITH LONG-TERM CURRENT USE OF INSULIN (HCC): ICD-10-CM

## 2020-06-05 DIAGNOSIS — E11.69 TYPE 2 DIABETES MELLITUS WITH OTHER SPECIFIED COMPLICATION, WITH LONG-TERM CURRENT USE OF INSULIN (HCC): ICD-10-CM

## 2020-06-05 PROCEDURE — 99213 OFFICE O/P EST LOW 20 MIN: CPT | Performed by: FAMILY MEDICINE

## 2020-06-05 RX ORDER — 0.9 % SODIUM CHLORIDE 0.9 %
20 VIAL (ML) INJECTION PRN
Status: CANCELLED | OUTPATIENT
Start: 2020-06-08

## 2020-06-05 RX ORDER — INSULIN GLARGINE 100 [IU]/ML
20 INJECTION, SOLUTION SUBCUTANEOUS EVERY EVENING
Qty: 3 PEN | Refills: 2
Start: 2020-06-05 | End: 2020-09-22 | Stop reason: SDUPTHER

## 2020-06-05 RX ORDER — SODIUM CHLORIDE 9 MG/ML
INJECTION, SOLUTION INTRAVENOUS CONTINUOUS
Status: CANCELLED
Start: 2020-06-06

## 2020-06-05 RX ORDER — 0.9 % SODIUM CHLORIDE 0.9 %
10 VIAL (ML) INJECTION PRN
Status: CANCELLED | OUTPATIENT
Start: 2020-06-05

## 2020-06-05 RX ORDER — 0.9 % SODIUM CHLORIDE 0.9 %
VIAL (ML) INJECTION PRN
Status: CANCELLED | OUTPATIENT
Start: 2020-06-05

## 2020-06-05 RX ORDER — PROCHLORPERAZINE MALEATE 10 MG
10 TABLET ORAL EVERY 6 HOURS PRN
Status: CANCELLED | OUTPATIENT
Start: 2020-06-06

## 2020-06-05 RX ORDER — EPINEPHRINE 1 MG/ML(1)
0.5 AMPUL (ML) INJECTION PRN
Status: CANCELLED | OUTPATIENT
Start: 2020-06-06

## 2020-06-05 RX ORDER — 0.9 % SODIUM CHLORIDE 0.9 %
10 VIAL (ML) INJECTION PRN
Status: CANCELLED | OUTPATIENT
Start: 2020-06-06

## 2020-06-05 RX ORDER — 0.9 % SODIUM CHLORIDE 0.9 %
3 VIAL (ML) INJECTION PRN
Status: CANCELLED | OUTPATIENT
Start: 2020-06-06

## 2020-06-05 RX ORDER — 0.9 % SODIUM CHLORIDE 0.9 %
VIAL (ML) INJECTION PRN
Status: CANCELLED | OUTPATIENT
Start: 2020-06-06

## 2020-06-05 RX ORDER — METHYLPREDNISOLONE SODIUM SUCCINATE 125 MG/2ML
125 INJECTION, POWDER, LYOPHILIZED, FOR SOLUTION INTRAMUSCULAR; INTRAVENOUS PRN
Status: CANCELLED | OUTPATIENT
Start: 2020-06-06

## 2020-06-05 RX ORDER — 0.9 % SODIUM CHLORIDE 0.9 %
3 VIAL (ML) INJECTION PRN
Status: CANCELLED | OUTPATIENT
Start: 2020-06-05

## 2020-06-05 RX ORDER — DIPHENHYDRAMINE HYDROCHLORIDE 50 MG/ML
50 INJECTION INTRAMUSCULAR; INTRAVENOUS PRN
Status: CANCELLED | OUTPATIENT
Start: 2020-06-06

## 2020-06-05 RX ORDER — ONDANSETRON 2 MG/ML
4 INJECTION INTRAMUSCULAR; INTRAVENOUS PRN
Status: CANCELLED | OUTPATIENT
Start: 2020-06-06

## 2020-06-05 RX ORDER — ONDANSETRON 8 MG/1
8 TABLET, ORALLY DISINTEGRATING ORAL PRN
Status: CANCELLED | OUTPATIENT
Start: 2020-06-06

## 2020-06-05 ASSESSMENT — PATIENT HEALTH QUESTIONNAIRE - PHQ9: CLINICAL INTERPRETATION OF PHQ2 SCORE: 0

## 2020-06-05 ASSESSMENT — FIBROSIS 4 INDEX: FIB4 SCORE: 2.88

## 2020-06-05 NOTE — PROGRESS NOTES
"Pharmacy Chemotherapy Calculations    Dx: Metastatic colorectal cancer [KRAS/NRAS wild type, ERBB2 (HER2) amplification]    Cycle 8  Previous treatment = 5/23/20  *C3D8: treatment cancelled d/t drug-related rash  *C4D1: delayed d/t thrombocytopenia    Regimen: mFOLFOX + Erbitux Vectibix + Herceptin  *Dosing Reference*  Trastuzumab 6 mg/kg IV loading dose C1D1, then 4 mg/kg IV Day 1 of subsequent cycles [confirmed \"every 2 weeks\", per MD; progress note: \"He also had an ERBB2 amplification which might suggest response to treatments like Herceptin\"]  Cetuximab 400 mg/m2 IV Cycle 1, Day 1, then 250 mg/m2 IV Days 1 and 8 (confirmed \"weekly\" per MD)   > C4 * * * cetuximab has been omitted * * *   > C5 * * * cetuximab replaced with panitumumab * * *      > Cody LEO reduced dose by 20% to  starting with C1D1 due to anticipated tolerance    > 3/14/20: Oxaliplatin discontinued from treatment  Panitumumab (Vectibix)  IV D1 over 60 min   + added; change of therapy d/t cetuximab skin tox   > 4/25/20, initial dose reduction to 4.5 mg/kg d/t previous EGFR cutaneous toxicities    > OK to round < 15% to 400mg (available vial size) x1 dose (order in treatment plan)  Leucovorin 400 mg/m2 IV Day 1 followed by  Fluorouracil  IV Day 1 followed by   > Cody LEO reduced dose by 10% to 360 mg/m2 starting with C1D1 due to anticipated tolerance   Fluorouracil  CIVI over 46-48 hours    > Cody LEO reduced dose by 10% to 2160 mg/m2 starting with C1D1 due to anticipated tolerance   14-day cycle until DP or UT  NCCN guidelines for colon cancer V.2.2019  (cetuximab + chemo) Venoonando AP et al, OBEY 2017;317(23):8999-6412  (HER2 tx in HER2+ CRC) Kayleen F, et al. Lancet Oncol. 2019 Apr;20(4):518-530.    Allergies: Patient has no known allergies.  /79   Pulse 80   Temp 36.4 °C (97.5 °F) (Temporal)   Resp 18   Ht 1.854 m (6' 1\")   Wt 98.7 kg (217 lb 9.5 oz)   SpO2 96%   BMI 28.71 kg/m²  Body surface area is 2.25 meters squared. "     2/7/20 LVEF by Echo ~ 60% (OK for 6 months per Cody LEO)    Labs 6/4/20 within treatment plan parameters. [platelets OK if > 70K]   BP OK, Urine protein negative    Panitumumab (Vectibix) 4.5 mg/kg x 98.7 kg = 444 mg   <10% difference, OK to treat with final dose = 400 mg IV    Trastuzumab (Herceptin) 4 mg/kg x 98.7 kg = 395 mg   <10% difference, okay to treat with final dose (unable to round to vial size as it would exceed 10% difference) = 395 mg IV    Leucovorin 400 mg/m² x 2.25 m² = 900 mg   <10% difference, okay to treat with final dose = 900 mg IV    Fluorouracil (Adrucil) 360 mg/m² x 2.25 m² = 810 mg   <10% difference, okay to treat with final dose = 810 mg IV    Fluorouracil (Adrucil) 2160 mg/m² x 2.25 m² = 4860 mg   <10% difference, okay to treat with final dose = 4860 mg IV over 46 hours at 2.1 ml/hr via CADD pump    Man Anderson, PharmD

## 2020-06-05 NOTE — PROGRESS NOTES
Complaint: F/u high BS     Subjective:     Gurmeet Jo is a 55 y.o. male here today for     Type 2 diabetes mellitus, with long-term current use of insulin ([facility])  Has been only checking BS in AM, running 300's. Wife administering Lantus 10 un sc qAM.     No other concerns or complaints today.    Current medicines (including changes today)  Current Outpatient Medications   Medication Sig Dispense Refill   • insulin glargine (LANTUS SOLOSTAR) 100 UNIT/ML Solution Pen-injector injection Inject 20 Units as instructed every evening. 3 PEN 2   • Blood Glucose Test Strips Test strips order: Test strips for  meter. Sig: use bid and prn ssx high or low sugar #100 RF x 3 100 Strip 2   • Blood Glucose Monitoring Suppl Device Meter: Dispense Device of Insurance Preference. Sig. Use as directed for blood sugar monitoring. #1. NR. 1 Device 0   • Lancets Lancets order: Lancets for  meter. Sig: use bid and prn ssx high or low sugar. #100 RF x 3 100 Each 2   • Insulin Pen Needle (PEN NEEDLES) 31G X 6 MM Misc Use as directed with insulin pen 1 Each 2   • potassium chloride (MICRO-K) 10 MEQ capsule Take  by mouth every day. Take 1 capsule by mouth every day     • nystatin (MYCOSTATIN) 622524 UNIT/ML Suspension SWISH AND SWALLOW 5 MILLILITERS PO QID     • ondansetron (ZOFRAN ODT) 4 MG TABLET DISPERSIBLE Take 4 mg by mouth every 6 hours as needed for Nausea.     • DOXYCYCLINE PO Take  by mouth.     • triamcinolone acetonide (KENALOG) 0.1 % Cream Apply 1 Application to affected area(s) 2 times a day. 472 g 2   • hydrocortisone 0.5 % Cream Apply  to affected area(s) every day. Indications: rash to face     • lidocaine-prilocaine (EMLA) 2.5-2.5 % Cream      • acetaminophen (TYLENOL) 500 MG Tab Take 500-1,000 mg by mouth every 6 hours as needed.     • LORazepam (ATIVAN) 1 MG Tab Take 1 mg by mouth 1 time daily as needed for Anxiety.     • baclofen (LIORESAL) 10 MG Tab Take 10 mg by mouth 3 times a day as needed.     • therapeutic  multivitamin-minerals (THERAGRAN-M) Tab Take 1 Tab by mouth every day.       No current facility-administered medications for this visit.      He  has a past medical history of Bowel habit changes, Cancer (HCC) (11/2018), Dental disorder, Hiatus hernia syndrome, Hypertension, Indigestion, Pain, Sleep apnea, and Snoring.    Health Maintenance:       Allergies: Compazine and Oxaliplatin    Current Outpatient Medications Ordered in Epic   Medication Sig Dispense Refill   • insulin glargine (LANTUS SOLOSTAR) 100 UNIT/ML Solution Pen-injector injection Inject 20 Units as instructed every evening. 3 PEN 2   • Blood Glucose Test Strips Test strips order: Test strips for  meter. Sig: use bid and prn ssx high or low sugar #100 RF x 3 100 Strip 2   • Blood Glucose Monitoring Suppl Device Meter: Dispense Device of Insurance Preference. Sig. Use as directed for blood sugar monitoring. #1. NR. 1 Device 0   • Lancets Lancets order: Lancets for  meter. Sig: use bid and prn ssx high or low sugar. #100 RF x 3 100 Each 2   • Insulin Pen Needle (PEN NEEDLES) 31G X 6 MM Misc Use as directed with insulin pen 1 Each 2   • potassium chloride (MICRO-K) 10 MEQ capsule Take  by mouth every day. Take 1 capsule by mouth every day     • nystatin (MYCOSTATIN) 859483 UNIT/ML Suspension SWISH AND SWALLOW 5 MILLILITERS PO QID     • ondansetron (ZOFRAN ODT) 4 MG TABLET DISPERSIBLE Take 4 mg by mouth every 6 hours as needed for Nausea.     • DOXYCYCLINE PO Take  by mouth.     • triamcinolone acetonide (KENALOG) 0.1 % Cream Apply 1 Application to affected area(s) 2 times a day. 472 g 2   • hydrocortisone 0.5 % Cream Apply  to affected area(s) every day. Indications: rash to face     • lidocaine-prilocaine (EMLA) 2.5-2.5 % Cream      • acetaminophen (TYLENOL) 500 MG Tab Take 500-1,000 mg by mouth every 6 hours as needed.     • LORazepam (ATIVAN) 1 MG Tab Take 1 mg by mouth 1 time daily as needed for Anxiety.     • baclofen (LIORESAL) 10 MG Tab Take 10  "mg by mouth 3 times a day as needed.     • therapeutic multivitamin-minerals (THERAGRAN-M) Tab Take 1 Tab by mouth every day.       No current [facility]-ordered facility-administered medications on file.        Past Medical History:   Diagnosis Date   • Bowel habit changes     constipation   • Cancer (HCC) 2018    Colon CA with Liver Mets   • Dental disorder     dentures    • Hiatus hernia syndrome    • Hypertension     No meds at this time   • Indigestion    • Pain     liver    • Sleep apnea     cpap    • Snoring        Past Surgical History:   Procedure Laterality Date   • COLONOSCOPY         Social History     Tobacco Use   • Smoking status: Former Smoker     Packs/day: 1.00     Years: 15.00     Pack years: 15.00     Last attempt to quit: 1998     Years since quittin.4   • Smokeless tobacco: Never Used   Substance Use Topics   • Alcohol use: No   • Drug use: Yes     Types: Inhaled, Marijuana     Comment: somtimes       Social History     Social History Narrative   • Not on file       Family History   Problem Relation Age of Onset   • Hypertension Mother    • Hypertension Father    • Diabetes Father    • Diabetes Brother          ROS   Patient denies any fever, chills, unintentional weight gain/loss, fatigue, stroke symptoms, dizziness, headache, nasal congestion, sore-throat, cough, heartburn, chest pain, difficulty breathing, abdominal discomfort, diarrhea/constipation, burning with urination or frequency, joint or back pain, skin rashes, depression or anxiety.       Objective:     /82   Pulse 76   Temp 36.9 °C (98.4 °F) (Temporal)   Resp 16   Ht 1.85 m (6' 0.84\")   Wt 97.1 kg (214 lb)   SpO2 97%  Body mass index is 28.36 kg/m².   Physical Exam:  Constitutional: Alert, no distress.    Psych: Alert and oriented x3, appropriate affect and mood.        Assessment and Plan:   The following treatment plan was discussed    1. Type 2 diabetes mellitus with other specified complication, with " long-term current use of insulin (HCC)  Uncontrolled. Need to check BS AM, PM, report back readings via [facility] next week. Increase Lantus to 20 u qAM, May need bid dosing. Patient and wife understand.    Followup: Return to be determined.    Please note that this dictation was created using voice recognition software. I have made every reasonable attempt to correct obvious errors, but I expect that there are errors of grammar and possibly content that I did not discover before finalizing the note.

## 2020-06-06 ENCOUNTER — OUTPATIENT INFUSION SERVICES (OUTPATIENT)
Dept: ONCOLOGY | Facility: MEDICAL CENTER | Age: 55
End: 2020-06-06
Attending: INTERNAL MEDICINE
Payer: MEDICAID

## 2020-06-06 VITALS
HEART RATE: 80 BPM | DIASTOLIC BLOOD PRESSURE: 79 MMHG | HEIGHT: 73 IN | RESPIRATION RATE: 18 BRPM | TEMPERATURE: 97.5 F | BODY MASS INDEX: 28.84 KG/M2 | SYSTOLIC BLOOD PRESSURE: 137 MMHG | WEIGHT: 217.59 LBS | OXYGEN SATURATION: 96 %

## 2020-06-06 DIAGNOSIS — C18.9 METASTATIC COLON CANCER TO LIVER (HCC): ICD-10-CM

## 2020-06-06 DIAGNOSIS — C78.7 METASTATIC COLON CANCER TO LIVER (HCC): ICD-10-CM

## 2020-06-06 PROCEDURE — 96367 TX/PROPH/DG ADDL SEQ IV INF: CPT

## 2020-06-06 PROCEDURE — 96411 CHEMO IV PUSH ADDL DRUG: CPT

## 2020-06-06 PROCEDURE — 700105 HCHG RX REV CODE 258: Performed by: INTERNAL MEDICINE

## 2020-06-06 PROCEDURE — 96375 TX/PRO/DX INJ NEW DRUG ADDON: CPT

## 2020-06-06 PROCEDURE — 96417 CHEMO IV INFUS EACH ADDL SEQ: CPT

## 2020-06-06 PROCEDURE — 96413 CHEMO IV INFUSION 1 HR: CPT

## 2020-06-06 PROCEDURE — G0498 CHEMO EXTEND IV INFUS W/PUMP: HCPCS

## 2020-06-06 PROCEDURE — 700111 HCHG RX REV CODE 636 W/ 250 OVERRIDE (IP): Performed by: INTERNAL MEDICINE

## 2020-06-06 RX ADMIN — TRASTUZUMAB 395 MG: 150 INJECTION, POWDER, LYOPHILIZED, FOR SOLUTION INTRAVENOUS at 11:25

## 2020-06-06 RX ADMIN — FLUOROURACIL 810 MG: 50 INJECTION, SOLUTION INTRAVENOUS at 14:24

## 2020-06-06 RX ADMIN — FLUOROURACIL 4860 MG: 50 INJECTION, SOLUTION INTRAVENOUS at 14:30

## 2020-06-06 RX ADMIN — ONDANSETRON HYDROCHLORIDE 16 MG: 2 INJECTION, SOLUTION INTRAMUSCULAR; INTRAVENOUS at 09:30

## 2020-06-06 RX ADMIN — PANITUMUMAB 400 MG: 400 SOLUTION INTRAVENOUS at 10:09

## 2020-06-06 RX ADMIN — DEXAMETHASONE SODIUM PHOSPHATE 20 MG: 4 INJECTION, SOLUTION INTRAMUSCULAR; INTRAVENOUS at 09:52

## 2020-06-06 RX ADMIN — DIPHENHYDRAMINE HYDROCHLORIDE 50 MG: 50 INJECTION, SOLUTION INTRAMUSCULAR; INTRAVENOUS at 09:20

## 2020-06-06 RX ADMIN — LEUCOVORIN CALCIUM 900 MG: 350 INJECTION, POWDER, LYOPHILIZED, FOR SUSPENSION INTRAMUSCULAR; INTRAVENOUS at 12:08

## 2020-06-06 ASSESSMENT — FIBROSIS 4 INDEX: FIB4 SCORE: 2.88

## 2020-06-06 NOTE — PROGRESS NOTES
Chemotherapy Verification - SECONDARY RN       Height = 185.4 cm  Weight = 98.7 kg  BSA = 2.25 m2       Medication: Vectibix  Dose: 4.5 mg/kg  Calculated Dose: 444.2 mg rounded down to vial size per pharmacy 400 mg                            (In mg/m2, AUC, mg/kg)     Medication: Herceptin  Dose: 4 mg/kg  Calculated Dose: 394.8 mg                             (In mg/m2, AUC, mg/kg)    Medication: Adrucil  Dose: 360 mg/m2  Calculated Dose: 810 mg                             (In mg/m2, AUC, mg/kg)    Medication: Adrucil  Dose: 2,160 mg/m2  Calculated Dose: 4,860 mg over 46 hours                             (In mg/m2, AUC, mg/kg)      I confirm that this process was performed independently.

## 2020-06-06 NOTE — PROGRESS NOTES
Pharmacy Chemotherapy Verification Note:    Patient Name: Gurmeet Jo      Dx: Metastatic Colorectal CA (KRAS/NRAS wild type, and ERBB2 [a HER2 family receptor] amplification)      Protocol: mFOLFOX+Panitumumab +trastuzumab       Panitumumab 6 mg/kg IV over 60 minutes on Day 1    4/25/20 added to treatment plan dose reduced to 4.5 mg/kg for tolerance  ~Followed by~  OXALIplatin 85 mg/m² IV over 2 hours on Day 1   - dose reduced to 68 mg/m² starting C1 for tolerance   3/3/20 - oxali removed from treatment plan d/t oxaliplatin allergy per Harman VARGAS.  Leucovorin 400 mg/m² IV over 2 hours on Day 1, to run concurrent with OXALIplatin, followed by  Fluorouracil 400 mg/m² IVP on Day 1, followed by   C1 dose reduced to 360 mg/m² for tolerance  Fluorouracil 2400 mg/m² CIVI over 46-48 hours   C1 dose reduced to 2160 mg/m² for tolerance   Trastuzumab 6 mg/kg IV over 90 minutes on Day 1 of Cycle 1, followed by  Trastuzumab 4 mg/kg IV over 30-60 minutes on Day 1 of Cycle 2    Confirmed: per Dr. Ross: Q2 week dosing   4/11/20 - Reload with 6 mg/kg per Dr. Chisholm  14-day cycle until disease progression or unacceptable toxicity    *Dosing Reference*  NCCN Guidelines for Colon Cancer. V.2.2019.  Rohith TAVERAS et al. J Clin Oncol. 2010;28(31):4697-705  Aura PATRICK et al. J Clin Oncol. 2008;28(12):2006-12  Luisa RODRIGEZ, et al. Br J Cancer. 2002;87(4):393-9    Herceptin has limited data in colorectal cancer, but two regimens exist neither at the same loading dose or interval as Dr. Ross has chosen. Per [facility] progress note: Patient has undergone further [employer] testing of his tumor. He was found to be KRAS/NRAS wild type, which suggest he has an EGFR mutation and would be a candidate for EGFR targeted therapy. He also had an ERBB2 amplification which might suggest response to treatments like Herceptin.    Allergies:  Patient has no known allergies.     /79   Pulse 80   Temp 36.4 °C (97.5 °F) (Temporal)   Resp 18   " Ht 1.854 m (6' 1\")   Wt 98.7 kg (217 lb 9.5 oz)   SpO2 96%   BMI 28.71 kg/m²  Body surface area is 2.25 meters squared.     Labs 6/4/20:  ANC~ 4660 Plt = 94k   Hgb = 14.4     SCr = 0.58 mg/dL CrCl > 125 mL/min   AST/ALT/ALK  = 29/36/163 TBili = 0.8     2/7/2020 ECHO: LVEF 60%     Drug Order   (Drug name, dose, route, IV Fluid & volume, frequency, number of doses) Cycle 8      Previous treatment: 5/23/20     Medication = Panitumumab (Vectibix)  Base Dose= 4.5 mg/kg  Calc Dose: Base Dose x 98.7 kg = 444.15 mg  Final Dose = 400 mg  Route = IV  Fluid & Volume =  mL  Admin Duration = Over 60 minutes          Rounded to nearest vial size (within 10%) per rounding protocol, okay to treat with final dose   Medication = Trastuzumab (herceptin)  Base Dose = 4 mg/kg  Calc Dose: Base Dose x 98.7 kg = 394.8 mg  Final Dose = 395 mg  Route = IV  Fluid & Volume =  mL  Admin Duration = Over 30 minutes          Unable to round to nearest vial size, okay to treat with final dose   Medication = Leucovorin (Wellcovorin)  Base Dose = 400 mg/m²  Calc Dose: Base Dose x 2.25 m² = 900 mg  Final Dose = 900 mg  Route = IV  Fluid & Volume = D5W 250 mL  Admin Duration = Over 2 hours          <10% difference, okay to treat with final dose   Medication = Fluorouracil (5-FU)  Base Dose = 360 mg/m²  Calc Dose: Base Dose x 2.25 m² = 810 mg  Final Dose = 810 mg  Route = IVP  Fluid & Volume = 16.2 mL in syringe  Conc = 50 mg/mL  Admin Duration = Over 5 minutes          <10% difference, okay to treat with final dose   Medication = Fluorouracil (5-FU)  Base Dose = 2160 mg/m²  Calc Dose:Base Dose x 2.25  m² = 4860 mg  Final Dose = 4860 mg  Route = CIVI   Fluid & Volume = 97.2 mL (+3 mL overfill =   100.2 mL)  Admin Duration = Over 46 hours to run at   2.1 mL/hour    Via CADD pump for home infusion      <10% difference, okay to treat with final dose         "

## 2020-06-06 NOTE — PROGRESS NOTES
Patient arrived ambulatory to [facility] for C8 Vectibix/Herceptin/5FU.  Pt denies any s/s of infection or fevers.  He saw MD on Thursday.  He is monitoring his BS now and has started long acting insulin.  He reports his blood sugar this morning was 300.  He has a f/u appointment with his PCP on Monday for his diabetes.      Port accessed using sterile technique with brisk blood return noted.  Labs drawn on 6/4 and within parameters to treat.  Pre medications given and chemotherapy infused per order, pt tolerated well.  CADD pump connected with 2nd RN verification of settings.  Confirmed appointment on Monday and pt ambulated out of clinic in no apparent distress.

## 2020-06-06 NOTE — PROGRESS NOTES
Chemotherapy Verification - PRIMARY RN      Height = 185.4 cm  Weight = 98.7 kg  BSA = 2.25 m2       Medication: Vectibix  Dose: 4.5 mg/kg  Calculated Dose: 444 mg                             (In mg/m2, AUC, mg/kg)     Medication: Herceptin  Dose: 4 mg/kg  Calculated Dose: 394.8 mg                             (In mg/m2, AUC, mg/kg)    Medication: Fluorouracil bolus  Dose: 360 mg/m2  Calculated Dose: 810 mg                             (In mg/m2, AUC, mg/kg)    Medication: Fluorouracil  Dose: 2160 mg/m2  Over 46 hours Calculated Dose: 4860 mg over 46 hours                            (In mg/m2, AUC, mg/kg)          I confirm this process was performed independently with the BSA and all final chemotherapy dosing calculations congruent.  Any discrepancies of 10% or greater have been addressed with the chemotherapy pharmacist. The resolution of the discrepancy has been documented in the EPIC progress notes.

## 2020-06-08 ENCOUNTER — OUTPATIENT INFUSION SERVICES (OUTPATIENT)
Dept: ONCOLOGY | Facility: MEDICAL CENTER | Age: 55
End: 2020-06-08
Attending: INTERNAL MEDICINE
Payer: MEDICAID

## 2020-06-08 VITALS
SYSTOLIC BLOOD PRESSURE: 131 MMHG | BODY MASS INDEX: 28.34 KG/M2 | HEIGHT: 73 IN | DIASTOLIC BLOOD PRESSURE: 71 MMHG | OXYGEN SATURATION: 96 % | RESPIRATION RATE: 18 BRPM | HEART RATE: 82 BPM | TEMPERATURE: 98 F | WEIGHT: 213.85 LBS

## 2020-06-08 DIAGNOSIS — C18.9 METASTATIC COLON CANCER TO LIVER (HCC): ICD-10-CM

## 2020-06-08 DIAGNOSIS — C78.7 METASTATIC COLON CANCER TO LIVER (HCC): ICD-10-CM

## 2020-06-08 DIAGNOSIS — E11.69 TYPE 2 DIABETES MELLITUS WITH OTHER SPECIFIED COMPLICATION, WITH LONG-TERM CURRENT USE OF INSULIN (HCC): ICD-10-CM

## 2020-06-08 DIAGNOSIS — Z79.4 TYPE 2 DIABETES MELLITUS WITH OTHER SPECIFIED COMPLICATION, WITH LONG-TERM CURRENT USE OF INSULIN (HCC): ICD-10-CM

## 2020-06-08 PROCEDURE — 700111 HCHG RX REV CODE 636 W/ 250 OVERRIDE (IP)

## 2020-06-08 PROCEDURE — 96523 IRRIG DRUG DELIVERY DEVICE: CPT

## 2020-06-08 RX ADMIN — HEPARIN: 100 SYRINGE at 14:52

## 2020-06-08 ASSESSMENT — FIBROSIS 4 INDEX: FIB4 SCORE: 2.88

## 2020-06-08 NOTE — PROGRESS NOTES
Pt arrived to [facility], ambulatory, for pump disconnect. Pt voices no complaints. Notable rash to chest and neck area, pt states he saw MD on Thursday and is using cream on it at this time. Pump shut off upon arrival, 0 mL left to be delivered. Pump disconnected and returned to pharmacy, cassette disposed of. Port flushed and heparin locked per policy, port de-accessed. Pt left [facility] with no s/sx of distress. Follow up appointment confirmed.

## 2020-06-09 ENCOUNTER — TELEPHONE (OUTPATIENT)
Dept: MEDICAL GROUP | Facility: CLINIC | Age: 55
End: 2020-06-09

## 2020-06-09 NOTE — TELEPHONE ENCOUNTER
Telcom with patient's wife. Informed need to send endo due to complexity caused by cancer. Given tel # [facility]. [facility] had already faxed doc.

## 2020-06-18 RX ORDER — HEPARIN SODIUM (PORCINE) LOCK FLUSH IV SOLN 100 UNIT/ML 100 UNIT/ML
500 SOLUTION INTRAVENOUS PRN
Status: CANCELLED | OUTPATIENT
Start: 2020-07-10

## 2020-06-18 RX ORDER — 0.9 % SODIUM CHLORIDE 0.9 %
10 VIAL (ML) INJECTION PRN
Status: CANCELLED | OUTPATIENT
Start: 2020-07-08

## 2020-06-18 RX ORDER — 0.9 % SODIUM CHLORIDE 0.9 %
20 VIAL (ML) INJECTION PRN
Status: CANCELLED | OUTPATIENT
Start: 2020-07-10

## 2020-06-18 RX ORDER — METHYLPREDNISOLONE SODIUM SUCCINATE 125 MG/2ML
125 INJECTION, POWDER, LYOPHILIZED, FOR SOLUTION INTRAMUSCULAR; INTRAVENOUS PRN
Status: CANCELLED | OUTPATIENT
Start: 2020-07-08

## 2020-06-18 RX ORDER — DIPHENHYDRAMINE HYDROCHLORIDE 50 MG/ML
50 INJECTION INTRAMUSCULAR; INTRAVENOUS PRN
Status: CANCELLED | OUTPATIENT
Start: 2020-07-08

## 2020-06-18 RX ORDER — 0.9 % SODIUM CHLORIDE 0.9 %
10 VIAL (ML) INJECTION PRN
Status: CANCELLED | OUTPATIENT
Start: 2020-07-07

## 2020-06-18 RX ORDER — 0.9 % SODIUM CHLORIDE 0.9 %
3 VIAL (ML) INJECTION PRN
Status: CANCELLED | OUTPATIENT
Start: 2020-07-08

## 2020-06-18 RX ORDER — 0.9 % SODIUM CHLORIDE 0.9 %
3 VIAL (ML) INJECTION PRN
Status: CANCELLED | OUTPATIENT
Start: 2020-07-07

## 2020-06-18 RX ORDER — ONDANSETRON 8 MG/1
8 TABLET, ORALLY DISINTEGRATING ORAL PRN
Status: CANCELLED | OUTPATIENT
Start: 2020-07-08

## 2020-06-18 RX ORDER — 0.9 % SODIUM CHLORIDE 0.9 %
VIAL (ML) INJECTION PRN
Status: CANCELLED | OUTPATIENT
Start: 2020-07-07

## 2020-06-18 RX ORDER — 0.9 % SODIUM CHLORIDE 0.9 %
VIAL (ML) INJECTION PRN
Status: CANCELLED | OUTPATIENT
Start: 2020-07-08

## 2020-06-18 RX ORDER — ONDANSETRON 2 MG/ML
4 INJECTION INTRAMUSCULAR; INTRAVENOUS PRN
Status: CANCELLED | OUTPATIENT
Start: 2020-07-08

## 2020-06-18 RX ORDER — SODIUM CHLORIDE 9 MG/ML
INJECTION, SOLUTION INTRAVENOUS CONTINUOUS
Status: CANCELLED
Start: 2020-07-08

## 2020-06-18 RX ORDER — EPINEPHRINE 1 MG/ML(1)
0.5 AMPUL (ML) INJECTION PRN
Status: CANCELLED | OUTPATIENT
Start: 2020-07-08

## 2020-06-18 RX ORDER — HEPARIN SODIUM (PORCINE) LOCK FLUSH IV SOLN 100 UNIT/ML 100 UNIT/ML
500 SOLUTION INTRAVENOUS PRN
Status: CANCELLED | OUTPATIENT
Start: 2020-07-08

## 2020-06-18 RX ORDER — PROCHLORPERAZINE MALEATE 10 MG
10 TABLET ORAL EVERY 6 HOURS PRN
Status: CANCELLED | OUTPATIENT
Start: 2020-07-08

## 2020-06-18 RX ORDER — HEPARIN SODIUM (PORCINE) LOCK FLUSH IV SOLN 100 UNIT/ML 100 UNIT/ML
500 SOLUTION INTRAVENOUS PRN
Status: CANCELLED | OUTPATIENT
Start: 2020-07-07

## 2020-06-22 DIAGNOSIS — L27.0 DRUG-INDUCED SKIN RASH: ICD-10-CM

## 2020-06-23 RX ORDER — HYDROXYZINE PAMOATE 50 MG/1
50 CAPSULE ORAL 3 TIMES DAILY PRN
Qty: 90 CAP | Refills: 1 | Status: SHIPPED | OUTPATIENT
Start: 2020-06-23 | End: 2020-10-02 | Stop reason: SDUPTHER

## 2020-06-27 ENCOUNTER — APPOINTMENT (OUTPATIENT)
Dept: ONCOLOGY | Facility: MEDICAL CENTER | Age: 55
End: 2020-06-27
Attending: INTERNAL MEDICINE
Payer: MEDICAID

## 2020-06-29 ENCOUNTER — APPOINTMENT (OUTPATIENT)
Dept: ONCOLOGY | Facility: MEDICAL CENTER | Age: 55
End: 2020-06-29
Attending: INTERNAL MEDICINE
Payer: MEDICAID

## 2020-07-02 ENCOUNTER — HOSPITAL ENCOUNTER (OUTPATIENT)
Facility: MEDICAL CENTER | Age: 55
End: 2020-07-02
Attending: INTERNAL MEDICINE
Payer: MEDICAID

## 2020-07-02 LAB
ALBUMIN SERPL BCP-MCNC: 3.5 G/DL (ref 3.2–4.9)
ALBUMIN/GLOB SERPL: 1.6 G/DL
ALP SERPL-CCNC: 221 U/L (ref 30–99)
ALT SERPL-CCNC: 32 U/L (ref 2–50)
ANION GAP SERPL CALC-SCNC: 14 MMOL/L (ref 7–16)
AST SERPL-CCNC: 34 U/L (ref 12–45)
BILIRUB SERPL-MCNC: 0.8 MG/DL (ref 0.1–1.5)
BUN SERPL-MCNC: 7 MG/DL (ref 8–22)
CALCIUM SERPL-MCNC: 8.5 MG/DL (ref 8.5–10.5)
CHLORIDE SERPL-SCNC: 105 MMOL/L (ref 96–112)
CO2 SERPL-SCNC: 22 MMOL/L (ref 20–33)
CREAT SERPL-MCNC: 0.56 MG/DL (ref 0.5–1.4)
GLOBULIN SER CALC-MCNC: 2.2 G/DL (ref 1.9–3.5)
GLUCOSE SERPL-MCNC: 223 MG/DL (ref 65–99)
POTASSIUM SERPL-SCNC: 3.6 MMOL/L (ref 3.6–5.5)
PROT SERPL-MCNC: 5.7 G/DL (ref 6–8.2)
SODIUM SERPL-SCNC: 141 MMOL/L (ref 135–145)

## 2020-07-02 PROCEDURE — 82378 CARCINOEMBRYONIC ANTIGEN: CPT

## 2020-07-02 PROCEDURE — 80053 COMPREHEN METABOLIC PANEL: CPT

## 2020-07-03 LAB — CEA SERPL-MCNC: 84.6 NG/ML (ref 0–3)

## 2020-07-06 ENCOUNTER — HOSPITAL ENCOUNTER (OUTPATIENT)
Dept: LAB | Facility: MEDICAL CENTER | Age: 55
End: 2020-07-06
Attending: NURSE PRACTITIONER
Payer: MEDICAID

## 2020-07-06 ENCOUNTER — HOSPITAL ENCOUNTER (OUTPATIENT)
Dept: LAB | Facility: MEDICAL CENTER | Age: 55
End: 2020-07-06
Attending: INTERNAL MEDICINE
Payer: MEDICAID

## 2020-07-06 LAB
ALBUMIN SERPL BCP-MCNC: 3.7 G/DL (ref 3.2–4.9)
ALBUMIN/GLOB SERPL: 1.2 G/DL
ALP SERPL-CCNC: 232 U/L (ref 30–99)
ALT SERPL-CCNC: 28 U/L (ref 2–50)
ANION GAP SERPL CALC-SCNC: 13 MMOL/L (ref 7–16)
APPEARANCE UR: CLEAR
AST SERPL-CCNC: 32 U/L (ref 12–45)
BASOPHILS # BLD AUTO: 1.3 % (ref 0–1.8)
BASOPHILS # BLD: 0.09 K/UL (ref 0–0.12)
BILIRUB SERPL-MCNC: 0.8 MG/DL (ref 0.1–1.5)
BILIRUB UR QL STRIP.AUTO: NEGATIVE
BUN SERPL-MCNC: 5 MG/DL (ref 8–22)
CALCIUM SERPL-MCNC: 9.3 MG/DL (ref 8.5–10.5)
CEA SERPL-MCNC: 93.6 NG/ML (ref 0–3)
CHLORIDE SERPL-SCNC: 106 MMOL/L (ref 96–112)
CO2 SERPL-SCNC: 22 MMOL/L (ref 20–33)
COLOR UR: YELLOW
CREAT SERPL-MCNC: 0.57 MG/DL (ref 0.5–1.4)
EOSINOPHIL # BLD AUTO: 0.77 K/UL (ref 0–0.51)
EOSINOPHIL NFR BLD: 11.1 % (ref 0–6.9)
ERYTHROCYTE [DISTWIDTH] IN BLOOD BY AUTOMATED COUNT: 48.2 FL (ref 35.9–50)
EST. AVERAGE GLUCOSE BLD GHB EST-MCNC: 209 MG/DL
GLOBULIN SER CALC-MCNC: 3 G/DL (ref 1.9–3.5)
GLUCOSE SERPL-MCNC: 120 MG/DL (ref 65–99)
GLUCOSE UR STRIP.AUTO-MCNC: NEGATIVE MG/DL
HBA1C MFR BLD: 8.9 % (ref 0–5.6)
HCT VFR BLD AUTO: 40.6 % (ref 42–52)
HGB BLD-MCNC: 13.1 G/DL (ref 14–18)
IMM GRANULOCYTES # BLD AUTO: 0.03 K/UL (ref 0–0.11)
IMM GRANULOCYTES NFR BLD AUTO: 0.4 % (ref 0–0.9)
KETONES UR STRIP.AUTO-MCNC: NEGATIVE MG/DL
LEUKOCYTE ESTERASE UR QL STRIP.AUTO: NEGATIVE
LYMPHOCYTES # BLD AUTO: 1.21 K/UL (ref 1–4.8)
LYMPHOCYTES NFR BLD: 17.5 % (ref 22–41)
MAGNESIUM SERPL-MCNC: 1.9 MG/DL (ref 1.5–2.5)
MCH RBC QN AUTO: 30.3 PG (ref 27–33)
MCHC RBC AUTO-ENTMCNC: 32.3 G/DL (ref 33.7–35.3)
MCV RBC AUTO: 93.8 FL (ref 81.4–97.8)
MICRO URNS: NORMAL
MONOCYTES # BLD AUTO: 0.68 K/UL (ref 0–0.85)
MONOCYTES NFR BLD AUTO: 9.8 % (ref 0–13.4)
NEUTROPHILS # BLD AUTO: 4.15 K/UL (ref 1.82–7.42)
NEUTROPHILS NFR BLD: 59.9 % (ref 44–72)
NITRITE UR QL STRIP.AUTO: NEGATIVE
NRBC # BLD AUTO: 0 K/UL
NRBC BLD-RTO: 0 /100 WBC
PH UR STRIP.AUTO: 7 [PH] (ref 5–8)
PLATELET # BLD AUTO: 131 K/UL (ref 164–446)
PMV BLD AUTO: 10.1 FL (ref 9–12.9)
POTASSIUM SERPL-SCNC: 3.8 MMOL/L (ref 3.6–5.5)
PROT SERPL-MCNC: 6.7 G/DL (ref 6–8.2)
PROT UR QL STRIP: NEGATIVE MG/DL
RBC # BLD AUTO: 4.33 M/UL (ref 4.7–6.1)
RBC UR QL AUTO: NEGATIVE
SODIUM SERPL-SCNC: 141 MMOL/L (ref 135–145)
SP GR UR STRIP.AUTO: 1.01
UROBILINOGEN UR STRIP.AUTO-MCNC: 0.2 MG/DL
WBC # BLD AUTO: 6.9 K/UL (ref 4.8–10.8)

## 2020-07-06 PROCEDURE — 83036 HEMOGLOBIN GLYCOSYLATED A1C: CPT

## 2020-07-06 PROCEDURE — 36415 COLL VENOUS BLD VENIPUNCTURE: CPT

## 2020-07-06 PROCEDURE — 80053 COMPREHEN METABOLIC PANEL: CPT

## 2020-07-06 PROCEDURE — 81003 URINALYSIS AUTO W/O SCOPE: CPT

## 2020-07-06 PROCEDURE — 83735 ASSAY OF MAGNESIUM: CPT

## 2020-07-06 PROCEDURE — 85025 COMPLETE CBC W/AUTO DIFF WBC: CPT

## 2020-07-06 PROCEDURE — 82378 CARCINOEMBRYONIC ANTIGEN: CPT

## 2020-07-08 ENCOUNTER — OUTPATIENT INFUSION SERVICES (OUTPATIENT)
Dept: ONCOLOGY | Facility: MEDICAL CENTER | Age: 55
End: 2020-07-08
Attending: INTERNAL MEDICINE
Payer: MEDICAID

## 2020-07-08 VITALS
HEART RATE: 85 BPM | RESPIRATION RATE: 18 BRPM | SYSTOLIC BLOOD PRESSURE: 133 MMHG | BODY MASS INDEX: 29.04 KG/M2 | HEIGHT: 73 IN | WEIGHT: 219.14 LBS | OXYGEN SATURATION: 98 % | DIASTOLIC BLOOD PRESSURE: 97 MMHG | TEMPERATURE: 98.7 F

## 2020-07-08 DIAGNOSIS — C78.7 METASTATIC COLON CANCER TO LIVER (HCC): ICD-10-CM

## 2020-07-08 DIAGNOSIS — C18.9 METASTATIC COLON CANCER TO LIVER (HCC): ICD-10-CM

## 2020-07-08 PROCEDURE — A4212 NON CORING NEEDLE OR STYLET: HCPCS

## 2020-07-08 PROCEDURE — 700111 HCHG RX REV CODE 636 W/ 250 OVERRIDE (IP): Performed by: INTERNAL MEDICINE

## 2020-07-08 PROCEDURE — 96413 CHEMO IV INFUSION 1 HR: CPT

## 2020-07-08 PROCEDURE — 96409 CHEMO IV PUSH SNGL DRUG: CPT

## 2020-07-08 PROCEDURE — G0498 CHEMO EXTEND IV INFUS W/PUMP: HCPCS

## 2020-07-08 PROCEDURE — 96411 CHEMO IV PUSH ADDL DRUG: CPT

## 2020-07-08 PROCEDURE — 96375 TX/PRO/DX INJ NEW DRUG ADDON: CPT

## 2020-07-08 PROCEDURE — 700105 HCHG RX REV CODE 258: Performed by: INTERNAL MEDICINE

## 2020-07-08 PROCEDURE — 96417 CHEMO IV INFUS EACH ADDL SEQ: CPT

## 2020-07-08 RX ORDER — SODIUM CHLORIDE 9 MG/ML
INJECTION, SOLUTION INTRAVENOUS CONTINUOUS
Status: DISCONTINUED | OUTPATIENT
Start: 2020-07-08 | End: 2020-07-08 | Stop reason: HOSPADM

## 2020-07-08 RX ADMIN — FLUOROURACIL 4880 MG: 50 INJECTION, SOLUTION INTRAVENOUS at 17:46

## 2020-07-08 RX ADMIN — SODIUM CHLORIDE: 9 INJECTION, SOLUTION INTRAVENOUS at 15:07

## 2020-07-08 RX ADMIN — FLUOROURACIL 815 MG: 50 INJECTION, SOLUTION INTRAVENOUS at 17:38

## 2020-07-08 RX ADMIN — LEUCOVORIN CALCIUM 904 MG: 350 INJECTION, POWDER, LYOPHILIZED, FOR SUSPENSION INTRAMUSCULAR; INTRAVENOUS at 17:13

## 2020-07-08 RX ADMIN — DEXAMETHASONE SODIUM PHOSPHATE 20 MG: 4 INJECTION, SOLUTION INTRAMUSCULAR; INTRAVENOUS at 15:26

## 2020-07-08 RX ADMIN — TRASTUZUMAB 398 MG: 150 INJECTION, POWDER, LYOPHILIZED, FOR SOLUTION INTRAVENOUS at 16:26

## 2020-07-08 RX ADMIN — DIPHENHYDRAMINE HYDROCHLORIDE 50 MG: 50 INJECTION, SOLUTION INTRAMUSCULAR; INTRAVENOUS at 15:43

## 2020-07-08 RX ADMIN — ONDANSETRON 16 MG: 2 INJECTION INTRAMUSCULAR; INTRAVENOUS at 15:07

## 2020-07-08 ASSESSMENT — FIBROSIS 4 INDEX: FIB4 SCORE: 2.59

## 2020-07-08 NOTE — PROGRESS NOTES
"Pharmacy Chemotherapy Calculations:    Dx: Metastatic colorectal cancer [KRAS/NRAS wild type, ERBB2 (HER2) amplification]    Cycle 9 - delayed  Previous treatment = 6/6/20    Regimen: mFOLFOX + Vectibix + Herceptin  *Dosing Reference*  Trastuzumab 6 mg/kg IV loading dose C1D1, then 4 mg/kg IV Day 1 of subsequent cycles [confirmed \"every 2 weeks\", per MD; progress note: \"He also had an ERBB2 amplification which might suggest response to treatments like Herceptin\"]   > 7/8/20: Herceptin 2.5 weeks overdue.  Do not reload Herceptin per [facility] Dr. Ross.  Cetuximab 400 mg/m2 IV Cycle 1 Day 1, then 250 mg/m2 IV Days 1 and 8 (confirmed \"weekly\" per MD)   > C4 * * * cetuximab has been omitted * * *   > C5 * * * cetuximab replaced with panitumumab * * *      > Cody LEO reduced dose by 20% to  starting with C1D1 due to anticipated tolerance    > 3/14/20: Oxaliplatin discontinued from treatment     + added; change of therapy d/t cetuximab skin tox   > 4/25/20, initial dose reduction to 4.5 mg/kg d/t previous EGFR cutaneous toxicities    > C9 * * * Vectibix has been omitted * * *  Leucovorin 400 mg/m2 IV Day 1 followed by  Fluorouracil  IV Day 1 followed by   > Cody LEO reduced dose by 10% to 360 mg/m2 starting with C1D1 due to anticipated tolerance   Fluorouracil  CIVI over 46-48 hours    > Cody LEO reduced dose by 10% to 2160 mg/m2 starting with C1D1 due to anticipated tolerance   14-day cycle until DP or UT  NCCN guidelines for colon cancer V.2.2019  (cetuximab + chemo) [place] AP et al, OBEY 2017;317(23):4680-3348  (HER2 tx in HER2+ CRC) Clark-Jeffrey F, et al. Lancet Oncol. 2019 Apr;20(4):518-530.    Allergies: Patient has no known allergies.  /97   Pulse 85   Temp 37.1 °C (98.7 °F) (Temporal)   Resp 18   Ht 1.858 m (6' 1.15\")   Wt 99.4 kg (219 lb 2.2 oz)   SpO2 98%   BMI 28.79 kg/m²  Body surface area is 2.26 meters squared.     2/7/20 LVEF by Echo ~ 60% (OK for 6 months per Cody LEO)    All labs (7/6/20) " within treatment plan parameters.      Trastuzumab (Herceptin) 4 mg/kg x 99.4 kg = 397.6 mg   <10% difference, okay to treat with final dose (unable to round to vial size as it would exceed 10% difference) = 398 mg IV    Leucovorin 400 mg/m² x 2.26 m² = 904 mg   <10% difference, okay to treat with final dose = 904 mg IV    Fluorouracil (Adrucil) 360 mg/m² x 2.26 m² = 813.6 mg   <10% difference, okay to treat with final dose = 815 mg IV    Fluorouracil (Adrucil) 2160 mg/m² x 2.26 m² = 4882 mg   <10% difference, okay to treat with final dose = 4880 mg CIVI    To be given over 46 hours at 2.1 ml/hr via CADD pump      Aura Guzman, PharmD

## 2020-07-08 NOTE — PROGRESS NOTES
Pharmacy Chemotherapy Verification Note:    Patient Name: Gurmeet Jo      Dx: Metastatic Colorectal CA (KRAS/NRAS wild type, and ERBB2 [a HER2 family receptor] amplification)      Protocol: mFOLFOX+Panitumumab +trastuzumab       Panitumumab 6 mg/kg IV over 60 minutes on Day 1    4/25/20 added to treatment plan dose reduced to 4.5 mg/kg for tolerance   7/8/20 Omitted starting C9  ~Followed by~  Trastuzumab 6 mg/kg IV over 90 minutes on Day 1 of Cycle 1, followed by  Trastuzumab 4 mg/kg IV over 30-60 minutes on Day 1 of Cycle 2    Confirmed: per Dr. Ross: Q2 week dosing   4/11/20 - Reload with 6 mg/kg per Dr. Chisholm  OXALIplatin 85 mg/m² IV over 2 hours on Day 1   - dose reduced to 68 mg/m² starting C1 for tolerance   3/3/20 - oxali removed from treatment plan d/t oxaliplatin allergy per Harman VARGAS.  Leucovorin 400 mg/m² IV over 2 hours on Day 1, to run concurrent with OXALIplatin, followed by  Fluorouracil 400 mg/m² IVP on Day 1, followed by   C1 dose reduced to 360 mg/m² for tolerance  Fluorouracil 2400 mg/m² CIVI over 46-48 hours   C1 dose reduced to 2160 mg/m² for tolerance     14-day cycle until disease progression or unacceptable toxicity    *Dosing Reference*  NCCN Guidelines for Colon Cancer. V.2.2019.  yeni Lord al. J Clin Oncol. 2010;28(31):4697-705  Aura J et al. J Clin Oncol. 2008;28(12):2006-12  Luisa RODRIGEZ, et al. Br J Cancer. 2002;87(4):393-9    Herceptin has limited data in colorectal cancer, but two regimens exist neither at the same loading dose or interval as Dr. Ross has chosen. Per [facility] progress note: Patient has undergone further [employer] testing of his tumor. He was found to be KRAS/NRAS wild type, which suggest he has an EGFR mutation and would be a candidate for EGFR targeted therapy. He also had an ERBB2 amplification which might suggest response to treatments like Herceptin.    Allergies:  Patient has no known allergies.     /97   Pulse 85   Temp 37.1 °C  "(98.7 °F) (Temporal)   Resp 18   Ht 1.858 m (6' 1.15\")   Wt 99.4 kg (219 lb 2.2 oz)   SpO2 98%   BMI 28.79 kg/m²  Body surface area is 2.26 meters squared.     Labs 6/4/20:      2/7/2020 ECHO: LVEF 60%     Drug Order   (Drug name, dose, route, IV Fluid & volume, frequency, number of doses) Cycle 9      Previous treatment: 6/6/20     Medication = Trastuzumab (herceptin)  Base Dose = 4 mg/kg  Calc Dose: Base Dose x 99.4 kg = 397.6 mg  Final Dose = 398 mg  Route = IV  Fluid & Volume =  mL  Admin Duration = Over 30 minutes          Unable to round to nearest vial size, okay to treat with final dose   Medication = Leucovorin (Wellcovorin)  Base Dose = 400 mg/m²  Calc Dose: Base Dose x 2.26 m² = 904 mg  Final Dose = 900 mg  Route = IV  Fluid & Volume = D5W 250 mL  Admin Duration = Over 15 min          <10% difference, okay to treat with final dose   Medication = Fluorouracil (5-FU)  Base Dose = 360 mg/m²  Calc Dose: Base Dose x 2.25 m² = 813.6 mg  Final Dose = 815 mg  Route = IVP  Fluid & Volume = 16.3 mL in syringe  Conc = 50 mg/mL  Admin Duration = Over 5 minutes          <10% difference, okay to treat with final dose   Medication = Fluorouracil (5-FU)  Base Dose = 2160 mg/m²  Calc Dose:Base Dose x 2.26  m² = 4880 mg  Final Dose = 4880 mg  Route = CIVI   Fluid & Volume = 97.6 mL (+3 mL overfill =   100.6 mL)  Admin Duration = Over 46 hours to run at   2.1 mL/hour    Via CADD pump for home infusion      <10% difference, okay to treat with final dose         "

## 2020-07-08 NOTE — PROGRESS NOTES
Chemotherapy Verification - PRIMARY RN      Height = 185.8 cm  Weight = 99.4 kg  BSA = 2.26 m2       Medication: Trastuzumab  Dose: 4 mg/kg    Calculated Dose: 398 mg                             (In mg/m2, AUC, mg/kg)     Medication: Leucovorin  Dose: 400 mg/m2    Calculated Dose: 906 mg                             (In mg/m2, AUC, mg/kg)    Medication: Fluorouracil  Dose: 360 mg/m2    Calculated Dose: 814 mg                             (In mg/m2, AUC, mg/kg)    Medication: Fluorouracil  Dose: 2,160 mg/m2    Calculated Dose: 4,882 mg                             (In mg/m2, AUC, mg/kg)    I confirm this process was performed independently with the BSA and all final chemotherapy dosing calculations congruent.  Any discrepancies of 10% or greater have been addressed with the chemotherapy pharmacist. The resolution of the discrepancy has been documented in the EPIC progress notes.

## 2020-07-08 NOTE — PROGRESS NOTES
Chemotherapy Verification - SECONDARY RN     C9    Height = 185.8 cm  Weight = 99.4 kg  BSA = 2.26 m2 - EF 60%, done on 2/7/2020      Medication: Trastuzumab (Herceptin)  Dose: 4 mg/kg  Calculated Dose: 397.6 mg                                  Medication: Fluorouracil (5FU) push  Dose: 360 mg/m2  Calculated Dose: 813.6 mg                                 Medication: Fluorouracil (5FU) CADD pump  Dose: 2160 mg/m2  Calculated Dose: 4881.6 mg over 46 hours                             I confirm that this process was performed independently.

## 2020-07-09 NOTE — PROGRESS NOTES
Pt presented to [facility] for C9D1 Herceptin, Leucovorin, Fluorouracil IV push and connection of 46 hour infusion Fluorouracil. Pre-treatment lab results from 7/6/20 reviewed, within treatable parameters.  Pt denied current illness/infection. Port accessed in sterile fashion. Flushed easily, ashley briskly. Pt received pre-meds as ordered. Herceptin infused over 30 minutes. Leucovorin infused over 20 minutes. Fluorouracil given IV push over 5 minutes, followed by connection f 46 hour CADD pump with Fluorouracil. Treatment tolerated well. Pt left [facility] in NAD. Next appointment confirmed prior to departure.

## 2020-07-10 ENCOUNTER — OUTPATIENT INFUSION SERVICES (OUTPATIENT)
Dept: ONCOLOGY | Facility: MEDICAL CENTER | Age: 55
End: 2020-07-10
Attending: INTERNAL MEDICINE
Payer: MEDICAID

## 2020-07-10 VITALS
OXYGEN SATURATION: 98 % | TEMPERATURE: 98.7 F | SYSTOLIC BLOOD PRESSURE: 132 MMHG | RESPIRATION RATE: 18 BRPM | DIASTOLIC BLOOD PRESSURE: 83 MMHG | HEART RATE: 87 BPM

## 2020-07-10 DIAGNOSIS — C18.9 METASTATIC COLON CANCER TO LIVER (HCC): ICD-10-CM

## 2020-07-10 DIAGNOSIS — C78.7 METASTATIC COLON CANCER TO LIVER (HCC): ICD-10-CM

## 2020-07-10 PROCEDURE — 700111 HCHG RX REV CODE 636 W/ 250 OVERRIDE (IP)

## 2020-07-10 PROCEDURE — 96523 IRRIG DRUG DELIVERY DEVICE: CPT

## 2020-07-10 RX ORDER — HEPARIN SODIUM (PORCINE) LOCK FLUSH IV SOLN 100 UNIT/ML 100 UNIT/ML
SOLUTION INTRAVENOUS
Status: COMPLETED
Start: 2020-07-10 | End: 2020-07-10

## 2020-07-10 RX ADMIN — HEPARIN 500 UNITS: 100 SYRINGE at 16:34

## 2020-07-11 NOTE — PROGRESS NOTES
[facility] to [facility] for CADD pump disconnect and port flush. 97.8ml given and 2.7ml left in pump. Patient declined to stay and let pump finish. Patient had the minimum requirement infused. Patient states that he had no issues with the pump. PORT flushed and heparinized, port de-accessed, gauze and paper tape applied as a dressing. Patient has future appointment. Patient to home in care of self.

## 2020-07-22 ENCOUNTER — APPOINTMENT (OUTPATIENT)
Dept: ONCOLOGY | Facility: MEDICAL CENTER | Age: 55
End: 2020-07-22
Attending: INTERNAL MEDICINE
Payer: MEDICAID

## 2020-07-23 ENCOUNTER — HOSPITAL ENCOUNTER (OUTPATIENT)
Dept: LAB | Facility: MEDICAL CENTER | Age: 55
End: 2020-07-23
Attending: INTERNAL MEDICINE
Payer: MEDICAID

## 2020-07-23 LAB
APPEARANCE UR: CLEAR
BASOPHILS # BLD AUTO: 0.9 % (ref 0–1.8)
BASOPHILS # BLD: 0.06 K/UL (ref 0–0.12)
BILIRUB UR QL STRIP.AUTO: NEGATIVE
COLOR UR: YELLOW
EOSINOPHIL # BLD AUTO: 0.41 K/UL (ref 0–0.51)
EOSINOPHIL NFR BLD: 6.1 % (ref 0–6.9)
ERYTHROCYTE [DISTWIDTH] IN BLOOD BY AUTOMATED COUNT: 50 FL (ref 35.9–50)
GLUCOSE UR STRIP.AUTO-MCNC: NEGATIVE MG/DL
HCT VFR BLD AUTO: 39.8 % (ref 42–52)
HGB BLD-MCNC: 12.7 G/DL (ref 14–18)
IMM GRANULOCYTES # BLD AUTO: 0.01 K/UL (ref 0–0.11)
IMM GRANULOCYTES NFR BLD AUTO: 0.1 % (ref 0–0.9)
KETONES UR STRIP.AUTO-MCNC: NEGATIVE MG/DL
LEUKOCYTE ESTERASE UR QL STRIP.AUTO: NEGATIVE
LYMPHOCYTES # BLD AUTO: 1.23 K/UL (ref 1–4.8)
LYMPHOCYTES NFR BLD: 18.2 % (ref 22–41)
MCH RBC QN AUTO: 30.2 PG (ref 27–33)
MCHC RBC AUTO-ENTMCNC: 31.9 G/DL (ref 33.7–35.3)
MCV RBC AUTO: 94.5 FL (ref 81.4–97.8)
MICRO URNS: NORMAL
MONOCYTES # BLD AUTO: 0.77 K/UL (ref 0–0.85)
MONOCYTES NFR BLD AUTO: 11.4 % (ref 0–13.4)
NEUTROPHILS # BLD AUTO: 4.28 K/UL (ref 1.82–7.42)
NEUTROPHILS NFR BLD: 63.3 % (ref 44–72)
NITRITE UR QL STRIP.AUTO: NEGATIVE
NRBC # BLD AUTO: 0 K/UL
NRBC BLD-RTO: 0 /100 WBC
PH UR STRIP.AUTO: 6 [PH] (ref 5–8)
PLATELET # BLD AUTO: 118 K/UL (ref 164–446)
PMV BLD AUTO: 10.4 FL (ref 9–12.9)
PROT UR QL STRIP: NEGATIVE MG/DL
RBC # BLD AUTO: 4.21 M/UL (ref 4.7–6.1)
RBC UR QL AUTO: NEGATIVE
SP GR UR STRIP.AUTO: 1.02
UROBILINOGEN UR STRIP.AUTO-MCNC: 0.2 MG/DL
WBC # BLD AUTO: 6.8 K/UL (ref 4.8–10.8)

## 2020-07-23 PROCEDURE — 81003 URINALYSIS AUTO W/O SCOPE: CPT

## 2020-07-23 PROCEDURE — 83735 ASSAY OF MAGNESIUM: CPT

## 2020-07-23 PROCEDURE — 85025 COMPLETE CBC W/AUTO DIFF WBC: CPT

## 2020-07-23 PROCEDURE — 82378 CARCINOEMBRYONIC ANTIGEN: CPT

## 2020-07-23 PROCEDURE — 80053 COMPREHEN METABOLIC PANEL: CPT

## 2020-07-23 PROCEDURE — 36415 COLL VENOUS BLD VENIPUNCTURE: CPT

## 2020-07-24 ENCOUNTER — TELEPHONE (OUTPATIENT)
Dept: ONCOLOGY | Facility: MEDICAL CENTER | Age: 55
End: 2020-07-24

## 2020-07-24 LAB
ALBUMIN SERPL BCP-MCNC: 4.2 G/DL (ref 3.2–4.9)
ALBUMIN/GLOB SERPL: 1.8 G/DL
ALP SERPL-CCNC: 218 U/L (ref 30–99)
ALT SERPL-CCNC: 33 U/L (ref 2–50)
ANION GAP SERPL CALC-SCNC: 12 MMOL/L (ref 7–16)
AST SERPL-CCNC: 29 U/L (ref 12–45)
BILIRUB SERPL-MCNC: 1.3 MG/DL (ref 0.1–1.5)
BUN SERPL-MCNC: 11 MG/DL (ref 8–22)
CALCIUM SERPL-MCNC: 9.5 MG/DL (ref 8.5–10.5)
CEA SERPL-MCNC: 114 NG/ML (ref 0–3)
CHLORIDE SERPL-SCNC: 102 MMOL/L (ref 96–112)
CO2 SERPL-SCNC: 23 MMOL/L (ref 20–33)
CREAT SERPL-MCNC: 0.56 MG/DL (ref 0.5–1.4)
GLOBULIN SER CALC-MCNC: 2.4 G/DL (ref 1.9–3.5)
GLUCOSE SERPL-MCNC: 126 MG/DL (ref 65–99)
MAGNESIUM SERPL-MCNC: 2 MG/DL (ref 1.5–2.5)
POTASSIUM SERPL-SCNC: 3.6 MMOL/L (ref 3.6–5.5)
PROT SERPL-MCNC: 6.6 G/DL (ref 6–8.2)
SODIUM SERPL-SCNC: 137 MMOL/L (ref 135–145)

## 2020-07-24 RX ORDER — 0.9 % SODIUM CHLORIDE 0.9 %
VIAL (ML) INJECTION PRN
Status: CANCELLED | OUTPATIENT
Start: 2020-07-25

## 2020-07-24 RX ORDER — 0.9 % SODIUM CHLORIDE 0.9 %
10 VIAL (ML) INJECTION PRN
Status: CANCELLED | OUTPATIENT
Start: 2020-07-25

## 2020-07-24 RX ORDER — 0.9 % SODIUM CHLORIDE 0.9 %
20 VIAL (ML) INJECTION PRN
Status: CANCELLED | OUTPATIENT
Start: 2020-07-27

## 2020-07-24 RX ORDER — ONDANSETRON 8 MG/1
8 TABLET, ORALLY DISINTEGRATING ORAL PRN
Status: CANCELLED | OUTPATIENT
Start: 2020-07-25

## 2020-07-24 RX ORDER — HEPARIN SODIUM (PORCINE) LOCK FLUSH IV SOLN 100 UNIT/ML 100 UNIT/ML
500 SOLUTION INTRAVENOUS PRN
Status: CANCELLED | OUTPATIENT
Start: 2020-07-27

## 2020-07-24 RX ORDER — METHYLPREDNISOLONE SODIUM SUCCINATE 125 MG/2ML
125 INJECTION, POWDER, LYOPHILIZED, FOR SOLUTION INTRAMUSCULAR; INTRAVENOUS PRN
Status: CANCELLED | OUTPATIENT
Start: 2020-07-25

## 2020-07-24 RX ORDER — DIPHENHYDRAMINE HYDROCHLORIDE 50 MG/ML
50 INJECTION INTRAMUSCULAR; INTRAVENOUS PRN
Status: CANCELLED | OUTPATIENT
Start: 2020-07-25

## 2020-07-24 RX ORDER — ONDANSETRON 2 MG/ML
4 INJECTION INTRAMUSCULAR; INTRAVENOUS PRN
Status: CANCELLED | OUTPATIENT
Start: 2020-07-25

## 2020-07-24 RX ORDER — HEPARIN SODIUM (PORCINE) LOCK FLUSH IV SOLN 100 UNIT/ML 100 UNIT/ML
500 SOLUTION INTRAVENOUS PRN
Status: CANCELLED | OUTPATIENT
Start: 2020-07-25

## 2020-07-24 RX ORDER — 0.9 % SODIUM CHLORIDE 0.9 %
3 VIAL (ML) INJECTION PRN
Status: CANCELLED | OUTPATIENT
Start: 2020-07-25

## 2020-07-24 RX ORDER — PROCHLORPERAZINE MALEATE 10 MG
10 TABLET ORAL EVERY 6 HOURS PRN
Status: CANCELLED | OUTPATIENT
Start: 2020-07-25

## 2020-07-24 RX ORDER — SODIUM CHLORIDE 9 MG/ML
INJECTION, SOLUTION INTRAVENOUS CONTINUOUS
Status: CANCELLED
Start: 2020-07-25

## 2020-07-24 RX ORDER — EPINEPHRINE 1 MG/ML(1)
0.5 AMPUL (ML) INJECTION PRN
Status: CANCELLED | OUTPATIENT
Start: 2020-07-25

## 2020-07-25 ENCOUNTER — OUTPATIENT INFUSION SERVICES (OUTPATIENT)
Dept: ONCOLOGY | Facility: MEDICAL CENTER | Age: 55
End: 2020-07-25
Attending: INTERNAL MEDICINE
Payer: MEDICAID

## 2020-07-25 VITALS
RESPIRATION RATE: 18 BRPM | HEIGHT: 73 IN | OXYGEN SATURATION: 98 % | SYSTOLIC BLOOD PRESSURE: 138 MMHG | WEIGHT: 226.19 LBS | TEMPERATURE: 97 F | BODY MASS INDEX: 29.98 KG/M2 | DIASTOLIC BLOOD PRESSURE: 74 MMHG | HEART RATE: 75 BPM

## 2020-07-25 DIAGNOSIS — C78.7 METASTATIC COLON CANCER TO LIVER (HCC): ICD-10-CM

## 2020-07-25 DIAGNOSIS — C18.9 METASTATIC COLON CANCER TO LIVER (HCC): ICD-10-CM

## 2020-07-25 PROCEDURE — 700111 HCHG RX REV CODE 636 W/ 250 OVERRIDE (IP): Performed by: INTERNAL MEDICINE

## 2020-07-25 PROCEDURE — 96413 CHEMO IV INFUSION 1 HR: CPT

## 2020-07-25 PROCEDURE — 96375 TX/PRO/DX INJ NEW DRUG ADDON: CPT

## 2020-07-25 PROCEDURE — G0498 CHEMO EXTEND IV INFUS W/PUMP: HCPCS

## 2020-07-25 PROCEDURE — 96417 CHEMO IV INFUS EACH ADDL SEQ: CPT

## 2020-07-25 PROCEDURE — A4212 NON CORING NEEDLE OR STYLET: HCPCS

## 2020-07-25 PROCEDURE — 700105 HCHG RX REV CODE 258: Performed by: INTERNAL MEDICINE

## 2020-07-25 PROCEDURE — 96367 TX/PROPH/DG ADDL SEQ IV INF: CPT

## 2020-07-25 PROCEDURE — 96411 CHEMO IV PUSH ADDL DRUG: CPT

## 2020-07-25 PROCEDURE — 700111 HCHG RX REV CODE 636 W/ 250 OVERRIDE (IP)

## 2020-07-25 PROCEDURE — 700105 HCHG RX REV CODE 258

## 2020-07-25 RX ORDER — SODIUM CHLORIDE 9 MG/ML
INJECTION, SOLUTION INTRAVENOUS CONTINUOUS
Status: DISCONTINUED | OUTPATIENT
Start: 2020-07-25 | End: 2020-07-25 | Stop reason: HOSPADM

## 2020-07-25 RX ADMIN — LEUCOVORIN CALCIUM 916 MG: 350 INJECTION, POWDER, LYOPHILIZED, FOR SUSPENSION INTRAMUSCULAR; INTRAVENOUS at 14:24

## 2020-07-25 RX ADMIN — FLUOROURACIL 825 MG: 50 INJECTION, SOLUTION INTRAVENOUS at 15:09

## 2020-07-25 RX ADMIN — FLUOROURACIL 4945 MG: 50 INJECTION, SOLUTION INTRAVENOUS at 15:13

## 2020-07-25 RX ADMIN — SODIUM CHLORIDE: 9 INJECTION, SOLUTION INTRAVENOUS at 11:01

## 2020-07-25 RX ADMIN — DIPHENHYDRAMINE HYDROCHLORIDE 50 MG: 50 INJECTION, SOLUTION INTRAMUSCULAR; INTRAVENOUS at 11:01

## 2020-07-25 RX ADMIN — ONDANSETRON 16 MG: 2 INJECTION, SOLUTION INTRAMUSCULAR; INTRAVENOUS at 11:22

## 2020-07-25 RX ADMIN — TRASTUZUMAB 450 MG: 150 INJECTION, POWDER, LYOPHILIZED, FOR SOLUTION INTRAVENOUS at 13:33

## 2020-07-25 RX ADMIN — PANITUMUMAB 300 MG: 100 SOLUTION INTRAVENOUS at 12:11

## 2020-07-25 ASSESSMENT — FIBROSIS 4 INDEX: FIB4 SCORE: 2.4

## 2020-07-25 NOTE — PROGRESS NOTES
"Pharmacy Chemotherapy Calculations:    Dx: Metastatic colorectal cancer [KRAS/NRAS wild type, ERBB2 (HER2) amplification]    Cycle 10 - delayed 3 days   Previous treatment = 7/8/20    Regimen: mFOLFOX + Vectibix + Herceptin  *Dosing Reference*  Trastuzumab 6 mg/kg IV loading dose C1D1, then 4 mg/kg IV Day 1 of subsequent cycles [confirmed \"every 2 weeks\", per MD; progress note: \"He also had an ERBB2 amplification which might suggest response to treatments like Herceptin\"]   > 7/8/20: Herceptin 2.5 weeks overdue.  Do not reload Herceptin per [facility] Dr. Ross.  Cetuximab 400 mg/m2 IV Cycle 1 Day 1, then 250 mg/m2 IV Days 1 and 8 (confirmed \"weekly\" per MD)   > C4 * * * cetuximab has been omitted * * *   > C5 * * * cetuximab replaced with panitumumab * * *      > Cody LEO reduced dose by 20% to  starting with C1D1 due to anticipated tolerance    > 3/14/20: Oxaliplatin discontinued from treatment     + added; change of therapy d/t cetuximab skin tox   > 4/25/20, initial dose reduction to 4.5 mg/kg d/t previous EGFR cutaneous toxicities    > C9 * * * Vectibix has been omitted * * *   >C10 Vectibix reordered at 3 mg/kg per Cody LEO   Leucovorin 400 mg/m2 IV Day 1 followed by  Fluorouracil  IV Day 1 followed by   > Cody LEO reduced dose by 10% to 360 mg/m2 starting with C1D1 due to anticipated tolerance   Fluorouracil  CIVI over 46-48 hours    > Cody LEO reduced dose by 10% to 2160 mg/m2 starting with C1D1 due to anticipated tolerance   14-day cycle until DP or UT  NCCN guidelines for colon cancer V.2.2019  (cetuximab + chemo) Christine AP et al, OBEY 2017;317(23):0524-0764  (HER2 tx in HER2+ CRC) Kayleen RINALDI, et al. Lancet Oncol. 2019 Apr;20(4):518-530.    Allergies: Patient has no known allergies.  /74   Pulse 75   Temp 36.1 °C (97 °F) (Temporal)   Resp 18   Ht 1.845 m (6' 0.64\")   Wt 102.6 kg (226 lb 3.1 oz)   SpO2 98%   BMI 30.14 kg/m²  Body surface area is 2.29 meters squared.     2/7/20 LVEF by Echo " ~ 60% OK for 6 months per Cody LEO order    All labs (7/23/20) within treatment plan parameters.    Panitumumab 3 mg/kg x 102.6 kg = 307.8 mg   <10% difference, OK to treat with final dose = 300 mg IV    Trastuzumab (Herceptin) 4 mg/kg x 102.6 kg = 410 mg   <10% difference, okay to treat with final dose (rounded to vial size within 10% difference) = 450 mg IV    Leucovorin 400 mg/m² x 2.29 m² = 916 mg   <10% difference, okay to treat with final dose = 916 mg IV over 30 minutes OK    Fluorouracil (Adrucil) 360 mg/m² x 2.29 m² = 824 mg   <10% difference, okay to treat with final dose = 825 mg IV    Fluorouracil (Adrucil) 2160 mg/m² x 2.29 m² = 4946 mg   <10% difference, okay to treat with final dose = 4945 mg CIVI    To be given over 46 hours at 2.2 ml/hr via CADD pump    Man Anderson, PharmD

## 2020-07-25 NOTE — PROGRESS NOTES
Chemotherapy Verification - PRIMARY RN      Height = 72.64in  Weight = 102.6kg  BSA = 2.28m2       Medication: Vectibix  Dose: 3mg/kg  Calculated Dose: 307.8mg                             (In mg/m2, AUC, mg/kg)     Medication: Herceptin  Dose: mg/kg  Calculated Dose: 410.4mg                             (In mg/m2, AUC, mg/kg)    Medication: Leucovorin  Dose: 400mg/m2  Calculated Dose: 912mg                             (In mg/m2, AUC, mg/kg)    Medication: Fluorouracil  Dose: 360mg/m2  Calculated Dose: 820.8mg                             (In mg/m2, AUC, mg/kg)    Medication: Fluorouracil  Dose: 2160mg/m2  Calculated Dose: 4924.8mg over 46 hours                             (In mg/m2, AUC, mg/kg)      I confirm this process was performed independently with the BSA and all final chemotherapy dosing calculations congruent.  Any discrepancies of 10% or greater have been addressed with the chemotherapy pharmacist. The resolution of the discrepancy has been documented in the EPIC progress notes.

## 2020-07-25 NOTE — PROGRESS NOTES
Pharmacy Chemotherapy Verification Note:    Patient Name: Gurmeet Jo      Dx: Metastatic Colorectal CA (KRAS/NRAS wild type, and ERBB2 [a HER2 family receptor] amplification)      Protocol: mFOLFOX+Panitumumab +trastuzumab       Panitumumab 6 mg/kg IV over 60 minutes on Day 1    4/25/20 added to treatment plan dose reduced to 4.5 mg/kg for tolerance   7/8/20 Omitted starting C9   7/25/20 dose reduced to 3 mg/kg for tolerance  ~Followed by~  Trastuzumab 6 mg/kg IV over 90 minutes on Day 1 of Cycle 1, followed by  Trastuzumab 4 mg/kg IV over 30-60 minutes on Day 1 of Cycle 2    Confirmed: per Dr. Ross: Q2 week dosing   4/11/20 - Reload with 6 mg/kg per Dr. Chisholm  OXALIplatin 85 mg/m² IV over 2 hours on Day 1   - dose reduced to 68 mg/m² starting C1 for tolerance   3/3/20 - oxali removed from treatment plan d/t oxaliplatin allergy per Harman VARGAS.  Leucovorin 400 mg/m² IV over 2 hours on Day 1, to run concurrent with OXALIplatin, followed by  Fluorouracil 400 mg/m² IVP on Day 1, followed by   C1 dose reduced to 360 mg/m² for tolerance  Fluorouracil 2400 mg/m² CIVI over 46-48 hours   C1 dose reduced to 2160 mg/m² for tolerance     14-day cycle until disease progression or unacceptable toxicity    *Dosing Reference*  NCCN Guidelines for Colon Cancer. V.2.2019.  Rohith TAVERAS et al. J Clin Oncol. 2010;28(31):4697-705  Aura PATRICK, et al. J Clin Oncol. 2008;28(12):2006-12  Luisa RODRIGEZ, et al. Br J Cancer. 2002;87(4):393-9    Herceptin has limited data in colorectal cancer, but two regimens exist neither at the same loading dose or interval as Dr. Ross has chosen. Per [facility] progress note: Patient has undergone further [employer] testing of his tumor. He was found to be KRAS/NRAS wild type, which suggest he has an EGFR mutation and would be a candidate for EGFR targeted therapy. He also had an ERBB2 amplification which might suggest response to treatments like Herceptin.    Allergies:  Patient has no known  "allergies.     /74   Pulse 75   Temp 36.1 °C (97 °F) (Temporal)   Resp 18   Ht 1.845 m (6' 0.64\")   Wt 102.6 kg (226 lb 3.1 oz)   SpO2 98%   BMI 30.14 kg/m²  Body surface area is 2.29 meters squared.     Labs 7/23/20:  Meets treatment plan parameters  Urine protein neg, BP ok    2/7/2020 ECHO: LVEF 60%     Drug Order   (Drug name, dose, route, IV Fluid & volume, frequency, number of doses) Cycle 10      Previous treatment: 7/8/20     Medication = panitumumab (Vectibix)  Base Dose = 3 mg/kg  Calc Dose: Base Dose x 102.6 kg = 307.8 mg  Final Dose = 300 mg  Route = IV  Fluid & Volume =  mL  Admin Duration = Over 60 minutes    <10% difference, okay to treat with final dose         Medication = Trastuzumab (Herceptin)  Base Dose = 4 mg/kg  Calc Dose: Base Dose x 102.6 kg = 410.4 mg  Final Dose = 450 mg  Route = IV  Fluid & Volume =  mL  Admin Duration = Over 30 minutes    <10% difference, okay to treat with final dose           Medication = Leucovorin (Wellcovorin)  Base Dose = 400 mg/m²  Calc Dose: Base Dose x 2.29 m² = 916 mg  Final Dose = 916 mg  Route = IV  Fluid & Volume = D5W 250 mL  Admin Duration = Over 15 min          <10% difference, okay to treat with final dose   Medication = Fluorouracil (5-FU)  Base Dose = 360 mg/m²  Calc Dose: Base Dose x 2.29 m² = 824.4 mg  Final Dose = 825 mg  Route = IVP  Fluid & Volume = 16.5 mL in syringe  Conc = 50 mg/mL  Admin Duration = Over 5 minutes          <10% difference, okay to treat with final dose   Medication = Fluorouracil (5-FU)  Base Dose = 2160 mg/m²  Calc Dose:Base Dose x 2.29 m² = 4946.4 mg  Final Dose = 4945 mg  Route = CIVI   Fluid & Volume = 98.9 mL (+3 mL overfill =   101.9 mL)  Admin Duration = Over 46 hours to run at   2.2 mL/hour    Via CADD pump for home infusion      <10% difference, okay to treat with final dose       "

## 2020-07-25 NOTE — PROGRESS NOTES
Chemotherapy Verification - SECONDARY RN       Height = 184.5 cm  Weight = 102.6 kg  BSA = 2.29 m2       Medication: Vectibix  Dose: 3 mg/kg  Calculated Dose: 307.8 mg                             (In mg/m2, AUC, mg/kg)     Medication: Herceptin  Dose: 4 mg/kg  Calculated Dose: 410.4 mg round to vial                             (In mg/m2, AUC, mg/kg)    Medication: 5FU  Dose: 360 mg/m2 dr  Calculated Dose: 824.4 mg                             (In mg/m2, AUC, mg/kg)    Medication: Home infusion 5FU  Dose: 2160 mg/m2 dr  Calculated Dose: 4946.4 mg over 46 hours                          (In mg/m2, AUC, mg/kg)      I confirm that this process was performed independently.

## 2020-07-26 NOTE — PROGRESS NOTES
Patient arrived ambulatory to the [facility] for C10D1 Vectabix/Herceptin/Leucovorin/5FU. Reviewed vital signs, labs, and physician order. Patient denies S&S of infection, or bleeding. Reviewed labs, and POC, pt under the impression that Vectibix was discontinued for good. Call placed to Dr Rusty B[place] on call for Dr Ross), per MD solorzano to proceed with Vectabix at partial dose as ordered. Right chest port accessed with sterile technique, visualized brisk blood return. Pre-treatment medications administered. Vectabix, Herceptin, Leucovorin, 5FU push, and 5FU pump administered, no adverse reaction observed. Port flushed, blood visualized by [facility] RN, 5FU pump settings verified with pt and RN infusing at rate of 2.2ml's/hour for 46 hours, volume with overfill 102cc's. Confirmed upcoming appointment date and time with patient. Patient left the [facility] ambulatory in no sign of distress.

## 2020-07-27 ENCOUNTER — OUTPATIENT INFUSION SERVICES (OUTPATIENT)
Dept: ONCOLOGY | Facility: MEDICAL CENTER | Age: 55
End: 2020-07-27
Attending: INTERNAL MEDICINE
Payer: MEDICAID

## 2020-07-27 VITALS
WEIGHT: 228.84 LBS | RESPIRATION RATE: 18 BRPM | BODY MASS INDEX: 31 KG/M2 | SYSTOLIC BLOOD PRESSURE: 119 MMHG | HEART RATE: 73 BPM | HEIGHT: 72 IN | DIASTOLIC BLOOD PRESSURE: 68 MMHG | OXYGEN SATURATION: 97 % | TEMPERATURE: 97 F

## 2020-07-27 DIAGNOSIS — C18.9 METASTATIC COLON CANCER TO LIVER (HCC): ICD-10-CM

## 2020-07-27 DIAGNOSIS — C78.7 METASTATIC COLON CANCER TO LIVER (HCC): ICD-10-CM

## 2020-07-27 PROCEDURE — 700111 HCHG RX REV CODE 636 W/ 250 OVERRIDE (IP)

## 2020-07-27 PROCEDURE — 96523 IRRIG DRUG DELIVERY DEVICE: CPT

## 2020-07-27 RX ORDER — HEPARIN SODIUM (PORCINE) LOCK FLUSH IV SOLN 100 UNIT/ML 100 UNIT/ML
SOLUTION INTRAVENOUS
Status: COMPLETED
Start: 2020-07-27 | End: 2020-07-27

## 2020-07-27 RX ORDER — HEPARIN SODIUM (PORCINE) LOCK FLUSH IV SOLN 100 UNIT/ML 100 UNIT/ML
500 SOLUTION INTRAVENOUS PRN
Status: DISCONTINUED | OUTPATIENT
Start: 2020-07-27 | End: 2020-07-27 | Stop reason: HOSPADM

## 2020-07-27 RX ADMIN — HEPARIN 5 ML: 100 SYRINGE at 15:54

## 2020-07-27 ASSESSMENT — FIBROSIS 4 INDEX: FIB4 SCORE: 2.4

## 2020-07-27 NOTE — PROGRESS NOTES
Gurmeet presented to infusion center for 5FU pump de-access. Pump had 0 ml in reservoir and 102 ml delivered. Pump disconnected, cleaned and returned to pharmacy with luis pack in pt's labeled bag. Port flushed per protocol, brisk blood return observed, heparinized and de-accessed with needle intact, gauze dressing placed. Pt has next appt, discharged home to self care.

## 2020-07-29 ENCOUNTER — HOSPITAL ENCOUNTER (OUTPATIENT)
Dept: RADIOLOGY | Facility: MEDICAL CENTER | Age: 55
End: 2020-07-29
Attending: INTERNAL MEDICINE
Payer: MEDICAID

## 2020-07-29 DIAGNOSIS — C18.9 MALIGNANT NEOPLASM OF COLON, UNSPECIFIED PART OF COLON (HCC): ICD-10-CM

## 2020-07-29 PROCEDURE — 700117 HCHG RX CONTRAST REV CODE 255: Performed by: INTERNAL MEDICINE

## 2020-07-29 PROCEDURE — 71260 CT THORAX DX C+: CPT

## 2020-07-29 RX ADMIN — IOHEXOL 100 ML: 350 INJECTION, SOLUTION INTRAVENOUS at 09:13

## 2020-07-29 RX ADMIN — IOHEXOL 25 ML: 240 INJECTION, SOLUTION INTRATHECAL; INTRAVASCULAR; INTRAVENOUS; ORAL at 09:14

## 2020-08-05 RX ORDER — 0.9 % SODIUM CHLORIDE 0.9 %
10 VIAL (ML) INJECTION PRN
Status: CANCELLED | OUTPATIENT
Start: 2020-08-08

## 2020-08-05 RX ORDER — 0.9 % SODIUM CHLORIDE 0.9 %
VIAL (ML) INJECTION PRN
Status: CANCELLED | OUTPATIENT
Start: 2020-08-08

## 2020-08-05 RX ORDER — ONDANSETRON 8 MG/1
8 TABLET, ORALLY DISINTEGRATING ORAL PRN
Status: CANCELLED | OUTPATIENT
Start: 2020-08-08

## 2020-08-05 RX ORDER — 0.9 % SODIUM CHLORIDE 0.9 %
3 VIAL (ML) INJECTION PRN
Status: CANCELLED | OUTPATIENT
Start: 2020-08-07

## 2020-08-05 RX ORDER — HEPARIN SODIUM (PORCINE) LOCK FLUSH IV SOLN 100 UNIT/ML 100 UNIT/ML
500 SOLUTION INTRAVENOUS PRN
Status: CANCELLED | OUTPATIENT
Start: 2020-08-07

## 2020-08-05 RX ORDER — METHYLPREDNISOLONE SODIUM SUCCINATE 125 MG/2ML
125 INJECTION, POWDER, LYOPHILIZED, FOR SOLUTION INTRAMUSCULAR; INTRAVENOUS PRN
Status: CANCELLED | OUTPATIENT
Start: 2020-08-08

## 2020-08-05 RX ORDER — EPINEPHRINE 1 MG/ML(1)
0.5 AMPUL (ML) INJECTION PRN
Status: CANCELLED | OUTPATIENT
Start: 2020-08-08

## 2020-08-05 RX ORDER — SODIUM CHLORIDE 9 MG/ML
INJECTION, SOLUTION INTRAVENOUS CONTINUOUS
Status: CANCELLED
Start: 2020-08-08

## 2020-08-05 RX ORDER — 0.9 % SODIUM CHLORIDE 0.9 %
3 VIAL (ML) INJECTION PRN
Status: CANCELLED | OUTPATIENT
Start: 2020-08-08

## 2020-08-05 RX ORDER — DIPHENHYDRAMINE HYDROCHLORIDE 50 MG/ML
50 INJECTION INTRAMUSCULAR; INTRAVENOUS PRN
Status: CANCELLED | OUTPATIENT
Start: 2020-08-08

## 2020-08-05 RX ORDER — 0.9 % SODIUM CHLORIDE 0.9 %
VIAL (ML) INJECTION PRN
Status: CANCELLED | OUTPATIENT
Start: 2020-08-07

## 2020-08-05 RX ORDER — ONDANSETRON 2 MG/ML
4 INJECTION INTRAMUSCULAR; INTRAVENOUS PRN
Status: CANCELLED | OUTPATIENT
Start: 2020-08-08

## 2020-08-05 RX ORDER — HEPARIN SODIUM (PORCINE) LOCK FLUSH IV SOLN 100 UNIT/ML 100 UNIT/ML
500 SOLUTION INTRAVENOUS PRN
Status: CANCELLED | OUTPATIENT
Start: 2020-08-10

## 2020-08-05 RX ORDER — HEPARIN SODIUM (PORCINE) LOCK FLUSH IV SOLN 100 UNIT/ML 100 UNIT/ML
500 SOLUTION INTRAVENOUS PRN
Status: CANCELLED | OUTPATIENT
Start: 2020-08-08

## 2020-08-05 RX ORDER — PROCHLORPERAZINE MALEATE 10 MG
10 TABLET ORAL EVERY 6 HOURS PRN
Status: CANCELLED | OUTPATIENT
Start: 2020-08-08

## 2020-08-05 RX ORDER — 0.9 % SODIUM CHLORIDE 0.9 %
10 VIAL (ML) INJECTION PRN
Status: CANCELLED | OUTPATIENT
Start: 2020-08-07

## 2020-08-05 RX ORDER — 0.9 % SODIUM CHLORIDE 0.9 %
20 VIAL (ML) INJECTION PRN
Status: CANCELLED | OUTPATIENT
Start: 2020-08-10

## 2020-08-07 ENCOUNTER — HOSPITAL ENCOUNTER (OUTPATIENT)
Dept: LAB | Facility: MEDICAL CENTER | Age: 55
End: 2020-08-07
Attending: NURSE PRACTITIONER
Payer: MEDICAID

## 2020-08-07 ENCOUNTER — HOSPITAL ENCOUNTER (OUTPATIENT)
Facility: MEDICAL CENTER | Age: 55
End: 2020-08-07
Attending: INTERNAL MEDICINE
Payer: MEDICAID

## 2020-08-07 LAB
ALBUMIN SERPL BCP-MCNC: 4 G/DL (ref 3.2–4.9)
ALBUMIN/GLOB SERPL: 1.7 G/DL
ALP SERPL-CCNC: 171 U/L (ref 30–99)
ALT SERPL-CCNC: 41 U/L (ref 2–50)
AMORPH CRY #/AREA URNS HPF: PRESENT /HPF
ANION GAP SERPL CALC-SCNC: 11 MMOL/L (ref 7–16)
APPEARANCE UR: ABNORMAL
AST SERPL-CCNC: 32 U/L (ref 12–45)
BACTERIA #/AREA URNS HPF: NEGATIVE /HPF
BILIRUB SERPL-MCNC: 0.7 MG/DL (ref 0.1–1.5)
BILIRUB UR QL STRIP.AUTO: NEGATIVE
BUN SERPL-MCNC: 9 MG/DL (ref 8–22)
CALCIUM SERPL-MCNC: 9.3 MG/DL (ref 8.5–10.5)
CEA SERPL-MCNC: 86 NG/ML (ref 0–3)
CHLORIDE SERPL-SCNC: 104 MMOL/L (ref 96–112)
CO2 SERPL-SCNC: 25 MMOL/L (ref 20–33)
COLOR UR: YELLOW
CREAT SERPL-MCNC: 0.61 MG/DL (ref 0.5–1.4)
EPI CELLS #/AREA URNS HPF: NEGATIVE /HPF
EST. AVERAGE GLUCOSE BLD GHB EST-MCNC: 163 MG/DL
FASTING STATUS PATIENT QL REPORTED: NORMAL
GLOBULIN SER CALC-MCNC: 2.3 G/DL (ref 1.9–3.5)
GLUCOSE SERPL-MCNC: 130 MG/DL (ref 65–99)
GLUCOSE UR STRIP.AUTO-MCNC: NEGATIVE MG/DL
HBA1C MFR BLD: 7.3 % (ref 0–5.6)
HYALINE CASTS #/AREA URNS LPF: ABNORMAL /LPF
KETONES UR STRIP.AUTO-MCNC: NEGATIVE MG/DL
LEUKOCYTE ESTERASE UR QL STRIP.AUTO: NEGATIVE
MICRO URNS: ABNORMAL
NITRITE UR QL STRIP.AUTO: NEGATIVE
PH UR STRIP.AUTO: 5.5 [PH] (ref 5–8)
POTASSIUM SERPL-SCNC: 3.6 MMOL/L (ref 3.6–5.5)
PROT SERPL-MCNC: 6.3 G/DL (ref 6–8.2)
PROT UR QL STRIP: NEGATIVE MG/DL
RBC # URNS HPF: ABNORMAL /HPF
RBC UR QL AUTO: NEGATIVE
SODIUM SERPL-SCNC: 140 MMOL/L (ref 135–145)
SP GR UR STRIP.AUTO: 1.01
UROBILINOGEN UR STRIP.AUTO-MCNC: 0.2 MG/DL
WBC #/AREA URNS HPF: ABNORMAL /HPF

## 2020-08-07 PROCEDURE — 83735 ASSAY OF MAGNESIUM: CPT

## 2020-08-07 PROCEDURE — 80053 COMPREHEN METABOLIC PANEL: CPT

## 2020-08-07 PROCEDURE — 36415 COLL VENOUS BLD VENIPUNCTURE: CPT

## 2020-08-07 PROCEDURE — 83036 HEMOGLOBIN GLYCOSYLATED A1C: CPT

## 2020-08-07 PROCEDURE — 82378 CARCINOEMBRYONIC ANTIGEN: CPT

## 2020-08-07 PROCEDURE — 81001 URINALYSIS AUTO W/SCOPE: CPT

## 2020-08-08 ENCOUNTER — OUTPATIENT INFUSION SERVICES (OUTPATIENT)
Dept: ONCOLOGY | Facility: MEDICAL CENTER | Age: 55
End: 2020-08-08
Attending: INTERNAL MEDICINE
Payer: MEDICAID

## 2020-08-08 VITALS
HEIGHT: 73 IN | BODY MASS INDEX: 30.04 KG/M2 | DIASTOLIC BLOOD PRESSURE: 68 MMHG | WEIGHT: 226.63 LBS | OXYGEN SATURATION: 98 % | SYSTOLIC BLOOD PRESSURE: 131 MMHG | RESPIRATION RATE: 17 BRPM | HEART RATE: 84 BPM | TEMPERATURE: 97.5 F

## 2020-08-08 DIAGNOSIS — C18.9 METASTATIC COLON CANCER TO LIVER (HCC): ICD-10-CM

## 2020-08-08 DIAGNOSIS — C78.7 METASTATIC COLON CANCER TO LIVER (HCC): ICD-10-CM

## 2020-08-08 LAB
BASOPHILS # BLD AUTO: 0.7 % (ref 0–1.8)
BASOPHILS # BLD: 0.03 K/UL (ref 0–0.12)
EOSINOPHIL # BLD AUTO: 0.11 K/UL (ref 0–0.51)
EOSINOPHIL NFR BLD: 2.6 % (ref 0–6.9)
ERYTHROCYTE [DISTWIDTH] IN BLOOD BY AUTOMATED COUNT: 51.1 FL (ref 35.9–50)
HCT VFR BLD AUTO: 37.4 % (ref 42–52)
HGB BLD-MCNC: 12 G/DL (ref 14–18)
IMM GRANULOCYTES # BLD AUTO: 0.02 K/UL (ref 0–0.11)
IMM GRANULOCYTES NFR BLD AUTO: 0.5 % (ref 0–0.9)
LYMPHOCYTES # BLD AUTO: 0.82 K/UL (ref 1–4.8)
LYMPHOCYTES NFR BLD: 19.1 % (ref 22–41)
MAGNESIUM SERPL-MCNC: 1.9 MG/DL (ref 1.5–2.5)
MCH RBC QN AUTO: 29.9 PG (ref 27–33)
MCHC RBC AUTO-ENTMCNC: 32.1 G/DL (ref 33.7–35.3)
MCV RBC AUTO: 93.3 FL (ref 81.4–97.8)
MONOCYTES # BLD AUTO: 0.37 K/UL (ref 0–0.85)
MONOCYTES NFR BLD AUTO: 8.6 % (ref 0–13.4)
NEUTROPHILS # BLD AUTO: 2.95 K/UL (ref 1.82–7.42)
NEUTROPHILS NFR BLD: 68.5 % (ref 44–72)
NRBC # BLD AUTO: 0 K/UL
NRBC BLD-RTO: 0 /100 WBC
PLATELET # BLD AUTO: 74 K/UL (ref 164–446)
PMV BLD AUTO: 10.1 FL (ref 9–12.9)
RBC # BLD AUTO: 4.01 M/UL (ref 4.7–6.1)
WBC # BLD AUTO: 4.3 K/UL (ref 4.8–10.8)

## 2020-08-08 PROCEDURE — 96417 CHEMO IV INFUS EACH ADDL SEQ: CPT

## 2020-08-08 PROCEDURE — 96413 CHEMO IV INFUSION 1 HR: CPT

## 2020-08-08 PROCEDURE — A4212 NON CORING NEEDLE OR STYLET: HCPCS

## 2020-08-08 PROCEDURE — 700111 HCHG RX REV CODE 636 W/ 250 OVERRIDE (IP): Performed by: INTERNAL MEDICINE

## 2020-08-08 PROCEDURE — G0498 CHEMO EXTEND IV INFUS W/PUMP: HCPCS

## 2020-08-08 PROCEDURE — 96411 CHEMO IV PUSH ADDL DRUG: CPT

## 2020-08-08 PROCEDURE — 96375 TX/PRO/DX INJ NEW DRUG ADDON: CPT

## 2020-08-08 PROCEDURE — 85025 COMPLETE CBC W/AUTO DIFF WBC: CPT

## 2020-08-08 PROCEDURE — 700105 HCHG RX REV CODE 258: Performed by: INTERNAL MEDICINE

## 2020-08-08 PROCEDURE — 96367 TX/PROPH/DG ADDL SEQ IV INF: CPT

## 2020-08-08 RX ADMIN — DIPHENHYDRAMINE HYDROCHLORIDE 50 MG: 50 INJECTION, SOLUTION INTRAMUSCULAR; INTRAVENOUS at 09:32

## 2020-08-08 RX ADMIN — LEUCOVORIN CALCIUM 920 MG: 350 INJECTION, POWDER, LYOPHILIZED, FOR SUSPENSION INTRAMUSCULAR; INTRAVENOUS at 12:15

## 2020-08-08 RX ADMIN — FLUOROURACIL 4970 MG: 50 INJECTION, SOLUTION INTRAVENOUS at 13:00

## 2020-08-08 RX ADMIN — DEXAMETHASONE SODIUM PHOSPHATE 20 MG: 4 INJECTION, SOLUTION INTRA-ARTICULAR; INTRALESIONAL; INTRAMUSCULAR; INTRAVENOUS; SOFT TISSUE at 10:03

## 2020-08-08 RX ADMIN — ONDANSETRON 16 MG: 2 INJECTION INTRAMUSCULAR; INTRAVENOUS at 09:45

## 2020-08-08 RX ADMIN — PANITUMUMAB 300 MG: 100 SOLUTION INTRAVENOUS at 10:24

## 2020-08-08 RX ADMIN — FLUOROURACIL 830 MG: 50 INJECTION, SOLUTION INTRAVENOUS at 12:55

## 2020-08-08 RX ADMIN — TRASTUZUMAB 450 MG: 150 INJECTION, POWDER, LYOPHILIZED, FOR SOLUTION INTRAVENOUS at 11:35

## 2020-08-08 ASSESSMENT — FIBROSIS 4 INDEX: FIB4 SCORE: 2.37

## 2020-08-08 NOTE — PROGRESS NOTES
"Pharmacy Chemotherapy Calculations:    Dx: Metastatic colorectal cancer [KRAS/NRAS wild type, ERBB2 (HER2) amplification]    Cycle 11  Previous treatment = 7/25/20    Regimen: mFOLFOX + Vectibix + Herceptin  *Dosing Reference*  Trastuzumab 6 mg/kg IV loading dose C1D1, then 4 mg/kg IV Day 1 of subsequent  cycles [confirmed \"every 2 weeks\", per MD; progress note: \"He also had an ERBB2  amplification which might suggest response to treatments like Herceptin\"]   > 7/8/20: Herceptin 2.5 weeks overdue.  Do not reload Herceptin per [facility] Dr. Ross.  Cetuximab 400 mg/m2 IV Cycle 1 Day 1, then 250 mg/m2 IV Days 1 and 8 (confirmed  \"weekly\" per MD)   > C4 * * * cetuximab has been omitted * * *   > C5 * * * cetuximab replaced with panitumumab * * *      > Cody LEO reduced dose by 20% to  starting with C1D1 due to anticipated tolerance    > 3/14/20: Oxaliplatin discontinued from treatment  Panitumumab (Vectibix)   + added; change of therapy d/t cetuximab skin tox   > 4/25/20, initial dose reduction to 4.5 mg/kg d/t previous EGFR cutaneous toxicities    > C9 * * * Vectibix has been omitted * * *   >C10 Vectibix reordered at 3 mg/kg per Cody LEO   Leucovorin 400 mg/m2 IV Day 1 followed by  Fluorouracil  IV Day 1 followed by   > Cody LEO reduced dose by 10% to 360 mg/m2 starting with C1D1 due to anticipated tolerance   Fluorouracil  CIVI over 46-48 hours    > Cody LEO reduced dose by 10% to 2160 mg/m2 starting with C1D1 due to anticipated tolerance   14-day cycle until DP or UT  NCCN guidelines for colon cancer V.2.2019  (cetuximab + chemo) [place] AP et al, OBEY 2017;317(23):5357-3860  (HER2 tx in HER2+ CRC) Kayleen RINALDI et al. Lancet Oncol. 2019 Apr;20(4):518-530.    Allergies: Patient has no known allergies.  /68   Pulse 84   Temp 36.4 °C (97.5 °F) (Temporal)   Resp 17   Ht 1.85 m (6' 0.84\")   Wt 102.8 kg (226 lb 10.1 oz)   SpO2 98%   BMI 30.04 kg/m²  Body surface area is 2.3 meters squared.     2/7/20 " LVEF by Echo ~ 60% OK for 6 months per Cody LEO order    CBC (8/8/20 and CMP (8/7/20) within treatment plan parameters.      Panitumumab (Vectibix) 3 mg/kg x 102.8 kg = 308 mg  Rounded down to vial size (within 10%) per dose rounding protocol = 300 mg IV    Trastuzumab (Herceptin) 4 mg/kg x 102.8 kg = 411 mg   Rounded to vial size (within 10%) per dose rounding protocol = 450 mg IV    Leucovorin 400 mg/m² x 2.3 m² = 920 mg   <10% difference, okay to treat with final dose = 920 mg IV over 30 minutes OK    Fluorouracil (Adrucil) 360 mg/m² x 2.3 m² = 828 mg   <10% difference, okay to treat with final dose = 830 mg IV    Fluorouracil (Adrucil) 2160 mg/m² x 2.3 m² = 4968 mg   <10% difference, okay to treat with final dose = 4970 mg CIVI    To be given over 46 hours at 2.2 ml/hr via CADD pump      Aura Guzman, PharmD

## 2020-08-08 NOTE — PROGRESS NOTES
Pharmacy Chemotherapy Verification Note:    Patient Name: Gurmeet Jo        Dx: Metastatic Colorectal CA (KRAS/NRAS wild type, and ERBB2 [a HER2 family receptor] amplification)        Protocol: mFOLFOX+Panitumumab +trastuzumab       *Dosing Reference*  Panitumumab 6 mg/kg IV over 60 minutes on Day 1    4/25/20 added to treatment plan dose reduced to 4.5 mg/kg for tolerance   7/8/20 Omitted starting C9   7/25/20 dose reduced to 3 mg/kg for tolerance  ~Followed by~  Trastuzumab 6 mg/kg IV over 90 minutes on Day 1 of Cycle 1, followed by  Trastuzumab 4 mg/kg IV over 30-60 minutes on Day 1 of Cycle 2    Confirmed: per Dr. Ross: Q2 week dosing   4/11/20 - Reload with 6 mg/kg per Dr. Chisholm  OXALIplatin 85 mg/m² IV over 2 hours on Day 1   - dose reduced to 68 mg/m² starting C1 for tolerance   3/3/20 - oxali removed from treatment plan d/t oxaliplatin allergy per Harman VARGAS.  Leucovorin 400 mg/m² IV over 2 hours on Day 1, to run concurrent with OXALIplatin, followed by  Fluorouracil 400 mg/m² IVP on Day 1, followed by   C1 dose reduced to 360 mg/m² for tolerance  Fluorouracil 2400 mg/m² CIVI over 46-48 hours   C1 dose reduced to 2160 mg/m² for tolerance   14-day cycle until disease progression or unacceptable toxicity  *Dosing Reference*  NCCN Guidelines for Colon Cancer. V.2.2019.  Rohith TAVERAS et al. J Clin Oncol. 2010;28(31):4602-705  Aura PATRICK et al. J Clin Oncol. 2008;28(12):2006-12  Luisa RODRIGEZ et al. Br J Cancer. 2002;87(4):393-9    Herceptin has limited data in colorectal cancer, but two regimens exist neither at the same loading dose or interval as Dr. Ross has chosen. Per [facility] progress note: Patient has undergone further [employer] testing of his tumor. He was found to be KRAS/NRAS wild type, which suggest he has an EGFR mutation and would be a candidate for EGFR targeted therapy. He also had an ERBB2 amplification which might suggest response to treatments like Herceptin.    Allergies:   "Compazine and Oxaliplatin     /68   Pulse 84   Temp 36.4 °C (97.5 °F) (Temporal)   Resp 17   Ht 1.85 m (6' 0.84\")   Wt 102.8 kg (226 lb 10.1 oz)   SpO2 98%   BMI 30.04 kg/m²  Body surface area is 2.3 meters squared.     Labs 8/8/20  ANC~ 2950 Plt = 74k   Hgb = 12       Labs 8/7/20 ([facility])  SCr = 0.61 mg/dL CrCl ~ >125 mL/min   LFT's = 32/41/171 TBili = 0.7   Urine protein = Negative    2/7/2020 ECHO (every 6 months): LVEF 60% (okay to use for treatment on 8/8/20 per Dr. Ross)     Drug Order   (Drug name, dose, route, IV Fluid & volume, frequency, number of doses) Cycle 11  Previous treatment: C10 on 7/25/20     Medication = panitumumab (Vectibix)  Base Dose = 3 mg/kg  Calc Dose: Base Dose x 102.8 kg = 308.4 mg  Final Dose = 300 mg  Route = IV  Fluid & Volume =  mL  Admin Duration = Over 60 minutes    <10% difference, okay to treat with final dose         Medication = Trastuzumab (Herceptin)  Base Dose = 4 mg/kg  Calc Dose: Base Dose x 102.8 kg = 411.2 mg  Final Dose = 450 mg  Route = IV  Fluid & Volume =  mL  Admin Duration = Over 30 minutes    <10% difference, okay to treat with final dose           Medication = Leucovorin (Wellcovorin)  Base Dose = 400 mg/m²  Calc Dose: Base Dose x 2.3 m² = 920 mg  Final Dose = 920 mg  Route = IV  Fluid & Volume = D5W 250 mL  Admin Duration = Over 30 min          <10% difference, okay to treat with final dose   Medication = Fluorouracil (5-FU)  Base Dose = 360 mg/m²  Calc Dose: Base Dose x 2.3 m² = 828 mg  Final Dose = 830 mg  Route = IVP  Fluid & Volume = 16.6 mL in syringe  Conc = 50 mg/mL  Admin Duration = Over 5 minutes          <10% difference, okay to treat with final dose   Medication = Fluorouracil (5-FU)  Base Dose = 2160 mg/m²  Calc Dose:Base Dose x 2.3 m² = 4968 mg  Final Dose = 4970 mg  Route = CIVI   Fluid & Volume = 99.4 mL (+3 mL overfill =   102.4 mL)  Admin Duration = Over 46 hours to run at   2.2 mL/hour    Via CADD pump for home " infusion      <10% difference, okay to treat with final dose     By my signature below, I confirm this process was performed independently with the BSA and all final chemotherapy dosing calculations congruent. I have reviewed the above chemotherapy order and that my calculation of the final dose and BSA (when applicable) corroborate those calculations of the  pharmacist. Discrepancies of 10% or greater in the written dose have been addressed and documented within the EPIC Progress notes.    Ruben Santamaria, PharmD

## 2020-08-08 NOTE — PROGRESS NOTES
Chemotherapy Verification - SECONDARY RN       Height = 185 cm  Weight = 102.8 kg  BSA = 2.3 m2       Medication: Vectibix  Dose: 3 mg/kg  Calculated Dose: 308.4 mg                             (In mg/m2, AUC, mg/kg)     Medication: Herceptin  Dose: 4 mg/kg  Calculated Dose: 411.2 mg                             (In mg/m2, AUC, mg/kg)    Medication: Adrucil  Dose: 360 mg/m2  Calculated Dose: 828 mg                             (In mg/m2, AUC, mg/kg)    Medication: Adrucil  Dose: 2,160 mg/m2  Calculated Dose: 4,968 mg over 46 hours                             (In mg/m2, AUC, mg/kg)        I confirm that this process was performed independently.

## 2020-08-08 NOTE — PROGRESS NOTES
Patient arrived ambulatory to [facility]"" for C11D1 Vectibix/Herceptin/Leucovorin/5FU.  He denies any s/s of infection or fevers.  Port accessed using sterile technique with brisk blood return noted.  Labs drawn per order and within parameters to treat.  Pre medication given and chemo infused per order.  CADD pump connected, settings verified.  Confirmed next appointment and he ambulated out of clinic in no apparent distress.

## 2020-08-08 NOTE — PROGRESS NOTES
Chemotherapy Verification - PRIMARY RN      Height = 185 cm  Weight = 102.8 kg  BSA = 2.3 m2       Medication: Vectibix  Dose: 3 mg/kg    Calculated Dose: 308.4 mg                              Medication: Herceptin  Dose: 4 mg/kg    Calculated Dose: 411.2 mg       Medication: 5FU bolus  Dose: 360 mg/m2    Calculated Dose: 828 mg                                 Medication: 5FU CADD pump  Dose: 2160 mg/m2 over 46 hours  Calculated Dose: 4968 mg                                I confirm this process was performed independently with the BSA and all final chemotherapy dosing calculations congruent.  Any discrepancies of 10% or greater have been addressed with the chemotherapy pharmacist. The resolution of the discrepancy has been documented in the EPIC progress notes.

## 2020-08-10 ENCOUNTER — OUTPATIENT INFUSION SERVICES (OUTPATIENT)
Dept: ONCOLOGY | Facility: MEDICAL CENTER | Age: 55
End: 2020-08-10
Attending: INTERNAL MEDICINE
Payer: MEDICAID

## 2020-08-10 VITALS
HEIGHT: 73 IN | BODY MASS INDEX: 29.86 KG/M2 | RESPIRATION RATE: 18 BRPM | DIASTOLIC BLOOD PRESSURE: 78 MMHG | TEMPERATURE: 97 F | SYSTOLIC BLOOD PRESSURE: 135 MMHG | OXYGEN SATURATION: 96 % | HEART RATE: 86 BPM | WEIGHT: 225.31 LBS

## 2020-08-10 DIAGNOSIS — C78.7 METASTATIC COLON CANCER TO LIVER (HCC): ICD-10-CM

## 2020-08-10 DIAGNOSIS — C18.9 METASTATIC COLON CANCER TO LIVER (HCC): ICD-10-CM

## 2020-08-10 PROCEDURE — 700111 HCHG RX REV CODE 636 W/ 250 OVERRIDE (IP)

## 2020-08-10 PROCEDURE — 96523 IRRIG DRUG DELIVERY DEVICE: CPT

## 2020-08-10 RX ORDER — 0.9 % SODIUM CHLORIDE 0.9 %
20 VIAL (ML) INJECTION PRN
Status: CANCELLED | OUTPATIENT
Start: 2020-08-24

## 2020-08-10 RX ORDER — DIPHENHYDRAMINE HYDROCHLORIDE 50 MG/ML
50 INJECTION INTRAMUSCULAR; INTRAVENOUS PRN
Status: CANCELLED | OUTPATIENT
Start: 2020-08-22

## 2020-08-10 RX ORDER — 0.9 % SODIUM CHLORIDE 0.9 %
10 VIAL (ML) INJECTION PRN
Status: CANCELLED | OUTPATIENT
Start: 2020-08-21

## 2020-08-10 RX ORDER — HEPARIN SODIUM (PORCINE) LOCK FLUSH IV SOLN 100 UNIT/ML 100 UNIT/ML
500 SOLUTION INTRAVENOUS PRN
Status: CANCELLED | OUTPATIENT
Start: 2020-08-24

## 2020-08-10 RX ORDER — EPINEPHRINE 1 MG/ML(1)
0.5 AMPUL (ML) INJECTION PRN
Status: CANCELLED | OUTPATIENT
Start: 2020-08-22

## 2020-08-10 RX ORDER — HEPARIN SODIUM (PORCINE) LOCK FLUSH IV SOLN 100 UNIT/ML 100 UNIT/ML
500 SOLUTION INTRAVENOUS PRN
Status: CANCELLED | OUTPATIENT
Start: 2020-08-21

## 2020-08-10 RX ORDER — SODIUM CHLORIDE 9 MG/ML
INJECTION, SOLUTION INTRAVENOUS CONTINUOUS
Status: CANCELLED
Start: 2020-08-22

## 2020-08-10 RX ORDER — ONDANSETRON 8 MG/1
8 TABLET, ORALLY DISINTEGRATING ORAL PRN
Status: CANCELLED | OUTPATIENT
Start: 2020-08-22

## 2020-08-10 RX ORDER — 0.9 % SODIUM CHLORIDE 0.9 %
VIAL (ML) INJECTION PRN
Status: CANCELLED | OUTPATIENT
Start: 2020-08-21

## 2020-08-10 RX ORDER — 0.9 % SODIUM CHLORIDE 0.9 %
3 VIAL (ML) INJECTION PRN
Status: CANCELLED | OUTPATIENT
Start: 2020-08-21

## 2020-08-10 RX ORDER — 0.9 % SODIUM CHLORIDE 0.9 %
10 VIAL (ML) INJECTION PRN
Status: CANCELLED | OUTPATIENT
Start: 2020-08-22

## 2020-08-10 RX ORDER — 0.9 % SODIUM CHLORIDE 0.9 %
VIAL (ML) INJECTION PRN
Status: CANCELLED | OUTPATIENT
Start: 2020-08-22

## 2020-08-10 RX ORDER — 0.9 % SODIUM CHLORIDE 0.9 %
3 VIAL (ML) INJECTION PRN
Status: CANCELLED | OUTPATIENT
Start: 2020-08-22

## 2020-08-10 RX ORDER — HEPARIN SODIUM (PORCINE) LOCK FLUSH IV SOLN 100 UNIT/ML 100 UNIT/ML
SOLUTION INTRAVENOUS
Status: COMPLETED
Start: 2020-08-10 | End: 2020-08-10

## 2020-08-10 RX ORDER — ONDANSETRON 2 MG/ML
4 INJECTION INTRAMUSCULAR; INTRAVENOUS PRN
Status: CANCELLED | OUTPATIENT
Start: 2020-08-22

## 2020-08-10 RX ORDER — METHYLPREDNISOLONE SODIUM SUCCINATE 125 MG/2ML
125 INJECTION, POWDER, LYOPHILIZED, FOR SOLUTION INTRAMUSCULAR; INTRAVENOUS PRN
Status: CANCELLED | OUTPATIENT
Start: 2020-08-22

## 2020-08-10 RX ORDER — PROCHLORPERAZINE MALEATE 10 MG
10 TABLET ORAL EVERY 6 HOURS PRN
Status: CANCELLED | OUTPATIENT
Start: 2020-08-22

## 2020-08-10 RX ORDER — HEPARIN SODIUM (PORCINE) LOCK FLUSH IV SOLN 100 UNIT/ML 100 UNIT/ML
500 SOLUTION INTRAVENOUS PRN
Status: CANCELLED | OUTPATIENT
Start: 2020-08-22

## 2020-08-10 RX ADMIN — HEPARIN 500 UNITS: 100 SYRINGE at 09:44

## 2020-08-10 ASSESSMENT — FIBROSIS 4 INDEX: FIB4 SCORE: 3.78

## 2020-08-10 NOTE — PROGRESS NOTES
"Pt called [facility] scheduling dept to report that his pump got disconnected and was leaking.  Pt was advised to come in by [facility] Charge nurse.  Upon arrival this nurse assessed pt CADD pump.  CADD pump was disconnected and stopped by the pt.  Pt used home spill kit to wrap pump with spill pad and placed pump in chemotherapy hazard bag.  Pt reports pump was leaking inside the carrier \"luis pack\" bag and he noticed the leak at 4:30 am.  CADD pump tubing was disconnected and the tubing closest to the pump.  6ml was left on pump volume.  Spoke to Dr. Ross, received telephone order to d/c pump and no longer infuse remainder of ordered volume.  RUC port flushed with NS and brisk blood return observed.  Port flushed again with NS and heparin locked.  Adkins needle removed intact.  Site covered with gauze.  Pt is aware of next appt time.  Pt dc home to self care.   "

## 2020-08-17 RX ORDER — LANCETS 30 GAUGE
EACH MISCELLANEOUS
Qty: 100 EACH | Refills: 0 | Status: SHIPPED | OUTPATIENT
Start: 2020-08-17

## 2020-08-17 NOTE — TELEPHONE ENCOUNTER
Was the patient seen in the last year in this department? Yes    Does patient have an active prescription for medications requested? Yes    Received Request Via: Pharmacy    Outpatient Infusion Services on 08/08/2020   Component Date Value   • Magnesium 08/07/2020 1.9    • WBC 08/08/2020 4.3*   • RBC 08/08/2020 4.01*   • Hemoglobin 08/08/2020 12.0*   • Hematocrit 08/08/2020 37.4*   • MCV 08/08/2020 93.3    • MCH 08/08/2020 29.9    • MCHC 08/08/2020 32.1*   • RDW 08/08/2020 51.1*   • Platelet Count 08/08/2020 74*   • MPV 08/08/2020 10.1    • Neutrophils-Polys 08/08/2020 68.50    • Lymphocytes 08/08/2020 19.10*   • Monocytes 08/08/2020 8.60    • Eosinophils 08/08/2020 2.60    • Basophils 08/08/2020 0.70    • Immature Granulocytes 08/08/2020 0.50    • Nucleated RBC 08/08/2020 0.00    • Neutrophils (Absolute) 08/08/2020 2.95    • Lymphs (Absolute) 08/08/2020 0.82*   • Monos (Absolute) 08/08/2020 0.37    • Eos (Absolute) 08/08/2020 0.11    • Baso (Absolute) 08/08/2020 0.03    • Immature Granulocytes (a* 08/08/2020 0.02    • NRBC (Absolute) 08/08/2020 0.00    Hospital Outpatient Visit on 08/07/2020   Component Date Value   • Color 08/07/2020 Yellow    • Character 08/07/2020 Cloudy*   • Specific Gravity 08/07/2020 1.014    • Ph 08/07/2020 5.5    • Glucose 08/07/2020 Negative    • Ketones 08/07/2020 Negative    • Protein 08/07/2020 Negative    • Bilirubin 08/07/2020 Negative    • Urobilinogen, Urine 08/07/2020 0.2    • Nitrite 08/07/2020 Negative    • Leukocyte Esterase 08/07/2020 Negative    • Occult Blood 08/07/2020 Negative    • Micro Urine Req 08/07/2020 Microscopic    • WBC 08/07/2020 0-2*   • RBC 08/07/2020 0-2*   • Bacteria 08/07/2020 Negative    • Epithelial Cells 08/07/2020 Negative    • Amorphous Crystal 08/07/2020 Present    • Hyaline Cast 08/07/2020 0-2    Hospital Outpatient Visit on 08/07/2020   Component Date Value   • Glycohemoglobin 08/07/2020 7.3*   • Est Avg Glucose 08/07/2020 163    • Carcinoembryonic  Antigen 08/07/2020 86.0*   • Sodium 08/07/2020 140    • Potassium 08/07/2020 3.6    • Chloride 08/07/2020 104    • Co2 08/07/2020 25    • Anion Gap 08/07/2020 11.0    • Glucose 08/07/2020 130*   • Bun 08/07/2020 9    • Creatinine 08/07/2020 0.61    • Calcium 08/07/2020 9.3    • AST(SGOT) 08/07/2020 32    • ALT(SGPT) 08/07/2020 41    • Alkaline Phosphatase 08/07/2020 171*   • Total Bilirubin 08/07/2020 0.7    • Albumin 08/07/2020 4.0    • Total Protein 08/07/2020 6.3    • Globulin 08/07/2020 2.3    • A-G Ratio 08/07/2020 1.7    • Fasting Status 08/07/2020 Non-Fasting    • GFR If  08/07/2020 >60    • GFR If Non  [facility]* 08/07/2020 >60    Hospital Outpatient Visit on 07/23/2020   Component Date Value   • Color 07/23/2020 Yellow    • Character 07/23/2020 Clear    • Specific Gravity 07/23/2020 1.020    • Ph 07/23/2020 6.0    • Glucose 07/23/2020 Negative    • Ketones 07/23/2020 Negative    • Protein 07/23/2020 Negative    • Bilirubin 07/23/2020 Negative    • Urobilinogen, Urine 07/23/2020 0.2    • Nitrite 07/23/2020 Negative    • Leukocyte Esterase 07/23/2020 Negative    • Occult Blood 07/23/2020 Negative    • Micro Urine Req 07/23/2020 see below    • WBC 07/23/2020 6.8    • RBC 07/23/2020 4.21*   • Hemoglobin 07/23/2020 12.7*   • Hematocrit 07/23/2020 39.8*   • MCV 07/23/2020 94.5    • MCH 07/23/2020 30.2    • MCHC 07/23/2020 31.9*   • RDW 07/23/2020 50.0    • Platelet Count 07/23/2020 118*   • MPV 07/23/2020 10.4    • Neutrophils-Polys 07/23/2020 63.30    • Lymphocytes 07/23/2020 18.20*   • Monocytes 07/23/2020 11.40    • Eosinophils 07/23/2020 6.10    • Basophils 07/23/2020 0.90    • Immature Granulocytes 07/23/2020 0.10    • Nucleated RBC 07/23/2020 0.00    • Neutrophils (Absolute) 07/23/2020 4.28    • Lymphs (Absolute) 07/23/2020 1.23    • Monos (Absolute) 07/23/2020 0.77    • Eos (Absolute) 07/23/2020 0.41    • Baso (Absolute) 07/23/2020 0.06    • Immature Granulocytes (a* 07/23/2020 0.01    •  NRBC (Absolute) 07/23/2020 0.00    • Magnesium 07/23/2020 2.0    • Carcinoembryonic Antigen 07/23/2020 114.0*   • Sodium 07/23/2020 137    • Potassium 07/23/2020 3.6    • Chloride 07/23/2020 102    • Co2 07/23/2020 23    • Anion Gap 07/23/2020 12.0    • Glucose 07/23/2020 126*   • Bun 07/23/2020 11    • Creatinine 07/23/2020 0.56    • Calcium 07/23/2020 9.5    • AST(SGOT) 07/23/2020 29    • ALT(SGPT) 07/23/2020 33    • Alkaline Phosphatase 07/23/2020 218*   • Total Bilirubin 07/23/2020 1.3    • Albumin 07/23/2020 4.2    • Total Protein 07/23/2020 6.6    • Globulin 07/23/2020 2.4    • A-G Ratio 07/23/2020 1.8    • GFR If  07/23/2020 >60    • GFR If Non  [facility]* 07/23/2020 >60    Hospital Outpatient Visit on 07/06/2020   Component Date Value   • Color 07/06/2020 Yellow    • Character 07/06/2020 Clear    • Specific Gravity 07/06/2020 1.010    • Ph 07/06/2020 7.0    • Glucose 07/06/2020 Negative    • Ketones 07/06/2020 Negative    • Protein 07/06/2020 Negative    • Bilirubin 07/06/2020 Negative    • Urobilinogen, Urine 07/06/2020 0.2    • Nitrite 07/06/2020 Negative    • Leukocyte Esterase 07/06/2020 Negative    • Occult Blood 07/06/2020 Negative    • Micro Urine Req 07/06/2020 see below    • WBC 07/06/2020 6.9    • RBC 07/06/2020 4.33*   • Hemoglobin 07/06/2020 13.1*   • Hematocrit 07/06/2020 40.6*   • MCV 07/06/2020 93.8    • MCH 07/06/2020 30.3    • MCHC 07/06/2020 32.3*   • RDW 07/06/2020 48.2    • Platelet Count 07/06/2020 131*   • MPV 07/06/2020 10.1    • Neutrophils-Polys 07/06/2020 59.90    • Lymphocytes 07/06/2020 17.50*   • Monocytes 07/06/2020 9.80    • Eosinophils 07/06/2020 11.10*   • Basophils 07/06/2020 1.30    • Immature Granulocytes 07/06/2020 0.40    • Nucleated RBC 07/06/2020 0.00    • Neutrophils (Absolute) 07/06/2020 4.15    • Lymphs (Absolute) 07/06/2020 1.21    • Monos (Absolute) 07/06/2020 0.68    • Eos (Absolute) 07/06/2020 0.77*   • Baso (Absolute) 07/06/2020 0.09    •  Immature Granulocytes (a* 07/06/2020 0.03    • NRBC (Absolute) 07/06/2020 0.00    • Magnesium 07/06/2020 1.9    • Carcinoembryonic Antigen 07/06/2020 93.6*   • Sodium 07/06/2020 141    • Potassium 07/06/2020 3.8    • Chloride 07/06/2020 106    • Co2 07/06/2020 22    • Anion Gap 07/06/2020 13.0    • Glucose 07/06/2020 120*   • Bun 07/06/2020 5*   • Creatinine 07/06/2020 0.57    • Calcium 07/06/2020 9.3    • AST(SGOT) 07/06/2020 32    • ALT(SGPT) 07/06/2020 28    • Alkaline Phosphatase 07/06/2020 232*   • Total Bilirubin 07/06/2020 0.8    • Albumin 07/06/2020 3.7    • Total Protein 07/06/2020 6.7    • Globulin 07/06/2020 3.0    • A-G Ratio 07/06/2020 1.2    • GFR If  07/06/2020 >60    • GFR If Non  [facility]* 07/06/2020 >60    Hospital Outpatient Visit on 07/06/2020   Component Date Value   • Glycohemoglobin 07/06/2020 8.9*   • Est Avg Glucose 07/06/2020 209    Hospital Outpatient Visit on 07/02/2020   Component Date Value   • GFR If  07/02/2020 >60    • GFR If Non  [facility]* 07/02/2020 >60    • Sodium 07/02/2020 141    • Potassium 07/02/2020 3.6    • Chloride 07/02/2020 105    • Co2 07/02/2020 22    • Anion Gap 07/02/2020 14.0    • Glucose 07/02/2020 223*   • Bun 07/02/2020 7*   • Creatinine 07/02/2020 0.56    • Calcium 07/02/2020 8.5    • AST(SGOT) 07/02/2020 34    • ALT(SGPT) 07/02/2020 32    • Alkaline Phosphatase 07/02/2020 221*   • Total Bilirubin 07/02/2020 0.8    • Albumin 07/02/2020 3.5    • Total Protein 07/02/2020 5.7*   • Globulin 07/02/2020 2.2    • A-G Ratio 07/02/2020 1.6    • Carcinoembryonic Antigen 07/02/2020 84.6*   Hospital Outpatient Visit on 06/04/2020   Component Date Value   • Color 06/04/2020 Yellow    • Character 06/04/2020 Clear    • Specific Gravity 06/04/2020 1.041    • Ph 06/04/2020 6.0    • Glucose 06/04/2020 >=1000*   • Ketones 06/04/2020 Negative    • Protein 06/04/2020 Negative    • Bilirubin 06/04/2020 Negative    • Urobilinogen, Urine  06/04/2020 0.2    • Nitrite 06/04/2020 Negative    • Leukocyte Esterase 06/04/2020 Negative    • Occult Blood 06/04/2020 Negative    • Micro Urine Req 06/04/2020 see below    • WBC 06/04/2020 6.1    • RBC 06/04/2020 4.55*   • Hemoglobin 06/04/2020 14.4    • Hematocrit 06/04/2020 44.4    • MCV 06/04/2020 97.6    • MCH 06/04/2020 31.6    • MCHC 06/04/2020 32.4*   • RDW 06/04/2020 59.8*   • Platelet Count 06/04/2020 94*   • MPV 06/04/2020 10.9    • Neutrophils-Polys 06/04/2020 76.30*   • Lymphocytes 06/04/2020 13.10*   • Monocytes 06/04/2020 8.40    • Eosinophils 06/04/2020 1.00    • Basophils 06/04/2020 0.70    • Immature Granulocytes 06/04/2020 0.50    • Nucleated RBC 06/04/2020 0.00    • Neutrophils (Absolute) 06/04/2020 4.66    • Lymphs (Absolute) 06/04/2020 0.80*   • Monos (Absolute) 06/04/2020 0.51    • Eos (Absolute) 06/04/2020 0.06    • Baso (Absolute) 06/04/2020 0.04    • Immature Granulocytes (a* 06/04/2020 0.03    • NRBC (Absolute) 06/04/2020 0.00    • Magnesium 06/04/2020 1.9    • Carcinoembryonic Antigen 06/04/2020 141.0*   • Sodium 06/04/2020 135    • Potassium 06/04/2020 4.2    • Chloride 06/04/2020 101    • Co2 06/04/2020 24    • Anion Gap 06/04/2020 10.0    • Glucose 06/04/2020 466*   • Bun 06/04/2020 11    • Creatinine 06/04/2020 0.58    • Calcium 06/04/2020 10.4    • AST(SGOT) 06/04/2020 29    • ALT(SGPT) 06/04/2020 36    • Alkaline Phosphatase 06/04/2020 163*   • Total Bilirubin 06/04/2020 0.8    • Albumin 06/04/2020 4.2    • Total Protein 06/04/2020 7.0    • Globulin 06/04/2020 2.8    • A-G Ratio 06/04/2020 1.5    • GFR If  06/04/2020 >60    • GFR If Non  [facility]* 06/04/2020 >60    Office Visit on 05/27/2020   Component Date Value   • POC Color 05/27/2020 Dark Yellow    • POC Appearance 05/27/2020 Clear    • POC Leukocyte Esterase 05/27/2020 Negative    • POC Nitrites 05/27/2020 Negative    • POC Urobiligen 05/27/2020 1.0    • POC Protein 05/27/2020 Negative    • POC Urine PH  05/27/2020 6.0    • POC Blood 05/27/2020 Negative    • POC Specific Gravity 05/27/2020 1.015    • POC Ketones 05/27/2020 40 mg    • POC Bilirubin 05/27/2020 negative    • POC Glucose 05/27/2020 >=1,000    Hospital Outpatient Visit on 05/21/2020   Component Date Value   • Color 05/21/2020 Yellow    • Character 05/21/2020 Clear    • Specific Gravity 05/21/2020 1.040    • Ph 05/21/2020 5.5    • Glucose 05/21/2020 >=1000*   • Ketones 05/21/2020 Negative    • Protein 05/21/2020 Negative    • Bilirubin 05/21/2020 Negative    • Urobilinogen, Urine 05/21/2020 0.2    • Nitrite 05/21/2020 Negative    • Leukocyte Esterase 05/21/2020 Negative    • Occult Blood 05/21/2020 Negative    • Micro Urine Req 05/21/2020 see below    • WBC 05/21/2020 5.0    • RBC 05/21/2020 4.52*   • Hemoglobin 05/21/2020 14.3    • Hematocrit 05/21/2020 43.1    • MCV 05/21/2020 95.4    • MCH 05/21/2020 31.6    • MCHC 05/21/2020 33.2*   • RDW 05/21/2020 61.9*   • Platelet Count 05/21/2020 84*   • MPV 05/21/2020 11.3    • Neutrophils-Polys 05/21/2020 69.80    • Lymphocytes 05/21/2020 17.80*   • Monocytes 05/21/2020 8.60    • Eosinophils 05/21/2020 2.60    • Basophils 05/21/2020 0.80    • Immature Granulocytes 05/21/2020 0.40    • Nucleated RBC 05/21/2020 0.00    • Neutrophils (Absolute) 05/21/2020 3.49    • Lymphs (Absolute) 05/21/2020 0.89*   • Monos (Absolute) 05/21/2020 0.43    • Eos (Absolute) 05/21/2020 0.13    • Baso (Absolute) 05/21/2020 0.04    • Immature Granulocytes (a* 05/21/2020 0.02    • NRBC (Absolute) 05/21/2020 0.00    • Magnesium 05/21/2020 1.5    • Carcinoembryonic Antigen 05/21/2020 257.0*   • Sodium 05/21/2020 134*   • Potassium 05/21/2020 3.8    • Chloride 05/21/2020 97    • Co2 05/21/2020 24    • Anion Gap 05/21/2020 13.0    • Glucose 05/21/2020 530*   • Bun 05/21/2020 10    • Creatinine 05/21/2020 0.58    • Calcium 05/21/2020 9.5    • AST(SGOT) 05/21/2020 30    • ALT(SGPT) 05/21/2020 37    • Alkaline Phosphatase 05/21/2020 178*   •  Total Bilirubin 05/21/2020 1.2    • Albumin 05/21/2020 4.3    • Total Protein 05/21/2020 7.2    • Globulin 05/21/2020 2.9    • A-G Ratio 05/21/2020 1.5    • Peripheral Smear Review 05/21/2020 see below    • GFR If  05/21/2020 >60    • GFR If Non  [facility]* 05/21/2020 >60    There may be more visits with results that are not included.   ]

## 2020-08-21 ENCOUNTER — HOSPITAL ENCOUNTER (OUTPATIENT)
Dept: LAB | Facility: MEDICAL CENTER | Age: 55
End: 2020-08-21
Attending: INTERNAL MEDICINE
Payer: MEDICAID

## 2020-08-21 LAB
ALBUMIN SERPL BCP-MCNC: 4 G/DL (ref 3.2–4.9)
ALBUMIN/GLOB SERPL: 1.7 G/DL
ALP SERPL-CCNC: 148 U/L (ref 30–99)
ALT SERPL-CCNC: 45 U/L (ref 2–50)
ANION GAP SERPL CALC-SCNC: 11 MMOL/L (ref 7–16)
APPEARANCE UR: CLEAR
AST SERPL-CCNC: 35 U/L (ref 12–45)
BASOPHILS # BLD AUTO: 0.7 % (ref 0–1.8)
BASOPHILS # BLD: 0.04 K/UL (ref 0–0.12)
BILIRUB SERPL-MCNC: 1 MG/DL (ref 0.1–1.5)
BILIRUB UR QL STRIP.AUTO: NEGATIVE
BUN SERPL-MCNC: 8 MG/DL (ref 8–22)
CALCIUM SERPL-MCNC: 9.6 MG/DL (ref 8.5–10.5)
CEA SERPL-MCNC: 46.2 NG/ML (ref 0–3)
CHLORIDE SERPL-SCNC: 103 MMOL/L (ref 96–112)
CO2 SERPL-SCNC: 27 MMOL/L (ref 20–33)
COLOR UR: YELLOW
CREAT SERPL-MCNC: 0.61 MG/DL (ref 0.5–1.4)
EOSINOPHIL # BLD AUTO: 0.13 K/UL (ref 0–0.51)
EOSINOPHIL NFR BLD: 2.2 % (ref 0–6.9)
ERYTHROCYTE [DISTWIDTH] IN BLOOD BY AUTOMATED COUNT: 53 FL (ref 35.9–50)
GLOBULIN SER CALC-MCNC: 2.4 G/DL (ref 1.9–3.5)
GLUCOSE SERPL-MCNC: 110 MG/DL (ref 65–99)
GLUCOSE UR STRIP.AUTO-MCNC: NEGATIVE MG/DL
HCT VFR BLD AUTO: 38.9 % (ref 42–52)
HGB BLD-MCNC: 12.6 G/DL (ref 14–18)
IMM GRANULOCYTES # BLD AUTO: 0.02 K/UL (ref 0–0.11)
IMM GRANULOCYTES NFR BLD AUTO: 0.3 % (ref 0–0.9)
KETONES UR STRIP.AUTO-MCNC: NEGATIVE MG/DL
LEUKOCYTE ESTERASE UR QL STRIP.AUTO: NEGATIVE
LYMPHOCYTES # BLD AUTO: 1.08 K/UL (ref 1–4.8)
LYMPHOCYTES NFR BLD: 18.2 % (ref 22–41)
MAGNESIUM SERPL-MCNC: 1.9 MG/DL (ref 1.5–2.5)
MCH RBC QN AUTO: 30 PG (ref 27–33)
MCHC RBC AUTO-ENTMCNC: 32.4 G/DL (ref 33.7–35.3)
MCV RBC AUTO: 92.6 FL (ref 81.4–97.8)
MICRO URNS: NORMAL
MONOCYTES # BLD AUTO: 0.69 K/UL (ref 0–0.85)
MONOCYTES NFR BLD AUTO: 11.7 % (ref 0–13.4)
NEUTROPHILS # BLD AUTO: 3.96 K/UL (ref 1.82–7.42)
NEUTROPHILS NFR BLD: 66.9 % (ref 44–72)
NITRITE UR QL STRIP.AUTO: NEGATIVE
NRBC # BLD AUTO: 0 K/UL
NRBC BLD-RTO: 0 /100 WBC
PH UR STRIP.AUTO: 7.5 [PH] (ref 5–8)
PLATELET # BLD AUTO: 101 K/UL (ref 164–446)
PMV BLD AUTO: 10.6 FL (ref 9–12.9)
POTASSIUM SERPL-SCNC: 3.8 MMOL/L (ref 3.6–5.5)
PROT SERPL-MCNC: 6.4 G/DL (ref 6–8.2)
PROT UR QL STRIP: NEGATIVE MG/DL
RBC # BLD AUTO: 4.2 M/UL (ref 4.7–6.1)
RBC UR QL AUTO: NEGATIVE
SODIUM SERPL-SCNC: 141 MMOL/L (ref 135–145)
SP GR UR STRIP.AUTO: 1.01
UROBILINOGEN UR STRIP.AUTO-MCNC: 1 MG/DL
WBC # BLD AUTO: 5.9 K/UL (ref 4.8–10.8)

## 2020-08-21 PROCEDURE — 83735 ASSAY OF MAGNESIUM: CPT

## 2020-08-21 PROCEDURE — 81003 URINALYSIS AUTO W/O SCOPE: CPT

## 2020-08-21 PROCEDURE — 80053 COMPREHEN METABOLIC PANEL: CPT

## 2020-08-21 PROCEDURE — 82378 CARCINOEMBRYONIC ANTIGEN: CPT

## 2020-08-21 PROCEDURE — 85025 COMPLETE CBC W/AUTO DIFF WBC: CPT

## 2020-08-21 PROCEDURE — 36415 COLL VENOUS BLD VENIPUNCTURE: CPT

## 2020-08-22 ENCOUNTER — OUTPATIENT INFUSION SERVICES (OUTPATIENT)
Dept: ONCOLOGY | Facility: MEDICAL CENTER | Age: 55
End: 2020-08-22
Attending: INTERNAL MEDICINE
Payer: MEDICAID

## 2020-08-22 VITALS
DIASTOLIC BLOOD PRESSURE: 79 MMHG | BODY MASS INDEX: 30.62 KG/M2 | WEIGHT: 231.04 LBS | HEART RATE: 74 BPM | TEMPERATURE: 97 F | RESPIRATION RATE: 18 BRPM | OXYGEN SATURATION: 96 % | HEIGHT: 73 IN | SYSTOLIC BLOOD PRESSURE: 126 MMHG

## 2020-08-22 DIAGNOSIS — C78.7 METASTATIC COLON CANCER TO LIVER (HCC): ICD-10-CM

## 2020-08-22 DIAGNOSIS — C18.9 METASTATIC COLON CANCER TO LIVER (HCC): ICD-10-CM

## 2020-08-22 PROCEDURE — G0498 CHEMO EXTEND IV INFUS W/PUMP: HCPCS

## 2020-08-22 PROCEDURE — 96375 TX/PRO/DX INJ NEW DRUG ADDON: CPT

## 2020-08-22 PROCEDURE — 96417 CHEMO IV INFUS EACH ADDL SEQ: CPT

## 2020-08-22 PROCEDURE — 700105 HCHG RX REV CODE 258: Performed by: INTERNAL MEDICINE

## 2020-08-22 PROCEDURE — 96413 CHEMO IV INFUSION 1 HR: CPT

## 2020-08-22 PROCEDURE — 700111 HCHG RX REV CODE 636 W/ 250 OVERRIDE (IP): Performed by: INTERNAL MEDICINE

## 2020-08-22 PROCEDURE — A4212 NON CORING NEEDLE OR STYLET: HCPCS

## 2020-08-22 PROCEDURE — 96411 CHEMO IV PUSH ADDL DRUG: CPT

## 2020-08-22 RX ADMIN — ONDANSETRON 16 MG: 2 INJECTION INTRAMUSCULAR; INTRAVENOUS at 11:39

## 2020-08-22 RX ADMIN — FLUOROURACIL 5000 MG: 50 INJECTION, SOLUTION INTRAVENOUS at 15:24

## 2020-08-22 RX ADMIN — DEXAMETHASONE SODIUM PHOSPHATE 20 MG: 4 INJECTION, SOLUTION INTRA-ARTICULAR; INTRALESIONAL; INTRAMUSCULAR; INTRAVENOUS; SOFT TISSUE at 11:28

## 2020-08-22 RX ADMIN — LEUCOVORIN CALCIUM 928 MG: 350 INJECTION, POWDER, LYOPHILIZED, FOR SUSPENSION INTRAMUSCULAR; INTRAVENOUS at 14:35

## 2020-08-22 RX ADMIN — FLUOROURACIL 835 MG: 50 INJECTION, SOLUTION INTRAVENOUS at 15:18

## 2020-08-22 RX ADMIN — DIPHENHYDRAMINE HYDROCHLORIDE 50 MG: 50 INJECTION, SOLUTION INTRAMUSCULAR; INTRAVENOUS at 11:54

## 2020-08-22 RX ADMIN — PANITUMUMAB 300 MG: 100 SOLUTION INTRAVENOUS at 12:23

## 2020-08-22 RX ADMIN — TRASTUZUMAB 450 MG: 150 INJECTION, POWDER, LYOPHILIZED, FOR SOLUTION INTRAVENOUS at 13:53

## 2020-08-22 ASSESSMENT — FIBROSIS 4 INDEX: FIB4 SCORE: 2.89

## 2020-08-22 NOTE — PROGRESS NOTES
"Pharmacy Chemotherapy Calculations:     Dx: Metastatic colorectal cancer [KRAS/NRAS wild type, ERBB2 (HER2) amplification]    Cycle 12  Previous treatment = 8/7/20    Regimen: mFOLFOX + Vectibix + Herceptin  *Dosing Reference*  Trastuzumab 6 mg/kg IV loading dose C1D1, then 4 mg/kg IV Day 1 of subsequent  cycles [confirmed \"every 2 weeks\", per MD; progress note: \"He also had an ERBB2  amplification which might suggest response to treatments like Herceptin\"]   > 7/8/20: Herceptin 2.5 weeks overdue.  Do not reload Herceptin per [facility] Dr. Ross.  Cetuximab 400 mg/m2 IV Cycle 1 Day 1, then 250 mg/m2 IV Days 1 and 8 (confirmed  \"weekly\" per MD)   > C4 * * * cetuximab has been omitted * * *   > C5 * * * cetuximab replaced with panitumumab * * *      > Cody LEO reduced dose by 20% to  starting with C1D1 due to anticipated tolerance    > 3/14/20: Oxaliplatin discontinued from treatment  Panitumumab (Vectibix)   + added; change of therapy d/t cetuximab skin tox   > 4/25/20, initial dose reduction to 4.5 mg/kg d/t previous EGFR cutaneous toxicities    > C9 * * * Vectibix has been omitted * * *   >C10 Vectibix reordered at 3 mg/kg per MD Cody   Leucovorin 400 mg/m2 IV Day 1 followed by  Fluorouracil 400 mg/m2 IV Day 1 followed by   C1D1 reduced dose by 10% to 360 mg/m2 per MD Cody for anticipated tolerance   Fluorouracil  CIVI over 46-48 hours    C1D1 reduced dose by 10% to 2160 mg/m2 per MD Cody for anticipated tolerance    14-day cycle until DP or UT  NCCN guidelines for colon cancer V.2.2019  (cetuximab + chemo) Venoonando AP et al, OBEY 2017;317(23):9991-9100  (HER2 tx in HER2+ CRC) Kayleen RINALDI et al. Lancet Oncol. 2019 Apr;20(4):518-530.    Allergies: Patient has no known allergies.  /79   Pulse 74   Temp 36.1 °C (97 °F) (Temporal)   Resp 18   Ht 1.85 m (6' 0.84\")   Wt 104.8 kg (231 lb 0.7 oz)   SpO2 96%   BMI 30.62 kg/m²  Body surface area is 2.32 meters squared.     2/7/20 LVEF by Echo ~ 60% OK " for 6 months per Cody LEO order    Labs: 8/22/20  Meets treatment parameters  BP ok; urine protein neg    Panitumumab (Vectibix) 3 mg/kg x 104.8 kg = 314 mg  Rounded down to vial size (within 10%) per dose rounding protocol = 300 mg IV    Trastuzumab (Herceptin) 4 mg/kg x 104.8 kg = 419 mg   Rounded to vial size (within 10%) per dose rounding protocol = 450 mg IV    Leucovorin 400 mg/m² x 2.32 m² = 928 mg   <10% difference, okay to treat with final dose = 928 mg IV over 30 minutes OK    Fluorouracil (Adrucil) 360 mg/m² x 2.32 m² = 835.2 mg   <10% difference, okay to treat with final dose = 835 mg IV    Fluorouracil (Adrucil) 2160 mg/m² x 2.32 m² = 5010 mg   <10% difference, okay to treat with final dose = 5000 mg CIVI    To be given over 46 hours @ 2.2 ml/hr via CADD pump

## 2020-08-22 NOTE — PROGRESS NOTES
Pharmacy Chemotherapy Verification Note:    Patient Name: Gurmeet Jo        Dx: Metastatic Colorectal CA (KRAS/NRAS wild type, and ERBB2 [a HER2 family receptor] amplification)        Protocol: mFOLFOX+Panitumumab +trastuzumab       *Dosing Reference*  Panitumumab 6 mg/kg IV over 60 minutes on Day 1    4/25/20 added to treatment plan dose reduced to 4.5 mg/kg for tolerance   7/8/20 Omitted starting C9   7/25/20 dose reduced to 3 mg/kg for tolerance  ~Followed by~  Trastuzumab 6 mg/kg IV over 90 minutes on Day 1 of Cycle 1, followed by  Trastuzumab 4 mg/kg IV over 30-60 minutes on Day 1 of Cycle 2    Confirmed: per Dr. Ross: Q2 week dosing   4/11/20 - Reload with 6 mg/kg per Dr. Chisholm  OXALIplatin 85 mg/m² IV over 2 hours on Day 1   - dose reduced to 68 mg/m² starting C1 for tolerance   3/3/20 - oxali removed from treatment plan d/t oxaliplatin allergy per Harman VARGAS.  Leucovorin 400 mg/m² IV over 2 hours on Day 1, to run concurrent with OXALIplatin, followed by  Fluorouracil 400 mg/m² IVP on Day 1, followed by   C1 dose reduced to 360 mg/m² for tolerance  Fluorouracil 2400 mg/m² CIVI over 46-48 hours   C1 dose reduced to 2160 mg/m² for tolerance   14-day cycle until disease progression or unacceptable toxicity  *Dosing Reference*  NCCN Guidelines for Colon Cancer. V.2.2019.  Rohith TAVERAS et al. J Clin Oncol. 2010;28(31):4641-705  Aura PATRICK et al. J Clin Oncol. 2008;28(12):2006-12  Luisa RODRIGEZ et al. Br J Cancer. 2002;87(4):393-9    Herceptin has limited data in colorectal cancer, but two regimens exist neither at the same loading dose or interval as Dr. Ross has chosen. Per [facility] progress note: Patient has undergone further [employer] testing of his tumor. He was found to be KRAS/NRAS wild type, which suggest he has an EGFR mutation and would be a candidate for EGFR targeted therapy. He also had an ERBB2 amplification which might suggest response to treatments like Herceptin.    Allergies:   "Compazine and Oxaliplatin     /79   Pulse 74   Temp 36.1 °C (97 °F) (Temporal)   Resp 18   Ht 1.85 m (6' 0.84\")   Wt 104.8 kg (231 lb 0.7 oz)   SpO2 96%   BMI 30.62 kg/m²  Body surface area is 2.32 meters squared.     Labs 8/21/20  ANC~ 3960 Plt = 101k   Hgb = 12.6     SCr = 0.61 mg/dL CrCl ~ >125 mL/min (minimum SCr of 0.7)   LFT's = 35/45/148 TBili = 1     2/7/2020 ECHO (every 6 months): LVEF 60% (okay to use for treatment on 8/22/20 per Harman VARGAS pt scheduled for ECHO on 9/4/20)     Drug Order   (Drug name, dose, route, IV Fluid & volume, frequency, number of doses) Cycle 12  Previous treatment: C11 on 8/8/20     Medication = panitumumab (Vectibix)  Base Dose = 3 mg/kg  Calc Dose: Base Dose x 104.8 kg = 314.4 mg  Final Dose = 300 mg  Route = IV  Fluid & Volume =  mL  Admin Duration = Over 60 minutes    <10% difference, okay to treat with final dose         Medication = Trastuzumab (Herceptin)  Base Dose = 4 mg/kg  Calc Dose: Base Dose x 104.8 kg = 419.2 mg  Final Dose = 450 mg  Route = IV  Fluid & Volume =  mL  Admin Duration = Over 30 minutes    <10% difference, okay to treat with final dose           Medication = Leucovorin (Wellcovorin)  Base Dose = 400 mg/m²  Calc Dose: Base Dose x 2.32 m² = 928 mg  Final Dose = 928 mg  Route = IV  Fluid & Volume = D5W 250 mL  Admin Duration = Over 30 min          <10% difference, okay to treat with final dose   Medication = Fluorouracil (5-FU)  Base Dose = 360 mg/m²  Calc Dose: Base Dose x 2.32 m² = 835.2 mg  Final Dose = 835 mg  Route = IVP  Fluid & Volume = 16.7 mL in syringe  Conc = 50 mg/mL  Admin Duration = Over 5 minutes          <10% difference, okay to treat with final dose   Medication = Fluorouracil (5-FU)  Base Dose = 2160 mg/m²  Calc Dose:Base Dose x 2.32 m² = 5011.2 mg  Final Dose = 5000 mg  Route = CIVI   Fluid & Volume = 100 mL (+3 mL overfill = 103 mL)  Admin Duration = Over 46 hours to run at   2.2 mL/hour    Via CADD pump for " home infusion      <10% difference, okay to treat with final dose     By my signature below, I confirm this process was performed independently with the BSA and all final chemotherapy dosing calculations congruent. I have reviewed the above chemotherapy order and that my calculation of the final dose and BSA (when applicable) corroborate those calculations of the  pharmacist. Discrepancies of 10% or greater in the written dose have been addressed and documented within the EPIC Progress notes.    Ruben Santamaria, PharmD

## 2020-08-22 NOTE — PROGRESS NOTES
Chemotherapy Verification - PRIMARY RN      Height = 104.8 kg  Weight = 185 cm  BSA = 2.32 m^2       Medication: Panitumumab  Dose: 3 mg/kg  Calculated Dose: 314.4 mg                             (In mg/m2, AUC, mg/kg)     Medication: Trastuzumab  Dose: 4 mg/kg  Calculated Dose: 419.2 mg                             (In mg/m2, AUC, mg/kg)    Medication: Fluorouracil  Dose: 360 mg/m^2  Calculated Dose: 835.2 mg (PUSH)                            (In mg/m2, AUC, mg/kg)    Medication: Fluorouracil  Dose: 2160 mg/m^2  Calculated Dose: 5011.2 mg over 46 hours in CADD pump                             (In mg/m2, AUC, mg/kg)    I confirm this process was performed independently with the BSA and all final chemotherapy dosing calculations congruent.  Any discrepancies of 10% or greater have been addressed with the chemotherapy pharmacist. The resolution of the discrepancy has been documented in the EPIC progress notes.

## 2020-08-22 NOTE — PROGRESS NOTES
Chemotherapy Verification - SECONDARY RN       Height = 185 cm  Weight = 104.8 kg  BSA = 2.32 m2       Medication: Vectibix  Dose: 6 mg/kg  Calculated Dose: 314.4 mg                             (In mg/m2, AUC, mg/kg)     Medication: Herceptin  Dose: 4 mg/kg  Calculated Dose: 419.2 mg                             (In mg/m2, AUC, mg/kg)    Medication: Adrcuil  Dose: 360 mg/m2  Calculated Dose: 835.2 mg                             (In mg/m2, AUC, mg/kg)    Medication: Adrucil  Dose: 2,160 mg/m2  Calculated Dose: 5,011.2 mg                             (In mg/m2, AUC, mg/kg)      I confirm that this process was performed independently.

## 2020-08-23 NOTE — PROGRESS NOTES
Pt presented to infusion for cycle 12 Vectibix/Herceptin/5FU. Pt has labs drawn in advance, results OK for treatment. Denied any s/s of infection or open wounds. Right chest port accessed in sterile fashion, brisk blood return observed. Pre-meds given. All treatment infused without issue. 5FU pump connected after blood return again verified, all connections secure, pt requested tape. Pump running and placed in luis pack. Has next appt, left on foot to self care.

## 2020-08-24 ENCOUNTER — OUTPATIENT INFUSION SERVICES (OUTPATIENT)
Dept: ONCOLOGY | Facility: MEDICAL CENTER | Age: 55
End: 2020-08-24
Attending: INTERNAL MEDICINE
Payer: MEDICAID

## 2020-08-24 VITALS
RESPIRATION RATE: 18 BRPM | WEIGHT: 230.6 LBS | HEIGHT: 73 IN | SYSTOLIC BLOOD PRESSURE: 122 MMHG | DIASTOLIC BLOOD PRESSURE: 69 MMHG | OXYGEN SATURATION: 93 % | TEMPERATURE: 97.3 F | HEART RATE: 89 BPM | BODY MASS INDEX: 30.56 KG/M2

## 2020-08-24 DIAGNOSIS — C78.7 METASTATIC COLON CANCER TO LIVER (HCC): ICD-10-CM

## 2020-08-24 DIAGNOSIS — C18.9 METASTATIC COLON CANCER TO LIVER (HCC): ICD-10-CM

## 2020-08-24 PROCEDURE — 700111 HCHG RX REV CODE 636 W/ 250 OVERRIDE (IP)

## 2020-08-24 PROCEDURE — 96523 IRRIG DRUG DELIVERY DEVICE: CPT

## 2020-08-24 RX ORDER — HEPARIN SODIUM (PORCINE) LOCK FLUSH IV SOLN 100 UNIT/ML 100 UNIT/ML
SOLUTION INTRAVENOUS
Status: COMPLETED
Start: 2020-08-24 | End: 2020-08-24

## 2020-08-24 RX ADMIN — HEPARIN 5 ML: 100 SYRINGE at 15:06

## 2020-08-24 ASSESSMENT — FIBROSIS 4 INDEX: FIB4 SCORE: 2.89

## 2020-08-24 NOTE — PROGRESS NOTES
Patient presents for port flush and dis connect from home infusion pump. Pump reads reservoir volume 0. Port flushed per protocol and de-accessed, sterile gauze to site. Patient scheduled for his next appointment and released in no acute distress.

## 2020-09-03 RX ORDER — HEPARIN SODIUM (PORCINE) LOCK FLUSH IV SOLN 100 UNIT/ML 100 UNIT/ML
500 SOLUTION INTRAVENOUS PRN
Status: CANCELLED | OUTPATIENT
Start: 2020-09-05

## 2020-09-03 RX ORDER — 0.9 % SODIUM CHLORIDE 0.9 %
10 VIAL (ML) INJECTION PRN
Status: CANCELLED | OUTPATIENT
Start: 2020-09-04

## 2020-09-03 RX ORDER — 0.9 % SODIUM CHLORIDE 0.9 %
VIAL (ML) INJECTION PRN
Status: CANCELLED | OUTPATIENT
Start: 2020-09-04

## 2020-09-03 RX ORDER — ONDANSETRON 2 MG/ML
4 INJECTION INTRAMUSCULAR; INTRAVENOUS PRN
Status: CANCELLED | OUTPATIENT
Start: 2020-09-05

## 2020-09-03 RX ORDER — 0.9 % SODIUM CHLORIDE 0.9 %
3 VIAL (ML) INJECTION PRN
Status: CANCELLED | OUTPATIENT
Start: 2020-09-04

## 2020-09-03 RX ORDER — ONDANSETRON 8 MG/1
8 TABLET, ORALLY DISINTEGRATING ORAL PRN
Status: CANCELLED | OUTPATIENT
Start: 2020-09-05

## 2020-09-03 RX ORDER — 0.9 % SODIUM CHLORIDE 0.9 %
10 VIAL (ML) INJECTION PRN
Status: CANCELLED | OUTPATIENT
Start: 2020-09-05

## 2020-09-03 RX ORDER — 0.9 % SODIUM CHLORIDE 0.9 %
3 VIAL (ML) INJECTION PRN
Status: CANCELLED | OUTPATIENT
Start: 2020-09-05

## 2020-09-03 RX ORDER — SODIUM CHLORIDE 9 MG/ML
INJECTION, SOLUTION INTRAVENOUS CONTINUOUS
Status: CANCELLED
Start: 2020-09-05

## 2020-09-03 RX ORDER — HEPARIN SODIUM (PORCINE) LOCK FLUSH IV SOLN 100 UNIT/ML 100 UNIT/ML
500 SOLUTION INTRAVENOUS PRN
Status: CANCELLED | OUTPATIENT
Start: 2020-09-04

## 2020-09-03 RX ORDER — 0.9 % SODIUM CHLORIDE 0.9 %
VIAL (ML) INJECTION PRN
Status: CANCELLED | OUTPATIENT
Start: 2020-09-05

## 2020-09-03 RX ORDER — 0.9 % SODIUM CHLORIDE 0.9 %
20 VIAL (ML) INJECTION PRN
Status: CANCELLED | OUTPATIENT
Start: 2020-09-07

## 2020-09-03 RX ORDER — METHYLPREDNISOLONE SODIUM SUCCINATE 125 MG/2ML
125 INJECTION, POWDER, LYOPHILIZED, FOR SOLUTION INTRAMUSCULAR; INTRAVENOUS PRN
Status: CANCELLED | OUTPATIENT
Start: 2020-09-05

## 2020-09-03 RX ORDER — HEPARIN SODIUM (PORCINE) LOCK FLUSH IV SOLN 100 UNIT/ML 100 UNIT/ML
500 SOLUTION INTRAVENOUS PRN
Status: CANCELLED | OUTPATIENT
Start: 2020-09-07

## 2020-09-03 RX ORDER — EPINEPHRINE 1 MG/ML(1)
0.5 AMPUL (ML) INJECTION PRN
Status: CANCELLED | OUTPATIENT
Start: 2020-09-05

## 2020-09-03 RX ORDER — DIPHENHYDRAMINE HYDROCHLORIDE 50 MG/ML
50 INJECTION INTRAMUSCULAR; INTRAVENOUS PRN
Status: CANCELLED | OUTPATIENT
Start: 2020-09-05

## 2020-09-03 RX ORDER — PROCHLORPERAZINE MALEATE 10 MG
10 TABLET ORAL EVERY 6 HOURS PRN
Status: CANCELLED | OUTPATIENT
Start: 2020-09-05

## 2020-09-04 ENCOUNTER — HOSPITAL ENCOUNTER (OUTPATIENT)
Dept: LAB | Facility: MEDICAL CENTER | Age: 55
End: 2020-09-04
Attending: INTERNAL MEDICINE
Payer: MEDICAID

## 2020-09-04 ENCOUNTER — HOSPITAL ENCOUNTER (OUTPATIENT)
Dept: CARDIOLOGY | Facility: MEDICAL CENTER | Age: 55
End: 2020-09-04
Attending: INTERNAL MEDICINE
Payer: MEDICAID

## 2020-09-04 ENCOUNTER — TELEPHONE (OUTPATIENT)
Dept: ONCOLOGY | Facility: MEDICAL CENTER | Age: 55
End: 2020-09-04

## 2020-09-04 DIAGNOSIS — C18.9 MALIGNANT NEOPLASM OF COLON, UNSPECIFIED PART OF COLON (HCC): ICD-10-CM

## 2020-09-04 LAB
ALBUMIN SERPL BCP-MCNC: 3.9 G/DL (ref 3.2–4.9)
ALBUMIN/GLOB SERPL: 1.8 G/DL
ALP SERPL-CCNC: 158 U/L (ref 30–99)
ALT SERPL-CCNC: 45 U/L (ref 2–50)
ANION GAP SERPL CALC-SCNC: 10 MMOL/L (ref 7–16)
APPEARANCE UR: CLEAR
AST SERPL-CCNC: 24 U/L (ref 12–45)
BASOPHILS # BLD AUTO: 0.7 % (ref 0–1.8)
BASOPHILS # BLD: 0.03 K/UL (ref 0–0.12)
BILIRUB SERPL-MCNC: 0.6 MG/DL (ref 0.1–1.5)
BILIRUB UR QL STRIP.AUTO: NEGATIVE
BUN SERPL-MCNC: 13 MG/DL (ref 8–22)
CALCIUM SERPL-MCNC: 9.3 MG/DL (ref 8.5–10.5)
CEA SERPL-MCNC: 33 NG/ML (ref 0–3)
CHLORIDE SERPL-SCNC: 107 MMOL/L (ref 96–112)
CO2 SERPL-SCNC: 24 MMOL/L (ref 20–33)
COLOR UR: YELLOW
CREAT SERPL-MCNC: 0.69 MG/DL (ref 0.5–1.4)
EOSINOPHIL # BLD AUTO: 0.18 K/UL (ref 0–0.51)
EOSINOPHIL NFR BLD: 4 % (ref 0–6.9)
ERYTHROCYTE [DISTWIDTH] IN BLOOD BY AUTOMATED COUNT: 54.2 FL (ref 35.9–50)
GLOBULIN SER CALC-MCNC: 2.2 G/DL (ref 1.9–3.5)
GLUCOSE SERPL-MCNC: 130 MG/DL (ref 65–99)
GLUCOSE UR STRIP.AUTO-MCNC: NEGATIVE MG/DL
HCT VFR BLD AUTO: 38.7 % (ref 42–52)
HGB BLD-MCNC: 12.5 G/DL (ref 14–18)
IMM GRANULOCYTES # BLD AUTO: 0.02 K/UL (ref 0–0.11)
IMM GRANULOCYTES NFR BLD AUTO: 0.4 % (ref 0–0.9)
KETONES UR STRIP.AUTO-MCNC: ABNORMAL MG/DL
LEUKOCYTE ESTERASE UR QL STRIP.AUTO: NEGATIVE
LV EJECT FRACT  99904: 60
LV EJECT FRACT MOD 2C 99903: 62.47
LV EJECT FRACT MOD 4C 99902: 59.83
LV EJECT FRACT MOD BP 99901: 61.5
LYMPHOCYTES # BLD AUTO: 0.89 K/UL (ref 1–4.8)
LYMPHOCYTES NFR BLD: 19.9 % (ref 22–41)
MAGNESIUM SERPL-MCNC: 1.9 MG/DL (ref 1.5–2.5)
MCH RBC QN AUTO: 30 PG (ref 27–33)
MCHC RBC AUTO-ENTMCNC: 32.3 G/DL (ref 33.7–35.3)
MCV RBC AUTO: 93 FL (ref 81.4–97.8)
MICRO URNS: ABNORMAL
MONOCYTES # BLD AUTO: 0.52 K/UL (ref 0–0.85)
MONOCYTES NFR BLD AUTO: 11.6 % (ref 0–13.4)
NEUTROPHILS # BLD AUTO: 2.84 K/UL (ref 1.82–7.42)
NEUTROPHILS NFR BLD: 63.4 % (ref 44–72)
NITRITE UR QL STRIP.AUTO: NEGATIVE
NRBC # BLD AUTO: 0 K/UL
NRBC BLD-RTO: 0 /100 WBC
PH UR STRIP.AUTO: 5.5 [PH] (ref 5–8)
PLATELET # BLD AUTO: 81 K/UL (ref 164–446)
PMV BLD AUTO: 10.9 FL (ref 9–12.9)
POTASSIUM SERPL-SCNC: 3.8 MMOL/L (ref 3.6–5.5)
PROT SERPL-MCNC: 6.1 G/DL (ref 6–8.2)
PROT UR QL STRIP: NEGATIVE MG/DL
RBC # BLD AUTO: 4.16 M/UL (ref 4.7–6.1)
RBC UR QL AUTO: NEGATIVE
SODIUM SERPL-SCNC: 141 MMOL/L (ref 135–145)
SP GR UR STRIP.AUTO: 1.03
UROBILINOGEN UR STRIP.AUTO-MCNC: 1 MG/DL
WBC # BLD AUTO: 4.5 K/UL (ref 4.8–10.8)

## 2020-09-04 PROCEDURE — 83735 ASSAY OF MAGNESIUM: CPT

## 2020-09-04 PROCEDURE — 82378 CARCINOEMBRYONIC ANTIGEN: CPT

## 2020-09-04 PROCEDURE — 36415 COLL VENOUS BLD VENIPUNCTURE: CPT

## 2020-09-04 PROCEDURE — 80053 COMPREHEN METABOLIC PANEL: CPT

## 2020-09-04 PROCEDURE — 93308 TTE F-UP OR LMTD: CPT

## 2020-09-04 PROCEDURE — 85025 COMPLETE CBC W/AUTO DIFF WBC: CPT

## 2020-09-04 PROCEDURE — 93308 TTE F-UP OR LMTD: CPT | Mod: 26 | Performed by: INTERNAL MEDICINE

## 2020-09-04 PROCEDURE — 81003 URINALYSIS AUTO W/O SCOPE: CPT

## 2020-09-04 NOTE — PROGRESS NOTES
Pharmacy Chemotherapy Verification Note:    Patient Name: Gurmeet Jo        Dx: Metastatic Colorectal CA (KRAS/NRAS wild type, and ERBB2 [a HER2 family receptor] amplification)        Protocol: mFOLFOX+Panitumumab +trastuzumab       *Dosing Reference*  Panitumumab 6 mg/kg IV over 60 minutes on Day 1    4/25/20 added to treatment plan dose reduced to 4.5 mg/kg for tolerance   7/8/20 Omitted starting C9   7/25/20 dose reduced to 3 mg/kg for tolerance   9/4/20 discontinued from treatment plan for Cycle 13  ~Followed by~  Trastuzumab 6 mg/kg IV over 90 minutes on Day 1 of Cycle 1, followed by  Trastuzumab 4 mg/kg IV over 30-60 minutes on Day 1 of Cycle 2    Confirmed: per Dr. Ross: Q2 week dosing   4/11/20 - Reload with 6 mg/kg per Dr. Chisholm  OXALIplatin 85 mg/m² IV over 2 hours on Day 1   - dose reduced to 68 mg/m² starting C1 for tolerance   3/3/20 - oxali removed from treatment plan d/t oxaliplatin allergy per Harman VARGAS.  Leucovorin 400 mg/m² IV over 2 hours on Day 1, to run concurrent with OXALIplatin, followed by  Fluorouracil 400 mg/m² IVP on Day 1, followed by   C1 dose reduced to 360 mg/m² for tolerance  Fluorouracil 2400 mg/m² CIVI over 46-48 hours   C1 dose reduced to 2160 mg/m² for tolerance   14-day cycle until disease progression or unacceptable toxicity  *Dosing Reference*  NCCN Guidelines for Colon Cancer. V.2.2019.  Rohith TAVERAS et al. J Clin Oncol. 2010;28(31):4667-705  Aura PATRICK et al. J Clin Oncol. 2008;28(12):2006-12  Luisa RODRIGEZ, et al. Br J Cancer. 2002;87(4):393-9    Herceptin has limited data in colorectal cancer, but two regimens exist neither at the same loading dose or interval as Dr. Ross has chosen. Per [facility] progress note: Patient has undergone further [employer] testing of his tumor. He was found to be KRAS/NRAS wild type, which suggest he has an EGFR mutation and would be a candidate for EGFR targeted therapy. He also had an ERBB2 amplification which might suggest  "response to treatments like Herceptin.    Allergies:  Compazine and Oxaliplatin     /88   Pulse 76   Temp 36.4 °C (97.5 °F) (Temporal)   Resp 18   Ht 1.85 m (6' 0.84\")   Wt 107.1 kg (236 lb 1.8 oz)   SpO2 98%   BMI 31.29 kg/m²  Body surface area is 2.35 meters squared.     Labs 9/4/20:  ANC~ 2840 Plt = 81k   Hgb = 12.5     SCr = 0.69 mg/dL CrCl >125 mL/min   LFT's = 24/45/158 TBili = 0.6     2/7/2020 ECHO: LVEF 60% (every 6 months)  9/4/20 ECHO: LVEF 60%     Drug Order   (Drug name, dose, route, IV Fluid & volume, frequency, number of doses) Cycle 13  Previous treatment: C12 on      Medication = Trastuzumab (Herceptin)  Base Dose = 4 mg/kg  Calc Dose: Base Dose x 107.1 kg = 428.4 mg  Final Dose = 450 mg  Route = IV  Fluid & Volume =  mL  Admin Duration = Over 30 minutes          Rounded to nearest vial size (within 10%) per rounding protocol, okay to treat with final dose     Medication = Leucovorin (Wellcovorin)  Base Dose = 400 mg/m²  Calc Dose: Base Dose x 2.35 m² = 940 mg  Final Dose = 940 mg  Route = IV  Fluid & Volume = D5W 250 mL  Admin Duration = Over 30 min          <10% difference, okay to treat with final dose   Medication = Fluorouracil (5-FU)  Base Dose = 360 mg/m²  Calc Dose: Base Dose x 2.35 m² = 846 mg  Final Dose = 845 mg  Route = IVP  Fluid & Volume = 16.9 mL in syringe  Conc = 50 mg/mL  Admin Duration = Over 5 minutes          <10% difference, okay to treat with final dose   Medication = Fluorouracil (5-FU)  Base Dose = 2160 mg/m²  Calc Dose:Base Dose x 2.35 m² = 5076 mg  Final Dose = 5075 mg  Route = CIVI   Fluid & Volume = 101.5 mL (+3 mL overfill = 104.5 mL)  Admin Duration = Over 46 hours to run at   2.2 mL/hour    Via CADD pump for home infusion      <10% difference, okay to treat with final dose     By my signature below, I confirm this process was performed independently with the BSA and all final chemotherapy dosing calculations congruent. I have reviewed the above " chemotherapy order and that my calculation of the final dose and BSA (when applicable) corroborate those calculations of the  pharmacist. Discrepancies of 10% or greater in the written dose have been addressed and documented within the EPIC Progress notes.    José Marvin, PharmD

## 2020-09-05 ENCOUNTER — OUTPATIENT INFUSION SERVICES (OUTPATIENT)
Dept: ONCOLOGY | Facility: MEDICAL CENTER | Age: 55
End: 2020-09-05
Attending: INTERNAL MEDICINE
Payer: MEDICAID

## 2020-09-05 VITALS
OXYGEN SATURATION: 98 % | SYSTOLIC BLOOD PRESSURE: 131 MMHG | DIASTOLIC BLOOD PRESSURE: 88 MMHG | TEMPERATURE: 97.5 F | BODY MASS INDEX: 31.29 KG/M2 | WEIGHT: 236.11 LBS | HEIGHT: 73 IN | RESPIRATION RATE: 18 BRPM | HEART RATE: 76 BPM

## 2020-09-05 DIAGNOSIS — C78.7 METASTATIC COLON CANCER TO LIVER (HCC): ICD-10-CM

## 2020-09-05 DIAGNOSIS — C18.9 METASTATIC COLON CANCER TO LIVER (HCC): ICD-10-CM

## 2020-09-05 PROCEDURE — G0498 CHEMO EXTEND IV INFUS W/PUMP: HCPCS

## 2020-09-05 PROCEDURE — 96367 TX/PROPH/DG ADDL SEQ IV INF: CPT

## 2020-09-05 PROCEDURE — 700105 HCHG RX REV CODE 258: Performed by: INTERNAL MEDICINE

## 2020-09-05 PROCEDURE — 96375 TX/PRO/DX INJ NEW DRUG ADDON: CPT

## 2020-09-05 PROCEDURE — 96417 CHEMO IV INFUS EACH ADDL SEQ: CPT

## 2020-09-05 PROCEDURE — 700111 HCHG RX REV CODE 636 W/ 250 OVERRIDE (IP): Performed by: INTERNAL MEDICINE

## 2020-09-05 PROCEDURE — 96411 CHEMO IV PUSH ADDL DRUG: CPT

## 2020-09-05 PROCEDURE — A4212 NON CORING NEEDLE OR STYLET: HCPCS

## 2020-09-05 PROCEDURE — 96413 CHEMO IV INFUSION 1 HR: CPT

## 2020-09-05 RX ADMIN — DIPHENHYDRAMINE HYDROCHLORIDE 50 MG: 50 INJECTION, SOLUTION INTRAMUSCULAR; INTRAVENOUS at 09:26

## 2020-09-05 RX ADMIN — TRASTUZUMAB 450 MG: 150 INJECTION, POWDER, LYOPHILIZED, FOR SOLUTION INTRAVENOUS at 10:24

## 2020-09-05 RX ADMIN — LEUCOVORIN CALCIUM 940 MG: 350 INJECTION, POWDER, LYOPHILIZED, FOR SOLUTION INTRAMUSCULAR; INTRAVENOUS at 11:08

## 2020-09-05 RX ADMIN — FLUOROURACIL 845 MG: 50 INJECTION, SOLUTION INTRAVENOUS at 11:57

## 2020-09-05 RX ADMIN — DEXAMETHASONE SODIUM PHOSPHATE 20 MG: 4 INJECTION, SOLUTION INTRA-ARTICULAR; INTRALESIONAL; INTRAMUSCULAR; INTRAVENOUS; SOFT TISSUE at 09:56

## 2020-09-05 RX ADMIN — FLUOROURACIL 5075 MG: 50 INJECTION, SOLUTION INTRAVENOUS at 12:13

## 2020-09-05 RX ADMIN — ONDANSETRON HYDROCHLORIDE 16 MG: 2 SOLUTION INTRAMUSCULAR; INTRAVENOUS at 09:42

## 2020-09-05 ASSESSMENT — FIBROSIS 4 INDEX: FIB4 SCORE: 2.47

## 2020-09-05 NOTE — PROGRESS NOTES
"Pharmacy Chemotherapy Calculations:     Dx: Metastatic colorectal cancer [KRAS/NRAS wild type, ERBB2 (HER2) amplification]    Cycle 13  Previous treatment = 8/22/20    Regimen: mFOLFOX + Vectibix + Herceptin  *Dosing Reference*  Trastuzumab 6 mg/kg IV loading dose C1D1, then 4 mg/kg IV Day 1 of subsequent  cycles [confirmed \"every 2 weeks\", per MD; progress note: \"He also had an ERBB2  amplification which might suggest response to treatments like Herceptin\"]   > 7/8/20: Herceptin 2.5 weeks overdue.  Do not reload Herceptin per [facility] Dr. Ross.   IV Cycle 1 Day 1, then 250 mg/m2 IV Days 1 and 8 (confirmed  \"weekly\" per MD)   > C4 * * * cetuximab has been omitted * * *   > C5 * * * cetuximab replaced with panitumumab * * *      > Cody LEO reduced dose by 20% to  starting with C1D1 due to anticipated tolerance    > 3/14/20: Oxaliplatin discontinued from treatment     + added; change of therapy d/t cetuximab skin tox   > 4/25/20, initial dose reduction to 4.5 mg/kg d/t previous EGFR cutaneous toxicities    > C9 * * * Vectibix has been omitted * * *   > C10 Vectibix reordered at 3 mg/kg per MD Cody    > C13 * * * Vectibix has been omitted * * *  Leucovorin 400 mg/m2 IV Day 1 followed by  Fluorouracil 400 mg/m2 IV Day 1 followed by   C1D1 reduced dose by 10% to 360 mg/m2 per MD Cody for anticipated tolerance   Fluorouracil  CIVI over 46-48 hours    C1D1 reduced dose by 10% to 2160 mg/m2 per MD Cody for anticipated tolerance   14-day cycle until DP or UT  NCCN guidelines for colon cancer V.2.2019  (cetuximab + chemo) [place] AP et al, OBEY 2017;317(23):6818-4324  (HER2 tx in HER2+ CRC) Kayleen RINALDI et al. Lancet Oncol. 2019 Apr;20(4):518-530.    Allergies: Patient has no known allergies.  /88   Pulse 76   Temp 36.4 °C (97.5 °F) (Temporal)   Resp 18   Ht 1.85 m (6' 0.84\")   Wt 107.1 kg (236 lb 1.8 oz)   SpO2 98%   BMI 31.29 kg/m²  Body surface area is 2.35 meters squared.     ECHO:  9/4/20 LVEF ~ " 60% OK for 6 months per Cody LEO order  2/7/20 LVEF ~ 60%     All labs (9/4/20) within treatment plan parameters.      Trastuzumab (Herceptin) 4 mg/kg x 107.1 kg = 428 mg   Rounded to vial size (within 10%) per dose rounding protocol = 450 mg IV    Leucovorin 400 mg/m² x 2.35 m² = 940 mg   <10% difference, okay to treat with final dose = 940 mg IV over 30 minutes OK    Fluorouracil (Adrucil) 360 mg/m² x 2.35 m² = 846 mg   <10% difference, okay to treat with final dose = 845 mg IV    Fluorouracil (Adrucil) 2160 mg/m² x 2.35 m² = 5076 mg   <10% difference, okay to treat with final dose = 5075 mg CIVI    To be given over 46 hours @ 2.2 ml/hr via CADD pump      Aura Guzman, PharmD

## 2020-09-05 NOTE — PROGRESS NOTES
Chemotherapy Verification - SECONDARY RN       Height = 185 cm  Weight = 107.1 kg  BSA = 2.346 m2       Medication: 5FU  Dose: 360 mg/m2 dr Calculated Dose: 844.56 mg                             (In mg/m2, AUC, mg/kg)     Medication: home infusion 5 FU  Dose: 2160 mg/m2 dr  Calculated Dose: 5037 mg over 46 hours                             (In mg/m2, AUC, mg/kg)    Medication: Herceptin  Dose: 4 mg/kg  Calculated Dose: 428.4 mg round to vial                             (In mg/m2, AUC, mg/kg)      I confirm that this process was performed independently.

## 2020-09-05 NOTE — PROGRESS NOTES
Patient to [facility] for chemotherapy infusion. PORT accessed with sterile field, flushed with + blood return. Low profile needle used with hypoallergenic dressing.  Labs drawn the day before. Patient meets parameters for chemo. Premeds given. Herceptin infused with no s/s of infusion reaction. Leucovorin infused with no s/s of infusion reaction. Blood return verified.Fluorouracil push given over 5 minutes. 5FU CADD pump connected. 103.5ml w/ over fill. Chemo spill kit given to patient. Connecter wrapped in tape. Patient has appointment for Monday. Patient to home in care of self.

## 2020-09-05 NOTE — PROGRESS NOTES
Chemotherapy Verification - PRIMARY RN      Height = 1.85m  Weight = 107.1kg  BSA = 2.35m2       Medication: trastuzumab  Dose: 4mg/kg  Calculated Dose: 428.4mg                            (In mg/m2, AUC, mg/kg)     Medication: leucovorin Dose: 400mg/m2  Calculated Dose: 940mg                            (In mg/m2, AUC, mg/kg)    Medication: fluorouracil  Dose: 360mg/m2  Calculated Dose: 846mg                            (In mg/m2, AUC, mg/kg)    Medication: fluorouracil  Dose: 2160mg/m2  Calculated Dose: 5076mg                            (In mg/m2, AUC, mg/kg)            I confirm this process was performed independently with the BSA and all final chemotherapy dosing calculations congruent.  Any discrepancies of 10% or greater have been addressed with the chemotherapy pharmacist. The resolution of the discrepancy has been documented in the EPIC progress notes.

## 2020-09-07 ENCOUNTER — OUTPATIENT INFUSION SERVICES (OUTPATIENT)
Dept: ONCOLOGY | Facility: MEDICAL CENTER | Age: 55
End: 2020-09-07
Attending: INTERNAL MEDICINE
Payer: MEDICAID

## 2020-09-07 DIAGNOSIS — C18.9 METASTATIC COLON CANCER TO LIVER (HCC): ICD-10-CM

## 2020-09-07 DIAGNOSIS — C78.7 METASTATIC COLON CANCER TO LIVER (HCC): ICD-10-CM

## 2020-09-07 PROCEDURE — 96523 IRRIG DRUG DELIVERY DEVICE: CPT

## 2020-09-07 PROCEDURE — 700111 HCHG RX REV CODE 636 W/ 250 OVERRIDE (IP): Performed by: INTERNAL MEDICINE

## 2020-09-07 RX ORDER — HEPARIN SODIUM (PORCINE) LOCK FLUSH IV SOLN 100 UNIT/ML 100 UNIT/ML
500 SOLUTION INTRAVENOUS PRN
Status: DISCONTINUED | OUTPATIENT
Start: 2020-09-07 | End: 2020-09-07 | Stop reason: HOSPADM

## 2020-09-07 RX ADMIN — HEPARIN 500 UNITS: 100 SYRINGE at 09:38

## 2020-09-08 NOTE — PROGRESS NOTES
Returns for de access.  Reports started leaking Sunday morning so shut the pump off.  But pump still running on arrival.  Stored in double baggie and chemo spill bag.  Very wet.  Best estimate 1500 mg potentially received.  Dr Rodriguez on call and appraised of leak and unfinished med.  No retreatment presently.  Port saline and hep lock flushed before de access.  Line appears disconnected between pump and extension.  [facility] appraised.  DC to self care.

## 2020-09-17 ENCOUNTER — HOSPITAL ENCOUNTER (OUTPATIENT)
Dept: LAB | Facility: MEDICAL CENTER | Age: 55
End: 2020-09-17
Attending: INTERNAL MEDICINE
Payer: MEDICAID

## 2020-09-17 LAB
ALBUMIN SERPL BCP-MCNC: 3.8 G/DL (ref 3.2–4.9)
ALBUMIN/GLOB SERPL: 1.6 G/DL
ALP SERPL-CCNC: 180 U/L (ref 30–99)
ALT SERPL-CCNC: 33 U/L (ref 2–50)
ANION GAP SERPL CALC-SCNC: 14 MMOL/L (ref 7–16)
APPEARANCE UR: CLEAR
AST SERPL-CCNC: 24 U/L (ref 12–45)
BILIRUB SERPL-MCNC: 0.6 MG/DL (ref 0.1–1.5)
BILIRUB UR QL STRIP.AUTO: NEGATIVE
BUN SERPL-MCNC: 12 MG/DL (ref 8–22)
CALCIUM SERPL-MCNC: 9.2 MG/DL (ref 8.5–10.5)
CEA SERPL-MCNC: 33.7 NG/ML (ref 0–3)
CHLORIDE SERPL-SCNC: 104 MMOL/L (ref 96–112)
CO2 SERPL-SCNC: 23 MMOL/L (ref 20–33)
COLOR UR: YELLOW
CREAT SERPL-MCNC: 0.73 MG/DL (ref 0.5–1.4)
GLOBULIN SER CALC-MCNC: 2.4 G/DL (ref 1.9–3.5)
GLUCOSE SERPL-MCNC: 201 MG/DL (ref 65–99)
GLUCOSE UR STRIP.AUTO-MCNC: NEGATIVE MG/DL
KETONES UR STRIP.AUTO-MCNC: NEGATIVE MG/DL
LEUKOCYTE ESTERASE UR QL STRIP.AUTO: NEGATIVE
MAGNESIUM SERPL-MCNC: 2.2 MG/DL (ref 1.5–2.5)
MICRO URNS: NORMAL
NITRITE UR QL STRIP.AUTO: NEGATIVE
PH UR STRIP.AUTO: 6 [PH] (ref 5–8)
POTASSIUM SERPL-SCNC: 3.6 MMOL/L (ref 3.6–5.5)
PROT SERPL-MCNC: 6.2 G/DL (ref 6–8.2)
PROT UR QL STRIP: NEGATIVE MG/DL
RBC UR QL AUTO: NEGATIVE
SODIUM SERPL-SCNC: 141 MMOL/L (ref 135–145)
SP GR UR STRIP.AUTO: 1.02
UROBILINOGEN UR STRIP.AUTO-MCNC: 1 MG/DL

## 2020-09-17 PROCEDURE — 83735 ASSAY OF MAGNESIUM: CPT

## 2020-09-17 PROCEDURE — 82378 CARCINOEMBRYONIC ANTIGEN: CPT

## 2020-09-17 PROCEDURE — 81003 URINALYSIS AUTO W/O SCOPE: CPT

## 2020-09-17 PROCEDURE — 36415 COLL VENOUS BLD VENIPUNCTURE: CPT

## 2020-09-17 PROCEDURE — 85025 COMPLETE CBC W/AUTO DIFF WBC: CPT

## 2020-09-17 PROCEDURE — 80053 COMPREHEN METABOLIC PANEL: CPT

## 2020-09-18 LAB
BASOPHILS # BLD AUTO: 0.5 % (ref 0–1.8)
BASOPHILS # BLD: 0.03 K/UL (ref 0–0.12)
EOSINOPHIL # BLD AUTO: 0.12 K/UL (ref 0–0.51)
EOSINOPHIL NFR BLD: 2.1 % (ref 0–6.9)
ERYTHROCYTE [DISTWIDTH] IN BLOOD BY AUTOMATED COUNT: 55.8 FL (ref 35.9–50)
HCT VFR BLD AUTO: 35.8 % (ref 42–52)
HGB BLD-MCNC: 11.7 G/DL (ref 14–18)
IMM GRANULOCYTES # BLD AUTO: 0.02 K/UL (ref 0–0.11)
IMM GRANULOCYTES NFR BLD AUTO: 0.3 % (ref 0–0.9)
LYMPHOCYTES # BLD AUTO: 1.16 K/UL (ref 1–4.8)
LYMPHOCYTES NFR BLD: 20.1 % (ref 22–41)
MCH RBC QN AUTO: 30.2 PG (ref 27–33)
MCHC RBC AUTO-ENTMCNC: 32.7 G/DL (ref 33.7–35.3)
MCV RBC AUTO: 92.5 FL (ref 81.4–97.8)
MONOCYTES # BLD AUTO: 0.58 K/UL (ref 0–0.85)
MONOCYTES NFR BLD AUTO: 10.1 % (ref 0–13.4)
NEUTROPHILS # BLD AUTO: 3.85 K/UL (ref 1.82–7.42)
NEUTROPHILS NFR BLD: 66.9 % (ref 44–72)
NRBC # BLD AUTO: 0 K/UL
NRBC BLD-RTO: 0 /100 WBC
PLATELET # BLD AUTO: 143 K/UL (ref 164–446)
PMV BLD AUTO: 10.8 FL (ref 9–12.9)
RBC # BLD AUTO: 3.87 M/UL (ref 4.7–6.1)
WBC # BLD AUTO: 5.8 K/UL (ref 4.8–10.8)

## 2020-09-18 RX ORDER — 0.9 % SODIUM CHLORIDE 0.9 %
VIAL (ML) INJECTION PRN
Status: CANCELLED | OUTPATIENT
Start: 2020-09-19

## 2020-09-18 RX ORDER — METHYLPREDNISOLONE SODIUM SUCCINATE 125 MG/2ML
125 INJECTION, POWDER, LYOPHILIZED, FOR SOLUTION INTRAMUSCULAR; INTRAVENOUS PRN
Status: CANCELLED | OUTPATIENT
Start: 2020-09-19

## 2020-09-18 RX ORDER — PROCHLORPERAZINE MALEATE 10 MG
10 TABLET ORAL EVERY 6 HOURS PRN
Status: CANCELLED | OUTPATIENT
Start: 2020-09-19

## 2020-09-18 RX ORDER — ONDANSETRON 2 MG/ML
4 INJECTION INTRAMUSCULAR; INTRAVENOUS PRN
Status: CANCELLED | OUTPATIENT
Start: 2020-09-19

## 2020-09-18 RX ORDER — 0.9 % SODIUM CHLORIDE 0.9 %
VIAL (ML) INJECTION PRN
Status: CANCELLED | OUTPATIENT
Start: 2020-09-18

## 2020-09-18 RX ORDER — 0.9 % SODIUM CHLORIDE 0.9 %
3 VIAL (ML) INJECTION PRN
Status: CANCELLED | OUTPATIENT
Start: 2020-09-18

## 2020-09-18 RX ORDER — 0.9 % SODIUM CHLORIDE 0.9 %
10 VIAL (ML) INJECTION PRN
Status: CANCELLED | OUTPATIENT
Start: 2020-09-19

## 2020-09-18 RX ORDER — HEPARIN SODIUM (PORCINE) LOCK FLUSH IV SOLN 100 UNIT/ML 100 UNIT/ML
500 SOLUTION INTRAVENOUS PRN
Status: CANCELLED | OUTPATIENT
Start: 2020-09-18

## 2020-09-18 RX ORDER — 0.9 % SODIUM CHLORIDE 0.9 %
10 VIAL (ML) INJECTION PRN
Status: CANCELLED | OUTPATIENT
Start: 2020-09-18

## 2020-09-18 RX ORDER — ONDANSETRON 8 MG/1
8 TABLET, ORALLY DISINTEGRATING ORAL PRN
Status: CANCELLED | OUTPATIENT
Start: 2020-09-19

## 2020-09-18 RX ORDER — 0.9 % SODIUM CHLORIDE 0.9 %
20 VIAL (ML) INJECTION PRN
Status: CANCELLED | OUTPATIENT
Start: 2020-09-21

## 2020-09-18 RX ORDER — 0.9 % SODIUM CHLORIDE 0.9 %
3 VIAL (ML) INJECTION PRN
Status: CANCELLED | OUTPATIENT
Start: 2020-09-19

## 2020-09-18 RX ORDER — HEPARIN SODIUM (PORCINE) LOCK FLUSH IV SOLN 100 UNIT/ML 100 UNIT/ML
500 SOLUTION INTRAVENOUS PRN
Status: CANCELLED | OUTPATIENT
Start: 2020-09-19

## 2020-09-18 RX ORDER — SODIUM CHLORIDE 9 MG/ML
INJECTION, SOLUTION INTRAVENOUS CONTINUOUS
Status: CANCELLED
Start: 2020-09-19

## 2020-09-18 RX ORDER — HEPARIN SODIUM (PORCINE) LOCK FLUSH IV SOLN 100 UNIT/ML 100 UNIT/ML
500 SOLUTION INTRAVENOUS PRN
Status: CANCELLED | OUTPATIENT
Start: 2020-09-21

## 2020-09-18 RX ORDER — EPINEPHRINE 1 MG/ML(1)
0.5 AMPUL (ML) INJECTION PRN
Status: CANCELLED | OUTPATIENT
Start: 2020-09-19

## 2020-09-18 RX ORDER — DIPHENHYDRAMINE HYDROCHLORIDE 50 MG/ML
50 INJECTION INTRAMUSCULAR; INTRAVENOUS PRN
Status: CANCELLED | OUTPATIENT
Start: 2020-09-19

## 2020-09-19 ENCOUNTER — OUTPATIENT INFUSION SERVICES (OUTPATIENT)
Dept: ONCOLOGY | Facility: MEDICAL CENTER | Age: 55
End: 2020-09-19
Attending: INTERNAL MEDICINE
Payer: MEDICAID

## 2020-09-19 VITALS
DIASTOLIC BLOOD PRESSURE: 82 MMHG | HEIGHT: 73 IN | SYSTOLIC BLOOD PRESSURE: 134 MMHG | HEART RATE: 72 BPM | TEMPERATURE: 97.6 F | RESPIRATION RATE: 18 BRPM | OXYGEN SATURATION: 98 % | WEIGHT: 237.44 LBS | BODY MASS INDEX: 31.47 KG/M2

## 2020-09-19 DIAGNOSIS — C18.9 METASTATIC COLON CANCER TO LIVER (HCC): ICD-10-CM

## 2020-09-19 DIAGNOSIS — C78.7 METASTATIC COLON CANCER TO LIVER (HCC): ICD-10-CM

## 2020-09-19 PROCEDURE — 700105 HCHG RX REV CODE 258: Performed by: INTERNAL MEDICINE

## 2020-09-19 PROCEDURE — G0498 CHEMO EXTEND IV INFUS W/PUMP: HCPCS

## 2020-09-19 PROCEDURE — A4212 NON CORING NEEDLE OR STYLET: HCPCS

## 2020-09-19 PROCEDURE — 96375 TX/PRO/DX INJ NEW DRUG ADDON: CPT

## 2020-09-19 PROCEDURE — 700111 HCHG RX REV CODE 636 W/ 250 OVERRIDE (IP): Performed by: INTERNAL MEDICINE

## 2020-09-19 PROCEDURE — 96411 CHEMO IV PUSH ADDL DRUG: CPT

## 2020-09-19 PROCEDURE — 96417 CHEMO IV INFUS EACH ADDL SEQ: CPT

## 2020-09-19 PROCEDURE — 96413 CHEMO IV INFUSION 1 HR: CPT

## 2020-09-19 RX ORDER — HUMAN INSULIN 100 [IU]/ML
INJECTION, SOLUTION SUBCUTANEOUS
COMMUNITY
Start: 2020-09-01 | End: 2022-04-28

## 2020-09-19 RX ADMIN — TRASTUZUMAB 450 MG: 150 INJECTION, POWDER, LYOPHILIZED, FOR SOLUTION INTRAVENOUS at 11:41

## 2020-09-19 RX ADMIN — FLUOROURACIL 845 MG: 50 INJECTION, SOLUTION INTRAVENOUS at 13:16

## 2020-09-19 RX ADMIN — LEUCOVORIN CALCIUM 940 MG: 350 INJECTION, POWDER, LYOPHILIZED, FOR SOLUTION INTRAMUSCULAR; INTRAVENOUS at 12:27

## 2020-09-19 RX ADMIN — DIPHENHYDRAMINE HYDROCHLORIDE 50 MG: 50 INJECTION, SOLUTION INTRAMUSCULAR; INTRAVENOUS at 09:22

## 2020-09-19 RX ADMIN — DEXAMETHASONE SODIUM PHOSPHATE 20 MG: 4 INJECTION, SOLUTION INTRA-ARTICULAR; INTRALESIONAL; INTRAMUSCULAR; INTRAVENOUS; SOFT TISSUE at 09:52

## 2020-09-19 RX ADMIN — PANITUMUMAB 300 MG: 100 SOLUTION INTRAVENOUS at 10:15

## 2020-09-19 RX ADMIN — FLUOROURACIL 5075 MG: 50 INJECTION, SOLUTION INTRAVENOUS at 13:20

## 2020-09-19 RX ADMIN — ONDANSETRON HYDROCHLORIDE 16 MG: 2 SOLUTION INTRAMUSCULAR; INTRAVENOUS at 09:35

## 2020-09-19 ASSESSMENT — FIBROSIS 4 INDEX: FIB4 SCORE: 1.64

## 2020-09-19 NOTE — PROGRESS NOTES
Chemotherapy Verification - SECONDARY RN     D1C14    Height = 185 cm  Weight = 107.7 kg  BSA = 2.35 m2       Medication: Panitumumab (Vectibix)  Dose: 6 mg/kg  Calculated Dose: 323.1 mg - dose rounded to 300 mg per MAB protocol                                  Medication: Trastuzumab (Herceptin)  Dose: 4 mg/kg  Calculated Dose: 430.8 mg - dose rounded to 450 mg per MAB protocol                                 Medication: Fluorouracil (5FU) push Dose: 360 mg/m2  Calculated Dose: 846 mg                               Medication: Fluorouracil (5FU) CADD pump  Dose: 2160 mg/m2  Calculated Dose: 5076 mg over 46 hours                              I confirm that this process was performed independently.

## 2020-09-19 NOTE — PROGRESS NOTES
Pt ambulatory to [facility] for C14 D1 of Vectibix, Herceptin, and 5FU for colon cancer w/ liver mets.  Pt w/ no s/s of infection, pt has no complaints at this time.  Pt PORT accessed using sterile technique, brisk blood return noted, flushed per protocol.  Pt had labs drawn 9-17, verified results w/n parameters for tx today.  Pre-meds given prior to therapy.  Vectibix, Herceptin, and leucovorin infused w/ no adverse reactions.  PORT w/ brisk blood return post-infusions, flushed per protocol.  5FU bolus administered over 5 minutes by IVP.  PORT flushed per protocol.  5FU CADD pump connected to PORT, all clamps opened, confirmed to be in RUN mode, placed in pt's luis-pack w/ extra batteries.  Pt left in care of self in NAD.  Confirmed pt's next appt.

## 2020-09-19 NOTE — PROGRESS NOTES
Pharmacy Chemotherapy Verification Note:    Patient Name: Gurmeet Jo        Dx: Metastatic Colorectal CA (KRAS/NRAS wild type, and ERBB2 [a HER2 family receptor] amplification)        Protocol: mFOLFOX+Panitumumab +trastuzumab     Panitumumab 6 mg/kg IV over 60 minutes on Day 1    4/25/20 added to treatment plan dose reduced to 4.5 mg/kg for tolerance   7/8/20 Omitted starting C9   7/25/20 dose reduced to 3 mg/kg for tolerance   9/4/20 discontinued from treatment plan for Cycle 13   9/19/20 re-start at 3 mg/kg for tolerance  ~Followed by~  Trastuzumab 6 mg/kg IV over 90 minutes on Day 1 of Cycle 1, followed by  Trastuzumab 4 mg/kg IV over 30-60 minutes on Day 1 of Cycle 2    Confirmed: per Dr. Ross: Q2 week dosing   4/11/20 - Reload with 6 mg/kg per Dr. Chisholm  OXALIplatin 85 mg/m² IV over 2 hours on Day 1   - dose reduced to 68 mg/m² starting C1 for tolerance   3/3/20 - oxali removed from treatment plan d/t oxaliplatin allergy per Harman VARGAS.  Leucovorin 400 mg/m² IV over 2 hours on Day 1, to run concurrent with OXALIplatin, followed by  Fluorouracil 400 mg/m² IVP on Day 1, followed by   C1 dose reduced to 360 mg/m² for tolerance  Fluorouracil 2400 mg/m² CIVI over 46-48 hours   C1 dose reduced to 2160 mg/m² for tolerance    14-day cycle until disease progression or unacceptable toxicity  *Dosing Reference*  NCCN Guidelines for Colon Cancer. V.2.2019.  Rohith TAVERAS et al. J Clin Oncol. 2010;28(31):4697-705  Aura PATRICK et al. J Clin Oncol. 2008;28(12):2006-12  Luisa RODRIGEZ, et al. Br J Cancer. 2002;87(4):393-9    Herceptin has limited data in colorectal cancer, but two regimens exist neither at the same loading dose or interval as Dr. Ross has chosen. Per [facility] progress note: Patient has undergone further [employer] testing of his tumor. He was found to be KRAS/NRAS wild type, which suggest he has an EGFR mutation and would be a candidate for EGFR targeted therapy. He also had an ERBB2 amplification  "which might suggest response to treatments like Herceptin.    Allergies:  Compazine and Oxaliplatin     /82   Pulse 72   Temp 36.4 °C (97.6 °F) (Temporal)   Resp 18   Ht 1.85 m (6' 0.84\")   Wt 107.7 kg (237 lb 7 oz)   SpO2 98%   BMI 31.47 kg/m²  Body surface area is 2.35 meters squared.     Labs 9/19/20:  ANC~ 2840 Plt = 81k   Hgb = 12.5     SCr = 0.69 mg/dL CrCl >125 mL/min   LFT's = 24/45/158 TBili = 0.6     2/7/2020 ECHO: LVEF 60% (every 6 months)  9/4/20 ECHO: LVEF 60%     Drug Order   (Drug name, dose, route, IV Fluid & volume, frequency, number of doses) Cycle 14  Previous treatment: 9/5/2020      Medication = Panitumumab (Vectibix)  Base Dose = 3 mg/kg   Calc Dose: Base Dose x 107.7 kg = 323.1 mg  Final Dose = 300 mg  Route = IV  Fluid & Volume =  mL  Admin Duration = Over 60 minutes          Rounded to nearest vial size (within 10%) per rounding protocol, okay to treat with final dose     Medication = Trastuzumab (Herceptin)  Base Dose = 4 mg/kg  Calc Dose: Base Dose x 107.7 kg = 430.8 mg  Final Dose = 450 mg  Route = IV  Fluid & Volume =  mL  Admin Duration = Over 30 minutes          Rounded to nearest vial size (within 10%) per rounding protocol, okay to treat with final dose     Medication = Leucovorin (Wellcovorin)  Base Dose = 400 mg/m²  Calc Dose: Base Dose x 2.35 m² = 940 mg  Final Dose = 940 mg  Route = IV  Fluid & Volume = D5W 250 mL  Admin Duration = Over 30 min          <10% difference, okay to treat with final dose   Medication = Fluorouracil (5-FU)  Base Dose = 360 mg/m²  Calc Dose: Base Dose x 2.35 m² = 846 mg  Final Dose = 845 mg  Route = IVP  Fluid & Volume = 16.9 mL in syringe  Conc = 50 mg/mL  Admin Duration = Over 5 minutes          <10% difference, okay to treat with final dose   Medication = Fluorouracil (5-FU)  Base Dose = 2160 mg/m²  Calc Dose:Base Dose x 2.35 m² = 5076 mg  Final Dose = 5075 mg  Route = CIVI   Fluid & Volume = 101.5 mL (+3 mL overfill = 104.5 " mL)  Admin Duration = Over 46 hours to run at   2.2 mL/hour    Via CADD pump for home infusion      <10% difference, okay to treat with final dose

## 2020-09-19 NOTE — PROGRESS NOTES
Chemotherapy Verification - PRIMARY RN  C14 D1      Height = 1.85 m  Weight = 107.7 kg  BSA = 2.35 m2       Medication: panitumumab  Dose: 3 mg/kg  Calculated Dose: 323.1 mg                             (In mg/m2, AUC, mg/kg)     Medication: trastuzumab  Dose: 4 mg/kg  Calculated Dose: 430.8 mg                             (In mg/m2, AUC, mg/kg)    Medication: fluorouracil bolus  Dose: 360 mg/m2  Calculated Dose: 846 mg                             (In mg/m2, AUC, mg/kg)    Medication: fluorouracil  Dose: 2160 mg/m2  Calculated Dose: 5076 mg                             (In mg/m2, AUC, mg/kg)      I confirm this process was performed independently with the BSA and all final chemotherapy dosing calculations congruent.  Any discrepancies of 10% or greater have been addressed with the chemotherapy pharmacist. The resolution of the discrepancy has been documented in the EPIC progress notes.

## 2020-09-19 NOTE — PROGRESS NOTES
"Pharmacy Chemotherapy Calculations:     Dx: Metastatic colorectal cancer [KRAS/NRAS wild type, ERBB2 (HER2) amplification]    Cycle 14  Previous treatment = 9/5/20    Regimen: mFOLFOX + Vectibix + Herceptin  *Dosing Reference*  Trastuzumab 6 mg/kg IV loading dose C1D1, then 4 mg/kg IV Day 1 of subsequent  cycles [confirmed \"every 2 weeks\", per MD; progress note: \"He also had an ERBB2  amplification which might suggest response to treatments like Herceptin\"]   > 7/8/20: Herceptin 2.5 weeks overdue.  Do not reload Herceptin per [facility] Dr. Ross.   IV Cycle 1 Day 1, then 250 mg/m2 IV Days 1 and 8 (confirmed  \"weekly\" per MD)   > C4 * * * cetuximab has been omitted * * *   > C5 * * * cetuximab replaced with panitumumab * * *      > Cody LEO reduced dose by 20% to  starting with C1D1 due to anticipated tolerance    > 3/14/20: Oxaliplatin discontinued from treatment     + added; change of therapy d/t cetuximab skin tox   > 4/25/20, initial dose reduction to 4.5 mg/kg d/t previous EGFR cutaneous toxicities    > C9 * * * Vectibix has been omitted * * *   > C10 Vectibix reordered at 3 mg/kg per MD Cody    > C13 * * * Vectibix has been omitted * * *   > C14 Vectibix reordered at 3 mg/kg per MD Cody  Leucovorin 400 mg/m2 IV Day 1 followed by  Fluorouracil 400 mg/m2 IV Day 1 followed by   C1D1 reduced dose by 10% to 360 mg/m2 per MD Cody for anticipated tolerance   Fluorouracil  CIVI over 46-48 hours    C1D1 reduced dose by 10% to 2160 mg/m2 per MD Cody for anticipated tolerance   14-day cycle until DP or UT  NCCN guidelines for colon cancer V.2.2019  (cetuximab + chemo) [place] AP et al, OBEY 2017;317(23):6117-3759  (HER2 tx in HER2+ CRC) Kayleen RINALDI et al. Lancet Oncol. 2019 Apr;20(4):518-530.    Allergies: Patient has no known allergies.  /82   Pulse 72   Temp 36.4 °C (97.6 °F) (Temporal)   Resp 18   Ht 1.85 m (6' 0.84\")   Wt 107.7 kg (237 lb 7 oz)   SpO2 98%   BMI 31.47 kg/m²  Body surface area " is 2.35 meters squared.     ECHO:  9/4/20 LVEF ~ 60% OK for 6 months per Cody LEO order  2/7/20 LVEF ~ 60%     All labs (9/17/20) within treatment plan parameters.      Panitumumab (Vectibix) 3 mg/kg x 107.7 kg = 323.1 mg   Rounded down to vial size (within 10%) per dose rounding protocol = 300 mg IV    Trastuzumab (Herceptin) 4 mg/kg x 107.7 kg = 430.8 mg   Rounded to vial size (within 10%) per dose rounding protocol = 450 mg IV    Leucovorin 400 mg/m² x 2.35 m² = 940 mg   <10% difference, okay to treat with final dose = 940 mg IV over 30 minutes OK    Fluorouracil (Adrucil) 360 mg/m² x 2.35 m² = 846 mg   <10% difference, okay to treat with final dose = 845 mg IV    Fluorouracil (Adrucil) 2160 mg/m² x 2.35 m² = 5076 mg   <10% difference, okay to treat with final dose = 5078 mg CIVI    To be given over 46 hours @ 2.2 ml/hr via CADD pump      Aura Guzman, PharmD

## 2020-09-21 ENCOUNTER — OUTPATIENT INFUSION SERVICES (OUTPATIENT)
Dept: ONCOLOGY | Facility: MEDICAL CENTER | Age: 55
End: 2020-09-21
Attending: INTERNAL MEDICINE
Payer: MEDICAID

## 2020-09-21 VITALS
HEART RATE: 79 BPM | DIASTOLIC BLOOD PRESSURE: 78 MMHG | SYSTOLIC BLOOD PRESSURE: 126 MMHG | TEMPERATURE: 97.8 F | WEIGHT: 240.52 LBS | RESPIRATION RATE: 18 BRPM | HEIGHT: 73 IN | BODY MASS INDEX: 31.88 KG/M2 | OXYGEN SATURATION: 97 %

## 2020-09-21 DIAGNOSIS — C78.7 METASTATIC COLON CANCER TO LIVER (HCC): ICD-10-CM

## 2020-09-21 DIAGNOSIS — C18.9 METASTATIC COLON CANCER TO LIVER (HCC): ICD-10-CM

## 2020-09-21 PROCEDURE — 96523 IRRIG DRUG DELIVERY DEVICE: CPT

## 2020-09-21 PROCEDURE — 700111 HCHG RX REV CODE 636 W/ 250 OVERRIDE (IP)

## 2020-09-21 RX ORDER — HEPARIN SODIUM (PORCINE) LOCK FLUSH IV SOLN 100 UNIT/ML 100 UNIT/ML
500 SOLUTION INTRAVENOUS PRN
Status: DISCONTINUED | OUTPATIENT
Start: 2020-09-21 | End: 2020-09-21 | Stop reason: HOSPADM

## 2020-09-21 RX ORDER — HEPARIN SODIUM (PORCINE) LOCK FLUSH IV SOLN 100 UNIT/ML 100 UNIT/ML
SOLUTION INTRAVENOUS
Status: COMPLETED
Start: 2020-09-21 | End: 2020-09-21

## 2020-09-21 RX ADMIN — HEPARIN SODIUM (PORCINE) LOCK FLUSH IV SOLN 100 UNIT/ML 500 UNITS: 100 SOLUTION at 13:42

## 2020-09-21 RX ADMIN — HEPARIN 500 UNITS: 100 SYRINGE at 13:42

## 2020-09-21 ASSESSMENT — FIBROSIS 4 INDEX: FIB4 SCORE: 1.64

## 2020-09-21 NOTE — PROGRESS NOTES
Pt to infusion services ambulatory per self.  Pt here for scheduled CADD pump de-access and port flush.  Plan of care reviewed.  Pt verbalizes understanding.  Pt reports no issues or concerns with pump.  Pump settings observed.  Reservoir volume = 0.0 mL and amount given = 104.4 mL.  Pump tubing clamped, pump turned off, and tubing disconnected from patient.  Port flushed with normal saline.  Port flushes easily; + brisk blood return verified.  Port flushed with normal saline and heparin per policy.  Port de-accessed; needle intact.  Pressure dressing applied.  Pt left infusion services in no apparent distress after completion of treatment, ambulatory.  Pt requests to move next appointment to Monday, 10/05/20, due to family event.  Next appointment scheduled per pt request.

## 2020-09-22 NOTE — TELEPHONE ENCOUNTER
Was the patient seen in the last year in this department? Yes    Does patient have an active prescription for medications requested? Yes    Received Request Via: Pharmacy    Hospital Outpatient Visit on 09/17/2020   Component Date Value   • Color 09/17/2020 Yellow    • Character 09/17/2020 Clear    • Specific Gravity 09/17/2020 1.020    • Ph 09/17/2020 6.0    • Glucose 09/17/2020 Negative    • Ketones 09/17/2020 Negative    • Protein 09/17/2020 Negative    • Bilirubin 09/17/2020 Negative    • Urobilinogen, Urine 09/17/2020 1.0    • Nitrite 09/17/2020 Negative    • Leukocyte Esterase 09/17/2020 Negative    • Occult Blood 09/17/2020 Negative    • Micro Urine Req 09/17/2020 see below    • WBC 09/17/2020 5.8    • RBC 09/17/2020 3.87*   • Hemoglobin 09/17/2020 11.7*   • Hematocrit 09/17/2020 35.8*   • MCV 09/17/2020 92.5    • MCH 09/17/2020 30.2    • MCHC 09/17/2020 32.7*   • RDW 09/17/2020 55.8*   • Platelet Count 09/17/2020 143*   • MPV 09/17/2020 10.8    • Neutrophils-Polys 09/17/2020 66.90    • Lymphocytes 09/17/2020 20.10*   • Monocytes 09/17/2020 10.10    • Eosinophils 09/17/2020 2.10    • Basophils 09/17/2020 0.50    • Immature Granulocytes 09/17/2020 0.30    • Nucleated RBC 09/17/2020 0.00    • Neutrophils (Absolute) 09/17/2020 3.85    • Lymphs (Absolute) 09/17/2020 1.16    • Monos (Absolute) 09/17/2020 0.58    • Eos (Absolute) 09/17/2020 0.12    • Baso (Absolute) 09/17/2020 0.03    • Immature Granulocytes (a* 09/17/2020 0.02    • NRBC (Absolute) 09/17/2020 0.00    • Magnesium 09/17/2020 2.2    • Carcinoembryonic Antigen 09/17/2020 33.7*   • Sodium 09/17/2020 141    • Potassium 09/17/2020 3.6    • Chloride 09/17/2020 104    • Co2 09/17/2020 23    • Anion Gap 09/17/2020 14.0    • Glucose 09/17/2020 201*   • Bun 09/17/2020 12    • Creatinine 09/17/2020 0.73    • Calcium 09/17/2020 9.2    • AST(SGOT) 09/17/2020 24    • ALT(SGPT) 09/17/2020 33    • Alkaline Phosphatase 09/17/2020 180*   • Total Bilirubin 09/17/2020  0.6    • Albumin 09/17/2020 3.8    • Total Protein 09/17/2020 6.2    • Globulin 09/17/2020 2.4    • A-G Ratio 09/17/2020 1.6    • GFR If  09/17/2020 >60    • GFR If Non  [facility]* 09/17/2020 >60    Hospital Outpatient Visit on 09/04/2020   Component Date Value   • Eject.Frac. MOD BP 09/04/2020 61.5    • Eject.Frac. MOD 4C 09/04/2020 59.83    • Eject.Frac. MOD 2C 09/04/2020 62.47    • Left Ventrical Ejection * 09/04/2020 60    Hospital Outpatient Visit on 09/04/2020   Component Date Value   • Color 09/04/2020 Yellow    • Character 09/04/2020 Clear    • Specific Gravity 09/04/2020 1.027    • Ph 09/04/2020 5.5    • Glucose 09/04/2020 Negative    • Ketones 09/04/2020 Trace*   • Protein 09/04/2020 Negative    • Bilirubin 09/04/2020 Negative    • Urobilinogen, Urine 09/04/2020 1.0    • Nitrite 09/04/2020 Negative    • Leukocyte Esterase 09/04/2020 Negative    • Occult Blood 09/04/2020 Negative    • Micro Urine Req 09/04/2020 see below    • WBC 09/04/2020 4.5*   • RBC 09/04/2020 4.16*   • Hemoglobin 09/04/2020 12.5*   • Hematocrit 09/04/2020 38.7*   • MCV 09/04/2020 93.0    • MCH 09/04/2020 30.0    • MCHC 09/04/2020 32.3*   • RDW 09/04/2020 54.2*   • Platelet Count 09/04/2020 81*   • MPV 09/04/2020 10.9    • Neutrophils-Polys 09/04/2020 63.40    • Lymphocytes 09/04/2020 19.90*   • Monocytes 09/04/2020 11.60    • Eosinophils 09/04/2020 4.00    • Basophils 09/04/2020 0.70    • Immature Granulocytes 09/04/2020 0.40    • Nucleated RBC 09/04/2020 0.00    • Neutrophils (Absolute) 09/04/2020 2.84    • Lymphs (Absolute) 09/04/2020 0.89*   • Monos (Absolute) 09/04/2020 0.52    • Eos (Absolute) 09/04/2020 0.18    • Baso (Absolute) 09/04/2020 0.03    • Immature Granulocytes (a* 09/04/2020 0.02    • NRBC (Absolute) 09/04/2020 0.00    • Magnesium 09/04/2020 1.9    • Carcinoembryonic Antigen 09/04/2020 33.0*   • Sodium 09/04/2020 141    • Potassium 09/04/2020 3.8    • Chloride 09/04/2020 107    • Co2 09/04/2020 24     • Anion Gap 09/04/2020 10.0    • Glucose 09/04/2020 130*   • Bun 09/04/2020 13    • Creatinine 09/04/2020 0.69    • Calcium 09/04/2020 9.3    • AST(SGOT) 09/04/2020 24    • ALT(SGPT) 09/04/2020 45    • Alkaline Phosphatase 09/04/2020 158*   • Total Bilirubin 09/04/2020 0.6    • Albumin 09/04/2020 3.9    • Total Protein 09/04/2020 6.1    • Globulin 09/04/2020 2.2    • A-G Ratio 09/04/2020 1.8    • GFR If  09/04/2020 >60    • GFR If Non  [facility]* 09/04/2020 >60    Hospital Outpatient Visit on 08/21/2020   Component Date Value   • Color 08/21/2020 Yellow    • Character 08/21/2020 Clear    • Specific Gravity 08/21/2020 1.012    • Ph 08/21/2020 7.5    • Glucose 08/21/2020 Negative    • Ketones 08/21/2020 Negative    • Protein 08/21/2020 Negative    • Bilirubin 08/21/2020 Negative    • Urobilinogen, Urine 08/21/2020 1.0    • Nitrite 08/21/2020 Negative    • Leukocyte Esterase 08/21/2020 Negative    • Occult Blood 08/21/2020 Negative    • Micro Urine Req 08/21/2020 see below    • WBC 08/21/2020 5.9    • RBC 08/21/2020 4.20*   • Hemoglobin 08/21/2020 12.6*   • Hematocrit 08/21/2020 38.9*   • MCV 08/21/2020 92.6    • MCH 08/21/2020 30.0    • MCHC 08/21/2020 32.4*   • RDW 08/21/2020 53.0*   • Platelet Count 08/21/2020 101*   • MPV 08/21/2020 10.6    • Neutrophils-Polys 08/21/2020 66.90    • Lymphocytes 08/21/2020 18.20*   • Monocytes 08/21/2020 11.70    • Eosinophils 08/21/2020 2.20    • Basophils 08/21/2020 0.70    • Immature Granulocytes 08/21/2020 0.30    • Nucleated RBC 08/21/2020 0.00    • Neutrophils (Absolute) 08/21/2020 3.96    • Lymphs (Absolute) 08/21/2020 1.08    • Monos (Absolute) 08/21/2020 0.69    • Eos (Absolute) 08/21/2020 0.13    • Baso (Absolute) 08/21/2020 0.04    • Immature Granulocytes (a* 08/21/2020 0.02    • NRBC (Absolute) 08/21/2020 0.00    • Magnesium 08/21/2020 1.9    • Carcinoembryonic Antigen 08/21/2020 46.2*   • Sodium 08/21/2020 141    • Potassium 08/21/2020 3.8    •  Chloride 08/21/2020 103    • Co2 08/21/2020 27    • Anion Gap 08/21/2020 11.0    • Glucose 08/21/2020 110*   • Bun 08/21/2020 8    • Creatinine 08/21/2020 0.61    • Calcium 08/21/2020 9.6    • AST(SGOT) 08/21/2020 35    • ALT(SGPT) 08/21/2020 45    • Alkaline Phosphatase 08/21/2020 148*   • Total Bilirubin 08/21/2020 1.0    • Albumin 08/21/2020 4.0    • Total Protein 08/21/2020 6.4    • Globulin 08/21/2020 2.4    • A-G Ratio 08/21/2020 1.7    • GFR If  08/21/2020 >60    • GFR If Non  [facility]* 08/21/2020 >60    Outpatient Infusion Services on 08/08/2020   Component Date Value   • Magnesium 08/07/2020 1.9    • WBC 08/08/2020 4.3*   • RBC 08/08/2020 4.01*   • Hemoglobin 08/08/2020 12.0*   • Hematocrit 08/08/2020 37.4*   • MCV 08/08/2020 93.3    • MCH 08/08/2020 29.9    • MCHC 08/08/2020 32.1*   • RDW 08/08/2020 51.1*   • Platelet Count 08/08/2020 74*   • MPV 08/08/2020 10.1    • Neutrophils-Polys 08/08/2020 68.50    • Lymphocytes 08/08/2020 19.10*   • Monocytes 08/08/2020 8.60    • Eosinophils 08/08/2020 2.60    • Basophils 08/08/2020 0.70    • Immature Granulocytes 08/08/2020 0.50    • Nucleated RBC 08/08/2020 0.00    • Neutrophils (Absolute) 08/08/2020 2.95    • Lymphs (Absolute) 08/08/2020 0.82*   • Monos (Absolute) 08/08/2020 0.37    • Eos (Absolute) 08/08/2020 0.11    • Baso (Absolute) 08/08/2020 0.03    • Immature Granulocytes (a* 08/08/2020 0.02    • NRBC (Absolute) 08/08/2020 0.00    Hospital Outpatient Visit on 08/07/2020   Component Date Value   • Color 08/07/2020 Yellow    • Character 08/07/2020 Cloudy*   • Specific Gravity 08/07/2020 1.014    • Ph 08/07/2020 5.5    • Glucose 08/07/2020 Negative    • Ketones 08/07/2020 Negative    • Protein 08/07/2020 Negative    • Bilirubin 08/07/2020 Negative    • Urobilinogen, Urine 08/07/2020 0.2    • Nitrite 08/07/2020 Negative    • Leukocyte Esterase 08/07/2020 Negative    • Occult Blood 08/07/2020 Negative    • Micro Urine Req 08/07/2020  Microscopic    • WBC 08/07/2020 0-2*   • RBC 08/07/2020 0-2*   • Bacteria 08/07/2020 Negative    • Epithelial Cells 08/07/2020 Negative    • Amorphous Crystal 08/07/2020 Present    • Hyaline Cast 08/07/2020 0-2    Hospital Outpatient Visit on 08/07/2020   Component Date Value   • Glycohemoglobin 08/07/2020 7.3*   • Est Avg Glucose 08/07/2020 163    • Carcinoembryonic Antigen 08/07/2020 86.0*   • Sodium 08/07/2020 140    • Potassium 08/07/2020 3.6    • Chloride 08/07/2020 104    • Co2 08/07/2020 25    • Anion Gap 08/07/2020 11.0    • Glucose 08/07/2020 130*   • Bun 08/07/2020 9    • Creatinine 08/07/2020 0.61    • Calcium 08/07/2020 9.3    • AST(SGOT) 08/07/2020 32    • ALT(SGPT) 08/07/2020 41    • Alkaline Phosphatase 08/07/2020 171*   • Total Bilirubin 08/07/2020 0.7    • Albumin 08/07/2020 4.0    • Total Protein 08/07/2020 6.3    • Globulin 08/07/2020 2.3    • A-G Ratio 08/07/2020 1.7    • Fasting Status 08/07/2020 Non-Fasting    • GFR If  08/07/2020 >60    • GFR If Non  [facility]* 08/07/2020 >60    Hospital Outpatient Visit on 07/23/2020   Component Date Value   • Color 07/23/2020 Yellow    • Character 07/23/2020 Clear    • Specific Gravity 07/23/2020 1.020    • Ph 07/23/2020 6.0    • Glucose 07/23/2020 Negative    • Ketones 07/23/2020 Negative    • Protein 07/23/2020 Negative    • Bilirubin 07/23/2020 Negative    • Urobilinogen, Urine 07/23/2020 0.2    • Nitrite 07/23/2020 Negative    • Leukocyte Esterase 07/23/2020 Negative    • Occult Blood 07/23/2020 Negative    • Micro Urine Req 07/23/2020 see below    • WBC 07/23/2020 6.8    • RBC 07/23/2020 4.21*   • Hemoglobin 07/23/2020 12.7*   • Hematocrit 07/23/2020 39.8*   • MCV 07/23/2020 94.5    • MCH 07/23/2020 30.2    • MCHC 07/23/2020 31.9*   • RDW 07/23/2020 50.0    • Platelet Count 07/23/2020 118*   • MPV 07/23/2020 10.4    • Neutrophils-Polys 07/23/2020 63.30    • Lymphocytes 07/23/2020 18.20*   • Monocytes 07/23/2020 11.40    • Eosinophils  07/23/2020 6.10    • Basophils 07/23/2020 0.90    • Immature Granulocytes 07/23/2020 0.10    • Nucleated RBC 07/23/2020 0.00    • Neutrophils (Absolute) 07/23/2020 4.28    • Lymphs (Absolute) 07/23/2020 1.23    • Monos (Absolute) 07/23/2020 0.77    • Eos (Absolute) 07/23/2020 0.41    • Baso (Absolute) 07/23/2020 0.06    • Immature Granulocytes (a* 07/23/2020 0.01    • NRBC (Absolute) 07/23/2020 0.00    • Magnesium 07/23/2020 2.0    • Carcinoembryonic Antigen 07/23/2020 114.0*   • Sodium 07/23/2020 137    • Potassium 07/23/2020 3.6    • Chloride 07/23/2020 102    • Co2 07/23/2020 23    • Anion Gap 07/23/2020 12.0    • Glucose 07/23/2020 126*   • Bun 07/23/2020 11    • Creatinine 07/23/2020 0.56    • Calcium 07/23/2020 9.5    • AST(SGOT) 07/23/2020 29    • ALT(SGPT) 07/23/2020 33    • Alkaline Phosphatase 07/23/2020 218*   • Total Bilirubin 07/23/2020 1.3    • Albumin 07/23/2020 4.2    • Total Protein 07/23/2020 6.6    • Globulin 07/23/2020 2.4    • A-G Ratio 07/23/2020 1.8    • GFR If  07/23/2020 >60    • GFR If Non  [facility]* 07/23/2020 >60    Hospital Outpatient Visit on 07/06/2020   Component Date Value   • Color 07/06/2020 Yellow    • Character 07/06/2020 Clear    • Specific Gravity 07/06/2020 1.010    • Ph 07/06/2020 7.0    • Glucose 07/06/2020 Negative    • Ketones 07/06/2020 Negative    • Protein 07/06/2020 Negative    • Bilirubin 07/06/2020 Negative    • Urobilinogen, Urine 07/06/2020 0.2    • Nitrite 07/06/2020 Negative    • Leukocyte Esterase 07/06/2020 Negative    • Occult Blood 07/06/2020 Negative    • Micro Urine Req 07/06/2020 see below    • WBC 07/06/2020 6.9    • RBC 07/06/2020 4.33*   • Hemoglobin 07/06/2020 13.1*   • Hematocrit 07/06/2020 40.6*   • MCV 07/06/2020 93.8    • MCH 07/06/2020 30.3    • MCHC 07/06/2020 32.3*   • RDW 07/06/2020 48.2    • Platelet Count 07/06/2020 131*   • MPV 07/06/2020 10.1    • Neutrophils-Polys 07/06/2020 59.90    • Lymphocytes 07/06/2020 17.50*   •  Monocytes 07/06/2020 9.80    • Eosinophils 07/06/2020 11.10*   • Basophils 07/06/2020 1.30    • Immature Granulocytes 07/06/2020 0.40    • Nucleated RBC 07/06/2020 0.00    • Neutrophils (Absolute) 07/06/2020 4.15    • Lymphs (Absolute) 07/06/2020 1.21    • Monos (Absolute) 07/06/2020 0.68    • Eos (Absolute) 07/06/2020 0.77*   • Baso (Absolute) 07/06/2020 0.09    • Immature Granulocytes (a* 07/06/2020 0.03    • NRBC (Absolute) 07/06/2020 0.00    • Magnesium 07/06/2020 1.9    • Carcinoembryonic Antigen 07/06/2020 93.6*   • Sodium 07/06/2020 141    • Potassium 07/06/2020 3.8    • Chloride 07/06/2020 106    • Co2 07/06/2020 22    • Anion Gap 07/06/2020 13.0    • Glucose 07/06/2020 120*   • Bun 07/06/2020 5*   • Creatinine 07/06/2020 0.57    • Calcium 07/06/2020 9.3    • AST(SGOT) 07/06/2020 32    • ALT(SGPT) 07/06/2020 28    • Alkaline Phosphatase 07/06/2020 232*   • Total Bilirubin 07/06/2020 0.8    • Albumin 07/06/2020 3.7    • Total Protein 07/06/2020 6.7    • Globulin 07/06/2020 3.0    • A-G Ratio 07/06/2020 1.2    • GFR If  07/06/2020 >60    • GFR If Non  [facility]* 07/06/2020 >60    Hospital Outpatient Visit on 07/06/2020   Component Date Value   • Glycohemoglobin 07/06/2020 8.9*   • Est Avg Glucose 07/06/2020 209    There may be more visits with results that are not included.   ]

## 2020-09-23 RX ORDER — INSULIN GLARGINE 100 [IU]/ML
20 INJECTION, SOLUTION SUBCUTANEOUS EVERY EVENING
Qty: 3 EACH | Refills: 0 | Status: SHIPPED | OUTPATIENT
Start: 2020-09-23 | End: 2020-12-08 | Stop reason: SDUPTHER

## 2020-10-01 RX ORDER — DIPHENHYDRAMINE HYDROCHLORIDE 50 MG/ML
50 INJECTION INTRAMUSCULAR; INTRAVENOUS PRN
Status: CANCELLED | OUTPATIENT
Start: 2020-10-03

## 2020-10-01 RX ORDER — ONDANSETRON 2 MG/ML
4 INJECTION INTRAMUSCULAR; INTRAVENOUS PRN
Status: CANCELLED | OUTPATIENT
Start: 2020-10-03

## 2020-10-01 RX ORDER — 0.9 % SODIUM CHLORIDE 0.9 %
3 VIAL (ML) INJECTION PRN
Status: CANCELLED | OUTPATIENT
Start: 2020-10-03

## 2020-10-01 RX ORDER — ONDANSETRON 8 MG/1
8 TABLET, ORALLY DISINTEGRATING ORAL PRN
Status: CANCELLED | OUTPATIENT
Start: 2020-10-03

## 2020-10-01 RX ORDER — 0.9 % SODIUM CHLORIDE 0.9 %
VIAL (ML) INJECTION PRN
Status: CANCELLED | OUTPATIENT
Start: 2020-10-02

## 2020-10-01 RX ORDER — 0.9 % SODIUM CHLORIDE 0.9 %
20 VIAL (ML) INJECTION PRN
Status: CANCELLED | OUTPATIENT
Start: 2020-10-07

## 2020-10-01 RX ORDER — 0.9 % SODIUM CHLORIDE 0.9 %
10 VIAL (ML) INJECTION PRN
Status: CANCELLED | OUTPATIENT
Start: 2020-10-02

## 2020-10-01 RX ORDER — HEPARIN SODIUM (PORCINE) LOCK FLUSH IV SOLN 100 UNIT/ML 100 UNIT/ML
500 SOLUTION INTRAVENOUS PRN
Status: CANCELLED | OUTPATIENT
Start: 2020-10-03

## 2020-10-01 RX ORDER — 0.9 % SODIUM CHLORIDE 0.9 %
10 VIAL (ML) INJECTION PRN
Status: CANCELLED | OUTPATIENT
Start: 2020-10-03

## 2020-10-01 RX ORDER — EPINEPHRINE 1 MG/ML(1)
0.5 AMPUL (ML) INJECTION PRN
Status: CANCELLED | OUTPATIENT
Start: 2020-10-03

## 2020-10-01 RX ORDER — HEPARIN SODIUM (PORCINE) LOCK FLUSH IV SOLN 100 UNIT/ML 100 UNIT/ML
500 SOLUTION INTRAVENOUS PRN
Status: CANCELLED | OUTPATIENT
Start: 2020-10-07

## 2020-10-01 RX ORDER — 0.9 % SODIUM CHLORIDE 0.9 %
3 VIAL (ML) INJECTION PRN
Status: CANCELLED | OUTPATIENT
Start: 2020-10-02

## 2020-10-01 RX ORDER — HEPARIN SODIUM (PORCINE) LOCK FLUSH IV SOLN 100 UNIT/ML 100 UNIT/ML
500 SOLUTION INTRAVENOUS PRN
Status: CANCELLED | OUTPATIENT
Start: 2020-10-02

## 2020-10-01 RX ORDER — SODIUM CHLORIDE 9 MG/ML
INJECTION, SOLUTION INTRAVENOUS CONTINUOUS
Status: CANCELLED
Start: 2020-10-03

## 2020-10-01 RX ORDER — METHYLPREDNISOLONE SODIUM SUCCINATE 125 MG/2ML
125 INJECTION, POWDER, LYOPHILIZED, FOR SOLUTION INTRAMUSCULAR; INTRAVENOUS PRN
Status: CANCELLED | OUTPATIENT
Start: 2020-10-03

## 2020-10-01 RX ORDER — 0.9 % SODIUM CHLORIDE 0.9 %
VIAL (ML) INJECTION PRN
Status: CANCELLED | OUTPATIENT
Start: 2020-10-03

## 2020-10-01 RX ORDER — PROCHLORPERAZINE MALEATE 10 MG
10 TABLET ORAL EVERY 6 HOURS PRN
Status: CANCELLED | OUTPATIENT
Start: 2020-10-03

## 2020-10-02 ENCOUNTER — HOSPITAL ENCOUNTER (OUTPATIENT)
Dept: LAB | Facility: MEDICAL CENTER | Age: 55
End: 2020-10-02
Attending: INTERNAL MEDICINE
Payer: MEDICAID

## 2020-10-02 DIAGNOSIS — L27.0 DRUG-INDUCED SKIN RASH: ICD-10-CM

## 2020-10-02 LAB
APPEARANCE UR: CLEAR
BASOPHILS # BLD AUTO: 0.8 % (ref 0–1.8)
BASOPHILS # BLD: 0.04 K/UL (ref 0–0.12)
BILIRUB UR QL STRIP.AUTO: NEGATIVE
COLOR UR: YELLOW
EOSINOPHIL # BLD AUTO: 0.14 K/UL (ref 0–0.51)
EOSINOPHIL NFR BLD: 2.9 % (ref 0–6.9)
ERYTHROCYTE [DISTWIDTH] IN BLOOD BY AUTOMATED COUNT: 54.3 FL (ref 35.9–50)
GLUCOSE UR STRIP.AUTO-MCNC: NEGATIVE MG/DL
HCT VFR BLD AUTO: 40.3 % (ref 42–52)
HGB BLD-MCNC: 13 G/DL (ref 14–18)
IMM GRANULOCYTES # BLD AUTO: 0.01 K/UL (ref 0–0.11)
IMM GRANULOCYTES NFR BLD AUTO: 0.2 % (ref 0–0.9)
KETONES UR STRIP.AUTO-MCNC: NEGATIVE MG/DL
LEUKOCYTE ESTERASE UR QL STRIP.AUTO: NEGATIVE
LYMPHOCYTES # BLD AUTO: 1.05 K/UL (ref 1–4.8)
LYMPHOCYTES NFR BLD: 21.6 % (ref 22–41)
MCH RBC QN AUTO: 30 PG (ref 27–33)
MCHC RBC AUTO-ENTMCNC: 32.3 G/DL (ref 33.7–35.3)
MCV RBC AUTO: 92.9 FL (ref 81.4–97.8)
MICRO URNS: NORMAL
MONOCYTES # BLD AUTO: 0.44 K/UL (ref 0–0.85)
MONOCYTES NFR BLD AUTO: 9.1 % (ref 0–13.4)
NEUTROPHILS # BLD AUTO: 3.17 K/UL (ref 1.82–7.42)
NEUTROPHILS NFR BLD: 65.4 % (ref 44–72)
NITRITE UR QL STRIP.AUTO: NEGATIVE
NRBC # BLD AUTO: 0 K/UL
NRBC BLD-RTO: 0 /100 WBC
PH UR STRIP.AUTO: 6 [PH] (ref 5–8)
PLATELET # BLD AUTO: 101 K/UL (ref 164–446)
PMV BLD AUTO: 10.6 FL (ref 9–12.9)
PROT UR QL STRIP: NEGATIVE MG/DL
RBC # BLD AUTO: 4.34 M/UL (ref 4.7–6.1)
RBC UR QL AUTO: NEGATIVE
SP GR UR STRIP.AUTO: 1.02
UROBILINOGEN UR STRIP.AUTO-MCNC: 1 MG/DL
WBC # BLD AUTO: 4.9 K/UL (ref 4.8–10.8)

## 2020-10-02 PROCEDURE — 36415 COLL VENOUS BLD VENIPUNCTURE: CPT

## 2020-10-02 PROCEDURE — 80053 COMPREHEN METABOLIC PANEL: CPT

## 2020-10-02 PROCEDURE — 85025 COMPLETE CBC W/AUTO DIFF WBC: CPT

## 2020-10-02 PROCEDURE — 82378 CARCINOEMBRYONIC ANTIGEN: CPT

## 2020-10-02 PROCEDURE — 81003 URINALYSIS AUTO W/O SCOPE: CPT

## 2020-10-02 PROCEDURE — 83735 ASSAY OF MAGNESIUM: CPT

## 2020-10-02 RX ORDER — HYDROXYZINE PAMOATE 50 MG/1
50 CAPSULE ORAL 3 TIMES DAILY PRN
Qty: 90 CAP | Refills: 0 | Status: SHIPPED | OUTPATIENT
Start: 2020-10-02 | End: 2020-11-30 | Stop reason: SDUPTHER

## 2020-10-03 LAB
ALBUMIN SERPL BCP-MCNC: 3.9 G/DL (ref 3.2–4.9)
ALBUMIN/GLOB SERPL: 1.6 G/DL
ALP SERPL-CCNC: 186 U/L (ref 30–99)
ALT SERPL-CCNC: 33 U/L (ref 2–50)
ANION GAP SERPL CALC-SCNC: 12 MMOL/L (ref 7–16)
AST SERPL-CCNC: 27 U/L (ref 12–45)
BILIRUB SERPL-MCNC: 0.9 MG/DL (ref 0.1–1.5)
BUN SERPL-MCNC: 12 MG/DL (ref 8–22)
CALCIUM SERPL-MCNC: 9.3 MG/DL (ref 8.5–10.5)
CEA SERPL-MCNC: 35.4 NG/ML (ref 0–3)
CHLORIDE SERPL-SCNC: 103 MMOL/L (ref 96–112)
CO2 SERPL-SCNC: 25 MMOL/L (ref 20–33)
CREAT SERPL-MCNC: 0.76 MG/DL (ref 0.5–1.4)
GLOBULIN SER CALC-MCNC: 2.4 G/DL (ref 1.9–3.5)
GLUCOSE SERPL-MCNC: 134 MG/DL (ref 65–99)
MAGNESIUM SERPL-MCNC: 2.1 MG/DL (ref 1.5–2.5)
POTASSIUM SERPL-SCNC: 4 MMOL/L (ref 3.6–5.5)
PROT SERPL-MCNC: 6.3 G/DL (ref 6–8.2)
SODIUM SERPL-SCNC: 140 MMOL/L (ref 135–145)

## 2020-10-05 ENCOUNTER — OUTPATIENT INFUSION SERVICES (OUTPATIENT)
Dept: ONCOLOGY | Facility: MEDICAL CENTER | Age: 55
End: 2020-10-05
Attending: INTERNAL MEDICINE
Payer: MEDICAID

## 2020-10-05 VITALS
HEIGHT: 72 IN | SYSTOLIC BLOOD PRESSURE: 134 MMHG | RESPIRATION RATE: 18 BRPM | DIASTOLIC BLOOD PRESSURE: 76 MMHG | WEIGHT: 236.11 LBS | OXYGEN SATURATION: 98 % | BODY MASS INDEX: 31.98 KG/M2 | HEART RATE: 80 BPM | TEMPERATURE: 97.3 F

## 2020-10-05 DIAGNOSIS — C78.7 METASTATIC COLON CANCER TO LIVER (HCC): ICD-10-CM

## 2020-10-05 DIAGNOSIS — C18.9 METASTATIC COLON CANCER TO LIVER (HCC): ICD-10-CM

## 2020-10-05 PROCEDURE — 96413 CHEMO IV INFUSION 1 HR: CPT

## 2020-10-05 PROCEDURE — 96375 TX/PRO/DX INJ NEW DRUG ADDON: CPT

## 2020-10-05 PROCEDURE — 96417 CHEMO IV INFUS EACH ADDL SEQ: CPT

## 2020-10-05 PROCEDURE — 96411 CHEMO IV PUSH ADDL DRUG: CPT

## 2020-10-05 PROCEDURE — 700111 HCHG RX REV CODE 636 W/ 250 OVERRIDE (IP): Performed by: INTERNAL MEDICINE

## 2020-10-05 PROCEDURE — 700105 HCHG RX REV CODE 258: Performed by: INTERNAL MEDICINE

## 2020-10-05 PROCEDURE — A4212 NON CORING NEEDLE OR STYLET: HCPCS

## 2020-10-05 PROCEDURE — G0498 CHEMO EXTEND IV INFUS W/PUMP: HCPCS

## 2020-10-05 RX ADMIN — ONDANSETRON 16 MG: 2 INJECTION INTRAMUSCULAR; INTRAVENOUS at 14:07

## 2020-10-05 RX ADMIN — FLUOROURACIL 5055 MG: 50 INJECTION, SOLUTION INTRAVENOUS at 18:02

## 2020-10-05 RX ADMIN — DEXAMETHASONE SODIUM PHOSPHATE 20 MG: 4 INJECTION, SOLUTION INTRA-ARTICULAR; INTRALESIONAL; INTRAMUSCULAR; INTRAVENOUS; SOFT TISSUE at 14:25

## 2020-10-05 RX ADMIN — FLUOROURACIL 840 MG: 50 INJECTION, SOLUTION INTRAVENOUS at 17:57

## 2020-10-05 RX ADMIN — TRASTUZUMAB 450 MG: 150 INJECTION, POWDER, LYOPHILIZED, FOR SOLUTION INTRAVENOUS at 16:36

## 2020-10-05 RX ADMIN — DIPHENHYDRAMINE HYDROCHLORIDE 50 MG: 50 INJECTION, SOLUTION INTRAMUSCULAR; INTRAVENOUS at 14:07

## 2020-10-05 RX ADMIN — LEUCOVORIN CALCIUM 936 MG: 350 INJECTION, POWDER, LYOPHILIZED, FOR SUSPENSION INTRAMUSCULAR; INTRAVENOUS at 17:15

## 2020-10-05 RX ADMIN — PANITUMUMAB 300 MG: 400 SOLUTION INTRAVENOUS at 15:15

## 2020-10-05 ASSESSMENT — FIBROSIS 4 INDEX: FIB4 SCORE: 2.61

## 2020-10-05 NOTE — PROGRESS NOTES
Pharmacy Chemotherapy Verification Note:    Patient Name: Gurmeet Jo        Dx: Metastatic Colorectal CA (KRAS/NRAS wild type, and ERBB2 [a HER2 family receptor] amplification)        Protocol: mFOLFOX+Panitumumab +trastuzumab       *Dosing Reference*  Panitumumab 6 mg/kg IV over 60 minutes on Day 1    4/25/20 added to treatment plan dose reduced to 4.5 mg/kg for tolerance   7/8/20 Omitted starting C9   7/25/20 dose reduced to 3 mg/kg for tolerance   9/4/20 discontinued from treatment plan for Cycle 13   C14- reordered at 3 mg/kg per MD Cody  ~Followed by~  Trastuzumab 6 mg/kg IV over 90 minutes on Day 1 of Cycle 1, followed by  Trastuzumab 4 mg/kg IV over 30-60 minutes on Day 1 of Cycle 2    Confirmed: per Dr. Ross: Q2 week dosing   4/11/20 - Reload with 6 mg/kg per Dr. Chisholm  OXALIplatin 85 mg/m² IV over 2 hours on Day 1   - dose reduced to 68 mg/m² starting C1 for tolerance   3/3/20 - oxali removed from treatment plan d/t oxaliplatin allergy per Harman VARGAS.  Leucovorin 400 mg/m² IV over 2 hours on Day 1, to run concurrent with OXALIplatin, followed by  Fluorouracil 400 mg/m² IVP on Day 1, followed by   C1 dose reduced to 360 mg/m² for tolerance  Fluorouracil 2400 mg/m² CIVI over 46-48 hours   C1 dose reduced to 2160 mg/m² for tolerance   14-day cycle until disease progression or unacceptable toxicity  *Dosing Reference*  NCCN Guidelines for Colon Cancer. V.2.2019.  Rohith TAVERAS et al. J Clin Oncol. 2010;28(31):4613-705  Aura PATRICK et al. J Clin Oncol. 2008;28(12):2006-12  Luisa SL, et al. Br J Cancer. 2002;87(4):393-9    Herceptin has limited data in colorectal cancer, but two regimens exist neither at the same loading dose or interval as Dr. Ross has chosen. Per [facility] progress note: Patient has undergone further [employer] testing of his tumor. He was found to be KRAS/NRAS wild type, which suggest he has an EGFR mutation and would be a candidate for EGFR targeted therapy. He also had an  "ERBB2 amplification which might suggest response to treatments like Herceptin.    Allergies:  Compazine and Oxaliplatin     /76   Pulse 80   Temp 36.3 °C (97.3 °F) (Temporal)   Resp 18   Ht 1.84 m (6' 0.44\")   Wt 107.1 kg (236 lb 1.8 oz)   SpO2 98%   BMI 31.63 kg/m²  Body surface area is 2.34 meters squared.     All lab results 10/2/20 within treatment parameters.     2/7/2020 ECHO: LVEF 60% (every 6 months)  9/4/20 ECHO: LVEF 60%     Drug Order   (Drug name, dose, route, IV Fluid & volume, frequency, number of doses) Cycle 15  Previous treatment: C14 on 9/19/20     Medication = Trastuzumab (Herceptin)  Base Dose = 4 mg/kg  Calc Dose: Base Dose x 1071 kg = 428mg  Final Dose = 450 mg  Route = IV  Fluid & Volume =  mL  Admin Duration = Over 30 minutes          Rounded to nearest vial size (within 10%) per rounding protocol, john to treat with final dose     Medication = panitumumab  Base Dose = 3mg/kg  Calc Dose: Base Dose x 107.1 kg = 321mg  Final Dose = 300 mg  Route = IV  Fluid & Volume = NS 100mL  Admin Duration = Over 60 minutes          Rounded to nearest vial size (within 10%) per rounding protocol, john to treat with final dose     Medication = Leucovorin (Wellcovorin)  Base Dose = 400 mg/m²  Calc Dose: Base Dose x 2.34m² = 936mg  Final Dose = 936 mg  Route = IV  Fluid & Volume = D5W 250 mL  Admin Duration = Over 30 min          <10% difference, john to treat with final dose   Medication = Fluorouracil (5-FU)  Base Dose = 360 mg/m²  Calc Dose: Base Dose x 2.34m² = 842mg  Final Dose = 840 mg  Route = IVP  Fluid & Volume = 16.8mL in syringe  Conc = 50 mg/mL  Admin Duration = Over 5 minutes          <10% difference, john to treat with final dose   Medication = Fluorouracil (5-FU)  Base Dose = 2160 mg/m²  Calc Dose:Base Dose x 2.34m² = 5054mg  Final Dose = 5055mg  Route = CIVI   Fluid & Volume = 101.1mL (+3 mL overfill)  Admin Duration = Over 46 hours to run at   2.2mL/hour    Via CADD pump for " home infusion      <10% difference, okay to treat with final dose     By my signature below, I confirm this process was performed independently with the BSA and all final chemotherapy dosing calculations congruent. I have reviewed the above chemotherapy order and that my calculation of the final dose and BSA (when applicable) corroborate those calculations of the  pharmacist. Discrepancies of 10% or greater in the written dose have been addressed and documented within the EPIC Progress notes.    Shari Amador, AbranD

## 2020-10-05 NOTE — PROGRESS NOTES
"Pharmacy Chemotherapy Calculations:     Dx: Metastatic colorectal cancer [KRAS/NRAS wild type, ERBB2 (HER2) amplification]    Cycle 15  Previous treatment = 9/19/20    Regimen: mFOLFOX + Vectibix + Herceptin  *Dosing Reference*  Trastuzumab 6 mg/kg IV loading dose C1D1, then 4 mg/kg IV Day 1 of subsequent  cycles [confirmed \"every 2 weeks\", per MD; progress note: \"He also had an ERBB2  amplification which might suggest response to treatments like Herceptin\"]   > 7/8/20: Herceptin 2.5 weeks overdue.  Do not reload Herceptin per [facility] Dr. Ross.   IV Cycle 1 Day 1, then 250 mg/m2 IV Days 1 and 8 (confirmed  \"weekly\" per MD)   > C4 * * * cetuximab has been omitted * * *   > C5 * * * cetuximab replaced with panitumumab * * *      > Cody LEO reduced dose by 20% to  starting with C1D1 due to anticipated tolerance    > 3/14/20: Oxaliplatin discontinued from treatment     + added; change of therapy d/t cetuximab skin tox   > 4/25/20, initial dose reduction to 4.5 mg/kg d/t previous EGFR cutaneous toxicities    > C9 * * * Vectibix has been omitted * * *   > C10 Vectibix reordered at 3 mg/kg per MD Cody    > C13 * * * Vectibix has been omitted * * *   > C14 Vectibix reordered at 3 mg/kg per MD Cody  Leucovorin 400 mg/m2 IV Day 1 followed by  Fluorouracil 400 mg/m2 IV Day 1 followed by   C1D1 reduced dose by 10% to 360 mg/m2 per MD Cody for anticipated tolerance   Fluorouracil  CIVI over 46-48 hours    C1D1 reduced dose by 10% to 2160 mg/m2 per MD Cody for anticipated tolerance   14-day cycle until DP or UT  NCCN guidelines for colon cancer V.2.2019  (cetuximab + chemo) [place] AP et al, OBEY 2017;317(23):5829-6241  (HER2 tx in HER2+ CRC) Kayleen RINALDI et al. Lancet Oncol. 2019 Apr;20(4):518-530.    Allergies: Patient has no known allergies.  /76   Pulse 80   Temp 36.3 °C (97.3 °F) (Temporal)   Resp 18   Ht 1.84 m (6' 0.44\")   Wt 107.1 kg (236 lb 1.8 oz)   SpO2 98%   BMI 31.63 kg/m²  Body surface " area is 2.34 meters squared.     ECHO:  9/4/20 LVEF ~ 60% OK for 6 months per Cody LEO order  2/7/20 LVEF ~ 60%     Labs 10/2/20:  ANC~ 3170 Plt = 101k   Hgb = 13     SCr = 0.76 mg/dL CrCl >125 mL/min   AST/ALT/ALK= 27/33/186 TBili = 0.9     Panitumumab (Vectibix) 3 mg/kg x 107.1 kg = 321.3 mg   Rounded down to vial size (within 10%) per dose rounding protocol = 300 mg IV    Trastuzumab (Herceptin) 4 mg/kg x 107.1 kg = 428.4 mg   Rounded to vial size (within 10%) per dose rounding protocol = 450 mg IV    Leucovorin 400 mg/m² x 2.34 m² = 936 mg   <10% difference, okay to treat with final dose = 936 mg IV over 30 minutes     Fluorouracil (Adrucil) 360 mg/m² x 2.34 m² = 842.4 mg   <10% difference, okay to treat with final dose = 840 mg IV    Fluorouracil (Adrucil) 2160 mg/m² x 2.34 m² = 5054.4 mg   <10% difference, okay to treat with final dose = 5055 mg CIVI    To be given over 46 hours @ 2.2 ml/hr via CADD pump    José Marvin, PharmD

## 2020-10-05 NOTE — PROGRESS NOTES
Chemotherapy Verification - SECONDARY RN       Height = 184 cm  Weight = 107.1 kg  BSA = 2.34 m2       Medication: Vectibix  Dose: 3 mg/kg  Calculated Dose: 321.3 mg                             (In mg/m2, AUC, mg/kg)     Medication: Herceptin  Dose: 4 mg/kg  Calculated Dose: 428.4 mg                             (In mg/m2, AUC, mg/kg)    Medication: Adrucil  Dose: 360 mg/kg  Calculated Dose: 842.4 mg                             (In mg/m2, AUC, mg/kg)    Medication: Adrucil Dose: 2,160 mg/m2  Calculated Dose: 5,054.4 mg over 46 hours                            (In mg/m2, AUC, mg/kg)    I confirm that this process was performed independently.

## 2020-10-05 NOTE — PROGRESS NOTES
Chemotherapy Verification - PRIMARY RN      [id number]  Weight = 107.1kg  BSA = 2.34m2       Medication: panitumumab  Dose: 3mg/kg  Calculated Dose: 321.3kg                             (In mg/m2, AUC, mg/kg)     Medication: trastuzumab  Dose: 4mg/kg  Calculated Dose: 428.4mg                             (In mg/m2, AUC, mg/kg)    Medication: fluorouracil push  Dose: 360mg/m2  Calculated Dose: 842.2mg                             (In mg/m2, AUC, mg/kg)    Medication: fluorouracil pump  Dose: 2160mg/m2  Calculated Dose: 5054.4mg                             (In mg/m2, AUC, mg/kg)      I confirm this process was performed independently with the BSA and all final chemotherapy dosing calculations congruent.  Any discrepancies of 10% or greater have been addressed with the chemotherapy pharmacist. The resolution of the discrepancy has been documented in the EPIC progress notes.

## 2020-10-06 NOTE — PROGRESS NOTES
Gurmeet came to [facility] for his C15D3 panitumumab/trastuzumab/leucovorin/and 5FU push and pump. Port was accessed, showed positive blood return before infusions, and right before pump hookup. Gurmeet was premedicated with IV benadryl, dex, and zofran. All infusions completed without issue. 5FU pump connected, all clamps were double checked to be open, and all connections were double checked to be secure and tight. Gurmeet discharges stable and ambulatory, aware of his appointment on Wednesday the 7th.

## 2020-10-07 ENCOUNTER — OUTPATIENT INFUSION SERVICES (OUTPATIENT)
Dept: ONCOLOGY | Facility: MEDICAL CENTER | Age: 55
End: 2020-10-07
Attending: INTERNAL MEDICINE
Payer: MEDICAID

## 2020-10-07 VITALS
SYSTOLIC BLOOD PRESSURE: 124 MMHG | HEIGHT: 73 IN | RESPIRATION RATE: 18 BRPM | HEART RATE: 84 BPM | WEIGHT: 233.69 LBS | DIASTOLIC BLOOD PRESSURE: 78 MMHG | OXYGEN SATURATION: 98 % | TEMPERATURE: 98.1 F | BODY MASS INDEX: 30.97 KG/M2

## 2020-10-07 DIAGNOSIS — C78.7 METASTATIC COLON CANCER TO LIVER (HCC): ICD-10-CM

## 2020-10-07 DIAGNOSIS — C18.9 METASTATIC COLON CANCER TO LIVER (HCC): ICD-10-CM

## 2020-10-07 PROCEDURE — 96523 IRRIG DRUG DELIVERY DEVICE: CPT

## 2020-10-07 PROCEDURE — 700111 HCHG RX REV CODE 636 W/ 250 OVERRIDE (IP): Performed by: INTERNAL MEDICINE

## 2020-10-07 RX ORDER — HEPARIN SODIUM (PORCINE) LOCK FLUSH IV SOLN 100 UNIT/ML 100 UNIT/ML
500 SOLUTION INTRAVENOUS PRN
Status: DISCONTINUED | OUTPATIENT
Start: 2020-10-07 | End: 2020-10-07 | Stop reason: HOSPADM

## 2020-10-07 RX ADMIN — HEPARIN 500 UNITS: 100 SYRINGE at 17:31

## 2020-10-07 ASSESSMENT — FIBROSIS 4 INDEX: FIB4 SCORE: 2.61

## 2020-10-08 NOTE — PROGRESS NOTES
Pt returns for pump de-access. Pump settings reviewed and vol observed at 0 ml. Pump turned off and removed from pt. Port flushed, blood return observed. Heparin instilled and needle removed, tip intact. Gauze dressing applied. No future appts in place so next 2 cycle appts made and printout provided to pt. Pt discharged home under self care in no apparent distress.

## 2020-10-09 NOTE — PROGRESS NOTES
"Pharmacy Chemotherapy Calculations:     Dx: Metastatic colorectal cancer [KRAS/NRAS wild type, ERBB2 (HER2) amplification]    Cycle 16  Previous treatment = 10/5/20    Regimen: mFOLFOX + Vectibix + Herceptin  *Dosing Reference*  Trastuzumab 6 mg/kg IV loading dose C1D1, then 4 mg/kg IV Day 1 of subsequent  cycles [confirmed \"every 2 weeks\", per MD; progress note: \"He also had an ERBB2  amplification which might suggest response to treatments like Herceptin\"]   > 7/8/20: Herceptin 2.5 weeks overdue.  Do not reload Herceptin per [facility] Dr. Ross.   IV Cycle 1 Day 1, then 250 mg/m2 IV Days 1 and 8 (confirmed  \"weekly\" per MD)   > C4 * * * cetuximab has been omitted * * *   > C5 * * * cetuximab replaced with panitumumab * * *      > Cody LEO reduced dose by 20% to  starting with C1D1 due to anticipated tolerance    > 3/14/20: Oxaliplatin discontinued from treatment     + added; change of therapy d/t cetuximab skin tox   > 4/25/20, initial dose reduction to 4.5 mg/kg d/t previous EGFR cutaneous toxicities    > C9 * * * Vectibix has been omitted * * *   > C10 Vectibix reordered at 3 mg/kg per MD Cody    > C13 * * * Vectibix has been omitted * * *   > C14 Vectibix reordered at 3 mg/kg per MD Cody  Leucovorin 400 mg/m2 IV Day 1 followed by  Fluorouracil 400 mg/m2 IV Day 1 followed by   C1D1 reduced dose by 10% to 360 mg/m2 per MD Cody for anticipated tolerance   Fluorouracil  CIVI over 46-48 hours    C1D1 reduced dose by 10% to 2160 mg/m2 per MD Cody for anticipated tolerance   14-day cycle until DP or UT  NCCN guidelines for colon cancer V.2.2019  (cetuximab + chemo) [place] AP et al, OBEY 2017;317(23):5415-5627  (HER2 tx in HER2+ CRC) Kayleen RINALDI et al. Lancet Oncol. 2019 Apr;20(4):518-530.    Allergies: Patient has no known allergies.  /69   Pulse 76   Temp 36.6 °C (97.8 °F) (Temporal)   Resp 18   Ht 1.86 m (6' 1.23\")   Wt 105.4 kg (232 lb 5.8 oz)   SpO2 98%   BMI 30.47 kg/m²  Body surface " area is 2.33 meters squared.     ECHO:  9/4/20 LVEF ~ 60% OK for 6 months per Cody LEO order  2/7/20 LVEF ~ 60%     Labs from 10/19/20 reviewed- results within treatment parameters     Panitumumab (Vectibix) 3 mg/kg x 105.4 kg = 316 mg   Rounded down to vial size (within 10%) per dose rounding protocol = 300 mg IV    Trastuzumab (Herceptin) 4 mg/kg x 105.4 kg = 422 mg   Rounded to vial size (within 10%) per dose rounding protocol = 450 mg IV    Leucovorin 400 mg/m² x 2.33 m² = 932 mg   <10% difference, okay to treat with final dose = 932 mg IV over 30 minutes     Fluorouracil (Adrucil) 360 mg/m² x 2.33 m² = 839 mg   <10% difference, okay to treat with final dose = 840 mg IV    Fluorouracil (Adrucil) 2160 mg/m² x 2.33 m² = 5033 mg   <10% difference, okay to treat with final dose = 5035 mg CIVI    To be given over 46 hours @ 2.2 ml/hr via CADD pump    Man Anderson, PharmD

## 2020-10-16 RX ORDER — ONDANSETRON 2 MG/ML
4 INJECTION INTRAMUSCULAR; INTRAVENOUS PRN
Status: CANCELLED | OUTPATIENT
Start: 2020-10-19

## 2020-10-16 RX ORDER — 0.9 % SODIUM CHLORIDE 0.9 %
10 VIAL (ML) INJECTION PRN
Status: CANCELLED | OUTPATIENT
Start: 2020-10-19

## 2020-10-16 RX ORDER — 0.9 % SODIUM CHLORIDE 0.9 %
VIAL (ML) INJECTION PRN
Status: CANCELLED | OUTPATIENT
Start: 2020-10-19

## 2020-10-16 RX ORDER — 0.9 % SODIUM CHLORIDE 0.9 %
10 VIAL (ML) INJECTION PRN
Status: CANCELLED | OUTPATIENT
Start: 2020-10-17

## 2020-10-16 RX ORDER — METHYLPREDNISOLONE SODIUM SUCCINATE 125 MG/2ML
125 INJECTION, POWDER, LYOPHILIZED, FOR SOLUTION INTRAMUSCULAR; INTRAVENOUS PRN
Status: CANCELLED | OUTPATIENT
Start: 2020-10-19

## 2020-10-16 RX ORDER — 0.9 % SODIUM CHLORIDE 0.9 %
20 VIAL (ML) INJECTION PRN
Status: CANCELLED | OUTPATIENT
Start: 2020-10-21

## 2020-10-16 RX ORDER — HEPARIN SODIUM (PORCINE) LOCK FLUSH IV SOLN 100 UNIT/ML 100 UNIT/ML
500 SOLUTION INTRAVENOUS PRN
Status: CANCELLED | OUTPATIENT
Start: 2020-10-19

## 2020-10-16 RX ORDER — ONDANSETRON 8 MG/1
8 TABLET, ORALLY DISINTEGRATING ORAL PRN
Status: CANCELLED | OUTPATIENT
Start: 2020-10-19

## 2020-10-16 RX ORDER — HEPARIN SODIUM (PORCINE) LOCK FLUSH IV SOLN 100 UNIT/ML 100 UNIT/ML
500 SOLUTION INTRAVENOUS PRN
Status: CANCELLED | OUTPATIENT
Start: 2020-10-17

## 2020-10-16 RX ORDER — SODIUM CHLORIDE 9 MG/ML
INJECTION, SOLUTION INTRAVENOUS CONTINUOUS
Status: CANCELLED
Start: 2020-10-19

## 2020-10-16 RX ORDER — 0.9 % SODIUM CHLORIDE 0.9 %
VIAL (ML) INJECTION PRN
Status: CANCELLED | OUTPATIENT
Start: 2020-10-17

## 2020-10-16 RX ORDER — HEPARIN SODIUM (PORCINE) LOCK FLUSH IV SOLN 100 UNIT/ML 100 UNIT/ML
500 SOLUTION INTRAVENOUS PRN
Status: CANCELLED | OUTPATIENT
Start: 2020-10-21

## 2020-10-16 RX ORDER — DIPHENHYDRAMINE HYDROCHLORIDE 50 MG/ML
50 INJECTION INTRAMUSCULAR; INTRAVENOUS PRN
Status: CANCELLED | OUTPATIENT
Start: 2020-10-19

## 2020-10-16 RX ORDER — 0.9 % SODIUM CHLORIDE 0.9 %
3 VIAL (ML) INJECTION PRN
Status: CANCELLED | OUTPATIENT
Start: 2020-10-19

## 2020-10-16 RX ORDER — PROCHLORPERAZINE MALEATE 10 MG
10 TABLET ORAL EVERY 6 HOURS PRN
Status: CANCELLED | OUTPATIENT
Start: 2020-10-19

## 2020-10-16 RX ORDER — 0.9 % SODIUM CHLORIDE 0.9 %
3 VIAL (ML) INJECTION PRN
Status: CANCELLED | OUTPATIENT
Start: 2020-10-17

## 2020-10-16 RX ORDER — EPINEPHRINE 1 MG/ML(1)
0.5 AMPUL (ML) INJECTION PRN
Status: CANCELLED | OUTPATIENT
Start: 2020-10-19

## 2020-10-19 ENCOUNTER — OUTPATIENT INFUSION SERVICES (OUTPATIENT)
Dept: ONCOLOGY | Facility: MEDICAL CENTER | Age: 55
End: 2020-10-19
Attending: INTERNAL MEDICINE
Payer: MEDICAID

## 2020-10-19 VITALS
RESPIRATION RATE: 18 BRPM | SYSTOLIC BLOOD PRESSURE: 117 MMHG | OXYGEN SATURATION: 98 % | TEMPERATURE: 97.8 F | HEART RATE: 76 BPM | HEIGHT: 73 IN | BODY MASS INDEX: 30.8 KG/M2 | DIASTOLIC BLOOD PRESSURE: 69 MMHG | WEIGHT: 232.37 LBS

## 2020-10-19 DIAGNOSIS — C18.9 METASTATIC COLON CANCER TO LIVER (HCC): ICD-10-CM

## 2020-10-19 DIAGNOSIS — C78.7 METASTATIC COLON CANCER TO LIVER (HCC): ICD-10-CM

## 2020-10-19 LAB
ALBUMIN SERPL BCP-MCNC: 4 G/DL (ref 3.2–4.9)
ALBUMIN/GLOB SERPL: 1.7 G/DL
ALP SERPL-CCNC: 167 U/L (ref 30–99)
ALT SERPL-CCNC: 39 U/L (ref 2–50)
ANION GAP SERPL CALC-SCNC: 12 MMOL/L (ref 7–16)
AST SERPL-CCNC: 28 U/L (ref 12–45)
BASOPHILS # BLD AUTO: 0.5 % (ref 0–1.8)
BASOPHILS # BLD: 0.03 K/UL (ref 0–0.12)
BILIRUB SERPL-MCNC: 1.5 MG/DL (ref 0.1–1.5)
BUN SERPL-MCNC: 15 MG/DL (ref 8–22)
CALCIUM SERPL-MCNC: 9 MG/DL (ref 8.5–10.5)
CEA SERPL-MCNC: 30.7 NG/ML (ref 0–3)
CHLORIDE SERPL-SCNC: 103 MMOL/L (ref 96–112)
CO2 SERPL-SCNC: 23 MMOL/L (ref 20–33)
CREAT SERPL-MCNC: 0.79 MG/DL (ref 0.5–1.4)
EOSINOPHIL # BLD AUTO: 0.1 K/UL (ref 0–0.51)
EOSINOPHIL NFR BLD: 1.7 % (ref 0–6.9)
ERYTHROCYTE [DISTWIDTH] IN BLOOD BY AUTOMATED COUNT: 52 FL (ref 35.9–50)
GLOBULIN SER CALC-MCNC: 2.3 G/DL (ref 1.9–3.5)
GLUCOSE SERPL-MCNC: 171 MG/DL (ref 65–99)
HCT VFR BLD AUTO: 37.7 % (ref 42–52)
HGB BLD-MCNC: 12.5 G/DL (ref 14–18)
IMM GRANULOCYTES # BLD AUTO: 0.02 K/UL (ref 0–0.11)
IMM GRANULOCYTES NFR BLD AUTO: 0.3 % (ref 0–0.9)
LYMPHOCYTES # BLD AUTO: 0.97 K/UL (ref 1–4.8)
LYMPHOCYTES NFR BLD: 16.6 % (ref 22–41)
MAGNESIUM SERPL-MCNC: 1.9 MG/DL (ref 1.5–2.5)
MCH RBC QN AUTO: 30 PG (ref 27–33)
MCHC RBC AUTO-ENTMCNC: 33.2 G/DL (ref 33.7–35.3)
MCV RBC AUTO: 90.6 FL (ref 81.4–97.8)
MONOCYTES # BLD AUTO: 0.43 K/UL (ref 0–0.85)
MONOCYTES NFR BLD AUTO: 7.4 % (ref 0–13.4)
NEUTROPHILS # BLD AUTO: 4.3 K/UL (ref 1.82–7.42)
NEUTROPHILS NFR BLD: 73.5 % (ref 44–72)
NRBC # BLD AUTO: 0 K/UL
NRBC BLD-RTO: 0 /100 WBC
PLATELET # BLD AUTO: 89 K/UL (ref 164–446)
PMV BLD AUTO: 9.9 FL (ref 9–12.9)
POTASSIUM SERPL-SCNC: 3.5 MMOL/L (ref 3.6–5.5)
PROT SERPL-MCNC: 6.3 G/DL (ref 6–8.2)
RBC # BLD AUTO: 4.16 M/UL (ref 4.7–6.1)
SODIUM SERPL-SCNC: 138 MMOL/L (ref 135–145)
WBC # BLD AUTO: 5.9 K/UL (ref 4.8–10.8)

## 2020-10-19 PROCEDURE — A4212 NON CORING NEEDLE OR STYLET: HCPCS

## 2020-10-19 PROCEDURE — 85025 COMPLETE CBC W/AUTO DIFF WBC: CPT

## 2020-10-19 PROCEDURE — 96375 TX/PRO/DX INJ NEW DRUG ADDON: CPT

## 2020-10-19 PROCEDURE — 82378 CARCINOEMBRYONIC ANTIGEN: CPT

## 2020-10-19 PROCEDURE — 96413 CHEMO IV INFUSION 1 HR: CPT

## 2020-10-19 PROCEDURE — 700111 HCHG RX REV CODE 636 W/ 250 OVERRIDE (IP): Performed by: INTERNAL MEDICINE

## 2020-10-19 PROCEDURE — G0498 CHEMO EXTEND IV INFUS W/PUMP: HCPCS

## 2020-10-19 PROCEDURE — 96417 CHEMO IV INFUS EACH ADDL SEQ: CPT

## 2020-10-19 PROCEDURE — 96411 CHEMO IV PUSH ADDL DRUG: CPT

## 2020-10-19 PROCEDURE — 83735 ASSAY OF MAGNESIUM: CPT

## 2020-10-19 PROCEDURE — 700105 HCHG RX REV CODE 258: Performed by: INTERNAL MEDICINE

## 2020-10-19 PROCEDURE — 80053 COMPREHEN METABOLIC PANEL: CPT

## 2020-10-19 RX ADMIN — LEUCOVORIN CALCIUM 932 MG: 350 INJECTION, POWDER, LYOPHILIZED, FOR SOLUTION INTRAMUSCULAR; INTRAVENOUS at 12:36

## 2020-10-19 RX ADMIN — ONDANSETRON 16 MG: 2 INJECTION INTRAMUSCULAR; INTRAVENOUS at 10:48

## 2020-10-19 RX ADMIN — FLUOROURACIL 840 MG: 50 INJECTION, SOLUTION INTRAVENOUS at 13:25

## 2020-10-19 RX ADMIN — FLUOROURACIL 5035 MG: 50 INJECTION, SOLUTION INTRAVENOUS at 13:33

## 2020-10-19 RX ADMIN — PANITUMUMAB 300 MG: 100 SOLUTION INTRAVENOUS at 11:26

## 2020-10-19 RX ADMIN — DIPHENHYDRAMINE HYDROCHLORIDE 50 MG: 50 INJECTION, SOLUTION INTRAMUSCULAR; INTRAVENOUS at 10:37

## 2020-10-19 RX ADMIN — TRASTUZUMAB 450 MG: 150 INJECTION, POWDER, LYOPHILIZED, FOR SOLUTION INTRAVENOUS at 12:07

## 2020-10-19 RX ADMIN — DEXAMETHASONE SODIUM PHOSPHATE 20 MG: 4 INJECTION, SOLUTION INTRA-ARTICULAR; INTRALESIONAL; INTRAMUSCULAR; INTRAVENOUS; SOFT TISSUE at 11:11

## 2020-10-19 ASSESSMENT — FIBROSIS 4 INDEX: FIB4 SCORE: 2.61

## 2020-10-19 NOTE — PROGRESS NOTES
Chemotherapy Verification - PRIMARY RN      Height = 186 cm  Weight = 105.4 kg  BSA = 2.33 m2       Medication: Vectbix  Dose: 3 mg/kg  Calculated Dose: 316.2 mg                             (In mg/m2, AUC, mg/kg)     Medication: Herceptin  Dose: 4 mg/kg   Calculated Dose: 421 mg                             (In mg/m2, AUC, mg/kg)    Medication: Adrucil  Dose: 360 mg/m2  Calculated Dose: 838.8 mg                             (In mg/m2, AUC, mg/kg)    Medication: Adrucil  Dose: 2,160 mg/m2  Calculated Dose: 5,032.8 mg over 46 hours                             (In mg/m2, AUC, mg/kg)          I confirm this process was performed independently with the BSA and all final chemotherapy dosing calculations congruent.  Any discrepancies of 10% or greater have been addressed with the chemotherapy pharmacist. The resolution of the discrepancy has been documented in the EPIC progress notes.

## 2020-10-19 NOTE — PROGRESS NOTES
Chemotherapy Verification - SECONDARY RN       Height = 186 cm  Weight = 105.4 kg  BSA = 2.33 m2       Medication: Vectibix  Dose: 3 mg/kg  Calculated Dose: 316.2 mg                            (In mg/m2, AUC, mg/kg)     Medication: Herceptin  Dose: 4 mg/kg  Calculated Dose: 421.6 mg round to vial                             (In mg/m2, AUC, mg/kg)    Medication: 5FU  Dose: 360 mg/m2 dr  Calculated Dose: 840 mg                             (In mg/m2, AUC, mg/kg)    Medication: Home infusion 5FU  Dose: 2160 mg/m2 dr Calculated Dose: 5032.8 mg over 46 hours                            (In mg/m2, AUC, mg/kg)    I confirm that this process was performed independently.

## 2020-10-19 NOTE — PROGRESS NOTES
Patient presents for Vectibix/Herceptin/Adrucil chemotherapy. Reviewed plan of care, patient verbalizes understanding. Port accessed using sterile technique, blood drawn as ordered. Lab results within parameters to proceed with treatment. Medications administered as ordered, line flushed clear. Patient connected to home pump. Patient returns Wednesday and released in no acute distress.

## 2020-10-19 NOTE — PROGRESS NOTES
Pharmacy Chemotherapy Verification Note:    Patient Name: Gurmeet Jo      Dx: Metastatic Colorectal CA (KRAS/NRAS wild type, and ERBB2 [a HER2 family receptor] amplification)        Protocol: mFOLFOX+Panitumumab +trastuzumab       *Dosing Reference*  Panitumumab 6 mg/kg IV over 60 minutes on Day 1    4/25/20 added to treatment plan dose reduced to 4.5 mg/kg for tolerance   7/8/20 Omitted starting C9   7/25/20 dose reduced to 3 mg/kg for tolerance   9/4/20 discontinued from treatment plan for Cycle 13   C14- reordered at 3 mg/kg per MD Cody  ~Followed by~  Trastuzumab 6 mg/kg IV over 90 minutes on Day 1 of Cycle 1, followed by  Trastuzumab 4 mg/kg IV over 30-60 minutes on Day 1 of Cycle 2    Confirmed: per Dr. Ross: Q2 week dosing   4/11/20 - Reload with 6 mg/kg per Dr. Chisholm  OXALIplatin 85 mg/m² IV over 2 hours on Day 1   - dose reduced to 68 mg/m² starting C1 for tolerance   3/3/20 - oxali removed from treatment plan d/t oxaliplatin allergy per Harman VARGAS.  Leucovorin 400 mg/m² IV over 2 hours on Day 1, to run concurrent with OXALIplatin, followed by  Fluorouracil 400 mg/m² IVP on Day 1, followed by   C1 dose reduced to 360 mg/m² for tolerance  Fluorouracil 2400 mg/m² CIVI over 46-48 hours   C1 dose reduced to 2160 mg/m² for tolerance   14-day cycle until disease progression or unacceptable toxicity  *Dosing Reference*  NCCN Guidelines for Colon Cancer. V.2.2019.  Rohith TAVERAS et al. J Clin Oncol. 2010;28(31):4609-705  Aura PATRICK et al. J Clin Oncol. 2008;28(12):2006-12  Luisa SL, et al. Br J Cancer. 2002;87(4):393-9    Herceptin has limited data in colorectal cancer, but two regimens exist neither at the same loading dose or interval as Dr. Ross has chosen. Per [facility] progress note: Patient has undergone further [employer] testing of his tumor. He was found to be KRAS/NRAS wild type, which suggest he has an EGFR mutation and would be a candidate for EGFR targeted therapy. He also had an ERBB2  "amplification which might suggest response to treatments like Herceptin.    Allergies:  Compazine and Oxaliplatin     /69   Pulse 76   Temp 36.6 °C (97.8 °F) (Temporal)   Resp 18   Ht 1.86 m (6' 1.23\")   Wt 105.4 kg (232 lb 5.8 oz)   SpO2 98%   BMI 30.47 kg/m²  Body surface area is 2.33 meters squared.     All lab results 10/19/20 within treatment parameters.     2/7/2020 ECHO: LVEF 60% (every 6 months)  9/4/20 ECHO: LVEF 60%     Drug Order   (Drug name, dose, route, IV Fluid & volume, frequency, number of doses) Cycle 16  Previous treatment: C15 on 10/5/20     Medication = Trastuzumab (Herceptin)  Base Dose = 4 mg/kg  Calc Dose: Base Dose x 105.4 kg = 421mg  Final Dose = 450 mg  Route = IV  Fluid & Volume =  mL  Admin Duration = Over 30 minutes          Rounded to nearest vial size (within 10%) per rounding protocol, john to treat with final dose     Medication = panitumumab  Base Dose = 3mg/kg  Calc Dose: Base Dose x 105.4kg = 316.2mg  Final Dose = 300 mg  Route = IV  Fluid & Volume = NS 100mL  Admin Duration = Over 60 minutes          Rounded to nearest vial size (within 10%) per rounding protocol, john to treat with final dose     Medication = Leucovorin (Wellcovorin)  Base Dose = 400 mg/m²  Calc Dose: Base Dose x 2.33m² = 932mg  Final Dose = 932 mg  Route = IV  Fluid & Volume = D5W 250 mL  Admin Duration = Over 30 min          <10% difference, john to treat with final dose   Medication = Fluorouracil (5-FU)  Base Dose = 360 mg/m²  Calc Dose: Base Dose x 2.33m² = 838mg  Final Dose = 840 mg  Route = IVP  Fluid & Volume = 16.8mL in syringe  Conc = 50 mg/mL  Admin Duration = Over 5 minutes          <10% difference, john to treat with final dose   Medication = Fluorouracil (5-FU)  Base Dose = 2160 mg/m²  Calc Dose:Base Dose x 2.33m² = 5033mg  Final Dose = 5035mg  Route = CIVI   Fluid & Volume = 100.7mL (+3 mL overfill)  Admin Duration = Over 46 hours to run at   2.2mL/hour    Via CADD pump for " home infusion      <10% difference, okay to treat with final dose     By my signature below, I confirm this process was performed independently with the BSA and all final chemotherapy dosing calculations congruent. I have reviewed the above chemotherapy order and that my calculation of the final dose and BSA (when applicable) corroborate those calculations of the  pharmacist. Discrepancies of 10% or greater in the written dose have been addressed and documented within the EPIC Progress notes.    Shari Amador, AbranD

## 2020-10-21 ENCOUNTER — OUTPATIENT INFUSION SERVICES (OUTPATIENT)
Dept: ONCOLOGY | Facility: MEDICAL CENTER | Age: 55
End: 2020-10-21
Attending: INTERNAL MEDICINE
Payer: MEDICAID

## 2020-10-21 VITALS
HEART RATE: 64 BPM | RESPIRATION RATE: 18 BRPM | SYSTOLIC BLOOD PRESSURE: 121 MMHG | DIASTOLIC BLOOD PRESSURE: 64 MMHG | OXYGEN SATURATION: 97 % | TEMPERATURE: 98.1 F

## 2020-10-21 DIAGNOSIS — C18.9 METASTATIC COLON CANCER TO LIVER (HCC): ICD-10-CM

## 2020-10-21 DIAGNOSIS — C78.7 METASTATIC COLON CANCER TO LIVER (HCC): ICD-10-CM

## 2020-10-21 PROCEDURE — 700111 HCHG RX REV CODE 636 W/ 250 OVERRIDE (IP)

## 2020-10-21 PROCEDURE — 700101 HCHG RX REV CODE 250: Performed by: INTERNAL MEDICINE

## 2020-10-21 PROCEDURE — 96523 IRRIG DRUG DELIVERY DEVICE: CPT

## 2020-10-21 RX ORDER — HEPARIN SODIUM (PORCINE) LOCK FLUSH IV SOLN 100 UNIT/ML 100 UNIT/ML
SOLUTION INTRAVENOUS
Status: COMPLETED
Start: 2020-10-21 | End: 2020-10-21

## 2020-10-21 RX ORDER — 0.9 % SODIUM CHLORIDE 0.9 %
20 VIAL (ML) INJECTION PRN
Status: DISCONTINUED | OUTPATIENT
Start: 2020-10-21 | End: 2020-10-21 | Stop reason: HOSPADM

## 2020-10-21 RX ORDER — HEPARIN SODIUM (PORCINE) LOCK FLUSH IV SOLN 100 UNIT/ML 100 UNIT/ML
500 SOLUTION INTRAVENOUS PRN
Status: DISCONTINUED | OUTPATIENT
Start: 2020-10-21 | End: 2020-10-21 | Stop reason: HOSPADM

## 2020-10-21 RX ADMIN — HEPARIN 500 UNITS: 100 SYRINGE at 14:18

## 2020-10-21 RX ADMIN — Medication 20 ML: at 14:29

## 2020-10-21 NOTE — PROGRESS NOTES
Patient to \[facility]\"" for CADD pump disconnect and port flush. CADD pump off and 102.5ml infused the reservoir was at 0ml. PORT flushed with + blood return. SASH performed and port de-accessed. Gauze and paper tape applied as a dressing. Patient has future appointment. Patient to home in care of self.

## 2020-10-29 RX ORDER — 0.9 % SODIUM CHLORIDE 0.9 %
10 VIAL (ML) INJECTION PRN
Status: CANCELLED | OUTPATIENT
Start: 2020-10-31

## 2020-10-29 RX ORDER — 0.9 % SODIUM CHLORIDE 0.9 %
20 VIAL (ML) INJECTION PRN
Status: CANCELLED | OUTPATIENT
Start: 2020-11-03

## 2020-10-29 RX ORDER — 0.9 % SODIUM CHLORIDE 0.9 %
3 VIAL (ML) INJECTION PRN
Status: CANCELLED | OUTPATIENT
Start: 2020-11-01

## 2020-10-29 RX ORDER — HEPARIN SODIUM (PORCINE) LOCK FLUSH IV SOLN 100 UNIT/ML 100 UNIT/ML
500 SOLUTION INTRAVENOUS PRN
Status: CANCELLED | OUTPATIENT
Start: 2020-11-03

## 2020-10-29 RX ORDER — 0.9 % SODIUM CHLORIDE 0.9 %
VIAL (ML) INJECTION PRN
Status: CANCELLED | OUTPATIENT
Start: 2020-11-01

## 2020-10-29 RX ORDER — EPINEPHRINE 1 MG/ML(1)
0.5 AMPUL (ML) INJECTION PRN
Status: CANCELLED | OUTPATIENT
Start: 2020-11-01

## 2020-10-29 RX ORDER — 0.9 % SODIUM CHLORIDE 0.9 %
10 VIAL (ML) INJECTION PRN
Status: CANCELLED | OUTPATIENT
Start: 2020-11-01

## 2020-10-29 RX ORDER — HEPARIN SODIUM (PORCINE) LOCK FLUSH IV SOLN 100 UNIT/ML 100 UNIT/ML
500 SOLUTION INTRAVENOUS PRN
Status: CANCELLED | OUTPATIENT
Start: 2020-10-31

## 2020-10-29 RX ORDER — HEPARIN SODIUM (PORCINE) LOCK FLUSH IV SOLN 100 UNIT/ML 100 UNIT/ML
500 SOLUTION INTRAVENOUS PRN
Status: CANCELLED | OUTPATIENT
Start: 2020-11-01

## 2020-10-29 RX ORDER — ONDANSETRON 8 MG/1
8 TABLET, ORALLY DISINTEGRATING ORAL PRN
Status: CANCELLED | OUTPATIENT
Start: 2020-11-01

## 2020-10-29 RX ORDER — 0.9 % SODIUM CHLORIDE 0.9 %
VIAL (ML) INJECTION PRN
Status: CANCELLED | OUTPATIENT
Start: 2020-10-31

## 2020-10-29 RX ORDER — METHYLPREDNISOLONE SODIUM SUCCINATE 125 MG/2ML
125 INJECTION, POWDER, LYOPHILIZED, FOR SOLUTION INTRAMUSCULAR; INTRAVENOUS PRN
Status: CANCELLED | OUTPATIENT
Start: 2020-11-01

## 2020-10-29 RX ORDER — PROCHLORPERAZINE MALEATE 10 MG
10 TABLET ORAL EVERY 6 HOURS PRN
Status: CANCELLED | OUTPATIENT
Start: 2020-11-01

## 2020-10-29 RX ORDER — 0.9 % SODIUM CHLORIDE 0.9 %
3 VIAL (ML) INJECTION PRN
Status: CANCELLED | OUTPATIENT
Start: 2020-10-31

## 2020-10-29 RX ORDER — SODIUM CHLORIDE 9 MG/ML
INJECTION, SOLUTION INTRAVENOUS CONTINUOUS
Status: CANCELLED
Start: 2020-11-01

## 2020-10-29 RX ORDER — ONDANSETRON 2 MG/ML
4 INJECTION INTRAMUSCULAR; INTRAVENOUS PRN
Status: CANCELLED | OUTPATIENT
Start: 2020-11-01

## 2020-10-29 RX ORDER — DIPHENHYDRAMINE HYDROCHLORIDE 50 MG/ML
50 INJECTION INTRAMUSCULAR; INTRAVENOUS PRN
Status: CANCELLED | OUTPATIENT
Start: 2020-11-01

## 2020-10-31 NOTE — PROGRESS NOTES
"Pharmacy Chemotherapy Calculations:     Dx: Metastatic colorectal cancer [KRAS/NRAS wild type, ERBB2 (HER2) amplification]    Cycle 17  Previous treatment = 10/19/20    Regimen: mFOLFOX + Vectibix + Herceptin  *Dosing Reference*  Trastuzumab 6 mg/kg IV loading dose C1D1, then 4 mg/kg IV Day 1 of subsequent  cycles [confirmed \"every 2 weeks\", per MD; progress note: \"He also had an ERBB2  amplification which might suggest response to treatments like Herceptin\"]   > 7/8/20: Herceptin 2.5 weeks overdue.  Do not reload Herceptin per [facility] Dr. Ross.   IV Cycle 1 Day 1, then 250 mg/m2 IV Days 1 and 8 (confirmed  \"weekly\" per MD)   > C4 * * * cetuximab has been omitted * * *   > C5 * * * cetuximab replaced with panitumumab * * *      > Cody LEO reduced dose by 20% to  starting with C1D1 due to anticipated tolerance    > 3/14/20: Oxaliplatin discontinued from treatment     + added; change of therapy d/t cetuximab skin tox   > 4/25/20, initial dose reduction to 4.5 mg/kg d/t previous EGFR cutaneous toxicities    > C9 * * * Vectibix has been omitted * * *   > C10 Vectibix reordered at 3 mg/kg per MD Cody    > C13 * * * Vectibix has been omitted * * *   > C14 Vectibix reordered at 3 mg/kg per MD Cody   >C17 Vectibix HELD per Cody LEO  Leucovorin 400 mg/m2 IV Day 1 followed by  Fluorouracil 400 mg/m2 IV Day 1 followed by   C1D1 reduced dose by 10% to 360 mg/m2 per MD Cody for anticipated tolerance   Fluorouracil  CIVI over 46-48 hours    C1D1 reduced dose by 10% to 2160 mg/m2 per MD Cody for anticipated tolerance   14-day cycle until DP or UT  NCCN guidelines for colon cancer V.2.2019  (cetuximab + chemo) [place] AP et al, OBEY 2017;317(23):6472-5098  (HER2 tx in HER2+ CRC) Kayleen RINALDI et al. Lancet Oncol. 2019 Apr;20(4):518-530.    Allergies: Patient has no known allergies.  /76   Pulse 64   Temp 36.7 °C (98 °F) (Temporal)   Resp 18   Ht 1.865 m (6' 1.43\")   Wt 104 kg (229 lb 4.5 oz)   SpO2 96%  "  BMI 29.90 kg/m²  Body surface area is 2.32 meters squared.     ECHO:  9/4/20 LVEF ~ 60% OK for 6 months per Cody LEO order  2/7/20 LVEF ~ 60%     Labs from 11/2/20 reviewed- results within treatment parameters       Trastuzumab (Herceptin) 4 mg/kg x 104 kg = 416 mg   Rounded to vial size (within 10%) per dose rounding protocol = 450 mg IV    Leucovorin 400 mg/m² x 2.32 m² = 928 mg   <10% difference, okay to treat with final dose = 928 mg IV over 30 minutes     Fluorouracil (Adrucil) 360 mg/m² x 2.32 m² = 835 mg   <10% difference, okay to treat with final dose = 835 mg IV    Fluorouracil (Adrucil) 2160 mg/m² x 2.32 m² = 5011 mg   <10% difference, okay to treat with final dose = 5000 mg CIVI    To be given over 46 hours @ 2.2 ml/hr via CADD pump    Man Anderson, PharmD

## 2020-11-01 NOTE — PROGRESS NOTES
Pharmacy Chemotherapy Verification Note:    Patient Name: Gurmeet Jo      Dx: Metastatic Colorectal CA (KRAS/NRAS wild type, and ERBB2 [a HER2 family receptor] amplification)        Protocol: mFOLFOX+Panitumumab +trastuzumab       *Dosing Reference*  Panitumumab 6 mg/kg IV over 60 minutes on Day 1    4/25/20 added to treatment plan dose reduced to 4.5 mg/kg for tolerance   7/8/20 Omitted starting C9   7/25/20 dose reduced to 3 mg/kg for tolerance   9/4/20 discontinued from treatment plan for Cycle 13   C14- reordered at 3 mg/kg per MD Cody   C17- HELD for Rash  ~Followed by~  Trastuzumab 6 mg/kg IV over 90 minutes on Day 1 of Cycle 1, followed by  Trastuzumab 4 mg/kg IV over 30-60 minutes on Day 1 of Cycle 2    Confirmed: per Dr. Ross: Q2 week dosing   4/11/20 - Reload with 6 mg/kg per Dr. Chisholm  OXALIplatin 85 mg/m² IV over 2 hours on Day 1   - dose reduced to 68 mg/m² starting C1 for tolerance   3/3/20 - oxali removed from treatment plan d/t oxaliplatin allergy per Harman VARGAS.  Leucovorin 400 mg/m² IV over 2 hours on Day 1, to run concurrent with OXALIplatin, followed by  Fluorouracil 400 mg/m² IVP on Day 1, followed by   C1 dose reduced to 360 mg/m² for tolerance  Fluorouracil 2400 mg/m² CIVI over 46-48 hours   C1 dose reduced to 2160 mg/m² for tolerance   14-day cycle until disease progression or unacceptable toxicity  *Dosing Reference*  NCCN Guidelines for Colon Cancer. V.2.2019.  Rohith TAVERAS et al. J Clin Oncol. 2010;28(31):4697-705  barbara Reyez. J Clin Oncol. 2008;28(12):2006-12  Luisa SL, et al. Br J Cancer. 2002;87(4):393-9    Herceptin has limited data in colorectal cancer, but two regimens exist neither at the same loading dose or interval as Dr. Ross has chosen. Per [facility] progress note: Patient has undergone further [employer] testing of his tumor. He was found to be KRAS/NRAS wild type, which suggest he has an EGFR mutation and would be a candidate for EGFR targeted therapy.  "He also had an ERBB2 amplification which might suggest response to treatments like Herceptin.    Allergies:  Compazine and Oxaliplatin     /76   Pulse 64   Temp 36.7 °C (98 °F) (Temporal)   Resp 18   Ht 1.865 m (6' 1.43\")   Wt 104 kg (229 lb 4.5 oz)   SpO2 96%   BMI 29.90 kg/m²  Body surface area is 2.32 meters squared.     All lab results 11/2/20 within treatment parameters.     2/7/2020 ECHO: LVEF 60% (every 6 months)  9/4/20 ECHO: LVEF 60%     Drug Order   (Drug name, dose, route, IV Fluid & volume, frequency, number of doses) Cycle 17  Previous treatment: C16 on 10/19/20     Medication = Trastuzumab (Herceptin)  Base Dose = 4 mg/kg  Calc Dose: Base Dose x 104kg = 416mg  Final Dose = 450 mg  Route = IV  Fluid & Volume =  mL  Admin Duration = Over 30 minutes          Rounded to nearest vial size (within 10%) per rounding protocol, okay to treat with final dose       Final Dose = 0mg     HELD FOR RASH            Medication = Leucovorin (Wellcovorin)  Base Dose = 400 mg/m²  Calc Dose: Base Dose x 2.32m² = 928mg  Final Dose = 928mg  Route = IV  Fluid & Volume = D5W 250 mL  Admin Duration = Over 30 min          <10% difference, okay to treat with final dose   Medication = Fluorouracil (5-FU)  Base Dose = 360 mg/m²  Calc Dose: Base Dose x 2.32m² = 835.2mg  Final Dose = 835 mg  Route = IVP  Fluid & Volume = 16.7mL in syringe  Conc = 50 mg/mL  Admin Duration = Over 5 minutes          <10% difference, okay to treat with final dose   Medication = Fluorouracil (5-FU)  Base Dose = 2160 mg/m²  Calc Dose:Base Dose x 2.32m² = 5011mg  Final Dose = 5000mg  Route = CIVI   Fluid & Volume = 100mL (+3 mL overfill)  Admin Duration = Over 46 hours to run at   2.2mL/hour    Via CADD pump for home infusion      <10% difference, okay to treat with final dose     By my signature below, I confirm this process was performed independently with the BSA and all final chemotherapy dosing calculations congruent. I have reviewed " the above chemotherapy order and that my calculation of the final dose and BSA (when applicable) corroborate those calculations of the  pharmacist. Discrepancies of 10% or greater in the written dose have been addressed and documented within the EPIC Progress notes.    Shari Amador, AbranD

## 2020-11-02 ENCOUNTER — OUTPATIENT INFUSION SERVICES (OUTPATIENT)
Dept: ONCOLOGY | Facility: MEDICAL CENTER | Age: 55
End: 2020-11-02
Attending: INTERNAL MEDICINE
Payer: MEDICAID

## 2020-11-02 VITALS
SYSTOLIC BLOOD PRESSURE: 133 MMHG | DIASTOLIC BLOOD PRESSURE: 76 MMHG | BODY MASS INDEX: 30.39 KG/M2 | RESPIRATION RATE: 18 BRPM | WEIGHT: 229.28 LBS | HEART RATE: 64 BPM | HEIGHT: 73 IN | TEMPERATURE: 98 F | OXYGEN SATURATION: 96 %

## 2020-11-02 DIAGNOSIS — C78.7 METASTATIC COLON CANCER TO LIVER (HCC): ICD-10-CM

## 2020-11-02 DIAGNOSIS — C18.9 METASTATIC COLON CANCER TO LIVER (HCC): ICD-10-CM

## 2020-11-02 LAB
ALBUMIN SERPL BCP-MCNC: 3.9 G/DL (ref 3.2–4.9)
ALBUMIN/GLOB SERPL: 1.5 G/DL
ALP SERPL-CCNC: 176 U/L (ref 30–99)
ALT SERPL-CCNC: 62 U/L (ref 2–50)
ANION GAP SERPL CALC-SCNC: 11 MMOL/L (ref 7–16)
AST SERPL-CCNC: 31 U/L (ref 12–45)
BASOPHILS # BLD AUTO: 0.1 % (ref 0–1.8)
BASOPHILS # BLD: 0.01 K/UL (ref 0–0.12)
BILIRUB SERPL-MCNC: 0.5 MG/DL (ref 0.1–1.5)
BUN SERPL-MCNC: 15 MG/DL (ref 8–22)
CALCIUM SERPL-MCNC: 9.6 MG/DL (ref 8.5–10.5)
CEA SERPL-MCNC: 24.4 NG/ML (ref 0–3)
CHLORIDE SERPL-SCNC: 102 MMOL/L (ref 96–112)
CO2 SERPL-SCNC: 22 MMOL/L (ref 20–33)
CREAT SERPL-MCNC: 0.59 MG/DL (ref 0.5–1.4)
EOSINOPHIL # BLD AUTO: 0 K/UL (ref 0–0.51)
EOSINOPHIL NFR BLD: 0 % (ref 0–6.9)
ERYTHROCYTE [DISTWIDTH] IN BLOOD BY AUTOMATED COUNT: 53.4 FL (ref 35.9–50)
GLOBULIN SER CALC-MCNC: 2.6 G/DL (ref 1.9–3.5)
GLUCOSE SERPL-MCNC: 298 MG/DL (ref 65–99)
HCT VFR BLD AUTO: 38.7 % (ref 42–52)
HGB BLD-MCNC: 12.5 G/DL (ref 14–18)
IMM GRANULOCYTES # BLD AUTO: 0.07 K/UL (ref 0–0.11)
IMM GRANULOCYTES NFR BLD AUTO: 0.6 % (ref 0–0.9)
LYMPHOCYTES # BLD AUTO: 0.51 K/UL (ref 1–4.8)
LYMPHOCYTES NFR BLD: 4.5 % (ref 22–41)
MAGNESIUM SERPL-MCNC: 2 MG/DL (ref 1.5–2.5)
MCH RBC QN AUTO: 29.9 PG (ref 27–33)
MCHC RBC AUTO-ENTMCNC: 32.3 G/DL (ref 33.7–35.3)
MCV RBC AUTO: 92.6 FL (ref 81.4–97.8)
MONOCYTES # BLD AUTO: 0.31 K/UL (ref 0–0.85)
MONOCYTES NFR BLD AUTO: 2.7 % (ref 0–13.4)
NEUTROPHILS # BLD AUTO: 10.49 K/UL (ref 1.82–7.42)
NEUTROPHILS NFR BLD: 92.1 % (ref 44–72)
NRBC # BLD AUTO: 0 K/UL
NRBC BLD-RTO: 0 /100 WBC
PLATELET # BLD AUTO: 97 K/UL (ref 164–446)
PMV BLD AUTO: 10.9 FL (ref 9–12.9)
POTASSIUM SERPL-SCNC: 4.1 MMOL/L (ref 3.6–5.5)
PROT SERPL-MCNC: 6.5 G/DL (ref 6–8.2)
RBC # BLD AUTO: 4.18 M/UL (ref 4.7–6.1)
SODIUM SERPL-SCNC: 135 MMOL/L (ref 135–145)
WBC # BLD AUTO: 11.4 K/UL (ref 4.8–10.8)

## 2020-11-02 PROCEDURE — 96413 CHEMO IV INFUSION 1 HR: CPT

## 2020-11-02 PROCEDURE — 700111 HCHG RX REV CODE 636 W/ 250 OVERRIDE (IP): Performed by: INTERNAL MEDICINE

## 2020-11-02 PROCEDURE — 96411 CHEMO IV PUSH ADDL DRUG: CPT

## 2020-11-02 PROCEDURE — 96375 TX/PRO/DX INJ NEW DRUG ADDON: CPT

## 2020-11-02 PROCEDURE — A4212 NON CORING NEEDLE OR STYLET: HCPCS

## 2020-11-02 PROCEDURE — 96417 CHEMO IV INFUS EACH ADDL SEQ: CPT

## 2020-11-02 PROCEDURE — 85025 COMPLETE CBC W/AUTO DIFF WBC: CPT

## 2020-11-02 PROCEDURE — 700105 HCHG RX REV CODE 258: Performed by: INTERNAL MEDICINE

## 2020-11-02 PROCEDURE — 82378 CARCINOEMBRYONIC ANTIGEN: CPT

## 2020-11-02 PROCEDURE — 80053 COMPREHEN METABOLIC PANEL: CPT

## 2020-11-02 PROCEDURE — 96367 TX/PROPH/DG ADDL SEQ IV INF: CPT

## 2020-11-02 PROCEDURE — G0498 CHEMO EXTEND IV INFUS W/PUMP: HCPCS

## 2020-11-02 PROCEDURE — 83735 ASSAY OF MAGNESIUM: CPT

## 2020-11-02 RX ORDER — METHYLPREDNISOLONE 4 MG/1
4 TABLET ORAL DAILY
COMMUNITY
End: 2020-12-14

## 2020-11-02 RX ADMIN — FLUOROURACIL 835 MG: 50 INJECTION, SOLUTION INTRAVENOUS at 13:29

## 2020-11-02 RX ADMIN — DIPHENHYDRAMINE HYDROCHLORIDE 50 MG: 50 INJECTION, SOLUTION INTRAMUSCULAR; INTRAVENOUS at 10:29

## 2020-11-02 RX ADMIN — ONDANSETRON 16 MG: 2 INJECTION INTRAMUSCULAR; INTRAVENOUS at 10:45

## 2020-11-02 RX ADMIN — TRASTUZUMAB 450 MG: 150 INJECTION, POWDER, LYOPHILIZED, FOR SOLUTION INTRAVENOUS at 11:52

## 2020-11-02 RX ADMIN — FLUOROURACIL 5000 MG: 50 INJECTION, SOLUTION INTRAVENOUS at 13:40

## 2020-11-02 RX ADMIN — DEXAMETHASONE SODIUM PHOSPHATE 20 MG: 4 INJECTION, SOLUTION INTRA-ARTICULAR; INTRALESIONAL; INTRAMUSCULAR; INTRAVENOUS; SOFT TISSUE at 11:08

## 2020-11-02 RX ADMIN — LEUCOVORIN CALCIUM 928 MG: 350 INJECTION, POWDER, LYOPHILIZED, FOR SOLUTION INTRAMUSCULAR; INTRAVENOUS at 12:32

## 2020-11-02 ASSESSMENT — FIBROSIS 4 INDEX: FIB4 SCORE: 2.82

## 2020-11-02 NOTE — PROGRESS NOTES
Chemotherapy Verification - PRIMARY RN      Height = 186cm  Weight = 104kg  BSA = 2.32m^2       Medication: Trastuzumab  Dose: 4mg/kg  Calculated Dose: 416mg                                Medication: Leucovorin  Dose: 400mg/m^2  Calculated Dose: 928mg                                 Medication: Fluorouracil  Dose: 360mg/m^2  Calculated Dose: 835mg                              Medication: Fluorouracil  Dose: 2160mg/m^2  Calculated Dose: 5011mg                           I confirm this process was performed independently with the BSA and all final chemotherapy dosing calculations congruent.  Any discrepancies of 10% or greater have been addressed with the chemotherapy pharmacist. The resolution of the discrepancy has been documented in the EPIC progress notes.

## 2020-11-02 NOTE — PROGRESS NOTES
Chemotherapy Verification - SECONDARY RN       Height = 186.5 cm  Weight = 104 kg  BSA = 2.32 m2       Medication: Vectibix  Dose: 3 mg/kg mg/kg  Calculated Dose: 312 mg                             (In mg/m2, AUC, mg/kg)     Medication: Herceptin  Dose: 4 mg/kg  Calculated Dose: 416 mg round to vial                             (In mg/m2, AUC, mg/kg)    Medication: 5FU  Dose: 360 mg/m2 dr Calculated Dose: 835.6 mg                             (In mg/m2, AUC, mg/kg)    Medication: Home infusion 5FU  Dose: 2160 mg/m2 dr  Calculated Dose: 5011.2 mg over 46 hours                             (In mg/m2, AUC, mg/kg)      I confirm that this process was performed independently.

## 2020-11-02 NOTE — PROGRESS NOTES
Pt arrived ambulatory to [facility] for Chemo treatment. Miriam VARGAS contacted this RN. Telephone orders received to hold Vectibix today, 11/2/2020. POC discussed with pt and he agrees with plan. Port accessed in sterile fashion, brisk blood return noted. Labs reviewed, ok to proceed with treatment. Pt medicated per MAR. Pt tolerated treatment without s/s adverse reaction. Pt placed on CADD pump, volume is 101.3ml with overfill. Pt discharged to self care, NAD. Pt aware of next appt 11/5/2020 to disconnect CADD pump.

## 2020-11-04 ENCOUNTER — OUTPATIENT INFUSION SERVICES (OUTPATIENT)
Dept: ONCOLOGY | Facility: MEDICAL CENTER | Age: 55
End: 2020-11-04
Attending: INTERNAL MEDICINE
Payer: MEDICAID

## 2020-11-04 VITALS
RESPIRATION RATE: 18 BRPM | HEART RATE: 69 BPM | TEMPERATURE: 98.2 F | DIASTOLIC BLOOD PRESSURE: 80 MMHG | SYSTOLIC BLOOD PRESSURE: 133 MMHG

## 2020-11-04 DIAGNOSIS — C78.7 METASTATIC COLON CANCER TO LIVER (HCC): ICD-10-CM

## 2020-11-04 DIAGNOSIS — C18.9 METASTATIC COLON CANCER TO LIVER (HCC): ICD-10-CM

## 2020-11-04 PROCEDURE — 700111 HCHG RX REV CODE 636 W/ 250 OVERRIDE (IP)

## 2020-11-04 PROCEDURE — 96523 IRRIG DRUG DELIVERY DEVICE: CPT

## 2020-11-04 RX ORDER — HEPARIN SODIUM (PORCINE) LOCK FLUSH IV SOLN 100 UNIT/ML 100 UNIT/ML
SOLUTION INTRAVENOUS
Status: COMPLETED
Start: 2020-11-04 | End: 2020-11-04

## 2020-11-04 RX ADMIN — HEPARIN 500 UNITS: 100 SYRINGE at 13:32

## 2020-11-04 NOTE — PROGRESS NOTES
Pt arrived to [facility], ambulatory, for pump disconnect. Pt voices no complaints. Pump shut off upon arrival, 0 mL left to be delivered. Pump disconnected and returned to pharmacy, cassette disposed of. Port flushed and heparin locked per policy, port de-accessed. Pt left [facility] with no s/sx of distress. Follow up appointment confirmed.

## 2020-11-12 ENCOUNTER — HOSPITAL ENCOUNTER (OUTPATIENT)
Dept: LAB | Facility: MEDICAL CENTER | Age: 55
End: 2020-11-12
Attending: NURSE PRACTITIONER
Payer: MEDICAID

## 2020-11-12 LAB
ALBUMIN SERPL BCP-MCNC: 3.5 G/DL (ref 3.2–4.9)
ALBUMIN/GLOB SERPL: 1.8 G/DL
ALP SERPL-CCNC: 112 U/L (ref 30–99)
ALT SERPL-CCNC: 50 U/L (ref 2–50)
ANION GAP SERPL CALC-SCNC: 9 MMOL/L (ref 7–16)
APPEARANCE UR: CLEAR
AST SERPL-CCNC: 19 U/L (ref 12–45)
BASOPHILS # BLD AUTO: 0.3 % (ref 0–1.8)
BASOPHILS # BLD: 0.03 K/UL (ref 0–0.12)
BILIRUB SERPL-MCNC: 0.8 MG/DL (ref 0.1–1.5)
BILIRUB UR QL STRIP.AUTO: NEGATIVE
BUN SERPL-MCNC: 11 MG/DL (ref 8–22)
CALCIUM SERPL-MCNC: 8.8 MG/DL (ref 8.5–10.5)
CEA SERPL-MCNC: 38.1 NG/ML (ref 0–3)
CHLORIDE SERPL-SCNC: 102 MMOL/L (ref 96–112)
CO2 SERPL-SCNC: 24 MMOL/L (ref 20–33)
COLOR UR: YELLOW
CREAT SERPL-MCNC: 0.62 MG/DL (ref 0.5–1.4)
EOSINOPHIL # BLD AUTO: 0.02 K/UL (ref 0–0.51)
EOSINOPHIL NFR BLD: 0.2 % (ref 0–6.9)
ERYTHROCYTE [DISTWIDTH] IN BLOOD BY AUTOMATED COUNT: 51.8 FL (ref 35.9–50)
GLOBULIN SER CALC-MCNC: 2 G/DL (ref 1.9–3.5)
GLUCOSE SERPL-MCNC: 229 MG/DL (ref 65–99)
GLUCOSE UR STRIP.AUTO-MCNC: >=1000 MG/DL
HCT VFR BLD AUTO: 38.7 % (ref 42–52)
HGB BLD-MCNC: 12.9 G/DL (ref 14–18)
IMM GRANULOCYTES # BLD AUTO: 0.27 K/UL (ref 0–0.11)
IMM GRANULOCYTES NFR BLD AUTO: 2.9 % (ref 0–0.9)
KETONES UR STRIP.AUTO-MCNC: NEGATIVE MG/DL
LEUKOCYTE ESTERASE UR QL STRIP.AUTO: NEGATIVE
LYMPHOCYTES # BLD AUTO: 0.89 K/UL (ref 1–4.8)
LYMPHOCYTES NFR BLD: 9.7 % (ref 22–41)
MAGNESIUM SERPL-MCNC: 1.9 MG/DL (ref 1.5–2.5)
MCH RBC QN AUTO: 30.1 PG (ref 27–33)
MCHC RBC AUTO-ENTMCNC: 33.3 G/DL (ref 33.7–35.3)
MCV RBC AUTO: 90.4 FL (ref 81.4–97.8)
MICRO URNS: ABNORMAL
MONOCYTES # BLD AUTO: 0.78 K/UL (ref 0–0.85)
MONOCYTES NFR BLD AUTO: 8.5 % (ref 0–13.4)
NEUTROPHILS # BLD AUTO: 7.23 K/UL (ref 1.82–7.42)
NEUTROPHILS NFR BLD: 78.4 % (ref 44–72)
NITRITE UR QL STRIP.AUTO: NEGATIVE
NRBC # BLD AUTO: 0 K/UL
NRBC BLD-RTO: 0 /100 WBC
PH UR STRIP.AUTO: 6 [PH] (ref 5–8)
PLATELET # BLD AUTO: 117 K/UL (ref 164–446)
PMV BLD AUTO: 10.4 FL (ref 9–12.9)
POTASSIUM SERPL-SCNC: 4.2 MMOL/L (ref 3.6–5.5)
PROT SERPL-MCNC: 5.5 G/DL (ref 6–8.2)
PROT UR QL STRIP: NEGATIVE MG/DL
RBC # BLD AUTO: 4.28 M/UL (ref 4.7–6.1)
RBC UR QL AUTO: NEGATIVE
SODIUM SERPL-SCNC: 135 MMOL/L (ref 135–145)
SP GR UR STRIP.AUTO: 1.01
UROBILINOGEN UR STRIP.AUTO-MCNC: 0.2 MG/DL
WBC # BLD AUTO: 9.2 K/UL (ref 4.8–10.8)

## 2020-11-12 PROCEDURE — 81003 URINALYSIS AUTO W/O SCOPE: CPT

## 2020-11-12 PROCEDURE — 82378 CARCINOEMBRYONIC ANTIGEN: CPT

## 2020-11-12 PROCEDURE — 85025 COMPLETE CBC W/AUTO DIFF WBC: CPT

## 2020-11-12 PROCEDURE — 80053 COMPREHEN METABOLIC PANEL: CPT

## 2020-11-12 PROCEDURE — 36415 COLL VENOUS BLD VENIPUNCTURE: CPT

## 2020-11-12 PROCEDURE — 83735 ASSAY OF MAGNESIUM: CPT

## 2020-11-12 RX ORDER — SODIUM CHLORIDE 9 MG/ML
INJECTION, SOLUTION INTRAVENOUS CONTINUOUS
Status: CANCELLED
Start: 2020-11-15

## 2020-11-12 RX ORDER — ONDANSETRON 2 MG/ML
4 INJECTION INTRAMUSCULAR; INTRAVENOUS PRN
Status: CANCELLED | OUTPATIENT
Start: 2020-11-15

## 2020-11-12 RX ORDER — 0.9 % SODIUM CHLORIDE 0.9 %
10 VIAL (ML) INJECTION PRN
Status: CANCELLED | OUTPATIENT
Start: 2020-11-15

## 2020-11-12 RX ORDER — 0.9 % SODIUM CHLORIDE 0.9 %
VIAL (ML) INJECTION PRN
Status: CANCELLED | OUTPATIENT
Start: 2020-11-15

## 2020-11-12 RX ORDER — EPINEPHRINE 1 MG/ML(1)
0.5 AMPUL (ML) INJECTION PRN
Status: CANCELLED | OUTPATIENT
Start: 2020-11-15

## 2020-11-12 RX ORDER — PROCHLORPERAZINE MALEATE 10 MG
10 TABLET ORAL EVERY 6 HOURS PRN
Status: CANCELLED | OUTPATIENT
Start: 2020-11-15

## 2020-11-12 RX ORDER — 0.9 % SODIUM CHLORIDE 0.9 %
3 VIAL (ML) INJECTION PRN
Status: CANCELLED | OUTPATIENT
Start: 2020-11-14

## 2020-11-12 RX ORDER — 0.9 % SODIUM CHLORIDE 0.9 %
20 VIAL (ML) INJECTION PRN
Status: CANCELLED | OUTPATIENT
Start: 2020-11-17

## 2020-11-12 RX ORDER — 0.9 % SODIUM CHLORIDE 0.9 %
VIAL (ML) INJECTION PRN
Status: CANCELLED | OUTPATIENT
Start: 2020-11-14

## 2020-11-12 RX ORDER — 0.9 % SODIUM CHLORIDE 0.9 %
10 VIAL (ML) INJECTION PRN
Status: CANCELLED | OUTPATIENT
Start: 2020-11-14

## 2020-11-12 RX ORDER — HEPARIN SODIUM (PORCINE) LOCK FLUSH IV SOLN 100 UNIT/ML 100 UNIT/ML
500 SOLUTION INTRAVENOUS PRN
Status: CANCELLED | OUTPATIENT
Start: 2020-11-14

## 2020-11-12 RX ORDER — DIPHENHYDRAMINE HYDROCHLORIDE 50 MG/ML
50 INJECTION INTRAMUSCULAR; INTRAVENOUS PRN
Status: CANCELLED | OUTPATIENT
Start: 2020-11-15

## 2020-11-12 RX ORDER — HEPARIN SODIUM (PORCINE) LOCK FLUSH IV SOLN 100 UNIT/ML 100 UNIT/ML
500 SOLUTION INTRAVENOUS PRN
Status: CANCELLED | OUTPATIENT
Start: 2020-11-17

## 2020-11-12 RX ORDER — ONDANSETRON 8 MG/1
8 TABLET, ORALLY DISINTEGRATING ORAL PRN
Status: CANCELLED | OUTPATIENT
Start: 2020-11-15

## 2020-11-12 RX ORDER — HEPARIN SODIUM (PORCINE) LOCK FLUSH IV SOLN 100 UNIT/ML 100 UNIT/ML
500 SOLUTION INTRAVENOUS PRN
Status: CANCELLED | OUTPATIENT
Start: 2020-11-15

## 2020-11-12 RX ORDER — 0.9 % SODIUM CHLORIDE 0.9 %
3 VIAL (ML) INJECTION PRN
Status: CANCELLED | OUTPATIENT
Start: 2020-11-15

## 2020-11-12 RX ORDER — METHYLPREDNISOLONE SODIUM SUCCINATE 125 MG/2ML
125 INJECTION, POWDER, LYOPHILIZED, FOR SOLUTION INTRAMUSCULAR; INTRAVENOUS PRN
Status: CANCELLED | OUTPATIENT
Start: 2020-11-15

## 2020-11-15 NOTE — PROGRESS NOTES
Pharmacy Chemotherapy Verification Note:    Patient Name: Gurmeet Jo      Dx: Metastatic Colorectal CA (KRAS/NRAS wild type, and ERBB2 [a HER2 family receptor] amplification)        Protocol: mFOLFOX+Panitumumab +trastuzumab       *Dosing Reference*  Panitumumab 6 mg/kg IV over 60 minutes on Day 1    4/25/20 added to treatment plan dose reduced to 4.5 mg/kg for tolerance   7/8/20 Omitted starting C9   7/25/20 dose reduced to 3 mg/kg for tolerance   9/4/20 discontinued from treatment plan for Cycle 13   C14- reordered at 3 mg/kg per MD Cody   C17- HELD for Rash   C18- Cont to HOLD for Rash  ~Followed by~  Trastuzumab 6 mg/kg IV over 90 minutes on Day 1 of Cycle 1, followed by  Trastuzumab 4 mg/kg IV over 30-60 minutes on Day 1 of Cycle 2    Confirmed: per Dr. Ross: Q2 week dosing   4/11/20 - Reload with 6 mg/kg per Dr. Chisholm  OXALIplatin 85 mg/m² IV over 2 hours on Day 1   - dose reduced to 68 mg/m² starting C1 for tolerance   3/3/20 - oxali removed from treatment plan d/t oxaliplatin allergy per Harman VARGAS.  Leucovorin 400 mg/m² IV over 2 hours on Day 1, to run concurrent with OXALIplatin, followed by  Fluorouracil 400 mg/m² IVP on Day 1, followed by   C1 dose reduced to 360 mg/m² for tolerance  Fluorouracil 2400 mg/m² CIVI over 46-48 hours   C1 dose reduced to 2160 mg/m² for tolerance   14-day cycle until disease progression or unacceptable toxicity  *Dosing Reference*  NCCN Guidelines for Colon Cancer. V.2.2019.  Rohith TAVERAS et al. J Clin Oncol. 2010;28(31):4697-705  yeni Reyez al. J Clin Oncol. 2008;28(12):2006-12  Luisa SL, et al. Br J Cancer. 2002;87(4):393-9    Herceptin has limited data in colorectal cancer, but two regimens exist neither at the same loading dose or interval as Dr. Ross has chosen. Per [facility] progress note: Patient has undergone further [employer] testing of his tumor. He was found to be KRAS/NRAS wild type, which suggest he has an EGFR mutation and would be a  "candidate for EGFR targeted therapy. He also had an ERBB2 amplification which might suggest response to treatments like Herceptin.    Allergies:  Compazine and Oxaliplatin     /68   Pulse 83   Temp 36.7 °C (98 °F) (Temporal)   Resp 18   Ht 1.865 m (6' 1.43\")   Wt 104.8 kg (231 lb 0.7 oz)   SpO2 98%   BMI 30.13 kg/m²  Body surface area is 2.33 meters squared.     All lab results 11/12/20 within treatment parameters.     2/7/2020 ECHO: LVEF 60% (every 6 months)  9/4/20 ECHO: LVEF 60%     Drug Order   (Drug name, dose, route, IV Fluid & volume, frequency, number of doses) Cycle 18  Previous treatment: C17 on 11/2/20     Medication = Trastuzumab (Herceptin)  Base Dose = 4 mg/kg  Calc Dose: Base Dose x  104.8kg = 419.2mg  Final Dose = 450 mg  Route = IV  Fluid & Volume =  mL  Admin Duration = Over 30 minutes          Rounded to nearest vial size (within 10%) per rounding protocol, okay to treat with final dose       Final Dose = 0mg     HELD FOR RASH           Medication = Leucovorin (Wellcovorin)  Base Dose = 400 mg/m²  Calc Dose: Base Dose x 2.33m² = 932mg  Final Dose = 932mg  Route = IV  Fluid & Volume = D5W 250 mL  Admin Duration = Over 30 min          <10% difference, okay to treat with final dose   Medication = Fluorouracil (5-FU)  Base Dose = 360 mg/m²  Calc Dose: Base Dose x 2.33m² = 838.8mg  Final Dose = 840mg  Route = IVP  Fluid & Volume = 16.8mL in syringe  Conc = 50 mg/mL  Admin Duration = Over 5 minutes          <10% difference, okay to treat with final dose   Medication = Fluorouracil (5-FU)  Base Dose = 2160 mg/m²  Calc Dose:Base Dose x 2.33m² = 5032.8mg  Final Dose = 5035mg  Route = CIVI   Fluid & Volume = 100.7mL (+3 mL overfill)  Admin Duration = Over 46 hours to run at   2.2mL/hour    Via CADD pump for home infusion      <10% difference, okay to treat with final dose     By my signature below, I confirm this process was performed independently with the BSA and all final chemotherapy " dosing calculations congruent. I have reviewed the above chemotherapy order and that my calculation of the final dose and BSA (when applicable) corroborate those calculations of the  pharmacist. Discrepancies of 10% or greater in the written dose have been addressed and documented within the EPIC Progress notes.    Shari Amador, PharmD

## 2020-11-16 ENCOUNTER — OUTPATIENT INFUSION SERVICES (OUTPATIENT)
Dept: ONCOLOGY | Facility: MEDICAL CENTER | Age: 55
End: 2020-11-16
Attending: INTERNAL MEDICINE
Payer: MEDICAID

## 2020-11-16 VITALS
HEIGHT: 73 IN | DIASTOLIC BLOOD PRESSURE: 68 MMHG | TEMPERATURE: 98 F | WEIGHT: 231.04 LBS | HEART RATE: 83 BPM | RESPIRATION RATE: 18 BRPM | BODY MASS INDEX: 30.62 KG/M2 | OXYGEN SATURATION: 98 % | SYSTOLIC BLOOD PRESSURE: 120 MMHG

## 2020-11-16 DIAGNOSIS — C18.9 METASTATIC COLON CANCER TO LIVER (HCC): ICD-10-CM

## 2020-11-16 DIAGNOSIS — C78.7 METASTATIC COLON CANCER TO LIVER (HCC): ICD-10-CM

## 2020-11-16 PROCEDURE — 700105 HCHG RX REV CODE 258: Performed by: INTERNAL MEDICINE

## 2020-11-16 PROCEDURE — 96413 CHEMO IV INFUSION 1 HR: CPT

## 2020-11-16 PROCEDURE — 96417 CHEMO IV INFUS EACH ADDL SEQ: CPT

## 2020-11-16 PROCEDURE — 96367 TX/PROPH/DG ADDL SEQ IV INF: CPT

## 2020-11-16 PROCEDURE — A4212 NON CORING NEEDLE OR STYLET: HCPCS

## 2020-11-16 PROCEDURE — 96411 CHEMO IV PUSH ADDL DRUG: CPT

## 2020-11-16 PROCEDURE — 700111 HCHG RX REV CODE 636 W/ 250 OVERRIDE (IP): Performed by: INTERNAL MEDICINE

## 2020-11-16 PROCEDURE — G0498 CHEMO EXTEND IV INFUS W/PUMP: HCPCS

## 2020-11-16 PROCEDURE — 96375 TX/PRO/DX INJ NEW DRUG ADDON: CPT

## 2020-11-16 RX ADMIN — FLUOROURACIL 5035 MG: 50 INJECTION, SOLUTION INTRAVENOUS at 14:25

## 2020-11-16 RX ADMIN — TRASTUZUMAB 450 MG: 150 INJECTION, POWDER, LYOPHILIZED, FOR SOLUTION INTRAVENOUS at 11:54

## 2020-11-16 RX ADMIN — LEUCOVORIN CALCIUM 932 MG: 350 INJECTION, POWDER, LYOPHILIZED, FOR SOLUTION INTRAMUSCULAR; INTRAVENOUS at 12:36

## 2020-11-16 RX ADMIN — DEXAMETHASONE SODIUM PHOSPHATE 20 MG: 4 INJECTION, SOLUTION INTRA-ARTICULAR; INTRALESIONAL; INTRAMUSCULAR; INTRAVENOUS; SOFT TISSUE at 11:17

## 2020-11-16 RX ADMIN — FLUOROURACIL 840 MG: 50 INJECTION, SOLUTION INTRAVENOUS at 14:15

## 2020-11-16 RX ADMIN — DIPHENHYDRAMINE HYDROCHLORIDE 50 MG: 50 INJECTION, SOLUTION INTRAMUSCULAR; INTRAVENOUS at 10:47

## 2020-11-16 RX ADMIN — ONDANSETRON 16 MG: 2 SOLUTION INTRAMUSCULAR; INTRAVENOUS at 10:59

## 2020-11-16 ASSESSMENT — FIBROSIS 4 INDEX: FIB4 SCORE: 1.29

## 2020-11-16 NOTE — PROGRESS NOTES
Pt arrives to [facility] for Herceptin/Leucovorin/5FU bolus/5FU CADD pump.  Pt had labs drawn on 11/12/2020.  Labs reviewed and pt meets parameters for treatment.  Pt reports he saw Dr. Ross this week and MD will hold Vectibix this cycle.  Pt denies any s/sx of infection, mouth sores or rash.  [facility] port has EMLA cream applied to site by the pt.  EMLA cream removed.  Port accessed with sterile technique, flushed with NS and brisk blood return observed.  Pre-medications given.  Herceptin infused without adverse reaction.  Leucovorin given over 30 minutes.  5FU bolus IVP given slowly over 5 minutes, checking for blood return after every 1ml was given.  5FU CADD pump connected to pt.  5FU CADD pump settings checked with another RN and pump is functioning properly.  Confirmed next appt with pt for pump disconnect.  Pt dc home to self care.

## 2020-11-16 NOTE — PROGRESS NOTES
"Pharmacy Chemotherapy Calculations:     Dx: Metastatic colorectal cancer [KRAS/NRAS wild type, ERBB2 (HER2) amplification]    Cycle 18  Previous treatment = 11/2/20    Regimen: mFOLFOX + Vectibix + Herceptin  *Dosing Reference*  Trastuzumab 6 mg/kg IV loading dose C1D1, then 4 mg/kg IV Day 1 of subsequent  cycles [confirmed \"every 2 weeks\", per MD; progress note: \"He also had an ERBB2  amplification which might suggest response to treatments like Herceptin\"]   > 7/8/20: Herceptin 2.5 weeks overdue.  Do not reload Herceptin per [facility] Dr. Ross.   IV Cycle 1 Day 1, then 250 mg/m2 IV Days 1 and 8 (confirmed  \"weekly\" per MD)   > C4 * * * cetuximab has been omitted * * *   > C5 * * * cetuximab replaced with panitumumab * * *      > Cody LEO reduced dose by 20% to  starting with C1D1 due to anticipated tolerance    > 3/14/20: Oxaliplatin discontinued from treatment     + added; change of therapy d/t cetuximab skin tox   > 4/25/20, initial dose reduction to 4.5 mg/kg d/t previous EGFR cutaneous toxicities    > C9 * * * Vectibix has been omitted * * *   > C10 Vectibix reordered at 3 mg/kg per MD Cody    > C13 * * * Vectibix has been omitted * * *   > C14 Vectibix reordered at 3 mg/kg per MD Cody   >C17 Vectibix HELD per Cody LEO              >C18 Vectibix held again per MD  Leucovorin 400 mg/m2 IV Day 1 followed by  Fluorouracil 400 mg/m2 IV Day 1 followed by   C1D1 reduced dose by 10% to 360 mg/m2 per MD Cody for anticipated tolerance   Fluorouracil  CIVI over 46-48 hours    C1D1 reduced dose by 10% to 2160 mg/m2 per MD Cody for anticipated tolerance   14-day cycle until DP or UT  NCCN guidelines for colon cancer V.2.2019  (cetuximab + chemo) Venlizzette AP et al, OBEY 2017;317(23):2712-9799  (HER2 tx in HER2+ CRC) Kayleen F et al. Lancet Oncol. 2019 Apr;20(4):518-530.    Allergies: Patient has no known allergies.  /68   Pulse 83   Temp 36.7 °C (98 °F) (Temporal)   Resp 18   Ht 1.865 m (6' " "1.43\")   Wt 104.8 kg (231 lb 0.7 oz)   SpO2 98%   BMI 30.13 kg/m²  Body surface area is 2.33 meters squared.     ECHO:  9/4/20 LVEF ~ 60% OK for 6 months per Cody LEO order  2/7/20 LVEF ~ 60%     Labs from 11/16/20 reviewed- all within treatment parameters       Trastuzumab (Herceptin) 4 mg/kg x 104.8 kg = 419.2 mg   Rounded to vial size (within 10%) per dose rounding protocol = 450 mg IV    Leucovorin 400 mg/m² x 2.33 m² = 932 mg   <10% difference, okay to treat with final dose = 932 mg IV over 30 minutes     Fluorouracil (Adrucil) 360 mg/m² x 2.33 m² = 838.8 mg   <10% difference, okay to treat with final dose = 840 mg IV push    Fluorouracil (Adrucil) 2160 mg/m² x 2.33 m² = 5032.8 mg   <10% difference, okay to treat with final dose = 5035 mg CIVI over 46 hrs    Elsy Londono, PharmD, BCOP  "

## 2020-11-16 NOTE — PROGRESS NOTES
Chemotherapy Verification - SECONDARY RN       Height = 186.5 cm  Weight = 104.8 kg  BSA = 2.33 m2       Medication: Herceptin  Dose: 4 mg/kg  Calculated Dose:  419.2 mg round to vial                            (In mg/m2, AUC, mg/kg)     Medication: 5FU  Dose: 360 mg/m2  Calculated Dose: 838.8 mg                             (In mg/m2, AUC, mg/kg)    Medication: home infusion 5FU  Dose: 2160 mg/m2  Calculated Dose: 5032.8 mg over 46 hours                             (In mg/m2, AUC, mg/kg)    I confirm that this process was performed independently.

## 2020-11-16 NOTE — PROGRESS NOTES
Chemotherapy Verification - PRIMARY RN      Height = 186.5 cm  Weight = 104.8 kg  BSA = 2.33 m2, Echo on 9/04/2020 LVEF 60%      Medication: herceptin  Dose: 4mg/kg  Calculated Dose: 419.2 mg                             (In mg/m2, AUC, mg/kg)     Medication: Fluorouracil Bolus IVP  Dose: 360mg/m2  Calculated Dose: 838.8 mg                             (In mg/m2, AUC, mg/kg)    Medication: Fluorouracil CADD pump  Dose: 2,160mg/m2  Calculated Dose: 5,032.8 mg infused over 46 hours                             (In mg/m2, AUC, mg/kg)    I confirm this process was performed independently with the BSA and all final chemotherapy dosing calculations congruent.  Any discrepancies of 10% or greater have been addressed with the chemotherapy pharmacist. The resolution of the discrepancy has been documented in the EPIC progress notes.

## 2020-11-18 ENCOUNTER — OUTPATIENT INFUSION SERVICES (OUTPATIENT)
Dept: ONCOLOGY | Facility: MEDICAL CENTER | Age: 55
End: 2020-11-18
Attending: INTERNAL MEDICINE
Payer: MEDICAID

## 2020-11-18 VITALS
RESPIRATION RATE: 18 BRPM | HEIGHT: 72 IN | HEART RATE: 83 BPM | OXYGEN SATURATION: 96 % | BODY MASS INDEX: 32.1 KG/M2 | DIASTOLIC BLOOD PRESSURE: 63 MMHG | WEIGHT: 236.99 LBS | SYSTOLIC BLOOD PRESSURE: 124 MMHG | TEMPERATURE: 97.9 F

## 2020-11-18 DIAGNOSIS — C78.7 METASTATIC COLON CANCER TO LIVER (HCC): ICD-10-CM

## 2020-11-18 DIAGNOSIS — C18.9 METASTATIC COLON CANCER TO LIVER (HCC): ICD-10-CM

## 2020-11-18 PROCEDURE — 96523 IRRIG DRUG DELIVERY DEVICE: CPT

## 2020-11-18 PROCEDURE — 700111 HCHG RX REV CODE 636 W/ 250 OVERRIDE (IP): Performed by: INTERNAL MEDICINE

## 2020-11-18 RX ORDER — HEPARIN SODIUM (PORCINE) LOCK FLUSH IV SOLN 100 UNIT/ML 100 UNIT/ML
500 SOLUTION INTRAVENOUS PRN
Status: DISCONTINUED | OUTPATIENT
Start: 2020-11-18 | End: 2020-11-18 | Stop reason: HOSPADM

## 2020-11-18 RX ADMIN — HEPARIN 500 UNITS: 100 SYRINGE at 13:40

## 2020-11-18 ASSESSMENT — FIBROSIS 4 INDEX: FIB4 SCORE: 1.29

## 2020-11-18 NOTE — PROGRESS NOTES
Pt presented to infusion center for 5FU pump de-access. Pump had 0.0 ml in reservoir and 102.4 ml delivered. Pump disconnected, cleaned and returned to pharmacy with luis pack in pt's labeled bag. Port flushed per protocol, brisk blood return observed, heparinized and de-accessed with needle intact, gauze dressing placed. Pt has next appt, discharged home to self care.

## 2020-11-25 RX ORDER — METHYLPREDNISOLONE SODIUM SUCCINATE 125 MG/2ML
125 INJECTION, POWDER, LYOPHILIZED, FOR SOLUTION INTRAMUSCULAR; INTRAVENOUS PRN
Status: CANCELLED | OUTPATIENT
Start: 2020-11-29

## 2020-11-25 RX ORDER — 0.9 % SODIUM CHLORIDE 0.9 %
20 VIAL (ML) INJECTION PRN
Status: CANCELLED | OUTPATIENT
Start: 2020-12-01

## 2020-11-25 RX ORDER — 0.9 % SODIUM CHLORIDE 0.9 %
10 VIAL (ML) INJECTION PRN
Status: CANCELLED | OUTPATIENT
Start: 2020-11-28

## 2020-11-25 RX ORDER — ONDANSETRON 2 MG/ML
4 INJECTION INTRAMUSCULAR; INTRAVENOUS PRN
Status: CANCELLED | OUTPATIENT
Start: 2020-11-29

## 2020-11-25 RX ORDER — 0.9 % SODIUM CHLORIDE 0.9 %
VIAL (ML) INJECTION PRN
Status: CANCELLED | OUTPATIENT
Start: 2020-11-29

## 2020-11-25 RX ORDER — HEPARIN SODIUM (PORCINE) LOCK FLUSH IV SOLN 100 UNIT/ML 100 UNIT/ML
500 SOLUTION INTRAVENOUS PRN
Status: CANCELLED | OUTPATIENT
Start: 2020-11-29

## 2020-11-25 RX ORDER — HEPARIN SODIUM (PORCINE) LOCK FLUSH IV SOLN 100 UNIT/ML 100 UNIT/ML
500 SOLUTION INTRAVENOUS PRN
Status: CANCELLED | OUTPATIENT
Start: 2020-11-28

## 2020-11-25 RX ORDER — 0.9 % SODIUM CHLORIDE 0.9 %
10 VIAL (ML) INJECTION PRN
Status: CANCELLED | OUTPATIENT
Start: 2020-11-29

## 2020-11-25 RX ORDER — ONDANSETRON 8 MG/1
8 TABLET, ORALLY DISINTEGRATING ORAL PRN
Status: CANCELLED | OUTPATIENT
Start: 2020-11-29

## 2020-11-25 RX ORDER — 0.9 % SODIUM CHLORIDE 0.9 %
3 VIAL (ML) INJECTION PRN
Status: CANCELLED | OUTPATIENT
Start: 2020-11-29

## 2020-11-25 RX ORDER — EPINEPHRINE 1 MG/ML(1)
0.5 AMPUL (ML) INJECTION PRN
Status: CANCELLED | OUTPATIENT
Start: 2020-11-29

## 2020-11-25 RX ORDER — PROCHLORPERAZINE MALEATE 10 MG
10 TABLET ORAL EVERY 6 HOURS PRN
Status: CANCELLED | OUTPATIENT
Start: 2020-11-29

## 2020-11-25 RX ORDER — 0.9 % SODIUM CHLORIDE 0.9 %
VIAL (ML) INJECTION PRN
Status: CANCELLED | OUTPATIENT
Start: 2020-11-28

## 2020-11-25 RX ORDER — HEPARIN SODIUM (PORCINE) LOCK FLUSH IV SOLN 100 UNIT/ML 100 UNIT/ML
500 SOLUTION INTRAVENOUS PRN
Status: CANCELLED | OUTPATIENT
Start: 2020-12-01

## 2020-11-25 RX ORDER — SODIUM CHLORIDE 9 MG/ML
INJECTION, SOLUTION INTRAVENOUS CONTINUOUS
Status: CANCELLED
Start: 2020-11-29

## 2020-11-25 RX ORDER — 0.9 % SODIUM CHLORIDE 0.9 %
3 VIAL (ML) INJECTION PRN
Status: CANCELLED | OUTPATIENT
Start: 2020-11-28

## 2020-11-25 RX ORDER — DIPHENHYDRAMINE HYDROCHLORIDE 50 MG/ML
50 INJECTION INTRAMUSCULAR; INTRAVENOUS PRN
Status: CANCELLED | OUTPATIENT
Start: 2020-11-29

## 2020-11-29 ENCOUNTER — TELEPHONE (OUTPATIENT)
Dept: ONCOLOGY | Facility: MEDICAL CENTER | Age: 55
End: 2020-11-29

## 2020-11-30 ENCOUNTER — OUTPATIENT INFUSION SERVICES (OUTPATIENT)
Dept: ONCOLOGY | Facility: MEDICAL CENTER | Age: 55
End: 2020-11-30
Attending: INTERNAL MEDICINE
Payer: MEDICAID

## 2020-11-30 VITALS
HEART RATE: 84 BPM | BODY MASS INDEX: 30.97 KG/M2 | SYSTOLIC BLOOD PRESSURE: 133 MMHG | OXYGEN SATURATION: 98 % | TEMPERATURE: 97.3 F | DIASTOLIC BLOOD PRESSURE: 83 MMHG | RESPIRATION RATE: 18 BRPM | WEIGHT: 233.69 LBS | HEIGHT: 73 IN

## 2020-11-30 DIAGNOSIS — C18.9 METASTATIC COLON CANCER TO LIVER (HCC): ICD-10-CM

## 2020-11-30 DIAGNOSIS — C78.7 METASTATIC COLON CANCER TO LIVER (HCC): ICD-10-CM

## 2020-11-30 DIAGNOSIS — L27.0 DRUG-INDUCED SKIN RASH: ICD-10-CM

## 2020-11-30 LAB
ALBUMIN SERPL BCP-MCNC: 3.9 G/DL (ref 3.2–4.9)
ALBUMIN/GLOB SERPL: 1.6 G/DL
ALP SERPL-CCNC: 133 U/L (ref 30–99)
ALT SERPL-CCNC: 32 U/L (ref 2–50)
ANION GAP SERPL CALC-SCNC: 9 MMOL/L (ref 7–16)
AST SERPL-CCNC: 25 U/L (ref 12–45)
BASOPHILS # BLD AUTO: 0.7 % (ref 0–1.8)
BASOPHILS # BLD: 0.04 K/UL (ref 0–0.12)
BILIRUB SERPL-MCNC: 1.2 MG/DL (ref 0.1–1.5)
BUN SERPL-MCNC: 8 MG/DL (ref 8–22)
CALCIUM SERPL-MCNC: 9.2 MG/DL (ref 8.5–10.5)
CEA SERPL-MCNC: 50.4 NG/ML (ref 0–3)
CHLORIDE SERPL-SCNC: 106 MMOL/L (ref 96–112)
CO2 SERPL-SCNC: 24 MMOL/L (ref 20–33)
CREAT SERPL-MCNC: 0.61 MG/DL (ref 0.5–1.4)
EOSINOPHIL # BLD AUTO: 0.14 K/UL (ref 0–0.51)
EOSINOPHIL NFR BLD: 2.3 % (ref 0–6.9)
ERYTHROCYTE [DISTWIDTH] IN BLOOD BY AUTOMATED COUNT: 58.4 FL (ref 35.9–50)
GLOBULIN SER CALC-MCNC: 2.4 G/DL (ref 1.9–3.5)
GLUCOSE SERPL-MCNC: 140 MG/DL (ref 65–99)
HCT VFR BLD AUTO: 36 % (ref 42–52)
HGB BLD-MCNC: 12 G/DL (ref 14–18)
IMM GRANULOCYTES # BLD AUTO: 0.03 K/UL (ref 0–0.11)
IMM GRANULOCYTES NFR BLD AUTO: 0.5 % (ref 0–0.9)
LYMPHOCYTES # BLD AUTO: 1 K/UL (ref 1–4.8)
LYMPHOCYTES NFR BLD: 16.4 % (ref 22–41)
MAGNESIUM SERPL-MCNC: 1.5 MG/DL (ref 1.5–2.5)
MCH RBC QN AUTO: 31 PG (ref 27–33)
MCHC RBC AUTO-ENTMCNC: 33.3 G/DL (ref 33.7–35.3)
MCV RBC AUTO: 93 FL (ref 81.4–97.8)
MONOCYTES # BLD AUTO: 0.57 K/UL (ref 0–0.85)
MONOCYTES NFR BLD AUTO: 9.3 % (ref 0–13.4)
NEUTROPHILS # BLD AUTO: 4.33 K/UL (ref 1.82–7.42)
NEUTROPHILS NFR BLD: 70.8 % (ref 44–72)
NRBC # BLD AUTO: 0 K/UL
NRBC BLD-RTO: 0 /100 WBC
PLATELET # BLD AUTO: 83 K/UL (ref 164–446)
PMV BLD AUTO: 11.4 FL (ref 9–12.9)
POTASSIUM SERPL-SCNC: 3.5 MMOL/L (ref 3.6–5.5)
PROT SERPL-MCNC: 6.3 G/DL (ref 6–8.2)
RBC # BLD AUTO: 3.87 M/UL (ref 4.7–6.1)
SODIUM SERPL-SCNC: 139 MMOL/L (ref 135–145)
WBC # BLD AUTO: 6.1 K/UL (ref 4.8–10.8)

## 2020-11-30 PROCEDURE — 80053 COMPREHEN METABOLIC PANEL: CPT

## 2020-11-30 PROCEDURE — 96417 CHEMO IV INFUS EACH ADDL SEQ: CPT

## 2020-11-30 PROCEDURE — A9270 NON-COVERED ITEM OR SERVICE: HCPCS | Performed by: INTERNAL MEDICINE

## 2020-11-30 PROCEDURE — 96366 THER/PROPH/DIAG IV INF ADDON: CPT

## 2020-11-30 PROCEDURE — 700111 HCHG RX REV CODE 636 W/ 250 OVERRIDE (IP): Performed by: INTERNAL MEDICINE

## 2020-11-30 PROCEDURE — 85025 COMPLETE CBC W/AUTO DIFF WBC: CPT

## 2020-11-30 PROCEDURE — 700102 HCHG RX REV CODE 250 W/ 637 OVERRIDE(OP): Performed by: INTERNAL MEDICINE

## 2020-11-30 PROCEDURE — G0498 CHEMO EXTEND IV INFUS W/PUMP: HCPCS

## 2020-11-30 PROCEDURE — 82378 CARCINOEMBRYONIC ANTIGEN: CPT

## 2020-11-30 PROCEDURE — 96367 TX/PROPH/DG ADDL SEQ IV INF: CPT

## 2020-11-30 PROCEDURE — 96411 CHEMO IV PUSH ADDL DRUG: CPT

## 2020-11-30 PROCEDURE — 96413 CHEMO IV INFUSION 1 HR: CPT

## 2020-11-30 PROCEDURE — 96375 TX/PRO/DX INJ NEW DRUG ADDON: CPT

## 2020-11-30 PROCEDURE — 83735 ASSAY OF MAGNESIUM: CPT

## 2020-11-30 PROCEDURE — A4212 NON CORING NEEDLE OR STYLET: HCPCS

## 2020-11-30 PROCEDURE — 700105 HCHG RX REV CODE 258: Performed by: INTERNAL MEDICINE

## 2020-11-30 RX ORDER — POTASSIUM CHLORIDE 20 MEQ/1
40 TABLET, EXTENDED RELEASE ORAL ONCE
Status: COMPLETED | OUTPATIENT
Start: 2020-11-30 | End: 2020-11-30

## 2020-11-30 RX ORDER — MAGNESIUM SULFATE HEPTAHYDRATE 40 MG/ML
2 INJECTION, SOLUTION INTRAVENOUS ONCE
Status: COMPLETED | OUTPATIENT
Start: 2020-11-30 | End: 2020-11-30

## 2020-11-30 RX ORDER — HYDROXYZINE PAMOATE 50 MG/1
50 CAPSULE ORAL 3 TIMES DAILY PRN
Qty: 30 CAP | Refills: 0 | Status: SHIPPED | OUTPATIENT
Start: 2020-11-30 | End: 2020-12-29 | Stop reason: SDUPTHER

## 2020-11-30 RX ADMIN — ONDANSETRON 16 MG: 2 INJECTION INTRAMUSCULAR; INTRAVENOUS at 13:36

## 2020-11-30 RX ADMIN — LEUCOVORIN CALCIUM 932 MG: 350 INJECTION, POWDER, LYOPHILIZED, FOR SOLUTION INTRAMUSCULAR; INTRAVENOUS at 15:59

## 2020-11-30 RX ADMIN — FLUOROURACIL 5035 MG: 50 INJECTION, SOLUTION INTRAVENOUS at 16:35

## 2020-11-30 RX ADMIN — FLUOROURACIL 840 MG: 50 INJECTION, SOLUTION INTRAVENOUS at 16:30

## 2020-11-30 RX ADMIN — DEXAMETHASONE SODIUM PHOSPHATE 20 MG: 4 INJECTION, SOLUTION INTRA-ARTICULAR; INTRALESIONAL; INTRAMUSCULAR; INTRAVENOUS; SOFT TISSUE at 13:56

## 2020-11-30 RX ADMIN — TRASTUZUMAB 450 MG: 150 INJECTION, POWDER, LYOPHILIZED, FOR SOLUTION INTRAVENOUS at 15:28

## 2020-11-30 RX ADMIN — MAGNESIUM SULFATE 2 G: 2 INJECTION INTRAVENOUS at 12:29

## 2020-11-30 RX ADMIN — PANITUMUMAB 300 MG: 100 SOLUTION INTRAVENOUS at 14:10

## 2020-11-30 RX ADMIN — POTASSIUM CHLORIDE 40 MEQ: 1500 TABLET, EXTENDED RELEASE ORAL at 13:57

## 2020-11-30 RX ADMIN — DIPHENHYDRAMINE HYDROCHLORIDE 50 MG: 50 INJECTION, SOLUTION INTRAMUSCULAR; INTRAVENOUS at 13:22

## 2020-11-30 ASSESSMENT — FIBROSIS 4 INDEX: FIB4 SCORE: 1.29

## 2020-11-30 NOTE — TELEPHONE ENCOUNTER
Called patient for pre-screening for COVID-19 and education regarding updated [facility] policies.  No answer. Message left on voicemail to call [facility] for pre-screening and instructions on new check-in process.

## 2020-11-30 NOTE — PROGRESS NOTES
"Pharmacy Chemotherapy Calculations:     Dx: Metastatic colorectal cancer [KRAS/NRAS wild type, ERBB2 (HER2) amplification]    Cycle 19  Previous treatment = 11/16/20    Regimen: mFOLFOX + Vectibix + Herceptin  *Dosing Reference*  Trastuzumab 6 mg/kg IV loading dose C1D1, then 4 mg/kg IV Day 1 of subsequent  cycles [confirmed \"every 2 weeks\", per MD; progress note: \"He also had an ERBB2  amplification which might suggest response to treatments like Herceptin\"]   > 7/8/20: Herceptin 2.5 weeks overdue.  Do not reload Herceptin per [facility] Dr. Ross.   IV Cycle 1 Day 1, then 250 mg/m2 IV Days 1 and 8 (confirmed  \"weekly\" per MD)   > C4 * * * cetuximab has been omitted * * *   > C5 * * * cetuximab replaced with panitumumab * * *      > Cody LEO reduced dose by 20% to  starting with C1D1 due to anticipated tolerance    > 3/14/20: Oxaliplatin discontinued from treatment  Panitumumab (Vectibix) IV D1 over 60 min   + added; change of therapy d/t cetuximab skin tox   > 4/25/20, initial dose reduction to 4.5 mg/kg d/t previous EGFR cutaneous toxicities    > C9 * * * Vectibix has been omitted * * *   > C10 Vectibix reordered at 3 mg/kg per MD Cody    > C13 * * * Vectibix has been omitted * * *   > C14 Vectibix reordered at 3 mg/kg per MD Cody   >C17 Vectibix HELD per Cody LEO              >C18 Vectibix held again per MD   >C19  11/30/20- vectibix resumed at 3mg/kg  Leucovorin 400 mg/m2 IV Day 1 followed by  Fluorouracil 400 mg/m2 IV Day 1 followed by   C1D1 reduced dose by 10% to 360 mg/m2 per MD Cody for anticipated tolerance   Fluorouracil  CIVI over 46-48 hours    C1D1 reduced dose by 10% to 2160 mg/m2 per MD Cody for anticipated tolerance   14-day cycle until DP or UT  NCCN guidelines for colon cancer V.2.2019  (cetuximab + chemo) Christine AP et al, OBEY 2017;317(23):7500-6047  (HER2 tx in HER2+ CRC) Kayleen RINALDI, et al. Lancet Oncol. 2019 Apr;20(4):518-530.    Allergies: Patient has no known allergies.  Temp " "(P) 36.3 °C (97.3 °F) (Temporal)   Ht (P) 1.845 m (6' 0.64\")   Wt (P) 106 kg (233 lb 11 oz)   BMI (P) 31.14 kg/m²  Body surface area is 2.33 meters squared (pended).     ECHO:  9/4/20 LVEF ~ 60% OK for 6 months per Cody LEO order  2/7/20 LVEF ~ 60%     Labs from 11/30/20 reviewed- all within treatment parameters       Panitumumab (Vectibix) 3mg/kg x 106kg = 318mg   Rounded to vial size (within 10%) per dose rounding protocol = 300mg IV    Trastuzumab (Herceptin) 4 mg/kg x 106kg = 424mg   Rounded to vial size (within 10%) per dose rounding protocol = 450 mg IV    Leucovorin 400 mg/m² x 2.33m² = 932 mg   <10% difference, okay to treat with final dose = 932 mg IV over 30 minutes     Fluorouracil (Adrucil) 360 mg/m² x 2.33m² = 839mg   <10% difference, okay to treat with final dose = 840mg IV push    Fluorouracil (Adrucil) 2160 mg/m² x 2.33m² = 5032.8mg   <10% difference, okay to treat with final dose =  5035mg CIVI over 46 hrs at  2.2 mL/hr    Shari Amador, PharmD  "

## 2020-11-30 NOTE — PROGRESS NOTES
Pharmacy Chemotherapy Verification Note:    Patient Name: Gurmeet Jo      Dx: Metastatic Colorectal CA (KRAS/NRAS wild type, and ERBB2 [a HER2 family receptor] amplification)        Protocol: mFOLFOX+Panitumumab +trastuzumab       *Dosing Reference*  Panitumumab 6 mg/kg IV over 60 minutes on Day 1    4/25/20 added to treatment plan dose reduced to 4.5 mg/kg for tolerance   7/8/20 Omitted starting C9   7/25/20 dose reduced to 3 mg/kg for tolerance   9/4/20 discontinued from treatment plan for Cycle 13   C14- reordered at 3 mg/kg per MD Cody   C17- HELD for Rash   C18- Cont to HOLD for Rash  ~Followed by~  Trastuzumab 6 mg/kg IV over 90 minutes on Day 1 of Cycle 1, followed by  Trastuzumab 4 mg/kg IV over 30-60 minutes on Day 1 of Cycle 2    Confirmed: per Dr. Ross: Q2 week dosing   4/11/20 - Reload with 6 mg/kg per Dr. Chisholm  OXALIplatin 85 mg/m² IV over 2 hours on Day 1   - dose reduced to 68 mg/m² starting C1 for tolerance   3/3/20 - oxali removed from treatment plan d/t oxaliplatin allergy per Harman VARGAS.  Leucovorin 400 mg/m² IV over 2 hours on Day 1, to run concurrent with OXALIplatin, followed by  Fluorouracil 400 mg/m² IVP on Day 1, followed by   C1 dose reduced to 360 mg/m² for tolerance  Fluorouracil 2400 mg/m² CIVI over 46-48 hours   C1 dose reduced to 2160 mg/m² for tolerance   14-day cycle until disease progression or unacceptable toxicity  *Dosing Reference*  NCCN Guidelines for Colon Cancer. V.2.2019.  Rohith TAVERAS et al. J Clin Oncol. 2010;28(31):4697-705  yeni Reyez al. J Clin Oncol. 2008;28(12):2006-12  Luisa SL, et al. Br J Cancer. 2002;87(4):393-9    Herceptin has limited data in colorectal cancer, but two regimens exist neither at the same loading dose or interval as Dr. Ross has chosen. Per [facility] progress note: Patient has undergone further [employer] testing of his tumor. He was found to be KRAS/NRAS wild type, which suggest he has an EGFR mutation and would be a  "candidate for EGFR targeted therapy. He also had an ERBB2 amplification which might suggest response to treatments like Herceptin.    Allergies:  Compazine and Oxaliplatin     /83   Pulse 84   Temp 36.3 °C (97.3 °F) (Temporal)   Resp 18   Ht 1.845 m (6' 0.64\")   Wt 106 kg (233 lb 11 oz)   SpO2 98%   BMI 31.14 kg/m²  Body surface area is 2.33 meters squared.     Labs 11/30/20:  ANC~ 4330 Plt = 83k   Hgb = 12     SCr = 0.61 mg/dL CrCl > 125 mL/min   AST/ALT/ALK = 25/32/133 TBili = 1.2     2/7/2020 ECHO: LVEF 60% (every 6 months)  9/4/20 ECHO: LVEF 60%     Drug Order   (Drug name, dose, route, IV Fluid & volume, frequency, number of doses) Cycle 19  Previous treatment: C18 on 11/16/20     Medication = Trastuzumab (Herceptin)  Base Dose = 4 mg/kg  Calc Dose: Base Dose x  106 kg = 424 mg  Final Dose = 450 mg  Route = IV  Fluid & Volume =  mL  Admin Duration = Over 30 minutes          Rounded to nearest vial size (within 10%) per rounding protocol, okay to treat with final dose     Medication = panitumumab (Vectibix)   Base Dose = 3 mg/kg  Calc Dose: Base Dose x 106 kg = 318 mg  Final Dose = 300 mg  Route = IV  Fluid & Volume =  mL  Admin Duration = Over 60 minutes         Rounded to nearest vial size (within 10%) per rounding protocol, okay to treat with final dose     Medication = Leucovorin (Wellcovorin)  Base Dose = 400 mg/m²  Calc Dose: Base Dose x 2.33 m² = 932 mg  Final Dose = 932 mg  Route = IV  Fluid & Volume = D5W 250 mL  Admin Duration = Over 30 minutes          <10% difference, okay to treat with final dose   Medication = Fluorouracil (5-FU)  Base Dose = 360 mg/m²  Calc Dose: Base Dose x 2.33 m² = 838.8 mg  Final Dose = 840 mg  Route = IVP  Fluid & Volume = 16.8 mL in syringe  Conc = 50 mg/mL  Admin Duration = Over 5 minutes          <10% difference, okay to treat with final dose   Medication = Fluorouracil (5-FU)  Base Dose = 2160 mg/m²  Calc Dose:Base Dose x 2.33 m² = 5032.8 " mg  Final Dose = 5035 mg  Route = CIVI   Fluid & Volume = 100.7 mL (+3 mL overfill = 103.7 mL)  Admin Duration = Over 46 hours to run at   2.2 mL/hour    Via CADD pump for home infusion      <10% difference, okay to treat with final dose     By my signature below, I confirm this process was performed independently with the BSA and all final chemotherapy dosing calculations congruent. I have reviewed the above chemotherapy order and that my calculation of the final dose and BSA (when applicable) corroborate those calculations of the  pharmacist. Discrepancies of 10% or greater in the written dose have been addressed and documented within the EPIC Progress notes.    José Marvin, PharmD

## 2020-11-30 NOTE — TELEPHONE ENCOUNTER
Was the patient seen in the last year in this department? Yes    Does patient have an active prescription for medications requested? Yes    Received Request Via: Pharmacy    Outpatient Infusion Services on 11/30/2020   Component Date Value   • WBC 11/30/2020 6.1    • RBC 11/30/2020 3.87*   • Hemoglobin 11/30/2020 12.0*   • Hematocrit 11/30/2020 36.0*   • MCV 11/30/2020 93.0    • MCH 11/30/2020 31.0    • MCHC 11/30/2020 33.3*   • RDW 11/30/2020 58.4*   • Platelet Count 11/30/2020 83*   • MPV 11/30/2020 11.4    • Neutrophils-Polys 11/30/2020 70.80    • Lymphocytes 11/30/2020 16.40*   • Monocytes 11/30/2020 9.30    • Eosinophils 11/30/2020 2.30    • Basophils 11/30/2020 0.70    • Immature Granulocytes 11/30/2020 0.50    • Nucleated RBC 11/30/2020 0.00    • Neutrophils (Absolute) 11/30/2020 4.33    • Lymphs (Absolute) 11/30/2020 1.00    • Monos (Absolute) 11/30/2020 0.57    • Eos (Absolute) 11/30/2020 0.14    • Baso (Absolute) 11/30/2020 0.04    • Immature Granulocytes (a* 11/30/2020 0.03    • NRBC (Absolute) 11/30/2020 0.00    • Sodium 11/30/2020 139    • Potassium 11/30/2020 3.5*   • Chloride 11/30/2020 106    • Co2 11/30/2020 24    • Anion Gap 11/30/2020 9.0    • Glucose 11/30/2020 140*   • Bun 11/30/2020 8    • Creatinine 11/30/2020 0.61    • Calcium 11/30/2020 9.2    • AST(SGOT) 11/30/2020 25    • ALT(SGPT) 11/30/2020 32    • Alkaline Phosphatase 11/30/2020 133*   • Total Bilirubin 11/30/2020 1.2    • Albumin 11/30/2020 3.9    • Total Protein 11/30/2020 6.3    • Globulin 11/30/2020 2.4    • A-G Ratio 11/30/2020 1.6    • Magnesium 11/30/2020 1.5    • Carcinoembryonic Antigen 11/30/2020 50.4*   • GFR If  11/30/2020 >60    • GFR If Non  Ameri* 11/30/2020 >60    Hospital Outpatient Visit on 11/12/2020   Component Date Value   • Color 11/12/2020 Yellow    • Character 11/12/2020 Clear    • Specific Gravity 11/12/2020 1.013    • Ph 11/12/2020 6.0    • Glucose 11/12/2020 >=1000*   • Ketones  11/12/2020 Negative    • Protein 11/12/2020 Negative    • Bilirubin 11/12/2020 Negative    • Urobilinogen, Urine 11/12/2020 0.2    • Nitrite 11/12/2020 Negative    • Leukocyte Esterase 11/12/2020 Negative    • Occult Blood 11/12/2020 Negative    • Micro Urine Req 11/12/2020 see below    • WBC 11/12/2020 9.2    • RBC 11/12/2020 4.28*   • Hemoglobin 11/12/2020 12.9*   • Hematocrit 11/12/2020 38.7*   • MCV 11/12/2020 90.4    • MCH 11/12/2020 30.1    • MCHC 11/12/2020 33.3*   • RDW 11/12/2020 51.8*   • Platelet Count 11/12/2020 117*   • MPV 11/12/2020 10.4    • Neutrophils-Polys 11/12/2020 78.40*   • Lymphocytes 11/12/2020 9.70*   • Monocytes 11/12/2020 8.50    • Eosinophils 11/12/2020 0.20    • Basophils 11/12/2020 0.30    • Immature Granulocytes 11/12/2020 2.90*   • Nucleated RBC 11/12/2020 0.00    • Neutrophils (Absolute) 11/12/2020 7.23    • Lymphs (Absolute) 11/12/2020 0.89*   • Monos (Absolute) 11/12/2020 0.78    • Eos (Absolute) 11/12/2020 0.02    • Baso (Absolute) 11/12/2020 0.03    • Immature Granulocytes (a* 11/12/2020 0.27*   • NRBC (Absolute) 11/12/2020 0.00    • Magnesium 11/12/2020 1.9    • Carcinoembryonic Antigen 11/12/2020 38.1*   • Sodium 11/12/2020 135    • Potassium 11/12/2020 4.2    • Chloride 11/12/2020 102    • Co2 11/12/2020 24    • Anion Gap 11/12/2020 9.0    • Glucose 11/12/2020 229*   • Bun 11/12/2020 11    • Creatinine 11/12/2020 0.62    • Calcium 11/12/2020 8.8    • AST(SGOT) 11/12/2020 19    • ALT(SGPT) 11/12/2020 50    • Alkaline Phosphatase 11/12/2020 112*   • Total Bilirubin 11/12/2020 0.8    • Albumin 11/12/2020 3.5    • Total Protein 11/12/2020 5.5*   • Globulin 11/12/2020 2.0    • A-G Ratio 11/12/2020 1.8    • GFR If  11/12/2020 >60    • GFR If Non  Ameri* 11/12/2020 >60    Outpatient Infusion Services on 11/02/2020   Component Date Value   • WBC 11/02/2020 11.4*   • RBC 11/02/2020 4.18*   • Hemoglobin 11/02/2020 12.5*   • Hematocrit 11/02/2020 38.7*   • MCV  11/02/2020 92.6    • MCH 11/02/2020 29.9    • MCHC 11/02/2020 32.3*   • RDW 11/02/2020 53.4*   • Platelet Count 11/02/2020 97*   • MPV 11/02/2020 10.9    • Neutrophils-Polys 11/02/2020 92.10*   • Lymphocytes 11/02/2020 4.50*   • Monocytes 11/02/2020 2.70    • Eosinophils 11/02/2020 0.00    • Basophils 11/02/2020 0.10    • Immature Granulocytes 11/02/2020 0.60    • Nucleated RBC 11/02/2020 0.00    • Neutrophils (Absolute) 11/02/2020 10.49*   • Lymphs (Absolute) 11/02/2020 0.51*   • Monos (Absolute) 11/02/2020 0.31    • Eos (Absolute) 11/02/2020 0.00    • Baso (Absolute) 11/02/2020 0.01    • Immature Granulocytes (a* 11/02/2020 0.07    • NRBC (Absolute) 11/02/2020 0.00    • Sodium 11/02/2020 135    • Potassium 11/02/2020 4.1    • Chloride 11/02/2020 102    • Co2 11/02/2020 22    • Anion Gap 11/02/2020 11.0    • Glucose 11/02/2020 298*   • Bun 11/02/2020 15    • Creatinine 11/02/2020 0.59    • Calcium 11/02/2020 9.6    • AST(SGOT) 11/02/2020 31    • ALT(SGPT) 11/02/2020 62*   • Alkaline Phosphatase 11/02/2020 176*   • Total Bilirubin 11/02/2020 0.5    • Albumin 11/02/2020 3.9    • Total Protein 11/02/2020 6.5    • Globulin 11/02/2020 2.6    • A-G Ratio 11/02/2020 1.5    • Magnesium 11/02/2020 2.0    • Carcinoembryonic Antigen 11/02/2020 24.4*   • GFR If  11/02/2020 >60    • GFR If Non  Ameri* 11/02/2020 >60    Outpatient Infusion Services on 10/19/2020   Component Date Value   • WBC 10/19/2020 5.9    • RBC 10/19/2020 4.16*   • Hemoglobin 10/19/2020 12.5*   • Hematocrit 10/19/2020 37.7*   • MCV 10/19/2020 90.6    • MCH 10/19/2020 30.0    • MCHC 10/19/2020 33.2*   • RDW 10/19/2020 52.0*   • Platelet Count 10/19/2020 89*   • MPV 10/19/2020 9.9    • Neutrophils-Polys 10/19/2020 73.50*   • Lymphocytes 10/19/2020 16.60*   • Monocytes 10/19/2020 7.40    • Eosinophils 10/19/2020 1.70    • Basophils 10/19/2020 0.50    • Immature Granulocytes 10/19/2020 0.30    • Nucleated RBC 10/19/2020 0.00    •  Neutrophils (Absolute) 10/19/2020 4.30    • Lymphs (Absolute) 10/19/2020 0.97*   • Monos (Absolute) 10/19/2020 0.43    • Eos (Absolute) 10/19/2020 0.10    • Baso (Absolute) 10/19/2020 0.03    • Immature Granulocytes (a* 10/19/2020 0.02    • NRBC (Absolute) 10/19/2020 0.00    • Sodium 10/19/2020 138    • Potassium 10/19/2020 3.5*   • Chloride 10/19/2020 103    • Co2 10/19/2020 23    • Anion Gap 10/19/2020 12.0    • Glucose 10/19/2020 171*   • Bun 10/19/2020 15    • Creatinine 10/19/2020 0.79    • Calcium 10/19/2020 9.0    • AST(SGOT) 10/19/2020 28    • ALT(SGPT) 10/19/2020 39    • Alkaline Phosphatase 10/19/2020 167*   • Total Bilirubin 10/19/2020 1.5    • Albumin 10/19/2020 4.0    • Total Protein 10/19/2020 6.3    • Globulin 10/19/2020 2.3    • A-G Ratio 10/19/2020 1.7    • Magnesium 10/19/2020 1.9    • Carcinoembryonic Antigen 10/19/2020 30.7*   • GFR If  10/19/2020 >60    • GFR If Non  Ameri* 10/19/2020 >60    Hospital Outpatient Visit on 10/02/2020   Component Date Value   • Color 10/02/2020 Yellow    • Character 10/02/2020 Clear    • Specific Gravity 10/02/2020 1.016    • Ph 10/02/2020 6.0    • Glucose 10/02/2020 Negative    • Ketones 10/02/2020 Negative    • Protein 10/02/2020 Negative    • Bilirubin 10/02/2020 Negative    • Urobilinogen, Urine 10/02/2020 1.0    • Nitrite 10/02/2020 Negative    • Leukocyte Esterase 10/02/2020 Negative    • Occult Blood 10/02/2020 Negative    • Micro Urine Req 10/02/2020 see below    • WBC 10/02/2020 4.9    • RBC 10/02/2020 4.34*   • Hemoglobin 10/02/2020 13.0*   • Hematocrit 10/02/2020 40.3*   • MCV 10/02/2020 92.9    • MCH 10/02/2020 30.0    • MCHC 10/02/2020 32.3*   • RDW 10/02/2020 54.3*   • Platelet Count 10/02/2020 101*   • MPV 10/02/2020 10.6    • Neutrophils-Polys 10/02/2020 65.40    • Lymphocytes 10/02/2020 21.60*   • Monocytes 10/02/2020 9.10    • Eosinophils 10/02/2020 2.90    • Basophils 10/02/2020 0.80    • Immature Granulocytes 10/02/2020  0.20    • Nucleated RBC 10/02/2020 0.00    • Neutrophils (Absolute) 10/02/2020 3.17    • Lymphs (Absolute) 10/02/2020 1.05    • Monos (Absolute) 10/02/2020 0.44    • Eos (Absolute) 10/02/2020 0.14    • Baso (Absolute) 10/02/2020 0.04    • Immature Granulocytes (a* 10/02/2020 0.01    • NRBC (Absolute) 10/02/2020 0.00    • Magnesium 10/02/2020 2.1    • Carcinoembryonic Antigen 10/02/2020 35.4*   • Sodium 10/02/2020 140    • Potassium 10/02/2020 4.0    • Chloride 10/02/2020 103    • Co2 10/02/2020 25    • Anion Gap 10/02/2020 12.0    • Glucose 10/02/2020 134*   • Bun 10/02/2020 12    • Creatinine 10/02/2020 0.76    • Calcium 10/02/2020 9.3    • AST(SGOT) 10/02/2020 27    • ALT(SGPT) 10/02/2020 33    • Alkaline Phosphatase 10/02/2020 186*   • Total Bilirubin 10/02/2020 0.9    • Albumin 10/02/2020 3.9    • Total Protein 10/02/2020 6.3    • Globulin 10/02/2020 2.4    • A-G Ratio 10/02/2020 1.6    • GFR If  10/02/2020 >60    • GFR If Non  Ameri* 10/02/2020 >60    Hospital Outpatient Visit on 09/17/2020   Component Date Value   • Color 09/17/2020 Yellow    • Character 09/17/2020 Clear    • Specific Gravity 09/17/2020 1.020    • Ph 09/17/2020 6.0    • Glucose 09/17/2020 Negative    • Ketones 09/17/2020 Negative    • Protein 09/17/2020 Negative    • Bilirubin 09/17/2020 Negative    • Urobilinogen, Urine 09/17/2020 1.0    • Nitrite 09/17/2020 Negative    • Leukocyte Esterase 09/17/2020 Negative    • Occult Blood 09/17/2020 Negative    • Micro Urine Req 09/17/2020 see below    • WBC 09/17/2020 5.8    • RBC 09/17/2020 3.87*   • Hemoglobin 09/17/2020 11.7*   • Hematocrit 09/17/2020 35.8*   • MCV 09/17/2020 92.5    • MCH 09/17/2020 30.2    • MCHC 09/17/2020 32.7*   • RDW 09/17/2020 55.8*   • Platelet Count 09/17/2020 143*   • MPV 09/17/2020 10.8    • Neutrophils-Polys 09/17/2020 66.90    • Lymphocytes 09/17/2020 20.10*   • Monocytes 09/17/2020 10.10    • Eosinophils 09/17/2020 2.10    • Basophils 09/17/2020  0.50    • Immature Granulocytes 09/17/2020 0.30    • Nucleated RBC 09/17/2020 0.00    • Neutrophils (Absolute) 09/17/2020 3.85    • Lymphs (Absolute) 09/17/2020 1.16    • Monos (Absolute) 09/17/2020 0.58    • Eos (Absolute) 09/17/2020 0.12    • Baso (Absolute) 09/17/2020 0.03    • Immature Granulocytes (a* 09/17/2020 0.02    • NRBC (Absolute) 09/17/2020 0.00    • Magnesium 09/17/2020 2.2    • Carcinoembryonic Antigen 09/17/2020 33.7*   • Sodium 09/17/2020 141    • Potassium 09/17/2020 3.6    • Chloride 09/17/2020 104    • Co2 09/17/2020 23    • Anion Gap 09/17/2020 14.0    • Glucose 09/17/2020 201*   • Bun 09/17/2020 12    • Creatinine 09/17/2020 0.73    • Calcium 09/17/2020 9.2    • AST(SGOT) 09/17/2020 24    • ALT(SGPT) 09/17/2020 33    • Alkaline Phosphatase 09/17/2020 180*   • Total Bilirubin 09/17/2020 0.6    • Albumin 09/17/2020 3.8    • Total Protein 09/17/2020 6.2    • Globulin 09/17/2020 2.4    • A-G Ratio 09/17/2020 1.6    • GFR If  09/17/2020 >60    • GFR If Non  Ameri* 09/17/2020 >60    Hospital Outpatient Visit on 09/04/2020   Component Date Value   • Eject.Frac. MOD BP 09/04/2020 61.5    • Eject.Frac. MOD 4C 09/04/2020 59.83    • Eject.Frac. MOD 2C 09/04/2020 62.47    • Left Ventrical Ejection * 09/04/2020 60    Hospital Outpatient Visit on 09/04/2020   Component Date Value   • Color 09/04/2020 Yellow    • Character 09/04/2020 Clear    • Specific Gravity 09/04/2020 1.027    • Ph 09/04/2020 5.5    • Glucose 09/04/2020 Negative    • Ketones 09/04/2020 Trace*   • Protein 09/04/2020 Negative    • Bilirubin 09/04/2020 Negative    • Urobilinogen, Urine 09/04/2020 1.0    • Nitrite 09/04/2020 Negative    • Leukocyte Esterase 09/04/2020 Negative    • Occult Blood 09/04/2020 Negative    • Micro Urine Req 09/04/2020 see below    • WBC 09/04/2020 4.5*   • RBC 09/04/2020 4.16*   • Hemoglobin 09/04/2020 12.5*   • Hematocrit 09/04/2020 38.7*   • MCV 09/04/2020 93.0    • MCH 09/04/2020 30.0    •  MCHC 09/04/2020 32.3*   • RDW 09/04/2020 54.2*   • Platelet Count 09/04/2020 81*   • MPV 09/04/2020 10.9    • Neutrophils-Polys 09/04/2020 63.40    • Lymphocytes 09/04/2020 19.90*   • Monocytes 09/04/2020 11.60    • Eosinophils 09/04/2020 4.00    • Basophils 09/04/2020 0.70    • Immature Granulocytes 09/04/2020 0.40    • Nucleated RBC 09/04/2020 0.00    • Neutrophils (Absolute) 09/04/2020 2.84    • Lymphs (Absolute) 09/04/2020 0.89*   • Monos (Absolute) 09/04/2020 0.52    • Eos (Absolute) 09/04/2020 0.18    • Baso (Absolute) 09/04/2020 0.03    • Immature Granulocytes (a* 09/04/2020 0.02    • NRBC (Absolute) 09/04/2020 0.00    • Magnesium 09/04/2020 1.9    • Carcinoembryonic Antigen 09/04/2020 33.0*   • Sodium 09/04/2020 141    • Potassium 09/04/2020 3.8    • Chloride 09/04/2020 107    • Co2 09/04/2020 24    • Anion Gap 09/04/2020 10.0    • Glucose 09/04/2020 130*   • Bun 09/04/2020 13    • Creatinine 09/04/2020 0.69    • Calcium 09/04/2020 9.3    • AST(SGOT) 09/04/2020 24    • ALT(SGPT) 09/04/2020 45    • Alkaline Phosphatase 09/04/2020 158*   • Total Bilirubin 09/04/2020 0.6    • Albumin 09/04/2020 3.9    • Total Protein 09/04/2020 6.1    • Globulin 09/04/2020 2.2    • A-G Ratio 09/04/2020 1.8    • GFR If  09/04/2020 >60    • GFR If Non  Ameri* 09/04/2020 >60    Hospital Outpatient Visit on 08/21/2020   Component Date Value   • Color 08/21/2020 Yellow    • Character 08/21/2020 Clear    • Specific Gravity 08/21/2020 1.012    • Ph 08/21/2020 7.5    • Glucose 08/21/2020 Negative    • Ketones 08/21/2020 Negative    • Protein 08/21/2020 Negative    • Bilirubin 08/21/2020 Negative    • Urobilinogen, Urine 08/21/2020 1.0    • Nitrite 08/21/2020 Negative    • Leukocyte Esterase 08/21/2020 Negative    • Occult Blood 08/21/2020 Negative    • Micro Urine Req 08/21/2020 see below    • WBC 08/21/2020 5.9    • RBC 08/21/2020 4.20*   • Hemoglobin 08/21/2020 12.6*   • Hematocrit 08/21/2020 38.9*   • MCV  08/21/2020 92.6    • MCH 08/21/2020 30.0    • MCHC 08/21/2020 32.4*   • RDW 08/21/2020 53.0*   • Platelet Count 08/21/2020 101*   • MPV 08/21/2020 10.6    • Neutrophils-Polys 08/21/2020 66.90    • Lymphocytes 08/21/2020 18.20*   • Monocytes 08/21/2020 11.70    • Eosinophils 08/21/2020 2.20    • Basophils 08/21/2020 0.70    • Immature Granulocytes 08/21/2020 0.30    • Nucleated RBC 08/21/2020 0.00    • Neutrophils (Absolute) 08/21/2020 3.96    • Lymphs (Absolute) 08/21/2020 1.08    • Monos (Absolute) 08/21/2020 0.69    • Eos (Absolute) 08/21/2020 0.13    • Baso (Absolute) 08/21/2020 0.04    • Immature Granulocytes (a* 08/21/2020 0.02    • NRBC (Absolute) 08/21/2020 0.00    • Magnesium 08/21/2020 1.9    • Carcinoembryonic Antigen 08/21/2020 46.2*   • Sodium 08/21/2020 141    • Potassium 08/21/2020 3.8    • Chloride 08/21/2020 103    • Co2 08/21/2020 27    • Anion Gap 08/21/2020 11.0    • Glucose 08/21/2020 110*   • Bun 08/21/2020 8    • Creatinine 08/21/2020 0.61    • Calcium 08/21/2020 9.6    • AST(SGOT) 08/21/2020 35    • ALT(SGPT) 08/21/2020 45    • Alkaline Phosphatase 08/21/2020 148*   • Total Bilirubin 08/21/2020 1.0    • Albumin 08/21/2020 4.0    • Total Protein 08/21/2020 6.4    • Globulin 08/21/2020 2.4    • A-G Ratio 08/21/2020 1.7    • GFR If  08/21/2020 >60    • GFR If Non  Ameri* 08/21/2020 >60    Outpatient Infusion Services on 08/08/2020   Component Date Value   • Magnesium 08/07/2020 1.9    • WBC 08/08/2020 4.3*   • RBC 08/08/2020 4.01*   • Hemoglobin 08/08/2020 12.0*   • Hematocrit 08/08/2020 37.4*   • MCV 08/08/2020 93.3    • MCH 08/08/2020 29.9    • MCHC 08/08/2020 32.1*   • RDW 08/08/2020 51.1*   • Platelet Count 08/08/2020 74*   • MPV 08/08/2020 10.1    • Neutrophils-Polys 08/08/2020 68.50    • Lymphocytes 08/08/2020 19.10*   • Monocytes 08/08/2020 8.60    • Eosinophils 08/08/2020 2.60    • Basophils 08/08/2020 0.70    • Immature Granulocytes 08/08/2020 0.50    • Nucleated  RBC 08/08/2020 0.00    • Neutrophils (Absolute) 08/08/2020 2.95    • Lymphs (Absolute) 08/08/2020 0.82*   • Monos (Absolute) 08/08/2020 0.37    • Eos (Absolute) 08/08/2020 0.11    • Baso (Absolute) 08/08/2020 0.03    • Immature Granulocytes (a* 08/08/2020 0.02    • NRBC (Absolute) 08/08/2020 0.00    There may be more visits with results that are not included.   ]

## 2020-11-30 NOTE — PROGRESS NOTES
Chemotherapy Verification - PRIMARY RN      Height = 184.5 cm  Weight = 106 kg  BSA = 2.33 m2       Medication: Vectibix  Dose: 3 mg/kg    Calculated Dose:  318 mg                            (In mg/m2, AUC, mg/kg)     Medication: Herceptin  Dose: 4 mg/kg    Calculated Dose: 424 mg                             (In mg/m2, AUC, mg/kg)    Medication: Fluorouracil  Dose: 360 mg/m2  Calculated Dose: 838.8 mg                             (In mg/m2, AUC, mg/kg)    Medication: Fluorouracil CADD pump  Dose: 2160 mg/m2 over 46 hours Calculated Dose: 5032.8 mg over 46 hours                             (In mg/m2, AUC, mg/kg)          I confirm this process was performed independently with the BSA and all final chemotherapy dosing calculations congruent.  Any discrepancies of 10% or greater have been addressed with the chemotherapy pharmacist. The resolution of the discrepancy has been documented in the EPIC progress notes.

## 2020-12-01 NOTE — PROGRESS NOTES
Patient arrived ambulatory to [facility] for Vectibix/Herceptin/5FU.  He denies any s/s of infection or fevers.  Port accessed using sterile technique with brisk blood return noted.  Labs drawn per order and within parameters to treat.  2g IV Mg, 40 mEq PO KCL, pre medications and chemotherapy infused per order.  Pt tolerated well.  CADD pump connected, settings verified.  Confirmed next appointment and he ambulated out of clinic in no apparent distress.

## 2020-12-02 ENCOUNTER — OUTPATIENT INFUSION SERVICES (OUTPATIENT)
Dept: ONCOLOGY | Facility: MEDICAL CENTER | Age: 55
End: 2020-12-02
Attending: INTERNAL MEDICINE
Payer: MEDICAID

## 2020-12-02 VITALS
HEART RATE: 85 BPM | BODY MASS INDEX: 31.41 KG/M2 | WEIGHT: 231.92 LBS | OXYGEN SATURATION: 97 % | TEMPERATURE: 97.6 F | DIASTOLIC BLOOD PRESSURE: 71 MMHG | SYSTOLIC BLOOD PRESSURE: 129 MMHG | RESPIRATION RATE: 18 BRPM | HEIGHT: 72 IN

## 2020-12-02 DIAGNOSIS — C78.7 METASTATIC COLON CANCER TO LIVER (HCC): ICD-10-CM

## 2020-12-02 DIAGNOSIS — C18.9 METASTATIC COLON CANCER TO LIVER (HCC): ICD-10-CM

## 2020-12-02 PROCEDURE — 700111 HCHG RX REV CODE 636 W/ 250 OVERRIDE (IP): Performed by: INTERNAL MEDICINE

## 2020-12-02 PROCEDURE — 96523 IRRIG DRUG DELIVERY DEVICE: CPT

## 2020-12-02 RX ORDER — HEPARIN SODIUM (PORCINE) LOCK FLUSH IV SOLN 100 UNIT/ML 100 UNIT/ML
500 SOLUTION INTRAVENOUS PRN
Status: DISCONTINUED | OUTPATIENT
Start: 2020-12-02 | End: 2020-12-02 | Stop reason: HOSPADM

## 2020-12-02 RX ADMIN — HEPARIN 500 UNITS: 100 SYRINGE at 15:09

## 2020-12-02 ASSESSMENT — FIBROSIS 4 INDEX: FIB4 SCORE: 2.98

## 2020-12-02 NOTE — PROGRESS NOTES
Pt arrived ambulatory to [facility] for 5FU CADD pump disconnect and PORT de-access.  Pump had 0.0ml in reservoir and 103.7 ml delivered.  Pump disconnected, cleaned and returned to pharmacy with luis pack in patient's labeled bag.  Port flushed with NS, brisk blood return observed, heparin instilled per protocol.  PORT de-accessed, gauze and tape to site.  Pt tolerated well.  Pt discharged to self care in [facility], next appointment confirmed.

## 2020-12-03 ENCOUNTER — OFFICE VISIT (OUTPATIENT)
Dept: URGENT CARE | Facility: PHYSICIAN GROUP | Age: 55
End: 2020-12-03

## 2020-12-03 VITALS
TEMPERATURE: 98.4 F | BODY MASS INDEX: 30.08 KG/M2 | DIASTOLIC BLOOD PRESSURE: 82 MMHG | OXYGEN SATURATION: 97 % | HEIGHT: 74 IN | HEART RATE: 86 BPM | WEIGHT: 234.4 LBS | SYSTOLIC BLOOD PRESSURE: 122 MMHG | RESPIRATION RATE: 16 BRPM

## 2020-12-03 DIAGNOSIS — Z02.4 ENCOUNTER FOR CDL (COMMERCIAL DRIVING LICENSE) EXAM: Primary | ICD-10-CM

## 2020-12-03 DIAGNOSIS — C18.9 METASTATIC COLON CANCER TO LIVER (HCC): ICD-10-CM

## 2020-12-03 DIAGNOSIS — E11.9 INSULIN DEPENDENT TYPE 2 DIABETES MELLITUS (HCC): ICD-10-CM

## 2020-12-03 DIAGNOSIS — C78.7 METASTATIC COLON CANCER TO LIVER (HCC): ICD-10-CM

## 2020-12-03 DIAGNOSIS — Z79.4 INSULIN DEPENDENT TYPE 2 DIABETES MELLITUS (HCC): ICD-10-CM

## 2020-12-03 PROBLEM — E78.5 HYPERLIPIDEMIA: Status: ACTIVE | Noted: 2020-07-29

## 2020-12-03 PROBLEM — B35.1 ONYCHOMYCOSIS: Status: ACTIVE | Noted: 2020-07-29

## 2020-12-03 PROCEDURE — 7100 PR DOT PHYSICAL: Performed by: PHYSICIAN ASSISTANT

## 2020-12-03 RX ORDER — CALCIUM CITRATE/VITAMIN D3 200MG-6.25
TABLET ORAL
COMMUNITY
Start: 2020-11-20 | End: 2022-11-09 | Stop reason: SDUPTHER

## 2020-12-03 RX ORDER — LEUCOVORIN CALCIUM 350 MG/17.5ML
INJECTION, POWDER, LYOPHILIZED, FOR SOLUTION INTRAMUSCULAR; INTRAVENOUS
COMMUNITY
Start: 2020-10-19 | End: 2021-04-21

## 2020-12-03 RX ORDER — TRASTUZUMAB 150 MG/7.4ML
INJECTION, POWDER, LYOPHILIZED, FOR SOLUTION INTRAVENOUS
COMMUNITY
Start: 2020-10-19 | End: 2021-04-21

## 2020-12-03 ASSESSMENT — VISUAL ACUITY
OS_CC: 20/30
OD_CC: 20/30

## 2020-12-03 ASSESSMENT — FIBROSIS 4 INDEX: FIB4 SCORE: 2.98

## 2020-12-04 NOTE — PROGRESS NOTES
Subjective:      Pt is a 55 y.o. male who presents with Employment Physical (CDL physical )            HPI  This is a new problem. 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 Pt notes hx of DM@ with insulin dependence and hx of metastatic colon cancer to liver. Pt notes his cancer treatment causes him type 2 DM. Pt denies CP, SOB, NVD, paresthesias, headaches, dizziness, change in vision, hives, or joint pain. Pt states current pain is 0/10. The pt's medication list, problem list, and allergies have been evaluated and reviewed during today's visit.    PMH:  Past Medical History:   Diagnosis Date   • Bowel habit changes     constipation   • Cancer (HCC) 2018    Colon CA with Liver Mets   • Dental disorder     dentures    • Hiatus hernia syndrome    • Hypertension     No meds at this time   • Indigestion    • Pain     liver    • Sleep apnea     cpap    • Snoring        PSH:  Past Surgical History:   Procedure Laterality Date   • COLONOSCOPY         Fam Hx:    family history includes Diabetes in his brother and father; Hypertension in his father and mother.  Family Status   Relation Name Status   • Mo  Alive   • Fa  Alive   • Bro  (Not Specified)       Soc HX:  Social History     Socioeconomic History   • Marital status:      Spouse name: Not on file   • Number of children: Not on file   • Years of education: Not on file   • Highest education level: Not on file   Occupational History   • Not on file   Social Needs   • Financial resource strain: Not on file   • Food insecurity     Worry: Not on file     Inability: Not on file   • Transportation needs     Medical: Not on file     Non-medical: Not on file   Tobacco Use   • Smoking status: Former Smoker     Packs/day: 1.00     Years: 15.00     Pack years: 15.00     Quit date: 1998     Years since quittin.9   • Smokeless tobacco: Never Used   Substance and Sexual Activity   • Alcohol use: No   • Drug use: Yes     Types: Inhaled, Marijuana     Comment:  somtimes   • Sexual activity: Not on file   Lifestyle   • Physical activity     Days per week: Not on file     Minutes per session: Not on file   • Stress: Not on file   Relationships   • Social connections     Talks on phone: Not on file     Gets together: Not on file     Attends Zoroastrianism service: Not on file     Active member of club or organization: Not on file     Attends meetings of clubs or organizations: Not on file     Relationship status: Not on file   • Intimate partner violence     Fear of current or ex partner: Not on file     Emotionally abused: Not on file     Physically abused: Not on file     Forced sexual activity: Not on file   Other Topics Concern   • Not on file   Social History Narrative   • Not on file         Medications:    Current Outpatient Medications:   •  hydrOXYzine pamoate (VISTARIL) 50 MG Cap, Take 1 Cap by mouth 3 times a day as needed for Itching., Disp: 30 Cap, Rfl: 0  •  methylPREDNISolone (MEDROL DOSEPAK) 4 MG Tablet Therapy Pack, Take 4 mg by mouth every day. Follow schedule on package instructions., Disp: , Rfl:   •  insulin glargine (LANTUS SOLOSTAR) 100 UNIT/ML Solution Pen-injector injection, Inject 20 Units as instructed every evening., Disp: 3 Each, Rfl: 0  •  NOVOLIN R 100 UNIT/ML Solution, , Disp: , Rfl:   •  mupirocin (BACTROBAN) 2 % Ointment, as needed. For rash from vectibix, Disp: , Rfl:   •  POTASSIUM CHLORIDE PO, Take 20 mEq by mouth every day., Disp: , Rfl:   •  Lancets, Lancets order: Lancets for  meter. Sig: use bid and prn ssx high or low sugar. #100 RF x 3, Disp: 100 Each, Rfl: 0  •  Blood Glucose Test Strips, Test strips order: Test strips for  meter. Sig: use bid and prn ssx high or low sugar #100 RF x 3, Disp: 100 Each, Rfl: 0  •  Blood Glucose Monitoring Suppl Device, Meter: Dispense Device of Insurance Preference. Sig. Use as directed for blood sugar monitoring. #1. NR., Disp: 1 Device, Rfl: 0  •  Insulin Pen Needle (PEN NEEDLES) 31G X 6 MM Misc, Use as  "directed with insulin pen, Disp: 1 Each, Rfl: 2  •  nystatin (MYCOSTATIN) 740128 UNIT/ML Suspension, SWISH AND SWALLOW 5 MILLILITERS PO QID, Disp: , Rfl:   •  ondansetron (ZOFRAN ODT) 4 MG TABLET DISPERSIBLE, Take 4 mg by mouth every 6 hours as needed for Nausea., Disp: , Rfl:   •  DOXYCYCLINE PO, Take  by mouth., Disp: , Rfl:   •  lidocaine-prilocaine (EMLA) 2.5-2.5 % Cream, , Disp: , Rfl:   •  acetaminophen (TYLENOL) 500 MG Tab, Take 500-1,000 mg by mouth every 6 hours as needed., Disp: , Rfl:   •  LORazepam (ATIVAN) 1 MG Tab, Take 1 mg by mouth 1 time daily as needed (nausea)., Disp: , Rfl:   •  baclofen (LIORESAL) 10 MG Tab, Take 10 mg by mouth 3 times a day as needed., Disp: , Rfl:   •  therapeutic multivitamin-minerals (THERAGRAN-M) Tab, Take 1 Tab by mouth every day., Disp: , Rfl:       Allergies:  Bee venom, Compazine, and Oxaliplatin    ROS  Constitutional: Negative for fever, chills and malaise/fatigue.   HENT: Negative for congestion and sore throat.    Eyes: Negative for blurred vision, double vision and photophobia.   Respiratory: Negative for cough and shortness of breath.  Cardiovascular: Negative for chest pain and palpitations.   Gastrointestinal: Negative for heartburn, nausea, vomiting, abdominal pain, diarrhea and constipation.   Genitourinary: Negative for dysuria and flank pain.   Musculoskeletal: Negative for joint pain and myalgias.   Skin: Negative for itching and rash.   Neurological: Negative for dizziness, tingling and headaches.   Endo/Heme/Allergies: Does not bruise/bleed easily.   Psychiatric/Behavioral: Negative for depression. The patient is not nervous/anxious.           Objective:     /82   Pulse 86   Temp 36.9 °C (98.4 °F) (Temporal)   Resp 16   Ht 1.88 m (6' 2\")   Wt 106.3 kg (234 lb 6.4 oz)   SpO2 97%   BMI 30.10 kg/m²      Physical Exam      Constitutional: PT is oriented to person, place, and time. PT appears well-developed and well-nourished. No distress.   HENT: "   Head: Normocephalic and atraumatic.   Mouth/Throat: Oropharynx is clear and moist. No oropharyngeal exudate.   Eyes: Conjunctivae normal and EOM are normal. Pupils are equal, round, and reactive to light.   Neck: Normal range of motion. Neck supple. No thyromegaly present.   Cardiovascular: Normal rate, regular rhythm, normal heart sounds and intact distal pulses.  Exam reveals no gallop and no friction rub.    No murmur heard.  Pulmonary/Chest: Effort normal and breath sounds normal. No respiratory distress. PT has no wheezes. PT has no rales. Pt exhibits no tenderness.   Abdominal: Soft. Bowel sounds are normal. PT exhibits no distension and no mass. There is no tenderness. There is no rebound and no guarding.   Musculoskeletal: Normal range of motion. PT exhibits no edema and no tenderness.   Neurological: PT is alert and oriented to person, place, and time. PT has normal reflexes. No cranial nerve deficit.   Skin: Skin is warm and dry. No rash noted. PT is not diaphoretic. No erythema.       Psychiatric: PT has a normal mood and affect. PT behavior is normal. Judgment and thought content normal.          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 Insulin dependent type 2 diabetes mellitus (HCC)      3. Metastatic colon cancer to liver (HCC)    I am not able to clear pt at this time for CDL physical  Called to consult Dr. Mitchell Ngo in [facility] and he sent me the Diabetes Forms that the pt would need to have filled out by his Endocrinologist as he is insulin dependent and then once forms completed may be reseen by Dr. Ngo in [facility] at [facility] to complete CDL physical.   Pt states he does not have DM2 but his cancer treatment caused him to now have it and need for insulin therapy  Rest, fluids encouraged.  AVS with medical info given.  Pt was in full understanding and agreement with the plan.  Differential diagnosis, natural history, supportive care, and  indications for immediate follow-up discussed. All questions answered. Patient agrees with the plan of care.  Follow-up as needed if symptoms worsen or fail to improve to PCP, Urgent care or Emergency Room.

## 2020-12-04 NOTE — PATIENT INSTRUCTIONS
Insulin Treatment for Diabetes Mellitus  Diabetes (diabetes mellitus) is a long-term (chronic) disease. It occurs when the body does not properly use sugar (glucose) that is released from food after digestion. Glucose levels are controlled by a hormone called insulin. Insulin is made in the pancreas, which is an organ behind the stomach.  · If you have type 1 diabetes, you must take insulin because your pancreas does not make any.  · If you have type 2 diabetes, you might need to take insulin along with other medicines. In type 2 diabetes, one or both of these problems may be present:  ? The pancreas does not make enough insulin.  ? Cells in the body do not respond properly to insulin that the body makes (insulin resistance).  You must use insulin correctly to control your diabetes. You must have some insulin in your body at all times. Insulin treatment varies depending on your type of diabetes, your treatment goals, and your medical history. Ask questions to understand your insulin treatment plan so you can be an active partner in managing your diabetes.  How is insulin given?  Insulin can only be given through a shot (injection). It is injected using a syringe and needle, an insulin pen, a pump, or a jet injector. Your health care provider will:  · Prescribe the type and amount of insulin that you need.  · Tell you when you should inject your insulin.  Where on the body should insulin be injected?  Insulin is injected into a layer of fatty tissue under the skin. Good places to inject insulin include:  · Abdomen. Generally, the abdomen is the best place to inject insulin. However, you should avoid any area that is less than 2 inches (5 cm) from the belly button (navel).  · Front of thigh.  · Upper, outer side of thigh.  · Upper, outer side of arm.  · Upper, outer part of buttock.  It is important to:  · Give your injection in a slightly different place each time. This helps to prevent irritation and improve  absorption.  · Avoid injecting into areas that have scar tissue.  Usually, you will give yourself insulin injections. Others can also be taught how to give you injections. You will use a special type of syringe that is made only for insulin. Some people may have an insulin pump that delivers insulin steadily through a tube (cannula) that is placed under the skin.  What are the different types of insulin?  The following information is a general guide to different types of insulin. Specifics vary depending on the insulin product that your health care provider prescribes.  · Rapid-acting insulin:  ? Starts working quickly, in as little as 5 minutes.  ? Can last for 4-6 hours (or sometimes longer).  ? Works well when taken right before a meal to quickly lower your blood glucose.  · Short-acting insulin:  ? Starts working in about 30 minutes.  ? Can last for 6-10 hours.  ? Should be taken about 30 minutes before you start eating a meal.  · Intermediate-acting insulin:  ? Starts working in 1-2 hours.  ? Lasts for about 10-18 hours.  ? Lowers your blood glucose for a longer period of time, but it is not as effective for lowering blood glucose right after a meal.  · Long-acting insulin:  ? Mimics the small amount of insulin that your pancreas usually produces throughout the day.  ? Should be used one or two times a day.  ? Is usually used in combination with other types of insulin or other medicines.  · Concentrated insulin, or U-500 insulin:  ? Contains a higher dose of insulin than most rapid-acting insulins. U-500 insulin has 5 times the amount of insulin per 1 mL.  ? Should only be used with the special U-500 syringe or U-500 insulin pen. It is dangerous to use the wrong type of syringe with this insulin.  What are the side effects of insulin?  Possible side effects of insulin treatment include:  · Low blood glucose (hypoglycemia).  · Weight gain.  · High blood glucose (hyperglycemia).  · Skin injury or  irritation.  Some of these side effects can be caused by using improper injection technique. Be sure to learn how to inject insulin properly.  What are common terms associated with insulin treatment?  Some terms that you might hear include:  · Basal insulin, or basal rate. This is the constant amount of insulin that needs to be present in your body to keep your blood glucose levels stable. People who have type 1 diabetes need basal insulin in a steady (continuous) dose 24 hours a day.  ? Usually, intermediate-acting or long-acting insulin is used one or two times a day to manage basal insulin levels.  ? Medicines that are taken by mouth may also be recommended to manage basal insulin levels.  · Prandial or nutrition insulin. This refers to meal-related insulin.  ? Blood glucose rises quickly after a meal (postprandial). Rapid-acting or short-acting insulin can be used right before a meal (preprandial) to quickly lower your blood glucose.  ? You may be instructed to adjust the amount of prandial insulin that you take, based on how much carbohydrate (starch) is in your meal.  · Corrective insulin. This may also be called a correction dose or supplemental dose. This is a small amount of rapid-acting or short-acting insulin that can be used to lower your blood glucose if it is too high. You may be instructed to check your blood glucose at certain times of the day and use corrective insulin as needed.  · Tight control, or intensive therapy. This means keeping your blood glucose as close to your target as possible, and preventing your blood glucose from getting too high after meals. People who have tight control of their diabetes have fewer long-term problems caused by diabetes.  Follow these instructions at home:  Talk with your health care provider or pharmacist about the type of insulin you should take and when you should take it. You should know when your insulin goes up the most (peaks) and when it wears off. You  need this information so you can plan your meals and exercise. Work with your health care provider to:  · Check your blood glucose every day. Your health care provider will tell you how often and when you should do this.  · Manage your:  ? Weight.  ? Blood pressure.  ? Cholesterol.  ? Stress.  · Eat a healthy diet.  · Exercise regularly.  Summary  · Diabetes is a long-term (chronic) disease. It occurs when the body does not properly use sugar (glucose) that is released from food after digestion. Glucose levels are controlled by a hormone called insulin, which is made in an organ behind your stomach (pancreas).  · You must use insulin correctly to control your diabetes. You must have some insulin in your body at all times.  · Insulin treatment varies depending on your type of diabetes, your treatment goals, and your medical history.  · Talk with your health care provider or pharmacist about the type of insulin you should take and when you should take it.  · Check your blood glucose every day. Your health care provider will tell you how often and when you should check it.  This information is not intended to replace advice given to you by your health care provider. Make sure you discuss any questions you have with your health care provider.  Document Released: 03/16/2010 Document Revised: 12/21/2018 Document Reviewed: 01/20/2017  [employer] Patient Education © 2020 [employer]

## 2020-12-08 RX ORDER — INSULIN GLARGINE 100 [IU]/ML
20 INJECTION, SOLUTION SUBCUTANEOUS EVERY EVENING
Qty: 3 EACH | Refills: 0 | Status: SHIPPED | OUTPATIENT
Start: 2020-12-08 | End: 2021-01-11 | Stop reason: SDUPTHER

## 2020-12-08 NOTE — TELEPHONE ENCOUNTER
Has est apt. With you on 12/29    Was the patient seen in the last year in this department? 6/5/20    Does patient have an active prescription for medications requested? Yes    Received Request Via: Pharmacy    Outpatient Infusion Services on 11/30/2020   Component Date Value   • WBC 11/30/2020 6.1    • RBC 11/30/2020 3.87*   • Hemoglobin 11/30/2020 12.0*   • Hematocrit 11/30/2020 36.0*   • MCV 11/30/2020 93.0    • MCH 11/30/2020 31.0    • MCHC 11/30/2020 33.3*   • RDW 11/30/2020 58.4*   • Platelet Count 11/30/2020 83*   • MPV 11/30/2020 11.4    • Neutrophils-Polys 11/30/2020 70.80    • Lymphocytes 11/30/2020 16.40*   • Monocytes 11/30/2020 9.30    • Eosinophils 11/30/2020 2.30    • Basophils 11/30/2020 0.70    • Immature Granulocytes 11/30/2020 0.50    • Nucleated RBC 11/30/2020 0.00    • Neutrophils (Absolute) 11/30/2020 4.33    • Lymphs (Absolute) 11/30/2020 1.00    • Monos (Absolute) 11/30/2020 0.57    • Eos (Absolute) 11/30/2020 0.14    • Baso (Absolute) 11/30/2020 0.04    • Immature Granulocytes (a* 11/30/2020 0.03    • NRBC (Absolute) 11/30/2020 0.00    • Sodium 11/30/2020 139    • Potassium 11/30/2020 3.5*   • Chloride 11/30/2020 106    • Co2 11/30/2020 24    • Anion Gap 11/30/2020 9.0    • Glucose 11/30/2020 140*   • Bun 11/30/2020 8    • Creatinine 11/30/2020 0.61    • Calcium 11/30/2020 9.2    • AST(SGOT) 11/30/2020 25    • ALT(SGPT) 11/30/2020 32    • Alkaline Phosphatase 11/30/2020 133*   • Total Bilirubin 11/30/2020 1.2    • Albumin 11/30/2020 3.9    • Total Protein 11/30/2020 6.3    • Globulin 11/30/2020 2.4    • A-G Ratio 11/30/2020 1.6    • Magnesium 11/30/2020 1.5    • Carcinoembryonic Antigen 11/30/2020 50.4*   • GFR If  11/30/2020 >60    • GFR If Non  Ameri* 11/30/2020 >60    Hospital Outpatient Visit on 11/12/2020   Component Date Value   • Color 11/12/2020 Yellow    • Character 11/12/2020 Clear    • Specific Gravity 11/12/2020 1.013    • Ph 11/12/2020 6.0    • Glucose  11/12/2020 >=1000*   • Ketones 11/12/2020 Negative    • Protein 11/12/2020 Negative    • Bilirubin 11/12/2020 Negative    • Urobilinogen, Urine 11/12/2020 0.2    • Nitrite 11/12/2020 Negative    • Leukocyte Esterase 11/12/2020 Negative    • Occult Blood 11/12/2020 Negative    • Micro Urine Req 11/12/2020 see below    • WBC 11/12/2020 9.2    • RBC 11/12/2020 4.28*   • Hemoglobin 11/12/2020 12.9*   • Hematocrit 11/12/2020 38.7*   • MCV 11/12/2020 90.4    • MCH 11/12/2020 30.1    • MCHC 11/12/2020 33.3*   • RDW 11/12/2020 51.8*   • Platelet Count 11/12/2020 117*   • MPV 11/12/2020 10.4    • Neutrophils-Polys 11/12/2020 78.40*   • Lymphocytes 11/12/2020 9.70*   • Monocytes 11/12/2020 8.50    • Eosinophils 11/12/2020 0.20    • Basophils 11/12/2020 0.30    • Immature Granulocytes 11/12/2020 2.90*   • Nucleated RBC 11/12/2020 0.00    • Neutrophils (Absolute) 11/12/2020 7.23    • Lymphs (Absolute) 11/12/2020 0.89*   • Monos (Absolute) 11/12/2020 0.78    • Eos (Absolute) 11/12/2020 0.02    • Baso (Absolute) 11/12/2020 0.03    • Immature Granulocytes (a* 11/12/2020 0.27*   • NRBC (Absolute) 11/12/2020 0.00    • Magnesium 11/12/2020 1.9    • Carcinoembryonic Antigen 11/12/2020 38.1*   • Sodium 11/12/2020 135    • Potassium 11/12/2020 4.2    • Chloride 11/12/2020 102    • Co2 11/12/2020 24    • Anion Gap 11/12/2020 9.0    • Glucose 11/12/2020 229*   • Bun 11/12/2020 11    • Creatinine 11/12/2020 0.62    • Calcium 11/12/2020 8.8    • AST(SGOT) 11/12/2020 19    • ALT(SGPT) 11/12/2020 50    • Alkaline Phosphatase 11/12/2020 112*   • Total Bilirubin 11/12/2020 0.8    • Albumin 11/12/2020 3.5    • Total Protein 11/12/2020 5.5*   • Globulin 11/12/2020 2.0    • A-G Ratio 11/12/2020 1.8    • GFR If  11/12/2020 >60    • GFR If Non  Ameri* 11/12/2020 >60    Outpatient Infusion Services on 11/02/2020   Component Date Value   • WBC 11/02/2020 11.4*   • RBC 11/02/2020 4.18*   • Hemoglobin 11/02/2020 12.5*   •  Hematocrit 11/02/2020 38.7*   • MCV 11/02/2020 92.6    • MCH 11/02/2020 29.9    • MCHC 11/02/2020 32.3*   • RDW 11/02/2020 53.4*   • Platelet Count 11/02/2020 97*   • MPV 11/02/2020 10.9    • Neutrophils-Polys 11/02/2020 92.10*   • Lymphocytes 11/02/2020 4.50*   • Monocytes 11/02/2020 2.70    • Eosinophils 11/02/2020 0.00    • Basophils 11/02/2020 0.10    • Immature Granulocytes 11/02/2020 0.60    • Nucleated RBC 11/02/2020 0.00    • Neutrophils (Absolute) 11/02/2020 10.49*   • Lymphs (Absolute) 11/02/2020 0.51*   • Monos (Absolute) 11/02/2020 0.31    • Eos (Absolute) 11/02/2020 0.00    • Baso (Absolute) 11/02/2020 0.01    • Immature Granulocytes (a* 11/02/2020 0.07    • NRBC (Absolute) 11/02/2020 0.00    • Sodium 11/02/2020 135    • Potassium 11/02/2020 4.1    • Chloride 11/02/2020 102    • Co2 11/02/2020 22    • Anion Gap 11/02/2020 11.0    • Glucose 11/02/2020 298*   • Bun 11/02/2020 15    • Creatinine 11/02/2020 0.59    • Calcium 11/02/2020 9.6    • AST(SGOT) 11/02/2020 31    • ALT(SGPT) 11/02/2020 62*   • Alkaline Phosphatase 11/02/2020 176*   • Total Bilirubin 11/02/2020 0.5    • Albumin 11/02/2020 3.9    • Total Protein 11/02/2020 6.5    • Globulin 11/02/2020 2.6    • A-G Ratio 11/02/2020 1.5    • Magnesium 11/02/2020 2.0    • Carcinoembryonic Antigen 11/02/2020 24.4*   • GFR If  11/02/2020 >60    • GFR If Non  Ameri* 11/02/2020 >60    Outpatient Infusion Services on 10/19/2020   Component Date Value   • WBC 10/19/2020 5.9    • RBC 10/19/2020 4.16*   • Hemoglobin 10/19/2020 12.5*   • Hematocrit 10/19/2020 37.7*   • MCV 10/19/2020 90.6    • MCH 10/19/2020 30.0    • MCHC 10/19/2020 33.2*   • RDW 10/19/2020 52.0*   • Platelet Count 10/19/2020 89*   • MPV 10/19/2020 9.9    • Neutrophils-Polys 10/19/2020 73.50*   • Lymphocytes 10/19/2020 16.60*   • Monocytes 10/19/2020 7.40    • Eosinophils 10/19/2020 1.70    • Basophils 10/19/2020 0.50    • Immature Granulocytes 10/19/2020 0.30    •  Nucleated RBC 10/19/2020 0.00    • Neutrophils (Absolute) 10/19/2020 4.30    • Lymphs (Absolute) 10/19/2020 0.97*   • Monos (Absolute) 10/19/2020 0.43    • Eos (Absolute) 10/19/2020 0.10    • Baso (Absolute) 10/19/2020 0.03    • Immature Granulocytes (a* 10/19/2020 0.02    • NRBC (Absolute) 10/19/2020 0.00    • Sodium 10/19/2020 138    • Potassium 10/19/2020 3.5*   • Chloride 10/19/2020 103    • Co2 10/19/2020 23    • Anion Gap 10/19/2020 12.0    • Glucose 10/19/2020 171*   • Bun 10/19/2020 15    • Creatinine 10/19/2020 0.79    • Calcium 10/19/2020 9.0    • AST(SGOT) 10/19/2020 28    • ALT(SGPT) 10/19/2020 39    • Alkaline Phosphatase 10/19/2020 167*   • Total Bilirubin 10/19/2020 1.5    • Albumin 10/19/2020 4.0    • Total Protein 10/19/2020 6.3    • Globulin 10/19/2020 2.3    • A-G Ratio 10/19/2020 1.7    • Magnesium 10/19/2020 1.9    • Carcinoembryonic Antigen 10/19/2020 30.7*   • GFR If  10/19/2020 >60    • GFR If Non  Ameri* 10/19/2020 >60    Hospital Outpatient Visit on 10/02/2020   Component Date Value   • Color 10/02/2020 Yellow    • Character 10/02/2020 Clear    • Specific Gravity 10/02/2020 1.016    • Ph 10/02/2020 6.0    • Glucose 10/02/2020 Negative    • Ketones 10/02/2020 Negative    • Protein 10/02/2020 Negative    • Bilirubin 10/02/2020 Negative    • Urobilinogen, Urine 10/02/2020 1.0    • Nitrite 10/02/2020 Negative    • Leukocyte Esterase 10/02/2020 Negative    • Occult Blood 10/02/2020 Negative    • Micro Urine Req 10/02/2020 see below    • WBC 10/02/2020 4.9    • RBC 10/02/2020 4.34*   • Hemoglobin 10/02/2020 13.0*   • Hematocrit 10/02/2020 40.3*   • MCV 10/02/2020 92.9    • MCH 10/02/2020 30.0    • MCHC 10/02/2020 32.3*   • RDW 10/02/2020 54.3*   • Platelet Count 10/02/2020 101*   • MPV 10/02/2020 10.6    • Neutrophils-Polys 10/02/2020 65.40    • Lymphocytes 10/02/2020 21.60*   • Monocytes 10/02/2020 9.10    • Eosinophils 10/02/2020 2.90    • Basophils 10/02/2020 0.80    •  Immature Granulocytes 10/02/2020 0.20    • Nucleated RBC 10/02/2020 0.00    • Neutrophils (Absolute) 10/02/2020 3.17    • Lymphs (Absolute) 10/02/2020 1.05    • Monos (Absolute) 10/02/2020 0.44    • Eos (Absolute) 10/02/2020 0.14    • Baso (Absolute) 10/02/2020 0.04    • Immature Granulocytes (a* 10/02/2020 0.01    • NRBC (Absolute) 10/02/2020 0.00    • Magnesium 10/02/2020 2.1    • Carcinoembryonic Antigen 10/02/2020 35.4*   • Sodium 10/02/2020 140    • Potassium 10/02/2020 4.0    • Chloride 10/02/2020 103    • Co2 10/02/2020 25    • Anion Gap 10/02/2020 12.0    • Glucose 10/02/2020 134*   • Bun 10/02/2020 12    • Creatinine 10/02/2020 0.76    • Calcium 10/02/2020 9.3    • AST(SGOT) 10/02/2020 27    • ALT(SGPT) 10/02/2020 33    • Alkaline Phosphatase 10/02/2020 186*   • Total Bilirubin 10/02/2020 0.9    • Albumin 10/02/2020 3.9    • Total Protein 10/02/2020 6.3    • Globulin 10/02/2020 2.4    • A-G Ratio 10/02/2020 1.6    • GFR If  10/02/2020 >60    • GFR If Non  Ameri* 10/02/2020 >60    Hospital Outpatient Visit on 09/17/2020   Component Date Value   • Color 09/17/2020 Yellow    • Character 09/17/2020 Clear    • Specific Gravity 09/17/2020 1.020    • Ph 09/17/2020 6.0    • Glucose 09/17/2020 Negative    • Ketones 09/17/2020 Negative    • Protein 09/17/2020 Negative    • Bilirubin 09/17/2020 Negative    • Urobilinogen, Urine 09/17/2020 1.0    • Nitrite 09/17/2020 Negative    • Leukocyte Esterase 09/17/2020 Negative    • Occult Blood 09/17/2020 Negative    • Micro Urine Req 09/17/2020 see below    • WBC 09/17/2020 5.8    • RBC 09/17/2020 3.87*   • Hemoglobin 09/17/2020 11.7*   • Hematocrit 09/17/2020 35.8*   • MCV 09/17/2020 92.5    • MCH 09/17/2020 30.2    • MCHC 09/17/2020 32.7*   • RDW 09/17/2020 55.8*   • Platelet Count 09/17/2020 143*   • MPV 09/17/2020 10.8    • Neutrophils-Polys 09/17/2020 66.90    • Lymphocytes 09/17/2020 20.10*   • Monocytes 09/17/2020 10.10    • Eosinophils  09/17/2020 2.10    • Basophils 09/17/2020 0.50    • Immature Granulocytes 09/17/2020 0.30    • Nucleated RBC 09/17/2020 0.00    • Neutrophils (Absolute) 09/17/2020 3.85    • Lymphs (Absolute) 09/17/2020 1.16    • Monos (Absolute) 09/17/2020 0.58    • Eos (Absolute) 09/17/2020 0.12    • Baso (Absolute) 09/17/2020 0.03    • Immature Granulocytes (a* 09/17/2020 0.02    • NRBC (Absolute) 09/17/2020 0.00    • Magnesium 09/17/2020 2.2    • Carcinoembryonic Antigen 09/17/2020 33.7*   • Sodium 09/17/2020 141    • Potassium 09/17/2020 3.6    • Chloride 09/17/2020 104    • Co2 09/17/2020 23    • Anion Gap 09/17/2020 14.0    • Glucose 09/17/2020 201*   • Bun 09/17/2020 12    • Creatinine 09/17/2020 0.73    • Calcium 09/17/2020 9.2    • AST(SGOT) 09/17/2020 24    • ALT(SGPT) 09/17/2020 33    • Alkaline Phosphatase 09/17/2020 180*   • Total Bilirubin 09/17/2020 0.6    • Albumin 09/17/2020 3.8    • Total Protein 09/17/2020 6.2    • Globulin 09/17/2020 2.4    • A-G Ratio 09/17/2020 1.6    • GFR If  09/17/2020 >60    • GFR If Non  Ameri* 09/17/2020 >60    Hospital Outpatient Visit on 09/04/2020   Component Date Value   • Eject.Frac. MOD BP 09/04/2020 61.5    • Eject.Frac. MOD 4C 09/04/2020 59.83    • Eject.Frac. MOD 2C 09/04/2020 62.47    • Left Ventrical Ejection * 09/04/2020 60    Hospital Outpatient Visit on 09/04/2020   Component Date Value   • Color 09/04/2020 Yellow    • Character 09/04/2020 Clear    • Specific Gravity 09/04/2020 1.027    • Ph 09/04/2020 5.5    • Glucose 09/04/2020 Negative    • Ketones 09/04/2020 Trace*   • Protein 09/04/2020 Negative    • Bilirubin 09/04/2020 Negative    • Urobilinogen, Urine 09/04/2020 1.0    • Nitrite 09/04/2020 Negative    • Leukocyte Esterase 09/04/2020 Negative    • Occult Blood 09/04/2020 Negative    • Micro Urine Req 09/04/2020 see below    • WBC 09/04/2020 4.5*   • RBC 09/04/2020 4.16*   • Hemoglobin 09/04/2020 12.5*   • Hematocrit 09/04/2020 38.7*   • MCV  09/04/2020 93.0    • MCH 09/04/2020 30.0    • MCHC 09/04/2020 32.3*   • RDW 09/04/2020 54.2*   • Platelet Count 09/04/2020 81*   • MPV 09/04/2020 10.9    • Neutrophils-Polys 09/04/2020 63.40    • Lymphocytes 09/04/2020 19.90*   • Monocytes 09/04/2020 11.60    • Eosinophils 09/04/2020 4.00    • Basophils 09/04/2020 0.70    • Immature Granulocytes 09/04/2020 0.40    • Nucleated RBC 09/04/2020 0.00    • Neutrophils (Absolute) 09/04/2020 2.84    • Lymphs (Absolute) 09/04/2020 0.89*   • Monos (Absolute) 09/04/2020 0.52    • Eos (Absolute) 09/04/2020 0.18    • Baso (Absolute) 09/04/2020 0.03    • Immature Granulocytes (a* 09/04/2020 0.02    • NRBC (Absolute) 09/04/2020 0.00    • Magnesium 09/04/2020 1.9    • Carcinoembryonic Antigen 09/04/2020 33.0*   • Sodium 09/04/2020 141    • Potassium 09/04/2020 3.8    • Chloride 09/04/2020 107    • Co2 09/04/2020 24    • Anion Gap 09/04/2020 10.0    • Glucose 09/04/2020 130*   • Bun 09/04/2020 13    • Creatinine 09/04/2020 0.69    • Calcium 09/04/2020 9.3    • AST(SGOT) 09/04/2020 24    • ALT(SGPT) 09/04/2020 45    • Alkaline Phosphatase 09/04/2020 158*   • Total Bilirubin 09/04/2020 0.6    • Albumin 09/04/2020 3.9    • Total Protein 09/04/2020 6.1    • Globulin 09/04/2020 2.2    • A-G Ratio 09/04/2020 1.8    • GFR If  09/04/2020 >60    • GFR If Non  Ameri* 09/04/2020 >60    Hospital Outpatient Visit on 08/21/2020   Component Date Value   • Color 08/21/2020 Yellow    • Character 08/21/2020 Clear    • Specific Gravity 08/21/2020 1.012    • Ph 08/21/2020 7.5    • Glucose 08/21/2020 Negative    • Ketones 08/21/2020 Negative    • Protein 08/21/2020 Negative    • Bilirubin 08/21/2020 Negative    • Urobilinogen, Urine 08/21/2020 1.0    • Nitrite 08/21/2020 Negative    • Leukocyte Esterase 08/21/2020 Negative    • Occult Blood 08/21/2020 Negative    • Micro Urine Req 08/21/2020 see below    • WBC 08/21/2020 5.9    • RBC 08/21/2020 4.20*   • Hemoglobin 08/21/2020  12.6*   • Hematocrit 08/21/2020 38.9*   • MCV 08/21/2020 92.6    • MCH 08/21/2020 30.0    • MCHC 08/21/2020 32.4*   • RDW 08/21/2020 53.0*   • Platelet Count 08/21/2020 101*   • MPV 08/21/2020 10.6    • Neutrophils-Polys 08/21/2020 66.90    • Lymphocytes 08/21/2020 18.20*   • Monocytes 08/21/2020 11.70    • Eosinophils 08/21/2020 2.20    • Basophils 08/21/2020 0.70    • Immature Granulocytes 08/21/2020 0.30    • Nucleated RBC 08/21/2020 0.00    • Neutrophils (Absolute) 08/21/2020 3.96    • Lymphs (Absolute) 08/21/2020 1.08    • Monos (Absolute) 08/21/2020 0.69    • Eos (Absolute) 08/21/2020 0.13    • Baso (Absolute) 08/21/2020 0.04    • Immature Granulocytes (a* 08/21/2020 0.02    • NRBC (Absolute) 08/21/2020 0.00    • Magnesium 08/21/2020 1.9    • Carcinoembryonic Antigen 08/21/2020 46.2*   • Sodium 08/21/2020 141    • Potassium 08/21/2020 3.8    • Chloride 08/21/2020 103    • Co2 08/21/2020 27    • Anion Gap 08/21/2020 11.0    • Glucose 08/21/2020 110*   • Bun 08/21/2020 8    • Creatinine 08/21/2020 0.61    • Calcium 08/21/2020 9.6    • AST(SGOT) 08/21/2020 35    • ALT(SGPT) 08/21/2020 45    • Alkaline Phosphatase 08/21/2020 148*   • Total Bilirubin 08/21/2020 1.0    • Albumin 08/21/2020 4.0    • Total Protein 08/21/2020 6.4    • Globulin 08/21/2020 2.4    • A-G Ratio 08/21/2020 1.7    • GFR If  08/21/2020 >60    • GFR If Non  Ameri* 08/21/2020 >60    Outpatient Infusion Services on 08/08/2020   Component Date Value   • Magnesium 08/07/2020 1.9    • WBC 08/08/2020 4.3*   • RBC 08/08/2020 4.01*   • Hemoglobin 08/08/2020 12.0*   • Hematocrit 08/08/2020 37.4*   • MCV 08/08/2020 93.3    • MCH 08/08/2020 29.9    • MCHC 08/08/2020 32.1*   • RDW 08/08/2020 51.1*   • Platelet Count 08/08/2020 74*   • MPV 08/08/2020 10.1    • Neutrophils-Polys 08/08/2020 68.50    • Lymphocytes 08/08/2020 19.10*   • Monocytes 08/08/2020 8.60    • Eosinophils 08/08/2020 2.60    • Basophils 08/08/2020 0.70    • Immature  Granulocytes 08/08/2020 0.50    • Nucleated RBC 08/08/2020 0.00    • Neutrophils (Absolute) 08/08/2020 2.95    • Lymphs (Absolute) 08/08/2020 0.82*   • Monos (Absolute) 08/08/2020 0.37    • Eos (Absolute) 08/08/2020 0.11    • Baso (Absolute) 08/08/2020 0.03    • Immature Granulocytes (a* 08/08/2020 0.02    • NRBC (Absolute) 08/08/2020 0.00    There may be more visits with results that are not included.

## 2020-12-11 ENCOUNTER — HOSPITAL ENCOUNTER (OUTPATIENT)
Dept: LAB | Facility: MEDICAL CENTER | Age: 55
End: 2020-12-11
Attending: NURSE PRACTITIONER
Payer: MEDICAID

## 2020-12-11 LAB
ALBUMIN SERPL BCP-MCNC: 4.3 G/DL (ref 3.2–4.9)
ALBUMIN/GLOB SERPL: 1.8 G/DL
ALP SERPL-CCNC: 140 U/L (ref 30–99)
ALT SERPL-CCNC: 35 U/L (ref 2–50)
ANION GAP SERPL CALC-SCNC: 9 MMOL/L (ref 7–16)
AST SERPL-CCNC: 25 U/L (ref 12–45)
BILIRUB SERPL-MCNC: 0.8 MG/DL (ref 0.1–1.5)
BUN SERPL-MCNC: 9 MG/DL (ref 8–22)
CALCIUM SERPL-MCNC: 9.5 MG/DL (ref 8.5–10.5)
CEA SERPL-MCNC: 48.2 NG/ML (ref 0–3)
CHLORIDE SERPL-SCNC: 106 MMOL/L (ref 96–112)
CO2 SERPL-SCNC: 24 MMOL/L (ref 20–33)
CREAT SERPL-MCNC: 0.61 MG/DL (ref 0.5–1.4)
GLOBULIN SER CALC-MCNC: 2.4 G/DL (ref 1.9–3.5)
GLUCOSE SERPL-MCNC: 142 MG/DL (ref 65–99)
POTASSIUM SERPL-SCNC: 4 MMOL/L (ref 3.6–5.5)
PROT SERPL-MCNC: 6.7 G/DL (ref 6–8.2)
SODIUM SERPL-SCNC: 139 MMOL/L (ref 135–145)

## 2020-12-11 PROCEDURE — 80053 COMPREHEN METABOLIC PANEL: CPT

## 2020-12-11 PROCEDURE — 82378 CARCINOEMBRYONIC ANTIGEN: CPT

## 2020-12-11 PROCEDURE — 83036 HEMOGLOBIN GLYCOSYLATED A1C: CPT

## 2020-12-11 PROCEDURE — 36415 COLL VENOUS BLD VENIPUNCTURE: CPT

## 2020-12-12 LAB
EST. AVERAGE GLUCOSE BLD GHB EST-MCNC: 157 MG/DL
HBA1C MFR BLD: 7.1 % (ref 0–5.6)

## 2020-12-12 NOTE — PROGRESS NOTES
Pharmacy Chemotherapy Verification Note:    Patient Name: Gurmeet Jo      Dx: Metastatic Colorectal CA (KRAS/NRAS wild type, and ERBB2 [a HER2 family receptor] amplification)        Protocol: mFOLFOX+Panitumumab +trastuzumab       *Dosing Reference*  Panitumumab 6 mg/kg IV over 60 minutes on Day 1    4/25/20 added to treatment plan dose reduced to 4.5 mg/kg for tolerance   7/8/20 Omitted starting C9   7/25/20 dose reduced to 3 mg/kg for tolerance   9/4/20 discontinued from treatment plan for Cycle 13   C14- reordered at 3 mg/kg per MD Cody   C17- HELD for Rash   C18- Cont to HOLD for Rash  ~Followed by~  Trastuzumab 6 mg/kg IV over 90 minutes on Day 1 of Cycle 1, followed by  Trastuzumab 4 mg/kg IV over 30-60 minutes on Day 1 of Cycle 2    Confirmed: per Dr. Ross: Q2 week dosing   4/11/20 - Reload with 6 mg/kg per Dr. Chisholm  OXALIplatin 85 mg/m² IV over 2 hours on Day 1   - dose reduced to 68 mg/m² starting C1 for tolerance   3/3/20 - oxali removed from treatment plan d/t oxaliplatin allergy per Harman VRAGAS.  Leucovorin 400 mg/m² IV over 2 hours on Day 1, to run concurrent with OXALIplatin, followed by  Fluorouracil 400 mg/m² IVP on Day 1, followed by   C1 dose reduced to 360 mg/m² for tolerance  Fluorouracil 2400 mg/m² CIVI over 46-48 hours   C1 dose reduced to 2160 mg/m² for tolerance   14-day cycle until disease progression or unacceptable toxicity  *Dosing Reference*  NCCN Guidelines for Colon Cancer. V.2.2019.  Rohith TAVERAS et al. J Clin Oncol. 2010;28(31):4697-705  yeni Reyez al. J Clin Oncol. 2008;28(12):2006-12  Luisa SL, et al. Br J Cancer. 2002;87(4):393-9    Herceptin has limited data in colorectal cancer, but two regimens exist neither at the same loading dose or interval as Dr. Ross has chosen. Per [facility] progress note: Patient has undergone further [employer] testing of his tumor. He was found to be KRAS/NRAS wild type, which suggest he has an EGFR mutation and would be a  "candidate for EGFR targeted therapy. He also had an ERBB2 amplification which might suggest response to treatments like Herceptin.    Allergies:  Compazine and Oxaliplatin     /79   Pulse 87   Temp 36.4 °C (97.5 °F) (Temporal)   Resp 18   Ht 1.82 m (5' 11.65\")   Wt 108.9 kg (240 lb 1.3 oz)   SpO2 98%   BMI 32.88 kg/m²  Body surface area is 2.35 meters squared.     Labs 12/14/20:  ANC~ 4720 Plt = 138k   Hgb = 12.9    12/11/20: SCr = 0.61 mg/dL CrCl > 125 mL/min (min Cr 0.7)  AST/ALT/ALK = 25/35/140 TBili = 0.8    2/7/2020 ECHO: LVEF 60% (every 6 months)  9/4/20 ECHO: LVEF 60%     Drug Order   (Drug name, dose, route, IV Fluid & volume, frequency, number of doses) Cycle 20  Previous treatment: C19 on 11/30/20     Medication = Trastuzumab (Herceptin)  Base Dose = 4 mg/kg  Calc Dose: Base Dose x  108.9 kg = 436 mg  Final Dose = 450 mg  Route = IV  Fluid & Volume =  mL  Admin Duration = Over 30 minutes          Rounded to nearest vial size (within 10%) per rounding protocol, okay to treat with final dose     Medication = panitumumab (Vectibix)   Base Dose = 3 mg/kg  Calc Dose: Base Dose x 108.9 kg = 327 mg  Final Dose = 300 mg  Route = IV  Fluid & Volume =  mL  Admin Duration = Over 60 minutes         Rounded to nearest vial size (within 10%) per rounding protocol, okay to treat with final dose     Medication = Leucovorin (Wellcovorin)  Base Dose = 400 mg/m²  Calc Dose: Base Dose x 2.35 m² = 940 mg  Final Dose = 940 mg  Route = IV  Fluid & Volume = D5W 250 mL  Admin Duration = Over 30 minutes          <10% difference, okay to treat with final dose   Medication = Fluorouracil (5-FU)  Base Dose = 360 mg/m²  Calc Dose: Base Dose x 2.35 m² = 846 mg  Final Dose = 845 mg  Route = IVP  Fluid & Volume = 16.9 mL in syringe  Conc = 50 mg/mL  Admin Duration = Over 5 minutes          <10% difference, okay to treat with final dose   Medication = Fluorouracil (5-FU)  Base Dose = 2160 mg/m²  Calc Dose:Base Dose " x 2.35 m² = 5076 mg  Final Dose = 5075 mg  Route = CIVI   Fluid & Volume = 101.5 mL (+3 mL overfill = 104.5 mL)  Admin Duration = Over 46 hours to run at   2.2 mL/hour    Via CADD pump for home infusion      <10% difference, okay to treat with final dose     By my signature below, I confirm this process was performed independently with the BSA and all final chemotherapy dosing calculations congruent. I have reviewed the above chemotherapy order and that my calculation of the final dose and BSA (when applicable) corroborate those calculations of the  pharmacist. Discrepancies of 10% or greater in the written dose have been addressed and documented within the EPIC Progress notes.    Man Anderson, AbranD

## 2020-12-13 ENCOUNTER — TELEPHONE (OUTPATIENT)
Dept: ONCOLOGY | Facility: MEDICAL CENTER | Age: 55
End: 2020-12-13

## 2020-12-14 ENCOUNTER — OUTPATIENT INFUSION SERVICES (OUTPATIENT)
Dept: ONCOLOGY | Facility: MEDICAL CENTER | Age: 55
End: 2020-12-14
Attending: INTERNAL MEDICINE
Payer: MEDICAID

## 2020-12-14 VITALS
OXYGEN SATURATION: 98 % | HEIGHT: 72 IN | HEART RATE: 87 BPM | WEIGHT: 240.08 LBS | RESPIRATION RATE: 18 BRPM | BODY MASS INDEX: 32.52 KG/M2 | DIASTOLIC BLOOD PRESSURE: 79 MMHG | TEMPERATURE: 97.5 F | SYSTOLIC BLOOD PRESSURE: 130 MMHG

## 2020-12-14 DIAGNOSIS — C78.7 METASTATIC COLON CANCER TO LIVER (HCC): ICD-10-CM

## 2020-12-14 DIAGNOSIS — C18.9 METASTATIC COLON CANCER TO LIVER (HCC): ICD-10-CM

## 2020-12-14 LAB
BASOPHILS # BLD AUTO: 0.7 % (ref 0–1.8)
BASOPHILS # BLD: 0.05 K/UL (ref 0–0.12)
EOSINOPHIL # BLD AUTO: 0.15 K/UL (ref 0–0.51)
EOSINOPHIL NFR BLD: 2.2 % (ref 0–6.9)
ERYTHROCYTE [DISTWIDTH] IN BLOOD BY AUTOMATED COUNT: 55.3 FL (ref 35.9–50)
HCT VFR BLD AUTO: 39.2 % (ref 42–52)
HGB BLD-MCNC: 12.9 G/DL (ref 14–18)
IMM GRANULOCYTES # BLD AUTO: 0.04 K/UL (ref 0–0.11)
IMM GRANULOCYTES NFR BLD AUTO: 0.6 % (ref 0–0.9)
LYMPHOCYTES # BLD AUTO: 1.29 K/UL (ref 1–4.8)
LYMPHOCYTES NFR BLD: 18.5 % (ref 22–41)
MAGNESIUM SERPL-MCNC: 2 MG/DL (ref 1.5–2.5)
MCH RBC QN AUTO: 30.2 PG (ref 27–33)
MCHC RBC AUTO-ENTMCNC: 32.9 G/DL (ref 33.7–35.3)
MCV RBC AUTO: 91.8 FL (ref 81.4–97.8)
MONOCYTES # BLD AUTO: 0.72 K/UL (ref 0–0.85)
MONOCYTES NFR BLD AUTO: 10.3 % (ref 0–13.4)
NEUTROPHILS # BLD AUTO: 4.72 K/UL (ref 1.82–7.42)
NEUTROPHILS NFR BLD: 67.7 % (ref 44–72)
NRBC # BLD AUTO: 0 K/UL
NRBC BLD-RTO: 0 /100 WBC
PLATELET # BLD AUTO: 138 K/UL (ref 164–446)
PMV BLD AUTO: 10.5 FL (ref 9–12.9)
RBC # BLD AUTO: 4.27 M/UL (ref 4.7–6.1)
WBC # BLD AUTO: 7 K/UL (ref 4.8–10.8)

## 2020-12-14 PROCEDURE — A4212 NON CORING NEEDLE OR STYLET: HCPCS | Performed by: CLINICAL NURSE SPECIALIST

## 2020-12-14 PROCEDURE — 96413 CHEMO IV INFUSION 1 HR: CPT | Performed by: CLINICAL NURSE SPECIALIST

## 2020-12-14 PROCEDURE — 96375 TX/PRO/DX INJ NEW DRUG ADDON: CPT | Performed by: CLINICAL NURSE SPECIALIST

## 2020-12-14 PROCEDURE — 85025 COMPLETE CBC W/AUTO DIFF WBC: CPT

## 2020-12-14 PROCEDURE — 700105 HCHG RX REV CODE 258: Performed by: INTERNAL MEDICINE

## 2020-12-14 PROCEDURE — 700111 HCHG RX REV CODE 636 W/ 250 OVERRIDE (IP): Performed by: INTERNAL MEDICINE

## 2020-12-14 PROCEDURE — 83735 ASSAY OF MAGNESIUM: CPT

## 2020-12-14 PROCEDURE — 96411 CHEMO IV PUSH ADDL DRUG: CPT | Performed by: CLINICAL NURSE SPECIALIST

## 2020-12-14 PROCEDURE — G0498 CHEMO EXTEND IV INFUS W/PUMP: HCPCS | Performed by: CLINICAL NURSE SPECIALIST

## 2020-12-14 PROCEDURE — 96417 CHEMO IV INFUS EACH ADDL SEQ: CPT | Performed by: CLINICAL NURSE SPECIALIST

## 2020-12-14 PROCEDURE — 96367 TX/PROPH/DG ADDL SEQ IV INF: CPT | Performed by: CLINICAL NURSE SPECIALIST

## 2020-12-14 RX ORDER — HEPARIN SODIUM (PORCINE) LOCK FLUSH IV SOLN 100 UNIT/ML 100 UNIT/ML
500 SOLUTION INTRAVENOUS PRN
Status: CANCELLED | OUTPATIENT
Start: 2020-12-14

## 2020-12-14 RX ORDER — SODIUM CHLORIDE 9 MG/ML
INJECTION, SOLUTION INTRAVENOUS CONTINUOUS
Status: CANCELLED
Start: 2020-12-14

## 2020-12-14 RX ORDER — 0.9 % SODIUM CHLORIDE 0.9 %
VIAL (ML) INJECTION PRN
Status: CANCELLED | OUTPATIENT
Start: 2020-12-14

## 2020-12-14 RX ORDER — METHYLPREDNISOLONE SODIUM SUCCINATE 125 MG/2ML
125 INJECTION, POWDER, LYOPHILIZED, FOR SOLUTION INTRAMUSCULAR; INTRAVENOUS PRN
Status: CANCELLED | OUTPATIENT
Start: 2020-12-14

## 2020-12-14 RX ORDER — HEPARIN SODIUM (PORCINE) LOCK FLUSH IV SOLN 100 UNIT/ML 100 UNIT/ML
500 SOLUTION INTRAVENOUS PRN
Status: CANCELLED | OUTPATIENT
Start: 2020-12-15

## 2020-12-14 RX ORDER — 0.9 % SODIUM CHLORIDE 0.9 %
10 VIAL (ML) INJECTION PRN
Status: CANCELLED | OUTPATIENT
Start: 2020-12-14

## 2020-12-14 RX ORDER — ONDANSETRON 2 MG/ML
4 INJECTION INTRAMUSCULAR; INTRAVENOUS PRN
Status: CANCELLED | OUTPATIENT
Start: 2020-12-14

## 2020-12-14 RX ORDER — EPINEPHRINE 1 MG/ML(1)
0.5 AMPUL (ML) INJECTION PRN
Status: CANCELLED | OUTPATIENT
Start: 2020-12-14

## 2020-12-14 RX ORDER — ONDANSETRON 8 MG/1
8 TABLET, ORALLY DISINTEGRATING ORAL PRN
Status: CANCELLED | OUTPATIENT
Start: 2020-12-14

## 2020-12-14 RX ORDER — 0.9 % SODIUM CHLORIDE 0.9 %
3 VIAL (ML) INJECTION PRN
Status: CANCELLED | OUTPATIENT
Start: 2020-12-14

## 2020-12-14 RX ORDER — 0.9 % SODIUM CHLORIDE 0.9 %
20 VIAL (ML) INJECTION PRN
Status: CANCELLED | OUTPATIENT
Start: 2020-12-15

## 2020-12-14 RX ORDER — DIPHENHYDRAMINE HYDROCHLORIDE 50 MG/ML
50 INJECTION INTRAMUSCULAR; INTRAVENOUS PRN
Status: CANCELLED | OUTPATIENT
Start: 2020-12-14

## 2020-12-14 RX ORDER — PROCHLORPERAZINE MALEATE 10 MG
10 TABLET ORAL EVERY 6 HOURS PRN
Status: CANCELLED | OUTPATIENT
Start: 2020-12-14

## 2020-12-14 RX ADMIN — DIPHENHYDRAMINE HYDROCHLORIDE 50 MG: 50 INJECTION INTRAMUSCULAR; INTRAVENOUS at 14:44

## 2020-12-14 RX ADMIN — TRASTUZUMAB 450 MG: 150 INJECTION, POWDER, LYOPHILIZED, FOR SOLUTION INTRAVENOUS at 16:44

## 2020-12-14 RX ADMIN — ONDANSETRON 16 MG: 2 INJECTION INTRAMUSCULAR; INTRAVENOUS at 14:57

## 2020-12-14 RX ADMIN — PANITUMUMAB 300 MG: 400 SOLUTION INTRAVENOUS at 15:36

## 2020-12-14 RX ADMIN — FLUOROURACIL 5075 MG: 50 INJECTION, SOLUTION INTRAVENOUS at 18:08

## 2020-12-14 RX ADMIN — DEXAMETHASONE SODIUM PHOSPHATE 20 MG: 4 INJECTION, SOLUTION INTRA-ARTICULAR; INTRALESIONAL; INTRAMUSCULAR; INTRAVENOUS; SOFT TISSUE at 15:15

## 2020-12-14 RX ADMIN — FLUOROURACIL 845 MG: 50 INJECTION, SOLUTION INTRAVENOUS at 18:01

## 2020-12-14 RX ADMIN — LEUCOVORIN CALCIUM 940 MG: 350 INJECTION, POWDER, LYOPHILIZED, FOR SOLUTION INTRAMUSCULAR; INTRAVENOUS at 17:21

## 2020-12-14 ASSESSMENT — FIBROSIS 4 INDEX: FIB4 SCORE: 2.85

## 2020-12-14 NOTE — PROGRESS NOTES
Chemotherapy Verification - SECONDARY RN       Height = 182 cm  Weight = 108.9 kg  BSA = 2.35 m2       Medication: Vectibix  Dose: 6 mg/kg  Calculated Dose: 653.4 mg                             (In mg/m2, AUC, mg/kg)     Medication: Herceptin  Dose: 4 mg/kg  Calculated Dose: 435.6 mg                             (In mg/m2, AUC, mg/kg)    Medication: 5FU bolus inj.  Dose: 360 mg/m2  Calculated Dose: 846 mg                             (In mg/m2, AUC, mg/kg)    Medication: 5FU CADD  Dose: 2160 mg/m2  Calculated Dose: 5076 mg                             (In mg/m2, AUC, mg/kg)    I confirm that this process was performed independently.

## 2020-12-14 NOTE — PROGRESS NOTES
Chemotherapy Verification - PRIMARY RN      Height = 1.82m  Weight = 108.9kg  BSA = 2.35m2       Medication: panitumumab  Dose: 3mg/kg  Calculated Dose: 326.7mg                             (In mg/m2, AUC, mg/kg)     Medication: trastuzumab  Dose: 4mg/kg  Calculated Dose: 435.6mg                             (In mg/m2, AUC, mg/kg)    Medication: 5FU push  Dose: 360mg/m2  Calculated Dose: 846mg                             (In mg/m2, AUC, mg/kg)    Medication: 5FU CADD  Dose: 2160mg/m2  Calculated Dose: 5076mg                              (In mg/m2, AUC, mg/kg)      I confirm this process was performed independently with the BSA and all final chemotherapy dosing calculations congruent.  Any discrepancies of 10% or greater have been addressed with the chemotherapy pharmacist. The resolution of the discrepancy has been documented in the EPIC progress notes.

## 2020-12-14 NOTE — PROGRESS NOTES
"Pharmacy Chemotherapy Calculations:     Dx: Metastatic colorectal cancer [KRAS/NRAS wild type, ERBB2 (HER2) amplification]    Cycle 20  Previous treatment = 11/30/20    Regimen: mFOLFOX + Vectibix + Herceptin  *Dosing Reference*  Trastuzumab 6 mg/kg IV loading dose C1D1, then 4 mg/kg IV Day 1 of subsequent  cycles [confirmed \"every 2 weeks\", per MD; progress note: \"He also had an ERBB2  amplification which might suggest response to treatments like Herceptin\"]   > 7/8/20: Herceptin 2.5 weeks overdue.  Do not reload Herceptin per [facility] Dr. Ross.   IV Cycle 1 Day 1, then 250 mg/m2 IV Days 1 and 8 (confirmed  \"weekly\" per MD)   > C4 * * * cetuximab has been omitted * * *   > C5 * * * cetuximab replaced with panitumumab * * *      > Cody LEO reduced dose by 20% to  starting with C1D1 due to anticipated tolerance    > 3/14/20: Oxaliplatin discontinued from treatment  Panitumumab (Vectibix) IV D1 over 60 min   + added; change of therapy d/t cetuximab skin tox   > 4/25/20, initial dose reduction to 4.5 mg/kg d/t previous EGFR cutaneous toxicities    > C9 * * * Vectibix has been omitted * * *   > C10 Vectibix reordered at 3 mg/kg per MD Cody    > C13 * * * Vectibix has been omitted * * *   > C14 Vectibix reordered at 3 mg/kg per MD Cody   >C17 Vectibix HELD per Cody LEO              >C18 Vectibix held again per MD   >C19  11/30/20- vectibix resumed at 3mg/kg  Leucovorin 400 mg/m2 IV Day 1 followed by  Fluorouracil 400 mg/m2 IV Day 1 followed by   C1D1 reduced dose by 10% to 360 mg/m2 per MD Cody for anticipated tolerance   Fluorouracil  CIVI over 46-48 hours    C1D1 reduced dose by 10% to 2160 mg/m2 per MD Cody for anticipated tolerance   14-day cycle until DP or UT  NCCN guidelines for colon cancer V.2.2019  (cetuximab + chemo) Christine AP et al, OBEY 2017;317(23):2025-3079  (HER2 tx in HER2+ CRC) Kayleen RINALDI, et al. Lancet Oncol. 2019 Apr;20(4):518-530.    Allergies: Patient has no known allergies.  BP " "130/79   Pulse 87   Temp 36.4 °C (97.5 °F) (Temporal)   Resp 18   Ht 1.82 m (5' 11.65\")   Wt 108.9 kg (240 lb 1.3 oz)   SpO2 98%   BMI 32.88 kg/m²  Body surface area is 2.35 meters squared.     ECHO:  9/4/20 LVEF ~ 60% OK for 6 months per Cdoy LEO order  2/7/20 LVEF ~ 60%     Labs from 12/14/20 and 12/11/20  reviewed- all within treatment parameters       Panitumumab (Vectibix) 3mg/kg x 108.9kg = 327mg   Rounded to vial size (within 10%) per dose rounding protocol = 300mg IV    Trastuzumab (Herceptin) 4 mg/kg x 108.9kg = 436mg   Rounded to vial size (within 10%) per dose rounding protocol = 450 mg IV    Leucovorin 400 mg/m² x 2.35m² = 940mg   <10% difference, okay to treat with final dose = 940 mg IV over 30 minutes     Fluorouracil (Adrucil) 360 mg/m² x 2.35m² = 846mg   <10% difference, okay to treat with final dose = 845mg IV push    Fluorouracil (Adrucil) 2160 mg/m² x 2.35m² = 5076mg   <10% difference, okay to treat with final dose =  5075mg CIVI over 46 hrs at 2.2 mL/hr    Shari Amador, PharmD  "

## 2020-12-15 ENCOUNTER — OFFICE VISIT (OUTPATIENT)
Dept: OCCUPATIONAL MEDICINE | Facility: CLINIC | Age: 55
End: 2020-12-15

## 2020-12-15 DIAGNOSIS — Z02.4 ENCOUNTER FOR COMMERCIAL DRIVER MEDICAL EXAMINATION (CDME): ICD-10-CM

## 2020-12-15 NOTE — PROGRESS NOTES
Gurmeet is in infusion for C20 panitumumab, trastuzumab, leucovorin, 5FU.  C/o mucositis and small vesicles and erythema noted in his oral mucosa and tongue.  MD aware.  Port accessed with brisk blood return. CMP and CEA drawn previously. Lobito CBC and mag today. Mag 2.0 and K 4.0. No need to replace. Premedicated as ordered. All cancer treatment infused without issue.  CADD pump connected and secured at approximately 1808.  Patient discharged ambulatory to home in stable condition. He will return in 46 hours for pump disconnect.

## 2020-12-16 ENCOUNTER — OUTPATIENT INFUSION SERVICES (OUTPATIENT)
Dept: ONCOLOGY | Facility: MEDICAL CENTER | Age: 55
End: 2020-12-16
Attending: INTERNAL MEDICINE
Payer: MEDICAID

## 2020-12-16 VITALS
WEIGHT: 235.89 LBS | OXYGEN SATURATION: 98 % | HEART RATE: 82 BPM | HEIGHT: 72 IN | RESPIRATION RATE: 18 BRPM | SYSTOLIC BLOOD PRESSURE: 138 MMHG | DIASTOLIC BLOOD PRESSURE: 81 MMHG | BODY MASS INDEX: 31.95 KG/M2 | TEMPERATURE: 98 F

## 2020-12-16 DIAGNOSIS — C78.7 METASTATIC COLON CANCER TO LIVER (HCC): ICD-10-CM

## 2020-12-16 DIAGNOSIS — C18.9 METASTATIC COLON CANCER TO LIVER (HCC): ICD-10-CM

## 2020-12-16 PROCEDURE — 700111 HCHG RX REV CODE 636 W/ 250 OVERRIDE (IP): Performed by: INTERNAL MEDICINE

## 2020-12-16 PROCEDURE — 96523 IRRIG DRUG DELIVERY DEVICE: CPT

## 2020-12-16 RX ORDER — HEPARIN SODIUM (PORCINE) LOCK FLUSH IV SOLN 100 UNIT/ML 100 UNIT/ML
500 SOLUTION INTRAVENOUS PRN
Status: DISCONTINUED | OUTPATIENT
Start: 2020-12-16 | End: 2020-12-16 | Stop reason: HOSPADM

## 2020-12-16 RX ADMIN — HEPARIN 500 UNITS: 100 SYRINGE at 16:57

## 2020-12-16 ASSESSMENT — FIBROSIS 4 INDEX: FIB4 SCORE: 1.71

## 2020-12-16 NOTE — PROGRESS NOTES
DOT examination. See scanned documents.  Patient with type 2 diabetes mellitus.  He has been on insulin for almost a year.  Takes Lantus 10 mg at night.  His last hemoglobin A1c was 7.1%.  No hypoglycemic episodes.  Given this information he was cleared for 1 year.

## 2020-12-17 NOTE — PROGRESS NOTES
Pt presented to [facility] for 46 hour Fluorouracil CADD pump disconnection.  Upon arrival pump alarmed that the infusion was completed.   Volume reading 0.0 ml remaining.  Bag in cartridge appeared empty. Pump disconnected. Port flushed with NS and Heparin per policy, then de-accessed. Adkins needle tip remained intact. Gauze dressing to site. Pt left [facility] in NAD. Next appointment time confirmed prior to departure.

## 2020-12-22 RX ORDER — HEPARIN SODIUM (PORCINE) LOCK FLUSH IV SOLN 100 UNIT/ML 100 UNIT/ML
500 SOLUTION INTRAVENOUS PRN
Status: CANCELLED | OUTPATIENT
Start: 2020-12-29

## 2020-12-22 RX ORDER — 0.9 % SODIUM CHLORIDE 0.9 %
3 VIAL (ML) INJECTION PRN
Status: CANCELLED | OUTPATIENT
Start: 2020-12-29

## 2020-12-22 RX ORDER — 0.9 % SODIUM CHLORIDE 0.9 %
10 VIAL (ML) INJECTION PRN
Status: CANCELLED | OUTPATIENT
Start: 2020-12-29

## 2020-12-22 RX ORDER — METHYLPREDNISOLONE SODIUM SUCCINATE 125 MG/2ML
125 INJECTION, POWDER, LYOPHILIZED, FOR SOLUTION INTRAMUSCULAR; INTRAVENOUS PRN
Status: CANCELLED | OUTPATIENT
Start: 2020-12-29

## 2020-12-22 RX ORDER — ONDANSETRON 8 MG/1
8 TABLET, ORALLY DISINTEGRATING ORAL PRN
Status: CANCELLED | OUTPATIENT
Start: 2020-12-29

## 2020-12-22 RX ORDER — HEPARIN SODIUM (PORCINE) LOCK FLUSH IV SOLN 100 UNIT/ML 100 UNIT/ML
500 SOLUTION INTRAVENOUS PRN
Status: CANCELLED | OUTPATIENT
Start: 2020-12-31

## 2020-12-22 RX ORDER — HEPARIN SODIUM (PORCINE) LOCK FLUSH IV SOLN 100 UNIT/ML 100 UNIT/ML
500 SOLUTION INTRAVENOUS PRN
Status: CANCELLED | OUTPATIENT
Start: 2020-12-26

## 2020-12-22 RX ORDER — 0.9 % SODIUM CHLORIDE 0.9 %
10 VIAL (ML) INJECTION PRN
Status: CANCELLED | OUTPATIENT
Start: 2020-12-26

## 2020-12-22 RX ORDER — 0.9 % SODIUM CHLORIDE 0.9 %
VIAL (ML) INJECTION PRN
Status: CANCELLED | OUTPATIENT
Start: 2020-12-26

## 2020-12-22 RX ORDER — SODIUM CHLORIDE 9 MG/ML
INJECTION, SOLUTION INTRAVENOUS CONTINUOUS
Status: CANCELLED
Start: 2020-12-29

## 2020-12-22 RX ORDER — 0.9 % SODIUM CHLORIDE 0.9 %
3 VIAL (ML) INJECTION PRN
Status: CANCELLED | OUTPATIENT
Start: 2020-12-26

## 2020-12-22 RX ORDER — 0.9 % SODIUM CHLORIDE 0.9 %
VIAL (ML) INJECTION PRN
Status: CANCELLED | OUTPATIENT
Start: 2020-12-29

## 2020-12-22 RX ORDER — EPINEPHRINE 1 MG/ML(1)
0.5 AMPUL (ML) INJECTION PRN
Status: CANCELLED | OUTPATIENT
Start: 2020-12-29

## 2020-12-22 RX ORDER — 0.9 % SODIUM CHLORIDE 0.9 %
20 VIAL (ML) INJECTION PRN
Status: CANCELLED | OUTPATIENT
Start: 2020-12-31

## 2020-12-22 RX ORDER — ONDANSETRON 2 MG/ML
4 INJECTION INTRAMUSCULAR; INTRAVENOUS PRN
Status: CANCELLED | OUTPATIENT
Start: 2020-12-29

## 2020-12-22 RX ORDER — PROCHLORPERAZINE MALEATE 10 MG
10 TABLET ORAL EVERY 6 HOURS PRN
Status: CANCELLED | OUTPATIENT
Start: 2020-12-29

## 2020-12-22 RX ORDER — DIPHENHYDRAMINE HYDROCHLORIDE 50 MG/ML
50 INJECTION INTRAMUSCULAR; INTRAVENOUS PRN
Status: CANCELLED | OUTPATIENT
Start: 2020-12-29

## 2020-12-28 ENCOUNTER — OUTPATIENT INFUSION SERVICES (OUTPATIENT)
Dept: ONCOLOGY | Facility: MEDICAL CENTER | Age: 55
End: 2020-12-28
Attending: INTERNAL MEDICINE
Payer: MEDICAID

## 2020-12-28 VITALS
SYSTOLIC BLOOD PRESSURE: 133 MMHG | HEIGHT: 72 IN | TEMPERATURE: 97.6 F | OXYGEN SATURATION: 95 % | HEART RATE: 91 BPM | WEIGHT: 236.55 LBS | DIASTOLIC BLOOD PRESSURE: 81 MMHG | RESPIRATION RATE: 18 BRPM | BODY MASS INDEX: 32.04 KG/M2

## 2020-12-28 DIAGNOSIS — C78.7 METASTATIC COLON CANCER TO LIVER (HCC): Primary | ICD-10-CM

## 2020-12-28 DIAGNOSIS — C18.9 METASTATIC COLON CANCER TO LIVER (HCC): Primary | ICD-10-CM

## 2020-12-28 LAB
ALBUMIN SERPL BCP-MCNC: 3.9 G/DL (ref 3.2–4.9)
ALBUMIN/GLOB SERPL: 1.6 G/DL
ALP SERPL-CCNC: 139 U/L (ref 30–99)
ALT SERPL-CCNC: 27 U/L (ref 2–50)
ANION GAP SERPL CALC-SCNC: 13 MMOL/L (ref 7–16)
AST SERPL-CCNC: 22 U/L (ref 12–45)
BASOPHILS # BLD AUTO: 0.6 % (ref 0–1.8)
BASOPHILS # BLD: 0.05 K/UL (ref 0–0.12)
BILIRUB SERPL-MCNC: 1 MG/DL (ref 0.1–1.5)
BUN SERPL-MCNC: 12 MG/DL (ref 8–22)
CALCIUM SERPL-MCNC: 9.3 MG/DL (ref 8.5–10.5)
CEA SERPL-MCNC: 35.6 NG/ML (ref 0–3)
CHLORIDE SERPL-SCNC: 101 MMOL/L (ref 96–112)
CO2 SERPL-SCNC: 22 MMOL/L (ref 20–33)
CREAT SERPL-MCNC: 0.77 MG/DL (ref 0.5–1.4)
EOSINOPHIL # BLD AUTO: 0.27 K/UL (ref 0–0.51)
EOSINOPHIL NFR BLD: 3.1 % (ref 0–6.9)
ERYTHROCYTE [DISTWIDTH] IN BLOOD BY AUTOMATED COUNT: 58.3 FL (ref 35.9–50)
GLOBULIN SER CALC-MCNC: 2.4 G/DL (ref 1.9–3.5)
GLUCOSE SERPL-MCNC: 214 MG/DL (ref 65–99)
HCT VFR BLD AUTO: 38.1 % (ref 42–52)
HGB BLD-MCNC: 12.6 G/DL (ref 14–18)
IMM GRANULOCYTES # BLD AUTO: 0.07 K/UL (ref 0–0.11)
IMM GRANULOCYTES NFR BLD AUTO: 0.8 % (ref 0–0.9)
LYMPHOCYTES # BLD AUTO: 1.31 K/UL (ref 1–4.8)
LYMPHOCYTES NFR BLD: 14.9 % (ref 22–41)
MAGNESIUM SERPL-MCNC: 1.6 MG/DL (ref 1.5–2.5)
MCH RBC QN AUTO: 30.7 PG (ref 27–33)
MCHC RBC AUTO-ENTMCNC: 33.1 G/DL (ref 33.7–35.3)
MCV RBC AUTO: 92.9 FL (ref 81.4–97.8)
MONOCYTES # BLD AUTO: 0.8 K/UL (ref 0–0.85)
MONOCYTES NFR BLD AUTO: 9.1 % (ref 0–13.4)
NEUTROPHILS # BLD AUTO: 6.29 K/UL (ref 1.82–7.42)
NEUTROPHILS NFR BLD: 71.5 % (ref 44–72)
NRBC # BLD AUTO: 0 K/UL
NRBC BLD-RTO: 0 /100 WBC
PLATELET # BLD AUTO: 110 K/UL (ref 164–446)
PMV BLD AUTO: 10.4 FL (ref 9–12.9)
POTASSIUM SERPL-SCNC: 3.1 MMOL/L (ref 3.6–5.5)
PROT SERPL-MCNC: 6.3 G/DL (ref 6–8.2)
RBC # BLD AUTO: 4.1 M/UL (ref 4.7–6.1)
SODIUM SERPL-SCNC: 136 MMOL/L (ref 135–145)
WBC # BLD AUTO: 8.8 K/UL (ref 4.8–10.8)

## 2020-12-28 PROCEDURE — 96413 CHEMO IV INFUSION 1 HR: CPT

## 2020-12-28 PROCEDURE — 96366 THER/PROPH/DIAG IV INF ADDON: CPT

## 2020-12-28 PROCEDURE — 96367 TX/PROPH/DG ADDL SEQ IV INF: CPT

## 2020-12-28 PROCEDURE — 700105 HCHG RX REV CODE 258: Performed by: INTERNAL MEDICINE

## 2020-12-28 PROCEDURE — 83735 ASSAY OF MAGNESIUM: CPT

## 2020-12-28 PROCEDURE — 96411 CHEMO IV PUSH ADDL DRUG: CPT

## 2020-12-28 PROCEDURE — 82378 CARCINOEMBRYONIC ANTIGEN: CPT

## 2020-12-28 PROCEDURE — 85025 COMPLETE CBC W/AUTO DIFF WBC: CPT

## 2020-12-28 PROCEDURE — G0498 CHEMO EXTEND IV INFUS W/PUMP: HCPCS

## 2020-12-28 PROCEDURE — 700111 HCHG RX REV CODE 636 W/ 250 OVERRIDE (IP): Performed by: INTERNAL MEDICINE

## 2020-12-28 PROCEDURE — 96417 CHEMO IV INFUS EACH ADDL SEQ: CPT

## 2020-12-28 PROCEDURE — 80053 COMPREHEN METABOLIC PANEL: CPT

## 2020-12-28 PROCEDURE — 96375 TX/PRO/DX INJ NEW DRUG ADDON: CPT

## 2020-12-28 PROCEDURE — A4212 NON CORING NEEDLE OR STYLET: HCPCS

## 2020-12-28 RX ORDER — POTASSIUM CHLORIDE 7.45 MG/ML
10 INJECTION INTRAVENOUS ONCE
Status: COMPLETED | OUTPATIENT
Start: 2020-12-28 | End: 2020-12-28

## 2020-12-28 RX ORDER — MAGNESIUM SULFATE 1 G/100ML
1 INJECTION INTRAVENOUS ONCE
Status: COMPLETED | OUTPATIENT
Start: 2020-12-28 | End: 2020-12-28

## 2020-12-28 RX ORDER — HEPARIN SODIUM (PORCINE) LOCK FLUSH IV SOLN 100 UNIT/ML 100 UNIT/ML
500 SOLUTION INTRAVENOUS PRN
Status: DISCONTINUED | OUTPATIENT
Start: 2020-12-28 | End: 2020-12-28 | Stop reason: HOSPADM

## 2020-12-28 RX ORDER — DIPHENHYDRAMINE HYDROCHLORIDE 50 MG/ML
50 INJECTION INTRAMUSCULAR; INTRAVENOUS PRN
Status: COMPLETED | OUTPATIENT
Start: 2020-12-28 | End: 2020-12-28

## 2020-12-28 RX ADMIN — MAGNESIUM SULFATE 1 G: 1 INJECTION INTRAVENOUS at 11:00

## 2020-12-28 RX ADMIN — DEXAMETHASONE SODIUM PHOSPHATE 20 MG: 4 INJECTION, SOLUTION INTRA-ARTICULAR; INTRALESIONAL; INTRAMUSCULAR; INTRAVENOUS; SOFT TISSUE at 10:46

## 2020-12-28 RX ADMIN — POTASSIUM CHLORIDE 10 MEQ: 7.46 INJECTION, SOLUTION INTRAVENOUS at 12:13

## 2020-12-28 RX ADMIN — FLUOROURACIL 5055 MG: 50 INJECTION, SOLUTION INTRAVENOUS at 14:40

## 2020-12-28 RX ADMIN — DIPHENHYDRAMINE HYDROCHLORIDE 50 MG: 50 INJECTION INTRAMUSCULAR; INTRAVENOUS at 09:06

## 2020-12-28 RX ADMIN — LEUCOVORIN CALCIUM 936 MG: 350 INJECTION, POWDER, LYOPHILIZED, FOR SOLUTION INTRAMUSCULAR; INTRAVENOUS at 12:25

## 2020-12-28 RX ADMIN — POTASSIUM CHLORIDE 10 MEQ: 7.46 INJECTION, SOLUTION INTRAVENOUS at 10:10

## 2020-12-28 RX ADMIN — POTASSIUM CHLORIDE 10 MEQ: 7.46 INJECTION, SOLUTION INTRAVENOUS at 13:17

## 2020-12-28 RX ADMIN — TRASTUZUMAB 450 MG: 150 INJECTION, POWDER, LYOPHILIZED, FOR SOLUTION INTRAVENOUS at 11:43

## 2020-12-28 RX ADMIN — FLUOROURACIL 840 MG: 50 INJECTION, SOLUTION INTRAVENOUS at 14:39

## 2020-12-28 RX ADMIN — ONDANSETRON 16 MG: 2 INJECTION INTRAMUSCULAR; INTRAVENOUS at 10:14

## 2020-12-28 RX ADMIN — POTASSIUM CHLORIDE 10 MEQ: 7.46 INJECTION, SOLUTION INTRAVENOUS at 11:10

## 2020-12-28 ASSESSMENT — FIBROSIS 4 INDEX: FIB4 SCORE: 1.71

## 2020-12-28 NOTE — PROGRESS NOTES
Pt arrived ambulatory to [facility] for D1/ C 21 of mFOLFOX. Pt denied fever, signs or symptoms of infection or acute illness today. POC discussed and pt verbalized understanding. Pt presents with severe acneiform rash to face. Dr. Ross contacted at this time by Nicholas Degroot, charge RN, and order received to hold Panitumumab today. IV benadryl administered per MAR at this time per pt request for comfort.     Port accessed without difficulty and labs drawn at this time; pt tolerated well. Labs reviewed. K+ 3.1 and Mg2+ 1.6. Magnesium and Potassium replaced per electrolyte protocol as per MAR. Pre-medications, Herceptin, Leucovorin and Fluorouracil IVP administered per MAR; pt tolerated well. No signs or symptoms of reaction or complications noted. Port flushed with NS and brisk, positive blood return noted at this time. Fluorouracil CADD pump connected with 2 RN verification of settings. Clamps open, claves secured and pump running at this time. Pt verbalized understanding on pump alarm troubleshooting and when to contact provider sooner.     Follow-up care discussed and next appointment confirmed with pt; pt verbalized understanding. Pt discharged home ambulatory to self care in no apparent distress at this time.

## 2020-12-28 NOTE — PROGRESS NOTES
Pharmacy Chemotherapy Verification Note:    Patient Name: Gurmeet Jo      Dx: Metastatic Colorectal CA (KRAS/NRAS wild type, and ERBB2 [a HER2 family receptor] amplification)        Protocol: mFOLFOX+Panitumumab +trastuzumab       *Dosing Reference*  Panitumumab 6 mg/kg IV over 60 minutes on Day 1    4/25/20 added to treatment plan dose reduced to 4.5 mg/kg for tolerance   7/8/20 Omitted starting C9   7/25/20 dose reduced to 3 mg/kg for tolerance   9/4/20 discontinued from treatment plan for Cycle 13   C14- reordered at 3 mg/kg per MD Cody   C17- HELD for Rash   C18- Cont to HOLD for Rash   C21 HELD for RASH  ~Followed by~  Trastuzumab 6 mg/kg IV over 90 minutes on Day 1 of Cycle 1, followed by  Trastuzumab 4 mg/kg IV over 30-60 minutes on Day 1 of Cycle 2    Confirmed: per Dr. Ross: Q2 week dosing   4/11/20 - Reload with 6 mg/kg per Dr. Chisholm  OXALIplatin 85 mg/m² IV over 2 hours on Day 1   - dose reduced to 68 mg/m² starting C1 for tolerance   3/3/20 - oxali removed from treatment plan d/t oxaliplatin allergy per Harman VARGAS.  Leucovorin 400 mg/m² IV over 2 hours on Day 1, to run concurrent with OXALIplatin, followed by  Fluorouracil 400 mg/m² IVP on Day 1, followed by   C1 dose reduced to 360 mg/m² for tolerance  Fluorouracil 2400 mg/m² CIVI over 46-48 hours   C1 dose reduced to 2160 mg/m² for tolerance   14-day cycle until disease progression or unacceptable toxicity  *Dosing Reference*  NCCN Guidelines for Colon Cancer. V.2.2019.  Rohith TAVERAS et al. J Clin Oncol. 2010;28(31):4697-705  Aura PATRICK et al. J Clin Oncol. 2008;28(12):2006-12  Luisa RODRIGEZ, et al. Br J Cancer. 2002;87(4):393-9    Herceptin has limited data in colorectal cancer, but two regimens exist neither at the same loading dose or interval as Dr. Ross has chosen. Per [facility] progress note: Patient has undergone further [employer] testing of his tumor. He was found to be KRAS/NRAS wild type, which suggest he has an EGFR mutation and  "would be a candidate for EGFR targeted therapy. He also had an ERBB2 amplification which might suggest response to treatments like Herceptin.    Allergies:  Compazine and Oxaliplatin     /81   Pulse 91   Temp 36.4 °C (97.6 °F) (Temporal)   Resp 18   Ht 1.83 m (6' 0.05\")   Wt 107.3 kg (236 lb 8.9 oz)   SpO2 95%   BMI 32.04 kg/m²  Body surface area is 2.34 meters squared.     Labs 12/28/20:  ANC~ 6290 Plt = 110k   Hgb = 12.6     SCr = 0.77 mg/dL CrCl >125 mL/min   AST/ALT/ALK = 22/27/139 TBili = 1       2/7/2020 ECHO: LVEF 60% (every 6 months)  9/4/20 ECHO: LVEF 60%     Drug Order   (Drug name, dose, route, IV Fluid & volume, frequency, number of doses) Cycle 21  Previous treatment: C20 on 12/14/20     Medication = Trastuzumab (Herceptin)  Base Dose = 4 mg/kg  Calc Dose: Base Dose x  107.3 kg = 429.2 mg  Final Dose = 450 mg  Route = IV  Fluid & Volume =  mL  Admin Duration = Over 30 minutes          Rounded to nearest vial size (within 10%) per rounding protocol, okay to treat with final dose     Medication = Leucovorin (Wellcovorin)  Base Dose = 400 mg/m²  Calc Dose: Base Dose x 2.34 m² = 936 mg  Final Dose = 936 mg  Route = IV  Fluid & Volume = D5W 250 mL  Admin Duration = Over 30 minutes          <10% difference, okay to treat with final dose   Medication = Fluorouracil (5-FU)  Base Dose = 360 mg/m²  Calc Dose: Base Dose x 2.34 m² = 842.4 mg  Final Dose = 840 mg  Route = IVP  Fluid & Volume = 16.8 mL in syringe  Conc = 50 mg/mL  Admin Duration = Over 5 minutes          <10% difference, okay to treat with final dose   Medication = Fluorouracil (5-FU)  Base Dose = 2160 mg/m²  Calc Dose:Base Dose x 2.34 m² = 5054.4 mg  Final Dose = 5055 mg  Route = CIVI   Fluid & Volume = 101.1 mL (+3 mL overfill = 104.1 mL)  Admin Duration = Over 46 hours to run at   2.2 mL/hour    Via CADD pump for home infusion      <10% difference, okay to treat with final dose     By my signature below, I confirm this process " was performed independently with the BSA and all final chemotherapy dosing calculations congruent. I have reviewed the above chemotherapy order and that my calculation of the final dose and BSA (when applicable) corroborate those calculations of the  pharmacist. Discrepancies of 10% or greater in the written dose have been addressed and documented within the EPIC Progress notes.    José Marvin, PharmD

## 2020-12-28 NOTE — PROGRESS NOTES
Chemotherapy Verification - PRIMARY RN      Height = 183cm  Weight = 107.3kg  BSA = 2.34m2   LVEF 60% 09/04/20    Medication: Panitumumab (Vectibix) ON HOLD TODAY due to acneiform rash    Medication: Trastuzumab (Herceptin)  Dose: 4mg/kg dose  Calculated Dose: 429.2mg                             (In mg/m2, AUC, mg/kg)    Medication: Leucovorin  Dose: 400mg/m2  Calculated Dose: 936mg                             (In mg/m2, AUC, mg/kg)    Medication: Fluorouracil IVP  Dose: 360mg/m2  Calculated Dose: 842.4mg                             (In mg/m2, AUC, mg/kg)    Medication: Fluorouracil CADD  Dose: 2160mg/m2 over 46 hours  Calculated Dose: 5054.4mg over 46 hours                             (In mg/m2, AUC, mg/kg)      I confirm this process was performed independently with the BSA and all final chemotherapy dosing calculations congruent.  Any discrepancies of 10% or greater have been addressed with the chemotherapy pharmacist. The resolution of the discrepancy has been documented in the EPIC progress notes.

## 2020-12-28 NOTE — PROGRESS NOTES
Chemotherapy Verification - SECONDARY RN       Height = 183 cm  Weight = 107.3 kg  BSA = 2.335 m2       Medication: 5FU  Dose: 360 mg/m2 dr Calculated Dose: 840.76 mg                             (In mg/m2, AUC, mg/kg)     Medication: Home infusion 5FU  Dose: 2160 mg/m2  dr Calculated Dose: 5043.6 mg over 46 hours                             (In mg/m2, AUC, mg/kg)    Medication: Herceptin  Dose: 4 mg/kg  Calculated Dose: 429.2 mg                             (In mg/m2, AUC, mg/kg)      I confirm that this process was performed independently.

## 2020-12-28 NOTE — PROGRESS NOTES
"Pharmacy Chemotherapy Calculations:     Dx: Metastatic colorectal cancer [KRAS/NRAS wild type, ERBB2 (HER2) amplification]    Cycle 21  Previous treatment = 12/14/20    Regimen: mFOLFOX + Vectibix + Herceptin    Trastuzumab 6 mg/kg IV loading dose C1D1, then 4 mg/kg IV Day 1 of subsequent  cycles [confirmed \"every 2 weeks\", per MD; progress note: \"He also had an ERBB2  amplification which might suggest response to treatments like Herceptin\"]   > 7/8/20: Herceptin 2.5 weeks overdue.  Do not reload Herceptin per [facility] Dr. Ross.  Cetuximab 400 mg/m2 IV Cycle 1 Day 1, then 250 mg/m2 IV Days 1 and 8 (confirmed  \"weekly\" per MD)   > C4 * * * cetuximab has been omitted * * *   > C5 * * * cetuximab replaced with panitumumab * * *   Oxaliplatin 85 mg/m2 IV Day 1 concurrent with   > Cody LEO reduced dose by 20% to 68 mg/m2 starting with C1D1 due to anticipated tolerance    > 3/14/20: Oxaliplatin discontinued from treatment  Panitumumab (Vectibix) 6 mg/kg IV D1 over 60 min   + added; change of therapy d/t cetuximab skin tox   > 4/25/20, initial dose reduction to 4.5 mg/kg d/t previous EGFR cutaneous toxicities    > C9 * * * Vectibix has been omitted * * *   > C10 Vectibix reordered at 3 mg/kg per MD Cody    > C13 * * * Vectibix has been omitted * * *   > C14 Vectibix reordered at 3 mg/kg per MD Cody   >C17 Vectibix HELD per Cody LEO              >C18 Vectibix held again per MD   >C19  11/30/20- vectibix resumed at 3mg/kg   >C21 HOLD x1 dose for rash  Leucovorin 400 mg/m2 IV Day 1 followed by  Fluorouracil 400 mg/m2 IV Day 1 followed by   C1D1 reduced dose by 10% to 360 mg/m2 per MD Cody for anticipated tolerance   Fluorouracil 2400 mg/m2 CIVI over 46-48 hours    C1D1 reduced dose by 10% to 2160 mg/m2 per MD Cody for anticipated tolerance    14-day cycle until progression or toxicity  *Dosing Reference*  NCCN guidelines for colon cancer v2.2019  (cetuximab + chemo) Christine ARGUELLO et al, OBEY " "2017;317(71):7235-1247  (HER2 tx in HER2+ CRC) Kayleen RINALDI et al. Lancet Oncol. 2019 Apr;20(4):518-530.    Allergies: Patient has no known allergies.  /81   Pulse 91   Temp 36.4 °C (97.6 °F) (Temporal)   Resp 18   Ht 1.83 m (6' 0.05\")   Wt 107.3 kg (236 lb 8.9 oz)   SpO2 95%   BMI 32.04 kg/m²  Body surface area is 2.34 meters squared.     ECHO:  9/4/20 LVEF ~ 60% OK for 6 months per Cody LEO order  2/7/20 LVEF ~ 60%     Labs: 12/28/20   Meets treatment parameters   K+ 3.1, IV supplement  Mg 1.6, IV supplement      Panitumumab (Vectibix) 3mg/kg x 108.9kg = 327mg   Rounded to vial size (within 10%) per dose rounding protocol = 300mg IV    Trastuzumab (Herceptin) 4 mg/kg x 107.3 kg = 429.2 mg   Rounded to vial size (within 10%) per dose rounding protocol = 450 mg IV    Leucovorin 400 mg/m² x 2.34 m² = 936 mg   Okay to treat with final dose = 936 mg IV over 30 minutes     Fluorouracil (Adrucil) 360 mg/m² x 2.34 m² = 842.4 mg   Okay to treat with final dose = 840mg IV push    Fluorouracil (Adrucil) 2160 mg/m² x 2.34 m² = 5054.4 mg   Okay to treat with final dose =  5055 mg CIVI over 46 hrs at 2.2 mL/hr        "

## 2020-12-29 ENCOUNTER — OFFICE VISIT (OUTPATIENT)
Dept: MEDICAL GROUP | Facility: CLINIC | Age: 55
End: 2020-12-29
Payer: MEDICAID

## 2020-12-29 VITALS
DIASTOLIC BLOOD PRESSURE: 70 MMHG | HEART RATE: 86 BPM | WEIGHT: 237 LBS | TEMPERATURE: 97.7 F | HEIGHT: 72 IN | RESPIRATION RATE: 16 BRPM | SYSTOLIC BLOOD PRESSURE: 110 MMHG | OXYGEN SATURATION: 96 % | BODY MASS INDEX: 32.1 KG/M2

## 2020-12-29 DIAGNOSIS — Z79.4 TYPE 2 DIABETES MELLITUS WITH OTHER SPECIFIED COMPLICATION, WITH LONG-TERM CURRENT USE OF INSULIN (HCC): ICD-10-CM

## 2020-12-29 DIAGNOSIS — E11.69 TYPE 2 DIABETES MELLITUS WITH OTHER SPECIFIED COMPLICATION, WITH LONG-TERM CURRENT USE OF INSULIN (HCC): ICD-10-CM

## 2020-12-29 DIAGNOSIS — E78.5 HYPERLIPIDEMIA, UNSPECIFIED HYPERLIPIDEMIA TYPE: ICD-10-CM

## 2020-12-29 DIAGNOSIS — E11.65 UNCONTROLLED TYPE 2 DIABETES MELLITUS WITH HYPERGLYCEMIA (HCC): ICD-10-CM

## 2020-12-29 DIAGNOSIS — Z23 NEED FOR VACCINATION: ICD-10-CM

## 2020-12-29 DIAGNOSIS — L27.0 DRUG-INDUCED SKIN RASH: ICD-10-CM

## 2020-12-29 PROCEDURE — 99213 OFFICE O/P EST LOW 20 MIN: CPT | Mod: 25 | Performed by: PHYSICIAN ASSISTANT

## 2020-12-29 PROCEDURE — 90471 IMMUNIZATION ADMIN: CPT | Performed by: PHYSICIAN ASSISTANT

## 2020-12-29 PROCEDURE — 90686 IIV4 VACC NO PRSV 0.5 ML IM: CPT | Performed by: PHYSICIAN ASSISTANT

## 2020-12-29 RX ORDER — LEUCOVORIN CALCIUM 100 MG/10ML
INJECTION, POWDER, LYOPHILIZED, FOR SOLUTION INTRAMUSCULAR; INTRAVENOUS
COMMUNITY
Start: 2020-11-30 | End: 2021-04-21

## 2020-12-29 RX ORDER — PANITUMUMAB 100 MG/5ML
SOLUTION INTRAVENOUS
COMMUNITY
Start: 2020-11-30 | End: 2021-04-21

## 2020-12-29 RX ORDER — MAGNESIUM SULFATE HEPTAHYDRATE 40 MG/ML
INJECTION, SOLUTION INTRAVENOUS
COMMUNITY
Start: 2020-11-30 | End: 2021-06-16

## 2020-12-29 RX ORDER — FLUOROURACIL 50 MG/ML
INJECTION, SOLUTION INTRAVENOUS
COMMUNITY
Start: 2020-11-30 | End: 2021-04-21

## 2020-12-29 RX ORDER — HYDROXYZINE PAMOATE 50 MG/1
50 CAPSULE ORAL 2 TIMES DAILY
Qty: 180 CAP | Refills: 2 | Status: SHIPPED | OUTPATIENT
Start: 2020-12-29 | End: 2022-04-28

## 2020-12-29 ASSESSMENT — FIBROSIS 4 INDEX: FIB4 SCORE: 2.155441004974602854

## 2020-12-29 NOTE — PROGRESS NOTES
cc:  Medication refills    Subjective:     Gurmeet Jo is a 55 y.o. male presenting for medication refills      Patient presents to the office for medication refills.  He does have a history of type 2 diabetes with insulin use and uncontrolled, hyperlipidemia and colon cancer stage IV.  He did have blood work drawn before coming in to be seen.  His A1c is 7.1.  Patient states that they are working with his sugars with infusion.  His sugars elevate with his chemotherapy drugs which is why infusion works with him on this.  He was just seen by his eye doctor for his diabetes.       Patient states that he needs a refill of his hydroxyzine.  He is undergoing chemotherapy for his stage IV colon cancer.  He does take this to help reduce drug-induced skin rashes and does take it twice a day.  He declines taking it any more frequently than this.    Patient does request his flu vaccine today.    Review of systems:  See above.   Denies any symptoms unless previously indicated.        Current Outpatient Medications:   •  hydrOXYzine pamoate (VISTARIL) 50 MG Cap, Take 1 Cap by mouth 2 Times a Day., Disp: 180 Cap, Rfl: 2  •  insulin glargine (LANTUS SOLOSTAR) 100 UNIT/ML Solution Pen-injector injection, Inject 20 Units under the skin every evening., Disp: 3 Each, Rfl: 0  •  TRUE METRIX BLOOD GLUCOSE TEST strip, USE AS DIRECTED TO CHECK BLOOD SUGAR B ID AND FOR HIGH OR SUGAR, Disp: , Rfl:   •  HERCEPTIN 150 MG Recon Soln, , Disp: , Rfl:   •  NOVOLIN R 100 UNIT/ML Solution, , Disp: , Rfl:   •  POTASSIUM CHLORIDE PO, Take 20 mEq by mouth every day., Disp: , Rfl:   •  Lancets, Lancets order: Lancets for  meter. Sig: use bid and prn ssx high or low sugar. #100 RF x 3, Disp: 100 Each, Rfl: 0  •  Blood Glucose Test Strips, Test strips order: Test strips for  meter. Sig: use bid and prn ssx high or low sugar #100 RF x 3, Disp: 100 Each, Rfl: 0  •  Blood Glucose Monitoring Suppl Device, Meter: Dispense Device of Insurance  "Preference. Sig. Use as directed for blood sugar monitoring. #1. NR., Disp: 1 Device, Rfl: 0  •  Insulin Pen Needle (PEN NEEDLES) 31G X 6 MM Misc, Use as directed with insulin pen, Disp: 1 Each, Rfl: 2  •  nystatin (MYCOSTATIN) 030334 UNIT/ML Suspension, SWISH AND SWALLOW 5 MILLILITERS PO QID, Disp: , Rfl:   •  ondansetron (ZOFRAN ODT) 4 MG TABLET DISPERSIBLE, Take 4 mg by mouth every 6 hours as needed for Nausea., Disp: , Rfl:   •  DOXYCYCLINE PO, Take  by mouth., Disp: , Rfl:   •  lidocaine-prilocaine (EMLA) 2.5-2.5 % Cream, , Disp: , Rfl:   •  acetaminophen (TYLENOL) 500 MG Tab, Take 500-1,000 mg by mouth every 6 hours as needed., Disp: , Rfl:   •  LORazepam (ATIVAN) 1 MG Tab, Take 1 mg by mouth 1 time daily as needed (nausea)., Disp: , Rfl:   •  baclofen (LIORESAL) 10 MG Tab, Take 10 mg by mouth 3 times a day as needed., Disp: , Rfl:   •  therapeutic multivitamin-minerals (THERAGRAN-M) Tab, Take 1 Tab by mouth every day., Disp: , Rfl:   •  Fluorouracil 2.5 GM/50ML Solution, , Disp: , Rfl:   •  leucovorin 100 MG injection, , Disp: , Rfl:   •  magnesium sulfate 2 GM/50ML Solution IVPB premix, , Disp: , Rfl:   •  VECTIBIX 100 MG/5ML Solution, , Disp: , Rfl:   •  leucovorin 350 MG injection, , Disp: , Rfl:   •  mupirocin (BACTROBAN) 2 % Ointment, as needed. For rash from vectibix, Disp: , Rfl:     Allergies, past medical history, past surgical history, family history, social history reviewed and updated    Objective:     Vitals: /70 (BP Location: Left arm, Patient Position: Sitting, BP Cuff Size: Large adult)   Pulse 86   Temp 36.5 °C (97.7 °F) (Temporal)   Resp 16   Ht 1.83 m (6' 0.05\") Comment: obtained from chart  Wt 107.5 kg (237 lb) Comment: with shoes on  SpO2 96%   BMI 32.10 kg/m²   General: Alert, pleasant, NAD  EYES:   PERRL, EOMI, no icterus or pallor.  Conjunctivae and lids normal.   HENT:  Normocephalic.  External ears normal.  Neck supple.    Respiratory: Normal respiratory effort.  Clear " to auscultation bilaterally.  Abdomen: obese  Skin: Warm, dry, no rashes.  Musculoskeletal: Gait is normal.  Moves all extremities well.    Extremities: normal range of motion all extremities.   Neurological: No tremors, sensation grossly intact,  CN2-12 intact.  Psych:  Affect/mood is normal, judgement is good, memory is intact, grooming is appropriate.    Assessment/Plan:     Gurmeet was seen today for establish care.    Diagnoses and all orders for this visit:    Type 2 diabetes mellitus with other specified complication, with long-term current use of insulin (HCC)  -     Lipid Profile; Future  -     Comp Metabolic Panel; Future  -     HEMOGLOBIN A1C; Future  -     MICROALBUMIN CREAT RATIO URINE; Future  Uncontrolled type 2 diabetes mellitus with hyperglycemia (HCC)  -     Lipid Profile; Future  -     Comp Metabolic Panel; Future  -     HEMOGLOBIN A1C; Future  -     MICROALBUMIN CREAT RATIO URINE; Future  Hyperlipidemia, unspecified hyperlipidemia type  -     Lipid Profile; Future  -     Comp Metabolic Panel; Future    This is being managed somewhat with chemo.  We will continue to monitor and will repeat labs at this time in the next 3 months with follow-up in 3 to 4 months.  As his insulin is being adjusted with specialty, we will not make changes at this time but we will continue to monitor and adjust if problems develop.    Drug-induced skin rash  -     hydrOXYzine pamoate (VISTARIL) 50 MG Cap; Take 1 Cap by mouth 2 Times a Day.    Acacian has been refilled at this time.  Patient will continue to use.  We can adjust if further treatment is needed.    Need for vaccination  -     Influenza Vaccine Quad Injection (PF)    Flu vaccine given today.        Return in about 4 months (around 4/29/2021), or if symptoms worsen or fail to improve, for 3-4 months.    Please note that this dictation was created using voice recognition software. I have made every reasonable attempt to correct obvious errors, but expect that  there are errors of grammar and possible content that I did not discover before finalizing note.

## 2020-12-29 NOTE — LETTER
[facility]  Dunia Smith P.A.-C.  [address] [address] [address]  [facility] [address]  Fax: [phone]   Authorization for Release/Disclosure of   Protected Health Information   Name: EVIE JO : 1965 SSN: [id number]   Address: [address] [place] [id number] Phone:    [phone] (home)    I authorize the entity listed below to release/disclose the PHI below to:   [facility]/Dunia Smith P.A.-C. and Dunia Smith P.A.-C.   Provider or Entity Name:[facility]     Address   City, State, Zip   Phone:      Fax:     Reason for request: continuity of care   Information to be released:    [  ] LAST COLONOSCOPY,  including any PATH REPORT and follow-up  [  ] LAST FIT/COLOGUARD RESULT [  ] LAST DEXA  [  ] LAST MAMMOGRAM  [  ] LAST PAP  [  ] LAST LABS [  ] RETINA EXAM REPORT  [  ] IMMUNIZATION RECORDS  [  ] Release all info      [  ] Check here and initial the line next to each item to release ALL health information INCLUDING  _____ Care and treatment for drug and / or alcohol abuse  _____ HIV testing, infection status, or AIDS  _____ Genetic Testing    DATES OF SERVICE OR TIME PERIOD TO BE DISCLOSED: _____________  I understand and acknowledge that:  * This Authorization may be revoked at any time by you in writing, except if your health information has already been used or disclosed.  * Your health information that will be used or disclosed as a result of you signing this authorization could be re-disclosed by the recipient. If this occurs, your re-disclosed health information may no longer be protected by State or Federal laws.  * You may refuse to sign this Authorization. Your refusal will not affect your ability to obtain treatment.  * This Authorization becomes effective upon signing and will  on (date) __________.      If no date is indicated, this Authorization will  one (1) year from the signature date.    Name: Evie Jo    Signature:   Date:          12/29/2020       PLEASE FAX REQUESTED RECORDS BACK TO: [phone]

## 2020-12-30 ENCOUNTER — OUTPATIENT INFUSION SERVICES (OUTPATIENT)
Dept: ONCOLOGY | Facility: MEDICAL CENTER | Age: 55
End: 2020-12-30
Attending: INTERNAL MEDICINE
Payer: MEDICAID

## 2020-12-30 DIAGNOSIS — C18.9 METASTATIC COLON CANCER TO LIVER (HCC): ICD-10-CM

## 2020-12-30 DIAGNOSIS — C78.7 METASTATIC COLON CANCER TO LIVER (HCC): ICD-10-CM

## 2020-12-30 PROCEDURE — 96523 IRRIG DRUG DELIVERY DEVICE: CPT

## 2020-12-30 PROCEDURE — 700111 HCHG RX REV CODE 636 W/ 250 OVERRIDE (IP)

## 2020-12-30 RX ORDER — HEPARIN SODIUM (PORCINE) LOCK FLUSH IV SOLN 100 UNIT/ML 100 UNIT/ML
500 SOLUTION INTRAVENOUS PRN
Status: DISCONTINUED | OUTPATIENT
Start: 2020-12-30 | End: 2020-12-30 | Stop reason: HOSPADM

## 2020-12-30 RX ORDER — HEPARIN SODIUM (PORCINE) LOCK FLUSH IV SOLN 100 UNIT/ML 100 UNIT/ML
SOLUTION INTRAVENOUS
Status: COMPLETED
Start: 2020-12-30 | End: 2020-12-30

## 2020-12-30 RX ADMIN — HEPARIN SODIUM (PORCINE) LOCK FLUSH IV SOLN 100 UNIT/ML 500 UNITS: 100 SOLUTION at 14:28

## 2020-12-30 RX ADMIN — HEPARIN 500 UNITS: 100 SYRINGE at 14:28

## 2020-12-31 NOTE — PROGRESS NOTES
Patient arrived ambulatory to [facility] for CADD pump disconnect and port de-access.  He denies any fevers, s/s of infection, pain, SOB, nausea, or other discomfort.  CADD pumo with 0ml volume.  Pump disconnected.  Port flushed, heparin instilled, and de-accessed per protocol.  Gauze/tape applied.  Confirmed next appointment and he ambulated out of clinic in no apparent distress.

## 2021-01-06 ENCOUNTER — PATIENT OUTREACH (OUTPATIENT)
Dept: OTHER | Facility: MEDICAL CENTER | Age: 56
End: 2021-01-06

## 2021-01-06 NOTE — PROGRESS NOTES
On 1-6-21, Oncology Social Worker, Dunia Siegel, contacted pt's wife, Joan, via phone. JEREMY Siegel introduced self as part of pt's social support team at [facility]. JEREMY Siegel inquired on pt's and/or families current needs and addressed referral from JAI Sanon.   Pt was not available to speak. JEREMY Siegel inquired on family need. Joan informed JEREMY Siegel the need for individual and family counseling/support. JEREMY Siegel discussed resources. Joan verbalized understanding and agreement. Joan requested resources be sent to email ([email or web address]).  JEREMY Siegel inquired on other concerns/questions. Joan discussed financial concerns. JEREMY Siegel offered resources for Food bank and Independent IP. Joan accepted.    JEREMY Siegel provided financial assessment and offered [facility] as potential resource. Joan accepted.  JEREMY Siegel inquired on other areas not addressed. Joan declined at this time.  JEREMY Siegel thanked Joan/pt for their time, would f/u phone call with emailed resources, and encouraged to contact for further questions/concerns.

## 2021-01-06 NOTE — PROGRESS NOTES
On 1/6/2021 this ONN received phone call from patient's wife Joan. Joan states that she is concerned about the patient's mental well being. Joan states her  was very upset last night. Joan confirms her safety and safety of the patient and safety of patient's 18 year old son who lives with them. Joan states that patient is very anxious about medical costs after switching to Medicare on January 1, 2021. This ONN explained this ONN can refer patient to Financial Resource Advocate. This ONN agreed to also refer patient to  Dunia Siegel regarding mental health concerns. Joan states that she too is overwhelmed. Joan is interested in caregiver support group. This ONN agreed to email support group flyer. Joan denies other concerns at this time.     Referral placed to MICHA and SERVANDO.     Email sent with January [facility] Cancer Support Services Calendar.

## 2021-01-07 ENCOUNTER — HOSPITAL ENCOUNTER (OUTPATIENT)
Dept: LAB | Facility: MEDICAL CENTER | Age: 56
End: 2021-01-07
Attending: INTERNAL MEDICINE
Payer: MEDICARE

## 2021-01-07 LAB
ALBUMIN SERPL BCP-MCNC: 4 G/DL (ref 3.2–4.9)
ALBUMIN/GLOB SERPL: 1.7 G/DL
ALP SERPL-CCNC: 127 U/L (ref 30–99)
ALT SERPL-CCNC: 34 U/L (ref 2–50)
ANION GAP SERPL CALC-SCNC: 10 MMOL/L (ref 7–16)
AST SERPL-CCNC: 30 U/L (ref 12–45)
BASOPHILS # BLD AUTO: 0.7 % (ref 0–1.8)
BASOPHILS # BLD: 0.04 K/UL (ref 0–0.12)
BILIRUB SERPL-MCNC: 0.8 MG/DL (ref 0.1–1.5)
BUN SERPL-MCNC: 12 MG/DL (ref 8–22)
CALCIUM SERPL-MCNC: 9.1 MG/DL (ref 8.5–10.5)
CEA SERPL-MCNC: 39.2 NG/ML (ref 0–3)
CHLORIDE SERPL-SCNC: 103 MMOL/L (ref 96–112)
CO2 SERPL-SCNC: 22 MMOL/L (ref 20–33)
CREAT SERPL-MCNC: 0.71 MG/DL (ref 0.5–1.4)
EOSINOPHIL # BLD AUTO: 0.29 K/UL (ref 0–0.51)
EOSINOPHIL NFR BLD: 4.9 % (ref 0–6.9)
ERYTHROCYTE [DISTWIDTH] IN BLOOD BY AUTOMATED COUNT: 54.4 FL (ref 35.9–50)
GLOBULIN SER CALC-MCNC: 2.4 G/DL (ref 1.9–3.5)
GLUCOSE SERPL-MCNC: 197 MG/DL (ref 65–99)
HCT VFR BLD AUTO: 38.7 % (ref 42–52)
HGB BLD-MCNC: 12.8 G/DL (ref 14–18)
IMM GRANULOCYTES # BLD AUTO: 0.08 K/UL (ref 0–0.11)
IMM GRANULOCYTES NFR BLD AUTO: 1.4 % (ref 0–0.9)
LYMPHOCYTES # BLD AUTO: 1.11 K/UL (ref 1–4.8)
LYMPHOCYTES NFR BLD: 18.8 % (ref 22–41)
MAGNESIUM SERPL-MCNC: 1.7 MG/DL (ref 1.5–2.5)
MCH RBC QN AUTO: 30.8 PG (ref 27–33)
MCHC RBC AUTO-ENTMCNC: 33.1 G/DL (ref 33.7–35.3)
MCV RBC AUTO: 93 FL (ref 81.4–97.8)
MONOCYTES # BLD AUTO: 0.76 K/UL (ref 0–0.85)
MONOCYTES NFR BLD AUTO: 12.9 % (ref 0–13.4)
NEUTROPHILS # BLD AUTO: 3.63 K/UL (ref 1.82–7.42)
NEUTROPHILS NFR BLD: 61.3 % (ref 44–72)
NRBC # BLD AUTO: 0 K/UL
NRBC BLD-RTO: 0 /100 WBC
PLATELET # BLD AUTO: 155 K/UL (ref 164–446)
PMV BLD AUTO: 10.9 FL (ref 9–12.9)
POTASSIUM SERPL-SCNC: 4 MMOL/L (ref 3.6–5.5)
PROT SERPL-MCNC: 6.4 G/DL (ref 6–8.2)
RBC # BLD AUTO: 4.16 M/UL (ref 4.7–6.1)
SODIUM SERPL-SCNC: 135 MMOL/L (ref 135–145)
WBC # BLD AUTO: 5.9 K/UL (ref 4.8–10.8)

## 2021-01-07 PROCEDURE — 85025 COMPLETE CBC W/AUTO DIFF WBC: CPT

## 2021-01-07 PROCEDURE — 80053 COMPREHEN METABOLIC PANEL: CPT

## 2021-01-07 PROCEDURE — 83735 ASSAY OF MAGNESIUM: CPT

## 2021-01-07 PROCEDURE — 36415 COLL VENOUS BLD VENIPUNCTURE: CPT

## 2021-01-07 PROCEDURE — 82378 CARCINOEMBRYONIC ANTIGEN: CPT

## 2021-01-11 ENCOUNTER — OUTPATIENT INFUSION SERVICES (OUTPATIENT)
Dept: ONCOLOGY | Facility: MEDICAL CENTER | Age: 56
End: 2021-01-11
Attending: INTERNAL MEDICINE
Payer: MEDICARE

## 2021-01-11 ENCOUNTER — PATIENT OUTREACH (OUTPATIENT)
Dept: OTHER | Facility: MEDICAL CENTER | Age: 56
End: 2021-01-11

## 2021-01-11 VITALS
WEIGHT: 242.51 LBS | OXYGEN SATURATION: 98 % | HEIGHT: 72 IN | DIASTOLIC BLOOD PRESSURE: 79 MMHG | RESPIRATION RATE: 18 BRPM | TEMPERATURE: 98 F | BODY MASS INDEX: 32.85 KG/M2 | HEART RATE: 84 BPM | SYSTOLIC BLOOD PRESSURE: 146 MMHG

## 2021-01-11 DIAGNOSIS — C78.7 METASTATIC COLON CANCER TO LIVER (HCC): ICD-10-CM

## 2021-01-11 DIAGNOSIS — C18.9 METASTATIC COLON CANCER TO LIVER (HCC): ICD-10-CM

## 2021-01-11 PROCEDURE — 700105 HCHG RX REV CODE 258

## 2021-01-11 PROCEDURE — 700105 HCHG RX REV CODE 258: Performed by: INTERNAL MEDICINE

## 2021-01-11 PROCEDURE — 96367 TX/PROPH/DG ADDL SEQ IV INF: CPT

## 2021-01-11 PROCEDURE — 700111 HCHG RX REV CODE 636 W/ 250 OVERRIDE (IP): Mod: JG | Performed by: INTERNAL MEDICINE

## 2021-01-11 PROCEDURE — 96411 CHEMO IV PUSH ADDL DRUG: CPT

## 2021-01-11 PROCEDURE — 96413 CHEMO IV INFUSION 1 HR: CPT

## 2021-01-11 PROCEDURE — 96417 CHEMO IV INFUS EACH ADDL SEQ: CPT

## 2021-01-11 PROCEDURE — A4212 NON CORING NEEDLE OR STYLET: HCPCS

## 2021-01-11 PROCEDURE — 700111 HCHG RX REV CODE 636 W/ 250 OVERRIDE (IP)

## 2021-01-11 PROCEDURE — G0498 CHEMO EXTEND IV INFUS W/PUMP: HCPCS

## 2021-01-11 RX ORDER — HEPARIN SODIUM (PORCINE) LOCK FLUSH IV SOLN 100 UNIT/ML 100 UNIT/ML
500 SOLUTION INTRAVENOUS PRN
Status: CANCELLED | OUTPATIENT
Start: 2021-01-11

## 2021-01-11 RX ORDER — 0.9 % SODIUM CHLORIDE 0.9 %
10 VIAL (ML) INJECTION PRN
Status: CANCELLED | OUTPATIENT
Start: 2021-01-11

## 2021-01-11 RX ORDER — INSULIN GLARGINE 100 [IU]/ML
20 INJECTION, SOLUTION SUBCUTANEOUS EVERY EVENING
Qty: 3 EACH | Refills: 1 | Status: SHIPPED | OUTPATIENT
Start: 2021-01-11 | End: 2022-04-28

## 2021-01-11 RX ORDER — METHYLPREDNISOLONE SODIUM SUCCINATE 125 MG/2ML
125 INJECTION, POWDER, LYOPHILIZED, FOR SOLUTION INTRAMUSCULAR; INTRAVENOUS PRN
Status: CANCELLED | OUTPATIENT
Start: 2021-01-11

## 2021-01-11 RX ORDER — ONDANSETRON 8 MG/1
8 TABLET, ORALLY DISINTEGRATING ORAL PRN
Status: CANCELLED | OUTPATIENT
Start: 2021-01-11

## 2021-01-11 RX ORDER — 0.9 % SODIUM CHLORIDE 0.9 %
3 VIAL (ML) INJECTION PRN
Status: CANCELLED | OUTPATIENT
Start: 2021-01-11

## 2021-01-11 RX ORDER — SODIUM CHLORIDE 9 MG/ML
INJECTION, SOLUTION INTRAVENOUS CONTINUOUS
Status: CANCELLED
Start: 2021-01-11

## 2021-01-11 RX ORDER — 0.9 % SODIUM CHLORIDE 0.9 %
20 VIAL (ML) INJECTION PRN
Status: CANCELLED | OUTPATIENT
Start: 2021-01-14

## 2021-01-11 RX ORDER — EPINEPHRINE 1 MG/ML(1)
0.5 AMPUL (ML) INJECTION PRN
Status: CANCELLED | OUTPATIENT
Start: 2021-01-11

## 2021-01-11 RX ORDER — DIPHENHYDRAMINE HYDROCHLORIDE 50 MG/ML
50 INJECTION INTRAMUSCULAR; INTRAVENOUS PRN
Status: CANCELLED | OUTPATIENT
Start: 2021-01-11

## 2021-01-11 RX ORDER — 0.9 % SODIUM CHLORIDE 0.9 %
VIAL (ML) INJECTION PRN
Status: CANCELLED | OUTPATIENT
Start: 2021-01-11

## 2021-01-11 RX ORDER — PROCHLORPERAZINE MALEATE 10 MG
10 TABLET ORAL EVERY 6 HOURS PRN
Status: CANCELLED | OUTPATIENT
Start: 2021-01-11

## 2021-01-11 RX ORDER — ONDANSETRON 2 MG/ML
4 INJECTION INTRAMUSCULAR; INTRAVENOUS PRN
Status: CANCELLED | OUTPATIENT
Start: 2021-01-11

## 2021-01-11 RX ORDER — HEPARIN SODIUM (PORCINE) LOCK FLUSH IV SOLN 100 UNIT/ML 100 UNIT/ML
500 SOLUTION INTRAVENOUS PRN
Status: CANCELLED | OUTPATIENT
Start: 2021-01-14

## 2021-01-11 RX ORDER — HEPARIN SODIUM (PORCINE) LOCK FLUSH IV SOLN 100 UNIT/ML 100 UNIT/ML
500 SOLUTION INTRAVENOUS PRN
Status: DISCONTINUED | OUTPATIENT
Start: 2021-01-11 | End: 2021-01-11 | Stop reason: HOSPADM

## 2021-01-11 RX ADMIN — DIPHENHYDRAMINE HYDROCHLORIDE 50 MG: 50 INJECTION, SOLUTION INTRAMUSCULAR; INTRAVENOUS at 09:14

## 2021-01-11 RX ADMIN — FLUOROURACIL 855 MG: 50 INJECTION, SOLUTION INTRAVENOUS at 11:47

## 2021-01-11 RX ADMIN — FLUOROURACIL 5120 MG: 50 INJECTION, SOLUTION INTRAVENOUS at 11:47

## 2021-01-11 RX ADMIN — TRASTUZUMAB 450 MG: 150 INJECTION, POWDER, LYOPHILIZED, FOR SOLUTION INTRAVENOUS at 10:20

## 2021-01-11 RX ADMIN — ONDANSETRON 16 MG: 2 INJECTION INTRAMUSCULAR; INTRAVENOUS at 09:35

## 2021-01-11 RX ADMIN — LEUCOVORIN CALCIUM 948 MG: 350 INJECTION, POWDER, LYOPHILIZED, FOR SOLUTION INTRAMUSCULAR; INTRAVENOUS at 11:02

## 2021-01-11 RX ADMIN — DEXAMETHASONE SODIUM PHOSPHATE 20 MG: 4 INJECTION, SOLUTION INTRA-ARTICULAR; INTRALESIONAL; INTRAMUSCULAR; INTRAVENOUS; SOFT TISSUE at 10:00

## 2021-01-11 ASSESSMENT — FIBROSIS 4 INDEX: FIB4 SCORE: 1.86

## 2021-01-11 NOTE — PROGRESS NOTES
Patient to [facility] for chemotherapy infusion. PORT accessed with sterile field, flushed with + blood return. Patient has labs drawn on 1-7-20. Patient meets parameters for infusion. Premeds infused with no s/s of infusion reaction. Herceptin infused with no s/s of infusion reaction. Leucovorin infused with no s/s of infusion reaction. Adrucil push given. 5 FU pump connected and in run position, verified by 2 RN's. Patient has future appointment. Patient to home in care of self.

## 2021-01-11 NOTE — PROGRESS NOTES
Chemotherapy Verification - SECONDARY RN       Height = 183.5 cm  Weight = 110 kg  BSA = 2.37 m2       Medication: Herceptin  Dose: 4 mg/kg  Calculated Dose: 440 mg                             (In mg/m2, AUC, mg/kg)     Medication: Adrucil  Dose: 360 mg/m2  Calculated Dose: 853.2 mg                             (In mg/m2, AUC, mg/kg)    Medication: Adrucil  Dose: 2,160 mg/m2  Calculated Dose: 5,119.2 mg                             (In mg/m2, AUC, mg/kg)      I confirm that this process was performed independently.

## 2021-01-11 NOTE — PROGRESS NOTES
"Pharmacy Chemotherapy Calculations:     Dx: Metastatic colorectal cancer [KRAS/NRAS wild type, ERBB2 (HER2) amplification]    Cycle 22  Previous treatment = 12/28/20    Regimen: mFOLFOX + Vectibix + Herceptin    Trastuzumab 6 mg/kg IV loading dose C1D1, then 4 mg/kg IV Day 1 of subsequent  cycles [confirmed \"every 2 weeks\", per MD; progress note: \"He also had an ERBB2  amplification which might suggest response to treatments like Herceptin\"]   > 7/8/20: Herceptin 2.5 weeks overdue.  Do not reload Herceptin per TRSAMMY Ross.  Cetuximab 400 mg/m2 IV Cycle 1 Day 1, then 250 mg/m2 IV Days 1 and 8 (confirmed  \"weekly\" per MD)   > C4 * * * cetuximab has been omitted * * *   > C5 * * * cetuximab replaced with panitumumab * * *   Oxaliplatin 85 mg/m2 IV Day 1 concurrent with   > Cody LEO reduced dose by 20% to 68 mg/m2 starting with C1D1 due to anticipated tolerance    > 3/14/20: Oxaliplatin discontinued from treatment  Panitumumab (Vectibix) 6 mg/kg IV D1 over 60 min   + added; change of therapy d/t cetuximab skin tox   > 4/25/20, initial dose reduction to 4.5 mg/kg d/t previous EGFR cutaneous toxicities    > C9 * * * Vectibix has been omitted * * *   > C10 Vectibix reordered at 3 mg/kg per MD Cody    > C13 * * * Vectibix has been omitted * * *   > C14 Vectibix reordered at 3 mg/kg per MD Cody   >C17 Vectibix HELD per Cody LEO              >C18 Vectibix held again per MD   >C19  11/30/20- vectibix resumed at 3mg/kg   >C21 HOLD x1 dose for rash   >C22 cont HOLD  Leucovorin 400 mg/m2 IV Day 1 followed by  Fluorouracil 400 mg/m2 IV Day 1 followed by   C1D1 reduced dose by 10% to 360 mg/m2 per MD Cody for anticipated tolerance   Fluorouracil 2400 mg/m2 CIVI over 46-48 hours    C1D1 reduced dose by 10% to 2160 mg/m2 per MD Cody for anticipated tolerance    14-day cycle until progression or toxicity  *Dosing Reference*  NCCN guidelines for colon cancer v2.2019  (cetuximab + chemo) Christine ARGUELLO et al, OBEY " "2017;317(15):7560-5607  (HER2 tx in HER2+ CRC) Kayleen RINALDI et al. Lancet Oncol. 2019 Apr;20(4):518-530.    Allergies: Patient has no known allergies.  /79   Pulse 84   Temp 36.7 °C (98 °F) (Temporal)   Resp 18   Ht 1.835 m (6' 0.24\")   Wt 110 kg (242 lb 8.1 oz)   SpO2 98%   BMI 32.67 kg/m²  Body surface area is 2.37 meters squared.     ECHO:  9/4/20 LVEF ~ 60% OK for 6 months per Cody LEO order  2/7/20 LVEF ~ 60%     Labs: 1/11/21  Meets treatment parameters   Mg 1.7, IV supplement      Panitumumab (Vectibix) 3mg/kg x 108.9kg = 327mg   Rounded to vial size (within 10%) per dose rounding protocol = 300mg IV    Trastuzumab (Herceptin) 4 mg/kg x 110 kg = 440 mg   Rounded to vial size (within 10%) per dose rounding protocol = 450 mg IV    Leucovorin 400 mg/m² x 2.37 m² = 948 mg   Okay to treat with final dose = 948 mg IV over 30 minutes     Fluorouracil (Adrucil) 360 mg/m² x 2.37 m² = 853.2 mg   Okay to treat with final dose =  855 mg IV push    Fluorouracil (Adrucil) 2160 mg/m² x 2.37 m² = 5119.2 mg   Okay to treat with final dose =  5120 mg CIVI over 46 hrs at 2.2 mL/hr        "

## 2021-01-11 NOTE — PROGRESS NOTES
On 1-11-21, Oncology Social Worker, Dunia Siegel, phoned pt to follow up on distress score of 10, relating to; insurance/financail and dealing with children. Pt did not answer. Left message with introduction to support team and OSKATHY Siegel direct contact information.

## 2021-01-11 NOTE — PROGRESS NOTES
Chemotherapy Verification - PRIMARY RN      Height = 1.835m  Weight = 110kg  BSA = 2.37m 2      Medication: trastuzumab  Dose: 4mg/kg  Calculated Dose: 440mg                            (In mg/m2, AUC, mg/kg)     Medication: leucovorin  Dose: 400mg/m2  Calculated Dose: 948mg                            (In mg/m2, AUC, mg/kg)    Medication: fluorouracil  Dose: 360mg/m2  Calculated Dose: 853.2mg                             (In mg/m2, AUC, mg/kg)    Medication: fluorouracil  Dose: 2160mg/m2  Calculated Dose: 5119.2mg                            (In mg/m2, AUC, mg/kg)      I confirm this process was performed independently with the BSA and all final chemotherapy dosing calculations congruent.  Any discrepancies of 10% or greater have been addressed with the chemotherapy pharmacist. The resolution of the discrepancy has been documented in the EPIC progress notes.

## 2021-01-12 NOTE — TELEPHONE ENCOUNTER
Was the patient seen in the last year in this department? Yes    Does patient have an active prescription for medications requested? Yes    Received Request Via: Pharmacy    Hospital Outpatient Visit on 01/07/2021   Component Date Value   • WBC 01/07/2021 5.9    • RBC 01/07/2021 4.16*   • Hemoglobin 01/07/2021 12.8*   • Hematocrit 01/07/2021 38.7*   • MCV 01/07/2021 93.0    • MCH 01/07/2021 30.8    • MCHC 01/07/2021 33.1*   • RDW 01/07/2021 54.4*   • Platelet Count 01/07/2021 155*   • MPV 01/07/2021 10.9    • Neutrophils-Polys 01/07/2021 61.30    • Lymphocytes 01/07/2021 18.80*   • Monocytes 01/07/2021 12.90    • Eosinophils 01/07/2021 4.90    • Basophils 01/07/2021 0.70    • Immature Granulocytes 01/07/2021 1.40*   • Nucleated RBC 01/07/2021 0.00    • Neutrophils (Absolute) 01/07/2021 3.63    • Lymphs (Absolute) 01/07/2021 1.11    • Monos (Absolute) 01/07/2021 0.76    • Eos (Absolute) 01/07/2021 0.29    • Baso (Absolute) 01/07/2021 0.04    • Immature Granulocytes (a* 01/07/2021 0.08    • NRBC (Absolute) 01/07/2021 0.00    • Magnesium 01/07/2021 1.7    • Carcinoembryonic Antigen 01/07/2021 39.2*   • Sodium 01/07/2021 135    • Potassium 01/07/2021 4.0    • Chloride 01/07/2021 103    • Co2 01/07/2021 22    • Anion Gap 01/07/2021 10.0    • Glucose 01/07/2021 197*   • Bun 01/07/2021 12    • Creatinine 01/07/2021 0.71    • Calcium 01/07/2021 9.1    • AST(SGOT) 01/07/2021 30    • ALT(SGPT) 01/07/2021 34    • Alkaline Phosphatase 01/07/2021 127*   • Total Bilirubin 01/07/2021 0.8    • Albumin 01/07/2021 4.0    • Total Protein 01/07/2021 6.4    • Globulin 01/07/2021 2.4    • A-G Ratio 01/07/2021 1.7    • GFR If  01/07/2021 >60    • GFR If Non  [employer]* 01/07/2021 >60    Outpatient Infusion Services on 12/28/2020   Component Date Value   • WBC 12/28/2020 8.8    • RBC 12/28/2020 4.10*   • Hemoglobin 12/28/2020 12.6*   • Hematocrit 12/28/2020 38.1*   • MCV 12/28/2020 92.9    • MCH 12/28/2020 30.7    • MCHC  12/28/2020 33.1*   • RDW 12/28/2020 58.3*   • Platelet Count 12/28/2020 110*   • MPV 12/28/2020 10.4    • Neutrophils-Polys 12/28/2020 71.50    • Lymphocytes 12/28/2020 14.90*   • Monocytes 12/28/2020 9.10    • Eosinophils 12/28/2020 3.10    • Basophils 12/28/2020 0.60    • Immature Granulocytes 12/28/2020 0.80    • Nucleated RBC 12/28/2020 0.00    • Neutrophils (Absolute) 12/28/2020 6.29    • Lymphs (Absolute) 12/28/2020 1.31    • Monos (Absolute) 12/28/2020 0.80    • Eos (Absolute) 12/28/2020 0.27    • Baso (Absolute) 12/28/2020 0.05    • Immature Granulocytes (a* 12/28/2020 0.07    • NRBC (Absolute) 12/28/2020 0.00    • Sodium 12/28/2020 136    • Potassium 12/28/2020 3.1*   • Chloride 12/28/2020 101    • Co2 12/28/2020 22    • Anion Gap 12/28/2020 13.0    • Glucose 12/28/2020 214*   • Bun 12/28/2020 12    • Creatinine 12/28/2020 0.77    • Calcium 12/28/2020 9.3    • AST(SGOT) 12/28/2020 22    • ALT(SGPT) 12/28/2020 27    • Alkaline Phosphatase 12/28/2020 139*   • Total Bilirubin 12/28/2020 1.0    • Albumin 12/28/2020 3.9    • Total Protein 12/28/2020 6.3    • Globulin 12/28/2020 2.4    • A-G Ratio 12/28/2020 1.6    • Magnesium 12/28/2020 1.6    • Carcinoembryonic Antigen 12/28/2020 35.6*   • GFR If  12/28/2020 >60    • GFR If Non  [employer]* 12/28/2020 >60    Outpatient Infusion Services on 12/14/2020   Component Date Value   • WBC 12/14/2020 7.0    • RBC 12/14/2020 4.27*   • Hemoglobin 12/14/2020 12.9*   • Hematocrit 12/14/2020 39.2*   • MCV 12/14/2020 91.8    • MCH 12/14/2020 30.2    • MCHC 12/14/2020 32.9*   • RDW 12/14/2020 55.3*   • Platelet Count 12/14/2020 138*   • MPV 12/14/2020 10.5    • Neutrophils-Polys 12/14/2020 67.70    • Lymphocytes 12/14/2020 18.50*   • Monocytes 12/14/2020 10.30    • Eosinophils 12/14/2020 2.20    • Basophils 12/14/2020 0.70    • Immature Granulocytes 12/14/2020 0.60    • Nucleated RBC 12/14/2020 0.00    • Neutrophils (Absolute) 12/14/2020 4.72    • Lymphs  (Absolute) 12/14/2020 1.29    • Monos (Absolute) 12/14/2020 0.72    • Eos (Absolute) 12/14/2020 0.15    • Baso (Absolute) 12/14/2020 0.05    • Immature Granulocytes (a* 12/14/2020 0.04    • NRBC (Absolute) 12/14/2020 0.00    • Magnesium 12/14/2020 2.0    Hospital Outpatient Visit on 12/11/2020   Component Date Value   • Glycohemoglobin 12/11/2020 7.1*   • Est Avg Glucose 12/11/2020 157    • Carcinoembryonic Antigen 12/11/2020 48.2*   • Sodium 12/11/2020 139    • Potassium 12/11/2020 4.0    • Chloride 12/11/2020 106    • Co2 12/11/2020 24    • Anion Gap 12/11/2020 9.0    • Glucose 12/11/2020 142*   • Bun 12/11/2020 9    • Creatinine 12/11/2020 0.61    • Calcium 12/11/2020 9.5    • AST(SGOT) 12/11/2020 25    • ALT(SGPT) 12/11/2020 35    • Alkaline Phosphatase 12/11/2020 140*   • Total Bilirubin 12/11/2020 0.8    • Albumin 12/11/2020 4.3    • Total Protein 12/11/2020 6.7    • Globulin 12/11/2020 2.4    • A-G Ratio 12/11/2020 1.8    • GFR If  12/11/2020 >60    • GFR If Non  [employer]* 12/11/2020 >60    Outpatient Infusion Services on 11/30/2020   Component Date Value   • WBC 11/30/2020 6.1    • RBC 11/30/2020 3.87*   • Hemoglobin 11/30/2020 12.0*   • Hematocrit 11/30/2020 36.0*   • MCV 11/30/2020 93.0    • MCH 11/30/2020 31.0    • MCHC 11/30/2020 33.3*   • RDW 11/30/2020 58.4*   • Platelet Count 11/30/2020 83*   • MPV 11/30/2020 11.4    • Neutrophils-Polys 11/30/2020 70.80    • Lymphocytes 11/30/2020 16.40*   • Monocytes 11/30/2020 9.30    • Eosinophils 11/30/2020 2.30    • Basophils 11/30/2020 0.70    • Immature Granulocytes 11/30/2020 0.50    • Nucleated RBC 11/30/2020 0.00    • Neutrophils (Absolute) 11/30/2020 4.33    • Lymphs (Absolute) 11/30/2020 1.00    • Monos (Absolute) 11/30/2020 0.57    • Eos (Absolute) 11/30/2020 0.14    • Baso (Absolute) 11/30/2020 0.04    • Immature Granulocytes (a* 11/30/2020 0.03    • NRBC (Absolute) 11/30/2020 0.00    • Sodium 11/30/2020 139    • Potassium 11/30/2020  3.5*   • Chloride 11/30/2020 106    • Co2 11/30/2020 24    • Anion Gap 11/30/2020 9.0    • Glucose 11/30/2020 140*   • Bun 11/30/2020 8    • Creatinine 11/30/2020 0.61    • Calcium 11/30/2020 9.2    • AST(SGOT) 11/30/2020 25    • ALT(SGPT) 11/30/2020 32    • Alkaline Phosphatase 11/30/2020 133*   • Total Bilirubin 11/30/2020 1.2    • Albumin 11/30/2020 3.9    • Total Protein 11/30/2020 6.3    • Globulin 11/30/2020 2.4    • A-G Ratio 11/30/2020 1.6    • Magnesium 11/30/2020 1.5    • Carcinoembryonic Antigen 11/30/2020 50.4*   • GFR If  11/30/2020 >60    • GFR If Non  [employer]* 11/30/2020 >60    Hospital Outpatient Visit on 11/12/2020   Component Date Value   • Color 11/12/2020 Yellow    • Character 11/12/2020 Clear    • Specific Gravity 11/12/2020 1.013    • Ph 11/12/2020 6.0    • Glucose 11/12/2020 >=1000*   • Ketones 11/12/2020 Negative    • Protein 11/12/2020 Negative    • Bilirubin 11/12/2020 Negative    • Urobilinogen, Urine 11/12/2020 0.2    • Nitrite 11/12/2020 Negative    • Leukocyte Esterase 11/12/2020 Negative    • Occult Blood 11/12/2020 Negative    • Micro Urine Req 11/12/2020 see below    • WBC 11/12/2020 9.2    • RBC 11/12/2020 4.28*   • Hemoglobin 11/12/2020 12.9*   • Hematocrit 11/12/2020 38.7*   • MCV 11/12/2020 90.4    • MCH 11/12/2020 30.1    • MCHC 11/12/2020 33.3*   • RDW 11/12/2020 51.8*   • Platelet Count 11/12/2020 117*   • MPV 11/12/2020 10.4    • Neutrophils-Polys 11/12/2020 78.40*   • Lymphocytes 11/12/2020 9.70*   • Monocytes 11/12/2020 8.50    • Eosinophils 11/12/2020 0.20    • Basophils 11/12/2020 0.30    • Immature Granulocytes 11/12/2020 2.90*   • Nucleated RBC 11/12/2020 0.00    • Neutrophils (Absolute) 11/12/2020 7.23    • Lymphs (Absolute) 11/12/2020 0.89*   • Monos (Absolute) 11/12/2020 0.78    • Eos (Absolute) 11/12/2020 0.02    • Baso (Absolute) 11/12/2020 0.03    • Immature Granulocytes (a* 11/12/2020 0.27*   • NRBC (Absolute) 11/12/2020 0.00    • Magnesium  11/12/2020 1.9    • Carcinoembryonic Antigen 11/12/2020 38.1*   • Sodium 11/12/2020 135    • Potassium 11/12/2020 4.2    • Chloride 11/12/2020 102    • Co2 11/12/2020 24    • Anion Gap 11/12/2020 9.0    • Glucose 11/12/2020 229*   • Bun 11/12/2020 11    • Creatinine 11/12/2020 0.62    • Calcium 11/12/2020 8.8    • AST(SGOT) 11/12/2020 19    • ALT(SGPT) 11/12/2020 50    • Alkaline Phosphatase 11/12/2020 112*   • Total Bilirubin 11/12/2020 0.8    • Albumin 11/12/2020 3.5    • Total Protein 11/12/2020 5.5*   • Globulin 11/12/2020 2.0    • A-G Ratio 11/12/2020 1.8    • GFR If  11/12/2020 >60    • GFR If Non  [employer]* 11/12/2020 >60    Outpatient Infusion Services on 11/02/2020   Component Date Value   • WBC 11/02/2020 11.4*   • RBC 11/02/2020 4.18*   • Hemoglobin 11/02/2020 12.5*   • Hematocrit 11/02/2020 38.7*   • MCV 11/02/2020 92.6    • MCH 11/02/2020 29.9    • MCHC 11/02/2020 32.3*   • RDW 11/02/2020 53.4*   • Platelet Count 11/02/2020 97*   • MPV 11/02/2020 10.9    • Neutrophils-Polys 11/02/2020 92.10*   • Lymphocytes 11/02/2020 4.50*   • Monocytes 11/02/2020 2.70    • Eosinophils 11/02/2020 0.00    • Basophils 11/02/2020 0.10    • Immature Granulocytes 11/02/2020 0.60    • Nucleated RBC 11/02/2020 0.00    • Neutrophils (Absolute) 11/02/2020 10.49*   • Lymphs (Absolute) 11/02/2020 0.51*   • Monos (Absolute) 11/02/2020 0.31    • Eos (Absolute) 11/02/2020 0.00    • Baso (Absolute) 11/02/2020 0.01    • Immature Granulocytes (a* 11/02/2020 0.07    • NRBC (Absolute) 11/02/2020 0.00    • Sodium 11/02/2020 135    • Potassium 11/02/2020 4.1    • Chloride 11/02/2020 102    • Co2 11/02/2020 22    • Anion Gap 11/02/2020 11.0    • Glucose 11/02/2020 298*   • Bun 11/02/2020 15    • Creatinine 11/02/2020 0.59    • Calcium 11/02/2020 9.6    • AST(SGOT) 11/02/2020 31    • ALT(SGPT) 11/02/2020 62*   • Alkaline Phosphatase 11/02/2020 176*   • Total Bilirubin 11/02/2020 0.5    • Albumin 11/02/2020 3.9    • Total  Protein 11/02/2020 6.5    • Globulin 11/02/2020 2.6    • A-G Ratio 11/02/2020 1.5    • Magnesium 11/02/2020 2.0    • Carcinoembryonic Antigen 11/02/2020 24.4*   • GFR If  11/02/2020 >60    • GFR If Non  [employer]* 11/02/2020 >60    Outpatient Infusion Services on 10/19/2020   Component Date Value   • WBC 10/19/2020 5.9    • RBC 10/19/2020 4.16*   • Hemoglobin 10/19/2020 12.5*   • Hematocrit 10/19/2020 37.7*   • MCV 10/19/2020 90.6    • MCH 10/19/2020 30.0    • MCHC 10/19/2020 33.2*   • RDW 10/19/2020 52.0*   • Platelet Count 10/19/2020 89*   • MPV 10/19/2020 9.9    • Neutrophils-Polys 10/19/2020 73.50*   • Lymphocytes 10/19/2020 16.60*   • Monocytes 10/19/2020 7.40    • Eosinophils 10/19/2020 1.70    • Basophils 10/19/2020 0.50    • Immature Granulocytes 10/19/2020 0.30    • Nucleated RBC 10/19/2020 0.00    • Neutrophils (Absolute) 10/19/2020 4.30    • Lymphs (Absolute) 10/19/2020 0.97*   • Monos (Absolute) 10/19/2020 0.43    • Eos (Absolute) 10/19/2020 0.10    • Baso (Absolute) 10/19/2020 0.03    • Immature Granulocytes (a* 10/19/2020 0.02    • NRBC (Absolute) 10/19/2020 0.00    • Sodium 10/19/2020 138    • Potassium 10/19/2020 3.5*   • Chloride 10/19/2020 103    • Co2 10/19/2020 23    • Anion Gap 10/19/2020 12.0    • Glucose 10/19/2020 171*   • Bun 10/19/2020 15    • Creatinine 10/19/2020 0.79    • Calcium 10/19/2020 9.0    • AST(SGOT) 10/19/2020 28    • ALT(SGPT) 10/19/2020 39    • Alkaline Phosphatase 10/19/2020 167*   • Total Bilirubin 10/19/2020 1.5    • Albumin 10/19/2020 4.0    • Total Protein 10/19/2020 6.3    • Globulin 10/19/2020 2.3    • A-G Ratio 10/19/2020 1.7    • Magnesium 10/19/2020 1.9    • Carcinoembryonic Antigen 10/19/2020 30.7*   • GFR If  10/19/2020 >60    • GFR If Non  [employer]* 10/19/2020 >60    Hospital Outpatient Visit on 10/02/2020   Component Date Value   • Color 10/02/2020 Yellow    • Character 10/02/2020 Clear    • Specific Gravity 10/02/2020  1.016    • Ph 10/02/2020 6.0    • Glucose 10/02/2020 Negative    • Ketones 10/02/2020 Negative    • Protein 10/02/2020 Negative    • Bilirubin 10/02/2020 Negative    • Urobilinogen, Urine 10/02/2020 1.0    • Nitrite 10/02/2020 Negative    • Leukocyte Esterase 10/02/2020 Negative    • Occult Blood 10/02/2020 Negative    • Micro Urine Req 10/02/2020 see below    • WBC 10/02/2020 4.9    • RBC 10/02/2020 4.34*   • Hemoglobin 10/02/2020 13.0*   • Hematocrit 10/02/2020 40.3*   • MCV 10/02/2020 92.9    • MCH 10/02/2020 30.0    • MCHC 10/02/2020 32.3*   • RDW 10/02/2020 54.3*   • Platelet Count 10/02/2020 101*   • MPV 10/02/2020 10.6    • Neutrophils-Polys 10/02/2020 65.40    • Lymphocytes 10/02/2020 21.60*   • Monocytes 10/02/2020 9.10    • Eosinophils 10/02/2020 2.90    • Basophils 10/02/2020 0.80    • Immature Granulocytes 10/02/2020 0.20    • Nucleated RBC 10/02/2020 0.00    • Neutrophils (Absolute) 10/02/2020 3.17    • Lymphs (Absolute) 10/02/2020 1.05    • Monos (Absolute) 10/02/2020 0.44    • Eos (Absolute) 10/02/2020 0.14    • Baso (Absolute) 10/02/2020 0.04    • Immature Granulocytes (a* 10/02/2020 0.01    • NRBC (Absolute) 10/02/2020 0.00    • Magnesium 10/02/2020 2.1    • Carcinoembryonic Antigen 10/02/2020 35.4*   • Sodium 10/02/2020 140    • Potassium 10/02/2020 4.0    • Chloride 10/02/2020 103    • Co2 10/02/2020 25    • Anion Gap 10/02/2020 12.0    • Glucose 10/02/2020 134*   • Bun 10/02/2020 12    • Creatinine 10/02/2020 0.76    • Calcium 10/02/2020 9.3    • AST(SGOT) 10/02/2020 27    • ALT(SGPT) 10/02/2020 33    • Alkaline Phosphatase 10/02/2020 186*   • Total Bilirubin 10/02/2020 0.9    • Albumin 10/02/2020 3.9    • Total Protein 10/02/2020 6.3    • Globulin 10/02/2020 2.4    • A-G Ratio 10/02/2020 1.6    • GFR If  10/02/2020 >60    • GFR If Non  [employer]* 10/02/2020 >60    Hospital Outpatient Visit on 09/17/2020   Component Date Value   • Color 09/17/2020 Yellow    • Character  09/17/2020 Clear    • Specific Gravity 09/17/2020 1.020    • Ph 09/17/2020 6.0    • Glucose 09/17/2020 Negative    • Ketones 09/17/2020 Negative    • Protein 09/17/2020 Negative    • Bilirubin 09/17/2020 Negative    • Urobilinogen, Urine 09/17/2020 1.0    • Nitrite 09/17/2020 Negative    • Leukocyte Esterase 09/17/2020 Negative    • Occult Blood 09/17/2020 Negative    • Micro Urine Req 09/17/2020 see below    • WBC 09/17/2020 5.8    • RBC 09/17/2020 3.87*   • Hemoglobin 09/17/2020 11.7*   • Hematocrit 09/17/2020 35.8*   • MCV 09/17/2020 92.5    • MCH 09/17/2020 30.2    • MCHC 09/17/2020 32.7*   • RDW 09/17/2020 55.8*   • Platelet Count 09/17/2020 143*   • MPV 09/17/2020 10.8    • Neutrophils-Polys 09/17/2020 66.90    • Lymphocytes 09/17/2020 20.10*   • Monocytes 09/17/2020 10.10    • Eosinophils 09/17/2020 2.10    • Basophils 09/17/2020 0.50    • Immature Granulocytes 09/17/2020 0.30    • Nucleated RBC 09/17/2020 0.00    • Neutrophils (Absolute) 09/17/2020 3.85    • Lymphs (Absolute) 09/17/2020 1.16    • Monos (Absolute) 09/17/2020 0.58    • Eos (Absolute) 09/17/2020 0.12    • Baso (Absolute) 09/17/2020 0.03    • Immature Granulocytes (a* 09/17/2020 0.02    • NRBC (Absolute) 09/17/2020 0.00    • Magnesium 09/17/2020 2.2    • Carcinoembryonic Antigen 09/17/2020 33.7*   • Sodium 09/17/2020 141    • Potassium 09/17/2020 3.6    • Chloride 09/17/2020 104    • Co2 09/17/2020 23    • Anion Gap 09/17/2020 14.0    • Glucose 09/17/2020 201*   • Bun 09/17/2020 12    • Creatinine 09/17/2020 0.73    • Calcium 09/17/2020 9.2    • AST(SGOT) 09/17/2020 24    • ALT(SGPT) 09/17/2020 33    • Alkaline Phosphatase 09/17/2020 180*   • Total Bilirubin 09/17/2020 0.6    • Albumin 09/17/2020 3.8    • Total Protein 09/17/2020 6.2    • Globulin 09/17/2020 2.4    • A-G Ratio 09/17/2020 1.6    • GFR If  09/17/2020 >60    • GFR If Non  Ameri* 09/17/2020 >60    There may be more visits with results that are not included.    ]

## 2021-01-13 ENCOUNTER — OUTPATIENT INFUSION SERVICES (OUTPATIENT)
Dept: ONCOLOGY | Facility: MEDICAL CENTER | Age: 56
End: 2021-01-13
Attending: INTERNAL MEDICINE
Payer: MEDICARE

## 2021-01-13 VITALS
SYSTOLIC BLOOD PRESSURE: 121 MMHG | TEMPERATURE: 98.6 F | RESPIRATION RATE: 20 BRPM | HEART RATE: 91 BPM | DIASTOLIC BLOOD PRESSURE: 72 MMHG | OXYGEN SATURATION: 97 %

## 2021-01-13 DIAGNOSIS — C78.7 METASTATIC COLON CANCER TO LIVER (HCC): ICD-10-CM

## 2021-01-13 DIAGNOSIS — C18.9 METASTATIC COLON CANCER TO LIVER (HCC): ICD-10-CM

## 2021-01-13 PROCEDURE — 700111 HCHG RX REV CODE 636 W/ 250 OVERRIDE (IP): Performed by: INTERNAL MEDICINE

## 2021-01-13 PROCEDURE — 96523 IRRIG DRUG DELIVERY DEVICE: CPT

## 2021-01-13 RX ORDER — HEPARIN SODIUM (PORCINE) LOCK FLUSH IV SOLN 100 UNIT/ML 100 UNIT/ML
500 SOLUTION INTRAVENOUS PRN
Status: DISCONTINUED | OUTPATIENT
Start: 2021-01-13 | End: 2021-01-13 | Stop reason: HOSPADM

## 2021-01-13 RX ADMIN — HEPARIN 500 UNITS: 100 SYRINGE at 16:38

## 2021-01-14 NOTE — PROGRESS NOTES
Pt presents to [facility] for fluorouracil pump disconnect after 46 hrs of continuous infusion. See chemotherapy flow sheet for volume and dose administration. Brisk blood return observed from R chest port before flushed, heparin locked, and de-accessed; gauze and tape dressing applied. Next appointment confirmed and education provided. Pt discharged to self care with all personal belongings and in NAD.

## 2021-01-21 ENCOUNTER — HOSPITAL ENCOUNTER (OUTPATIENT)
Dept: LAB | Facility: MEDICAL CENTER | Age: 56
End: 2021-01-21
Attending: PHYSICIAN ASSISTANT
Payer: MEDICARE

## 2021-01-21 LAB
ALBUMIN SERPL BCP-MCNC: 4.1 G/DL (ref 3.2–4.9)
ALBUMIN/GLOB SERPL: 1.8 G/DL
ALP SERPL-CCNC: 139 U/L (ref 30–99)
ALT SERPL-CCNC: 31 U/L (ref 2–50)
ANION GAP SERPL CALC-SCNC: 7 MMOL/L (ref 7–16)
AST SERPL-CCNC: 21 U/L (ref 12–45)
BASOPHILS # BLD AUTO: 0.6 % (ref 0–1.8)
BASOPHILS # BLD: 0.03 K/UL (ref 0–0.12)
BILIRUB SERPL-MCNC: 0.9 MG/DL (ref 0.1–1.5)
BUN SERPL-MCNC: 11 MG/DL (ref 8–22)
CALCIUM SERPL-MCNC: 9.2 MG/DL (ref 8.5–10.5)
CEA SERPL-MCNC: 40.3 NG/ML (ref 0–3)
CHLORIDE SERPL-SCNC: 104 MMOL/L (ref 96–112)
CO2 SERPL-SCNC: 25 MMOL/L (ref 20–33)
CREAT SERPL-MCNC: 0.72 MG/DL (ref 0.5–1.4)
EOSINOPHIL # BLD AUTO: 0.13 K/UL (ref 0–0.51)
EOSINOPHIL NFR BLD: 2.6 % (ref 0–6.9)
ERYTHROCYTE [DISTWIDTH] IN BLOOD BY AUTOMATED COUNT: 53.3 FL (ref 35.9–50)
GLOBULIN SER CALC-MCNC: 2.3 G/DL (ref 1.9–3.5)
GLUCOSE SERPL-MCNC: 176 MG/DL (ref 65–99)
HCT VFR BLD AUTO: 38.3 % (ref 42–52)
HGB BLD-MCNC: 12.5 G/DL (ref 14–18)
IMM GRANULOCYTES # BLD AUTO: 0.05 K/UL (ref 0–0.11)
IMM GRANULOCYTES NFR BLD AUTO: 1 % (ref 0–0.9)
LYMPHOCYTES # BLD AUTO: 1.12 K/UL (ref 1–4.8)
LYMPHOCYTES NFR BLD: 22 % (ref 22–41)
MAGNESIUM SERPL-MCNC: 2 MG/DL (ref 1.5–2.5)
MCH RBC QN AUTO: 31.1 PG (ref 27–33)
MCHC RBC AUTO-ENTMCNC: 32.6 G/DL (ref 33.7–35.3)
MCV RBC AUTO: 95.3 FL (ref 81.4–97.8)
MONOCYTES # BLD AUTO: 0.88 K/UL (ref 0–0.85)
MONOCYTES NFR BLD AUTO: 17.3 % (ref 0–13.4)
NEUTROPHILS # BLD AUTO: 2.88 K/UL (ref 1.82–7.42)
NEUTROPHILS NFR BLD: 56.5 % (ref 44–72)
NRBC # BLD AUTO: 0 K/UL
NRBC BLD-RTO: 0 /100 WBC
PLATELET # BLD AUTO: 158 K/UL (ref 164–446)
PMV BLD AUTO: 10.5 FL (ref 9–12.9)
POTASSIUM SERPL-SCNC: 4.2 MMOL/L (ref 3.6–5.5)
PROT SERPL-MCNC: 6.4 G/DL (ref 6–8.2)
RBC # BLD AUTO: 4.02 M/UL (ref 4.7–6.1)
SODIUM SERPL-SCNC: 136 MMOL/L (ref 135–145)
WBC # BLD AUTO: 5.1 K/UL (ref 4.8–10.8)

## 2021-01-21 PROCEDURE — 82378 CARCINOEMBRYONIC ANTIGEN: CPT

## 2021-01-21 PROCEDURE — 80053 COMPREHEN METABOLIC PANEL: CPT

## 2021-01-21 PROCEDURE — 85025 COMPLETE CBC W/AUTO DIFF WBC: CPT

## 2021-01-21 PROCEDURE — 36415 COLL VENOUS BLD VENIPUNCTURE: CPT

## 2021-01-21 PROCEDURE — 83735 ASSAY OF MAGNESIUM: CPT

## 2021-01-23 RX ORDER — 0.9 % SODIUM CHLORIDE 0.9 %
10 VIAL (ML) INJECTION PRN
Status: CANCELLED | OUTPATIENT
Start: 2021-01-25

## 2021-01-23 RX ORDER — 0.9 % SODIUM CHLORIDE 0.9 %
3 VIAL (ML) INJECTION PRN
Status: CANCELLED | OUTPATIENT
Start: 2021-01-25

## 2021-01-23 RX ORDER — 0.9 % SODIUM CHLORIDE 0.9 %
VIAL (ML) INJECTION PRN
Status: CANCELLED | OUTPATIENT
Start: 2021-01-25

## 2021-01-23 RX ORDER — 0.9 % SODIUM CHLORIDE 0.9 %
10 VIAL (ML) INJECTION PRN
Status: CANCELLED | OUTPATIENT
Start: 2021-01-26

## 2021-01-23 RX ORDER — HEPARIN SODIUM (PORCINE) LOCK FLUSH IV SOLN 100 UNIT/ML 100 UNIT/ML
500 SOLUTION INTRAVENOUS PRN
Status: CANCELLED | OUTPATIENT
Start: 2021-01-25

## 2021-01-23 RX ORDER — HEPARIN SODIUM (PORCINE) LOCK FLUSH IV SOLN 100 UNIT/ML 100 UNIT/ML
500 SOLUTION INTRAVENOUS PRN
Status: CANCELLED | OUTPATIENT
Start: 2021-01-28

## 2021-01-23 RX ORDER — PROCHLORPERAZINE MALEATE 10 MG
10 TABLET ORAL EVERY 6 HOURS PRN
Status: CANCELLED | OUTPATIENT
Start: 2021-01-26

## 2021-01-23 RX ORDER — SODIUM CHLORIDE 9 MG/ML
INJECTION, SOLUTION INTRAVENOUS CONTINUOUS
Status: CANCELLED
Start: 2021-01-26

## 2021-01-23 RX ORDER — 0.9 % SODIUM CHLORIDE 0.9 %
3 VIAL (ML) INJECTION PRN
Status: CANCELLED | OUTPATIENT
Start: 2021-01-26

## 2021-01-23 RX ORDER — METHYLPREDNISOLONE SODIUM SUCCINATE 125 MG/2ML
125 INJECTION, POWDER, LYOPHILIZED, FOR SOLUTION INTRAMUSCULAR; INTRAVENOUS PRN
Status: CANCELLED | OUTPATIENT
Start: 2021-01-26

## 2021-01-23 RX ORDER — ONDANSETRON 8 MG/1
8 TABLET, ORALLY DISINTEGRATING ORAL PRN
Status: CANCELLED | OUTPATIENT
Start: 2021-01-26

## 2021-01-23 RX ORDER — DIPHENHYDRAMINE HYDROCHLORIDE 50 MG/ML
50 INJECTION INTRAMUSCULAR; INTRAVENOUS PRN
Status: CANCELLED | OUTPATIENT
Start: 2021-01-26

## 2021-01-23 RX ORDER — 0.9 % SODIUM CHLORIDE 0.9 %
20 VIAL (ML) INJECTION PRN
Status: CANCELLED | OUTPATIENT
Start: 2021-01-28

## 2021-01-23 RX ORDER — 0.9 % SODIUM CHLORIDE 0.9 %
VIAL (ML) INJECTION PRN
Status: CANCELLED | OUTPATIENT
Start: 2021-01-26

## 2021-01-23 RX ORDER — EPINEPHRINE 1 MG/ML(1)
0.5 AMPUL (ML) INJECTION PRN
Status: CANCELLED | OUTPATIENT
Start: 2021-01-26

## 2021-01-23 RX ORDER — ONDANSETRON 2 MG/ML
4 INJECTION INTRAMUSCULAR; INTRAVENOUS PRN
Status: CANCELLED | OUTPATIENT
Start: 2021-01-26

## 2021-01-23 RX ORDER — HEPARIN SODIUM (PORCINE) LOCK FLUSH IV SOLN 100 UNIT/ML 100 UNIT/ML
500 SOLUTION INTRAVENOUS PRN
Status: CANCELLED | OUTPATIENT
Start: 2021-01-26

## 2021-01-26 ENCOUNTER — OUTPATIENT INFUSION SERVICES (OUTPATIENT)
Dept: ONCOLOGY | Facility: MEDICAL CENTER | Age: 56
End: 2021-01-26
Attending: INTERNAL MEDICINE
Payer: MEDICARE

## 2021-01-26 VITALS
SYSTOLIC BLOOD PRESSURE: 132 MMHG | TEMPERATURE: 97.4 F | BODY MASS INDEX: 32.85 KG/M2 | WEIGHT: 242.51 LBS | HEIGHT: 72 IN | RESPIRATION RATE: 18 BRPM | DIASTOLIC BLOOD PRESSURE: 73 MMHG | OXYGEN SATURATION: 98 % | HEART RATE: 74 BPM

## 2021-01-26 DIAGNOSIS — C78.7 METASTATIC COLON CANCER TO LIVER (HCC): ICD-10-CM

## 2021-01-26 DIAGNOSIS — C18.9 METASTATIC COLON CANCER TO LIVER (HCC): ICD-10-CM

## 2021-01-26 PROCEDURE — 96367 TX/PROPH/DG ADDL SEQ IV INF: CPT

## 2021-01-26 PROCEDURE — 96413 CHEMO IV INFUSION 1 HR: CPT

## 2021-01-26 PROCEDURE — 96411 CHEMO IV PUSH ADDL DRUG: CPT

## 2021-01-26 PROCEDURE — 700111 HCHG RX REV CODE 636 W/ 250 OVERRIDE (IP): Performed by: INTERNAL MEDICINE

## 2021-01-26 PROCEDURE — 700105 HCHG RX REV CODE 258: Performed by: INTERNAL MEDICINE

## 2021-01-26 PROCEDURE — A4212 NON CORING NEEDLE OR STYLET: HCPCS

## 2021-01-26 PROCEDURE — 96417 CHEMO IV INFUS EACH ADDL SEQ: CPT

## 2021-01-26 PROCEDURE — 96375 TX/PRO/DX INJ NEW DRUG ADDON: CPT

## 2021-01-26 PROCEDURE — G0498 CHEMO EXTEND IV INFUS W/PUMP: HCPCS

## 2021-01-26 RX ADMIN — FLUOROURACIL 5120 MG: 50 INJECTION, SOLUTION INTRAVENOUS at 14:25

## 2021-01-26 RX ADMIN — DIPHENHYDRAMINE HYDROCHLORIDE 50 MG: 50 INJECTION INTRAMUSCULAR; INTRAVENOUS at 10:19

## 2021-01-26 RX ADMIN — FLUOROURACIL 855 MG: 50 INJECTION, SOLUTION INTRAVENOUS at 14:15

## 2021-01-26 RX ADMIN — ONDANSETRON 16 MG: 2 INJECTION INTRAMUSCULAR; INTRAVENOUS at 10:32

## 2021-01-26 RX ADMIN — LEUCOVORIN CALCIUM 948 MG: 350 INJECTION, POWDER, LYOPHILIZED, FOR SOLUTION INTRAMUSCULAR; INTRAVENOUS at 13:34

## 2021-01-26 RX ADMIN — PANITUMUMAB 300 MG: 100 SOLUTION INTRAVENOUS at 11:15

## 2021-01-26 RX ADMIN — TRASTUZUMAB 450 MG: 150 INJECTION, POWDER, LYOPHILIZED, FOR SOLUTION INTRAVENOUS at 12:45

## 2021-01-26 ASSESSMENT — FIBROSIS 4 INDEX: FIB4 SCORE: 1.34

## 2021-01-26 NOTE — PROGRESS NOTES
Pt arrives to [facility] for cycle 23 day 1 of chemotherapy.  Pt denies s/sx of infection.  Pt reports rash to skin has resolved since not receiving Vectibix.  Noted that ordered dose for Vectibix is reduced to 50% of original dose.  Pt had labs drawn on 1/21/2021.  Adrián Godfrey RN received telephone order that it is ok to proceed with labs on 1/21/2021.  [facility] has EMLA cream applied by the pt.  EMLA cream removed.  Port accessed with sterile technique, flushed with NS and brisk blood return observed.  Pre-medications given.  Vectibix infused through in line filter.  Herceptin given.  Leucovorin infused.  Port flushed with NS and brisk blood return noted.  5FU bolus given slow IVP over 5 minutes.  5FU CADD pump connected to pt.  CADD pump clamps opened and pump is functioning properly.  Spoke to scheduling dept to set up more appts for pt.  Gave pt a copy of new schedule.  Pt dc home to self care.

## 2021-01-26 NOTE — PROGRESS NOTES
"Pharmacy Chemotherapy Calculations:     Dx: Metastatic colorectal cancer [KRAS/NRAS wild type, ERBB2 (HER2) amplification]    Cycle 23  Previous treatment = 1/13/21    Regimen: mFOLFOX + Vectibix + Herceptin    Trastuzumab 6 mg/kg IV loading dose C1D1, then 4 mg/kg IV Day 1 of subsequent  cycles [confirmed \"every 2 weeks\", per MD; progress note: \"He also had an ERBB2  amplification which might suggest response to treatments like Herceptin\"]   > 7/8/20: Herceptin 2.5 weeks overdue.  Do not reload Herceptin per [facility] Dr. Ross.  Cetuximab 400 mg/m2 IV Cycle 1 Day 1, then 250 mg/m2 IV Days 1 and 8 (confirmed  \"weekly\" per MD)   > C4 * * * cetuximab has been omitted * * *   > C5 * * * cetuximab replaced with panitumumab * * *   Oxaliplatin 85 mg/m2 IV Day 1 concurrent with   > Cody LEO reduced dose by 20% to 68 mg/m2 starting with C1D1 due to anticipated tolerance    > 3/14/20: Oxaliplatin discontinued from treatment  Panitumumab (Vectibix) 6 mg/kg IV D1 over 60 min   + added; change of therapy d/t cetuximab skin tox   > 4/25/20, initial dose reduction to 4.5 mg/kg d/t previous EGFR cutaneous toxicities    > C9 * * * Vectibix has been omitted * * *   > C10 Vectibix reordered at 3 mg/kg per MD Cody    > C13 * * * Vectibix has been omitted * * *   > C14 Vectibix reordered at 3 mg/kg per MD Cody   >C17 Vectibix HELD per Cody LEO              >C18 Vectibix held again per MD   >C19  11/30/20- vectibix resumed at 3mg/kg   >C21 HOLD x1 dose for rash   >C22 cont HOLD  Leucovorin 400 mg/m2 IV Day 1 followed by  Fluorouracil 400 mg/m2 IV Day 1 followed by   C1D1 reduced dose by 10% to 360 mg/m2 per MD Cody for anticipated tolerance   Fluorouracil 2400 mg/m2 CIVI over 46-48 hours    C1D1 reduced dose by 10% to 2160 mg/m2 per MD Cody for anticipated tolerance    14-day cycle until progression or toxicity  *Dosing Reference*  NCCN guidelines for colon cancer v2.2019  (cetuximab + chemo) Christine ARGUELLO et al, OBEY " "2017;317(85):7335-2353  (HER2 tx in HER2+ CRC) Kayleen RINALDI et al. Lancet Oncol. 2019 Apr;20(4):518-530.    Allergies: Patient has no known allergies.  /73   Pulse 74   Temp 36.3 °C (97.4 °F) (Temporal)   Resp 18   Ht 1.84 m (6' 0.44\")   Wt 110 kg (242 lb 8.1 oz)   SpO2 98%   BMI 32.49 kg/m²  Body surface area is 2.37 meters squared.     ECHO:  9/4/20 LVEF ~ 60% OK for 6 months per Cody LEO order  2/7/20 LVEF ~ 60%     Labs: 1/21/21  Meets treatment parameters - MD order to use labs from 1/21/21        Panitumumab (Vectibix) 3 mg/kg x 110kg = 330 mg   Rounded to vial size (within 10%) per dose rounding protocol = 300 mg IV    Trastuzumab (Herceptin) 4 mg/kg x 110 kg = 440 mg   Rounded to vial size (within 10%) per dose rounding protocol = 450 mg IV    Leucovorin 400 mg/m² x 2.37 m² = 948 mg   Okay to treat with final dose = 948 mg IV over 30 minutes     Fluorouracil (Adrucil) 360 mg/m² x 2.37 m² = 853.2 mg   Okay to treat with final dose =  855 mg IV push    Fluorouracil (Adrucil) 2160 mg/m² x 2.37 m² = 5119.2 mg   Okay to treat with final dose =  5120 mg CIVI over 46 hrs at 2.2 mL/hr        "

## 2021-01-26 NOTE — PROGRESS NOTES
Pharmacy Chemotherapy Verification Note:    Dx: Metastatic Colorectal CA (KRAS/NRAS wild type, and ERBB2 [a HER2 family receptor] amplification)        Protocol: mFOLFOX+Panitumumab +trastuzumab     Panitumumab 6 mg/kg IV over 60 minutes on Day 1    4/25/20 added to treatment plan dose reduced to 4.5 mg/kg for tolerance   7/8/20 Omitted starting C9   7/25/20 dose reduced to 3 mg/kg for tolerance   9/4/20 discontinued from treatment plan for Cycle 13   C14- reordered at 3 mg/kg per MD Cody   C17- HELD for Rash   C18- Cont to HOLD for Rash   C21 HELD for RASH   C22 Cont to HOLD   C23 panitumumab 3 mg/kg restarted  ~Followed by~  Trastuzumab 6 mg/kg IV over 90 minutes on Day 1 of Cycle 1, followed by  Trastuzumab 4 mg/kg IV over 30-60 minutes on Day 1 of Cycle 2    Confirmed: per Dr. Ross: Q2 week dosing   4/11/20 - Reload with 6 mg/kg per Dr. Chisholm  OXALIplatin 85 mg/m² IV over 2 hours on Day 1   - dose reduced to 68 mg/m² starting C1 for tolerance   3/3/20 - oxali removed from treatment plan d/t oxaliplatin allergy per Harman VARGAS.  Leucovorin 400 mg/m² IV over 2 hours on Day 1, to run concurrent with OXALIplatin, followed by  Fluorouracil 400 mg/m² IVP on Day 1, followed by   C1 dose reduced to 360 mg/m² for tolerance  Fluorouracil 2400 mg/m² CIVI over 46-48 hours   C1 dose reduced to 2160 mg/m² for tolerance   14-day cycle until disease progression or unacceptable toxicity  *Dosing Reference*  NCCN Guidelines for Colon Cancer. V.2.2019.  Rohith TAVERAS et al. J Clin Oncol. 2010;28(31):4697-705  Aura J, et al. J Clin Oncol. 2008;28(12):2006-12  Luisa RODRIGEZ, et al. Br J Cancer. 2002;87(4):393-9    Herceptin has limited data in colorectal cancer, but two regimens exist neither at the same loading dose or interval as Dr. Ross has chosen. Per [facility] progress note: Patient has undergone further [employer] testing of his tumor. He was found to be KRAS/NRAS wild type, which suggest he has an EGFR mutation and  "would be a candidate for EGFR targeted therapy. He also had an ERBB2 amplification which might suggest response to treatments like Herceptin.    Allergies:  Compazine and Oxaliplatin     /73   Pulse 74   Temp 36.3 °C (97.4 °F) (Temporal)   Resp 18   Ht 1.84 m (6' 0.44\")   Wt 110 kg (242 lb 8.1 oz)   SpO2 98%   BMI 32.49 kg/m²  Body surface area is 2.37 meters squared.     Labs from 1/21/21 (ok'd by provider) reviewed - all within treatment parameters.  9/4/20 ECHO: LVEF 60% (every 6 months)     Drug Order   (Drug name, dose, route, IV Fluid & volume, frequency, number of doses) Cycle 23   Previous treatment: 1/11/21     Medication = Trastuzumab (Herceptin)  Base Dose = 4 mg/kg  Calc Dose: Base Dose x  110 kg = 440 mg  Final Dose = 450 mg  Route = IV  Fluid & Volume =  mL  Admin Duration = Over 30 minutes          rounded to vial size (within 10%) per RX protocol     Medication = Leucovorin (Wellcovorin)  Base Dose = 400 mg/m²  Calc Dose: Base Dose x 2.37 m² = 948 mg  Final Dose = 948 mg  Route = IV  Fluid & Volume = D5W 250 mL  Admin Duration = Over 30 minutes          <10% difference, okay to treat with final dose   Medication = Fluorouracil (5-FU)  Base Dose = 360 mg/m²  Calc Dose: Base Dose x 2.37 m² = 853.2 mg  Final Dose = 855 mg  Route = IVP  Fluid & Volume = 17.1 mL in syringe  Conc = 50 mg/mL  Admin Duration = Over 5 minutes          <10% difference, okay to treat with final dose   Medication = Fluorouracil (5-FU)  Base Dose = 2160 mg/m²  Calc Dose:Base Dose x 2.37 m² = 5119.2 mg  Final Dose = 5120 mg  Route = CIVI   Fluid & Volume = 102.4 mL (+3 mL overfill = 105.4 mL)  Admin Duration = Over 46 hours to run at   2.2 mL/hour    Via CADD pump for home infusion       <10% difference, okay to treat with final dose     Medication = panitumumab (Vectibix)  Base Dose = 3 mg/kg  Calc Dose: Base Dose x  110 kg = 3300 mg  Final Dose = 300 mg  Route = IV  Fluid & Volume =  mL  Admin Duration = " Over 60 minutes          rounded to vial size (within 10%) per RX protocol       By my signature below, I confirm this process was performed independently with the BSA and all final chemotherapy dosing calculations congruent. I have reviewed the above chemotherapy order and that my calculation of the final dose and BSA (when applicable) corroborate those calculations of the  pharmacist. Discrepancies of 10% or greater in the written dose have been addressed and documented within the EPIC Progress notes.    Elsy Londono, PharmD, BCOP

## 2021-01-26 NOTE — PROGRESS NOTES
Chemotherapy Verification - PRIMARY RN      Height = 184 cm  Weight = 110 kg  BSA = 2.37 m2       Medication: Vectibix  Dose: 3 mg/kg  Calculated Dose: 330 mg                             (In mg/m2, AUC, mg/kg)     Medication: Trastuzumab (Herceptin)  Dose: 4 mg/kg  Calculated Dose: 440 mg                             (In mg/m2, AUC, mg/kg)    Medication: Fluorouracil bolus  Dose: 360 mg/m2  Calculated Dose: 853.2 mg                             (In mg/m2, AUC, mg/kg)    Medication: Fluorouracil CADD pump  Dose: 2,160 mg/m2  Calculated Dose: 5,119.2 mg infused over 46 hours                             (In mg/m2, AUC, mg/kg)    I confirm this process was performed independently with the BSA and all final chemotherapy dosing calculations congruent.  Any discrepancies of 10% or greater have been addressed with the chemotherapy pharmacist. The resolution of the discrepancy has been documented in the EPIC progress notes.

## 2021-01-26 NOTE — PROGRESS NOTES
Chemotherapy Verification - SECONDARY RN       Height = 184 cm  Weight = 110 kg  BSA = 2.37 m2       Medication: Vectibix  Dose: 3 mg/kg  Calculated Dose: 330 mg                             (In mg/m2, AUC, mg/kg)     Medication: Herceptin  Dose: 4 mg/kg  Calculated Dose: 440 mg                             (In mg/m2, AUC, mg/kg)    Medication: Adrucil  Dose: 360 mg/m2  Calculated Dose: 853.2 mg                             (In mg/m2, AUC, mg/kg)    Medication: Adrucil  Dose: 2,160 mg/m2  Calculated Dose: 5,119.2 mg over 46 hours                             (In mg/m2, AUC, mg/kg)        I confirm that this process was performed independently.

## 2021-01-28 ENCOUNTER — OUTPATIENT INFUSION SERVICES (OUTPATIENT)
Dept: ONCOLOGY | Facility: MEDICAL CENTER | Age: 56
End: 2021-01-28
Attending: INTERNAL MEDICINE
Payer: MEDICARE

## 2021-01-28 VITALS
RESPIRATION RATE: 18 BRPM | OXYGEN SATURATION: 97 % | DIASTOLIC BLOOD PRESSURE: 81 MMHG | WEIGHT: 244.71 LBS | SYSTOLIC BLOOD PRESSURE: 139 MMHG | HEIGHT: 72 IN | TEMPERATURE: 97.9 F | BODY MASS INDEX: 33.15 KG/M2 | HEART RATE: 73 BPM

## 2021-01-28 DIAGNOSIS — C18.9 METASTATIC COLON CANCER TO LIVER (HCC): ICD-10-CM

## 2021-01-28 DIAGNOSIS — C78.7 METASTATIC COLON CANCER TO LIVER (HCC): ICD-10-CM

## 2021-01-28 PROCEDURE — 700111 HCHG RX REV CODE 636 W/ 250 OVERRIDE (IP)

## 2021-01-28 PROCEDURE — 96523 IRRIG DRUG DELIVERY DEVICE: CPT

## 2021-01-28 RX ORDER — HEPARIN SODIUM (PORCINE) LOCK FLUSH IV SOLN 100 UNIT/ML 100 UNIT/ML
500 SOLUTION INTRAVENOUS PRN
Status: DISCONTINUED | OUTPATIENT
Start: 2021-01-28 | End: 2021-01-28 | Stop reason: HOSPADM

## 2021-01-28 RX ORDER — HEPARIN SODIUM (PORCINE) LOCK FLUSH IV SOLN 100 UNIT/ML 100 UNIT/ML
SOLUTION INTRAVENOUS
Status: COMPLETED
Start: 2021-01-28 | End: 2021-01-28

## 2021-01-28 RX ADMIN — HEPARIN 500 UNITS: 100 SYRINGE at 14:25

## 2021-01-28 RX ADMIN — HEPARIN SODIUM (PORCINE) LOCK FLUSH IV SOLN 100 UNIT/ML 500 UNITS: 100 SOLUTION at 14:25

## 2021-01-28 ASSESSMENT — FIBROSIS 4 INDEX: FIB4 SCORE: 1.34

## 2021-01-28 NOTE — PROGRESS NOTES
Pt returns for pump de-access. Pump settings verified, vol @ 0ml. Pump removed, port flushed, blood return observed. Heparin instilled and needle removed, tip intact. Gauze dressing applied. Pt knows to return on 2/8, time confirmed. Pt discharged home under self care in no apparent distress.

## 2021-02-05 ENCOUNTER — HOSPITAL ENCOUNTER (OUTPATIENT)
Dept: LAB | Facility: MEDICAL CENTER | Age: 56
End: 2021-02-05
Attending: INTERNAL MEDICINE
Payer: MEDICARE

## 2021-02-05 LAB
BASOPHILS # BLD AUTO: 0.4 % (ref 0–1.8)
BASOPHILS # BLD: 0.02 K/UL (ref 0–0.12)
EOSINOPHIL # BLD AUTO: 0 K/UL (ref 0–0.51)
EOSINOPHIL NFR BLD: 0 % (ref 0–6.9)
ERYTHROCYTE [DISTWIDTH] IN BLOOD BY AUTOMATED COUNT: 52 FL (ref 35.9–50)
HCT VFR BLD AUTO: 41.2 % (ref 42–52)
HGB BLD-MCNC: 13.6 G/DL (ref 14–18)
IMM GRANULOCYTES # BLD AUTO: 0.02 K/UL (ref 0–0.11)
IMM GRANULOCYTES NFR BLD AUTO: 0.4 % (ref 0–0.9)
LYMPHOCYTES # BLD AUTO: 0.56 K/UL (ref 1–4.8)
LYMPHOCYTES NFR BLD: 11.3 % (ref 22–41)
MCH RBC QN AUTO: 31.2 PG (ref 27–33)
MCHC RBC AUTO-ENTMCNC: 33 G/DL (ref 33.7–35.3)
MCV RBC AUTO: 94.5 FL (ref 81.4–97.8)
MONOCYTES # BLD AUTO: 0.15 K/UL (ref 0–0.85)
MONOCYTES NFR BLD AUTO: 3 % (ref 0–13.4)
NEUTROPHILS # BLD AUTO: 4.2 K/UL (ref 1.82–7.42)
NEUTROPHILS NFR BLD: 84.9 % (ref 44–72)
NRBC # BLD AUTO: 0 K/UL
NRBC BLD-RTO: 0 /100 WBC
PLATELET # BLD AUTO: 126 K/UL (ref 164–446)
PMV BLD AUTO: 11.1 FL (ref 9–12.9)
RBC # BLD AUTO: 4.36 M/UL (ref 4.7–6.1)
WBC # BLD AUTO: 5 K/UL (ref 4.8–10.8)

## 2021-02-05 PROCEDURE — 36415 COLL VENOUS BLD VENIPUNCTURE: CPT

## 2021-02-05 PROCEDURE — 83735 ASSAY OF MAGNESIUM: CPT

## 2021-02-05 PROCEDURE — 82378 CARCINOEMBRYONIC ANTIGEN: CPT

## 2021-02-05 PROCEDURE — 80053 COMPREHEN METABOLIC PANEL: CPT

## 2021-02-05 PROCEDURE — 85025 COMPLETE CBC W/AUTO DIFF WBC: CPT

## 2021-02-06 LAB
ALBUMIN SERPL BCP-MCNC: 4.3 G/DL (ref 3.2–4.9)
ALBUMIN/GLOB SERPL: 1.8 G/DL
ALP SERPL-CCNC: 130 U/L (ref 30–99)
ALT SERPL-CCNC: 36 U/L (ref 2–50)
ANION GAP SERPL CALC-SCNC: 9 MMOL/L (ref 7–16)
AST SERPL-CCNC: 36 U/L (ref 12–45)
BILIRUB SERPL-MCNC: 0.8 MG/DL (ref 0.1–1.5)
BUN SERPL-MCNC: 12 MG/DL (ref 8–22)
CALCIUM SERPL-MCNC: 9.7 MG/DL (ref 8.5–10.5)
CEA SERPL-MCNC: 26.5 NG/ML (ref 0–3)
CHLORIDE SERPL-SCNC: 102 MMOL/L (ref 96–112)
CO2 SERPL-SCNC: 26 MMOL/L (ref 20–33)
CREAT SERPL-MCNC: 0.72 MG/DL (ref 0.5–1.4)
GLOBULIN SER CALC-MCNC: 2.4 G/DL (ref 1.9–3.5)
GLUCOSE SERPL-MCNC: 288 MG/DL (ref 65–99)
MAGNESIUM SERPL-MCNC: 2.1 MG/DL (ref 1.5–2.5)
POTASSIUM SERPL-SCNC: 4.1 MMOL/L (ref 3.6–5.5)
PROT SERPL-MCNC: 6.7 G/DL (ref 6–8.2)
SODIUM SERPL-SCNC: 137 MMOL/L (ref 135–145)

## 2021-02-08 ENCOUNTER — OUTPATIENT INFUSION SERVICES (OUTPATIENT)
Dept: ONCOLOGY | Facility: MEDICAL CENTER | Age: 56
End: 2021-02-08
Attending: INTERNAL MEDICINE
Payer: MEDICARE

## 2021-02-08 VITALS
HEIGHT: 72 IN | OXYGEN SATURATION: 98 % | TEMPERATURE: 98 F | DIASTOLIC BLOOD PRESSURE: 72 MMHG | SYSTOLIC BLOOD PRESSURE: 138 MMHG | HEART RATE: 77 BPM | RESPIRATION RATE: 18 BRPM | WEIGHT: 240.3 LBS | BODY MASS INDEX: 32.55 KG/M2

## 2021-02-08 DIAGNOSIS — C18.9 METASTATIC COLON CANCER TO LIVER (HCC): ICD-10-CM

## 2021-02-08 DIAGNOSIS — C78.7 METASTATIC COLON CANCER TO LIVER (HCC): ICD-10-CM

## 2021-02-08 PROCEDURE — A4212 NON CORING NEEDLE OR STYLET: HCPCS

## 2021-02-08 PROCEDURE — 96411 CHEMO IV PUSH ADDL DRUG: CPT

## 2021-02-08 PROCEDURE — 96413 CHEMO IV INFUSION 1 HR: CPT

## 2021-02-08 PROCEDURE — 700111 HCHG RX REV CODE 636 W/ 250 OVERRIDE (IP): Performed by: INTERNAL MEDICINE

## 2021-02-08 PROCEDURE — 700105 HCHG RX REV CODE 258: Performed by: INTERNAL MEDICINE

## 2021-02-08 PROCEDURE — 96375 TX/PRO/DX INJ NEW DRUG ADDON: CPT

## 2021-02-08 PROCEDURE — 96417 CHEMO IV INFUS EACH ADDL SEQ: CPT

## 2021-02-08 PROCEDURE — 96367 TX/PROPH/DG ADDL SEQ IV INF: CPT

## 2021-02-08 PROCEDURE — G0498 CHEMO EXTEND IV INFUS W/PUMP: HCPCS

## 2021-02-08 RX ORDER — 0.9 % SODIUM CHLORIDE 0.9 %
VIAL (ML) INJECTION PRN
Status: CANCELLED | OUTPATIENT
Start: 2021-02-09

## 2021-02-08 RX ORDER — 0.9 % SODIUM CHLORIDE 0.9 %
10 VIAL (ML) INJECTION PRN
Status: CANCELLED | OUTPATIENT
Start: 2021-02-08

## 2021-02-08 RX ORDER — 0.9 % SODIUM CHLORIDE 0.9 %
3 VIAL (ML) INJECTION PRN
Status: CANCELLED | OUTPATIENT
Start: 2021-02-09

## 2021-02-08 RX ORDER — 0.9 % SODIUM CHLORIDE 0.9 %
3 VIAL (ML) INJECTION PRN
Status: CANCELLED | OUTPATIENT
Start: 2021-02-08

## 2021-02-08 RX ORDER — ONDANSETRON 2 MG/ML
4 INJECTION INTRAMUSCULAR; INTRAVENOUS PRN
Status: CANCELLED | OUTPATIENT
Start: 2021-02-09

## 2021-02-08 RX ORDER — DIPHENHYDRAMINE HYDROCHLORIDE 50 MG/ML
50 INJECTION INTRAMUSCULAR; INTRAVENOUS PRN
Status: CANCELLED | OUTPATIENT
Start: 2021-02-09

## 2021-02-08 RX ORDER — 0.9 % SODIUM CHLORIDE 0.9 %
10 VIAL (ML) INJECTION PRN
Status: CANCELLED | OUTPATIENT
Start: 2021-02-09

## 2021-02-08 RX ORDER — METHYLPREDNISOLONE 4 MG/1
TABLET ORAL
COMMUNITY
Start: 2021-02-04 | End: 2021-04-05

## 2021-02-08 RX ORDER — HEPARIN SODIUM (PORCINE) LOCK FLUSH IV SOLN 100 UNIT/ML 100 UNIT/ML
500 SOLUTION INTRAVENOUS PRN
Status: CANCELLED | OUTPATIENT
Start: 2021-02-09

## 2021-02-08 RX ORDER — HEPARIN SODIUM (PORCINE) LOCK FLUSH IV SOLN 100 UNIT/ML 100 UNIT/ML
500 SOLUTION INTRAVENOUS PRN
Status: CANCELLED | OUTPATIENT
Start: 2021-02-11

## 2021-02-08 RX ORDER — EPINEPHRINE 1 MG/ML(1)
0.5 AMPUL (ML) INJECTION PRN
Status: CANCELLED | OUTPATIENT
Start: 2021-02-09

## 2021-02-08 RX ORDER — SODIUM CHLORIDE 9 MG/ML
INJECTION, SOLUTION INTRAVENOUS CONTINUOUS
Status: CANCELLED
Start: 2021-02-09

## 2021-02-08 RX ORDER — HEPARIN SODIUM (PORCINE) LOCK FLUSH IV SOLN 100 UNIT/ML 100 UNIT/ML
500 SOLUTION INTRAVENOUS PRN
Status: CANCELLED | OUTPATIENT
Start: 2021-02-08

## 2021-02-08 RX ORDER — METHYLPREDNISOLONE SODIUM SUCCINATE 125 MG/2ML
125 INJECTION, POWDER, LYOPHILIZED, FOR SOLUTION INTRAMUSCULAR; INTRAVENOUS PRN
Status: CANCELLED | OUTPATIENT
Start: 2021-02-09

## 2021-02-08 RX ORDER — 0.9 % SODIUM CHLORIDE 0.9 %
20 VIAL (ML) INJECTION PRN
Status: CANCELLED | OUTPATIENT
Start: 2021-02-11

## 2021-02-08 RX ORDER — ONDANSETRON 8 MG/1
8 TABLET, ORALLY DISINTEGRATING ORAL PRN
Status: CANCELLED | OUTPATIENT
Start: 2021-02-09

## 2021-02-08 RX ORDER — 0.9 % SODIUM CHLORIDE 0.9 %
VIAL (ML) INJECTION PRN
Status: CANCELLED | OUTPATIENT
Start: 2021-02-08

## 2021-02-08 RX ORDER — PROCHLORPERAZINE MALEATE 10 MG
10 TABLET ORAL EVERY 6 HOURS PRN
Status: CANCELLED | OUTPATIENT
Start: 2021-02-09

## 2021-02-08 RX ADMIN — FLUOROURACIL 5100 MG: 50 INJECTION, SOLUTION INTRAVENOUS at 15:42

## 2021-02-08 RX ADMIN — TRASTUZUMAB 450 MG: 150 INJECTION, POWDER, LYOPHILIZED, FOR SOLUTION INTRAVENOUS at 13:46

## 2021-02-08 RX ADMIN — LEUCOVORIN CALCIUM 944 MG: 350 INJECTION, POWDER, LYOPHILIZED, FOR SOLUTION INTRAMUSCULAR; INTRAVENOUS at 14:45

## 2021-02-08 RX ADMIN — PANITUMUMAB 300 MG: 100 SOLUTION INTRAVENOUS at 12:23

## 2021-02-08 RX ADMIN — ONDANSETRON 16 MG: 2 INJECTION INTRAMUSCULAR; INTRAVENOUS at 11:30

## 2021-02-08 RX ADMIN — DIPHENHYDRAMINE HYDROCHLORIDE 50 MG: 50 INJECTION, SOLUTION INTRAMUSCULAR; INTRAVENOUS at 11:18

## 2021-02-08 RX ADMIN — FLUOROURACIL 850 MG: 50 INJECTION, SOLUTION INTRAVENOUS at 15:32

## 2021-02-08 ASSESSMENT — FIBROSIS 4 INDEX: FIB4 SCORE: 2.666666666666666667

## 2021-02-08 NOTE — PROGRESS NOTES
"Pharmacy Chemotherapy Calculations:     Dx: Metastatic colorectal cancer [KRAS/NRAS wild type, ERBB2 (HER2) amplification]    Cycle 24  Previous treatment = 1/26/21    Regimen: mFOLFOX + Vectibix + Herceptin    Trastuzumab 6 mg/kg IV loading dose C1D1, then 4 mg/kg IV Day 1 of subsequent  cycles [confirmed \"every 2 weeks\", per MD; progress note: \"He also had an ERBB2  amplification which might suggest response to treatments like Herceptin\"]   > 7/8/20: Herceptin 2.5 weeks overdue.  Do not reload Herceptin per TRSAMMY Ross.  Cetuximab 400 mg/m2 IV Cycle 1 Day 1, then 250 mg/m2 IV Days 1 and 8 (confirmed  \"weekly\" per MD)   > C4 * * * cetuximab has been omitted * * *   > C5 * * * cetuximab replaced with panitumumab * * *   Oxaliplatin 85 mg/m2 IV Day 1 concurrent with   > Cody LEO reduced dose by 20% to 68 mg/m2 starting with C1D1 due to anticipated tolerance    > 3/14/20: Oxaliplatin discontinued from treatment  Panitumumab (Vectibix) 6 mg/kg IV D1 over 60 min   + added; change of therapy d/t cetuximab skin tox   > 4/25/20, initial dose reduction to 4.5 mg/kg d/t previous EGFR cutaneous toxicities    > C9 * * * Vectibix has been omitted * * *   > C10 Vectibix reordered at 3 mg/kg per MD Cody    > C13 * * * Vectibix has been omitted * * *   > C14 Vectibix reordered at 3 mg/kg per MD Cody   >C17 Vectibix HELD per Cody LEO              >C18 Vectibix held again per MD   >C19  11/30/20- vectibix resumed at 3mg/kg   >C21 HOLD x1 dose for rash   >C22 cont HOLD   >C23 vectibix resumed at 3mg/kg  Leucovorin 400 mg/m2 IV Day 1 followed by  Fluorouracil 400 mg/m2 IV Day 1 followed by   C1D1 reduced dose by 10% to 360 mg/m2 per MD Cody for anticipated tolerance   Fluorouracil 2400 mg/m2 CIVI over 46-48 hours    C1D1 reduced dose by 10% to 2160 mg/m2 per MD Cody for anticipated tolerance    14-day cycle until progression or toxicity  *Dosing Reference*  NCCN guidelines for colon cancer v2.2019  (cetuximab + " "chemo) Christine ARGUELLO et al, OBEY 2017;317(23):9370-2748  (HER2 tx in HER2+ CRC) Kayleen RINALDI, et al. Lancet Oncol. 2019 Apr;20(4):518-530.    Allergies: Patient has no known allergies.  /72   Pulse 77   Temp 36.7 °C (98 °F) (Temporal)   Resp 18   Ht 1.84 m (6' 0.44\")   Wt 109 kg (240 lb 4.8 oz)   SpO2 98%   BMI 32.20 kg/m²  Body surface area is 2.36 meters squared.     ECHO:  9/4/20 LVEF ~ 60% OK for 6 months per Cody LEO order  2/7/20 LVEF ~ 60%     Labs: 2/5/21  Meets treatment parameters       (Drug name, dose, route, IV Fluid & volume, frequency, number of doses) Cycle 24  Previous treatment: 1/26/21      Medication = Trastuzumab (Herceptin)  Base Dose = 4 mg/kg  Calc Dose: Base Dose x 109 kg = 436 mg  Final Dose = 450 mg  Route = IV  Fluid & Volume =  mL  Admin Duration = Over 30 minutes            rounded to vial size (within 10%) per RX protocol      Medication = Leucovorin (Wellcovorin)  Base Dose = 400 mg/m²  Calc Dose: Base Dose x 2.36 m² = 944 mg  Final Dose = 944 mg  Route = IV  Fluid & Volume = D5W 250 mL  Admin Duration = Over 30 minutes            Okay to treat with final dose   Medication = Fluorouracil (5-FU)  Base Dose = 360 mg/m²  Calc Dose: Base Dose x 2.36 m² = 849.6 mg  Final Dose = 850 mg  Route = IVP  Fluid & Volume = 17 mL in syringe  Conc = 50 mg/mL  Admin Duration = Over 5 minutes            Okay to treat with final dose   Medication = Fluorouracil (5-FU)  Base Dose = 2160 mg/m²  Calc Dose:Base Dose x 2.36 m² = 5097.6 mg  Final Dose = 5120 mg  Route = CIVI   Fluid & Volume = 102 mL (+3 mL overfill = 105 mL)  Admin Duration = Over 46 hours to run at   2.2 mL/hour     Via CADD pump for home infusion        Okay to treat with final dose      Medication = panitumumab (Vectibix)  Base Dose = 3 mg/kg  Calc Dose: Base Dose x  109 kg = 327 mg  Final Dose = 300 mg  Route = IV  Fluid & Volume =  mL  Admin Duration = Over 60 minutes            rounded to vial size (within " 10%) per RX protocol

## 2021-02-08 NOTE — PROGRESS NOTES
Chemotherapy Verification - PRIMARY RN      Height = 184 cm  Weight = 109 kg  BSA = 2.36 m2,  Echo on 9/04/2020 LVEF 60% (echo every 6 months)      Medication: Vectibix  Dose: 3 mg/kg  Calculated Dose: 327 mg                              (In mg/m2, AUC, mg/kg)     Medication: Trastuzumab (Herceptin)  Dose: 4 mg/kg  Calculated Dose: 436 mg                             (In mg/m2, AUC, mg/kg)    Medication: Fluorouracil bolus IVP  Dose: 360 mg/m2  Calculated Dose: 849.6 mg                             (In mg/m2, AUC, mg/kg)    Medication: Fluorouracil CADD pump  Dose: 2,160 mg/m2  Calculated Dose: 5,097.6 mg infused over 46 hours via CADD pump                             (In mg/m2, AUC, mg/kg)    I confirm this process was performed independently with the BSA and all final chemotherapy dosing calculations congruent.  Any discrepancies of 10% or greater have been addressed with the chemotherapy pharmacist. The resolution of the discrepancy has been documented in the EPIC progress notes.

## 2021-02-08 NOTE — PROGRESS NOTES
"Pharmacy Chemotherapy Calculations:     Dx: Metastatic colorectal cancer [KRAS/NRAS wild type, ERBB2 (HER2) amplification]    Cycle 24  Previous treatment = 1/26/21    Regimen: mFOLFOX + Vectibix + Herceptin    Trastuzumab 6 mg/kg IV loading dose C1D1, then 4 mg/kg IV Day 1 of subsequent  cycles [confirmed \"every 2 weeks\", per MD; progress note: \"He also had an ERBB2  amplification which might suggest response to treatments like Herceptin\"]   > 7/8/20: Herceptin 2.5 weeks overdue.  Do not reload Herceptin per TRSAMMY Ross.  Cetuximab 400 mg/m2 IV Cycle 1 Day 1, then 250 mg/m2 IV Days 1 and 8 (confirmed  \"weekly\" per MD)   > C4 * * * cetuximab has been omitted * * *   > C5 * * * cetuximab replaced with panitumumab * * *   Oxaliplatin 85 mg/m2 IV Day 1 concurrent with   > Cody LEO reduced dose by 20% to 68 mg/m2 starting with C1D1 due to anticipated tolerance    > 3/14/20: Oxaliplatin discontinued from treatment  Panitumumab (Vectibix) 6 mg/kg IV D1 over 60 min   + added; change of therapy d/t cetuximab skin tox   > 4/25/20, initial dose reduction to 4.5 mg/kg d/t previous EGFR cutaneous toxicities    > C9 * * * Vectibix has been omitted * * *   > C10 Vectibix reordered at 3 mg/kg per MD Cody    > C13 * * * Vectibix has been omitted * * *   > C14 Vectibix reordered at 3 mg/kg per MD Cody   >C17 Vectibix HELD per Cody LEO              >C18 Vectibix held again per MD   >C19  11/30/20- vectibix resumed at 3mg/kg   >C21 HOLD x1 dose for rash   >C22 cont HOLD   >C23- resume at 3mg/kg  Leucovorin 400 mg/m2 IV Day 1 followed by  Fluorouracil 400 mg/m2 IV Day 1 followed by   C1D1 reduced dose by 10% to 360 mg/m2 per MD Cody for anticipated tolerance   Fluorouracil 2400 mg/m2 CIVI over 46-48 hours    C1D1 reduced dose by 10% to 2160 mg/m2 per MD Cody for anticipated tolerance    14-day cycle until progression or toxicity  *Dosing Reference*  NCCN guidelines for colon cancer v2.2019  (cetuximab + chemo) [place] " "JOS et al, OBEY 2017;317(23):7152-1230  (HER2 tx in HER2+ CRC) Kayleen RINALDI, et al. Lancet Oncol. 2019 Apr;20(4):518-530.    Allergies: Patient has no known allergies.  /72   Pulse 77   Temp 36.7 °C (98 °F) (Temporal)   Resp 18   Ht 1.84 m (6' 0.44\")   Wt 109 kg (240 lb 4.8 oz)   SpO2 98%   BMI 32.20 kg/m²  Body surface area is 2.36 meters squared.     ECHO: OK for 6 months per Cody LEO order  9/4/20 LVEF ~ 60%   2/7/20 LVEF ~ 60%     All lab results 2/5/21 within treatment parameters.       Panitumumab (Vectibix) 3 mg/kg x 109kg = 327mg   Rounded to vial size (within 10%) per dose rounding protocol = 300 mg IV    Trastuzumab (Herceptin) 4 mg/kg x 109kg = 436mg   Rounded to vial size (within 10%) per dose rounding protocol = 450 mg IV    Leucovorin 400 mg/m² x 2.36m² = 944mg   Okay to treat with final dose = 944mg IV over 30 minutes     Fluorouracil (Adrucil) 360 mg/m² x 2.36m² = 849.6 mg   Okay to treat with final dose =  850mg IV push    Fluorouracil (Adrucil) 2160 mg/m² x 2.36m² = 5097 mg   Okay to treat with final dose =  5100mg CIVI over 46 hrs at 2.2 mL/hr    RAHEEM Amador Pharm.D.    "

## 2021-02-08 NOTE — PROGRESS NOTES
Chemotherapy Verification - SECONDARY RN       Height = 72.44in  Weight = 109kg  BSA = 2.36m2       Medication: Vectibix  Dose: 3mg/kg  Calculated Dose: 327mg                             (In mg/m2, AUC, mg/kg)     Medication: Herceptin  Dose: 4mg/kg  Calculated Dose: 436mg                             (In mg/m2, AUC, mg/kg)    Medication: Leucovorin  Dose: 400mg/m2  Calculated Dose: 944mg              Medi               (In mg/m2, AUC, mg/kg)    Medication: Fluorouracil  Dose: 360mg/m2  Calculated Dose: 849.6mg                             (In mg/m2, AUC, mg/kg)    Medication: Fluorouracil Dose: 2160mg/m2 Calculated Dose: 5097.6mg    I confirm that this process was performed independently.

## 2021-02-09 NOTE — PROGRESS NOTES
Pt arrives to [facility] for Vectibix/Herceptin/5FU CADD pump.  Pt denies s/sx of infection.  Rash to face and back is mild and pt is taking Medrol pack.  Labs on 2/05/2021 reviewed and pt meets parameters for treatment today.  [facility] port has EMLA cream applied.  EMLA cream removed.  Port accessed with sterile technique, flushed with NS and brisk blood return observed.  Pre-medications given.  Vectibix infused. Herceptin infused.  Leucovorin given.  Port assessed for blood return prior to 5FU bolus was given.  5FU CADD pump connected to pt.  CADD pump clamps opened and pump is functioning properly prior to pt discharge.  Confirmed next appt time with pt.  Gave pt a copy of new schedule.  Pt dc home to self care.

## 2021-02-10 ENCOUNTER — OUTPATIENT INFUSION SERVICES (OUTPATIENT)
Dept: ONCOLOGY | Facility: MEDICAL CENTER | Age: 56
End: 2021-02-10
Attending: INTERNAL MEDICINE
Payer: MEDICARE

## 2021-02-10 VITALS
RESPIRATION RATE: 18 BRPM | HEART RATE: 70 BPM | SYSTOLIC BLOOD PRESSURE: 142 MMHG | DIASTOLIC BLOOD PRESSURE: 73 MMHG | TEMPERATURE: 97.3 F | OXYGEN SATURATION: 98 %

## 2021-02-10 DIAGNOSIS — C78.7 METASTATIC COLON CANCER TO LIVER (HCC): ICD-10-CM

## 2021-02-10 DIAGNOSIS — C18.9 METASTATIC COLON CANCER TO LIVER (HCC): ICD-10-CM

## 2021-02-10 PROCEDURE — 96523 IRRIG DRUG DELIVERY DEVICE: CPT | Performed by: CLINICAL NURSE SPECIALIST

## 2021-02-10 PROCEDURE — 700111 HCHG RX REV CODE 636 W/ 250 OVERRIDE (IP)

## 2021-02-10 RX ORDER — HEPARIN SODIUM (PORCINE) LOCK FLUSH IV SOLN 100 UNIT/ML 100 UNIT/ML
SOLUTION INTRAVENOUS
Status: COMPLETED
Start: 2021-02-10 | End: 2021-02-10

## 2021-02-10 RX ORDER — HEPARIN SODIUM (PORCINE) LOCK FLUSH IV SOLN 100 UNIT/ML 100 UNIT/ML
500 SOLUTION INTRAVENOUS PRN
Status: DISCONTINUED | OUTPATIENT
Start: 2021-02-10 | End: 2021-02-10 | Stop reason: HOSPADM

## 2021-02-10 RX ADMIN — HEPARIN SODIUM (PORCINE) LOCK FLUSH IV SOLN 100 UNIT/ML 500 UNITS: 100 SOLUTION at 16:29

## 2021-02-10 RX ADMIN — HEPARIN 500 UNITS: 100 SYRINGE at 16:29

## 2021-02-11 NOTE — PROGRESS NOTES
Pt to infusion for pump and port deaccess.  No new concerns. 5FU pump read 0mL remaining.  Pump disconnected. Port flushed, heparin locked and deaccessed.  Pt discharged ambulatory to home in stable condition.

## 2021-02-18 RX ORDER — 0.9 % SODIUM CHLORIDE 0.9 %
VIAL (ML) INJECTION PRN
Status: CANCELLED | OUTPATIENT
Start: 2021-02-22

## 2021-02-18 RX ORDER — 0.9 % SODIUM CHLORIDE 0.9 %
3 VIAL (ML) INJECTION PRN
Status: CANCELLED | OUTPATIENT
Start: 2021-02-22

## 2021-02-18 RX ORDER — 0.9 % SODIUM CHLORIDE 0.9 %
VIAL (ML) INJECTION PRN
Status: CANCELLED | OUTPATIENT
Start: 2021-02-23

## 2021-02-18 RX ORDER — HEPARIN SODIUM (PORCINE) LOCK FLUSH IV SOLN 100 UNIT/ML 100 UNIT/ML
500 SOLUTION INTRAVENOUS PRN
Status: CANCELLED | OUTPATIENT
Start: 2021-02-22

## 2021-02-18 RX ORDER — DIPHENHYDRAMINE HYDROCHLORIDE 50 MG/ML
50 INJECTION INTRAMUSCULAR; INTRAVENOUS PRN
Status: CANCELLED | OUTPATIENT
Start: 2021-02-23

## 2021-02-18 RX ORDER — ONDANSETRON 8 MG/1
8 TABLET, ORALLY DISINTEGRATING ORAL PRN
Status: CANCELLED | OUTPATIENT
Start: 2021-02-23

## 2021-02-18 RX ORDER — 0.9 % SODIUM CHLORIDE 0.9 %
3 VIAL (ML) INJECTION PRN
Status: CANCELLED | OUTPATIENT
Start: 2021-02-23

## 2021-02-18 RX ORDER — 0.9 % SODIUM CHLORIDE 0.9 %
20 VIAL (ML) INJECTION PRN
Status: CANCELLED | OUTPATIENT
Start: 2021-02-25

## 2021-02-18 RX ORDER — 0.9 % SODIUM CHLORIDE 0.9 %
10 VIAL (ML) INJECTION PRN
Status: CANCELLED | OUTPATIENT
Start: 2021-02-23

## 2021-02-18 RX ORDER — PROCHLORPERAZINE MALEATE 10 MG
10 TABLET ORAL EVERY 6 HOURS PRN
Status: CANCELLED | OUTPATIENT
Start: 2021-02-23

## 2021-02-18 RX ORDER — METHYLPREDNISOLONE SODIUM SUCCINATE 125 MG/2ML
125 INJECTION, POWDER, LYOPHILIZED, FOR SOLUTION INTRAMUSCULAR; INTRAVENOUS PRN
Status: CANCELLED | OUTPATIENT
Start: 2021-02-23

## 2021-02-18 RX ORDER — 0.9 % SODIUM CHLORIDE 0.9 %
10 VIAL (ML) INJECTION PRN
Status: CANCELLED | OUTPATIENT
Start: 2021-02-22

## 2021-02-18 RX ORDER — ONDANSETRON 2 MG/ML
4 INJECTION INTRAMUSCULAR; INTRAVENOUS PRN
Status: CANCELLED | OUTPATIENT
Start: 2021-02-23

## 2021-02-18 RX ORDER — HEPARIN SODIUM (PORCINE) LOCK FLUSH IV SOLN 100 UNIT/ML 100 UNIT/ML
500 SOLUTION INTRAVENOUS PRN
Status: CANCELLED | OUTPATIENT
Start: 2021-02-23

## 2021-02-18 RX ORDER — HEPARIN SODIUM (PORCINE) LOCK FLUSH IV SOLN 100 UNIT/ML 100 UNIT/ML
500 SOLUTION INTRAVENOUS PRN
Status: CANCELLED | OUTPATIENT
Start: 2021-02-25

## 2021-02-18 RX ORDER — SODIUM CHLORIDE 9 MG/ML
INJECTION, SOLUTION INTRAVENOUS CONTINUOUS
Status: CANCELLED
Start: 2021-02-23

## 2021-02-18 RX ORDER — EPINEPHRINE 1 MG/ML(1)
0.5 AMPUL (ML) INJECTION PRN
Status: CANCELLED | OUTPATIENT
Start: 2021-02-23

## 2021-02-19 ENCOUNTER — HOSPITAL ENCOUNTER (OUTPATIENT)
Dept: LAB | Facility: MEDICAL CENTER | Age: 56
End: 2021-02-19
Attending: INTERNAL MEDICINE
Payer: MEDICARE

## 2021-02-19 LAB
ALBUMIN SERPL BCP-MCNC: 4.2 G/DL (ref 3.2–4.9)
ALBUMIN/GLOB SERPL: 1.8 G/DL
ALP SERPL-CCNC: 115 U/L (ref 30–99)
ALT SERPL-CCNC: 34 U/L (ref 2–50)
ANION GAP SERPL CALC-SCNC: 10 MMOL/L (ref 7–16)
AST SERPL-CCNC: 27 U/L (ref 12–45)
BASOPHILS # BLD AUTO: 0.5 % (ref 0–1.8)
BASOPHILS # BLD: 0.04 K/UL (ref 0–0.12)
BILIRUB SERPL-MCNC: 0.9 MG/DL (ref 0.1–1.5)
BUN SERPL-MCNC: 11 MG/DL (ref 8–22)
CALCIUM SERPL-MCNC: 9.7 MG/DL (ref 8.5–10.5)
CEA SERPL-MCNC: 22 NG/ML (ref 0–3)
CHLORIDE SERPL-SCNC: 104 MMOL/L (ref 96–112)
CO2 SERPL-SCNC: 23 MMOL/L (ref 20–33)
CREAT SERPL-MCNC: 0.62 MG/DL (ref 0.5–1.4)
EOSINOPHIL # BLD AUTO: 0.16 K/UL (ref 0–0.51)
EOSINOPHIL NFR BLD: 2 % (ref 0–6.9)
ERYTHROCYTE [DISTWIDTH] IN BLOOD BY AUTOMATED COUNT: 54.4 FL (ref 35.9–50)
GLOBULIN SER CALC-MCNC: 2.4 G/DL (ref 1.9–3.5)
GLUCOSE SERPL-MCNC: 142 MG/DL (ref 65–99)
HCT VFR BLD AUTO: 41.8 % (ref 42–52)
HGB BLD-MCNC: 14.1 G/DL (ref 14–18)
IMM GRANULOCYTES # BLD AUTO: 0.04 K/UL (ref 0–0.11)
IMM GRANULOCYTES NFR BLD AUTO: 0.5 % (ref 0–0.9)
LYMPHOCYTES # BLD AUTO: 1.18 K/UL (ref 1–4.8)
LYMPHOCYTES NFR BLD: 14.6 % (ref 22–41)
MAGNESIUM SERPL-MCNC: 2 MG/DL (ref 1.5–2.5)
MCH RBC QN AUTO: 31.8 PG (ref 27–33)
MCHC RBC AUTO-ENTMCNC: 33.7 G/DL (ref 33.7–35.3)
MCV RBC AUTO: 94.4 FL (ref 81.4–97.8)
MONOCYTES # BLD AUTO: 0.97 K/UL (ref 0–0.85)
MONOCYTES NFR BLD AUTO: 12 % (ref 0–13.4)
NEUTROPHILS # BLD AUTO: 5.7 K/UL (ref 1.82–7.42)
NEUTROPHILS NFR BLD: 70.4 % (ref 44–72)
NRBC # BLD AUTO: 0 K/UL
NRBC BLD-RTO: 0 /100 WBC
PLATELET # BLD AUTO: 150 K/UL (ref 164–446)
PMV BLD AUTO: 10.6 FL (ref 9–12.9)
POTASSIUM SERPL-SCNC: 3.8 MMOL/L (ref 3.6–5.5)
PROT SERPL-MCNC: 6.6 G/DL (ref 6–8.2)
RBC # BLD AUTO: 4.43 M/UL (ref 4.7–6.1)
SODIUM SERPL-SCNC: 137 MMOL/L (ref 135–145)
WBC # BLD AUTO: 8.1 K/UL (ref 4.8–10.8)

## 2021-02-19 PROCEDURE — 83735 ASSAY OF MAGNESIUM: CPT

## 2021-02-19 PROCEDURE — 85025 COMPLETE CBC W/AUTO DIFF WBC: CPT

## 2021-02-19 PROCEDURE — 80053 COMPREHEN METABOLIC PANEL: CPT

## 2021-02-19 PROCEDURE — 36415 COLL VENOUS BLD VENIPUNCTURE: CPT

## 2021-02-19 PROCEDURE — 82378 CARCINOEMBRYONIC ANTIGEN: CPT

## 2021-02-21 NOTE — PROGRESS NOTES
"Pharmacy Chemotherapy Verification    Dx: Metastatic colorectal cancer [KRAS/NRAS wild type, ERBB2 (HER2) amplification]    Regimen: mFOLFOX + Vectibix + Herceptin    Trastuzumab 6 mg/kg IV loading dose C1D1, then 4 mg/kg IV Day 1 of subsequent  cycles [confirmed \"every 2 weeks\", per MD; progress note: \"He also had an ERBB2  amplification which might suggest response to treatments like Herceptin\"]   > 7/8/20: Herceptin 2.5 weeks overdue.  Do not reload Herceptin per TRSAMMY Ross.  Cetuximab 400 mg/m2 IV Cycle 1 Day 1, then 250 mg/m2 IV Days 1 and 8 (confirmed  \"weekly\" per MD)   > C4 * * * cetuximab has been omitted * * *   > C5 * * * cetuximab replaced with panitumumab * * *   OXALIplatin 85 mg/m2 IV Day 1 concurrent with   > Cody LEO reduced dose by 20% to 68 mg/m2 starting with C1D1 due to anticipated tolerance    > 3/14/20: Oxaliplatin discontinued from treatment  Panitumumab (Vectibix) 6 mg/kg IV D1 over 60 min   + added; change of therapy d/t cetuximab skin tox   > 4/25/20, initial dose reduction to 4.5 mg/kg d/t previous EGFR cutaneous toxicities    > C9 * * * Vectibix has been omitted * * *   > C10 Vectibix reordered at 3 mg/kg per MD Cody    > C13 * * * Vectibix has been omitted * * *   > C14 Vectibix reordered at 3 mg/kg per MD Cody   >C17 Vectibix HELD per Cody LEO              >C18 Vectibix held again per MD   >C19  11/30/20- vectibix resumed at 3mg/kg   >C21 HOLD x1 dose for rash   >C22 cont HOLD   >C23 vectibix resumed at 3 mg/kg   >C25 Vectibix on hold due to rash  Leucovorin 400 mg/m2 IV Day 1 followed by  Fluorouracil 400 mg/m2 IV Day 1 followed by   C1D1 reduced dose by 10% to 360 mg/m2 per MD Cody for anticipated tolerance   Fluorouracil 2400 mg/m2 CIVI over 46-48 hours    C1D1 reduced dose by 10% to 2160 mg/m2 per MD Cody for anticipated tolerance   14-day cycle until progression or toxicity  *Dosing Reference*  NCCN guidelines for colon cancer v2.2019  (cetuximab + chemo) [place] " "JOS et al, OBEY 2017;317(23):8803-5560  (HER2 tx in HER2+ CRC) Kayleen RINALDI et al. Lancet Oncol. 2019 Apr;20(4):518-530.    Allergies: Patient has no known allergies.  /86   Pulse 81   Temp 36.5 °C (97.7 °F) (Temporal)   Resp 18   Ht 1.845 m (6' 0.64\")   Wt 108 kg (238 lb 1.6 oz)   SpO2 98%   BMI 31.73 kg/m²  Body surface area is 2.35 meters squared.   Labs 2/19/21  ANC 5700 Hgb 14.1 Plt 150k  SCr 0.62 CrCl >125 mL/min   AST/ALT/AP = 27/34/115    ECHO  9/4/20 LVEF ~ 60% OK for 6 months per Cody LEO order  2/7/20 LVEF ~ 60%     (Drug name, dose, route, IV Fluid & volume, frequency, number of doses) Cycle 25  Previous treatment: 2/8/21      Medication = Trastuzumab (Herceptin)  Base Dose = 4 mg/kg  Calc Dose: Base Dose x 108 kg = 432 mg  Final Dose = 450 mg  Route = IV  Fluid & Volume =  mL  Admin Duration = Over 30 minutes            rounded to vial size (within 10%) per RX protocol      Medication = Leucovorin (Wellcovorin)  Base Dose = 400 mg/m²  Calc Dose: Base Dose x 2.35 m² = 940 mg  Final Dose = 940 mg  Route = IV  Fluid & Volume = D5W 250 mL  Admin Duration = Over 30 minutes            Okay to treat with final dose   Medication = Fluorouracil (5-FU)  Base Dose = 360 mg/m²  Calc Dose: Base Dose x 2.35 m² = 846 mg  Final Dose = 845 mg  Route = IVP  Fluid & Volume = 16.9 mL in syringe  Conc = 50 mg/mL  Admin Duration = Over 5 minutes            Okay to treat with final dose   Medication = Fluorouracil (5-FU)  Base Dose = 2160 mg/m²  Calc Dose:Base Dose x 2.35 m² = 5076 mg  Final Dose = 5075 mg  Route = CIVI   Fluid & Volume = 101.5 mL (+3 mL overfill = 104.5 mL)  Admin Duration = Over 46 hours to run at   2.2 mL/hour     Via CADD pump for home infusion        Okay to treat with final dose      Jeni Ponce, PharmD, BCOP    "

## 2021-02-22 ENCOUNTER — OUTPATIENT INFUSION SERVICES (OUTPATIENT)
Dept: ONCOLOGY | Facility: MEDICAL CENTER | Age: 56
End: 2021-02-22
Attending: INTERNAL MEDICINE
Payer: MEDICARE

## 2021-02-22 VITALS
RESPIRATION RATE: 18 BRPM | HEART RATE: 81 BPM | OXYGEN SATURATION: 98 % | WEIGHT: 238.1 LBS | DIASTOLIC BLOOD PRESSURE: 86 MMHG | TEMPERATURE: 97.7 F | BODY MASS INDEX: 31.56 KG/M2 | HEIGHT: 73 IN | SYSTOLIC BLOOD PRESSURE: 130 MMHG

## 2021-02-22 DIAGNOSIS — C78.7 METASTATIC COLON CANCER TO LIVER (HCC): ICD-10-CM

## 2021-02-22 DIAGNOSIS — C18.9 METASTATIC COLON CANCER TO LIVER (HCC): ICD-10-CM

## 2021-02-22 PROCEDURE — 700111 HCHG RX REV CODE 636 W/ 250 OVERRIDE (IP): Performed by: INTERNAL MEDICINE

## 2021-02-22 PROCEDURE — G0498 CHEMO EXTEND IV INFUS W/PUMP: HCPCS

## 2021-02-22 PROCEDURE — 96375 TX/PRO/DX INJ NEW DRUG ADDON: CPT

## 2021-02-22 PROCEDURE — 96411 CHEMO IV PUSH ADDL DRUG: CPT

## 2021-02-22 PROCEDURE — A4212 NON CORING NEEDLE OR STYLET: HCPCS

## 2021-02-22 PROCEDURE — 96413 CHEMO IV INFUSION 1 HR: CPT

## 2021-02-22 PROCEDURE — 700105 HCHG RX REV CODE 258: Performed by: INTERNAL MEDICINE

## 2021-02-22 PROCEDURE — 96417 CHEMO IV INFUS EACH ADDL SEQ: CPT

## 2021-02-22 RX ADMIN — FLUOROURACIL 5075 MG: 50 INJECTION, SOLUTION INTRAVENOUS at 12:52

## 2021-02-22 RX ADMIN — FLUOROURACIL 845 MG: 50 INJECTION, SOLUTION INTRAVENOUS at 12:52

## 2021-02-22 RX ADMIN — LEUCOVORIN CALCIUM 940 MG: 350 INJECTION, POWDER, LYOPHILIZED, FOR SOLUTION INTRAMUSCULAR; INTRAVENOUS at 12:20

## 2021-02-22 RX ADMIN — TRASTUZUMAB 450 MG: 150 INJECTION, POWDER, LYOPHILIZED, FOR SOLUTION INTRAVENOUS at 11:37

## 2021-02-22 RX ADMIN — ONDANSETRON 16 MG: 2 INJECTION INTRAMUSCULAR; INTRAVENOUS at 10:54

## 2021-02-22 RX ADMIN — DIPHENHYDRAMINE HYDROCHLORIDE 50 MG: 50 INJECTION, SOLUTION INTRAMUSCULAR; INTRAVENOUS at 11:10

## 2021-02-22 ASSESSMENT — FIBROSIS 4 INDEX: FIB4 SCORE: 1.73

## 2021-02-22 NOTE — PROGRESS NOTES
Pt arrived ambulatory to  for chemotherapy.  POC discussed.  Pt denies active infections.  Labs drawn recently at outside lab, results reviewed.  Pt meets parameters for treatment today.  Vectibix removed from treatment plan due to worsening rash.  R chest port in place with EMLA cream, accessed in sterile field.  Brisk blood return noted.  Premedications given, tolerated well.  Herceptin infused followed by Leucovorin and 5FU bolus/CADD pump connect.  No adverse reaction noted.  Return appointment confirmed.  Pt discharged from [facility] in NAD under self care.

## 2021-02-22 NOTE — PROGRESS NOTES
"Pharmacy Chemotherapy Calculations:     Dx: Metastatic colorectal cancer [KRAS/NRAS wild type, ERBB2 (HER2) amplification]    Cycle 25  Previous treatment = 2/8/21    Regimen: mFOLFOX + Vectibix + Herceptin    Trastuzumab 6 mg/kg IV loading dose C1D1, then 4 mg/kg IV Day 1 of subsequent  cycles [confirmed \"every 2 weeks\", per MD; progress note: \"He also had an ERBB2  amplification which might suggest response to treatments like Herceptin\"]   > 7/8/20: Herceptin 2.5 weeks overdue.  Do not reload Herceptin per TRBO Dr. Ross.  Cetuximab 400 mg/m2 IV Cycle 1 Day 1, then 250 mg/m2 IV Days 1 and 8 (confirmed  \"weekly\" per MD)   > C4 * * * cetuximab has been omitted * * *   > C5 * * * cetuximab replaced with panitumumab * * *   Oxaliplatin 85 mg/m2 IV Day 1 concurrent with   > Cody LEO reduced dose by 20% to 68 mg/m2 starting with C1D1 due to anticipated tolerance    > 3/14/20: Oxaliplatin discontinued from treatment  Panitumumab (Vectibix) 6 mg/kg IV D1 over 60 min   + added; change of therapy d/t cetuximab skin tox   > 4/25/20, initial dose reduction to 4.5 mg/kg d/t previous EGFR cutaneous toxicities    > C9 * * * Vectibix has been omitted * * *   > C10 Vectibix reordered at 3 mg/kg per MD Cody    > C13 * * * Vectibix has been omitted * * *   > C14 Vectibix reordered at 3 mg/kg per MD Cody   >C17 Vectibix HELD per Cody LEO              >C18 Vectibix held again per MD   >C19  11/30/20- vectibix resumed at 3mg/kg   >C21 HOLD x1 dose for rash   >C22 cont HOLD   >C23- resume at 3mg/kg   >C25- HOLD due to rash  Leucovorin 400 mg/m2 IV Day 1 followed by  Fluorouracil 400 mg/m2 IV Day 1 followed by   C1D1 reduced dose by 10% to 360 mg/m2 per MD Cody for anticipated tolerance   Fluorouracil 2400 mg/m2 CIVI over 46-48 hours    C1D1 reduced dose by 10% to 2160 mg/m2 per MD Cody for anticipated tolerance    14-day cycle until progression or toxicity  *Dosing Reference*  NCCN guidelines for colon cancer " "v2.2019  (cetuximab + chemo) Christine ARGUELLO et al, OBEY 2017;317(23):5178-9227  (HER2 tx in HER2+ CRC) Kayleen RINALDI, et al. Lancet Oncol. 2019 Apr;20(4):518-530.    Allergies: Patient has no known allergies.  /86   Pulse 81   Temp 36.5 °C (97.7 °F) (Temporal)   Resp 18   Ht 1.845 m (6' 0.64\")   Wt 108 kg (238 lb 1.6 oz)   SpO2 98%   BMI 31.73 kg/m²  Body surface area is 2.35 meters squared.     ECHO: OK for 6 months per Cody LEO order  9/4/20 LVEF ~ 60%   2/7/20 LVEF ~ 60%     All lab results 2/19/21 within treatment parameters.        = 0 mg IV - HOLD 2/22/21 FOR RASH    Trastuzumab (Herceptin) 4 mg/kg x 108kg = 432mg   Rounded to vial size (within 10%) per dose rounding protocol = 450 mg IV    Leucovorin 400 mg/m² x 2.35m² = 940mg   Okay to treat with final dose = 940mg IV over 30 minutes     Fluorouracil (Adrucil) 360 mg/m² x 2.35m² = 846 mg   Okay to treat with final dose =  845mg IV push    Fluorouracil (Adrucil) 2160 mg/m² x 2.35m² = 5076mg   Okay to treat with final dose =  5075mg CIVI over 46 hrs at 2.2mL/hr    RAHEEM Amador Pharm.D.    "

## 2021-02-22 NOTE — PROGRESS NOTES
Chemotherapy Verification - SECONDARY RN       Height = 184.5 cm  Weight = 108 kg  BSA = 2.35 m2       Medication: Herceptin  Dose: 4 mg/kg  Calculated Dose:  432 mg                            (In mg/m2, AUC, mg/kg)     Medication: 5FU  Dose: 360 mg/m2 dr  Calculated Dose: 846.95 mg                             (In mg/m2, AUC, mg/kg)    Medication: Home infusion 5FU  Dose: 2160 mg/m2  Calculated Dose:  5076 mg                             (In mg/m2, AUC, mg/kg)      I confirm that this process was performed independently.

## 2021-02-22 NOTE — PROGRESS NOTES
Chemotherapy Verification - PRIMARY RN      Height = 184.5 cm  Weight = 108 kg  BSA = 2.35 m2       Medication: Herceptin  Dose: 4 mg/kg  Calculated Dose: 432 mg                             (In mg/m2, AUC, mg/kg)     Medication: 5FU bolus inj  Dose: 360 mg/m2  Calculated Dose: 846 mg                             (In mg/m2, AUC, mg/kg)    Medication: 5FU CADD Dose: 2160 mg/m2  Calculated Dose: 5076 mg                             (In mg/m2, AUC, mg/kg)    I confirm this process was performed independently with the BSA and all final chemotherapy dosing calculations congruent.  Any discrepancies of 10% or greater have been addressed with the chemotherapy pharmacist. The resolution of the discrepancy has been documented in the EPIC progress notes.

## 2021-02-24 ENCOUNTER — OUTPATIENT INFUSION SERVICES (OUTPATIENT)
Dept: ONCOLOGY | Facility: MEDICAL CENTER | Age: 56
End: 2021-02-24
Attending: INTERNAL MEDICINE
Payer: MEDICARE

## 2021-02-24 VITALS
OXYGEN SATURATION: 98 % | RESPIRATION RATE: 18 BRPM | BODY MASS INDEX: 31.79 KG/M2 | DIASTOLIC BLOOD PRESSURE: 81 MMHG | WEIGHT: 239.86 LBS | SYSTOLIC BLOOD PRESSURE: 153 MMHG | HEIGHT: 73 IN | TEMPERATURE: 98 F | HEART RATE: 78 BPM

## 2021-02-24 DIAGNOSIS — C18.9 METASTATIC COLON CANCER TO LIVER (HCC): ICD-10-CM

## 2021-02-24 DIAGNOSIS — C78.7 METASTATIC COLON CANCER TO LIVER (HCC): ICD-10-CM

## 2021-02-24 PROCEDURE — 700111 HCHG RX REV CODE 636 W/ 250 OVERRIDE (IP): Performed by: INTERNAL MEDICINE

## 2021-02-24 PROCEDURE — 96523 IRRIG DRUG DELIVERY DEVICE: CPT | Performed by: CLINICAL NURSE SPECIALIST

## 2021-02-24 RX ORDER — HEPARIN SODIUM (PORCINE) LOCK FLUSH IV SOLN 100 UNIT/ML 100 UNIT/ML
500 SOLUTION INTRAVENOUS PRN
Status: DISCONTINUED | OUTPATIENT
Start: 2021-02-24 | End: 2021-02-24 | Stop reason: HOSPADM

## 2021-02-24 RX ADMIN — HEPARIN 500 UNITS: 100 SYRINGE at 16:18

## 2021-02-24 ASSESSMENT — FIBROSIS 4 INDEX: FIB4 SCORE: 1.73

## 2021-02-25 NOTE — PROGRESS NOTES
"Pt to infusion for 5FU CADD pump deaccess.  Face red/purple with peeling and excoriation.  Pt states face is \"on fire\".  Sclera red bilaterally.  Arms with red, itchy patches.  Abdomen with diffuse red papular rash.  Pt states MD aware and has previously reduced Vectibix dose d/t skin toxicity.  Pt encouraged to take pictures and send to MD again as this incident appears more severe than previous.  Pt encouraged to keep skin clean and moist to reduce the chance of infection.  Pt verbalized understanding. 5FU CADD pump at 0mL remaining. Pump flushed, heparin locked and deaccessed.  Pt discharged ambulatory to home in stable condition.   "

## 2021-03-05 ENCOUNTER — HOSPITAL ENCOUNTER (OUTPATIENT)
Dept: LAB | Facility: MEDICAL CENTER | Age: 56
End: 2021-03-05
Attending: INTERNAL MEDICINE
Payer: MEDICARE

## 2021-03-05 LAB
ALBUMIN SERPL BCP-MCNC: 4 G/DL (ref 3.2–4.9)
ALBUMIN/GLOB SERPL: 1.5 G/DL
ALP SERPL-CCNC: 119 U/L (ref 30–99)
ALT SERPL-CCNC: 32 U/L (ref 2–50)
ANION GAP SERPL CALC-SCNC: 10 MMOL/L (ref 7–16)
AST SERPL-CCNC: 26 U/L (ref 12–45)
BASOPHILS # BLD AUTO: 0.6 % (ref 0–1.8)
BASOPHILS # BLD: 0.04 K/UL (ref 0–0.12)
BILIRUB SERPL-MCNC: 0.7 MG/DL (ref 0.1–1.5)
BUN SERPL-MCNC: 10 MG/DL (ref 8–22)
CALCIUM SERPL-MCNC: 9.7 MG/DL (ref 8.5–10.5)
CEA SERPL-MCNC: 18.9 NG/ML (ref 0–3)
CHLORIDE SERPL-SCNC: 102 MMOL/L (ref 96–112)
CO2 SERPL-SCNC: 24 MMOL/L (ref 20–33)
CREAT SERPL-MCNC: 0.71 MG/DL (ref 0.5–1.4)
EOSINOPHIL # BLD AUTO: 0.28 K/UL (ref 0–0.51)
EOSINOPHIL NFR BLD: 4.3 % (ref 0–6.9)
ERYTHROCYTE [DISTWIDTH] IN BLOOD BY AUTOMATED COUNT: 55.3 FL (ref 35.9–50)
GLOBULIN SER CALC-MCNC: 2.6 G/DL (ref 1.9–3.5)
GLUCOSE SERPL-MCNC: 122 MG/DL (ref 65–99)
HCT VFR BLD AUTO: 40.8 % (ref 42–52)
HGB BLD-MCNC: 13.5 G/DL (ref 14–18)
IMM GRANULOCYTES # BLD AUTO: 0.05 K/UL (ref 0–0.11)
IMM GRANULOCYTES NFR BLD AUTO: 0.8 % (ref 0–0.9)
LYMPHOCYTES # BLD AUTO: 1.2 K/UL (ref 1–4.8)
LYMPHOCYTES NFR BLD: 18.4 % (ref 22–41)
MAGNESIUM SERPL-MCNC: 2 MG/DL (ref 1.5–2.5)
MCH RBC QN AUTO: 31.8 PG (ref 27–33)
MCHC RBC AUTO-ENTMCNC: 33.1 G/DL (ref 33.7–35.3)
MCV RBC AUTO: 96 FL (ref 81.4–97.8)
MONOCYTES # BLD AUTO: 0.81 K/UL (ref 0–0.85)
MONOCYTES NFR BLD AUTO: 12.4 % (ref 0–13.4)
NEUTROPHILS # BLD AUTO: 4.14 K/UL (ref 1.82–7.42)
NEUTROPHILS NFR BLD: 63.5 % (ref 44–72)
NRBC # BLD AUTO: 0 K/UL
NRBC BLD-RTO: 0 /100 WBC
PLATELET # BLD AUTO: 177 K/UL (ref 164–446)
PMV BLD AUTO: 10.4 FL (ref 9–12.9)
POTASSIUM SERPL-SCNC: 3.9 MMOL/L (ref 3.6–5.5)
PROT SERPL-MCNC: 6.6 G/DL (ref 6–8.2)
RBC # BLD AUTO: 4.25 M/UL (ref 4.7–6.1)
SODIUM SERPL-SCNC: 136 MMOL/L (ref 135–145)
WBC # BLD AUTO: 6.5 K/UL (ref 4.8–10.8)

## 2021-03-05 PROCEDURE — 83735 ASSAY OF MAGNESIUM: CPT

## 2021-03-05 PROCEDURE — 85025 COMPLETE CBC W/AUTO DIFF WBC: CPT

## 2021-03-05 PROCEDURE — 80053 COMPREHEN METABOLIC PANEL: CPT

## 2021-03-05 PROCEDURE — 82378 CARCINOEMBRYONIC ANTIGEN: CPT

## 2021-03-05 PROCEDURE — 36415 COLL VENOUS BLD VENIPUNCTURE: CPT

## 2021-03-05 RX ORDER — 0.9 % SODIUM CHLORIDE 0.9 %
VIAL (ML) INJECTION PRN
Status: CANCELLED | OUTPATIENT
Start: 2021-03-08

## 2021-03-05 RX ORDER — 0.9 % SODIUM CHLORIDE 0.9 %
10 VIAL (ML) INJECTION PRN
Status: CANCELLED | OUTPATIENT
Start: 2021-03-08

## 2021-03-05 RX ORDER — 0.9 % SODIUM CHLORIDE 0.9 %
10 VIAL (ML) INJECTION PRN
Status: CANCELLED | OUTPATIENT
Start: 2021-03-09

## 2021-03-05 RX ORDER — ONDANSETRON 2 MG/ML
4 INJECTION INTRAMUSCULAR; INTRAVENOUS PRN
Status: CANCELLED | OUTPATIENT
Start: 2021-03-09

## 2021-03-05 RX ORDER — HEPARIN SODIUM (PORCINE) LOCK FLUSH IV SOLN 100 UNIT/ML 100 UNIT/ML
500 SOLUTION INTRAVENOUS PRN
Status: CANCELLED | OUTPATIENT
Start: 2021-03-08

## 2021-03-05 RX ORDER — SODIUM CHLORIDE 9 MG/ML
INJECTION, SOLUTION INTRAVENOUS CONTINUOUS
Status: CANCELLED
Start: 2021-03-09

## 2021-03-05 RX ORDER — HEPARIN SODIUM (PORCINE) LOCK FLUSH IV SOLN 100 UNIT/ML 100 UNIT/ML
500 SOLUTION INTRAVENOUS PRN
Status: CANCELLED | OUTPATIENT
Start: 2021-03-09

## 2021-03-05 RX ORDER — PROCHLORPERAZINE MALEATE 10 MG
10 TABLET ORAL EVERY 6 HOURS PRN
Status: CANCELLED | OUTPATIENT
Start: 2021-03-09

## 2021-03-05 RX ORDER — DIPHENHYDRAMINE HYDROCHLORIDE 50 MG/ML
50 INJECTION INTRAMUSCULAR; INTRAVENOUS PRN
Status: CANCELLED | OUTPATIENT
Start: 2021-03-09

## 2021-03-05 RX ORDER — 0.9 % SODIUM CHLORIDE 0.9 %
VIAL (ML) INJECTION PRN
Status: CANCELLED | OUTPATIENT
Start: 2021-03-09

## 2021-03-05 RX ORDER — ONDANSETRON 8 MG/1
8 TABLET, ORALLY DISINTEGRATING ORAL PRN
Status: CANCELLED | OUTPATIENT
Start: 2021-03-09

## 2021-03-05 RX ORDER — 0.9 % SODIUM CHLORIDE 0.9 %
20 VIAL (ML) INJECTION PRN
Status: CANCELLED | OUTPATIENT
Start: 2021-03-11

## 2021-03-05 RX ORDER — 0.9 % SODIUM CHLORIDE 0.9 %
3 VIAL (ML) INJECTION PRN
Status: CANCELLED | OUTPATIENT
Start: 2021-03-08

## 2021-03-05 RX ORDER — METHYLPREDNISOLONE SODIUM SUCCINATE 125 MG/2ML
125 INJECTION, POWDER, LYOPHILIZED, FOR SOLUTION INTRAMUSCULAR; INTRAVENOUS PRN
Status: CANCELLED | OUTPATIENT
Start: 2021-03-09

## 2021-03-05 RX ORDER — 0.9 % SODIUM CHLORIDE 0.9 %
3 VIAL (ML) INJECTION PRN
Status: CANCELLED | OUTPATIENT
Start: 2021-03-09

## 2021-03-05 RX ORDER — HEPARIN SODIUM (PORCINE) LOCK FLUSH IV SOLN 100 UNIT/ML 100 UNIT/ML
500 SOLUTION INTRAVENOUS PRN
Status: CANCELLED | OUTPATIENT
Start: 2021-03-11

## 2021-03-05 RX ORDER — EPINEPHRINE 1 MG/ML(1)
0.5 AMPUL (ML) INJECTION PRN
Status: CANCELLED | OUTPATIENT
Start: 2021-03-09

## 2021-03-08 ENCOUNTER — PATIENT OUTREACH (OUTPATIENT)
Dept: OTHER | Facility: MEDICAL CENTER | Age: 56
End: 2021-03-08

## 2021-03-08 ENCOUNTER — OUTPATIENT INFUSION SERVICES (OUTPATIENT)
Dept: ONCOLOGY | Facility: MEDICAL CENTER | Age: 56
End: 2021-03-08
Attending: INTERNAL MEDICINE
Payer: MEDICARE

## 2021-03-08 VITALS
BODY MASS INDEX: 31.94 KG/M2 | OXYGEN SATURATION: 98 % | TEMPERATURE: 98 F | DIASTOLIC BLOOD PRESSURE: 81 MMHG | WEIGHT: 240.96 LBS | RESPIRATION RATE: 18 BRPM | SYSTOLIC BLOOD PRESSURE: 125 MMHG | HEIGHT: 73 IN | HEART RATE: 70 BPM

## 2021-03-08 DIAGNOSIS — C18.9 METASTATIC COLON CANCER TO LIVER (HCC): ICD-10-CM

## 2021-03-08 DIAGNOSIS — C78.7 METASTATIC COLON CANCER TO LIVER (HCC): ICD-10-CM

## 2021-03-08 PROCEDURE — 96413 CHEMO IV INFUSION 1 HR: CPT

## 2021-03-08 PROCEDURE — 700111 HCHG RX REV CODE 636 W/ 250 OVERRIDE (IP): Performed by: INTERNAL MEDICINE

## 2021-03-08 PROCEDURE — 700105 HCHG RX REV CODE 258: Performed by: INTERNAL MEDICINE

## 2021-03-08 PROCEDURE — A4212 NON CORING NEEDLE OR STYLET: HCPCS

## 2021-03-08 PROCEDURE — 96417 CHEMO IV INFUS EACH ADDL SEQ: CPT

## 2021-03-08 PROCEDURE — G0498 CHEMO EXTEND IV INFUS W/PUMP: HCPCS

## 2021-03-08 PROCEDURE — 96375 TX/PRO/DX INJ NEW DRUG ADDON: CPT

## 2021-03-08 PROCEDURE — 96411 CHEMO IV PUSH ADDL DRUG: CPT

## 2021-03-08 RX ADMIN — LEUCOVORIN CALCIUM 944 MG: 350 INJECTION, POWDER, LYOPHILIZED, FOR SOLUTION INTRAMUSCULAR; INTRAVENOUS at 11:16

## 2021-03-08 RX ADMIN — FLUOROURACIL 850 MG: 50 INJECTION, SOLUTION INTRAVENOUS at 11:55

## 2021-03-08 RX ADMIN — ONDANSETRON 16 MG: 2 INJECTION INTRAMUSCULAR; INTRAVENOUS at 09:40

## 2021-03-08 RX ADMIN — DIPHENHYDRAMINE HYDROCHLORIDE 50 MG: 50 INJECTION, SOLUTION INTRAMUSCULAR; INTRAVENOUS at 09:30

## 2021-03-08 RX ADMIN — FLUOROURACIL 5100 MG: 50 INJECTION, SOLUTION INTRAVENOUS at 12:00

## 2021-03-08 RX ADMIN — TRASTUZUMAB 450 MG: 150 INJECTION, POWDER, LYOPHILIZED, FOR SOLUTION INTRAVENOUS at 10:34

## 2021-03-08 ASSESSMENT — FIBROSIS 4 INDEX: FIB4 SCORE: 1.45

## 2021-03-08 NOTE — PROGRESS NOTES
On 3/8/2021 this ONN received phone call from patient's wife Joan. Per Joan patient no longer has Medicaid and patient and patient's wife cannot afford patient's diabetic medication. This ONN explained that this ONN would request Financial Resource Advocate Dequan Dotson verify patient's insurance. Patient's wife states that their income has changed and Joan would like this ONN to re-send email with resources. This ONN agreed. Joan denies other questions or concerns at this time.     Referral placed to FRA.  Chart reviewed, noted  Dunia Siegel had previously sent email with community resources. Sent request to SERVANDO Siegel asking SERVANDO Siegel to provide resources again, SERVANDO Siegel agreed to re-send email.

## 2021-03-08 NOTE — PROGRESS NOTES
On 3-8-21, Oncology Social Worker, Dunia Siegel, re-emailed pt with resources discussed in prior discussion.

## 2021-03-08 NOTE — PROGRESS NOTES
Chemotherapy Verification - PRIMARY RN      Height = 184.5cm  Weight = 109 kg  BSA = 2.36m2       Medication: Herceptin  Dose: 4mg/kg    Calculated Dose: 436 mg                             (In mg/m2, AUC, mg/kg)     Medication: 5FU bolus  Dose: 360 mg/m2    Calculated Dose: 849.6mg                             (In mg/m2, AUC, mg/kg)    Medication: 5FU pump  Dose: 2160 mg/m2 over 46 hours  Calculated Dose: 5097.6 mg over 46 hours                             (In mg/m2, AUC, mg/kg)        I confirm this process was performed independently with the BSA and all final chemotherapy dosing calculations congruent.  Any discrepancies of 10% or greater have been addressed with the chemotherapy pharmacist. The resolution of the discrepancy has been documented in the EPIC progress notes.

## 2021-03-08 NOTE — PROGRESS NOTES
Patient arrived ambulatory to [facility] for Herceptin/Leucovorin/5FU.  He denies any s/s of infection or fevers.  States his rash is getting better but still has a lot of itching.  Port accessed using sterile technique with brisk blood return noted.  Labs drawn previously and within parameters to treat.  Pre medications given and treatment infused per order, pt tolerated well.  CADD pump connected with second RN verification.  Confirmed next appointment and pt ambulated out of clinic in no apparent distress.

## 2021-03-08 NOTE — PROGRESS NOTES
Chemotherapy Verification - SECONDARY RN       Height = 184.5 cm  Weight = 109 kg  BSA = 2.36 m2       Medication: Herceptin  Dose: 4 mg/kg  Calculated Dose: 436 mg                             (In mg/m2, AUC, mg/kg)     Medication: Adrucil  Dose: 360 mg/m2  Calculated Dose: 849.6 mg                             (In mg/m2, AUC, mg/kg)    Medication: Adrucil  Dose: 2,160 mg/m2  Calculated Dose: 5,097.6 mg over 46 hours                             (In mg/m2, AUC, mg/kg)      I confirm that this process was performed independently.

## 2021-03-08 NOTE — PROGRESS NOTES
"Pharmacy Chemotherapy Verification    Dx: Metastatic colorectal cancer [KRAS/NRAS wild type, ERBB2 (HER2) amplification]    Regimen: mFOLFOX + Vectibix + Herceptin    Trastuzumab 6 mg/kg IV loading dose C1D1, then 4 mg/kg IV Day 1 of subsequent  cycles [confirmed \"every 2 weeks\", per MD; progress note: \"He also had an ERBB2  amplification which might suggest response to treatments like Herceptin\"]   > 7/8/20: Herceptin 2.5 weeks overdue.  Do not reload Herceptin per TRSAMMY Ross.  Cetuximab 400 mg/m2 IV Cycle 1 Day 1, then 250 mg/m2 IV Days 1 and 8 (confirmed  \"weekly\" per MD)   > C4 * * * cetuximab has been omitted * * *   > C5 * * * cetuximab replaced with panitumumab * * *   OXALIplatin 85 mg/m2 IV Day 1 concurrent with   > Cody LEO reduced dose by 20% to 68 mg/m2 starting with C1D1 due to anticipated tolerance    > 3/14/20: Oxaliplatin discontinued from treatment  Panitumumab (Vectibix) 6 mg/kg IV D1 over 60 min   + added; change of therapy d/t cetuximab skin tox   > 4/25/20, initial dose reduction to 4.5 mg/kg d/t previous EGFR cutaneous toxicities    > C9 * * * Vectibix has been omitted * * *   > C10 Vectibix reordered at 3 mg/kg per MD Cody    > C13 * * * Vectibix has been omitted * * *   > C14 Vectibix reordered at 3 mg/kg per MD Cody   >C17 Vectibix HELD per Cody LEO              >C18 Vectibix held again per MD   >C19  11/30/20- vectibix resumed at 3mg/kg   >C21 HOLD x1 dose for rash   >C22 cont HOLD   >C23 vectibix resumed at 3 mg/kg   >C25 & 26 Vectibix on hold due to rash  Leucovorin 400 mg/m2 IV Day 1 followed by  Fluorouracil 400 mg/m2 IV Day 1 followed by   C1D1 reduced dose by 10% to 360 mg/m2 per MD Cody for anticipated tolerance   Fluorouracil 2400 mg/m2 CIVI over 46-48 hours    C1D1 reduced dose by 10% to 2160 mg/m2 per MD Cody for anticipated tolerance   14-day cycle until progression or toxicity  *Dosing Reference*  NCCN guidelines for colon cancer v2.2019  (cetuximab + chemo) " "Christine ARGUELLO et al, OBEY 2017;317(23):3172-8633  (HER2 tx in HER2+ CRC) Kayleen RINALDI et al. Lancet Oncol. 2019 Apr;20(4):518-530.    Allergies: Patient has no known allergies.  /81   Pulse 70   Temp 36.7 °C (98 °F) (Temporal)   Resp 18   Ht 1.845 m (6' 0.64\")   Wt 109 kg (240 lb 15.4 oz)   SpO2 98%   BMI 32.11 kg/m²  Body surface area is 2.36 meters squared.     Labs 3/5/21  Meet treatment parameters    ECHO  9/4/20 LVEF ~ 60% OK for 6 months per Cody LEO order  2/7/20 LVEF ~ 60%     (Drug name, dose, route, IV Fluid & volume, frequency, number of doses) Cycle 26  Previous treatment: 2/8/21      Medication = Trastuzumab (Herceptin)  Base Dose = 4 mg/kg  Calc Dose: Base Dose x 109 kg = 436 mg  Final Dose = 450 mg  Route = IV  Fluid & Volume =  mL  Admin Duration = Over 30 minutes            rounded to vial size (within 10%) per RX protocol      Medication = Leucovorin (Wellcovorin)  Base Dose = 400 mg/m²  Calc Dose: Base Dose x 2.36 m² = 944 mg  Final Dose = 944 mg  Route = IV  Fluid & Volume = D5W 250 mL  Admin Duration = Over 30 minutes            Okay to treat with final dose   Medication = Fluorouracil (5-FU)  Base Dose = 360 mg/m²  Calc Dose: Base Dose x 2.36 m² = 849.6mg  Final Dose = 850 mg  Route = IVP  Fluid & Volume = 17 mL in syringe  Conc = 50 mg/mL  Admin Duration = Over 5 minutes            Okay to treat with final dose   Medication = Fluorouracil (5-FU)  Base Dose = 2160 mg/m²  Calc Dose:Base Dose x 2.36 m² = 5097.6 mg  Final Dose = 5100 mg  Route = CIVI   Fluid & Volume = 102 mL (+3 mL overfill = 105 mL)  Admin Duration = Over 46 hours to run at   2.2 mL/hour     Via CADD pump for home infusion        Okay to treat with final dose         "

## 2021-03-08 NOTE — PROGRESS NOTES
"Pharmacy Chemotherapy Calculations:     Dx: Metastatic colorectal cancer [KRAS/NRAS wild type, ERBB2 (HER2) amplification]    Cycle 26  Previous treatment = 2/22/21    Regimen: mFOLFOX + Vectibix + Herceptin    Trastuzumab 6 mg/kg IV loading dose C1D1, then 4 mg/kg IV Day 1 of subsequent  cycles [confirmed \"every 2 weeks\", per MD; progress note: \"He also had an ERBB2  amplification which might suggest response to treatments like Herceptin\"]   > 7/8/20: Herceptin 2.5 weeks overdue.  Do not reload Herceptin per TRSAMMY Ross.  Cetuximab 400 mg/m2 IV Cycle 1 Day 1, then 250 mg/m2 IV Days 1 and 8 (confirmed  \"weekly\" per MD)   > C4 * * * cetuximab has been omitted * * *   > C5 * * * cetuximab replaced with panitumumab * * *   Oxaliplatin 85 mg/m2 IV Day 1 concurrent with   > Cody LEO reduced dose by 20% to 68 mg/m2 starting with C1D1 due to anticipated tolerance    > 3/14/20: Oxaliplatin discontinued from treatment  Panitumumab (Vectibix) 6 mg/kg IV D1 over 60 min   + added; change of therapy d/t cetuximab skin tox   > 4/25/20, initial dose reduction to 4.5 mg/kg d/t previous EGFR cutaneous toxicities    > C9 * * * Vectibix has been omitted * * *   > C10 Vectibix reordered at 3 mg/kg per MD Cody    > C13 * * * Vectibix has been omitted * * *   > C14 Vectibix reordered at 3 mg/kg per MD Cody   >C17 Vectibix HELD per Cody LEO              >C18 Vectibix held again per MD   >C19  11/30/20- vectibix resumed at 3mg/kg   >C21 HOLD x1 dose for rash   >C22 cont HOLD   >C23- resume at 3mg/kg   >C25- HOLD due to rash   >C26- HOLD due to rash  Leucovorin 400 mg/m2 IV Day 1 followed by  Fluorouracil 400 mg/m2 IV Day 1 followed by   C1D1 reduced dose by 10% to 360 mg/m2 per MD Cody for anticipated tolerance   Fluorouracil 2400 mg/m2 CIVI over 46-48 hours    C1D1 reduced dose by 10% to 2160 mg/m2 per MD Cody for anticipated tolerance    14-day cycle until progression or toxicity  *Dosing Reference*  NCCN guidelines for " "colon cancer v2.2019  (cetuximab + chemo) Christine ARGUELLO et al, OBEY 2017;317(23):4717-7497  (HER2 tx in HER2+ CRC) Kayleen RINALDI, et al. Lancet Oncol. 2019 Apr;20(4):518-530.    Allergies: Patient has no known allergies.  /81   Pulse 70   Temp 36.7 °C (98 °F) (Temporal)   Resp 18   Ht 1.845 m (6' 0.64\")   Wt 109 kg (240 lb 15.4 oz)   SpO2 98%   BMI 32.11 kg/m²  Body surface area is 2.36 meters squared.     ECHO: OK for 6 months per Cody LEO order  9/4/20 LVEF ~ 60%   2/7/20 LVEF ~ 60%     All lab results 3/5/21 within treatment parameters.   3/8/21- Ok to proceed with treatment without repeat ECHO per Dr. Ross     = 0 mg IV - HOLD 3/8/21 FOR RASH    Trastuzumab (Herceptin) 4 mg/kg x 109kg = 436mg   Rounded to vial size (within 10%) per dose rounding protocol = 450 mg IV    Leucovorin 400 mg/m² x 2.36m² = 944mg   Okay to treat with final dose = 944mg IV over 30 minutes     Fluorouracil (Adrucil) 360 mg/m² x 2.36m² = 849mg   Okay to treat with final dose =  850mg IV push    Fluorouracil (Adrucil) 2160 mg/m² x 2.36m² = 5098mg   Okay to treat with final dose =  5100mg CIVI over 46 hrs at 2.2mL/hr    RAHEEM Amador, Pharm.D.    "

## 2021-03-10 ENCOUNTER — OUTPATIENT INFUSION SERVICES (OUTPATIENT)
Dept: ONCOLOGY | Facility: MEDICAL CENTER | Age: 56
End: 2021-03-10
Attending: INTERNAL MEDICINE
Payer: MEDICARE

## 2021-03-10 VITALS
OXYGEN SATURATION: 98 % | TEMPERATURE: 98 F | SYSTOLIC BLOOD PRESSURE: 131 MMHG | DIASTOLIC BLOOD PRESSURE: 84 MMHG | RESPIRATION RATE: 18 BRPM | HEART RATE: 78 BPM

## 2021-03-10 DIAGNOSIS — C18.9 METASTATIC COLON CANCER TO LIVER (HCC): ICD-10-CM

## 2021-03-10 DIAGNOSIS — C78.7 METASTATIC COLON CANCER TO LIVER (HCC): ICD-10-CM

## 2021-03-10 PROCEDURE — 96523 IRRIG DRUG DELIVERY DEVICE: CPT

## 2021-03-10 PROCEDURE — 700111 HCHG RX REV CODE 636 W/ 250 OVERRIDE (IP): Performed by: INTERNAL MEDICINE

## 2021-03-10 RX ORDER — HEPARIN SODIUM (PORCINE) LOCK FLUSH IV SOLN 100 UNIT/ML 100 UNIT/ML
500 SOLUTION INTRAVENOUS PRN
Status: DISCONTINUED | OUTPATIENT
Start: 2021-03-10 | End: 2021-03-10 | Stop reason: HOSPADM

## 2021-03-10 RX ADMIN — HEPARIN 500 UNITS: 100 SYRINGE at 15:21

## 2021-03-11 NOTE — PROGRESS NOTES
Patient arrived ambulatory to [facility] for CADD pump disconnect.  Patient denies any acute changes.  Pump volume 0ml.  Pump disconnected.  Port flushed, heparin instilled, and de-accessed per protocol.  Gauze/tape applied.  Confirmed next appointment and he ambulated out of clinic in no apparent distress.

## 2021-03-19 RX ORDER — ONDANSETRON 2 MG/ML
4 INJECTION INTRAMUSCULAR; INTRAVENOUS PRN
Status: CANCELLED | OUTPATIENT
Start: 2021-03-23

## 2021-03-19 RX ORDER — 0.9 % SODIUM CHLORIDE 0.9 %
3 VIAL (ML) INJECTION PRN
Status: CANCELLED | OUTPATIENT
Start: 2021-03-23

## 2021-03-19 RX ORDER — 0.9 % SODIUM CHLORIDE 0.9 %
10 VIAL (ML) INJECTION PRN
Status: CANCELLED | OUTPATIENT
Start: 2021-03-23

## 2021-03-19 RX ORDER — DIPHENHYDRAMINE HYDROCHLORIDE 50 MG/ML
50 INJECTION INTRAMUSCULAR; INTRAVENOUS PRN
Status: CANCELLED | OUTPATIENT
Start: 2021-03-23

## 2021-03-19 RX ORDER — 0.9 % SODIUM CHLORIDE 0.9 %
20 VIAL (ML) INJECTION PRN
Status: CANCELLED | OUTPATIENT
Start: 2021-03-25

## 2021-03-19 RX ORDER — 0.9 % SODIUM CHLORIDE 0.9 %
10 VIAL (ML) INJECTION PRN
Status: CANCELLED | OUTPATIENT
Start: 2021-03-22

## 2021-03-19 RX ORDER — 0.9 % SODIUM CHLORIDE 0.9 %
VIAL (ML) INJECTION PRN
Status: CANCELLED | OUTPATIENT
Start: 2021-03-22

## 2021-03-19 RX ORDER — ONDANSETRON 8 MG/1
8 TABLET, ORALLY DISINTEGRATING ORAL PRN
Status: CANCELLED | OUTPATIENT
Start: 2021-03-23

## 2021-03-19 RX ORDER — PROCHLORPERAZINE MALEATE 10 MG
10 TABLET ORAL EVERY 6 HOURS PRN
Status: CANCELLED | OUTPATIENT
Start: 2021-03-23

## 2021-03-19 RX ORDER — HEPARIN SODIUM (PORCINE) LOCK FLUSH IV SOLN 100 UNIT/ML 100 UNIT/ML
500 SOLUTION INTRAVENOUS PRN
Status: CANCELLED | OUTPATIENT
Start: 2021-03-25

## 2021-03-19 RX ORDER — 0.9 % SODIUM CHLORIDE 0.9 %
3 VIAL (ML) INJECTION PRN
Status: CANCELLED | OUTPATIENT
Start: 2021-03-22

## 2021-03-19 RX ORDER — 0.9 % SODIUM CHLORIDE 0.9 %
VIAL (ML) INJECTION PRN
Status: CANCELLED | OUTPATIENT
Start: 2021-03-23

## 2021-03-19 RX ORDER — EPINEPHRINE 1 MG/ML(1)
0.5 AMPUL (ML) INJECTION PRN
Status: CANCELLED | OUTPATIENT
Start: 2021-03-23

## 2021-03-19 RX ORDER — METHYLPREDNISOLONE SODIUM SUCCINATE 125 MG/2ML
125 INJECTION, POWDER, LYOPHILIZED, FOR SOLUTION INTRAMUSCULAR; INTRAVENOUS PRN
Status: CANCELLED | OUTPATIENT
Start: 2021-03-23

## 2021-03-19 RX ORDER — HEPARIN SODIUM (PORCINE) LOCK FLUSH IV SOLN 100 UNIT/ML 100 UNIT/ML
500 SOLUTION INTRAVENOUS PRN
Status: CANCELLED | OUTPATIENT
Start: 2021-03-22

## 2021-03-19 RX ORDER — HEPARIN SODIUM (PORCINE) LOCK FLUSH IV SOLN 100 UNIT/ML 100 UNIT/ML
500 SOLUTION INTRAVENOUS PRN
Status: CANCELLED | OUTPATIENT
Start: 2021-03-23

## 2021-03-19 RX ORDER — SODIUM CHLORIDE 9 MG/ML
INJECTION, SOLUTION INTRAVENOUS CONTINUOUS
Status: CANCELLED
Start: 2021-03-23

## 2021-03-22 ENCOUNTER — OUTPATIENT INFUSION SERVICES (OUTPATIENT)
Dept: ONCOLOGY | Facility: MEDICAL CENTER | Age: 56
End: 2021-03-22
Attending: INTERNAL MEDICINE
Payer: MEDICARE

## 2021-03-22 VITALS
OXYGEN SATURATION: 98 % | HEIGHT: 73 IN | DIASTOLIC BLOOD PRESSURE: 81 MMHG | RESPIRATION RATE: 18 BRPM | WEIGHT: 247.36 LBS | BODY MASS INDEX: 32.78 KG/M2 | HEART RATE: 81 BPM | TEMPERATURE: 97 F | SYSTOLIC BLOOD PRESSURE: 132 MMHG

## 2021-03-22 DIAGNOSIS — C78.7 METASTATIC COLON CANCER TO LIVER (HCC): ICD-10-CM

## 2021-03-22 DIAGNOSIS — C18.9 METASTATIC COLON CANCER TO LIVER (HCC): ICD-10-CM

## 2021-03-22 LAB
ALBUMIN SERPL BCP-MCNC: 4 G/DL (ref 3.2–4.9)
ALBUMIN/GLOB SERPL: 1.7 G/DL
ALP SERPL-CCNC: 109 U/L (ref 30–99)
ALT SERPL-CCNC: 25 U/L (ref 2–50)
ANION GAP SERPL CALC-SCNC: 10 MMOL/L (ref 7–16)
AST SERPL-CCNC: 29 U/L (ref 12–45)
BASOPHILS # BLD AUTO: 0.6 % (ref 0–1.8)
BASOPHILS # BLD: 0.04 K/UL (ref 0–0.12)
BILIRUB SERPL-MCNC: 0.9 MG/DL (ref 0.1–1.5)
BUN SERPL-MCNC: 11 MG/DL (ref 8–22)
CALCIUM SERPL-MCNC: 9.1 MG/DL (ref 8.5–10.5)
CEA SERPL-MCNC: 25.1 NG/ML (ref 0–3)
CHLORIDE SERPL-SCNC: 104 MMOL/L (ref 96–112)
CO2 SERPL-SCNC: 24 MMOL/L (ref 20–33)
CREAT SERPL-MCNC: 0.74 MG/DL (ref 0.5–1.4)
EOSINOPHIL # BLD AUTO: 0.22 K/UL (ref 0–0.51)
EOSINOPHIL NFR BLD: 3.5 % (ref 0–6.9)
ERYTHROCYTE [DISTWIDTH] IN BLOOD BY AUTOMATED COUNT: 53.1 FL (ref 35.9–50)
GLOBULIN SER CALC-MCNC: 2.4 G/DL (ref 1.9–3.5)
GLUCOSE SERPL-MCNC: 179 MG/DL (ref 65–99)
HCT VFR BLD AUTO: 37.5 % (ref 42–52)
HGB BLD-MCNC: 12.6 G/DL (ref 14–18)
IMM GRANULOCYTES # BLD AUTO: 0.02 K/UL (ref 0–0.11)
IMM GRANULOCYTES NFR BLD AUTO: 0.3 % (ref 0–0.9)
LYMPHOCYTES # BLD AUTO: 1.18 K/UL (ref 1–4.8)
LYMPHOCYTES NFR BLD: 18.7 % (ref 22–41)
MAGNESIUM SERPL-MCNC: 1.8 MG/DL (ref 1.5–2.5)
MCH RBC QN AUTO: 31.3 PG (ref 27–33)
MCHC RBC AUTO-ENTMCNC: 33.6 G/DL (ref 33.7–35.3)
MCV RBC AUTO: 93.3 FL (ref 81.4–97.8)
MONOCYTES # BLD AUTO: 0.65 K/UL (ref 0–0.85)
MONOCYTES NFR BLD AUTO: 10.3 % (ref 0–13.4)
NEUTROPHILS # BLD AUTO: 4.19 K/UL (ref 1.82–7.42)
NEUTROPHILS NFR BLD: 66.6 % (ref 44–72)
NRBC # BLD AUTO: 0 K/UL
NRBC BLD-RTO: 0 /100 WBC
PLATELET # BLD AUTO: 114 K/UL (ref 164–446)
PMV BLD AUTO: 10.4 FL (ref 9–12.9)
POTASSIUM SERPL-SCNC: 3.7 MMOL/L (ref 3.6–5.5)
PROT SERPL-MCNC: 6.4 G/DL (ref 6–8.2)
RBC # BLD AUTO: 4.02 M/UL (ref 4.7–6.1)
SODIUM SERPL-SCNC: 138 MMOL/L (ref 135–145)
WBC # BLD AUTO: 6.3 K/UL (ref 4.8–10.8)

## 2021-03-22 PROCEDURE — 700111 HCHG RX REV CODE 636 W/ 250 OVERRIDE (IP): Performed by: INTERNAL MEDICINE

## 2021-03-22 PROCEDURE — 96413 CHEMO IV INFUSION 1 HR: CPT

## 2021-03-22 PROCEDURE — 80053 COMPREHEN METABOLIC PANEL: CPT

## 2021-03-22 PROCEDURE — 96417 CHEMO IV INFUS EACH ADDL SEQ: CPT

## 2021-03-22 PROCEDURE — 700105 HCHG RX REV CODE 258: Performed by: INTERNAL MEDICINE

## 2021-03-22 PROCEDURE — 96375 TX/PRO/DX INJ NEW DRUG ADDON: CPT

## 2021-03-22 PROCEDURE — A4212 NON CORING NEEDLE OR STYLET: HCPCS

## 2021-03-22 PROCEDURE — 85025 COMPLETE CBC W/AUTO DIFF WBC: CPT

## 2021-03-22 PROCEDURE — G0498 CHEMO EXTEND IV INFUS W/PUMP: HCPCS

## 2021-03-22 PROCEDURE — 83735 ASSAY OF MAGNESIUM: CPT

## 2021-03-22 PROCEDURE — 96411 CHEMO IV PUSH ADDL DRUG: CPT

## 2021-03-22 PROCEDURE — 82378 CARCINOEMBRYONIC ANTIGEN: CPT

## 2021-03-22 RX ADMIN — DIPHENHYDRAMINE HYDROCHLORIDE 50 MG: 50 INJECTION, SOLUTION INTRAMUSCULAR; INTRAVENOUS at 11:40

## 2021-03-22 RX ADMIN — TRASTUZUMAB 450 MG: 150 INJECTION, POWDER, LYOPHILIZED, FOR SOLUTION INTRAVENOUS at 13:22

## 2021-03-22 RX ADMIN — FLUOROURACIL 865 MG: 50 INJECTION, SOLUTION INTRAVENOUS at 15:06

## 2021-03-22 RX ADMIN — PANITUMUMAB 300 MG: 100 SOLUTION INTRAVENOUS at 12:02

## 2021-03-22 RX ADMIN — FLUOROURACIL 5185 MG: 50 INJECTION, SOLUTION INTRAVENOUS at 15:17

## 2021-03-22 RX ADMIN — ONDANSETRON 16 MG: 2 INJECTION INTRAMUSCULAR; INTRAVENOUS at 11:21

## 2021-03-22 RX ADMIN — LEUCOVORIN CALCIUM 960 MG: 350 INJECTION, POWDER, LYOPHILIZED, FOR SOLUTION INTRAMUSCULAR; INTRAVENOUS at 14:06

## 2021-03-22 ASSESSMENT — FIBROSIS 4 INDEX: FIB4 SCORE: 1.45

## 2021-03-22 NOTE — PROGRESS NOTES
"Pharmacy Chemotherapy Verification    Dx: Metastatic colorectal cancer [KRAS/NRAS wild type, ERBB2 (HER2) amplification]    Regimen: mFOLFOX + Vectibix + Herceptin  Cycle 27  Previous treatment: 3/8/21    Trastuzumab 6 mg/kg IV loading dose C1D1, then 4 mg/kg IV Day 1 of subsequent  cycles [confirmed \"every 2 weeks\", per MD; progress note: \"He also had an ERBB2  amplification which might suggest response to treatments like Herceptin\"]   > 7/8/20: Herceptin 2.5 weeks overdue.  Do not reload Herceptin per TRBO Dr. Ross.  Cetuximab 400 mg/m2 IV Cycle 1 Day 1, then 250 mg/m2 IV Days 1 and 8 (confirmed  \"weekly\" per MD)   > C4 * * * cetuximab has been omitted * * *   > C5 * * * cetuximab replaced with panitumumab * * *   OXALIplatin 85 mg/m2 IV Day 1 concurrent with   > Cody LEO reduced dose by 20% to 68 mg/m2 starting with C1D1 due to anticipated tolerance    > 3/14/20: Oxaliplatin discontinued from treatment  Panitumumab (Vectibix) 6 mg/kg IV D1 over 60 min   + added; change of therapy d/t cetuximab skin tox   > 4/25/20, initial dose reduction to 4.5 mg/kg d/t previous EGFR cutaneous toxicities    > C9 * * * Vectibix has been omitted * * *   > C10 Vectibix reordered at 3 mg/kg per MD Cody    > C13 * * * Vectibix has been omitted * * *   > C14 Vectibix reordered at 3 mg/kg per MD Cody   >C17 Vectibix HELD per Cody LEO              >C18 Vectibix held again per MD   >C19  11/30/20- vectibix resumed at 3mg/kg   >C21 HOLD x1 dose for rash   >C22 cont HOLD   >C23 vectibix resumed at 3 mg/kg   >C25 & C26 Vectibix on hold due to rash   >C27 Vectibix 3mg/kg  Leucovorin 400 mg/m2 IV Day 1 followed by  Fluorouracil 400 mg/m2 IV Day 1 followed by   C1D1 reduced dose by 10% to 360 mg/m2 per MD Cody for anticipated tolerance   Fluorouracil 2400 mg/m2 CIVI over 46-48 hours    C1D1 reduced dose by 10% to 2160 mg/m2 per MD Cody for anticipated tolerance   14-day cycle until progression or toxicity  *Dosing " "Reference*  NCCN guidelines for colon cancer v2.2019  (cetuximab + chemo) Christine AP et al, OBEY 2017;317(23):8493-8661  (HER2 tx in HER2+ CRC) Kayleen RINALDI et al. Lancet Oncol. 2019 Apr;20(4):518-530.    Allergies: Patient has no known allergies.  /81   Pulse 81   Temp 36.1 °C (97 °F) (Temporal)   Resp 18   Ht 1.85 m (6' 0.84\")   Wt 112 kg (247 lb 5.7 oz)   SpO2 98%   BMI 32.78 kg/m²  Body surface area is 2.4 meters squared.     Labs 3/22/21  Meet treatment parameters    ECHO  9/4/20 LVEF ~ 60% OK for 6 months per Cody LEO order  2/7/20 LVEF ~ 60%     Panitumumab (Vectibix) 3 mg/kg x 112 kg = 336 mg              Rounded to vial size (within 10%) per dose rounding protocol = 300 mg IV     Trastuzumab (Herceptin) 4 mg/kg x 112 kg = 448 mg              Rounded to vial size (within 10%) per dose rounding protocol = 450 mg IV     Leucovorin 400 mg/m² x 2.4 m² = 960 mg              Okay to treat with final dose = 960 mg IV over 30 minutes      Fluorouracil (Adrucil) 360 mg/m² x 2.4 m² = 864 mg              Okay to treat with final dose =  865 mg IV push     Fluorouracil (Adrucil) 2160 mg/m² x 2.4 m² = 5184 mg              Okay to treat with final dose =  5185 mg CIVI over 46 hrs at 2.3 mL/hr       "

## 2021-03-22 NOTE — PROGRESS NOTES
Chemotherapy Verification - PRIMARY RN      Height = 1.85 m  Weight = 112 kg  BSA = 2.4 m^2       Medication: penitumumab  Dose: 3 mg/kg  Calculated Dose: 336 mg                             (In mg/m2, AUC, mg/kg)     Medication: trastuzumab  Dose: 4 mg/kg  Calculated Dose: 448 mg                             (In mg/m2, AUC, mg/kg)    Medication: fluorouracil bolus Dose: 360 mg/m^2  Calculated Dose: 864 mg                             (In mg/m2, AUC, mg/kg)    Medication: fluorouracil pump  Dose: 2160 mg/m^2  Calculated Dose: 5184 mg                             (In mg/m2, AUC, mg/kg)      I confirm this process was performed independently with the BSA and all final chemotherapy dosing calculations congruent.  Any discrepancies of 10% or greater have been addressed with the chemotherapy pharmacist. The resolution of the discrepancy has been documented in the EPIC progress notes.

## 2021-03-22 NOTE — PROGRESS NOTES
Pt presented to [facility] for panitumumab, trastuzumab, leucovorin, fluorouracil bolus and fluorouracil CADD pump connection. R chest port accessed using sterile technique; brisk blood return observed and pt tolerated well. Labs drawn per orders and results were within treatable parameters. Pre meds administered and panitumumab, trastuzumab, leucovorin, fluorouracil bolus infused with no s/s of adverse reactions. Brisk blood return from port verified before initiation of the fluorouracil CADD pump. Connection confirmed and pump placed in carrying case. Next appointment confirmed for de-access and education provided. Pt discharged to self care with all personal belongings and in NAD.

## 2021-03-22 NOTE — PROGRESS NOTES
"Pharmacy Chemotherapy Verification    Dx: Metastatic colorectal cancer [KRAS/NRAS wild type, ERBB2 (HER2) amplification]    Regimen: mFOLFOX + Vectibix + Herceptin    Trastuzumab 6 mg/kg IV loading dose C1D1, then 4 mg/kg IV Day 1 of subsequent  cycles [confirmed \"every 2 weeks\", per MD; progress note: \"He also had an ERBB2  amplification which might suggest response to treatments like Herceptin\"]   > 7/8/20: Herceptin 2.5 weeks overdue.  Do not reload Herceptin per TRSAMMY Ross.  Cetuximab 400 mg/m2 IV Cycle 1 Day 1, then 250 mg/m2 IV Days 1 and 8 (confirmed  \"weekly\" per MD)   > C4 * * * cetuximab has been omitted * * *   > C5 * * * cetuximab replaced with panitumumab * * *   OXALIplatin 85 mg/m2 IV Day 1 concurrent with   > Cody LEO reduced dose by 20% to 68 mg/m2 starting with C1D1 due to anticipated tolerance    > 3/14/20: Oxaliplatin discontinued from treatment  Panitumumab (Vectibix) 6 mg/kg IV D1 over 60 min   + added; change of therapy d/t cetuximab skin tox   > 4/25/20, initial dose reduction to 4.5 mg/kg d/t previous EGFR cutaneous toxicities    > C9 * * * Vectibix has been omitted * * *   > C10 Vectibix reordered at 3 mg/kg per MD Cody    > C13 * * * Vectibix has been omitted * * *   > C14 Vectibix reordered at 3 mg/kg per MD Cody   >C17 Vectibix HELD per Cody LEO              >C18 Vectibix held again per MD   >C19  11/30/20- vectibix resumed at 3mg/kg   >C21 HOLD x1 dose for rash   >C22 cont HOLD   >C23 vectibix resumed at 3 mg/kg   >C25 & 26 Vectibix on hold due to rash   C27 Vectibix restarted at 3 mg/kg per Dr Ross   Leucovorin 400 mg/m2 IV Day 1 followed by  Fluorouracil 400 mg/m2 IV Day 1 followed by   C1D1 reduced dose by 10% to 360 mg/m2 per MD Cody for anticipated tolerance   Fluorouracil 2400 mg/m2 CIVI over 46-48 hours    C1D1 reduced dose by 10% to 2160 mg/m2 per MD Cody for anticipated tolerance   14-day cycle until progression or toxicity  *Dosing Reference*  NCCN " "guidelines for colon cancer v2.2019  (cetuximab + chemo) Venlizzette AP et al, OBEY 2017;317(23):4128-0105  (HER2 tx in HER2+ CRC) Kayleen RINALDI, et al. Lancet Oncol. 2019 Apr;20(4):518-530.    Allergies: Patient has no known allergies.  /81   Pulse 81   Temp 36.1 °C (97 °F) (Temporal)   Resp 18   Ht 1.85 m (6' 0.84\")   Wt 112 kg (247 lb 5.7 oz)   SpO2 98%   BMI 32.78 kg/m²  Body surface area is 2.4 meters squared.     Labs 3/22/21  ANC~ 4190 Plt = 114k   Hgb = 12.6     SCr = 0.74 mg/dL CrCl > 125 mL/min   AST/ALT/ALK = 29/25/109 TBili = 0.9       ECHO  9/4/20 LVEF ~ 60% OK for 6 months per Cody LEO order  2/7/20 LVEF ~ 60%       Drug Order   (Drug name, dose, route, IV Fluid & volume, frequency, number of doses) Cycle 27      Previous treatment: 3/8/21     Medication = Panitumumab (Vectibix)   Base Dose= 3 mg/kg  Calc Dose: Base Dose x 112 kg = 336 mg  Final Dose = 300 mg  Route = IV  Fluid & Volume =  mL  Admin Duration = Over 60 minutes          Rounded to nearest vial size (within 10%) per rounding protocol, okay to treat with final dose   Medication = Trastuzumab (Herceptin)  Base Dose = 4 mg/kg  Calc Dose: Base Dose x 112 kg = 448 mg  Final Dose = 450  mg  Route = IV  Fluid & Volume =  mL  Admin Duration = Over 30 minutes          Rounded to nearest vial size (within 10%) per rounding protocol, okay to treat with final dose   Medication = Leucovorin (Wellcovorin)  Base Dose = 400 mg/m²  Calc Dose: Base Dose x 2.4 m² = 960 mg  Final Dose = 960 mg  Route = IV  Fluid & Volume = D5W 250 mL  Admin Duration = Over 30 minutes          <10% difference, okay to treat with final dose   Medication = Fluorouracil (5-FU)  Base Dose = 360 mg/m²  Calc Dose: Base Dose x 2.4 m² = 864 mg  Final Dose = 865 mg  Route = IVP  Fluid & Volume = 17.3 mL in syringe  Conc = 50 mg/mL  Admin Duration = Over 5 minutes          <10% difference, okay to treat with final dose   Medication = Fluorouracil (5-FU)  Base " Dose = 2160 mg/m²  Calc Dose:Base Dose x 2.4 m² = 5184 mg  Final Dose = 5185 mg  Route = CIVI   Fluid & Volume = 103.7 mL (+3 mL overfill = 106.7 mL)  Admin Duration = Over 46 hours to run at   2.3 mL/hour    Via CADD pump for home infusion       <10% difference, okay to treat with final dose     By my signature below, I confirm this process was performed independently with the BSA and all final chemotherapy dosing calculations congruent. I have reviewed the above chemotherapy order and that my calculation of the final dose and BSA (when applicable) corroborate those calculations of the  pharmacist. Discrepancies of 10% or greater in the written dose have been addressed and documented within the EPIC Progress notes.    José Marvin, PharmD

## 2021-03-22 NOTE — PROGRESS NOTES
Chemotherapy Verification - SECONDARY RN       Height = 185 cm  Weight = 112 kg  BSA = 2.4 m2       Medication: Vectibix  Dose: 3 mg/kg  Calculated Dose: 336 mg                             (In mg/m2, AUC, mg/kg)     Medication: Herceptin  Dose: 4 mg/kg  Calculated Dose: 448 mg                             (In mg/m2, AUC, mg/kg)    Medication: 5FU bolus inj.  Dose: 360 mg/m2   Calculated Dose: 864 mg    Medication: 5FU CADD pump  Dose: 2160 mg/m2  Calculated Dose: 5184 mg                             (In mg/m2, AUC, mg/kg)    I confirm that this process was performed independently.

## 2021-03-24 ENCOUNTER — OUTPATIENT INFUSION SERVICES (OUTPATIENT)
Dept: ONCOLOGY | Facility: MEDICAL CENTER | Age: 56
End: 2021-03-24
Attending: INTERNAL MEDICINE
Payer: MEDICARE

## 2021-03-24 VITALS
SYSTOLIC BLOOD PRESSURE: 135 MMHG | RESPIRATION RATE: 18 BRPM | OXYGEN SATURATION: 98 % | TEMPERATURE: 98.2 F | HEART RATE: 82 BPM | DIASTOLIC BLOOD PRESSURE: 72 MMHG

## 2021-03-24 DIAGNOSIS — C18.9 METASTATIC COLON CANCER TO LIVER (HCC): ICD-10-CM

## 2021-03-24 DIAGNOSIS — C78.7 METASTATIC COLON CANCER TO LIVER (HCC): ICD-10-CM

## 2021-03-24 PROCEDURE — 96523 IRRIG DRUG DELIVERY DEVICE: CPT

## 2021-03-24 PROCEDURE — 700111 HCHG RX REV CODE 636 W/ 250 OVERRIDE (IP)

## 2021-03-24 RX ORDER — HEPARIN SODIUM (PORCINE) LOCK FLUSH IV SOLN 100 UNIT/ML 100 UNIT/ML
SOLUTION INTRAVENOUS
Status: COMPLETED
Start: 2021-03-24 | End: 2021-03-24

## 2021-03-24 RX ADMIN — HEPARIN 500 UNITS: 100 SYRINGE at 16:28

## 2021-03-31 ENCOUNTER — IMMUNIZATION (OUTPATIENT)
Dept: FAMILY PLANNING/WOMEN'S HEALTH CLINIC | Facility: IMMUNIZATION CENTER | Age: 56
End: 2021-03-31
Payer: MEDICARE

## 2021-03-31 DIAGNOSIS — Z23 ENCOUNTER FOR VACCINATION: Primary | ICD-10-CM

## 2021-04-02 ENCOUNTER — HOSPITAL ENCOUNTER (OUTPATIENT)
Dept: LAB | Facility: MEDICAL CENTER | Age: 56
End: 2021-04-02
Attending: INTERNAL MEDICINE
Payer: MEDICARE

## 2021-04-02 LAB
BASOPHILS # BLD AUTO: 1.2 % (ref 0–1.8)
BASOPHILS # BLD: 0.06 K/UL (ref 0–0.12)
EOSINOPHIL # BLD AUTO: 0.25 K/UL (ref 0–0.51)
EOSINOPHIL NFR BLD: 5.1 % (ref 0–6.9)
ERYTHROCYTE [DISTWIDTH] IN BLOOD BY AUTOMATED COUNT: 50.7 FL (ref 35.9–50)
HCT VFR BLD AUTO: 40.6 % (ref 42–52)
HGB BLD-MCNC: 14.2 G/DL (ref 14–18)
IMM GRANULOCYTES # BLD AUTO: 0.02 K/UL (ref 0–0.11)
IMM GRANULOCYTES NFR BLD AUTO: 0.4 % (ref 0–0.9)
LYMPHOCYTES # BLD AUTO: 1.31 K/UL (ref 1–4.8)
LYMPHOCYTES NFR BLD: 27 % (ref 22–41)
MCH RBC QN AUTO: 32.2 PG (ref 27–33)
MCHC RBC AUTO-ENTMCNC: 35 G/DL (ref 33.7–35.3)
MCV RBC AUTO: 92.1 FL (ref 81.4–97.8)
MONOCYTES # BLD AUTO: 0.67 K/UL (ref 0–0.85)
MONOCYTES NFR BLD AUTO: 13.8 % (ref 0–13.4)
NEUTROPHILS # BLD AUTO: 2.55 K/UL (ref 1.82–7.42)
NEUTROPHILS NFR BLD: 52.5 % (ref 44–72)
NRBC # BLD AUTO: 0 K/UL
NRBC BLD-RTO: 0 /100 WBC
PLATELET # BLD AUTO: 166 K/UL (ref 164–446)
PMV BLD AUTO: 10.3 FL (ref 9–12.9)
RBC # BLD AUTO: 4.41 M/UL (ref 4.7–6.1)
WBC # BLD AUTO: 4.9 K/UL (ref 4.8–10.8)

## 2021-04-02 PROCEDURE — 91300 PFIZER SARS-COV-2 VACCINE: CPT

## 2021-04-02 PROCEDURE — 36415 COLL VENOUS BLD VENIPUNCTURE: CPT

## 2021-04-02 PROCEDURE — 0001A PFIZER SARS-COV-2 VACCINE: CPT

## 2021-04-02 PROCEDURE — 82378 CARCINOEMBRYONIC ANTIGEN: CPT

## 2021-04-02 PROCEDURE — 85025 COMPLETE CBC W/AUTO DIFF WBC: CPT

## 2021-04-02 PROCEDURE — 83735 ASSAY OF MAGNESIUM: CPT

## 2021-04-02 PROCEDURE — 80053 COMPREHEN METABOLIC PANEL: CPT

## 2021-04-02 RX ORDER — SODIUM CHLORIDE 9 MG/ML
INJECTION, SOLUTION INTRAVENOUS CONTINUOUS
Status: CANCELLED
Start: 2021-04-06

## 2021-04-02 RX ORDER — METHYLPREDNISOLONE SODIUM SUCCINATE 125 MG/2ML
125 INJECTION, POWDER, LYOPHILIZED, FOR SOLUTION INTRAMUSCULAR; INTRAVENOUS PRN
Status: CANCELLED | OUTPATIENT
Start: 2021-04-06

## 2021-04-02 RX ORDER — 0.9 % SODIUM CHLORIDE 0.9 %
10 VIAL (ML) INJECTION PRN
Status: CANCELLED | OUTPATIENT
Start: 2021-04-06

## 2021-04-02 RX ORDER — ONDANSETRON 2 MG/ML
4 INJECTION INTRAMUSCULAR; INTRAVENOUS PRN
Status: CANCELLED | OUTPATIENT
Start: 2021-04-06

## 2021-04-02 RX ORDER — PROCHLORPERAZINE MALEATE 10 MG
10 TABLET ORAL EVERY 6 HOURS PRN
Status: CANCELLED | OUTPATIENT
Start: 2021-04-06

## 2021-04-02 RX ORDER — HEPARIN SODIUM (PORCINE) LOCK FLUSH IV SOLN 100 UNIT/ML 100 UNIT/ML
500 SOLUTION INTRAVENOUS PRN
Status: CANCELLED | OUTPATIENT
Start: 2021-04-05

## 2021-04-02 RX ORDER — 0.9 % SODIUM CHLORIDE 0.9 %
10 VIAL (ML) INJECTION PRN
Status: CANCELLED | OUTPATIENT
Start: 2021-04-05

## 2021-04-02 RX ORDER — 0.9 % SODIUM CHLORIDE 0.9 %
VIAL (ML) INJECTION PRN
Status: CANCELLED | OUTPATIENT
Start: 2021-04-06

## 2021-04-02 RX ORDER — EPINEPHRINE 1 MG/ML(1)
0.5 AMPUL (ML) INJECTION PRN
Status: CANCELLED | OUTPATIENT
Start: 2021-04-06

## 2021-04-02 RX ORDER — 0.9 % SODIUM CHLORIDE 0.9 %
20 VIAL (ML) INJECTION PRN
Status: CANCELLED | OUTPATIENT
Start: 2021-04-08

## 2021-04-02 RX ORDER — HEPARIN SODIUM (PORCINE) LOCK FLUSH IV SOLN 100 UNIT/ML 100 UNIT/ML
500 SOLUTION INTRAVENOUS PRN
Status: CANCELLED | OUTPATIENT
Start: 2021-04-08

## 2021-04-02 RX ORDER — ONDANSETRON 8 MG/1
8 TABLET, ORALLY DISINTEGRATING ORAL PRN
Status: CANCELLED | OUTPATIENT
Start: 2021-04-06

## 2021-04-02 RX ORDER — 0.9 % SODIUM CHLORIDE 0.9 %
3 VIAL (ML) INJECTION PRN
Status: CANCELLED | OUTPATIENT
Start: 2021-04-05

## 2021-04-02 RX ORDER — HEPARIN SODIUM (PORCINE) LOCK FLUSH IV SOLN 100 UNIT/ML 100 UNIT/ML
500 SOLUTION INTRAVENOUS PRN
Status: CANCELLED | OUTPATIENT
Start: 2021-04-06

## 2021-04-02 RX ORDER — 0.9 % SODIUM CHLORIDE 0.9 %
3 VIAL (ML) INJECTION PRN
Status: CANCELLED | OUTPATIENT
Start: 2021-04-06

## 2021-04-02 RX ORDER — DIPHENHYDRAMINE HYDROCHLORIDE 50 MG/ML
50 INJECTION INTRAMUSCULAR; INTRAVENOUS PRN
Status: CANCELLED | OUTPATIENT
Start: 2021-04-06

## 2021-04-02 RX ORDER — 0.9 % SODIUM CHLORIDE 0.9 %
VIAL (ML) INJECTION PRN
Status: CANCELLED | OUTPATIENT
Start: 2021-04-05

## 2021-04-02 NOTE — PROGRESS NOTES
"Pharmacy Chemotherapy Verification    Dx: Metastatic colorectal cancer [KRAS/NRAS wild type, ERBB2 (HER2) amplification]    Regimen: mFOLFOX + Vectibix + Herceptin    Trastuzumab 6 mg/kg IV loading dose C1D1, then 4 mg/kg IV Day 1 of subsequent  cycles [confirmed \"every 2 weeks\", per MD; progress note: \"He also had an ERBB2  amplification which might suggest response to treatments like Herceptin\"]   > 7/8/20: Herceptin 2.5 weeks overdue.  Do not reload Herceptin per TRSAMMY Ross.  Cetuximab 400 mg/m2 IV Cycle 1 Day 1, then 250 mg/m2 IV Days 1 and 8 (confirmed  \"weekly\" per MD)   > C4 * * * cetuximab has been omitted * * *   > C5 * * * cetuximab replaced with panitumumab * * *   OXALIplatin 85 mg/m2 IV Day 1 concurrent with   > Cody LEO reduced dose by 20% to 68 mg/m2 starting with C1D1 due to anticipated tolerance    > 3/14/20: Oxaliplatin discontinued from treatment  Panitumumab (Vectibix) 6 mg/kg IV D1 over 60 min   + added; change of therapy d/t cetuximab skin tox   > 4/25/20, initial dose reduction to 4.5 mg/kg d/t previous EGFR cutaneous toxicities    > C9 * * * Vectibix has been omitted * * *   > C10 Vectibix reordered at 3 mg/kg per MD Cody    > C13 * * * Vectibix has been omitted * * *   > C14 Vectibix reordered at 3 mg/kg per MD Cody   >C17 Vectibix HELD per Cody LEO              >C18 Vectibix held again per MD   >C19  11/30/20- vectibix resumed at 3mg/kg   >C21 HOLD x1 dose for rash   >C22 cont HOLD   >C23 vectibix resumed at 3 mg/kg   >C25 & 26 Vectibix on hold due to rash   C27 Vectibix restarted at 3 mg/kg per Dr Ross    C28 Vectibix HELD  Leucovorin 400 mg/m2 IV Day 1 followed by  Fluorouracil 400 mg/m2 IV Day 1 followed by   C1D1 reduced dose by 10% to 360 mg/m2 per MD Cody for anticipated tolerance   Fluorouracil 2400 mg/m2 CIVI over 46-48 hours    C1D1 reduced dose by 10% to 2160 mg/m2 per MD Cody for anticipated tolerance   14-day cycle until progression or toxicity  *Dosing " "Reference*  NCCN guidelines for colon cancer v2.2019  (cetuximab + chemo) Christine AP et al, OBEY 2017;317(23):1495-3818  (HER2 tx in HER2+ CRC) Kayleen RINALDI et al. Lancet Oncol. 2019 Apr;20(4):518-530.    Allergies: Patient has no known allergies.  /80   Pulse 92   Temp 36.1 °C (97 °F) (Temporal)   Resp 18   Ht 1.85 m (6' 0.84\")   Wt 111 kg (245 lb 9.5 oz)   SpO2 98%   BMI 32.55 kg/m²  Body surface area is 2.39 meters squared.     Labs 4/2/21 results within treatment parameters     ECHO  9/4/20 LVEF ~ 60% OK for 6 months per Cody LEO order  2/7/20 LVEF ~ 60%       Drug Order   (Drug name, dose, route, IV Fluid & volume, frequency, number of doses) Cycle 28  Previous treatment: 3/22/21     Medication = Panitumumab (Vectibix)   Base Dose= 3 mg/kg  Calc Dose: Base Dose x * kg = * mg  Final Dose = 0 mg  Route = IV  Fluid & Volume =  mL  Admin Duration = Over 60 minutes          Rounded to nearest vial size (within 10%) per rounding protocol, okay to treat with final dose   Medication = Trastuzumab (Herceptin)  Base Dose = 4 mg/kg  Calc Dose: Base Dose x 111 kg = 444 mg  Final Dose = 450 mg  Route = IV  Fluid & Volume =  mL  Admin Duration = Over 30 minutes          Rounded to nearest vial size (within 10%) per rounding protocol, okay to treat with final dose   Medication = Leucovorin (Wellcovorin)  Base Dose = 400 mg/m²  Calc Dose: Base Dose x 2.39 m² = 956 mg  Final Dose = 956 mg  Route = IV  Fluid & Volume = D5W 250 mL  Admin Duration = Over 30 minutes          <10% difference, okay to treat with final dose   Medication = Fluorouracil (5-FU)  Base Dose = 360 mg/m²  Calc Dose: Base Dose x 2.39 m² = 860 mg  Final Dose = 860 mg  Route = IVP  Fluid & Volume = 17.2 mL in syringe  Conc = 50 mg/mL  Admin Duration = Over 5 minutes          <10% difference, okay to treat with final dose   Medication = Fluorouracil (5-FU)  Base Dose = 2160 mg/m²  Calc Dose:Base Dose x 2.39 m² = 5162 mg  Final Dose " = 5160 mg  Route = CIVI   Fluid & Volume = 103.2 mL (+3 mL overfill = 106.2 mL)  Admin Duration = Over 46 hours to run at   2.2 mL/hour    Via CADD pump for home infusion       <10% difference, okay to treat with final dose     By my signature below, I confirm this process was performed independently with the BSA and all final chemotherapy dosing calculations congruent. I have reviewed the above chemotherapy order and that my calculation of the final dose and BSA (when applicable) corroborate those calculations of the  pharmacist. Discrepancies of 10% or greater in the written dose have been addressed and documented within the [facility] Progress notes.    Man Anderson, AbranD

## 2021-04-03 LAB
ALBUMIN SERPL BCP-MCNC: 4.4 G/DL (ref 3.2–4.9)
ALBUMIN/GLOB SERPL: 2 G/DL
ALP SERPL-CCNC: 110 U/L (ref 30–99)
ALT SERPL-CCNC: 29 U/L (ref 2–50)
ANION GAP SERPL CALC-SCNC: 9 MMOL/L (ref 7–16)
AST SERPL-CCNC: 43 U/L (ref 12–45)
BILIRUB SERPL-MCNC: 0.7 MG/DL (ref 0.1–1.5)
BUN SERPL-MCNC: 13 MG/DL (ref 8–22)
CALCIUM SERPL-MCNC: 9.3 MG/DL (ref 8.5–10.5)
CEA SERPL-MCNC: 27.2 NG/ML (ref 0–3)
CHLORIDE SERPL-SCNC: 103 MMOL/L (ref 96–112)
CO2 SERPL-SCNC: 26 MMOL/L (ref 20–33)
CREAT SERPL-MCNC: 0.74 MG/DL (ref 0.5–1.4)
GLOBULIN SER CALC-MCNC: 2.2 G/DL (ref 1.9–3.5)
GLUCOSE SERPL-MCNC: 154 MG/DL (ref 65–99)
MAGNESIUM SERPL-MCNC: 2.2 MG/DL (ref 1.5–2.5)
POTASSIUM SERPL-SCNC: 4.2 MMOL/L (ref 3.6–5.5)
PROT SERPL-MCNC: 6.6 G/DL (ref 6–8.2)
SODIUM SERPL-SCNC: 138 MMOL/L (ref 135–145)

## 2021-04-05 ENCOUNTER — OUTPATIENT INFUSION SERVICES (OUTPATIENT)
Dept: ONCOLOGY | Facility: MEDICAL CENTER | Age: 56
End: 2021-04-05
Attending: INTERNAL MEDICINE
Payer: MEDICARE

## 2021-04-05 VITALS
DIASTOLIC BLOOD PRESSURE: 80 MMHG | SYSTOLIC BLOOD PRESSURE: 143 MMHG | HEART RATE: 92 BPM | BODY MASS INDEX: 32.55 KG/M2 | HEIGHT: 73 IN | OXYGEN SATURATION: 98 % | RESPIRATION RATE: 18 BRPM | TEMPERATURE: 97 F | WEIGHT: 245.59 LBS

## 2021-04-05 DIAGNOSIS — C18.9 METASTATIC COLON CANCER TO LIVER (HCC): ICD-10-CM

## 2021-04-05 DIAGNOSIS — C78.7 METASTATIC COLON CANCER TO LIVER (HCC): ICD-10-CM

## 2021-04-05 PROCEDURE — 96375 TX/PRO/DX INJ NEW DRUG ADDON: CPT

## 2021-04-05 PROCEDURE — 96413 CHEMO IV INFUSION 1 HR: CPT

## 2021-04-05 PROCEDURE — 96411 CHEMO IV PUSH ADDL DRUG: CPT

## 2021-04-05 PROCEDURE — G0498 CHEMO EXTEND IV INFUS W/PUMP: HCPCS

## 2021-04-05 PROCEDURE — A4212 NON CORING NEEDLE OR STYLET: HCPCS

## 2021-04-05 PROCEDURE — 96417 CHEMO IV INFUS EACH ADDL SEQ: CPT

## 2021-04-05 PROCEDURE — 700105 HCHG RX REV CODE 258: Performed by: INTERNAL MEDICINE

## 2021-04-05 PROCEDURE — 700111 HCHG RX REV CODE 636 W/ 250 OVERRIDE (IP): Performed by: INTERNAL MEDICINE

## 2021-04-05 RX ORDER — CITALOPRAM HYDROBROMIDE 10 MG/1
10 TABLET ORAL
COMMUNITY
Start: 2021-03-24 | End: 2021-07-12

## 2021-04-05 RX ADMIN — TRASTUZUMAB 450 MG: 150 INJECTION, POWDER, LYOPHILIZED, FOR SOLUTION INTRAVENOUS at 10:42

## 2021-04-05 RX ADMIN — FLUOROURACIL 5160 MG: 50 INJECTION, SOLUTION INTRAVENOUS at 12:16

## 2021-04-05 RX ADMIN — DIPHENHYDRAMINE HYDROCHLORIDE 50 MG: 50 INJECTION, SOLUTION INTRAMUSCULAR; INTRAVENOUS at 10:04

## 2021-04-05 RX ADMIN — ONDANSETRON 16 MG: 2 INJECTION INTRAMUSCULAR; INTRAVENOUS at 10:19

## 2021-04-05 RX ADMIN — LEUCOVORIN CALCIUM 956 MG: 350 INJECTION, POWDER, LYOPHILIZED, FOR SOLUTION INTRAMUSCULAR; INTRAVENOUS at 11:22

## 2021-04-05 RX ADMIN — FLUOROURACIL 860 MG: 50 INJECTION, SOLUTION INTRAVENOUS at 12:11

## 2021-04-05 ASSESSMENT — FIBROSIS 4 INDEX: FIB4 SCORE: 2.69

## 2021-04-05 NOTE — PROGRESS NOTES
"Pharmacy Chemotherapy Calculations Note:    Patient Name: Gurmeet Jo        Dx: Metastatic Colorectal CA [KRAS/NRAS wild type, ERBB2 (HER2) amplification]  Cycle:  28 Previous treatment: C27 = 3/22/21     Protocol: mFOLFOX + Vectibix + Herceptin     Trastuzumab 6 mg/kg IV loading dose C1D1, then 4 mg/kg IV Day 1 of subsequent  cycles [confirmed \"every 2 weeks\", per MD; progress note: \"He also had an ERBB2  amplification which might suggest response to treatments like Herceptin\"]              > 7/8/20: Herceptin 2.5 weeks overdue. Do not reload Herceptin per TRBO Dr. Ross.  Cetuximab 400 mg/m2 IV Cycle 1 Day 1, then 250 mg/m2 IV Days 1 and 8 (confirmed  \"weekly\" per MD)              > C4 * * * cetuximab has been omitted * * *              > C5 * * * cetuximab replaced with panitumumab * * *   OXALIplatin 85 mg/m2 IV Day 1 concurrent with              > Cody LEO reduced dose by 20% to 68 mg/m2 starting with C1D1 due to anticipated tolerance               > 3/14/20: Oxaliplatin discontinued from treatment  Panitumumab (Vectibix) 6 mg/kg IV D1 over 60 min              + added; change of therapy d/t cetuximab skin tox              > 4/25/20, initial dose reduction to 4.5 mg/kg d/t previous EGFR cutaneous toxicities               > C9 * * * Vectibix has been omitted * * *              > C10 Vectibix reordered at 3 mg/kg per MD Cody               > C13 * * * Vectibix has been omitted * * *              > C14 Vectibix reordered at 3 mg/kg per MD Cody              >C17 Vectibix HELD per Cody LEO              >C18 Vectibix held again per MD              >C19  11/30/20- vectibix resumed at 3mg/kg              >C21 HOLD x1 dose for rash              >C22 cont HOLD              >C23 vectibix resumed at 3 mg/kg              >C25 & 26 Vectibix on hold due to rash              C27 Vectibix restarted at 3 mg/kg per Dr Ross   C28 Held due to rash  Leucovorin 400 mg/m2 IV Day 1 followed by  Fluorouracil 400 mg/m2 IV " "Day 1 followed by              C1D1 reduced dose by 10% to 360 mg/m2 per MD Cody for anticipated tolerance   Fluorouracil 2400 mg/m2 CIVI over 46-48 hours               C1D1 reduced dose by 10% to 2160 mg/m2 per MD Cody for anticipated tolerance   14-day cycle until progression or toxicity    *Dosing Reference*  NCCN guidelines for colon cancer V.2.2019.  (cetuximab + chemo) [place] AP et al, OBEY 2017;317(23):0250-0420.  (HER2 tx in HER2+ CRC) Kayleen RINALDI et al. Lancet Oncol. 2019 Apr;20(4):518-530.          /80   Pulse 92   Temp 36.1 °C (97 °F) (Temporal)   Resp 18   Ht 1.85 m (6' 0.84\")   Wt 111 kg (245 lb 9.5 oz)   SpO2 98%   BMI 32.55 kg/m²  Body surface area is 2.39 meters squared.   Labs from 4/2:  ANC~ 2550  Plt = 166 k    SCr = 0.74 mg/dL CrCl = >125 mL/min  LFT's = WNL  TBili = 0.7  9/4/20 ECHO: LVEF 60%    Trastuzumab (HERceptin) 4 mg/kg x 111 kg = 444 mg   <10% difference, rounded to vial size per protocol, ok to treat with final written dose = 450 mg    Leucovorin (Wellcovorin) 400 mg/m² x 2.39 m² = 956 mg   <10% difference, ok to treat with final written dose = 956 mg    Fluorouracil (5-FU) 360 mg/m² x 2.39 m² = 860 mg   <10% difference, ok to treat with final written dose = 860 mg    Fluorouracil (5-FU) 2160 mg/m² x 2.39 m² = 5162 mg   <10% difference, ok to treat with final written dose = 5160 mg CIVI via CADD pump    Ernesto Portillo, PharmD, PharmD, BCPS             "

## 2021-04-05 NOTE — PROGRESS NOTES
Patient here for Day 1, Cycle 28 mFOLFOX + trastuzumab (HERCEPTIN). Right upper chest port accessed using sterile technique; brisk blood return noted. Labs drawn previously and reviewed today. Labs meets parameters to proceed with treatment today. Pre-medications given per MAR. trastuzmab (HERCEPTIN) given per MAR. Okay to use ECHO from 9/4/20 per MD. Next ECHO scheduled 4/21/21. Leucovorin given per MAR. 5FU bolus given per MAR over 5 minutes. Patient then connected to 5FU home infusion pump. Next appointment scheduled. Discharged to self care; no apparent distress noted.

## 2021-04-05 NOTE — PROGRESS NOTES
Chemotherapy Verification - PRIMARY RN      Height = 185 cm  Weight = 111 kg  BSA = 2.39 m^2      Medication: trastuzumab (HERCEPTIN)  Dose: 4 mg/kg  Calculated Dose: 444 mg                             (In mg/m2, AUC, mg/kg)     Medication: Leucovorin  Dose: 400 mg/m^2  Calculated Dose: 956 mg                             (In mg/m2, AUC, mg/kg)    Medication: fluorouracil  Dose: 360 mg/m^2  Calculated Dose: 860.4 mg                             (In mg/m2, AUC, mg/kg)    Medication: fluorouracil  Dose: 2160 mg/m^2  Calculated Dose: 5162.4 mg over 46 hours via CADD pump.                              (In mg/m2, AUC, mg/kg)    I confirm this process was performed independently with the BSA and all final chemotherapy dosing calculations congruent.  Any discrepancies of 10% or greater have been addressed with the chemotherapy pharmacist. The resolution of the discrepancy has been documented in the EPIC progress notes.

## 2021-04-05 NOTE — PROGRESS NOTES
Chemotherapy Verification - SECONDARY RN       Height = 185 cm  Weight = 111 kg  BSA = 2.39 m2       Medication: Herceptin  Dose: 4 mg/kg  Calculated Dose: 444 mg                             (In mg/m2, AUC, mg/kg)     Medication: Adrucil  Dose: 360 mg/m2  Calculated Dose: 860.4 mg                             (In mg/m2, AUC, mg/kg)    Medication: Adrucil  Dose: 2,160 mg/m2  Calculated Dose: 5,162.4 mg over 46 hours                             (In mg/m2, AUC, mg/kg)        I confirm that this process was performed independently.

## 2021-04-07 ENCOUNTER — OUTPATIENT INFUSION SERVICES (OUTPATIENT)
Dept: ONCOLOGY | Facility: MEDICAL CENTER | Age: 56
End: 2021-04-07
Attending: INTERNAL MEDICINE
Payer: MEDICARE

## 2021-04-07 VITALS
HEART RATE: 89 BPM | DIASTOLIC BLOOD PRESSURE: 71 MMHG | OXYGEN SATURATION: 97 % | RESPIRATION RATE: 16 BRPM | SYSTOLIC BLOOD PRESSURE: 135 MMHG

## 2021-04-07 DIAGNOSIS — C78.7 METASTATIC COLON CANCER TO LIVER (HCC): ICD-10-CM

## 2021-04-07 DIAGNOSIS — C18.9 METASTATIC COLON CANCER TO LIVER (HCC): ICD-10-CM

## 2021-04-07 PROCEDURE — 700111 HCHG RX REV CODE 636 W/ 250 OVERRIDE (IP)

## 2021-04-07 PROCEDURE — 96523 IRRIG DRUG DELIVERY DEVICE: CPT

## 2021-04-07 RX ORDER — HEPARIN SODIUM (PORCINE) LOCK FLUSH IV SOLN 100 UNIT/ML 100 UNIT/ML
500 SOLUTION INTRAVENOUS PRN
Status: DISCONTINUED | OUTPATIENT
Start: 2021-04-07 | End: 2021-04-07 | Stop reason: HOSPADM

## 2021-04-07 RX ORDER — HEPARIN SODIUM (PORCINE) LOCK FLUSH IV SOLN 100 UNIT/ML 100 UNIT/ML
SOLUTION INTRAVENOUS
Status: COMPLETED
Start: 2021-04-07 | End: 2021-04-07

## 2021-04-07 RX ADMIN — HEPARIN 500 UNITS: 100 SYRINGE at 15:25

## 2021-04-07 RX ADMIN — HEPARIN SODIUM (PORCINE) LOCK FLUSH IV SOLN 100 UNIT/ML 500 UNITS: 100 SOLUTION at 15:25

## 2021-04-07 NOTE — PROGRESS NOTES
Patient arrived to [facility] for C28D3 5FU CADD pump de-access.  Pump stopped with 0ml remaining in reservoir, 104.5 mLs administered.  Disconnected pump, Port flushed per protocol with brisk blood return noted, heparinized, de-accessed and gauze dressing applied.  Pump cleaned and placed in pharmacy drawer.  Pt's fannypack cent home with pt. Confirmed pt's next appt. Pt discharged home in NAD.

## 2021-04-15 ENCOUNTER — HOSPITAL ENCOUNTER (OUTPATIENT)
Dept: LAB | Facility: MEDICAL CENTER | Age: 56
End: 2021-04-15
Attending: INTERNAL MEDICINE
Payer: MEDICARE

## 2021-04-15 PROCEDURE — 83735 ASSAY OF MAGNESIUM: CPT

## 2021-04-15 PROCEDURE — 36415 COLL VENOUS BLD VENIPUNCTURE: CPT

## 2021-04-15 PROCEDURE — 80053 COMPREHEN METABOLIC PANEL: CPT

## 2021-04-15 PROCEDURE — 82378 CARCINOEMBRYONIC ANTIGEN: CPT

## 2021-04-15 PROCEDURE — 85025 COMPLETE CBC W/AUTO DIFF WBC: CPT

## 2021-04-15 RX ORDER — 0.9 % SODIUM CHLORIDE 0.9 %
10 VIAL (ML) INJECTION PRN
Status: CANCELLED | OUTPATIENT
Start: 2021-04-19

## 2021-04-15 RX ORDER — ONDANSETRON 8 MG/1
8 TABLET, ORALLY DISINTEGRATING ORAL PRN
Status: CANCELLED | OUTPATIENT
Start: 2021-04-20

## 2021-04-15 RX ORDER — SODIUM CHLORIDE 9 MG/ML
INJECTION, SOLUTION INTRAVENOUS CONTINUOUS
Status: CANCELLED
Start: 2021-04-20

## 2021-04-15 RX ORDER — DIPHENHYDRAMINE HYDROCHLORIDE 50 MG/ML
50 INJECTION INTRAMUSCULAR; INTRAVENOUS PRN
Status: CANCELLED | OUTPATIENT
Start: 2021-04-20

## 2021-04-15 RX ORDER — 0.9 % SODIUM CHLORIDE 0.9 %
20 VIAL (ML) INJECTION PRN
Status: CANCELLED | OUTPATIENT
Start: 2021-04-22

## 2021-04-15 RX ORDER — HEPARIN SODIUM (PORCINE) LOCK FLUSH IV SOLN 100 UNIT/ML 100 UNIT/ML
500 SOLUTION INTRAVENOUS PRN
Status: CANCELLED | OUTPATIENT
Start: 2021-04-19

## 2021-04-15 RX ORDER — 0.9 % SODIUM CHLORIDE 0.9 %
3 VIAL (ML) INJECTION PRN
Status: CANCELLED | OUTPATIENT
Start: 2021-04-20

## 2021-04-15 RX ORDER — PROCHLORPERAZINE MALEATE 10 MG
10 TABLET ORAL EVERY 6 HOURS PRN
Status: CANCELLED | OUTPATIENT
Start: 2021-04-20

## 2021-04-15 RX ORDER — 0.9 % SODIUM CHLORIDE 0.9 %
10 VIAL (ML) INJECTION PRN
Status: CANCELLED | OUTPATIENT
Start: 2021-04-20

## 2021-04-15 RX ORDER — ONDANSETRON 2 MG/ML
4 INJECTION INTRAMUSCULAR; INTRAVENOUS PRN
Status: CANCELLED | OUTPATIENT
Start: 2021-04-20

## 2021-04-15 RX ORDER — HEPARIN SODIUM (PORCINE) LOCK FLUSH IV SOLN 100 UNIT/ML 100 UNIT/ML
500 SOLUTION INTRAVENOUS PRN
Status: CANCELLED | OUTPATIENT
Start: 2021-04-20

## 2021-04-15 RX ORDER — 0.9 % SODIUM CHLORIDE 0.9 %
3 VIAL (ML) INJECTION PRN
Status: CANCELLED | OUTPATIENT
Start: 2021-04-19

## 2021-04-15 RX ORDER — 0.9 % SODIUM CHLORIDE 0.9 %
VIAL (ML) INJECTION PRN
Status: CANCELLED | OUTPATIENT
Start: 2021-04-19

## 2021-04-15 RX ORDER — HEPARIN SODIUM (PORCINE) LOCK FLUSH IV SOLN 100 UNIT/ML 100 UNIT/ML
500 SOLUTION INTRAVENOUS PRN
Status: CANCELLED | OUTPATIENT
Start: 2021-04-22

## 2021-04-15 RX ORDER — EPINEPHRINE 1 MG/ML(1)
0.5 AMPUL (ML) INJECTION PRN
Status: CANCELLED | OUTPATIENT
Start: 2021-04-20

## 2021-04-15 RX ORDER — 0.9 % SODIUM CHLORIDE 0.9 %
VIAL (ML) INJECTION PRN
Status: CANCELLED | OUTPATIENT
Start: 2021-04-20

## 2021-04-15 RX ORDER — METHYLPREDNISOLONE SODIUM SUCCINATE 125 MG/2ML
125 INJECTION, POWDER, LYOPHILIZED, FOR SOLUTION INTRAMUSCULAR; INTRAVENOUS PRN
Status: CANCELLED | OUTPATIENT
Start: 2021-04-20

## 2021-04-16 LAB
ALBUMIN SERPL BCP-MCNC: 4.3 G/DL (ref 3.2–4.9)
ALBUMIN/GLOB SERPL: 1.9 G/DL
ALP SERPL-CCNC: 109 U/L (ref 30–99)
ALT SERPL-CCNC: 21 U/L (ref 2–50)
ANION GAP SERPL CALC-SCNC: 8 MMOL/L (ref 7–16)
AST SERPL-CCNC: 29 U/L (ref 12–45)
BASOPHILS # BLD AUTO: 0.8 % (ref 0–1.8)
BASOPHILS # BLD: 0.05 K/UL (ref 0–0.12)
BILIRUB SERPL-MCNC: 0.9 MG/DL (ref 0.1–1.5)
BUN SERPL-MCNC: 14 MG/DL (ref 8–22)
CALCIUM SERPL-MCNC: 9.5 MG/DL (ref 8.5–10.5)
CEA SERPL-MCNC: 23 NG/ML (ref 0–3)
CHLORIDE SERPL-SCNC: 102 MMOL/L (ref 96–112)
CO2 SERPL-SCNC: 25 MMOL/L (ref 20–33)
CREAT SERPL-MCNC: 0.84 MG/DL (ref 0.5–1.4)
EOSINOPHIL # BLD AUTO: 0.19 K/UL (ref 0–0.51)
EOSINOPHIL NFR BLD: 3.1 % (ref 0–6.9)
ERYTHROCYTE [DISTWIDTH] IN BLOOD BY AUTOMATED COUNT: 53.1 FL (ref 35.9–50)
GLOBULIN SER CALC-MCNC: 2.3 G/DL (ref 1.9–3.5)
GLUCOSE SERPL-MCNC: 141 MG/DL (ref 65–99)
HCT VFR BLD AUTO: 39 % (ref 42–52)
HGB BLD-MCNC: 13.5 G/DL (ref 14–18)
IMM GRANULOCYTES # BLD AUTO: 0.04 K/UL (ref 0–0.11)
IMM GRANULOCYTES NFR BLD AUTO: 0.6 % (ref 0–0.9)
LYMPHOCYTES # BLD AUTO: 1.27 K/UL (ref 1–4.8)
LYMPHOCYTES NFR BLD: 20.4 % (ref 22–41)
MAGNESIUM SERPL-MCNC: 1.9 MG/DL (ref 1.5–2.5)
MCH RBC QN AUTO: 32.3 PG (ref 27–33)
MCHC RBC AUTO-ENTMCNC: 34.6 G/DL (ref 33.7–35.3)
MCV RBC AUTO: 93.3 FL (ref 81.4–97.8)
MONOCYTES # BLD AUTO: 0.77 K/UL (ref 0–0.85)
MONOCYTES NFR BLD AUTO: 12.4 % (ref 0–13.4)
NEUTROPHILS # BLD AUTO: 3.9 K/UL (ref 1.82–7.42)
NEUTROPHILS NFR BLD: 62.7 % (ref 44–72)
NRBC # BLD AUTO: 0 K/UL
NRBC BLD-RTO: 0 /100 WBC
PLATELET # BLD AUTO: 173 K/UL (ref 164–446)
PMV BLD AUTO: 10.4 FL (ref 9–12.9)
POTASSIUM SERPL-SCNC: 4 MMOL/L (ref 3.6–5.5)
PROT SERPL-MCNC: 6.6 G/DL (ref 6–8.2)
RBC # BLD AUTO: 4.18 M/UL (ref 4.7–6.1)
SODIUM SERPL-SCNC: 135 MMOL/L (ref 135–145)
WBC # BLD AUTO: 6.2 K/UL (ref 4.8–10.8)

## 2021-04-18 NOTE — PROGRESS NOTES
"Pharmacy Chemotherapy Verification    Dx: Metastatic colorectal cancer [KRAS/NRAS wild type, ERBB2 (HER2) amplification]    Regimen: mFOLFOX + Vectibix + Herceptin    Trastuzumab 6 mg/kg IV loading dose C1D1, then 4 mg/kg IV Day 1 of subsequent  cycles [confirmed \"every 2 weeks\", per MD; progress note: \"He also had an ERBB2  amplification which might suggest response to treatments like Herceptin\"]   > 7/8/20: Herceptin 2.5 weeks overdue.  Do not reload Herceptin per TRSAMMY Ross.  Cetuximab 400 mg/m2 IV Cycle 1 Day 1, then 250 mg/m2 IV Days 1 and 8 (confirmed  \"weekly\" per MD)   > C4 * * * cetuximab has been omitted * * *   > C5 * * * cetuximab replaced with panitumumab * * *   OXALIplatin 85 mg/m2 IV Day 1 concurrent with   > Cody LEO reduced dose by 20% to 68 mg/m2 starting with C1D1 due to anticipated tolerance    > 3/14/20: Oxaliplatin discontinued from treatment  Panitumumab (Vectibix) 6 mg/kg IV D1 over 60 min   + added; change of therapy d/t cetuximab skin tox   > 4/25/20, initial dose reduction to 4.5 mg/kg d/t previous EGFR cutaneous toxicities    > C9 * * * Vectibix has been omitted * * *   > C10 Vectibix reordered at 3 mg/kg per MD Cody    > C13 * * * Vectibix has been omitted * * *   > C14 Vectibix reordered at 3 mg/kg per MD Cody   >C17 Vectibix HELD per Cody LEO              >C18 Vectibix held again per MD   >C19  11/30/20- vectibix resumed at 3mg/kg   >C21 HOLD x1 dose for rash   >C22 cont HOLD   >C23 vectibix resumed at 3 mg/kg   >C25 & 26 Vectibix on hold due to rash   C27 Vectibix restarted at 3 mg/kg per Dr Ross    C28 Vectibix HELD  Leucovorin 400 mg/m2 IV Day 1 followed by  Fluorouracil 400 mg/m2 IV Day 1 followed by   C1D1 reduced dose by 10% to 360 mg/m2 per MD Cody for anticipated tolerance   Fluorouracil 2400 mg/m2 CIVI over 46-48 hours    C1D1 reduced dose by 10% to 2160 mg/m2 per MD Cody for anticipated tolerance   14-day cycle until progression or toxicity  *Dosing " "Reference*  NCCN guidelines for colon cancer v2.2019  (cetuximab + chemo) Christine ARGUELLO et al, OBEY 2017;317(23):0713-8091  (HER2 tx in HER2+ CRC) Kayleen RINALDI et al. Lancet Oncol. 2019 Apr;20(4):518-530.    Allergies: Patient has no known allergies.  /71   Pulse 76   Temp 36.2 °C (97.2 °F) (Temporal)   Resp 18   Ht 1.845 m (6' 0.64\")   Wt 112 kg (246 lb 0.5 oz)   SpO2 98%   BMI 32.78 kg/m²  Body surface area is 2.4 meters squared.     Labs 4/15/21:  ANC~ 3900 Plt = 173k   Hgb = 13.5     SCr = 0.84 mg/dL CrCl >125 mL/min   AST/ALT/ALK = 29/21/109 TBili = 0.9     ECHO  9/4/20 LVEF ~ 60% OK for 6 months per Cody LEO order  2/7/20 LVEF ~ 60%       Drug Order   (Drug name, dose, route, IV Fluid & volume, frequency, number of doses) Cycle 29  Previous treatment: 4/5/21     Medication = Trastuzumab (Herceptin)  Base Dose = 4 mg/kg  Calc Dose: Base Dose x 112 kg = 448 mg  Final Dose = 450 mg  Route = IV  Fluid & Volume =  mL  Admin Duration = Over 30 minutes          Rounded to nearest vial size (within 10%) per rounding protocol, okay to treat with final dose   Medication = Leucovorin (Wellcovorin)  Base Dose = 400 mg/m²  Calc Dose: Base Dose x 2.4 m² = 960 mg  Final Dose = 960 mg  Route = IV  Fluid & Volume = D5W 250 mL  Admin Duration = Over 30 minutes          <10% difference, okay to treat with final dose   Medication = Fluorouracil (5-FU)  Base Dose = 360 mg/m²  Calc Dose: Base Dose x 2.4 m² = 864 mg  Final Dose = 865 mg  Route = IVP  Fluid & Volume = 17.3 mL in syringe  Conc = 50 mg/mL  Admin Duration = Over 5 minutes          <10% difference, okay to treat with final dose   Medication = Fluorouracil (5-FU)  Base Dose = 2160 mg/m²  Calc Dose:Base Dose x 2.4 m² = 5184 mg  Final Dose = 5185 mg  Route = CIVI   Fluid & Volume = 103.7 mL (+3 mL overfill = 106.7 mL)  Admin Duration = Over 46 hours to run at   2.3 mL/hour    Via CADD pump for home infusion       <10% difference, okay to treat with " final dose     By my signature below, I confirm this process was performed independently with the BSA and all final chemotherapy dosing calculations congruent. I have reviewed the above chemotherapy order and that my calculation of the final dose and BSA (when applicable) corroborate those calculations of the  pharmacist. Discrepancies of 10% or greater in the written dose have been addressed and documented within the EPIC Progress notes.    José Marvin, PharmD

## 2021-04-19 ENCOUNTER — OUTPATIENT INFUSION SERVICES (OUTPATIENT)
Dept: ONCOLOGY | Facility: MEDICAL CENTER | Age: 56
End: 2021-04-19
Attending: INTERNAL MEDICINE
Payer: MEDICARE

## 2021-04-19 VITALS
TEMPERATURE: 97.2 F | SYSTOLIC BLOOD PRESSURE: 128 MMHG | WEIGHT: 246.03 LBS | BODY MASS INDEX: 32.61 KG/M2 | RESPIRATION RATE: 18 BRPM | HEIGHT: 73 IN | OXYGEN SATURATION: 98 % | DIASTOLIC BLOOD PRESSURE: 71 MMHG | HEART RATE: 76 BPM

## 2021-04-19 DIAGNOSIS — C78.7 METASTATIC COLON CANCER TO LIVER (HCC): ICD-10-CM

## 2021-04-19 DIAGNOSIS — C18.9 METASTATIC COLON CANCER TO LIVER (HCC): ICD-10-CM

## 2021-04-19 PROCEDURE — A4212 NON CORING NEEDLE OR STYLET: HCPCS

## 2021-04-19 PROCEDURE — G0498 CHEMO EXTEND IV INFUS W/PUMP: HCPCS

## 2021-04-19 PROCEDURE — 96375 TX/PRO/DX INJ NEW DRUG ADDON: CPT

## 2021-04-19 PROCEDURE — 96411 CHEMO IV PUSH ADDL DRUG: CPT

## 2021-04-19 PROCEDURE — 700111 HCHG RX REV CODE 636 W/ 250 OVERRIDE (IP): Performed by: INTERNAL MEDICINE

## 2021-04-19 PROCEDURE — 96413 CHEMO IV INFUSION 1 HR: CPT

## 2021-04-19 PROCEDURE — 700105 HCHG RX REV CODE 258: Performed by: INTERNAL MEDICINE

## 2021-04-19 PROCEDURE — 96417 CHEMO IV INFUS EACH ADDL SEQ: CPT

## 2021-04-19 RX ADMIN — TRASTUZUMAB 450 MG: 150 INJECTION, POWDER, LYOPHILIZED, FOR SOLUTION INTRAVENOUS at 11:00

## 2021-04-19 RX ADMIN — ONDANSETRON 16 MG: 2 INJECTION INTRAMUSCULAR; INTRAVENOUS at 10:25

## 2021-04-19 RX ADMIN — LEUCOVORIN CALCIUM 960 MG: 350 INJECTION, POWDER, LYOPHILIZED, FOR SOLUTION INTRAMUSCULAR; INTRAVENOUS at 11:57

## 2021-04-19 RX ADMIN — FLUOROURACIL 865 MG: 50 INJECTION, SOLUTION INTRAVENOUS at 12:52

## 2021-04-19 RX ADMIN — DIPHENHYDRAMINE HYDROCHLORIDE 50 MG: 50 INJECTION, SOLUTION INTRAMUSCULAR; INTRAVENOUS at 10:11

## 2021-04-19 RX ADMIN — FLUOROURACIL 5185 MG: 50 INJECTION, SOLUTION INTRAVENOUS at 12:57

## 2021-04-19 ASSESSMENT — FIBROSIS 4 INDEX: FIB4 SCORE: 2.05

## 2021-04-19 NOTE — PROGRESS NOTES
"Pharmacy Chemotherapy Calculations Note:    Patient Name: Gurmeet Jo        Dx: Metastatic Colorectal CA [KRAS/NRAS wild type, ERBB2 (HER2) amplification]  Cycle:  29 Previous treatment: C28 = 4/5/21     Protocol: mFOLFOX + Vectibix + Herceptin     Trastuzumab 6 mg/kg IV loading dose C1D1, then 4 mg/kg IV Day 1 of subsequent  cycles [confirmed \"every 2 weeks\", per MD; progress note: \"He also had an ERBB2  amplification which might suggest response to treatments like Herceptin\"]              > 7/8/20: Herceptin 2.5 weeks overdue. Do not reload Herceptin per TRBO Dr. Ross.  Cetuximab 400 mg/m2 IV Cycle 1 Day 1, then 250 mg/m2 IV Days 1 and 8 (confirmed  \"weekly\" per MD)              > C4 * * * cetuximab has been omitted * * *              > C5 * * * cetuximab replaced with panitumumab * * *   OXALIplatin 85 mg/m2 IV Day 1 concurrent with              > Cody LEO reduced dose by 20% to 68 mg/m2 starting with C1D1 due to anticipated tolerance               > 3/14/20: Oxaliplatin discontinued from treatment  Panitumumab (Vectibix) 6 mg/kg IV D1 over 60 min              + added; change of therapy d/t cetuximab skin tox              > 4/25/20, initial dose reduction to 4.5 mg/kg d/t previous EGFR cutaneous toxicities               > C9 * * * Vectibix has been omitted * * *              > C10 Vectibix reordered at 3 mg/kg per MD Cody               > C13 * * * Vectibix has been omitted * * *              > C14 Vectibix reordered at 3 mg/kg per MD Cody              >C17 Vectibix HELD per Cody LEO              >C18 Vectibix held again per MD              >C19  11/30/20- vectibix resumed at 3mg/kg              >C21 HOLD x1 dose for rash              >C22 cont HOLD              >C23 vectibix resumed at 3 mg/kg              >C25 & 26 Vectibix on hold due to rash              C27 Vectibix restarted at 3 mg/kg per Dr Ross   C28 Held due to rash  Leucovorin 400 mg/m2 IV Day 1 followed by  Fluorouracil 400 mg/m2 IV " "Day 1 followed by              C1D1 reduced dose by 10% to 360 mg/m² per MD Cody for anticipated tolerance   Fluorouracil 2400 mg/m2 CIVI over 46-48 hours               C1D1 reduced dose by 10% to 2160 mg/m² per MD Cody for anticipated tolerance   14-day cycle until progression or toxicity    *Dosing Reference*  NCCN guidelines for colon cancer V.2.2019.  (cetuximab + chemo) [place] AP et al, OBEY 2017;317(23):7015-7616.  (HER2 tx in HER2+ CRC) Kayleen RINALDI et al. Lancet Oncol. 2019 Apr;20(4):518-530.          /71   Pulse 76   Temp 36.2 °C (97.2 °F) (Temporal)   Resp 18   Ht 1.845 m (6' 0.64\")   Wt 112 kg (246 lb 0.5 oz)   SpO2 98%   BMI 32.78 kg/m²  Body surface area is 2.4 meters squared.   Labs from 4/15:  ANC~ 3900  Plt = 173 k    SCr = 0.84 mg/dL CrCl = >125 mL/min  LFT's = WNL  TBili = 0.9  9/4/20 ECHO: LVEF 60%    Trastuzumab (HERceptin) 4 mg/kg x 112 kg = 448 mg   <10% difference, rounded to vial size per protocol, ok to treat with final written dose = 450 mg    Leucovorin (Wellcovorin) 400 mg/m² x 2.4 m² = 960 mg   <10% difference, ok to treat with final written dose = 960 mg    Fluorouracil (5-FU) 360 mg/m² x 2.4 m² = 864 mg   <10% difference, ok to treat with final written dose = 865 mg    Fluorouracil (5-FU) 2160 mg/m² x 2.4 m² = 5184 mg   <10% difference, ok to treat with final written dose = 5185 mg CIVI via CADD pump    Ernesto Portillo, PharmD, PharmD, BCPS             "

## 2021-04-19 NOTE — PROGRESS NOTES
Chemotherapy Verification - PRIMARY RN  C29 D1      Height =  1.845 m  Weight = 112 kg  BSA = 2.4 m2       Medication: trastuzumab  Dose: 4 mg/kg  Calculated Dose: 448 mg                             (In mg/m2, AUC, mg/kg)     Medication: fluorouracil bolus  Dose: 360 mg/m2  Calculated Dose: 864 mg                             (In mg/m2, AUC, mg/kg)    Medication: fluorouracil CADD pump  Dose: 2160 mg/m2  Calculated Dose: 5184 mg                             (In mg/m2, AUC, mg/kg)      I confirm this process was performed independently with the BSA and all final chemotherapy dosing calculations congruent.  Any discrepancies of 10% or greater have been addressed with the chemotherapy pharmacist. The resolution of the discrepancy has been documented in the EPIC progress notes.

## 2021-04-19 NOTE — PROGRESS NOTES
Chemotherapy Verification - SECONDARY RN       Height = 184.5 cm  Weight = 112 kg  BSA = 2.4 m2       Medication: Herceptin  Dose: 4 mg/kg  Calculated Dose: 448 mg                             (In mg/m2, AUC, mg/kg)     Medication: Adrucil  Dose: 360 mg/m2  Calculated Dose: 864 mg                             (In mg/m2, AUC, mg/kg)    Medication: Adrucil  Dose: 2160 mg/m2  Calculated Dose: 5,184 mg over 46 hours                             (In mg/m2, AUC, mg/kg)        I confirm that this process was performed independently.

## 2021-04-19 NOTE — PROGRESS NOTES
Pt ambulatory to [facility] for C29 D1 of mFOLFOX and Herceptin for colon cancer w/ liver mets.  Pt w/ no s/s of infection, pt has no complaints at this time.  Pt PORT accessed using sterile technique w/ low profile needle, brisk blood return noted, flushed per protocol.  Pt had labs drawn 4-15, verified results w/n parameters for tx today.  Pre-meds given prior to chemo w/ no AE.  All chemos infused w/ no adverse reactions.  PORT w/ brisk blood return, 5FU bolus administered over 5 minutes w/ blood return noted every minute, w/ no adverse reactions.  PORT flushed and connected to 5FU CADD pump, all clamps opened, pump confirmed to be in RUN mode.  Pt forgot fannypack in car so he will hand carry pump to his car.  Pt left in care of self in NAD.  Confirmed pt's next appt.

## 2021-04-21 ENCOUNTER — HOSPITAL ENCOUNTER (OUTPATIENT)
Dept: CARDIOLOGY | Facility: MEDICAL CENTER | Age: 56
End: 2021-04-21
Attending: INTERNAL MEDICINE
Payer: MEDICARE

## 2021-04-21 ENCOUNTER — OUTPATIENT INFUSION SERVICES (OUTPATIENT)
Dept: ONCOLOGY | Facility: MEDICAL CENTER | Age: 56
End: 2021-04-21
Attending: INTERNAL MEDICINE
Payer: MEDICARE

## 2021-04-21 VITALS
SYSTOLIC BLOOD PRESSURE: 129 MMHG | OXYGEN SATURATION: 97 % | DIASTOLIC BLOOD PRESSURE: 74 MMHG | WEIGHT: 245.37 LBS | BODY MASS INDEX: 33.23 KG/M2 | TEMPERATURE: 97 F | RESPIRATION RATE: 18 BRPM | HEIGHT: 72 IN | HEART RATE: 92 BPM

## 2021-04-21 DIAGNOSIS — C18.9 MALIGNANT NEOPLASM OF COLON, UNSPECIFIED PART OF COLON (HCC): ICD-10-CM

## 2021-04-21 DIAGNOSIS — C18.9 METASTATIC COLON CANCER TO LIVER (HCC): ICD-10-CM

## 2021-04-21 DIAGNOSIS — C78.7 METASTATIC COLON CANCER TO LIVER (HCC): ICD-10-CM

## 2021-04-21 LAB — LV EJECT FRACT  99904: 60

## 2021-04-21 PROCEDURE — 93306 TTE W/DOPPLER COMPLETE: CPT | Mod: 26 | Performed by: INTERNAL MEDICINE

## 2021-04-21 PROCEDURE — 700111 HCHG RX REV CODE 636 W/ 250 OVERRIDE (IP)

## 2021-04-21 PROCEDURE — 700117 HCHG RX CONTRAST REV CODE 255: Performed by: INTERNAL MEDICINE

## 2021-04-21 PROCEDURE — 96523 IRRIG DRUG DELIVERY DEVICE: CPT

## 2021-04-21 PROCEDURE — 93306 TTE W/DOPPLER COMPLETE: CPT

## 2021-04-21 RX ORDER — HEPARIN SODIUM (PORCINE) LOCK FLUSH IV SOLN 100 UNIT/ML 100 UNIT/ML
500 SOLUTION INTRAVENOUS PRN
Status: DISCONTINUED | OUTPATIENT
Start: 2021-04-21 | End: 2021-04-21 | Stop reason: HOSPADM

## 2021-04-21 RX ORDER — DOXYCYCLINE HYCLATE 100 MG
100 TABLET ORAL
COMMUNITY
Start: 2021-04-11 | End: 2022-04-28

## 2021-04-21 RX ORDER — HEPARIN SODIUM (PORCINE) LOCK FLUSH IV SOLN 100 UNIT/ML 100 UNIT/ML
SOLUTION INTRAVENOUS
Status: COMPLETED
Start: 2021-04-21 | End: 2021-04-21

## 2021-04-21 RX ORDER — HYDROXYZINE 50 MG/1
TABLET, FILM COATED ORAL
COMMUNITY
Start: 2021-03-30 | End: 2021-06-16

## 2021-04-21 RX ADMIN — HEPARIN 500 UNITS: 100 SYRINGE at 15:13

## 2021-04-21 RX ADMIN — HEPARIN SODIUM (PORCINE) LOCK FLUSH IV SOLN 100 UNIT/ML 500 UNITS: 100 SOLUTION at 15:13

## 2021-04-21 RX ADMIN — HUMAN ALBUMIN MICROSPHERES AND PERFLUTREN 3 ML: 10; .22 INJECTION, SOLUTION INTRAVENOUS at 09:01

## 2021-04-21 ASSESSMENT — FIBROSIS 4 INDEX: FIB4 SCORE: 2.05

## 2021-04-22 ENCOUNTER — IMMUNIZATION (OUTPATIENT)
Dept: FAMILY PLANNING/WOMEN'S HEALTH CLINIC | Facility: IMMUNIZATION CENTER | Age: 56
End: 2021-04-22
Payer: MEDICARE

## 2021-04-22 DIAGNOSIS — Z23 ENCOUNTER FOR VACCINATION: Primary | ICD-10-CM

## 2021-04-22 PROCEDURE — 91300 PFIZER SARS-COV-2 VACCINE: CPT | Performed by: INTERNAL MEDICINE

## 2021-04-22 PROCEDURE — 0002A PFIZER SARS-COV-2 VACCINE: CPT | Performed by: INTERNAL MEDICINE

## 2021-04-22 NOTE — PROGRESS NOTES
Pt returns for pump de-access. Pump settings verified, vol @ 0ml. Pump removed, port flushed, blood return observed. Heparin instilled and needle removed, tip intact. Gauze dressing applied. Pt knows to return on 5/3, time confirmed. Pt discharged home under self care in no apparent distress.

## 2021-04-30 RX ORDER — 0.9 % SODIUM CHLORIDE 0.9 %
10 VIAL (ML) INJECTION PRN
Status: CANCELLED | OUTPATIENT
Start: 2021-05-04

## 2021-04-30 RX ORDER — ONDANSETRON 8 MG/1
8 TABLET, ORALLY DISINTEGRATING ORAL PRN
Status: CANCELLED | OUTPATIENT
Start: 2021-05-04

## 2021-04-30 RX ORDER — 0.9 % SODIUM CHLORIDE 0.9 %
VIAL (ML) INJECTION PRN
Status: CANCELLED | OUTPATIENT
Start: 2021-05-03

## 2021-04-30 RX ORDER — 0.9 % SODIUM CHLORIDE 0.9 %
VIAL (ML) INJECTION PRN
Status: CANCELLED | OUTPATIENT
Start: 2021-05-04

## 2021-04-30 RX ORDER — SODIUM CHLORIDE 9 MG/ML
INJECTION, SOLUTION INTRAVENOUS CONTINUOUS
Status: CANCELLED
Start: 2021-05-04

## 2021-04-30 RX ORDER — ONDANSETRON 2 MG/ML
4 INJECTION INTRAMUSCULAR; INTRAVENOUS PRN
Status: CANCELLED | OUTPATIENT
Start: 2021-05-04

## 2021-04-30 RX ORDER — 0.9 % SODIUM CHLORIDE 0.9 %
3 VIAL (ML) INJECTION PRN
Status: CANCELLED | OUTPATIENT
Start: 2021-05-04

## 2021-04-30 RX ORDER — METHYLPREDNISOLONE SODIUM SUCCINATE 125 MG/2ML
125 INJECTION, POWDER, LYOPHILIZED, FOR SOLUTION INTRAMUSCULAR; INTRAVENOUS PRN
Status: CANCELLED | OUTPATIENT
Start: 2021-05-04

## 2021-04-30 RX ORDER — DIPHENHYDRAMINE HYDROCHLORIDE 50 MG/ML
50 INJECTION INTRAMUSCULAR; INTRAVENOUS PRN
Status: CANCELLED | OUTPATIENT
Start: 2021-05-04

## 2021-04-30 RX ORDER — 0.9 % SODIUM CHLORIDE 0.9 %
10 VIAL (ML) INJECTION PRN
Status: CANCELLED | OUTPATIENT
Start: 2021-05-03

## 2021-04-30 RX ORDER — PROCHLORPERAZINE MALEATE 10 MG
10 TABLET ORAL EVERY 6 HOURS PRN
Status: CANCELLED | OUTPATIENT
Start: 2021-05-04

## 2021-04-30 RX ORDER — HEPARIN SODIUM (PORCINE) LOCK FLUSH IV SOLN 100 UNIT/ML 100 UNIT/ML
500 SOLUTION INTRAVENOUS PRN
Status: CANCELLED | OUTPATIENT
Start: 2021-05-06

## 2021-04-30 RX ORDER — 0.9 % SODIUM CHLORIDE 0.9 %
3 VIAL (ML) INJECTION PRN
Status: CANCELLED | OUTPATIENT
Start: 2021-05-03

## 2021-04-30 RX ORDER — 0.9 % SODIUM CHLORIDE 0.9 %
20 VIAL (ML) INJECTION PRN
Status: CANCELLED | OUTPATIENT
Start: 2021-05-06

## 2021-04-30 RX ORDER — HEPARIN SODIUM (PORCINE) LOCK FLUSH IV SOLN 100 UNIT/ML 100 UNIT/ML
500 SOLUTION INTRAVENOUS PRN
Status: CANCELLED | OUTPATIENT
Start: 2021-05-03

## 2021-04-30 RX ORDER — HEPARIN SODIUM (PORCINE) LOCK FLUSH IV SOLN 100 UNIT/ML 100 UNIT/ML
500 SOLUTION INTRAVENOUS PRN
Status: CANCELLED | OUTPATIENT
Start: 2021-05-04

## 2021-04-30 RX ORDER — EPINEPHRINE 1 MG/ML(1)
0.5 AMPUL (ML) INJECTION PRN
Status: CANCELLED | OUTPATIENT
Start: 2021-05-04

## 2021-05-01 ENCOUNTER — HOSPITAL ENCOUNTER (OUTPATIENT)
Dept: LAB | Facility: MEDICAL CENTER | Age: 56
End: 2021-05-01
Attending: PHYSICIAN ASSISTANT
Payer: MEDICARE

## 2021-05-01 DIAGNOSIS — Z79.4 TYPE 2 DIABETES MELLITUS WITH OTHER SPECIFIED COMPLICATION, WITH LONG-TERM CURRENT USE OF INSULIN (HCC): ICD-10-CM

## 2021-05-01 DIAGNOSIS — E78.5 HYPERLIPIDEMIA, UNSPECIFIED HYPERLIPIDEMIA TYPE: ICD-10-CM

## 2021-05-01 DIAGNOSIS — E11.69 TYPE 2 DIABETES MELLITUS WITH OTHER SPECIFIED COMPLICATION, WITH LONG-TERM CURRENT USE OF INSULIN (HCC): ICD-10-CM

## 2021-05-01 DIAGNOSIS — E11.65 UNCONTROLLED TYPE 2 DIABETES MELLITUS WITH HYPERGLYCEMIA (HCC): ICD-10-CM

## 2021-05-01 LAB
ALBUMIN SERPL BCP-MCNC: 4.2 G/DL (ref 3.2–4.9)
ALBUMIN/GLOB SERPL: 1.7 G/DL
ALP SERPL-CCNC: 104 U/L (ref 30–99)
ALT SERPL-CCNC: 19 U/L (ref 2–50)
ANION GAP SERPL CALC-SCNC: 12 MMOL/L (ref 7–16)
AST SERPL-CCNC: 26 U/L (ref 12–45)
BASOPHILS # BLD AUTO: 0.6 % (ref 0–1.8)
BASOPHILS # BLD: 0.03 K/UL (ref 0–0.12)
BILIRUB SERPL-MCNC: 1.3 MG/DL (ref 0.1–1.5)
BUN SERPL-MCNC: 13 MG/DL (ref 8–22)
CALCIUM SERPL-MCNC: 9.2 MG/DL (ref 8.5–10.5)
CEA SERPL-MCNC: 20.8 NG/ML (ref 0–3)
CHLORIDE SERPL-SCNC: 104 MMOL/L (ref 96–112)
CO2 SERPL-SCNC: 23 MMOL/L (ref 20–33)
CREAT SERPL-MCNC: 0.82 MG/DL (ref 0.5–1.4)
EOSINOPHIL # BLD AUTO: 0.16 K/UL (ref 0–0.51)
EOSINOPHIL NFR BLD: 3 % (ref 0–6.9)
ERYTHROCYTE [DISTWIDTH] IN BLOOD BY AUTOMATED COUNT: 53.8 FL (ref 35.9–50)
GLOBULIN SER CALC-MCNC: 2.5 G/DL (ref 1.9–3.5)
GLUCOSE SERPL-MCNC: 126 MG/DL (ref 65–99)
HCT VFR BLD AUTO: 39.9 % (ref 42–52)
HGB BLD-MCNC: 13.7 G/DL (ref 14–18)
IMM GRANULOCYTES # BLD AUTO: 0.02 K/UL (ref 0–0.11)
IMM GRANULOCYTES NFR BLD AUTO: 0.4 % (ref 0–0.9)
LYMPHOCYTES # BLD AUTO: 1.06 K/UL (ref 1–4.8)
LYMPHOCYTES NFR BLD: 19.7 % (ref 22–41)
MAGNESIUM SERPL-MCNC: 2 MG/DL (ref 1.5–2.5)
MCH RBC QN AUTO: 32.1 PG (ref 27–33)
MCHC RBC AUTO-ENTMCNC: 34.3 G/DL (ref 33.7–35.3)
MCV RBC AUTO: 93.4 FL (ref 81.4–97.8)
MONOCYTES # BLD AUTO: 1.02 K/UL (ref 0–0.85)
MONOCYTES NFR BLD AUTO: 19 % (ref 0–13.4)
NEUTROPHILS # BLD AUTO: 3.09 K/UL (ref 1.82–7.42)
NEUTROPHILS NFR BLD: 57.3 % (ref 44–72)
NRBC # BLD AUTO: 0 K/UL
NRBC BLD-RTO: 0 /100 WBC
PLATELET # BLD AUTO: 135 K/UL (ref 164–446)
PMV BLD AUTO: 9.4 FL (ref 9–12.9)
POTASSIUM SERPL-SCNC: 3.6 MMOL/L (ref 3.6–5.5)
PROT SERPL-MCNC: 6.7 G/DL (ref 6–8.2)
RBC # BLD AUTO: 4.27 M/UL (ref 4.7–6.1)
SODIUM SERPL-SCNC: 139 MMOL/L (ref 135–145)
WBC # BLD AUTO: 5.4 K/UL (ref 4.8–10.8)

## 2021-05-01 PROCEDURE — 82378 CARCINOEMBRYONIC ANTIGEN: CPT

## 2021-05-01 PROCEDURE — 80053 COMPREHEN METABOLIC PANEL: CPT

## 2021-05-01 PROCEDURE — 36415 COLL VENOUS BLD VENIPUNCTURE: CPT

## 2021-05-01 PROCEDURE — 83735 ASSAY OF MAGNESIUM: CPT

## 2021-05-01 PROCEDURE — 85025 COMPLETE CBC W/AUTO DIFF WBC: CPT

## 2021-05-03 ENCOUNTER — OUTPATIENT INFUSION SERVICES (OUTPATIENT)
Dept: ONCOLOGY | Facility: MEDICAL CENTER | Age: 56
End: 2021-05-03
Attending: INTERNAL MEDICINE
Payer: MEDICARE

## 2021-05-03 VITALS
TEMPERATURE: 97.1 F | RESPIRATION RATE: 18 BRPM | HEIGHT: 73 IN | DIASTOLIC BLOOD PRESSURE: 70 MMHG | SYSTOLIC BLOOD PRESSURE: 120 MMHG | WEIGHT: 241.4 LBS | BODY MASS INDEX: 31.99 KG/M2 | HEART RATE: 65 BPM | OXYGEN SATURATION: 98 %

## 2021-05-03 DIAGNOSIS — C78.7 METASTATIC COLON CANCER TO LIVER (HCC): ICD-10-CM

## 2021-05-03 DIAGNOSIS — C18.9 METASTATIC COLON CANCER TO LIVER (HCC): ICD-10-CM

## 2021-05-03 PROCEDURE — A4212 NON CORING NEEDLE OR STYLET: HCPCS

## 2021-05-03 PROCEDURE — 96375 TX/PRO/DX INJ NEW DRUG ADDON: CPT

## 2021-05-03 PROCEDURE — 700105 HCHG RX REV CODE 258: Performed by: INTERNAL MEDICINE

## 2021-05-03 PROCEDURE — 96411 CHEMO IV PUSH ADDL DRUG: CPT

## 2021-05-03 PROCEDURE — 700111 HCHG RX REV CODE 636 W/ 250 OVERRIDE (IP): Performed by: INTERNAL MEDICINE

## 2021-05-03 PROCEDURE — G0498 CHEMO EXTEND IV INFUS W/PUMP: HCPCS

## 2021-05-03 PROCEDURE — 96417 CHEMO IV INFUS EACH ADDL SEQ: CPT

## 2021-05-03 PROCEDURE — 96413 CHEMO IV INFUSION 1 HR: CPT

## 2021-05-03 RX ADMIN — DIPHENHYDRAMINE HYDROCHLORIDE 50 MG: 50 INJECTION, SOLUTION INTRAMUSCULAR; INTRAVENOUS at 10:00

## 2021-05-03 RX ADMIN — LEUCOVORIN CALCIUM 952 MG: 350 INJECTION, POWDER, LYOPHILIZED, FOR SOLUTION INTRAMUSCULAR; INTRAVENOUS at 13:05

## 2021-05-03 RX ADMIN — FLUOROURACIL 855 MG: 50 INJECTION, SOLUTION INTRAVENOUS at 14:00

## 2021-05-03 RX ADMIN — TRASTUZUMAB 440 MG: KIT at 12:09

## 2021-05-03 RX ADMIN — ONDANSETRON 16 MG: 2 INJECTION INTRAMUSCULAR; INTRAVENOUS at 09:40

## 2021-05-03 RX ADMIN — FLUOROURACIL 5140 MG: 50 INJECTION, SOLUTION INTRAVENOUS at 14:10

## 2021-05-03 RX ADMIN — PANITUMUMAB 300 MG: 100 SOLUTION INTRAVENOUS at 10:40

## 2021-05-03 ASSESSMENT — FIBROSIS 4 INDEX: FIB4 SCORE: 2.52

## 2021-05-03 NOTE — PROGRESS NOTES
"Pharmacy Chemotherapy Verification    Dx: Metastatic colorectal cancer [KRAS/NRAS wild type, ERBB2 (HER2) amplification]    Regimen: mFOLFOX + Vectibix + trastuzumab  Cycle 30  Previous treatment: 4/19/21    Trastuzumab 6 mg/kg IV loading dose C1D1, then 4 mg/kg IV Day 1 of subsequent  cycles [confirmed \"every 2 weeks\", per MD; progress note: \"He also had an ERBB2  amplification which might suggest response to treatments like Herceptin\"]   > 7/8/20: Herceptin 2.5 weeks overdue.  Do not reload Herceptin per TRBO Dr. Ross.  Cetuximab 400 mg/m2 IV Cycle 1 Day 1, then 250 mg/m2 IV Days 1 and 8 (confirmed  \"weekly\" per MD)   > C4 * * * cetuximab has been omitted * * *   > C5 * * * cetuximab replaced with panitumumab * * *   OXALIplatin 85 mg/m2 IV Day 1 concurrent with   > Cody LEO reduced dose by 20% to 68 mg/m2 starting with C1D1 due to anticipated tolerance    > 3/14/20: Oxaliplatin discontinued from treatment  Panitumumab (Vectibix) 6 mg/kg IV D1 over 60 min   + added; change of therapy d/t cetuximab skin tox   > 4/25/20, initial dose reduction to 4.5 mg/kg d/t previous EGFR cutaneous toxicities    > C9 * * * Vectibix has been omitted * * *   > C10 Vectibix reordered at 3 mg/kg per MD Cody    > C13 * * * Vectibix has been omitted * * *   > C14 Vectibix reordered at 3 mg/kg per MD Cody   >C17 Vectibix HELD per Cody LEO              >C18 Vectibix held again per MD   >C19  11/30/20- vectibix resumed at 3mg/kg   >C21 HOLD x1 dose for rash   >C22 cont HOLD   >C23 vectibix resumed at 3 mg/kg   >C25 & C26 Vectibix on hold due to rash   >C27 Vectibix 3mg/kg   >C28-29 Vectibix on hold due to rash   >C30 Vectibix 3mg/kg  Leucovorin 400 mg/m2 IV Day 1 followed by  Fluorouracil 400 mg/m2 IV Day 1 followed by   C1D1 reduced dose by 10% to 360 mg/m2 per MD Cody for anticipated tolerance   Fluorouracil 2400 mg/m2 CIVI over 46-48 hours    C1D1 reduced dose by 10% to 2160 mg/m2 per MD Cody for anticipated tolerance " "  14-day cycle until progression or toxicity  *Dosing Reference*  NCCN guidelines for colon cancer v2.2019  (cetuximab + chemo) [place] AP et al, OBEY 2017;317(23):7254-5066  (HER2 tx in HER2+ CRC) Kayleen RINALDI et al. Lancet Oncol. 2019 Apr;20(4):518-530.    Allergies: Patient has no known allergies.  /70   Pulse 65   Temp 36.2 °C (97.1 °F) (Temporal)   Resp 18   Ht 1.85 m (6' 0.84\")   Wt 110 kg (241 lb 6.5 oz)   SpO2 98%   BMI 31.99 kg/m²  Body surface area is 2.38 meters squared.     Labs 5/1/21  Meet treatment parameters    ECHO  9/4/20 LVEF ~ 60% OK for 6 months per Cody LEO order  2/7/20 LVEF ~ 60%     Panitumumab (Vectibix) 3 mg/kg x 110 kg = 330 mg              Rounded to vial size (within 10%) per dose rounding protocol = 300 mg IV     Trastuzumab-pkrb (Herzuma) 4 mg/kg x 110 kg = 440 mg              Rounded to vial size (within 10%) per dose rounding protocol = 440 mg IV     Leucovorin 400 mg/m² x 2.38 m² = 952 mg              Okay to treat with final dose = 952 mg IV over 30 minutes      Fluorouracil (Adrucil) 360 mg/m² x 2.38 m² = 856.8 mg              Okay to treat with final dose =  855 mg IV push     Fluorouracil (Adrucil) 2160 mg/m² x 2.38 m² = 5140.8 mg              Okay to treat with final dose =  5140 mg CIVI over 46 hrs at 2.2 mL/hr       "

## 2021-05-03 NOTE — PROGRESS NOTES
Chemotherapy Verification - PRIMARY RN    D1C30    Height = 185cm  Weight = 110kg  BSA = 2.38m2       Medication: Panitumumab (Vectibix)  Dose: 3mg/kg  Calculated Dose: 330mg                                 Medication: Trastuzumab-pkrb (Herzuma)  Dose: 4mg/kg  Calculated Dose: 440mg                               Medication: Leucovorin  Dose: 400mg/m2  Calculated Dose: 952mg                                Medication: Fluorouracil (Adrucil)  Dose: 360mg/m2  Calculated Dose: 856.8mg                               Medication:  Fluorouracil (Adrucil)    Dose: 2160mg/m2  Calculated Dose: 5140mg CADD Pump over 46 hours               I confirm this process was performed independently with the BSA and all final chemotherapy dosing calculations congruent.  Any discrepancies of 10% or greater have been addressed with the chemotherapy pharmacist. The resolution of the discrepancy has been documented in the EPIC progress notes.

## 2021-05-03 NOTE — PROGRESS NOTES
Gurmeet arrived ambulatory to [facility] for chemo treatment. Lab results reviewed from 5/1/2021, ok to proceed with treatment. Port accessed in sterile fashion, brisk blood return noted. Pt medicated per MAR. Pt tolerated treatment without s/s adverse reaction. CADD Pump connected. Pt discharged to self care, NAD. Pt aware of next appt for pump dis-connect 5/5/2021.

## 2021-05-03 NOTE — PROGRESS NOTES
Chemotherapy Verification - SECONDARY RN       Height = 185 cm  Weight = 110 kg  BSA = 2.38 m2       Medication: Vectibix  Dose: 6 mg/m2  Calculated Dose: 660 mg                             (In mg/m2, AUC, mg/kg)     Medication: transtuzumab-pkrb  Dose: 4 mg/kg  Calculated Dose: 440 mg                             (In mg/m2, AUC, mg/kg)    Medication: 5FU bolus  Dose: 360 mg/m2  Calculated Dose: 856.8 mg                             (In mg/m2, AUC, mg/kg)    Medication: 5FU CADD pump  Dose: 2160 mg/m2  Calculated Dose: 5140.8 mg                             (In mg/m2, AUC, mg/kg)    I confirm that this process was performed independently.

## 2021-05-03 NOTE — PROGRESS NOTES
"Pharmacy Chemotherapy Verification    Dx: Metastatic colorectal cancer [KRAS/NRAS wild type, ERBB2 (HER2) amplification]    Regimen: mFOLFOX + Vectibix + Herceptin    Trastuzumab 6 mg/kg IV loading dose C1D1, then 4 mg/kg IV Day 1 of subsequent  cycles [confirmed \"every 2 weeks\", per MD; progress note: \"He also had an ERBB2  amplification which might suggest response to treatments like Herceptin\"]   > 7/8/20: Herceptin 2.5 weeks overdue.  Do not reload Herceptin per TRSAMMY Ross.  Cetuximab 400 mg/m2 IV Cycle 1 Day 1, then 250 mg/m2 IV Days 1 and 8 (confirmed  \"weekly\" per MD)   > C4 * * * cetuximab has been omitted * * *   > C5 * * * cetuximab replaced with panitumumab * * *   OXALIplatin 85 mg/m2 IV Day 1 concurrent with   > Cody LEO reduced dose by 20% to 68 mg/m2 starting with C1D1 due to anticipated tolerance    > 3/14/20: Oxaliplatin discontinued from treatment  Panitumumab (Vectibix) 6 mg/kg IV D1 over 60 min   + added; change of therapy d/t cetuximab skin tox   > 4/25/20, initial dose reduction to 4.5 mg/kg d/t previous EGFR cutaneous toxicities    > C9 * * * Vectibix has been omitted * * *   > C10 Vectibix reordered at 3 mg/kg per MD Cody    > C13 * * * Vectibix has been omitted * * *   > C14 Vectibix reordered at 3 mg/kg per MD Cody   >C17 Vectibix HELD per Cody LEO              >C18 Vectibix held again per MD   >C19  11/30/20- vectibix resumed at 3mg/kg   >C21 HOLD x1 dose for rash   >C22 cont HOLD   >C23 vectibix resumed at 3 mg/kg   >C25 & 26 Vectibix on hold due to rash   C27 Vectibix restarted at 3 mg/kg per Dr Ross    C28 Vectibix HELD   C30 Vectibix restarted 3 mg/kg per Dr. Ross   Leucovorin 400 mg/m2 IV Day 1 followed by  Fluorouracil 400 mg/m2 IV Day 1 followed by   C1D1 reduced dose by 10% to 360 mg/m2 per MD Cody for anticipated tolerance   Fluorouracil 2400 mg/m2 CIVI over 46-48 hours    C1D1 reduced dose by 10% to 2160 mg/m2 per MD Cody for anticipated tolerance   14-day " "cycle until progression or toxicity  *Dosing Reference*  NCCN guidelines for colon cancer v2.2019  (cetuximab + chemo) [place] AP et al, OBEY 2017;317(23):0124-9971  (HER2 tx in HER2+ CRC) Kayleen RINALDI et al. Lancet Oncol. 2019 Apr;20(4):518-530.    Allergies: Patient has no known allergies.  /70   Pulse 65   Temp 36.2 °C (97.1 °F) (Temporal)   Resp 18   Ht 1.85 m (6' 0.84\")   Wt 110 kg (241 lb 6.5 oz)   SpO2 98%   BMI 31.99 kg/m²  Body surface area is 2.38 meters squared.     Labs 5/1/21:  ANC~ 3090 Plt = 135k   Hgb = 13.7     SCr = 0.82 mg/dL CrCl >125 mL/min   AST/ALT/ALK = 26/19/104 TBili = 1.3     ECHO(OK for 6 months per Cody LEO order)  4/21/21 LVEF ~60 %  9/4/20 LVEF ~ 60%   2/7/20 LVEF ~ 60%       Drug Order   (Drug name, dose, route, IV Fluid & volume, frequency, number of doses) Cycle 30  Previous treatment: 4/19/21       Medication = Panitumumab (Vectibix)   Base Dose= 3 mg/kg  Calc Dose: Base Dose x 110 kg = 330 mg  Final Dose = 300 mg  Route = IV  Fluid & Volume =  mL  Admin Duration = Over 60 minutes          Rounded to nearest vial size (within 10%) per rounding protocol, okay to treat with final dose     Medication = Trastuzumab-pkrb (Herzuma)  Base Dose = 4 mg/kg  Calc Dose: Base Dose x 110 kg = 440 mg  Final Dose = 440 mg  Route = IV  Fluid & Volume =  mL  Admin Duration = Over 30 minutes          <10% difference, okay to treat with final dose   Medication = Leucovorin (Wellcovorin)  Base Dose = 400 mg/m²  Calc Dose: Base Dose x 2.38 m² = 952 mg  Final Dose = 952 mg  Route = IV  Fluid & Volume = D5W 250 mL  Admin Duration = Over 30 minutes          <10% difference, okay to treat with final dose   Medication = Fluorouracil (5-FU)  Base Dose = 360 mg/m²  Calc Dose: Base Dose x 2.38 m² = 856.8 mg  Final Dose = 855 mg  Route = IVP  Fluid & Volume = 17.1 mL in syringe  Conc = 50 mg/mL  Admin Duration = Over 5 minutes          <10% difference, okay to treat with final " dose     Medication = Fluorouracil (5-FU)  Base Dose = 2160 mg/m²  Calc Dose:Base Dose x 2.38 m² = 5140.8 mg  Final Dose = 5140 mg  Route = CIVI   Fluid & Volume = 102.8 mL (+3 mL overfill = 105.8 mL)  Admin Duration = Over 46 hours to run at   2.2 mL/hour    Via CADD pump for home infusion        <10% difference, okay to treat with final dose       By my signature below, I confirm this process was performed independently with the BSA and all final chemotherapy dosing calculations congruent. I have reviewed the above chemotherapy order and that my calculation of the final dose and BSA (when applicable) corroborate those calculations of the  pharmacist. Discrepancies of 10% or greater in the written dose have been addressed and documented within the EPIC Progress notes.    José Marvin, PharmD

## 2021-05-05 ENCOUNTER — OUTPATIENT INFUSION SERVICES (OUTPATIENT)
Dept: ONCOLOGY | Facility: MEDICAL CENTER | Age: 56
End: 2021-05-05
Attending: INTERNAL MEDICINE
Payer: MEDICARE

## 2021-05-05 VITALS
HEART RATE: 68 BPM | OXYGEN SATURATION: 98 % | RESPIRATION RATE: 18 BRPM | SYSTOLIC BLOOD PRESSURE: 135 MMHG | TEMPERATURE: 98 F | DIASTOLIC BLOOD PRESSURE: 67 MMHG

## 2021-05-05 DIAGNOSIS — C78.7 METASTATIC COLON CANCER TO LIVER (HCC): ICD-10-CM

## 2021-05-05 DIAGNOSIS — C18.9 METASTATIC COLON CANCER TO LIVER (HCC): ICD-10-CM

## 2021-05-05 PROCEDURE — 700111 HCHG RX REV CODE 636 W/ 250 OVERRIDE (IP)

## 2021-05-05 PROCEDURE — 96523 IRRIG DRUG DELIVERY DEVICE: CPT

## 2021-05-05 RX ORDER — HEPARIN SODIUM (PORCINE) LOCK FLUSH IV SOLN 100 UNIT/ML 100 UNIT/ML
SOLUTION INTRAVENOUS
Status: COMPLETED
Start: 2021-05-05 | End: 2021-05-05

## 2021-05-05 RX ADMIN — HEPARIN 5 ML: 100 SYRINGE at 15:53

## 2021-05-05 NOTE — PROGRESS NOTES
Patient presents for home infusion pump disconnect. Pump states volume 0 with 104.1 mL given. Port flushed per protocol and de-accessed, sterile gauze to site. Patient scheduled for his next appointment and released in no acute distress.

## 2021-05-07 ENCOUNTER — HOSPITAL ENCOUNTER (OUTPATIENT)
Dept: RADIOLOGY | Facility: MEDICAL CENTER | Age: 56
End: 2021-05-07
Payer: MEDICARE

## 2021-05-14 RX ORDER — ONDANSETRON 2 MG/ML
4 INJECTION INTRAMUSCULAR; INTRAVENOUS PRN
Status: CANCELLED | OUTPATIENT
Start: 2021-05-17

## 2021-05-14 RX ORDER — HEPARIN SODIUM (PORCINE) LOCK FLUSH IV SOLN 100 UNIT/ML 100 UNIT/ML
500 SOLUTION INTRAVENOUS PRN
Status: CANCELLED | OUTPATIENT
Start: 2021-05-17

## 2021-05-14 RX ORDER — 0.9 % SODIUM CHLORIDE 0.9 %
3 VIAL (ML) INJECTION PRN
Status: CANCELLED | OUTPATIENT
Start: 2021-05-17

## 2021-05-14 RX ORDER — EPINEPHRINE 1 MG/ML(1)
0.5 AMPUL (ML) INJECTION PRN
Status: CANCELLED | OUTPATIENT
Start: 2021-05-17

## 2021-05-14 RX ORDER — SODIUM CHLORIDE 9 MG/ML
INJECTION, SOLUTION INTRAVENOUS CONTINUOUS
Status: CANCELLED
Start: 2021-05-17

## 2021-05-14 RX ORDER — 0.9 % SODIUM CHLORIDE 0.9 %
10 VIAL (ML) INJECTION PRN
Status: CANCELLED | OUTPATIENT
Start: 2021-05-17

## 2021-05-14 RX ORDER — 0.9 % SODIUM CHLORIDE 0.9 %
VIAL (ML) INJECTION PRN
Status: CANCELLED | OUTPATIENT
Start: 2021-05-17

## 2021-05-14 RX ORDER — PROCHLORPERAZINE MALEATE 10 MG
10 TABLET ORAL EVERY 6 HOURS PRN
Status: CANCELLED | OUTPATIENT
Start: 2021-05-17

## 2021-05-14 RX ORDER — METHYLPREDNISOLONE SODIUM SUCCINATE 125 MG/2ML
125 INJECTION, POWDER, LYOPHILIZED, FOR SOLUTION INTRAMUSCULAR; INTRAVENOUS PRN
Status: CANCELLED | OUTPATIENT
Start: 2021-05-17

## 2021-05-14 RX ORDER — ONDANSETRON 8 MG/1
8 TABLET, ORALLY DISINTEGRATING ORAL PRN
Status: CANCELLED | OUTPATIENT
Start: 2021-05-17

## 2021-05-14 RX ORDER — DIPHENHYDRAMINE HYDROCHLORIDE 50 MG/ML
50 INJECTION INTRAMUSCULAR; INTRAVENOUS PRN
Status: CANCELLED | OUTPATIENT
Start: 2021-05-17

## 2021-05-14 RX ORDER — HEPARIN SODIUM (PORCINE) LOCK FLUSH IV SOLN 100 UNIT/ML 100 UNIT/ML
500 SOLUTION INTRAVENOUS PRN
Status: CANCELLED | OUTPATIENT
Start: 2021-05-19

## 2021-05-14 RX ORDER — 0.9 % SODIUM CHLORIDE 0.9 %
20 VIAL (ML) INJECTION PRN
Status: CANCELLED | OUTPATIENT
Start: 2021-05-19

## 2021-05-15 ENCOUNTER — HOSPITAL ENCOUNTER (OUTPATIENT)
Dept: LAB | Facility: MEDICAL CENTER | Age: 56
End: 2021-05-15
Attending: INTERNAL MEDICINE
Payer: MEDICARE

## 2021-05-15 LAB
ALBUMIN SERPL BCP-MCNC: 4.1 G/DL (ref 3.2–4.9)
ALBUMIN/GLOB SERPL: 1.7 G/DL
ALP SERPL-CCNC: 110 U/L (ref 30–99)
ALT SERPL-CCNC: 30 U/L (ref 2–50)
ANION GAP SERPL CALC-SCNC: 10 MMOL/L (ref 7–16)
AST SERPL-CCNC: 28 U/L (ref 12–45)
BASOPHILS # BLD AUTO: 0.8 % (ref 0–1.8)
BASOPHILS # BLD: 0.05 K/UL (ref 0–0.12)
BILIRUB SERPL-MCNC: 1.1 MG/DL (ref 0.1–1.5)
BUN SERPL-MCNC: 10 MG/DL (ref 8–22)
CALCIUM SERPL-MCNC: 9.2 MG/DL (ref 8.5–10.5)
CEA SERPL-MCNC: 21.6 NG/ML (ref 0–3)
CHLORIDE SERPL-SCNC: 106 MMOL/L (ref 96–112)
CO2 SERPL-SCNC: 24 MMOL/L (ref 20–33)
CREAT SERPL-MCNC: 0.72 MG/DL (ref 0.5–1.4)
EOSINOPHIL # BLD AUTO: 0.24 K/UL (ref 0–0.51)
EOSINOPHIL NFR BLD: 3.6 % (ref 0–6.9)
ERYTHROCYTE [DISTWIDTH] IN BLOOD BY AUTOMATED COUNT: 57.3 FL (ref 35.9–50)
GLOBULIN SER CALC-MCNC: 2.4 G/DL (ref 1.9–3.5)
GLUCOSE SERPL-MCNC: 117 MG/DL (ref 65–99)
HCT VFR BLD AUTO: 40.8 % (ref 42–52)
HGB BLD-MCNC: 13.9 G/DL (ref 14–18)
IMM GRANULOCYTES # BLD AUTO: 0.03 K/UL (ref 0–0.11)
IMM GRANULOCYTES NFR BLD AUTO: 0.5 % (ref 0–0.9)
LYMPHOCYTES # BLD AUTO: 1.2 K/UL (ref 1–4.8)
LYMPHOCYTES NFR BLD: 18.2 % (ref 22–41)
MAGNESIUM SERPL-MCNC: 2.1 MG/DL (ref 1.5–2.5)
MCH RBC QN AUTO: 32.3 PG (ref 27–33)
MCHC RBC AUTO-ENTMCNC: 34.1 G/DL (ref 33.7–35.3)
MCV RBC AUTO: 94.9 FL (ref 81.4–97.8)
MONOCYTES # BLD AUTO: 0.63 K/UL (ref 0–0.85)
MONOCYTES NFR BLD AUTO: 9.5 % (ref 0–13.4)
NEUTROPHILS # BLD AUTO: 4.46 K/UL (ref 1.82–7.42)
NEUTROPHILS NFR BLD: 67.4 % (ref 44–72)
NRBC # BLD AUTO: 0 K/UL
NRBC BLD-RTO: 0 /100 WBC
PLATELET # BLD AUTO: 150 K/UL (ref 164–446)
PMV BLD AUTO: 11.5 FL (ref 9–12.9)
POTASSIUM SERPL-SCNC: 3.3 MMOL/L (ref 3.6–5.5)
PROT SERPL-MCNC: 6.5 G/DL (ref 6–8.2)
RBC # BLD AUTO: 4.3 M/UL (ref 4.7–6.1)
SODIUM SERPL-SCNC: 140 MMOL/L (ref 135–145)
WBC # BLD AUTO: 6.6 K/UL (ref 4.8–10.8)

## 2021-05-15 PROCEDURE — 80053 COMPREHEN METABOLIC PANEL: CPT

## 2021-05-15 PROCEDURE — 82378 CARCINOEMBRYONIC ANTIGEN: CPT

## 2021-05-15 PROCEDURE — 85025 COMPLETE CBC W/AUTO DIFF WBC: CPT

## 2021-05-15 PROCEDURE — 83735 ASSAY OF MAGNESIUM: CPT

## 2021-05-15 PROCEDURE — 36415 COLL VENOUS BLD VENIPUNCTURE: CPT

## 2021-05-17 ENCOUNTER — OUTPATIENT INFUSION SERVICES (OUTPATIENT)
Dept: ONCOLOGY | Facility: MEDICAL CENTER | Age: 56
End: 2021-05-17
Attending: INTERNAL MEDICINE
Payer: MEDICARE

## 2021-05-17 VITALS
DIASTOLIC BLOOD PRESSURE: 69 MMHG | RESPIRATION RATE: 18 BRPM | WEIGHT: 237.66 LBS | BODY MASS INDEX: 32.19 KG/M2 | SYSTOLIC BLOOD PRESSURE: 144 MMHG | HEART RATE: 65 BPM | TEMPERATURE: 97 F | HEIGHT: 72 IN | OXYGEN SATURATION: 99 %

## 2021-05-17 DIAGNOSIS — C78.7 METASTATIC COLON CANCER TO LIVER (HCC): ICD-10-CM

## 2021-05-17 DIAGNOSIS — C18.9 METASTATIC COLON CANCER TO LIVER (HCC): ICD-10-CM

## 2021-05-17 PROCEDURE — 96417 CHEMO IV INFUS EACH ADDL SEQ: CPT

## 2021-05-17 PROCEDURE — 96367 TX/PROPH/DG ADDL SEQ IV INF: CPT

## 2021-05-17 PROCEDURE — 700111 HCHG RX REV CODE 636 W/ 250 OVERRIDE (IP): Performed by: INTERNAL MEDICINE

## 2021-05-17 PROCEDURE — 96411 CHEMO IV PUSH ADDL DRUG: CPT

## 2021-05-17 PROCEDURE — A4212 NON CORING NEEDLE OR STYLET: HCPCS

## 2021-05-17 PROCEDURE — G0498 CHEMO EXTEND IV INFUS W/PUMP: HCPCS

## 2021-05-17 PROCEDURE — 700102 HCHG RX REV CODE 250 W/ 637 OVERRIDE(OP): Performed by: INTERNAL MEDICINE

## 2021-05-17 PROCEDURE — A9270 NON-COVERED ITEM OR SERVICE: HCPCS | Performed by: INTERNAL MEDICINE

## 2021-05-17 PROCEDURE — 700105 HCHG RX REV CODE 258: Performed by: INTERNAL MEDICINE

## 2021-05-17 PROCEDURE — 96413 CHEMO IV INFUSION 1 HR: CPT

## 2021-05-17 PROCEDURE — 96375 TX/PRO/DX INJ NEW DRUG ADDON: CPT

## 2021-05-17 RX ORDER — HEPARIN SODIUM (PORCINE) LOCK FLUSH IV SOLN 100 UNIT/ML 100 UNIT/ML
500 SOLUTION INTRAVENOUS PRN
Status: CANCELLED | OUTPATIENT
Start: 2021-05-17

## 2021-05-17 RX ORDER — 0.9 % SODIUM CHLORIDE 0.9 %
10 VIAL (ML) INJECTION PRN
Status: CANCELLED | OUTPATIENT
Start: 2021-05-17

## 2021-05-17 RX ORDER — SODIUM CHLORIDE 9 MG/ML
INJECTION, SOLUTION INTRAVENOUS CONTINUOUS
Status: CANCELLED
Start: 2021-05-17

## 2021-05-17 RX ORDER — ONDANSETRON 2 MG/ML
4 INJECTION INTRAMUSCULAR; INTRAVENOUS PRN
Status: CANCELLED | OUTPATIENT
Start: 2021-05-17

## 2021-05-17 RX ORDER — POTASSIUM CHLORIDE 20 MEQ/1
40 TABLET, EXTENDED RELEASE ORAL DAILY
Status: DISCONTINUED | OUTPATIENT
Start: 2021-05-17 | End: 2021-05-17 | Stop reason: HOSPADM

## 2021-05-17 RX ORDER — PROCHLORPERAZINE MALEATE 10 MG
10 TABLET ORAL EVERY 6 HOURS PRN
Status: CANCELLED | OUTPATIENT
Start: 2021-05-17

## 2021-05-17 RX ORDER — 0.9 % SODIUM CHLORIDE 0.9 %
VIAL (ML) INJECTION PRN
Status: CANCELLED | OUTPATIENT
Start: 2021-05-17

## 2021-05-17 RX ORDER — EPINEPHRINE 1 MG/ML(1)
0.5 AMPUL (ML) INJECTION PRN
Status: CANCELLED | OUTPATIENT
Start: 2021-05-17

## 2021-05-17 RX ORDER — DIPHENHYDRAMINE HYDROCHLORIDE 50 MG/ML
50 INJECTION INTRAMUSCULAR; INTRAVENOUS PRN
Status: CANCELLED | OUTPATIENT
Start: 2021-05-17

## 2021-05-17 RX ORDER — 0.9 % SODIUM CHLORIDE 0.9 %
3 VIAL (ML) INJECTION PRN
Status: CANCELLED | OUTPATIENT
Start: 2021-05-17

## 2021-05-17 RX ORDER — METHYLPREDNISOLONE SODIUM SUCCINATE 125 MG/2ML
125 INJECTION, POWDER, LYOPHILIZED, FOR SOLUTION INTRAMUSCULAR; INTRAVENOUS PRN
Status: CANCELLED | OUTPATIENT
Start: 2021-05-17

## 2021-05-17 RX ORDER — ONDANSETRON 8 MG/1
8 TABLET, ORALLY DISINTEGRATING ORAL PRN
Status: CANCELLED | OUTPATIENT
Start: 2021-05-17

## 2021-05-17 RX ADMIN — DIPHENHYDRAMINE HYDROCHLORIDE 50 MG: 50 INJECTION, SOLUTION INTRAMUSCULAR; INTRAVENOUS at 10:05

## 2021-05-17 RX ADMIN — LEUCOVORIN CALCIUM 940 MG: 350 INJECTION, POWDER, LYOPHILIZED, FOR SOLUTION INTRAMUSCULAR; INTRAVENOUS at 11:32

## 2021-05-17 RX ADMIN — FLUOROURACIL 845 MG: 50 INJECTION, SOLUTION INTRAVENOUS at 12:20

## 2021-05-17 RX ADMIN — FLUOROURACIL 5075 MG: 50 INJECTION, SOLUTION INTRAVENOUS at 12:35

## 2021-05-17 RX ADMIN — Medication 450 MG: at 10:39

## 2021-05-17 RX ADMIN — ONDANSETRON 16 MG: 2 INJECTION INTRAMUSCULAR; INTRAVENOUS at 09:45

## 2021-05-17 RX ADMIN — POTASSIUM CHLORIDE 40 MEQ: 1500 TABLET, EXTENDED RELEASE ORAL at 10:11

## 2021-05-17 ASSESSMENT — FIBROSIS 4 INDEX: FIB4 SCORE: 1.94

## 2021-05-17 NOTE — PROGRESS NOTES
"Pharmacy Chemotherapy Verification    Dx: Metastatic colorectal cancer [KRAS/NRAS wild type, ERBB2 (HER2) amplification]    Regimen: mFOLFOX + Vectibix + trastuzumab  Cycle 31  Previous treatment: 5/3/21    Trastuzumab 6 mg/kg IV loading dose C1D1, then 4 mg/kg IV Day 1 of subsequent  cycles [confirmed \"every 2 weeks\", per MD; progress note: \"He also had an ERBB2  amplification which might suggest response to treatments like Herceptin\"]   > 7/8/20: Herceptin 2.5 weeks overdue.  Do not reload Herceptin per TRBO Dr. Ross.  Cetuximab 400 mg/m2 IV Cycle 1 Day 1, then 250 mg/m2 IV Days 1 and 8 (confirmed  \"weekly\" per MD)   > C4 * * * cetuximab has been omitted * * *   > C5 * * * cetuximab replaced with panitumumab * * *   OXALIplatin 85 mg/m2 IV Day 1 concurrent with   > Cody LEO reduced dose by 20% to 68 mg/m2 starting with C1D1 due to anticipated tolerance    > 3/14/20: Oxaliplatin discontinued from treatment  Panitumumab (Vectibix) 6 mg/kg IV D1 over 60 min   + added; change of therapy d/t cetuximab skin tox   > 4/25/20, initial dose reduction to 4.5 mg/kg d/t previous EGFR cutaneous toxicities    > C9 * * * Vectibix has been omitted * * *   > C10 Vectibix reordered at 3 mg/kg per MD Cody    > C13 * * * Vectibix has been omitted * * *   > C14 Vectibix reordered at 3 mg/kg per MD Cody   >C17 Vectibix HELD per Cody LEO              >C18 Vectibix held again per MD   >C19  11/30/20- vectibix resumed at 3mg/kg   >C21 HOLD x1 dose for rash   >C22 cont HOLD   >C23 vectibix resumed at 3 mg/kg   >C25 & C26 Vectibix on hold due to rash   >C27 Vectibix 3mg/kg   >C28-29 Vectibix on hold due to rash   >C30 Vectibix 3mg/kg   >C31 Vectibix HELD  Leucovorin 400 mg/m2 IV Day 1 followed by  Fluorouracil 400 mg/m2 IV Day 1 followed by   C1D1 reduced dose by 10% to 360 mg/m2 per MD Cody for anticipated tolerance   Fluorouracil 2400 mg/m2 CIVI over 46-48 hours    C1D1 reduced dose by 10% to 2160 mg/m2 per MD Cody for " "anticipated tolerance   14-day cycle until progression or toxicity  *Dosing Reference*  NCCN guidelines for colon cancer v2.2019  (cetuximab + chemo) [place] AP et al, OBEY 2017;317(23):8160-0957  (HER2 tx in HER2+ CRC) Kayleen RINALDI et al. Lancet Oncol. 2019 Apr;20(4):518-530.    Allergies: Patient has no known allergies.  /69   Pulse 65   Temp 36.1 °C (97 °F) (Temporal)   Resp 18   Ht 1.84 m (6' 0.44\")   Wt 108 kg (237 lb 10.5 oz)   SpO2 99%   BMI 31.84 kg/m²  Body surface area is 2.35 meters squared.     Labs 5/15/21  Meet treatment parameters    ECHO  4/21/21 LVEF ~60%  9/4/20 LVEF ~ 60% OK for 6 months per Cody LEO order  2/7/20 LVEF ~ 60%     Panitumumab (Vectibix) 0 mg/kg x * kg = * mg              Rounded to vial size (within 10%) per dose rounding protocol = 0 mg IV     Trastuzumab-pkrb (Herzuma) 4 mg/kg x 108 kg = 432 mg              Rounded to vial size (within 10%) per dose rounding protocol = 450 mg IV     Leucovorin 400 mg/m² x 2.35 m² = 940 mg              Okay to treat with final dose = 940 mg IV over 30 minutes      Fluorouracil (Adrucil) 360 mg/m² x 2.35 m² = 846 mg              Okay to treat with final dose =  845 mg IV push     Fluorouracil (Adrucil) 2160 mg/m² x 2.35 m² = 5076 mg              Okay to treat with final dose =  5075 mg CIVI over 46 hrs at 2.2 mL/hr     Man Anderson, PharmD    "

## 2021-05-17 NOTE — PROGRESS NOTES
Gurmeet arrived ambulatory to [facility] for Chemo treatment. POC discussed with pt and he agrees with plan. Port accessed in sterile fashion, brisk blood return noted. Labs reviewed from 5/15, ok to proceed with treatment. Pt medicated per MAR. Pt tolerated treatment without s/s adverse reaction. Pt connected to CADD pump. Pt discharged to self care, NAD. Pt's next appt 5/19/2021.

## 2021-05-17 NOTE — PROGRESS NOTES
Chemotherapy Verification - PRIMARY RN    D1C31    Height = 184cm  Weight = 108kg  BSA = 2.35m2       Medication: Trastuzumab-pkrb (Herzuma)  Dose: 4mg/kg  Calculated Dose: 432mg                                Medication: Leucovorin  Dose: 400mg/m2  Calculated Dose: 940mg                               Medication: Fluruoracil (Adrucil) Dose: 360mg/m2  Calculated Dose:846mg                                Medication: Fluruoracil  Dose: 2160mg/m2  Calculated Dose: 5076mg  CADD Pump over 46 hours                                 I confirm this process was performed independently with the BSA and all final chemotherapy dosing calculations congruent.  Any discrepancies of 10% or greater have been addressed with the chemotherapy pharmacist. The resolution of the discrepancy has been documented in the EPIC progress notes.

## 2021-05-17 NOTE — PROGRESS NOTES
"Pharmacy Chemotherapy Verification  Patient name: Gurmeet Jo  Dx: Metastatic colorectal cancer [KRAS/NRAS wild type, ERBB2 (HER2) amplification]  Protocol: mFOLFOX + Vectibix + trastuzumab    Regimen:  Trastuzumab 6 mg/kg IV loading dose C1D1, then 4 mg/kg IV Day 1 of subsequent  cycles [confirmed \"every 2 weeks\", per MD; progress note: \"He also had an ERBB2  amplification which might suggest response to treatments like Herceptin\"]   > 7/8/20: Herceptin 2.5 weeks overdue.  Do not reload Herceptin per [facility] Dr. Ross.  Cetuximab 400 mg/m2 IV Cycle 1 Day 1, then 250 mg/m2 IV Days 1 and 8 (confirmed  \"weekly\" per MD)   > C4 * * * cetuximab has been omitted * * *   > C5 * * * cetuximab replaced with panitumumab * * *   OXALIplatin 85 mg/m2 IV Day 1 concurrent with   > Cody LEO reduced dose by 20% to 68 mg/m2 starting with C1D1 due to anticipated tolerance    > 3/14/20: Oxaliplatin discontinued from treatment  Panitumumab (Vectibix) 6 mg/kg IV D1 over 60 min   + added; change of therapy d/t cetuximab skin tox   > 4/25/20, initial dose reduction to 4.5 mg/kg d/t previous EGFR cutaneous toxicities    > C9 * * * Vectibix has been omitted * * *   > C10 Vectibix reordered at 3 mg/kg per MD Cody    > C13 * * * Vectibix has been omitted * * *   > C14 Vectibix reordered at 3 mg/kg per MD Cody   >C17 Vectibix HELD per Cody LEO              >C18 Vectibix held again per MD   >C19  11/30/20- vectibix resumed at 3mg/kg   >C21 HOLD x1 dose for rash   >C22 cont HOLD   >C23 vectibix resumed at 3 mg/kg   >C25 & C26 Vectibix on hold due to rash   >C27 Vectibix 3mg/kg   >C28-29 Vectibix on hold due to rash   >C30 Vectibix 3mg/kg   >C31 Vectibix HOLD  Leucovorin 400 mg/m2 IV Day 1 followed by  Fluorouracil 400 mg/m2 IV Day 1 followed by   C1D1 reduced dose by 10% to 360 mg/m2 per MD Cody for anticipated tolerance   Fluorouracil 2400 mg/m2 CIVI over 46-48 hours    C1D1 reduced dose by 10% to 2160 mg/m2 per MD Cody for " "anticipated tolerance   14-day cycle until progression or toxicity  *Dosing Reference*  NCCN guidelines for colon cancer v2.2019  (cetuximab + chemo) [place] AP et al, OBEY 2017;317(23):6242-3705  (HER2 tx in HER2+ CRC) Kayleen RINALDI et al. Lancet Oncol. 2019 Apr;20(4):518-530.    Allergies: Patient has no known allergies.  /69   Pulse 65   Temp 36.1 °C (97 °F) (Temporal)   Resp 18   Ht 1.84 m (6' 0.44\")   Wt 108 kg (237 lb 10.5 oz)   SpO2 99%   BMI 31.84 kg/m²  Body surface area is 2.35 meters squared.     Labs 5/17/21  Meet treatment parameters    ECHO  9/4/20 LVEF ~ 60% OK for 6 months per Cody LEO order  2/7/20 LVEF ~ 60%        Drug Order   (Drug name, dose, route, IV Fluid & volume, frequency, number of doses) Cycle 31  Previous treatment: 5/3/21         Medication = Panitumumab (Vectibix)   Base Dose= 3 mg/kg  Calc Dose: Base Dose x * kg = * mg  Final Dose = * mg  Route = IV  Fluid & Volume =  mL  Admin Duration = Over 60 minutes            Rounded to nearest vial size (within 10%) per rounding protocol, okay to treat with final dose      Medication = Trastuzumab-pkrb (Herzuma)  Base Dose = 4 mg/kg  Calc Dose: Base Dose x 108 kg = 432 mg  Final Dose = 450 mg  Route = IV  Fluid & Volume =  mL  Admin Duration = Over 30 minutes            Okay to treat with final dose   Medication = Leucovorin (Wellcovorin)  Base Dose = 400 mg/m²  Calc Dose: Base Dose x 2.35 m² = 940 mg  Final Dose = 940 mg  Route = IV  Fluid & Volume = D5W 250 mL  Admin Duration = Over 30 minutes            Okay to treat with final dose   Medication = Fluorouracil (5-FU)  Base Dose = 360 mg/m²  Calc Dose: Base Dose x 2.35 m² = 846 mg  Final Dose = 845 mg  Route = IVP  Fluid & Volume = 16.9 mL in syringe  Conc = 50 mg/mL  Admin Duration = Over 5 minutes            Okay to treat with final dose      Medication = Fluorouracil (5-FU)  Base Dose = 2160 mg/m²  Calc Dose:Base Dose x 2.35 m² = 5076 mg  Final Dose = 5075 " mg  Route = CIVI   Fluid & Volume = 101.5 mL (+3 mL overfill = 104.5 mL)  Admin Duration = Over 46 hours to run at   2.2 mL/hour     Via CADD pump for home infusion         Okay to treat with final dose

## 2021-05-17 NOTE — PROGRESS NOTES
Chemotherapy Verification - SECONDARY RN       Height = 72.44in  Weight = 108kg  BSA 2.34m2       Medication: Trastuzumab-pkrb  Dose: 4mg/kg  Calculated Dose: 432mg                             (In mg/m2, AUC, mg/kg)     Medication: Leucovorin  Dose: 400mg/m2  Calculated Dose: 936mg                             (In mg/m2, AUC, mg/kg)    Medication: Fluorouracil  Dose: 360mg/m2 (90% of original dose of 400mg/m2)  Calculated Dose: 842.4mg                             (In mg/m2, AUC, mg/kg)    Medication: Fluorouracil  Dose: 2160mg/m2 (90% of original dose of 2400mg/m2)  Calculated Dose: 5054.4mg                            (In mg/m2, AUC, mg/kg)      I confirm that this process was performed independently.

## 2021-05-19 ENCOUNTER — OUTPATIENT INFUSION SERVICES (OUTPATIENT)
Dept: ONCOLOGY | Facility: MEDICAL CENTER | Age: 56
End: 2021-05-19
Attending: INTERNAL MEDICINE
Payer: MEDICARE

## 2021-05-19 VITALS
OXYGEN SATURATION: 99 % | RESPIRATION RATE: 18 BRPM | DIASTOLIC BLOOD PRESSURE: 81 MMHG | SYSTOLIC BLOOD PRESSURE: 133 MMHG | HEART RATE: 70 BPM | TEMPERATURE: 98 F

## 2021-05-19 DIAGNOSIS — C78.7 METASTATIC COLON CANCER TO LIVER (HCC): ICD-10-CM

## 2021-05-19 DIAGNOSIS — C18.9 METASTATIC COLON CANCER TO LIVER (HCC): ICD-10-CM

## 2021-05-19 PROCEDURE — 96523 IRRIG DRUG DELIVERY DEVICE: CPT

## 2021-05-19 PROCEDURE — 700111 HCHG RX REV CODE 636 W/ 250 OVERRIDE (IP)

## 2021-05-19 RX ORDER — HEPARIN SODIUM (PORCINE) LOCK FLUSH IV SOLN 100 UNIT/ML 100 UNIT/ML
SOLUTION INTRAVENOUS
Status: COMPLETED
Start: 2021-05-19 | End: 2021-05-19

## 2021-05-19 RX ADMIN — HEPARIN 500 UNITS: 100 SYRINGE at 16:18

## 2021-05-19 NOTE — PROGRESS NOTES
Pt returns to [facility] for 5FU CADD pump disconnect.  Pt reports he tolerated CADD pump infusion well.  Pt had mild nausea today.  5FU CADD pump volume reads 0ml.  CADD pump disconnected from pt.  RUC port flushed with NS and brisk blood return observed.  Port flushed with NS and heparin locked.  Adkins needle removed intact.  Site covered with gauze.  Confirmed next appt time with pt.  Pt will be out of town on Memorial day and next appt will be 6/02/2021.  Email sent to scheduling dept toset up more appts for pt.  Pt dc home to self care.

## 2021-05-30 RX ORDER — HEPARIN SODIUM (PORCINE) LOCK FLUSH IV SOLN 100 UNIT/ML 100 UNIT/ML
500 SOLUTION INTRAVENOUS PRN
Status: CANCELLED | OUTPATIENT
Start: 2021-06-02

## 2021-05-30 RX ORDER — PROCHLORPERAZINE MALEATE 10 MG
10 TABLET ORAL EVERY 6 HOURS PRN
Status: CANCELLED | OUTPATIENT
Start: 2021-06-02

## 2021-05-30 RX ORDER — 0.9 % SODIUM CHLORIDE 0.9 %
10 VIAL (ML) INJECTION PRN
Status: CANCELLED | OUTPATIENT
Start: 2021-06-02

## 2021-05-30 RX ORDER — DIPHENHYDRAMINE HYDROCHLORIDE 50 MG/ML
50 INJECTION INTRAMUSCULAR; INTRAVENOUS PRN
Status: CANCELLED | OUTPATIENT
Start: 2021-06-02

## 2021-05-30 RX ORDER — METHYLPREDNISOLONE SODIUM SUCCINATE 125 MG/2ML
125 INJECTION, POWDER, LYOPHILIZED, FOR SOLUTION INTRAMUSCULAR; INTRAVENOUS PRN
Status: CANCELLED | OUTPATIENT
Start: 2021-06-02

## 2021-05-30 RX ORDER — 0.9 % SODIUM CHLORIDE 0.9 %
VIAL (ML) INJECTION PRN
Status: CANCELLED | OUTPATIENT
Start: 2021-06-02

## 2021-05-30 RX ORDER — 0.9 % SODIUM CHLORIDE 0.9 %
3 VIAL (ML) INJECTION PRN
Status: CANCELLED | OUTPATIENT
Start: 2021-06-02

## 2021-05-30 RX ORDER — HEPARIN SODIUM (PORCINE) LOCK FLUSH IV SOLN 100 UNIT/ML 100 UNIT/ML
500 SOLUTION INTRAVENOUS PRN
Status: CANCELLED | OUTPATIENT
Start: 2021-06-04

## 2021-05-30 RX ORDER — EPINEPHRINE 1 MG/ML(1)
0.5 AMPUL (ML) INJECTION PRN
Status: CANCELLED | OUTPATIENT
Start: 2021-06-02

## 2021-05-30 RX ORDER — 0.9 % SODIUM CHLORIDE 0.9 %
20 VIAL (ML) INJECTION PRN
Status: CANCELLED | OUTPATIENT
Start: 2021-06-04

## 2021-05-30 RX ORDER — SODIUM CHLORIDE 9 MG/ML
INJECTION, SOLUTION INTRAVENOUS CONTINUOUS
Status: CANCELLED
Start: 2021-06-02

## 2021-05-30 RX ORDER — ONDANSETRON 2 MG/ML
4 INJECTION INTRAMUSCULAR; INTRAVENOUS PRN
Status: CANCELLED | OUTPATIENT
Start: 2021-06-02

## 2021-05-30 RX ORDER — ONDANSETRON 8 MG/1
8 TABLET, ORALLY DISINTEGRATING ORAL PRN
Status: CANCELLED | OUTPATIENT
Start: 2021-06-02

## 2021-06-01 ENCOUNTER — HOSPITAL ENCOUNTER (OUTPATIENT)
Dept: LAB | Facility: MEDICAL CENTER | Age: 56
End: 2021-06-01
Attending: INTERNAL MEDICINE
Payer: MEDICARE

## 2021-06-01 PROCEDURE — 82378 CARCINOEMBRYONIC ANTIGEN: CPT

## 2021-06-01 PROCEDURE — 83735 ASSAY OF MAGNESIUM: CPT

## 2021-06-01 PROCEDURE — 36415 COLL VENOUS BLD VENIPUNCTURE: CPT

## 2021-06-01 PROCEDURE — 85025 COMPLETE CBC W/AUTO DIFF WBC: CPT

## 2021-06-01 PROCEDURE — 80053 COMPREHEN METABOLIC PANEL: CPT

## 2021-06-02 ENCOUNTER — OUTPATIENT INFUSION SERVICES (OUTPATIENT)
Dept: ONCOLOGY | Facility: MEDICAL CENTER | Age: 56
End: 2021-06-02
Attending: INTERNAL MEDICINE
Payer: MEDICARE

## 2021-06-02 VITALS
TEMPERATURE: 97.6 F | SYSTOLIC BLOOD PRESSURE: 134 MMHG | RESPIRATION RATE: 18 BRPM | DIASTOLIC BLOOD PRESSURE: 82 MMHG | HEIGHT: 72 IN | OXYGEN SATURATION: 97 % | HEART RATE: 71 BPM | BODY MASS INDEX: 31.95 KG/M2 | WEIGHT: 235.89 LBS

## 2021-06-02 DIAGNOSIS — C18.9 METASTATIC COLON CANCER TO LIVER (HCC): ICD-10-CM

## 2021-06-02 DIAGNOSIS — C78.7 METASTATIC COLON CANCER TO LIVER (HCC): ICD-10-CM

## 2021-06-02 LAB
ALBUMIN SERPL BCP-MCNC: 4.1 G/DL (ref 3.2–4.9)
ALBUMIN/GLOB SERPL: 1.8 G/DL
ALP SERPL-CCNC: 105 U/L (ref 30–99)
ALT SERPL-CCNC: 24 U/L (ref 2–50)
ANION GAP SERPL CALC-SCNC: 11 MMOL/L (ref 7–16)
AST SERPL-CCNC: 30 U/L (ref 12–45)
BASOPHILS # BLD AUTO: 0.7 % (ref 0–1.8)
BASOPHILS # BLD: 0.04 K/UL (ref 0–0.12)
BILIRUB SERPL-MCNC: 1.2 MG/DL (ref 0.1–1.5)
BUN SERPL-MCNC: 11 MG/DL (ref 8–22)
CALCIUM SERPL-MCNC: 9 MG/DL (ref 8.5–10.5)
CEA SERPL-MCNC: 22.7 NG/ML (ref 0–3)
CHLORIDE SERPL-SCNC: 108 MMOL/L (ref 96–112)
CO2 SERPL-SCNC: 22 MMOL/L (ref 20–33)
CREAT SERPL-MCNC: 0.75 MG/DL (ref 0.5–1.4)
EOSINOPHIL # BLD AUTO: 0.25 K/UL (ref 0–0.51)
EOSINOPHIL NFR BLD: 4.2 % (ref 0–6.9)
ERYTHROCYTE [DISTWIDTH] IN BLOOD BY AUTOMATED COUNT: 56.2 FL (ref 35.9–50)
GLOBULIN SER CALC-MCNC: 2.3 G/DL (ref 1.9–3.5)
GLUCOSE SERPL-MCNC: 122 MG/DL (ref 65–99)
HCT VFR BLD AUTO: 39.2 % (ref 42–52)
HGB BLD-MCNC: 13 G/DL (ref 14–18)
IMM GRANULOCYTES # BLD AUTO: 0.02 K/UL (ref 0–0.11)
IMM GRANULOCYTES NFR BLD AUTO: 0.3 % (ref 0–0.9)
LYMPHOCYTES # BLD AUTO: 1.01 K/UL (ref 1–4.8)
LYMPHOCYTES NFR BLD: 17.1 % (ref 22–41)
MAGNESIUM SERPL-MCNC: 2.1 MG/DL (ref 1.5–2.5)
MCH RBC QN AUTO: 32.1 PG (ref 27–33)
MCHC RBC AUTO-ENTMCNC: 33.2 G/DL (ref 33.7–35.3)
MCV RBC AUTO: 96.8 FL (ref 81.4–97.8)
MONOCYTES # BLD AUTO: 0.68 K/UL (ref 0–0.85)
MONOCYTES NFR BLD AUTO: 11.5 % (ref 0–13.4)
NEUTROPHILS # BLD AUTO: 3.9 K/UL (ref 1.82–7.42)
NEUTROPHILS NFR BLD: 66.2 % (ref 44–72)
NRBC # BLD AUTO: 0 K/UL
NRBC BLD-RTO: 0 /100 WBC
PLATELET # BLD AUTO: 114 K/UL (ref 164–446)
PMV BLD AUTO: 12 FL (ref 9–12.9)
POTASSIUM SERPL-SCNC: 3.8 MMOL/L (ref 3.6–5.5)
PROT SERPL-MCNC: 6.4 G/DL (ref 6–8.2)
RBC # BLD AUTO: 4.05 M/UL (ref 4.7–6.1)
SODIUM SERPL-SCNC: 141 MMOL/L (ref 135–145)
WBC # BLD AUTO: 5.9 K/UL (ref 4.8–10.8)

## 2021-06-02 PROCEDURE — 700105 HCHG RX REV CODE 258: Performed by: INTERNAL MEDICINE

## 2021-06-02 PROCEDURE — G0498 CHEMO EXTEND IV INFUS W/PUMP: HCPCS

## 2021-06-02 PROCEDURE — 96375 TX/PRO/DX INJ NEW DRUG ADDON: CPT

## 2021-06-02 PROCEDURE — 96417 CHEMO IV INFUS EACH ADDL SEQ: CPT

## 2021-06-02 PROCEDURE — 96413 CHEMO IV INFUSION 1 HR: CPT

## 2021-06-02 PROCEDURE — A4212 NON CORING NEEDLE OR STYLET: HCPCS

## 2021-06-02 PROCEDURE — 700111 HCHG RX REV CODE 636 W/ 250 OVERRIDE (IP): Performed by: INTERNAL MEDICINE

## 2021-06-02 PROCEDURE — 96411 CHEMO IV PUSH ADDL DRUG: CPT

## 2021-06-02 RX ORDER — POTASSIUM CHLORIDE 750 MG/1
10 CAPSULE, EXTENDED RELEASE ORAL
COMMUNITY
Start: 2021-04-30 | End: 2021-06-16

## 2021-06-02 RX ORDER — ONDANSETRON 8 MG/1
TABLET, ORALLY DISINTEGRATING ORAL
COMMUNITY
Start: 2021-04-18

## 2021-06-02 RX ORDER — CALCIUM CITRATE/VITAMIN D3 200MG-6.25
TABLET ORAL
COMMUNITY
Start: 2021-05-18

## 2021-06-02 RX ORDER — SODIUM CHLORIDE 9 MG/ML
INJECTION, SOLUTION INTRAVENOUS CONTINUOUS
Status: DISCONTINUED | OUTPATIENT
Start: 2021-06-02 | End: 2021-06-02 | Stop reason: HOSPADM

## 2021-06-02 RX ORDER — HUMAN INSULIN 100 [IU]/ML
INJECTION, SOLUTION SUBCUTANEOUS
COMMUNITY
Start: 2021-05-13 | End: 2022-03-29

## 2021-06-02 RX ADMIN — FLUOROURACIL 5055 MG: 50 INJECTION, SOLUTION INTRAVENOUS at 12:32

## 2021-06-02 RX ADMIN — FLUOROURACIL 840 MG: 50 INJECTION, SOLUTION INTRAVENOUS at 12:25

## 2021-06-02 RX ADMIN — DIPHENHYDRAMINE HYDROCHLORIDE 50 MG: 50 INJECTION, SOLUTION INTRAMUSCULAR; INTRAVENOUS at 10:30

## 2021-06-02 RX ADMIN — LEUCOVORIN CALCIUM 936 MG: 350 INJECTION, POWDER, LYOPHILIZED, FOR SOLUTION INTRAMUSCULAR; INTRAVENOUS at 11:56

## 2021-06-02 RX ADMIN — SODIUM CHLORIDE: 9 INJECTION, SOLUTION INTRAVENOUS at 09:43

## 2021-06-02 RX ADMIN — ONDANSETRON 16 MG: 2 INJECTION INTRAMUSCULAR; INTRAVENOUS at 09:53

## 2021-06-02 RX ADMIN — TRASTUZUMAB 428 MG: KIT at 11:18

## 2021-06-02 ASSESSMENT — FIBROSIS 4 INDEX: FIB4 SCORE: 3.06

## 2021-06-02 NOTE — PROGRESS NOTES
Chemotherapy Verification - SECONDARY RN       Height = 184 cm  Weight = 107 kg  BSA = 2.34 m2       Medication: Herzuma  Dose: 4 mg/kg  Calculated Dose: 428 mg                             (In mg/m2, AUC, mg/kg)     Medication: Fluorouracil  Dose: 360 mg/m2  Calculated Dose: 842.4 mg                             (In mg/m2, AUC, mg/kg)    Medication: Fluorouracil  Dose: 2,160 mg/m2  Calculated Dose: 5,054.4 mg                             (In mg/m2, AUC, mg/kg)      I confirm that this process was performed independently.

## 2021-06-02 NOTE — PROGRESS NOTES
Chemotherapy Verification - PRIMARY RN    C32    Height = 184 cm  Weight = 107 kg  BSA = 2.34 m2 - EF 60%, done 4/21/21 - OK for 6 months per Dr. Ross      Medication: Trastuzumab-pkrb (Herzuma)  Dose: 4 mg/kg  Calculated Dose: 428 mg                                  Medication: Leucovorin  Dose: 400 mg/m2  Calculated Dose: 936 mg                                 Medication: Fluorouracil (5FU) push  Dose: 360 mg/m2  Calculated Dose: 842.4 mg                               Medication: Fluorouracil (5FU) CADD pump  Dose: 2160 mg/m2  Calculated Dose: 5054.4 mg over 46 hours                                I confirm this process was performed independently with the BSA and all final chemotherapy dosing calculations congruent.  Any discrepancies of 10% or greater have been addressed with the chemotherapy pharmacist. The resolution of the discrepancy has been documented in the EPIC progress notes.

## 2021-06-02 NOTE — PROGRESS NOTES
"Pharmacy Chemotherapy Verification    Dx: Metastatic colorectal cancer [KRAS/NRAS wild type, ERBB2 (HER2) amplification]  Regimen: mFOLFOX + Vectibix + trastuzumab  Cycle 32  Previous treatment: 5/17/21    Trastuzumab 6 mg/kg IV loading dose C1D1, then 4 mg/kg IV Day 1 of subsequent  cycles [confirmed \"every 2 weeks\", per MD; progress note: \"He also had an ERBB2  amplification which might suggest response to treatments like Herceptin\"]   > 7/8/20: Herceptin 2.5 weeks overdue.  Do not reload Herceptin per [facility] Dr. Ross.  Cetuximab 400 mg/m2 IV Cycle 1 Day 1, then 250 mg/m2 IV Days 1 and 8 (confirmed  \"weekly\" per MD)   > C4 * * * cetuximab has been omitted * * *   > C5 * * * cetuximab replaced with panitumumab * * *   OXALIplatin 85 mg/m2 IV Day 1 concurrent with   > Cdoy LEO reduced dose by 20% to 68 mg/m2 starting with C1D1 due to anticipated tolerance    > 3/14/20: Oxaliplatin discontinued from treatment  Panitumumab (Vectibix) 6 mg/kg IV D1 over 60 min   + added; change of therapy d/t cetuximab skin tox   > 4/25/20, initial dose reduction to 4.5 mg/kg d/t previous EGFR cutaneous toxicities    > C9 * * * Vectibix has been omitted * * *   > C10 Vectibix reordered at 3 mg/kg per MD Cody    > C13 * * * Vectibix has been omitted * * *   > C14 Vectibix reordered at 3 mg/kg per MD Cody   >C17 Vectibix HELD per Cody LEO              >C18 Vectibix held again per MD   >C19  11/30/20- vectibix resumed at 3mg/kg   >C21 HOLD x1 dose for rash   >C22 cont HOLD   >C23 vectibix resumed at 3 mg/kg   >C25 & C26 Vectibix on hold due to rash   >C27 Vectibix 3mg/kg   >C28-29 Vectibix on hold due to rash   >C30 Vectibix 3mg/kg   >C31,32 Vectibix held  Leucovorin 400 mg/m2 IV Day 1 followed by  Fluorouracil 400 mg/m2 IV Day 1 followed by   C1D1 reduced dose by 10% to 360 mg/m2 per MD Cody for anticipated tolerance   Fluorouracil 2400 mg/m2 CIVI over 46-48 hours    C1D1 reduced dose by 10% to 2160 mg/m2 per MD Cody for " "anticipated tolerance   14-day cycle until progression or toxicity  *Dosing Reference*  NCCN guidelines for colon cancer v2.2019  (cetuximab + chemo) [place] AP et al, OBEY 2017;317(23):9890-4528  (HER2 tx in HER2+ CRC) Kayleen RINALDI et al. Lancet Oncol. 2019 Apr;20(4):518-530.    Allergies: Patient has no known allergies.  /82   Pulse 71   Temp 36.4 °C (97.6 °F) (Temporal)   Resp 18   Ht 1.84 m (6' 0.44\")   Wt 107 kg (235 lb 14.3 oz)   SpO2 97%   BMI 31.60 kg/m²  Body surface area is 2.34 meters squared.     Labs 6/1/21  Meet treatment parameters    ECHO  9/4/20 LVEF ~ 60% OK for 6 months per Cody LEO order  2/7/20 LVEF ~ 60%     Panitumumab (Vectibix) 3 mg/kg x * kg = * mg              Rounded to vial size (within 10%) per dose rounding protocol   OK to treat with final dosel = * mg IV     Trastuzumab-pkrb (Herzuma) 4 mg/kg x 107 kg = 428 mg              Rounded within 10% per dose rounding protocol   OK to treat with final dosel = 428 mg IV     Leucovorin 400 mg/m² x 2.34 m² = 936 mg              Okay to treat with final dose = 936 mg IV over 30 minutes      Fluorouracil (Adrucil) 360 mg/m² x 2.34 m² = 842.4 mg              Okay to treat with final dose =  840 mg IV push     Fluorouracil (Adrucil) 2160 mg/m² x 2.34 m² = 5054.4 mg              Okay to treat with final dose =  5055 mg CIVI     "

## 2021-06-02 NOTE — PROGRESS NOTES
"Pharmacy Chemotherapy Verification    Dx: Metastatic colorectal cancer [KRAS/NRAS wild type, ERBB2 (HER2) amplification]    Regimen: mFOLFOX + Vectibix + Herceptin  Trastuzumab 6 mg/kg IV loading dose C1D1, then 4 mg/kg IV Day 1 of subsequent  cycles [confirmed \"every 2 weeks\", per MD; progress note: \"He also had an ERBB2  amplification which might suggest response to treatments like Herceptin\"]   > 7/8/20: Herceptin 2.5 weeks overdue.  Do not reload Herceptin per [facility] Dr. Ross.  Cetuximab 400 mg/m2 IV Cycle 1 Day 1, then 250 mg/m2 IV Days 1 and 8 (confirmed  \"weekly\" per MD)   > C4 * * * cetuximab has been omitted * * *   > C5 * * * cetuximab replaced with panitumumab * * *   OXALIplatin 85 mg/m2 IV Day 1 concurrent with   > Cody LEO reduced dose by 20% to 68 mg/m2 starting with C1D1 due to anticipated tolerance    > 3/14/20: Oxaliplatin discontinued from treatment  Panitumumab (Vectibix) 6 mg/kg IV D1 over 60 min   + added; change of therapy d/t cetuximab skin tox   > 4/25/20, initial dose reduction to 4.5 mg/kg d/t previous EGFR cutaneous toxicities    > C9 * * * Vectibix has been omitted * * *   > C10 Vectibix reordered at 3 mg/kg per MD Cody    > C13 * * * Vectibix has been omitted * * *   > C14 Vectibix reordered at 3 mg/kg per MD Cody   >C17 Vectibix HELD per Cody LEO              >C18 Vectibix held again per MD   >C19  11/30/20- vectibix resumed at 3mg/kg   >C21 HOLD x1 dose for rash   >C22 cont HOLD   >C23 vectibix resumed at 3 mg/kg   >C25 & 26 Vectibix on hold due to rash   C27 Vectibix restarted at 3 mg/kg per Dr Ross    C28 Vectibix HELD   C30 Vectibix restarted 3 mg/kg per Dr. Ross      >C31,32 Vectibix held  Leucovorin 400 mg/m2 IV Day 1 followed by  Fluorouracil 400 mg/m2 IV Day 1 followed by   C1D1 reduced dose by 10% to 360 mg/m2 per MD Cody for anticipated tolerance   Fluorouracil 2400 mg/m2 CIVI over 46-48 hours    C1D1 reduced dose by 10% to 2160 mg/m2 per MD Cody for " "anticipated tolerance   14-day cycle until progression or toxicity  *Dosing Reference*  NCCN guidelines for colon cancer v2.2019  (cetuximab + chemo) [place] AP et al, OBEY 2017;317(23):0553-7834  (HER2 tx in HER2+ CRC) Kayleen RINALDI et al. Lancet Oncol. 2019 Apr;20(4):518-530.    Allergies: Patient has no known allergies.  /82   Pulse 71   Temp 36.4 °C (97.6 °F) (Temporal)   Resp 18   Ht 1.84 m (6' 0.44\")   Wt 107 kg (235 lb 14.3 oz)   SpO2 97%   BMI 31.60 kg/m²  Body surface area is 2.34 meters squared.     All lab results 6/1/21 within treatment parameters.     ECHO(OK for 6 months per Cody LEO order)  4/21/21 LVEF ~60 %    Drug Order   (Drug name, dose, route, IV Fluid & volume, frequency, number of doses) Cycle 32  Previous treatment: 5/17/21       Medication = Panitumumab (Vectibix)   Base Dose= 3 mg/kg  Calc Dose: Base Dose x 107kg = 321mg  Final Dose = 0mg  Route = IV  Fluid & Volume =  mL  Admin Duration = Over 60 minutes   HELD       Rounded to nearest vial size (within 10%) per rounding protocol, okay to treat with final dose     Medication = Trastuzumab-pkrb (Herzuma)  Base Dose = 4 mg/kg  Calc Dose: Base Dose x 107kg = 428mg  Final Dose = 428mg  Route = IV  Fluid & Volume =  mL  Admin Duration = Over 30 minutes          <10% difference, okay to treat with final dose   Medication = Leucovorin (Wellcovorin)  Base Dose = 400 mg/m²  Calc Dose: Base Dose x 2.34m² = 936mg  Final Dose = 936mg  Route = IV  Fluid & Volume = D5W 250 mL  Admin Duration = Over 30 minutes          <10% difference, okay to treat with final dose   Medication = Fluorouracil (5-FU)  Base Dose = 360 mg/m²  Calc Dose: Base Dose x 2.34m² = 842mg  Final Dose = 840mg  Route = IVP  Fluid & Volume = 16.8mL in syringe  Conc = 50 mg/mL  Admin Duration = Over 5 minutes          <10% difference, okay to treat with final dose     Medication = Fluorouracil (5-FU)  Base Dose = 2160 mg/m²  Calc Dose:Base Dose x 2.34m² = " 5054mg  Final Dose = 5055mg  Route = CIVI   Fluid & Volume = 101.1mL (+3 mL overfill)  Admin Duration = Over 46 hours to run at   2.2mL/hour    Via CADD pump for home infusion        <10% difference, okay to treat with final dose       By my signature below, I confirm this process was performed independently with the BSA and all final chemotherapy dosing calculations congruent. I have reviewed the above chemotherapy order and that my calculation of the final dose and BSA (when applicable) corroborate those calculations of the  pharmacist. Discrepancies of 10% or greater in the written dose have been addressed and documented within the [facility] Progress notes.    Shari Amador, AbranD

## 2021-06-03 NOTE — PROGRESS NOTES
Pt arrived to [facility] ambulatory, here for C32 FOLFOX for colon cancer. Pt with R chest port, EMLA applied by pt at home. EMLA wiped from site and accessed in sterile field with low profile needle. Port flushed, brisk blood return observed. Pt reports he had his labs done in [facility], results reviewed and WNL for chemo to proceed. Premeds given and all chemo infused per MAR. Home pump connected, settings verified by 2 RNs and verified pump was in RUN prior to discharge. Pt requested connection to be taped as he has had issues with the chemo line disconnecting while at home. Pt knows to return 6/4, discharged home under self care in no apparent distress.

## 2021-06-04 ENCOUNTER — OUTPATIENT INFUSION SERVICES (OUTPATIENT)
Dept: ONCOLOGY | Facility: MEDICAL CENTER | Age: 56
End: 2021-06-04
Attending: INTERNAL MEDICINE
Payer: MEDICARE

## 2021-06-04 VITALS
HEIGHT: 73 IN | TEMPERATURE: 97.6 F | DIASTOLIC BLOOD PRESSURE: 83 MMHG | WEIGHT: 233.69 LBS | OXYGEN SATURATION: 97 % | RESPIRATION RATE: 18 BRPM | BODY MASS INDEX: 30.97 KG/M2 | SYSTOLIC BLOOD PRESSURE: 136 MMHG | HEART RATE: 78 BPM

## 2021-06-04 DIAGNOSIS — C78.7 METASTATIC COLON CANCER TO LIVER (HCC): ICD-10-CM

## 2021-06-04 DIAGNOSIS — C18.9 METASTATIC COLON CANCER TO LIVER (HCC): ICD-10-CM

## 2021-06-04 PROCEDURE — 96523 IRRIG DRUG DELIVERY DEVICE: CPT

## 2021-06-04 PROCEDURE — 700111 HCHG RX REV CODE 636 W/ 250 OVERRIDE (IP)

## 2021-06-04 RX ORDER — HEPARIN SODIUM (PORCINE) LOCK FLUSH IV SOLN 100 UNIT/ML 100 UNIT/ML
500 SOLUTION INTRAVENOUS PRN
Status: DISCONTINUED | OUTPATIENT
Start: 2021-06-04 | End: 2021-06-04 | Stop reason: HOSPADM

## 2021-06-04 RX ORDER — HEPARIN SODIUM (PORCINE) LOCK FLUSH IV SOLN 100 UNIT/ML 100 UNIT/ML
SOLUTION INTRAVENOUS
Status: COMPLETED
Start: 2021-06-04 | End: 2021-06-04

## 2021-06-04 RX ADMIN — HEPARIN 500 UNITS: 100 SYRINGE at 12:34

## 2021-06-04 RX ADMIN — HEPARIN SODIUM (PORCINE) LOCK FLUSH IV SOLN 100 UNIT/ML 500 UNITS: 100 SOLUTION at 12:34

## 2021-06-04 ASSESSMENT — FIBROSIS 4 INDEX: FIB4 SCORE: 3.06

## 2021-06-04 NOTE — PROGRESS NOTES
Returns for port de access.  States having hardly any rash after reducing vecitbix.  Expressing concern re if receiving enough to work on tumors, but states last scan showed improvement, so MD ok with it.  Emotional support provided, discussed later rash arrival at times.  Pump complete, saline and hep lock flush post before de access.  DC to self care.  Appt set

## 2021-06-10 RX ORDER — PROCHLORPERAZINE MALEATE 10 MG
10 TABLET ORAL EVERY 6 HOURS PRN
Status: CANCELLED | OUTPATIENT
Start: 2021-06-16

## 2021-06-10 RX ORDER — 0.9 % SODIUM CHLORIDE 0.9 %
VIAL (ML) INJECTION PRN
Status: CANCELLED | OUTPATIENT
Start: 2021-06-16

## 2021-06-10 RX ORDER — EPINEPHRINE 1 MG/ML(1)
0.5 AMPUL (ML) INJECTION PRN
Status: CANCELLED | OUTPATIENT
Start: 2021-06-16

## 2021-06-10 RX ORDER — HEPARIN SODIUM (PORCINE) LOCK FLUSH IV SOLN 100 UNIT/ML 100 UNIT/ML
500 SOLUTION INTRAVENOUS PRN
Status: CANCELLED | OUTPATIENT
Start: 2021-06-16

## 2021-06-10 RX ORDER — 0.9 % SODIUM CHLORIDE 0.9 %
10 VIAL (ML) INJECTION PRN
Status: CANCELLED | OUTPATIENT
Start: 2021-06-16

## 2021-06-10 RX ORDER — ONDANSETRON 2 MG/ML
4 INJECTION INTRAMUSCULAR; INTRAVENOUS PRN
Status: CANCELLED | OUTPATIENT
Start: 2021-06-16

## 2021-06-10 RX ORDER — 0.9 % SODIUM CHLORIDE 0.9 %
3 VIAL (ML) INJECTION PRN
Status: CANCELLED | OUTPATIENT
Start: 2021-06-16

## 2021-06-10 RX ORDER — DIPHENHYDRAMINE HYDROCHLORIDE 50 MG/ML
50 INJECTION INTRAMUSCULAR; INTRAVENOUS PRN
Status: CANCELLED | OUTPATIENT
Start: 2021-06-16

## 2021-06-10 RX ORDER — HEPARIN SODIUM (PORCINE) LOCK FLUSH IV SOLN 100 UNIT/ML 100 UNIT/ML
500 SOLUTION INTRAVENOUS PRN
Status: CANCELLED | OUTPATIENT
Start: 2021-06-15

## 2021-06-10 RX ORDER — 0.9 % SODIUM CHLORIDE 0.9 %
VIAL (ML) INJECTION PRN
Status: CANCELLED | OUTPATIENT
Start: 2021-06-15

## 2021-06-10 RX ORDER — METHYLPREDNISOLONE SODIUM SUCCINATE 125 MG/2ML
125 INJECTION, POWDER, LYOPHILIZED, FOR SOLUTION INTRAMUSCULAR; INTRAVENOUS PRN
Status: CANCELLED | OUTPATIENT
Start: 2021-06-16

## 2021-06-10 RX ORDER — ONDANSETRON 8 MG/1
8 TABLET, ORALLY DISINTEGRATING ORAL PRN
Status: CANCELLED | OUTPATIENT
Start: 2021-06-16

## 2021-06-10 RX ORDER — 0.9 % SODIUM CHLORIDE 0.9 %
20 VIAL (ML) INJECTION PRN
Status: CANCELLED | OUTPATIENT
Start: 2021-06-16

## 2021-06-10 RX ORDER — 0.9 % SODIUM CHLORIDE 0.9 %
3 VIAL (ML) INJECTION PRN
Status: CANCELLED | OUTPATIENT
Start: 2021-06-15

## 2021-06-10 RX ORDER — 0.9 % SODIUM CHLORIDE 0.9 %
10 VIAL (ML) INJECTION PRN
Status: CANCELLED | OUTPATIENT
Start: 2021-06-15

## 2021-06-10 RX ORDER — SODIUM CHLORIDE 9 MG/ML
INJECTION, SOLUTION INTRAVENOUS CONTINUOUS
Status: CANCELLED
Start: 2021-06-16

## 2021-06-11 ENCOUNTER — HOSPITAL ENCOUNTER (OUTPATIENT)
Dept: LAB | Facility: MEDICAL CENTER | Age: 56
End: 2021-06-11
Attending: INTERNAL MEDICINE
Payer: MEDICARE

## 2021-06-11 PROCEDURE — 82378 CARCINOEMBRYONIC ANTIGEN: CPT

## 2021-06-11 PROCEDURE — 85025 COMPLETE CBC W/AUTO DIFF WBC: CPT

## 2021-06-11 PROCEDURE — 36415 COLL VENOUS BLD VENIPUNCTURE: CPT

## 2021-06-11 PROCEDURE — 80053 COMPREHEN METABOLIC PANEL: CPT

## 2021-06-11 PROCEDURE — 83735 ASSAY OF MAGNESIUM: CPT

## 2021-06-12 LAB
ALBUMIN SERPL BCP-MCNC: 4.3 G/DL (ref 3.2–4.9)
ALBUMIN/GLOB SERPL: 1.9 G/DL
ALP SERPL-CCNC: 109 U/L (ref 30–99)
ALT SERPL-CCNC: 21 U/L (ref 2–50)
ANION GAP SERPL CALC-SCNC: 9 MMOL/L (ref 7–16)
AST SERPL-CCNC: 30 U/L (ref 12–45)
BASOPHILS # BLD AUTO: 0.3 % (ref 0–1.8)
BASOPHILS # BLD: 0.02 K/UL (ref 0–0.12)
BILIRUB SERPL-MCNC: 0.7 MG/DL (ref 0.1–1.5)
BUN SERPL-MCNC: 13 MG/DL (ref 8–22)
CALCIUM SERPL-MCNC: 9.1 MG/DL (ref 8.5–10.5)
CEA SERPL-MCNC: 29.2 NG/ML (ref 0–3)
CHLORIDE SERPL-SCNC: 106 MMOL/L (ref 96–112)
CO2 SERPL-SCNC: 24 MMOL/L (ref 20–33)
CREAT SERPL-MCNC: 0.7 MG/DL (ref 0.5–1.4)
EOSINOPHIL # BLD AUTO: 0.18 K/UL (ref 0–0.51)
EOSINOPHIL NFR BLD: 3.1 % (ref 0–6.9)
ERYTHROCYTE [DISTWIDTH] IN BLOOD BY AUTOMATED COUNT: 52.4 FL (ref 35.9–50)
GLOBULIN SER CALC-MCNC: 2.3 G/DL (ref 1.9–3.5)
GLUCOSE SERPL-MCNC: 111 MG/DL (ref 65–99)
HCT VFR BLD AUTO: 39.7 % (ref 42–52)
HGB BLD-MCNC: 13.2 G/DL (ref 14–18)
IMM GRANULOCYTES # BLD AUTO: 0.02 K/UL (ref 0–0.11)
IMM GRANULOCYTES NFR BLD AUTO: 0.3 % (ref 0–0.9)
LYMPHOCYTES # BLD AUTO: 0.99 K/UL (ref 1–4.8)
LYMPHOCYTES NFR BLD: 17.2 % (ref 22–41)
MAGNESIUM SERPL-MCNC: 2.3 MG/DL (ref 1.5–2.5)
MCH RBC QN AUTO: 32.1 PG (ref 27–33)
MCHC RBC AUTO-ENTMCNC: 33.2 G/DL (ref 33.7–35.3)
MCV RBC AUTO: 96.6 FL (ref 81.4–97.8)
MONOCYTES # BLD AUTO: 0.49 K/UL (ref 0–0.85)
MONOCYTES NFR BLD AUTO: 8.5 % (ref 0–13.4)
NEUTROPHILS # BLD AUTO: 4.06 K/UL (ref 1.82–7.42)
NEUTROPHILS NFR BLD: 70.6 % (ref 44–72)
NRBC # BLD AUTO: 0 K/UL
NRBC BLD-RTO: 0 /100 WBC
PLATELET # BLD AUTO: 169 K/UL (ref 164–446)
PMV BLD AUTO: 10.9 FL (ref 9–12.9)
POTASSIUM SERPL-SCNC: 4.1 MMOL/L (ref 3.6–5.5)
PROT SERPL-MCNC: 6.6 G/DL (ref 6–8.2)
RBC # BLD AUTO: 4.11 M/UL (ref 4.7–6.1)
SODIUM SERPL-SCNC: 139 MMOL/L (ref 135–145)
WBC # BLD AUTO: 5.8 K/UL (ref 4.8–10.8)

## 2021-06-13 NOTE — PROGRESS NOTES
"Pharmacy Chemotherapy Verification    Dx: Metastatic colorectal cancer [KRAS/NRAS wild type, ERBB2 (HER2) amplification]    Regimen: mFOLFOX + Vectibix + Herceptin  Trastuzumab 6 mg/kg IV loading dose C1D1, then 4 mg/kg IV Day 1 of subsequent  cycles [confirmed \"every 2 weeks\", per MD; progress note: \"He also had an ERBB2  amplification which might suggest response to treatments like Herceptin\"]   > 7/8/20: Herceptin 2.5 weeks overdue.  Do not reload Herceptin per [facility] Dr. Ross.  Cetuximab 400 mg/m2 IV Cycle 1 Day 1, then 250 mg/m2 IV Days 1 and 8 (confirmed  \"weekly\" per MD)   > C4 * * * cetuximab has been omitted * * *   > C5 * * * cetuximab replaced with panitumumab * * *   OXALIplatin 85 mg/m2 IV Day 1 concurrent with   > Cody LEO reduced dose by 20% to 68 mg/m2 starting with C1D1 due to anticipated tolerance    > 3/14/20: Oxaliplatin discontinued from treatment  Panitumumab (Vectibix) 6 mg/kg IV D1 over 60 min   + added; change of therapy d/t cetuximab skin tox   > 4/25/20, initial dose reduction to 4.5 mg/kg d/t previous EGFR cutaneous toxicities    > C9 * * * Vectibix has been omitted * * *   > C10 Vectibix reordered at 3 mg/kg per MD Cody    > C13 * * * Vectibix has been omitted * * *   > C14 Vectibix reordered at 3 mg/kg per MD Cody   >C17 Vectibix HELD per Cody LEO              >C18 Vectibix held again per MD   >C19  11/30/20- vectibix resumed at 3mg/kg   >C21 HOLD x1 dose for rash   >C22 cont HOLD   >C23 vectibix resumed at 3 mg/kg   >C25 & 26 Vectibix on hold due to rash   C27 Vectibix restarted at 3 mg/kg per Dr Ross    C28 Vectibix HELD   C30 Vectibix restarted 3 mg/kg per Dr. Ross      >C31,32 Vectibix held  Leucovorin 400 mg/m2 IV Day 1 followed by  Fluorouracil 400 mg/m2 IV Day 1 followed by   C1D1 reduced dose by 10% to 360 mg/m2 per MD Cody for anticipated tolerance   Fluorouracil 2400 mg/m2 CIVI over 46-48 hours    C1D1 reduced dose by 10% to 2160 mg/m2 per MD Cody for " "anticipated tolerance   14-day cycle until progression or toxicity  *Dosing Reference*  NCCN guidelines for colon cancer v2.2019  (cetuximab + chemo) [place] AP et al, OBEY 2017;317(23):2445-6840  (HER2 tx in HER2+ CRC) Kayleen RINALDI et al. Lancet Oncol. 2019 Apr;20(4):518-530.    Allergies: Patient has no known allergies.  /81   Pulse 78   Temp 36.7 °C (98 °F) (Temporal)   Resp 18   Ht 1.85 m (6' 0.84\")   Wt 106 kg (233 lb 7.5 oz)   SpO2 98%   BMI 30.94 kg/m²  Body surface area is 2.33 meters squared.     Labs 6/11/21  ANC 4060 Hgb 13.2 Plt 169k  SCr 0.7 CrCl >125 mL/min   AST/ALT/AP = 30/21/109 Tbili = 0.7    ECHO (OK for 6 months per Cody LEO order)  4/21/21 LVEF ~60 %    Drug Order   (Drug name, dose, route, IV Fluid & volume, frequency, number of doses) Cycle 33  Previous treatment: 6/2/21       Medication = Panitumumab (Vectibix)   Base Dose = 3 mg/kg  Calc Dose: Base Dose x 106 kg = 318 mg  Final Dose = 300 mg  Route = IV  Fluid & Volume =  mL  Admin Duration = Over 60 minutes         Rounded to nearest vial size (within 10%) per rounding protocol, okay to treat with final dose     Medication = Trastuzumab-pkrb (Herzuma)  Base Dose = 4 mg/kg  Calc Dose: Base Dose x 106 kg = 424 mg  Final Dose = 424 mg  Route = IV  Fluid & Volume =  mL  Admin Duration = Over 30 minutes          <10% difference, okay to treat with final dose   Medication = Leucovorin (Wellcovorin)  Base Dose = 400 mg/m²  Calc Dose: Base Dose x 2.33 m² = 932 mg  Final Dose = 932 mg  Route = IV  Fluid & Volume = D5W 250 mL  Admin Duration = Over 30 minutes          <10% difference, okay to treat with final dose   Medication = Fluorouracil (5-FU)  Base Dose = 360 mg/m²  Calc Dose: Base Dose x 2.33 m² = 838.8 mg  Final Dose = 840 mg  Route = IVP  Fluid & Volume = 16.8 mL in syringe  Conc = 50 mg/mL  Admin Duration = Over 5 minutes          <10% difference, okay to treat with final dose     Medication = Fluorouracil " (5-FU)  Base Dose = 2160 mg/m²  Calc Dose:Base Dose x 2.33 m² = 5032.8 mg  Final Dose = 5035 mg  Route = CIVI   Fluid & Volume = 100.7 mL (+3 mL overfill)  Admin Duration = Over 46 hours to run at   2.2 mL/hour    Via CADD pump for home infusion        <10% difference, okay to treat with final dose       By my signature below, I confirm this process was performed independently with the BSA and all final chemotherapy dosing calculations congruent. I have reviewed the above chemotherapy order and that my calculation of the final dose and BSA (when applicable) corroborate those calculations of the  pharmacist. Discrepancies of 10% or greater in the written dose have been addressed and documented within the EPIC Progress notes.    Jeni Ponce, PharmD, BCOP

## 2021-06-14 ENCOUNTER — OUTPATIENT INFUSION SERVICES (OUTPATIENT)
Dept: ONCOLOGY | Facility: MEDICAL CENTER | Age: 56
End: 2021-06-14
Attending: INTERNAL MEDICINE
Payer: MEDICARE

## 2021-06-14 VITALS
SYSTOLIC BLOOD PRESSURE: 145 MMHG | RESPIRATION RATE: 18 BRPM | DIASTOLIC BLOOD PRESSURE: 81 MMHG | BODY MASS INDEX: 30.94 KG/M2 | TEMPERATURE: 98 F | HEIGHT: 73 IN | WEIGHT: 233.47 LBS | OXYGEN SATURATION: 98 % | HEART RATE: 78 BPM

## 2021-06-14 DIAGNOSIS — C18.9 METASTATIC COLON CANCER TO LIVER (HCC): ICD-10-CM

## 2021-06-14 DIAGNOSIS — C78.7 METASTATIC COLON CANCER TO LIVER (HCC): ICD-10-CM

## 2021-06-14 PROCEDURE — 96413 CHEMO IV INFUSION 1 HR: CPT

## 2021-06-14 PROCEDURE — A4212 NON CORING NEEDLE OR STYLET: HCPCS

## 2021-06-14 PROCEDURE — 96417 CHEMO IV INFUS EACH ADDL SEQ: CPT

## 2021-06-14 PROCEDURE — 96375 TX/PRO/DX INJ NEW DRUG ADDON: CPT

## 2021-06-14 PROCEDURE — 700111 HCHG RX REV CODE 636 W/ 250 OVERRIDE (IP): Performed by: INTERNAL MEDICINE

## 2021-06-14 PROCEDURE — 700105 HCHG RX REV CODE 258: Performed by: INTERNAL MEDICINE

## 2021-06-14 PROCEDURE — G0498 CHEMO EXTEND IV INFUS W/PUMP: HCPCS

## 2021-06-14 PROCEDURE — 96411 CHEMO IV PUSH ADDL DRUG: CPT

## 2021-06-14 RX ADMIN — FLUOROURACIL 5035 MG: 50 INJECTION, SOLUTION INTRAVENOUS at 17:46

## 2021-06-14 RX ADMIN — DIPHENHYDRAMINE HYDROCHLORIDE 50 MG: 50 INJECTION INTRAMUSCULAR; INTRAVENOUS at 14:19

## 2021-06-14 RX ADMIN — LEUCOVORIN CALCIUM 932 MG: 350 INJECTION, POWDER, LYOPHILIZED, FOR SUSPENSION INTRAMUSCULAR; INTRAVENOUS at 17:06

## 2021-06-14 RX ADMIN — TRASTUZUMAB 424 MG: KIT at 16:28

## 2021-06-14 RX ADMIN — PANITUMUMAB 300 MG: 100 SOLUTION INTRAVENOUS at 15:02

## 2021-06-14 RX ADMIN — ONDANSETRON 16 MG: 2 INJECTION INTRAMUSCULAR; INTRAVENOUS at 14:32

## 2021-06-14 RX ADMIN — FLUOROURACIL 840 MG: 50 INJECTION, SOLUTION INTRAVENOUS at 17:38

## 2021-06-14 ASSESSMENT — FIBROSIS 4 INDEX: FIB4 SCORE: 2.21

## 2021-06-14 NOTE — PROGRESS NOTES
Chemotherapy Verification - PRIMARY RN      Height = 185 cm  Weight = 106 kg  BSA = 2.33 m^2    Echo in April 2021 LVEF 60%      Medication: Panitumumab (Vectibix)  Dose: 3 mg/kg  Calculated Dose: 318 mg                             (In mg/m2, AUC, mg/kg)     Medication: Trastuzumab-pkrb (Herzuma)  Dose: 4 mg/kg  Calculated Dose: 424 mg                             (In mg/m2, AUC, mg/kg)    Medication: Fluorouracil bolus IVP  Dose: 360 mg/m^2  Calculated Dose: 838.8 mg                             (In mg/m2, AUC, mg/kg)    Medication: Fluorouracil CADD pump  Dose: 2,160 mg/m^2  Calculated Dose: 5,032.8 mg infused via CADD pump over 46 hours                             (In mg/m2, AUC, mg/kg)      I confirm this process was performed independently with the BSA and all final chemotherapy dosing calculations congruent.  Any discrepancies of 10% or greater have been addressed with the chemotherapy pharmacist. The resolution of the discrepancy has been documented in the EPIC progress notes.        no chest pain and no edema.

## 2021-06-14 NOTE — PROGRESS NOTES
"Pharmacy Chemotherapy Verification    Dx: Metastatic colorectal cancer [KRAS/NRAS wild type, ERBB2 (HER2) amplification]    Cycle 33  Previous treatment: 5/17/21    Regimen: mFOLFOX + Vectibix + trastuzumab  Trastuzumab 6 mg/kg IV loading dose C1D1, then 4 mg/kg IV Day 1 of subsequent  cycles [confirmed \"every 2 weeks\", per MD; progress note: \"He also had an ERBB2  amplification which might suggest response to treatments like Herceptin\"]   > 7/8/20: Herceptin 2.5 weeks overdue.  Do not reload Herceptin per TRBO Dr. Ross.  Cetuximab 400 mg/m2 IV Cycle 1 Day 1, then 250 mg/m2 IV Days 1 and 8 (confirmed  \"weekly\" per MD)   > C4 * * * cetuximab has been omitted * * *   > C5 * * * cetuximab replaced with panitumumab * * *   OXALIplatin 85 mg/m2 IV Day 1 concurrent with   > Cody LEO reduced dose by 20% to 68 mg/m2 starting with C1D1 due to anticipated tolerance    > 3/14/20: Oxaliplatin discontinued from treatment  Panitumumab (Vectibix) 6 mg/kg IV D1 over 60 min   + added; change of therapy d/t cetuximab skin tox   > 4/25/20, initial dose reduction to 4.5 mg/kg d/t previous EGFR cutaneous toxicities    > C9 * * * Vectibix has been omitted * * *   > C10 Vectibix reordered at 3 mg/kg per MD Cody    > C13 * * * Vectibix has been omitted * * *   > C14 Vectibix reordered at 3 mg/kg per MD Cody   >C17 Vectibix HELD per Cody LEO              >C18 Vectibix held again per MD   >C19  11/30/20- vectibix resumed at 3mg/kg   >C21 HOLD x1 dose for rash   >C22 cont HOLD   >C23 vectibix resumed at 3 mg/kg   >C25 & C26 Vectibix on hold due to rash   >C27 Vectibix 3mg/kg   >C28-29 Vectibix on hold due to rash   >C30 Vectibix 3mg/kg   >C31,32 Vectibix held   >C33- resume at 3mg/kg   Leucovorin 400 mg/m2 IV Day 1 followed by  Fluorouracil 400 mg/m2 IV Day 1 followed by   C1D1 reduced dose by 10% to 360 mg/m2 per MD Cody for anticipated tolerance   Fluorouracil 2400 mg/m2 CIVI over 46-48 hours    C1D1 reduced dose by 10% to 2160 " "mg/m2 per MD Cody for anticipated tolerance   14-day cycle until progression or toxicity  *Dosing Reference*  NCCN guidelines for colon cancer v2.2019  (cetuximab + chemo) [place] AP et al, OBEY 2017;317(23):2495-1167  (HER2 tx in HER2+ CRC) Kayleen RINALDI et al. Lancet Oncol. 2019 Apr;20(4):518-530.    Allergies: Patient has no known allergies.  /81   Pulse 78   Temp 36.7 °C (98 °F) (Temporal)   Resp 18   Ht 1.85 m (6' 0.84\")   Wt 106 kg (233 lb 7.5 oz)   SpO2 98%   BMI 30.94 kg/m²  Body surface area is 2.33 meters squared.     All lab results 6/11/21 within treatment parameters.     ECHO  4/21/21- LVEF ~ 60% -OK for 6 months per Cody LEO order      Panitumumab (Vectibix) 3 mg/kg x 106 kg = 318 mg              Rounded to vial size (within 10%) per dose rounding protocol   ok to treat with final dose = 300mg IV     Trastuzumab-pkrb (Herzuma) 4 mg/kg x 106kg = 424mg   <10% difference, ok to treat with final dose = 424mg IV     Leucovorin 400 mg/m² x 2.33 m² = 932mg              Okay to treat with final dose = 932 mg IV over 30 minutes      Fluorouracil (Adrucil) 360 mg/m² x 2.33m² = 838mg              Okay to treat with final dose =  840mg IV push     Fluorouracil (Adrucil) 2160 mg/m² x 2.33m² = 5033 mg              Okay to treat with final dose =  5035 mg AKOSUA Amador, Pharm.D.    "

## 2021-06-14 NOTE — PROGRESS NOTES
Chemotherapy Verification - SECONDARY RN     D1C33    Height = 185cm  Weight = 106kg  BSA = 2.33m2       Medication: Panitumumab (Vectibix)  Dose: 3mg/kg  Calculated Dose: 318mg                                Medication: Trastuzumab-pkrb (Huerzuma) Dose: 4mg/kg  Calculated Dose: 424mg                                Medication: Leucovorin  Dose: 400mg/m2  Calculated Dose: 932mg                                Medication: Fluorouracil (Adrucil)  Dose: 360mg/m2  Calculated Dose: 838.8mg                                Medication: Fluorouracil (Adrucil)  Dose: 2160mg/m2  Calculated Dose: 5032.8mg CADD Pump over 46 hours                                I confirm that this process was performed independently.

## 2021-06-15 NOTE — PROGRESS NOTES
Pt arrives to [facility] for cycle 33 day 1 of Vectibix/Herceptin/5FU CADD pump for colon cancer.  Pt denies s/sx of infection or Vectibix rash.  Discussed plan of care with pt.  Labs on 6/12/2021 reviewed and pt meets parameters for treatment.  Echo in April 2021 with LVEF 60%. [facility] port has EMLA cream applied to port site by the pt.  EMLA cream removed.  Port accessed with sterile technique, flushed with NS and brisk blood return observed.  Pre-medications given.  Vectibix given without issue.  Herceptin infused.  Leucovorin infused.  Port flushed with NS and brisk blood return observed prior to giving 5FU bolus and 5FU CADD pump.  5FU CADD pump settings verified with 2 RN's and pump is functioning properly.  Confirmed next appt time with pt.  Pt dc home to self care

## 2021-06-16 ENCOUNTER — OUTPATIENT INFUSION SERVICES (OUTPATIENT)
Dept: ONCOLOGY | Facility: MEDICAL CENTER | Age: 56
End: 2021-06-16
Attending: INTERNAL MEDICINE
Payer: MEDICARE

## 2021-06-16 VITALS
DIASTOLIC BLOOD PRESSURE: 83 MMHG | OXYGEN SATURATION: 98 % | SYSTOLIC BLOOD PRESSURE: 136 MMHG | TEMPERATURE: 98 F | RESPIRATION RATE: 18 BRPM | HEART RATE: 84 BPM

## 2021-06-16 DIAGNOSIS — C78.7 METASTATIC COLON CANCER TO LIVER (HCC): ICD-10-CM

## 2021-06-16 DIAGNOSIS — C18.9 METASTATIC COLON CANCER TO LIVER (HCC): ICD-10-CM

## 2021-06-16 PROCEDURE — 700111 HCHG RX REV CODE 636 W/ 250 OVERRIDE (IP): Performed by: INTERNAL MEDICINE

## 2021-06-16 PROCEDURE — 96523 IRRIG DRUG DELIVERY DEVICE: CPT

## 2021-06-16 RX ORDER — HEPARIN SODIUM (PORCINE) LOCK FLUSH IV SOLN 100 UNIT/ML 100 UNIT/ML
500 SOLUTION INTRAVENOUS PRN
Status: DISCONTINUED | OUTPATIENT
Start: 2021-06-16 | End: 2021-06-16 | Stop reason: HOSPADM

## 2021-06-16 RX ADMIN — HEPARIN 500 UNITS: 100 SYRINGE at 16:35

## 2021-06-17 NOTE — PROGRESS NOTES
Here for 5 FU pump disconnect after 46 hrs of continuous infusion. See chemotherapy flow sheet for volume / dose administration. Port flushed per protocol, site d-accessed, needle intact compression dressing applied. Patient discharge home to self care in [facility]. Next appointment confirmed.

## 2021-06-23 RX ORDER — DIPHENHYDRAMINE HYDROCHLORIDE 50 MG/ML
50 INJECTION INTRAMUSCULAR; INTRAVENOUS PRN
Status: CANCELLED | OUTPATIENT
Start: 2021-06-28

## 2021-06-23 RX ORDER — HEPARIN SODIUM (PORCINE) LOCK FLUSH IV SOLN 100 UNIT/ML 100 UNIT/ML
500 SOLUTION INTRAVENOUS PRN
Status: CANCELLED | OUTPATIENT
Start: 2021-06-30

## 2021-06-23 RX ORDER — 0.9 % SODIUM CHLORIDE 0.9 %
10 VIAL (ML) INJECTION PRN
Status: CANCELLED | OUTPATIENT
Start: 2021-06-28

## 2021-06-23 RX ORDER — EPINEPHRINE 1 MG/ML(1)
0.5 AMPUL (ML) INJECTION PRN
Status: CANCELLED | OUTPATIENT
Start: 2021-06-28

## 2021-06-23 RX ORDER — 0.9 % SODIUM CHLORIDE 0.9 %
10 VIAL (ML) INJECTION PRN
Status: CANCELLED | OUTPATIENT
Start: 2021-06-27

## 2021-06-23 RX ORDER — 0.9 % SODIUM CHLORIDE 0.9 %
VIAL (ML) INJECTION PRN
Status: CANCELLED | OUTPATIENT
Start: 2021-06-28

## 2021-06-23 RX ORDER — HEPARIN SODIUM (PORCINE) LOCK FLUSH IV SOLN 100 UNIT/ML 100 UNIT/ML
500 SOLUTION INTRAVENOUS PRN
Status: CANCELLED | OUTPATIENT
Start: 2021-06-28

## 2021-06-23 RX ORDER — 0.9 % SODIUM CHLORIDE 0.9 %
3 VIAL (ML) INJECTION PRN
Status: CANCELLED | OUTPATIENT
Start: 2021-06-28

## 2021-06-23 RX ORDER — 0.9 % SODIUM CHLORIDE 0.9 %
VIAL (ML) INJECTION PRN
Status: CANCELLED | OUTPATIENT
Start: 2021-06-27

## 2021-06-23 RX ORDER — METHYLPREDNISOLONE SODIUM SUCCINATE 125 MG/2ML
125 INJECTION, POWDER, LYOPHILIZED, FOR SOLUTION INTRAMUSCULAR; INTRAVENOUS PRN
Status: CANCELLED | OUTPATIENT
Start: 2021-06-28

## 2021-06-23 RX ORDER — ONDANSETRON 2 MG/ML
4 INJECTION INTRAMUSCULAR; INTRAVENOUS PRN
Status: CANCELLED | OUTPATIENT
Start: 2021-06-28

## 2021-06-23 RX ORDER — 0.9 % SODIUM CHLORIDE 0.9 %
20 VIAL (ML) INJECTION PRN
Status: CANCELLED | OUTPATIENT
Start: 2021-06-30

## 2021-06-23 RX ORDER — HEPARIN SODIUM (PORCINE) LOCK FLUSH IV SOLN 100 UNIT/ML 100 UNIT/ML
500 SOLUTION INTRAVENOUS PRN
Status: CANCELLED | OUTPATIENT
Start: 2021-06-27

## 2021-06-23 RX ORDER — PROCHLORPERAZINE MALEATE 10 MG
10 TABLET ORAL EVERY 6 HOURS PRN
Status: CANCELLED | OUTPATIENT
Start: 2021-06-28

## 2021-06-23 RX ORDER — ONDANSETRON 8 MG/1
8 TABLET, ORALLY DISINTEGRATING ORAL PRN
Status: CANCELLED | OUTPATIENT
Start: 2021-06-28

## 2021-06-23 RX ORDER — 0.9 % SODIUM CHLORIDE 0.9 %
3 VIAL (ML) INJECTION PRN
Status: CANCELLED | OUTPATIENT
Start: 2021-06-27

## 2021-06-23 RX ORDER — SODIUM CHLORIDE 9 MG/ML
INJECTION, SOLUTION INTRAVENOUS CONTINUOUS
Status: CANCELLED
Start: 2021-06-28

## 2021-06-25 ENCOUNTER — HOSPITAL ENCOUNTER (OUTPATIENT)
Dept: LAB | Facility: MEDICAL CENTER | Age: 56
End: 2021-06-25
Attending: INTERNAL MEDICINE
Payer: MEDICARE

## 2021-06-25 LAB
ALBUMIN SERPL BCP-MCNC: 4.2 G/DL (ref 3.2–4.9)
ALBUMIN/GLOB SERPL: 1.8 G/DL
ALP SERPL-CCNC: 112 U/L (ref 30–99)
ALT SERPL-CCNC: 23 U/L (ref 2–50)
ANION GAP SERPL CALC-SCNC: 11 MMOL/L (ref 7–16)
AST SERPL-CCNC: 33 U/L (ref 12–45)
BASOPHILS # BLD AUTO: 0.7 % (ref 0–1.8)
BASOPHILS # BLD: 0.04 K/UL (ref 0–0.12)
BILIRUB SERPL-MCNC: 1 MG/DL (ref 0.1–1.5)
BUN SERPL-MCNC: 18 MG/DL (ref 8–22)
CALCIUM SERPL-MCNC: 9 MG/DL (ref 8.5–10.5)
CEA SERPL-MCNC: 31.2 NG/ML (ref 0–3)
CHLORIDE SERPL-SCNC: 106 MMOL/L (ref 96–112)
CO2 SERPL-SCNC: 23 MMOL/L (ref 20–33)
CREAT SERPL-MCNC: 0.73 MG/DL (ref 0.5–1.4)
EOSINOPHIL # BLD AUTO: 0.21 K/UL (ref 0–0.51)
EOSINOPHIL NFR BLD: 3.7 % (ref 0–6.9)
ERYTHROCYTE [DISTWIDTH] IN BLOOD BY AUTOMATED COUNT: 52.1 FL (ref 35.9–50)
GLOBULIN SER CALC-MCNC: 2.4 G/DL (ref 1.9–3.5)
GLUCOSE SERPL-MCNC: 137 MG/DL (ref 65–99)
HCT VFR BLD AUTO: 40.9 % (ref 42–52)
HGB BLD-MCNC: 13.4 G/DL (ref 14–18)
IMM GRANULOCYTES # BLD AUTO: 0.02 K/UL (ref 0–0.11)
IMM GRANULOCYTES NFR BLD AUTO: 0.3 % (ref 0–0.9)
LYMPHOCYTES # BLD AUTO: 1.14 K/UL (ref 1–4.8)
LYMPHOCYTES NFR BLD: 19.9 % (ref 22–41)
MAGNESIUM SERPL-MCNC: 2 MG/DL (ref 1.5–2.5)
MCH RBC QN AUTO: 31.9 PG (ref 27–33)
MCHC RBC AUTO-ENTMCNC: 32.8 G/DL (ref 33.7–35.3)
MCV RBC AUTO: 97.4 FL (ref 81.4–97.8)
MONOCYTES # BLD AUTO: 0.66 K/UL (ref 0–0.85)
MONOCYTES NFR BLD AUTO: 11.5 % (ref 0–13.4)
NEUTROPHILS # BLD AUTO: 3.65 K/UL (ref 1.82–7.42)
NEUTROPHILS NFR BLD: 63.9 % (ref 44–72)
NRBC # BLD AUTO: 0 K/UL
NRBC BLD-RTO: 0 /100 WBC
PLATELET # BLD AUTO: 151 K/UL (ref 164–446)
PMV BLD AUTO: 11.3 FL (ref 9–12.9)
POTASSIUM SERPL-SCNC: 4.1 MMOL/L (ref 3.6–5.5)
PROT SERPL-MCNC: 6.6 G/DL (ref 6–8.2)
RBC # BLD AUTO: 4.2 M/UL (ref 4.7–6.1)
SODIUM SERPL-SCNC: 140 MMOL/L (ref 135–145)
WBC # BLD AUTO: 5.7 K/UL (ref 4.8–10.8)

## 2021-06-25 PROCEDURE — 83735 ASSAY OF MAGNESIUM: CPT

## 2021-06-25 PROCEDURE — 80053 COMPREHEN METABOLIC PANEL: CPT

## 2021-06-25 PROCEDURE — 36415 COLL VENOUS BLD VENIPUNCTURE: CPT

## 2021-06-25 PROCEDURE — 85025 COMPLETE CBC W/AUTO DIFF WBC: CPT

## 2021-06-25 PROCEDURE — 82378 CARCINOEMBRYONIC ANTIGEN: CPT

## 2021-06-28 ENCOUNTER — OUTPATIENT INFUSION SERVICES (OUTPATIENT)
Dept: ONCOLOGY | Facility: MEDICAL CENTER | Age: 56
End: 2021-06-28
Attending: INTERNAL MEDICINE
Payer: MEDICARE

## 2021-06-28 VITALS
DIASTOLIC BLOOD PRESSURE: 79 MMHG | OXYGEN SATURATION: 98 % | WEIGHT: 229.28 LBS | RESPIRATION RATE: 18 BRPM | HEART RATE: 88 BPM | SYSTOLIC BLOOD PRESSURE: 145 MMHG | TEMPERATURE: 98 F | BODY MASS INDEX: 30.39 KG/M2 | HEIGHT: 73 IN

## 2021-06-28 DIAGNOSIS — C78.7 METASTATIC COLON CANCER TO LIVER (HCC): ICD-10-CM

## 2021-06-28 DIAGNOSIS — C18.9 METASTATIC COLON CANCER TO LIVER (HCC): ICD-10-CM

## 2021-06-28 PROCEDURE — 700111 HCHG RX REV CODE 636 W/ 250 OVERRIDE (IP): Performed by: INTERNAL MEDICINE

## 2021-06-28 PROCEDURE — G0498 CHEMO EXTEND IV INFUS W/PUMP: HCPCS

## 2021-06-28 PROCEDURE — 96367 TX/PROPH/DG ADDL SEQ IV INF: CPT

## 2021-06-28 PROCEDURE — 96411 CHEMO IV PUSH ADDL DRUG: CPT

## 2021-06-28 PROCEDURE — 96417 CHEMO IV INFUS EACH ADDL SEQ: CPT

## 2021-06-28 PROCEDURE — 96375 TX/PRO/DX INJ NEW DRUG ADDON: CPT

## 2021-06-28 PROCEDURE — 96413 CHEMO IV INFUSION 1 HR: CPT

## 2021-06-28 PROCEDURE — 700105 HCHG RX REV CODE 258: Performed by: INTERNAL MEDICINE

## 2021-06-28 PROCEDURE — A4212 NON CORING NEEDLE OR STYLET: HCPCS

## 2021-06-28 RX ORDER — EMPAGLIFLOZIN 10 MG/1
TABLET, FILM COATED ORAL
COMMUNITY
End: 2022-03-29 | Stop reason: SDUPTHER

## 2021-06-28 RX ORDER — SODIUM CHLORIDE 9 MG/ML
INJECTION, SOLUTION INTRAVENOUS CONTINUOUS
Status: DISCONTINUED | OUTPATIENT
Start: 2021-06-28 | End: 2021-06-28 | Stop reason: HOSPADM

## 2021-06-28 RX ADMIN — ONDANSETRON 16 MG: 2 INJECTION INTRAMUSCULAR; INTRAVENOUS at 10:50

## 2021-06-28 RX ADMIN — SODIUM CHLORIDE: 9 INJECTION, SOLUTION INTRAVENOUS at 10:50

## 2021-06-28 RX ADMIN — FLUOROURACIL 4990 MG: 50 INJECTION, SOLUTION INTRAVENOUS at 13:26

## 2021-06-28 RX ADMIN — LEUCOVORIN CALCIUM 924 MG: 350 INJECTION, POWDER, LYOPHILIZED, FOR SUSPENSION INTRAMUSCULAR; INTRAVENOUS at 12:44

## 2021-06-28 RX ADMIN — DIPHENHYDRAMINE HYDROCHLORIDE 50 MG: 50 INJECTION, SOLUTION INTRAMUSCULAR; INTRAVENOUS at 11:12

## 2021-06-28 RX ADMIN — TRASTUZUMAB 416 MG: KIT at 11:57

## 2021-06-28 RX ADMIN — FLUOROURACIL 830 MG: 50 INJECTION, SOLUTION INTRAVENOUS at 13:26

## 2021-06-28 ASSESSMENT — FIBROSIS 4 INDEX: FIB4 SCORE: 2.6

## 2021-06-28 NOTE — PROGRESS NOTES
Mr. Jo arrived to the infusion center today for Herzuma and Fluorouracil. Patient is in great spirits and is glad that the rash from Vextibix is improving. He is aware that he will be receiving vextibix every third cycle. Port-a-cath accessed with brisk blood return and low profile needle. Labs verified from 6/25/2021 and met parameters to treat. Premedication of Zofran and benadryl given. Herzuma given followed by Leukovorin. Fluorouracil push given and connected patient to CADD pump for 46 hour infusion. Patient tolerated all well with no reactions noted. Patient left infusion center with no apparent distress and confirmed his appointment on 6/30/2021 to have pump disconnected. Left infusion center with no apparent distress.

## 2021-06-28 NOTE — PROGRESS NOTES
"Pharmacy Chemotherapy Verification  Patient name: Gurmeet Jo  Dx: Metastatic colorectal cancer [KRAS/NRAS wild type, ERBB2 (HER2) amplification]    Regimen: mFOLFOX + Vectibix + Herceptin  Trastuzumab 6 mg/kg IV loading dose C1D1, then 4 mg/kg IV Day 1 of subsequent  cycles [confirmed \"every 2 weeks\", per MD; progress note: \"He also had an ERBB2  amplification which might suggest response to treatments like Herceptin\"]   > 7/8/20: Herceptin 2.5 weeks overdue.  Do not reload Herceptin per TRSAMMY Ross.  Cetuximab 400 mg/m2 IV Cycle 1 Day 1, then 250 mg/m2 IV Days 1 and 8 (confirmed  \"weekly\" per MD)   > C4 * * * cetuximab has been omitted * * *   > C5 * * * cetuximab replaced with panitumumab * * *   OXALIplatin 85 mg/m2 IV Day 1 concurrent with   > Cody LEO reduced dose by 20% to 68 mg/m2 starting with C1D1 due to anticipated tolerance    > 3/14/20: Oxaliplatin discontinued from treatment  Panitumumab (Vectibix) 6 mg/kg IV D1 over 60 min   + added; change of therapy d/t cetuximab skin tox   4/25/20, initial dose reduction to 4.5 mg/kg d/t previous EGFR cutaneous toxicities    C9 * * * Vectibix has been omitted * * *   C10 Vectibix reordered at 3 mg/kg per MD Cody    C13 * * * Vectibix has been omitted * * *   C14 Vectibix reordered at 3 mg/kg per MD Cody   C17 Vectibix HELD per Cody LEO              C18 Vectibix held again per MD   C19  11/30/20- vectibix resumed at 3mg/kg   C21 HOLD x1 dose for rash   C22 cont HOLD   C23 vectibix resumed at 3 mg/kg   C25 & 26 Vectibix on HOLD d/t rash   C27 Vectibix restarted at 3 mg/kg per Dr Ross    C28 Vectibix HELD   C30 Vectibix 3 mg/kg per Dr. Ross    C31,32 Vectibix HOLD   C33 Vectibix 3 mg/kg per Dr. Ross    C34 Vectibix HOLD  Leucovorin 400 mg/m2 IV Day 1 followed by  Fluorouracil 400 mg/m2 IV Day 1 followed by   C1D1 reduced dose by 10% to 360 mg/m2 per MD Cody for anticipated tolerance   Fluorouracil 2400 mg/m2 CIVI over 46-48 hours    C1D1 " "reduced dose by 10% to 2160 mg/m2 per MD Cody for anticipated tolerance   14-day cycle until progression or toxicity  *Dosing Reference*  NCCN guidelines for colon cancer v2.2019  (cetuximab + chemo) [place] AP et al, OBEY 2017;317(23):7351-1310  (HER2 tx in HER2+ CRC) Kayleen RINALDI et al. Lancet Oncol. 2019 Apr;20(4):518-530.    Allergies: Patient has no known allergies.  /79   Pulse 88   Temp 36.7 °C (98 °F) (Temporal)   Resp 18   Ht 1.85 m (6' 0.84\")   Wt 104 kg (229 lb 4.5 oz)   SpO2 98%   BMI 30.39 kg/m²  Body surface area is 2.31 meters squared.     Labs 6/25/21  Meet treatment parameters    ECHO (OK for 6 months per Cody LEO order)  4/21/21 LVEF ~60 %    Drug Order   (Drug name, dose, route, IV Fluid & volume, frequency, number of doses) Cycle 34  Previous treatment: 6/14/21       Medication = Panitumumab (Vectibix)   Base Dose = 3 mg/kg  Calc Dose: Base Dose x 106 kg = 318 mg  Final Dose = 300 mg  Route = IV  Fluid & Volume =  mL  Admin Duration = Over 60 minutes         Rounded to nearest vial size (within 10%) per rounding protocol, okay to treat with final dose     Medication = Trastuzumab-pkrb (Herzuma)  Base Dose = 4 mg/kg  Calc Dose: Base Dose x 104 kg = 416 mg  Final Dose = 416 mg  Route = IV  Fluid & Volume =  mL  Admin Duration = Over 30 minutes          Okay to treat with final dose   Medication = Leucovorin (Wellcovorin)  Base Dose = 400 mg/m²  Calc Dose: Base Dose x 2.31 m² = 924 mg  Final Dose = 924 mg  Route = IV  Fluid & Volume = D5W 250 mL  Admin Duration = Over 30 minutes          Okay to treat with final dose   Medication = Fluorouracil (5-FU)  Base Dose = 360 mg/m²  Calc Dose: Base Dose x 2.31 m² = 831.6 mg  Final Dose = 830 mg  Route = IVP  Fluid & Volume = 16.6 mL in syringe  Conc = 50 mg/mL  Admin Duration = Over 5 minutes          Okay to treat with final dose     Medication = Fluorouracil (5-FU)  Base Dose = 2160 mg/m²  Calc Dose:Base Dose x 2.31 m² = " 4989.6 mg  Final Dose = 4990 mg  Route = CIVI   Fluid & Volume = 99.8 mL (+3 mL overfill)  Admin Duration = Over 46 hours to run at   2.2 mL/hour    Via CADD pump for home infusion        Okay to treat with final dose

## 2021-06-28 NOTE — PROGRESS NOTES
"Pharmacy Chemotherapy Verification    Dx: Metastatic colorectal cancer [KRAS/NRAS wild type, ERBB2 (HER2) amplification]    Cycle 34  Previous treatment: 6/14/21    Regimen: mFOLFOX + Vectibix + trastuzumab  Trastuzumab 6 mg/kg IV loading dose C1D1, then 4 mg/kg IV Day 1 of subsequent  cycles [confirmed \"every 2 weeks\", per MD; progress note: \"He also had an ERBB2  amplification which might suggest response to treatments like Herceptin\"]   > 7/8/20: Herceptin 2.5 weeks overdue.  Do not reload Herceptin per TRBO Dr. Ross.  Cetuximab 400 mg/m2 IV Cycle 1 Day 1, then 250 mg/m2 IV Days 1 and 8 (confirmed  \"weekly\" per MD)   > C4 * * * cetuximab has been omitted * * *   > C5 * * * cetuximab replaced with panitumumab * * *   OXALIplatin 85 mg/m2 IV Day 1 concurrent with   > Cody LEO reduced dose by 20% to 68 mg/m2 starting with C1D1 due to anticipated tolerance    > 3/14/20: Oxaliplatin discontinued from treatment  Panitumumab (Vectibix) 6 mg/kg IV D1 over 60 min   + added; change of therapy d/t cetuximab skin tox   > 4/25/20, initial dose reduction to 4.5 mg/kg d/t previous EGFR cutaneous toxicities    > C9 * * * Vectibix has been omitted * * *   > C10 Vectibix reordered at 3 mg/kg per MD Cody    > C13 * * * Vectibix has been omitted * * *   > C14 Vectibix reordered at 3 mg/kg per MD Cody   >C17 Vectibix HELD per Cody LEO              >C18 Vectibix held again per MD   >C19  11/30/20- vectibix resumed at 3mg/kg   >C21 HOLD x1 dose for rash   >C22 cont HOLD   >C23 vectibix resumed at 3 mg/kg   >C25 & C26 Vectibix on hold due to rash   >C27 Vectibix 3mg/kg   >C28-29 Vectibix on hold due to rash   >C30 Vectibix 3mg/kg   >C31,32 Vectibix held   >C33- resume at 3mg/kg    >C33 onward- plan for vectibix 3mg/kg to be administered every 3rd treatment due to tolerability and rash- see progress notes in media tab from [facility]   6/24/21 and scanned into media 6/28/21.  Leucovorin 400 mg/m2 IV Day 1 followed by  Fluorouracil 400 " "mg/m2 IV Day 1 followed by   C1D1 reduced dose by 10% to 360 mg/m2 per MD Cody for anticipated tolerance   Fluorouracil 2400 mg/m2 CIVI over 46-48 hours    C1D1 reduced dose by 10% to 2160 mg/m2 per MD Cody for anticipated tolerance   14-day cycle until progression or toxicity  *Dosing Reference*  NCCN guidelines for colon cancer v2.2019  (cetuximab + chemo) [place] AP et al, OBEY 2017;317(23):7394-1045  (HER2 tx in HER2+ CRC) Kayleen RINALDI et al. Lancet Oncol. 2019 Apr;20(4):518-530.    Allergies: Patient has no known allergies.  /79   Pulse 88   Temp 36.7 °C (98 °F) (Temporal)   Resp 18   Ht 1.85 m (6' 0.84\")   Wt 104 kg (229 lb 4.5 oz)   SpO2 98%   BMI 30.39 kg/m²  Body surface area is 2.31 meters squared.     All lab results 6/25/21 within treatment parameters.     ECHO  4/21/21- LVEF ~ 60% -OK for 6 months per Cody LEO order      Panitumumab (Vectibix) 3 mg/kg x **kg = **mg              Rounded to vial size (within 10%) per dose rounding protocol   ok to treat with final dose =0mg IV     Trastuzumab-pkrb (Herzuma) 4 mg/kg x 104kg = 416mg   <10% difference, ok to treat with final dose = 416mg IV     Leucovorin 400 mg/m² x 2.31m² = 924mg              Okay to treat with final dose = 924mg IV over 30 minutes      Fluorouracil (Adrucil) 360 mg/m² x 2.31m² = 832mg              Okay to treat with final dose = 830mg IV push     Fluorouracil (Adrucil) 2160 mg/m² x 2.31m² = 4989mg              Okay to treat with final dose =  4990mg CIVI       RAHEEM Amador, Pharm.D.    "

## 2021-06-28 NOTE — PROGRESS NOTES
Chemotherapy Verification - PRIMARY RN      Height = 185 cm  Weight = 104 kg  BSA = 2.31 m2     Medication: trastuzumab-pkrb (Herzuma)  Dose: 4 mg/kg  Calculated Dose: 416 mg                              Medication: Leucovorin Dose: 400 mg/m2  Calculated Dose: 924 mg                               Medication: Fluorouracil  Dose: 360 mg/m2  Calculated Dose: 821.6 mg                              Medication: Fluroouracil CADD PUMP  Dose: 2160 mg/m2  Calculated Dose: 4989.6 mg                              I confirm this process was performed independently with the BSA and all final chemotherapy dosing calculations congruent.  Any discrepancies of 10% or greater have been addressed with the chemotherapy pharmacist. The resolution of the discrepancy has been documented in the EPIC progress notes.

## 2021-06-28 NOTE — PROGRESS NOTES
Chemotherapy Verification - SECONDARY RN       Height = 185 cm  Weight = 104 kg  BSA = 2.31 m^2       Medication: trastuzumab-pkrb (HERZUMA)  Dose: 4 mg/kg  Calculated Dose: 416 mg                             (In mg/m2, AUC, mg/kg)     Medication: fluorouracil (ADRUCIL) bolus  Dose: 360 mg/m^2  Calculated Dose: 831.6 mg                             (In mg/m2, AUC, mg/kg)    Medication: fluorouracil (ADRUCIL)  Dose: 2160 mg/m^2  Calculated Dose: 4989.6 mg over 46 hours via CADD pump                             (In mg/m2, AUC, mg/kg)    I confirm that this process was performed independently.

## 2021-06-30 ENCOUNTER — OUTPATIENT INFUSION SERVICES (OUTPATIENT)
Dept: ONCOLOGY | Facility: MEDICAL CENTER | Age: 56
End: 2021-06-30
Attending: INTERNAL MEDICINE
Payer: MEDICARE

## 2021-06-30 VITALS
OXYGEN SATURATION: 98 % | DIASTOLIC BLOOD PRESSURE: 82 MMHG | TEMPERATURE: 97.7 F | SYSTOLIC BLOOD PRESSURE: 118 MMHG | RESPIRATION RATE: 18 BRPM | HEART RATE: 86 BPM

## 2021-06-30 DIAGNOSIS — C78.7 METASTATIC COLON CANCER TO LIVER (HCC): ICD-10-CM

## 2021-06-30 DIAGNOSIS — C18.9 METASTATIC COLON CANCER TO LIVER (HCC): ICD-10-CM

## 2021-06-30 PROCEDURE — 96523 IRRIG DRUG DELIVERY DEVICE: CPT

## 2021-06-30 PROCEDURE — 700111 HCHG RX REV CODE 636 W/ 250 OVERRIDE (IP)

## 2021-06-30 RX ORDER — HEPARIN SODIUM (PORCINE) LOCK FLUSH IV SOLN 100 UNIT/ML 100 UNIT/ML
SOLUTION INTRAVENOUS
Status: COMPLETED
Start: 2021-06-30 | End: 2021-06-30

## 2021-06-30 RX ADMIN — HEPARIN 500 UNITS: 100 SYRINGE at 13:13

## 2021-06-30 NOTE — PROGRESS NOTES
Pt arrived to IS, ambulatory, for pump disconnect. Pt voices no complaints. Pump stopped upon arrival, 0 mL left to be delivered. Pump disconnected and returned to pharmacy, cassette properly disposed of. Port flushed and heparin locked per policy, port de-accessed. Pt left IS with no s/sx of distress. Follow up appointment confirmed.

## 2021-07-02 ENCOUNTER — PATIENT OUTREACH (OUTPATIENT)
Dept: OTHER | Facility: MEDICAL CENTER | Age: 56
End: 2021-07-02

## 2021-07-02 NOTE — PROGRESS NOTES
On 7/2/2021 this ONN called phone number listed for patient, which belongs to patient's wife Joan, left voicemail explaining this call was just to follow-up and make sure there weren't any current needs, left call back number, encouraged patient's wife to call as needed.

## 2021-07-06 RX ORDER — ONDANSETRON 2 MG/ML
4 INJECTION INTRAMUSCULAR; INTRAVENOUS PRN
Status: CANCELLED | OUTPATIENT
Start: 2021-07-12

## 2021-07-06 RX ORDER — 0.9 % SODIUM CHLORIDE 0.9 %
10 VIAL (ML) INJECTION PRN
Status: CANCELLED | OUTPATIENT
Start: 2021-07-12

## 2021-07-06 RX ORDER — SODIUM CHLORIDE 9 MG/ML
INJECTION, SOLUTION INTRAVENOUS CONTINUOUS
Status: CANCELLED
Start: 2021-07-12

## 2021-07-06 RX ORDER — HEPARIN SODIUM (PORCINE) LOCK FLUSH IV SOLN 100 UNIT/ML 100 UNIT/ML
500 SOLUTION INTRAVENOUS PRN
Status: CANCELLED | OUTPATIENT
Start: 2021-07-14

## 2021-07-06 RX ORDER — 0.9 % SODIUM CHLORIDE 0.9 %
10 VIAL (ML) INJECTION PRN
Status: CANCELLED | OUTPATIENT
Start: 2021-07-13

## 2021-07-06 RX ORDER — METHYLPREDNISOLONE SODIUM SUCCINATE 125 MG/2ML
125 INJECTION, POWDER, LYOPHILIZED, FOR SOLUTION INTRAMUSCULAR; INTRAVENOUS PRN
Status: CANCELLED | OUTPATIENT
Start: 2021-07-12

## 2021-07-06 RX ORDER — HEPARIN SODIUM (PORCINE) LOCK FLUSH IV SOLN 100 UNIT/ML 100 UNIT/ML
500 SOLUTION INTRAVENOUS PRN
Status: CANCELLED | OUTPATIENT
Start: 2021-07-12

## 2021-07-06 RX ORDER — 0.9 % SODIUM CHLORIDE 0.9 %
20 VIAL (ML) INJECTION PRN
Status: CANCELLED | OUTPATIENT
Start: 2021-07-14

## 2021-07-06 RX ORDER — 0.9 % SODIUM CHLORIDE 0.9 %
VIAL (ML) INJECTION PRN
Status: CANCELLED | OUTPATIENT
Start: 2021-07-12

## 2021-07-06 RX ORDER — 0.9 % SODIUM CHLORIDE 0.9 %
3 VIAL (ML) INJECTION PRN
Status: CANCELLED | OUTPATIENT
Start: 2021-07-12

## 2021-07-06 RX ORDER — PROCHLORPERAZINE MALEATE 10 MG
10 TABLET ORAL EVERY 6 HOURS PRN
Status: CANCELLED | OUTPATIENT
Start: 2021-07-12

## 2021-07-06 RX ORDER — DIPHENHYDRAMINE HYDROCHLORIDE 50 MG/ML
50 INJECTION INTRAMUSCULAR; INTRAVENOUS PRN
Status: CANCELLED | OUTPATIENT
Start: 2021-07-12

## 2021-07-06 RX ORDER — ONDANSETRON 8 MG/1
8 TABLET, ORALLY DISINTEGRATING ORAL PRN
Status: CANCELLED | OUTPATIENT
Start: 2021-07-12

## 2021-07-06 RX ORDER — 0.9 % SODIUM CHLORIDE 0.9 %
VIAL (ML) INJECTION PRN
Status: CANCELLED | OUTPATIENT
Start: 2021-07-13

## 2021-07-06 RX ORDER — EPINEPHRINE 1 MG/ML(1)
0.5 AMPUL (ML) INJECTION PRN
Status: CANCELLED | OUTPATIENT
Start: 2021-07-12

## 2021-07-06 RX ORDER — HEPARIN SODIUM (PORCINE) LOCK FLUSH IV SOLN 100 UNIT/ML 100 UNIT/ML
500 SOLUTION INTRAVENOUS PRN
Status: CANCELLED | OUTPATIENT
Start: 2021-07-13

## 2021-07-06 RX ORDER — 0.9 % SODIUM CHLORIDE 0.9 %
3 VIAL (ML) INJECTION PRN
Status: CANCELLED | OUTPATIENT
Start: 2021-07-13

## 2021-07-09 ENCOUNTER — HOSPITAL ENCOUNTER (OUTPATIENT)
Dept: LAB | Facility: MEDICAL CENTER | Age: 56
End: 2021-07-09
Attending: INTERNAL MEDICINE
Payer: MEDICARE

## 2021-07-09 PROCEDURE — 85025 COMPLETE CBC W/AUTO DIFF WBC: CPT

## 2021-07-09 PROCEDURE — 83735 ASSAY OF MAGNESIUM: CPT

## 2021-07-09 PROCEDURE — 36415 COLL VENOUS BLD VENIPUNCTURE: CPT

## 2021-07-09 PROCEDURE — 80053 COMPREHEN METABOLIC PANEL: CPT

## 2021-07-09 PROCEDURE — 82378 CARCINOEMBRYONIC ANTIGEN: CPT

## 2021-07-10 LAB
ALBUMIN SERPL BCP-MCNC: 4.3 G/DL (ref 3.2–4.9)
ALBUMIN/GLOB SERPL: 1.7 G/DL
ALP SERPL-CCNC: 114 U/L (ref 30–99)
ALT SERPL-CCNC: 26 U/L (ref 2–50)
ANION GAP SERPL CALC-SCNC: 10 MMOL/L (ref 7–16)
AST SERPL-CCNC: 29 U/L (ref 12–45)
BASOPHILS # BLD AUTO: 0.7 % (ref 0–1.8)
BASOPHILS # BLD: 0.05 K/UL (ref 0–0.12)
BILIRUB SERPL-MCNC: 0.7 MG/DL (ref 0.1–1.5)
BUN SERPL-MCNC: 17 MG/DL (ref 8–22)
CALCIUM SERPL-MCNC: 9.2 MG/DL (ref 8.5–10.5)
CEA SERPL-MCNC: 40.6 NG/ML (ref 0–3)
CHLORIDE SERPL-SCNC: 106 MMOL/L (ref 96–112)
CO2 SERPL-SCNC: 23 MMOL/L (ref 20–33)
CREAT SERPL-MCNC: 0.92 MG/DL (ref 0.5–1.4)
EOSINOPHIL # BLD AUTO: 0.31 K/UL (ref 0–0.51)
EOSINOPHIL NFR BLD: 4.4 % (ref 0–6.9)
ERYTHROCYTE [DISTWIDTH] IN BLOOD BY AUTOMATED COUNT: 53.2 FL (ref 35.9–50)
GLOBULIN SER CALC-MCNC: 2.6 G/DL (ref 1.9–3.5)
GLUCOSE SERPL-MCNC: 139 MG/DL (ref 65–99)
HCT VFR BLD AUTO: 40.9 % (ref 42–52)
HGB BLD-MCNC: 13.6 G/DL (ref 14–18)
IMM GRANULOCYTES # BLD AUTO: 0.03 K/UL (ref 0–0.11)
IMM GRANULOCYTES NFR BLD AUTO: 0.4 % (ref 0–0.9)
LYMPHOCYTES # BLD AUTO: 1.46 K/UL (ref 1–4.8)
LYMPHOCYTES NFR BLD: 20.9 % (ref 22–41)
MAGNESIUM SERPL-MCNC: 2.1 MG/DL (ref 1.5–2.5)
MCH RBC QN AUTO: 32.8 PG (ref 27–33)
MCHC RBC AUTO-ENTMCNC: 33.3 G/DL (ref 33.7–35.3)
MCV RBC AUTO: 98.6 FL (ref 81.4–97.8)
MONOCYTES # BLD AUTO: 0.88 K/UL (ref 0–0.85)
MONOCYTES NFR BLD AUTO: 12.6 % (ref 0–13.4)
NEUTROPHILS # BLD AUTO: 4.25 K/UL (ref 1.82–7.42)
NEUTROPHILS NFR BLD: 61 % (ref 44–72)
NRBC # BLD AUTO: 0 K/UL
NRBC BLD-RTO: 0 /100 WBC
PLATELET # BLD AUTO: 192 K/UL (ref 164–446)
PMV BLD AUTO: 10.6 FL (ref 9–12.9)
POTASSIUM SERPL-SCNC: 4 MMOL/L (ref 3.6–5.5)
PROT SERPL-MCNC: 6.9 G/DL (ref 6–8.2)
RBC # BLD AUTO: 4.15 M/UL (ref 4.7–6.1)
SODIUM SERPL-SCNC: 139 MMOL/L (ref 135–145)
WBC # BLD AUTO: 7 K/UL (ref 4.8–10.8)

## 2021-07-12 ENCOUNTER — OUTPATIENT INFUSION SERVICES (OUTPATIENT)
Dept: ONCOLOGY | Facility: MEDICAL CENTER | Age: 56
End: 2021-07-12
Attending: INTERNAL MEDICINE
Payer: MEDICARE

## 2021-07-12 VITALS
BODY MASS INDEX: 31.59 KG/M2 | WEIGHT: 238.32 LBS | HEART RATE: 80 BPM | TEMPERATURE: 97.2 F | DIASTOLIC BLOOD PRESSURE: 73 MMHG | HEIGHT: 73 IN | OXYGEN SATURATION: 97 % | RESPIRATION RATE: 18 BRPM | SYSTOLIC BLOOD PRESSURE: 143 MMHG

## 2021-07-12 DIAGNOSIS — C78.7 METASTATIC COLON CANCER TO LIVER (HCC): ICD-10-CM

## 2021-07-12 DIAGNOSIS — C18.9 METASTATIC COLON CANCER TO LIVER (HCC): ICD-10-CM

## 2021-07-12 PROCEDURE — 96413 CHEMO IV INFUSION 1 HR: CPT

## 2021-07-12 PROCEDURE — 96417 CHEMO IV INFUS EACH ADDL SEQ: CPT

## 2021-07-12 PROCEDURE — 96411 CHEMO IV PUSH ADDL DRUG: CPT

## 2021-07-12 PROCEDURE — 96375 TX/PRO/DX INJ NEW DRUG ADDON: CPT

## 2021-07-12 PROCEDURE — 700111 HCHG RX REV CODE 636 W/ 250 OVERRIDE (IP): Performed by: INTERNAL MEDICINE

## 2021-07-12 PROCEDURE — 700105 HCHG RX REV CODE 258: Performed by: INTERNAL MEDICINE

## 2021-07-12 PROCEDURE — G0498 CHEMO EXTEND IV INFUS W/PUMP: HCPCS

## 2021-07-12 PROCEDURE — A4212 NON CORING NEEDLE OR STYLET: HCPCS

## 2021-07-12 RX ORDER — SODIUM CHLORIDE 9 MG/ML
INJECTION, SOLUTION INTRAVENOUS CONTINUOUS
Status: DISCONTINUED | OUTPATIENT
Start: 2021-07-12 | End: 2021-07-12 | Stop reason: HOSPADM

## 2021-07-12 RX ORDER — HYDROXYZINE 50 MG/1
TABLET, FILM COATED ORAL
Status: ON HOLD | COMMUNITY
Start: 2021-06-23 | End: 2022-07-08

## 2021-07-12 RX ADMIN — SODIUM CHLORIDE: 9 INJECTION, SOLUTION INTRAVENOUS at 10:51

## 2021-07-12 RX ADMIN — FLUOROURACIL 845 MG: 50 INJECTION, SOLUTION INTRAVENOUS at 13:35

## 2021-07-12 RX ADMIN — FLUOROURACIL 5075 MG: 50 INJECTION, SOLUTION INTRAVENOUS at 13:37

## 2021-07-12 RX ADMIN — DIPHENHYDRAMINE HYDROCHLORIDE 50 MG: 50 INJECTION, SOLUTION INTRAMUSCULAR; INTRAVENOUS at 10:51

## 2021-07-12 RX ADMIN — LEUCOVORIN CALCIUM 940 MG: 350 INJECTION, POWDER, LYOPHILIZED, FOR SUSPENSION INTRAMUSCULAR; INTRAVENOUS at 12:55

## 2021-07-12 RX ADMIN — ONDANSETRON 16 MG: 2 INJECTION INTRAMUSCULAR; INTRAVENOUS at 11:04

## 2021-07-12 RX ADMIN — Medication 432 MG: at 11:56

## 2021-07-12 ASSESSMENT — FIBROSIS 4 INDEX: FIB4 SCORE: 1.69

## 2021-07-12 NOTE — PROGRESS NOTES
"Pharmacy Chemotherapy Verification    Dx: Metastatic colorectal cancer [KRAS/NRAS wild type, ERBB2 (HER2) amplification]    Cycle 35  Previous treatment: 6/28/21    Regimen: mFOLFOX + Vectibix + trastuzumab  Trastuzumab 6 mg/kg IV loading dose C1D1, then 4 mg/kg IV Day 1 of subsequent  cycles [confirmed \"every 2 weeks\", per MD; progress note: \"He also had an ERBB2  amplification which might suggest response to treatments like Herceptin\"]   > 7/8/20: Herceptin 2.5 weeks overdue.  Do not reload Herceptin per TRBO Dr. Ross.  Cetuximab 400 mg/m2 IV Cycle 1 Day 1, then 250 mg/m2 IV Days 1 and 8 (confirmed  \"weekly\" per MD)   > C4 * * * cetuximab has been omitted * * *   > C5 * * * cetuximab replaced with panitumumab * * *   OXALIplatin 85 mg/m2 IV Day 1 concurrent with   > Cody LEO reduced dose by 20% to 68 mg/m2 starting with C1D1 due to anticipated tolerance    > 3/14/20: Oxaliplatin discontinued from treatment  Panitumumab (Vectibix) 6 mg/kg IV D1 over 60 min   + added; change of therapy d/t cetuximab skin tox   > 4/25/20, initial dose reduction to 4.5 mg/kg d/t previous EGFR cutaneous toxicities    > C9 * * * Vectibix has been omitted * * *   > C10 Vectibix reordered at 3 mg/kg per MD Cody    > C13 * * * Vectibix has been omitted * * *   > C14 Vectibix reordered at 3 mg/kg per MD Cody   >C17 Vectibix HELD per Cody LEO              >C18 Vectibix held again per MD   >C19  11/30/20- vectibix resumed at 3mg/kg   >C21 HOLD x1 dose for rash   >C22 cont HOLD   >C23 vectibix resumed at 3 mg/kg   >C25 & C26 Vectibix on hold due to rash   >C27 Vectibix 3mg/kg   >C28-29 Vectibix on hold due to rash   >C30 Vectibix 3mg/kg   >C31,32 Vectibix held   >C33- resume at 3mg/kg    >C33 onward- plan for vectibix 3mg/kg to be administered every 3rd treatment due to tolerability and rash- see progress notes in media tab from [facility]   6/24/21 and scanned into media 6/28/21.  Leucovorin 400 mg/m2 IV Day 1 followed by  Fluorouracil 400 " "mg/m2 IV Day 1 followed by   C1D1 reduced dose by 10% to 360 mg/m2 per MD Cody for anticipated tolerance   Fluorouracil 2400 mg/m2 CIVI over 46-48 hours    C1D1 reduced dose by 10% to 2160 mg/m2 per MD Cody for anticipated tolerance   14-day cycle until progression or toxicity  *Dosing Reference*  NCCN guidelines for colon cancer v2.2019  (cetuximab + chemo) [place] AP et al, OBEY 2017;317(23):3579-6275  (HER2 tx in HER2+ CRC) Kayleen RINALDI et al. Lancet Oncol. 2019 Apr;20(4):518-530.    Allergies: Patient has no known allergies.  /73   Pulse 80   Temp 36.2 °C (97.2 °F) (Temporal)   Resp 18   Ht 1.845 m (6' 0.64\")   Wt 108 kg (238 lb 5.1 oz)   SpO2 97%   BMI 31.76 kg/m²  Body surface area is 2.35 meters squared.     Labs 7/9/21:  ANC~ 4250 Plt = 192k   Hgb = 13.6     SCr = 0.92 mg/dL CrCl >125 mL/min   AST/ALT/ALK = 29/26/114 TBili = 0.7     ECHO  4/21/21- LVEF ~ 60% -OK for 6 months per Cody LEO order      Trastuzumab-pkrb (Herzuma) 4 mg/kg x 108 kg= 432 mg   <10% difference, ok to treat with final dose = 432 mg IV     Leucovorin 400 mg/m² x 2.35 m² = 940 mg              <10% difference, okay to treat with final dose = 940 mg IV over 30 minutes      Fluorouracil (Adrucil) 360 mg/m² x 2.35 m² = 846 mg              <10% difference, okay to treat with final dose = 845 mg IV push     Fluorouracil (Adrucil) 2160 mg/m² x 2.35 m² = 5076 mg              <10% difference, okay to treat with final dose =  5075 mg CIVI    Via CADD pump for home infusion over 46 hours @ 2.2 mL/hr    José Marvin, PharmD      "

## 2021-07-12 NOTE — PROGRESS NOTES
Chemotherapy Verification - SECONDARY RN       Height = 184.5 cm  Weight = 108 kg  BSA = 2.35 m2       Medication: Herzuma  Dose: 4 mg/kg  Calculated Dose: 432 mg                             (In mg/m2, AUC, mg/kg)     Medication: 5FU bolus injection  Dose: 360 mg/m2  Calculated Dose: 845 mg                             (In mg/m2, AUC, mg/kg)    Medication: 5FU CADD  Dose: 2160 mg/m2  Calculated Dose: 5076 mg                             (In mg/m2, AUC, mg/kg)    I confirm that this process was performed independently.

## 2021-07-12 NOTE — PROGRESS NOTES
"Pharmacy Chemotherapy Verification  Patient name: Gurmeet Jo  Dx: Metastatic colorectal cancer [KRAS/NRAS wild type, ERBB2 (HER2) amplification]    Regimen: mFOLFOX + Vectibix + Herceptin  Trastuzumab 6 mg/kg IV loading dose C1D1, then 4 mg/kg IV Day 1 of subsequent  cycles [confirmed \"every 2 weeks\", per MD; progress note: \"He also had an ERBB2  amplification which might suggest response to treatments like Herceptin\"]   > 7/8/20: Herceptin 2.5 weeks overdue.  Do not reload Herceptin per TRSAMMY Ross.  Cetuximab 400 mg/m2 IV Cycle 1 Day 1, then 250 mg/m2 IV Days 1 and 8 (confirmed  \"weekly\" per MD)   > C4 * * * cetuximab has been omitted * * *   > C5 * * * cetuximab replaced with panitumumab * * *   OXALIplatin 85 mg/m2 IV Day 1 concurrent with   > Cody LEO reduced dose by 20% to 68 mg/m2 starting with C1D1 due to anticipated tolerance    > 3/14/20: Oxaliplatin discontinued from treatment  Panitumumab (Vectibix) 6 mg/kg IV D1 over 60 min   + added; change of therapy d/t cetuximab skin tox   4/25/20, initial dose reduction to 4.5 mg/kg d/t previous EGFR cutaneous toxicities    C9 * * * Vectibix has been omitted * * *   C10 Vectibix reordered at 3 mg/kg per MD Cody    C13 * * * Vectibix has been omitted * * *   C14 Vectibix reordered at 3 mg/kg per MD Cody   C17 Vectibix HELD per Cody LEO              C18 Vectibix held again per MD   C19  11/30/20- vectibix resumed at 3mg/kg   C21 HOLD x1 dose for rash   C22 cont HOLD   C23 vectibix resumed at 3 mg/kg   C25 & 26 Vectibix on HOLD d/t rash   C27 Vectibix restarted at 3 mg/kg per Dr Ross    C28 Vectibix HELD   C30 Vectibix 3 mg/kg per Dr. Ross    C31,32 Vectibix HOLD   C33 Vectibix 3 mg/kg per Dr. Ross    C34,35 Vectibix HOLD  Leucovorin 400 mg/m2 IV Day 1 followed by  Fluorouracil 400 mg/m2 IV Day 1 followed by   C1D1 reduced dose by 10% to 360 mg/m2 per MD Cody for anticipated tolerance   Fluorouracil 2400 mg/m2 CIVI over 46-48 hours    C1D1 " "reduced dose by 10% to 2160 mg/m2 per MD Cody for anticipated tolerance   14-day cycle until progression or toxicity  *Dosing Reference*  NCCN guidelines for colon cancer v2.2019  (cetuximab + chemo) [place] AP et al, OBEY 2017;317(23):9838-8306  (HER2 tx in HER2+ CRC) Kayleen RINALDI et al. Lancet Oncol. 2019 Apr;20(4):518-530.    Allergies: Patient has no known allergies.  /73   Pulse 80   Temp 36.2 °C (97.2 °F) (Temporal)   Resp 18   Ht 1.845 m (6' 0.64\")   Wt 108 kg (238 lb 5.1 oz)   SpO2 97%   BMI 31.76 kg/m²  Body surface area is 2.35 meters squared.     Labs 7/12/21  Meet treatment parameters    ECHO (OK for 6 months per Cody LEO order)  4/21/21 LVEF ~60 %    Drug Order   (Drug name, dose, route, IV Fluid & volume, frequency, number of doses) Cycle 35  Previous treatment: 6/28/21       Medication = Panitumumab (Vectibix)   Base Dose = 3 mg/kg  Calc Dose: Base Dose x 106 kg = 318 mg  Final Dose = 300 mg  Route = IV  Fluid & Volume =  mL  Admin Duration = Over 60 minutes         Rounded to nearest vial size (within 10%) per rounding protocol, okay to treat with final dose     Medication = Trastuzumab-pkrb (Herzuma)  Base Dose = 4 mg/kg  Calc Dose: Base Dose x 108 kg = 432 mg  Final Dose = 432 mg  Route = IV  Fluid & Volume =  mL  Admin Duration = Over 30 minutes          Okay to treat with final dose   Medication = Leucovorin (Wellcovorin)  Base Dose = 400 mg/m²  Calc Dose: Base Dose x 2.35 m² = 940 mg  Final Dose = 940 mg  Route = IV  Fluid & Volume = D5W 250 mL  Admin Duration = Over 30 minutes          Okay to treat with final dose   Medication = Fluorouracil (5-FU)  Base Dose = 360 mg/m²  Calc Dose: Base Dose x 2.35 m² = 846 mg  Final Dose = 845 mg  Route = IVP  Fluid & Volume = 16.9 mL in syringe  Conc = 50 mg/mL  Admin Duration = Over 5 minutes          Okay to treat with final dose     Medication = Fluorouracil (5-FU)  Base Dose = 2160 mg/m²  Calc Dose:Base Dose x 2.35 m² = " 5076 mg  Final Dose = 5075 mg  Route = CIVI   Fluid & Volume = 101.5 mL (+3 mL overfill)  Admin Duration = Over 46 hours to run at   2.2 mL/hour    Via CADD pump for home infusion        Okay to treat with final dose

## 2021-07-12 NOTE — PROGRESS NOTES
Pt arrived to [facility] ambulatory, here for C35 FOLFOX for colon cancer. Pt with R chest port, EMLA applied by pt at home. EMLA wiped from site and accessed in sterile field with low profile needle. Port flushed, brisk blood return observed. Pt reports he had his labs done in [facility], results reviewed and WNL for chemo to proceed. Premeds given and all chemo infused per MAR. Home pump connected, settings verified by 2 RNs and verified pump was in RUN prior to discharge. Pt requested connection to be taped as he has had issues with the chemo line disconnecting while at home. Pt knows to return 7/14, discharged home under self care in no apparent distress.

## 2021-07-12 NOTE — PROGRESS NOTES
Chemotherapy Verification - PRIMARY RN    C35    Height = 184.5 cm  Weight = 108 kg  BSA = 2.35 m2 - EF 60%, done 4/21/21      Medication: Trastuzumab-pkrb (Herzuma)  Dose: 4 mg/kg  Calculated Dose: 432 mg                                 Medication: Leucovorin  Dose: 400 mg/m2  Calculated Dose: 940 mg                                 Medication: Fluorouracil (5FU) push  Dose: 360 mg/m2  Calculated Dose: 846 mg                                 Medication: Fluorouracil (5FU) CADD pump  Dose: 2160 mg/m2  Calculated Dose: 5076 mg over 46 hours                              I confirm this process was performed independently with the BSA and all final chemotherapy dosing calculations congruent.  Any discrepancies of 10% or greater have been addressed with the chemotherapy pharmacist. The resolution of the discrepancy has been documented in the EPIC progress notes.

## 2021-07-13 ENCOUNTER — HOSPITAL ENCOUNTER (OUTPATIENT)
Dept: CARDIOLOGY | Facility: MEDICAL CENTER | Age: 56
End: 2021-07-13
Attending: NURSE PRACTITIONER
Payer: MEDICARE

## 2021-07-13 DIAGNOSIS — C18.9 MALIGNANT NEOPLASM OF COLON, UNSPECIFIED PART OF COLON (HCC): ICD-10-CM

## 2021-07-13 LAB
LV EJECT FRACT  99904: 65
LV EJECT FRACT MOD 2C 99903: 57.31
LV EJECT FRACT MOD 4C 99902: 63.58
LV EJECT FRACT MOD BP 99901: 59.72

## 2021-07-13 PROCEDURE — 93306 TTE W/DOPPLER COMPLETE: CPT | Mod: 26 | Performed by: INTERNAL MEDICINE

## 2021-07-13 PROCEDURE — 93306 TTE W/DOPPLER COMPLETE: CPT

## 2021-07-14 ENCOUNTER — OUTPATIENT INFUSION SERVICES (OUTPATIENT)
Dept: ONCOLOGY | Facility: MEDICAL CENTER | Age: 56
End: 2021-07-14
Attending: INTERNAL MEDICINE
Payer: MEDICARE

## 2021-07-14 VITALS
HEART RATE: 87 BPM | TEMPERATURE: 97.3 F | OXYGEN SATURATION: 96 % | DIASTOLIC BLOOD PRESSURE: 80 MMHG | BODY MASS INDEX: 32.04 KG/M2 | WEIGHT: 236.55 LBS | HEIGHT: 72 IN | RESPIRATION RATE: 18 BRPM | SYSTOLIC BLOOD PRESSURE: 141 MMHG

## 2021-07-14 DIAGNOSIS — C18.9 METASTATIC COLON CANCER TO LIVER (HCC): ICD-10-CM

## 2021-07-14 DIAGNOSIS — C78.7 METASTATIC COLON CANCER TO LIVER (HCC): ICD-10-CM

## 2021-07-14 PROCEDURE — 96523 IRRIG DRUG DELIVERY DEVICE: CPT

## 2021-07-14 PROCEDURE — 700111 HCHG RX REV CODE 636 W/ 250 OVERRIDE (IP): Performed by: INTERNAL MEDICINE

## 2021-07-14 RX ORDER — HEPARIN SODIUM (PORCINE) LOCK FLUSH IV SOLN 100 UNIT/ML 100 UNIT/ML
500 SOLUTION INTRAVENOUS PRN
Status: DISCONTINUED | OUTPATIENT
Start: 2021-07-14 | End: 2021-07-14 | Stop reason: HOSPADM

## 2021-07-14 RX ADMIN — HEPARIN 500 UNITS: 100 SYRINGE at 15:05

## 2021-07-14 ASSESSMENT — FIBROSIS 4 INDEX: FIB4 SCORE: 1.69

## 2021-07-14 NOTE — PROGRESS NOTES
Patient arrived to [facility] for C35D3 5FU CADD pump de-access.  Pump stopped with 0ml remaining in reservoir, 103.5 mLs administered.  Disconnected pump, Port flushed per protocol with brisk blood return noted, heparinized, de-accessed and gauze dressing applied.  Pump cleaned and placed in pharmacy drawer.  Pt's fannypack sent home with pt. Confirmed pt's next appt. Pt discharged home in NAD.

## 2021-07-22 RX ORDER — 0.9 % SODIUM CHLORIDE 0.9 %
3 VIAL (ML) INJECTION PRN
Status: CANCELLED | OUTPATIENT
Start: 2021-07-26

## 2021-07-22 RX ORDER — 0.9 % SODIUM CHLORIDE 0.9 %
20 VIAL (ML) INJECTION PRN
Status: CANCELLED | OUTPATIENT
Start: 2021-07-28

## 2021-07-22 RX ORDER — HEPARIN SODIUM (PORCINE) LOCK FLUSH IV SOLN 100 UNIT/ML 100 UNIT/ML
500 SOLUTION INTRAVENOUS PRN
Status: CANCELLED | OUTPATIENT
Start: 2021-07-28

## 2021-07-22 RX ORDER — SODIUM CHLORIDE 9 MG/ML
INJECTION, SOLUTION INTRAVENOUS CONTINUOUS
Status: CANCELLED
Start: 2021-07-26

## 2021-07-22 RX ORDER — HEPARIN SODIUM (PORCINE) LOCK FLUSH IV SOLN 100 UNIT/ML 100 UNIT/ML
500 SOLUTION INTRAVENOUS PRN
Status: CANCELLED | OUTPATIENT
Start: 2021-07-25

## 2021-07-22 RX ORDER — HEPARIN SODIUM (PORCINE) LOCK FLUSH IV SOLN 100 UNIT/ML 100 UNIT/ML
500 SOLUTION INTRAVENOUS PRN
Status: CANCELLED | OUTPATIENT
Start: 2021-07-26

## 2021-07-22 RX ORDER — EPINEPHRINE 1 MG/ML(1)
0.5 AMPUL (ML) INJECTION PRN
Status: CANCELLED | OUTPATIENT
Start: 2021-07-26

## 2021-07-22 RX ORDER — 0.9 % SODIUM CHLORIDE 0.9 %
3 VIAL (ML) INJECTION PRN
Status: CANCELLED | OUTPATIENT
Start: 2021-07-25

## 2021-07-22 RX ORDER — 0.9 % SODIUM CHLORIDE 0.9 %
10 VIAL (ML) INJECTION PRN
Status: CANCELLED | OUTPATIENT
Start: 2021-07-25

## 2021-07-22 RX ORDER — 0.9 % SODIUM CHLORIDE 0.9 %
VIAL (ML) INJECTION PRN
Status: CANCELLED | OUTPATIENT
Start: 2021-07-25

## 2021-07-22 RX ORDER — 0.9 % SODIUM CHLORIDE 0.9 %
10 VIAL (ML) INJECTION PRN
Status: CANCELLED | OUTPATIENT
Start: 2021-07-26

## 2021-07-22 RX ORDER — 0.9 % SODIUM CHLORIDE 0.9 %
VIAL (ML) INJECTION PRN
Status: CANCELLED | OUTPATIENT
Start: 2021-07-26

## 2021-07-22 RX ORDER — DIPHENHYDRAMINE HYDROCHLORIDE 50 MG/ML
50 INJECTION INTRAMUSCULAR; INTRAVENOUS PRN
Status: CANCELLED | OUTPATIENT
Start: 2021-07-26

## 2021-07-22 RX ORDER — ONDANSETRON 8 MG/1
8 TABLET, ORALLY DISINTEGRATING ORAL PRN
Status: CANCELLED | OUTPATIENT
Start: 2021-07-26

## 2021-07-22 RX ORDER — PROCHLORPERAZINE MALEATE 10 MG
10 TABLET ORAL EVERY 6 HOURS PRN
Status: CANCELLED | OUTPATIENT
Start: 2021-07-26

## 2021-07-22 RX ORDER — METHYLPREDNISOLONE SODIUM SUCCINATE 125 MG/2ML
125 INJECTION, POWDER, LYOPHILIZED, FOR SOLUTION INTRAMUSCULAR; INTRAVENOUS PRN
Status: CANCELLED | OUTPATIENT
Start: 2021-07-26

## 2021-07-22 RX ORDER — ONDANSETRON 2 MG/ML
4 INJECTION INTRAMUSCULAR; INTRAVENOUS PRN
Status: CANCELLED | OUTPATIENT
Start: 2021-07-26

## 2021-07-23 ENCOUNTER — HOSPITAL ENCOUNTER (OUTPATIENT)
Dept: LAB | Facility: MEDICAL CENTER | Age: 56
End: 2021-07-23
Attending: INTERNAL MEDICINE
Payer: MEDICARE

## 2021-07-23 LAB
ALBUMIN SERPL BCP-MCNC: 4.3 G/DL (ref 3.2–4.9)
ALBUMIN/GLOB SERPL: 1.7 G/DL
ALP SERPL-CCNC: 119 U/L (ref 30–99)
ALT SERPL-CCNC: 24 U/L (ref 2–50)
ANION GAP SERPL CALC-SCNC: 12 MMOL/L (ref 7–16)
AST SERPL-CCNC: 33 U/L (ref 12–45)
BASOPHILS # BLD AUTO: 0.8 % (ref 0–1.8)
BASOPHILS # BLD: 0.05 K/UL (ref 0–0.12)
BILIRUB SERPL-MCNC: 0.9 MG/DL (ref 0.1–1.5)
BUN SERPL-MCNC: 20 MG/DL (ref 8–22)
CALCIUM SERPL-MCNC: 9.2 MG/DL (ref 8.5–10.5)
CEA SERPL-MCNC: 48 NG/ML (ref 0–3)
CHLORIDE SERPL-SCNC: 108 MMOL/L (ref 96–112)
CO2 SERPL-SCNC: 21 MMOL/L (ref 20–33)
CREAT SERPL-MCNC: 0.94 MG/DL (ref 0.5–1.4)
EOSINOPHIL # BLD AUTO: 0.15 K/UL (ref 0–0.51)
EOSINOPHIL NFR BLD: 2.4 % (ref 0–6.9)
ERYTHROCYTE [DISTWIDTH] IN BLOOD BY AUTOMATED COUNT: 54.3 FL (ref 35.9–50)
GLOBULIN SER CALC-MCNC: 2.5 G/DL (ref 1.9–3.5)
GLUCOSE SERPL-MCNC: 138 MG/DL (ref 65–99)
HCT VFR BLD AUTO: 39 % (ref 42–52)
HGB BLD-MCNC: 13.3 G/DL (ref 14–18)
IMM GRANULOCYTES # BLD AUTO: 0.04 K/UL (ref 0–0.11)
IMM GRANULOCYTES NFR BLD AUTO: 0.6 % (ref 0–0.9)
LYMPHOCYTES # BLD AUTO: 1.26 K/UL (ref 1–4.8)
LYMPHOCYTES NFR BLD: 19.8 % (ref 22–41)
MAGNESIUM SERPL-MCNC: 2.3 MG/DL (ref 1.5–2.5)
MCH RBC QN AUTO: 32.8 PG (ref 27–33)
MCHC RBC AUTO-ENTMCNC: 34.1 G/DL (ref 33.7–35.3)
MCV RBC AUTO: 96.3 FL (ref 81.4–97.8)
MONOCYTES # BLD AUTO: 0.67 K/UL (ref 0–0.85)
MONOCYTES NFR BLD AUTO: 10.5 % (ref 0–13.4)
NEUTROPHILS # BLD AUTO: 4.19 K/UL (ref 1.82–7.42)
NEUTROPHILS NFR BLD: 65.9 % (ref 44–72)
NRBC # BLD AUTO: 0 K/UL
NRBC BLD-RTO: 0 /100 WBC
PLATELET # BLD AUTO: 190 K/UL (ref 164–446)
PMV BLD AUTO: 10.5 FL (ref 9–12.9)
POTASSIUM SERPL-SCNC: 4.1 MMOL/L (ref 3.6–5.5)
PROT SERPL-MCNC: 6.8 G/DL (ref 6–8.2)
RBC # BLD AUTO: 4.05 M/UL (ref 4.7–6.1)
SODIUM SERPL-SCNC: 141 MMOL/L (ref 135–145)
WBC # BLD AUTO: 6.4 K/UL (ref 4.8–10.8)

## 2021-07-23 PROCEDURE — 83735 ASSAY OF MAGNESIUM: CPT

## 2021-07-23 PROCEDURE — 36415 COLL VENOUS BLD VENIPUNCTURE: CPT

## 2021-07-23 PROCEDURE — 85025 COMPLETE CBC W/AUTO DIFF WBC: CPT

## 2021-07-23 PROCEDURE — 80053 COMPREHEN METABOLIC PANEL: CPT

## 2021-07-23 PROCEDURE — 82378 CARCINOEMBRYONIC ANTIGEN: CPT

## 2021-07-26 ENCOUNTER — OUTPATIENT INFUSION SERVICES (OUTPATIENT)
Dept: ONCOLOGY | Facility: MEDICAL CENTER | Age: 56
End: 2021-07-26
Attending: INTERNAL MEDICINE
Payer: MEDICARE

## 2021-07-26 VITALS
HEART RATE: 81 BPM | DIASTOLIC BLOOD PRESSURE: 72 MMHG | OXYGEN SATURATION: 99 % | SYSTOLIC BLOOD PRESSURE: 127 MMHG | TEMPERATURE: 97.8 F | BODY MASS INDEX: 32.46 KG/M2 | WEIGHT: 239.64 LBS | RESPIRATION RATE: 18 BRPM | HEIGHT: 72 IN

## 2021-07-26 DIAGNOSIS — C78.7 METASTATIC COLON CANCER TO LIVER (HCC): ICD-10-CM

## 2021-07-26 DIAGNOSIS — C18.9 METASTATIC COLON CANCER TO LIVER (HCC): ICD-10-CM

## 2021-07-26 PROCEDURE — 96411 CHEMO IV PUSH ADDL DRUG: CPT

## 2021-07-26 PROCEDURE — 700111 HCHG RX REV CODE 636 W/ 250 OVERRIDE (IP): Performed by: INTERNAL MEDICINE

## 2021-07-26 PROCEDURE — 700105 HCHG RX REV CODE 258: Performed by: INTERNAL MEDICINE

## 2021-07-26 PROCEDURE — 96417 CHEMO IV INFUS EACH ADDL SEQ: CPT

## 2021-07-26 PROCEDURE — 96413 CHEMO IV INFUSION 1 HR: CPT

## 2021-07-26 PROCEDURE — 96375 TX/PRO/DX INJ NEW DRUG ADDON: CPT

## 2021-07-26 PROCEDURE — G0498 CHEMO EXTEND IV INFUS W/PUMP: HCPCS

## 2021-07-26 PROCEDURE — A4212 NON CORING NEEDLE OR STYLET: HCPCS

## 2021-07-26 RX ORDER — CITALOPRAM HYDROBROMIDE 10 MG/1
10 TABLET ORAL
COMMUNITY
Start: 2021-07-16 | End: 2022-04-28

## 2021-07-26 RX ADMIN — Medication 436 MG: at 13:06

## 2021-07-26 RX ADMIN — LEUCOVORIN CALCIUM 944 MG: 350 INJECTION, POWDER, LYOPHILIZED, FOR SUSPENSION INTRAMUSCULAR; INTRAVENOUS at 13:58

## 2021-07-26 RX ADMIN — FLUOROURACIL 850 MG: 50 INJECTION, SOLUTION INTRAVENOUS at 14:43

## 2021-07-26 RX ADMIN — FLUOROURACIL 5100 MG: 50 INJECTION, SOLUTION INTRAVENOUS at 14:50

## 2021-07-26 RX ADMIN — PANITUMUMAB 300 MG: 100 SOLUTION INTRAVENOUS at 11:46

## 2021-07-26 RX ADMIN — ONDANSETRON 16 MG: 2 INJECTION INTRAMUSCULAR; INTRAVENOUS at 10:53

## 2021-07-26 RX ADMIN — DIPHENHYDRAMINE HYDROCHLORIDE 50 MG: 50 INJECTION INTRAMUSCULAR; INTRAVENOUS at 11:15

## 2021-07-26 ASSESSMENT — FIBROSIS 4 INDEX: FIB4 SCORE: 2.02

## 2021-07-26 NOTE — PROGRESS NOTES
"Pharmacy Chemotherapy Calculations Note:    Patient Name: Gurmeet Jo        Dx: Metastatic Colorectal CA [KRAS/NRAS wild type, ERBB2 (HER2) amplification]  Cycle:  36 Previous treatment: C35 = 7/12/21     Protocol: mFOLFOX + Vectibix + Herceptin     Trastuzumab 6 mg/kg IV loading dose C1D1, then 4 mg/kg IV Day 1 of subsequent  cycles [confirmed \"every 2 weeks\", per MD; progress note: \"He also had an ERBB2  amplification which might suggest response to treatments like Herceptin\"]              > 7/8/20: Herceptin 2.5 weeks overdue. Do not reload Herceptin per TRBO Dr. Ross.  Cetuximab 400 mg/m2 IV Cycle 1 Day 1, then 250 mg/m2 IV Days 1 and 8 (confirmed  \"weekly\" per MD)              > C4 * * * cetuximab has been omitted * * *              > C5 * * * cetuximab replaced with panitumumab * * *   OXALIplatin 85 mg/m2 IV Day 1 concurrent with              > Cody LEO reduced dose by 20% to 68 mg/m2 starting with C1D1 due to anticipated tolerance               > 3/14/20: Oxaliplatin discontinued from treatment  Panitumumab (Vectibix) 6 mg/kg IV D1 over 60 min              + added; change of therapy d/t cetuximab skin tox              > 4/25/20, initial dose reduction to 4.5 mg/kg d/t previous EGFR cutaneous toxicities               > C9 * * * Vectibix has been omitted * * *              > C10 Vectibix reordered at 3 mg/kg per MD Cody               > C13 * * * Vectibix has been omitted * * *              > C14 Vectibix reordered at 3 mg/kg per MD Cody              >C17 Vectibix HELD per Cody LEO              >C18 Vectibix held again per MD              >C19  11/30/20- vectibix resumed at 3mg/kg              >C21 HOLD x1 dose for rash              >C22 cont HOLD              >C23 vectibix resumed at 3 mg/kg              >C25 & 26 Vectibix on hold due to rash              C27 Vectibix restarted at 3 mg/kg per Dr Ross               C28 Vectibix HELD              C30 Vectibix 3 mg/kg per Dr. Ross             " "  C31,32 Vectibix HOLD              C33 Vectibix 3 mg/kg per Dr. Ross               C34,35 Vectibix HOLD   C36 Dose reduce to 3 mg/kg for tolerance  Leucovorin 400 mg/m2 IV Day 1 followed by  Fluorouracil 400 mg/m2 IV Day 1 followed by              C1D1 reduced dose by 10% to 360 mg/m² per MD Cody for anticipated tolerance   Fluorouracil 2400 mg/m2 CIVI over 46-48 hours               C1D1 reduced dose by 10% to 2160 mg/m² per MD Cody for anticipated tolerance   14-day cycle until progression or toxicity    *Dosing Reference*  NCCN guidelines for colon cancer V.2.2019.  (cetuximab + chemo) [place] AP et al, OBEY 2017;317(23):6144-0040.  (HER2 tx in HER2+ CRC) Kayleen RINALDI et al. Lancet Oncol. 2019 Apr;20(4):518-530.          /72   Pulse 81   Temp 36.6 °C (97.8 °F) (Temporal)   Resp 18   Ht 1.835 m (6' 0.24\")   Wt 109 kg (239 lb 10.2 oz)   SpO2 99%   BMI 32.28 kg/m²  Body surface area is 2.36 meters squared.   Labs from 7/23:  ANC~ 4190  Plt = 190 k    SCr = 0.94 mg/dL CrCl = >125 mL/min  LFT's = WNL  TBili = 0.9  7/13/21 ECHO: LVEF 65%  4/21/21 ECHO: LVEF 60%    Trastuzumab-pkrb 4 mg/kg x 109 kg = 436 mg   <10% difference, rounded to vial size per protocol, ok to treat with final written dose = 436 mg    Leucovorin (Wellcovorin) 400 mg/m² x 2.36 m² = 944 mg   <10% difference, ok to treat with final written dose = 944 mg    Fluorouracil (5-FU) 360 mg/m² x 2.36 m² = 850 mg   <10% difference, ok to treat with final written dose = 850 mg    Fluorouracil (5-FU) 2160 mg/m² x 2.36 m² = 5098 mg   <10% difference, ok to treat with final written dose = 5100 mg CIVI via CADD pump    Panitumumab 3 mg/kg x 109 kg = 327 mg   <10% difference, ok to treat with final written dose = 300 mg    Ernesto Portillo, PharmD, PharmD, BCPS             "

## 2021-07-26 NOTE — PROGRESS NOTES
Chemotherapy Verification - SECONDARY RN       Height = 1.835 m  Weight = 109 kg  BSA = 2.36 m^2       Medication: panitumumab  Dose: 3 mg/kg  Calculated Dose: 327 mg                             (In mg/m2, AUC, mg/kg)     Medication: trastuzumab-pkrb  Dose: 4 mg/kg  Calculated Dose: 436 mg                             (In mg/m2, AUC, mg/kg)    Medication: leucovorin  Dose: 400 mg/m^2  Calculated Dose: 944 mg                             (In mg/m2, AUC, mg/kg)    Medication: fluorouracil bolus  Dose: 360 mg/m^2  Calculated Dose: 849.6 mg                             (In mg/m2, AUC, mg/kg)    Medication: fluorouracil CADD pump  Dose: 2160 mg/m^2  Calculated Dose: 5,097.6 mg over 46 hrs                            (In mg/m2, AUC, mg/kg)      I confirm that this process was performed independently.

## 2021-07-26 NOTE — PROGRESS NOTES
Gurmeet into [facility] for Day 1/ Cycle 36 of vectibix, herzuma, leucovorin, Adrucil push and Adrucil CADD pump. Pt denied having any new or acute complaints today, reports tolerating past treatments well. Port accessed in a sterile manner, had + blood return, flushed briskly. Pt given pre-medications, vectibix, herzuma, leucovorin, Adrucil push, and attached to CADD pump as prescribed, tolerated well, denied having any complaints during or after infusion. Port exhibited blood return prior to attaching pump. Pump screen states that it is running at this time, clamps unclamped. Tape applied per pt preference. Gurmeet denies having further needs at this time. Next appointment scheduled, Gurmeet discharged home to self care with CADD pump infusing at this time.

## 2021-07-26 NOTE — PROGRESS NOTES
"Pharmacy Chemotherapy Verification    Dx: Metastatic colorectal cancer [KRAS/NRAS wild type, ERBB2 (HER2) amplification]    Regimen: mFOLFOX + Vectibix + Herceptin  Trastuzumab 6 mg/kg IV loading dose C1D1, then 4 mg/kg IV Day 1 of subsequent  cycles [confirmed \"every 2 weeks\", per MD; progress note: \"He also had an ERBB2  amplification which might suggest response to treatments like Herceptin\"]   > 7/8/20: Herceptin 2.5 weeks overdue.  Do not reload Herceptin per TRSAMMY Ross.  Cetuximab 400 mg/m2 IV Cycle 1 Day 1, then 250 mg/m2 IV Days 1 and 8 (confirmed  \"weekly\" per MD)   > C4 * * * cetuximab has been omitted * * *   > C5 * * * cetuximab replaced with panitumumab * * *   OXALIplatin 85 mg/m2 IV Day 1 concurrent with   > Cody LEO reduced dose by 20% to 68 mg/m2 starting with C1D1 due to anticipated tolerance    > 3/14/20: Oxaliplatin discontinued from treatment  Panitumumab (Vectibix) 6 mg/kg IV D1 over 60 min   + added; change of therapy d/t cetuximab skin tox   > 4/25/20, initial dose reduction to 4.5 mg/kg d/t previous EGFR cutaneous toxicities    > C9 * * * Vectibix has been omitted * * *   > C10 Vectibix reordered at 3 mg/kg per MD Cody    > C13 * * * Vectibix has been omitted * * *   > C14 Vectibix reordered at 3 mg/kg per MD Cody   >C17 Vectibix HELD per Cody LEO              >C18 Vectibix held again per MD   >C19  11/30/20- vectibix resumed at 3mg/kg   >C21 HOLD x1 dose for rash   >C22 cont HOLD   >C23 vectibix resumed at 3 mg/kg   >C25 & 26 Vectibix on hold due to rash   C27 Vectibix restarted at 3 mg/kg per Dr Ross    C28 Vectibix HELD   C30 Vectibix restarted 3 mg/kg per Dr. Ross     >C31,32 Vectibix held   C33 Vectibix 3 mg/kg per Dr. Ross    C34,35 Vectibix HOLD   C36- resume at 3mg/kg  Leucovorin 400 mg/m2 IV Day 1 followed by  Fluorouracil 400 mg/m2 IV Day 1 followed by   C1D1 reduced dose by 10% to 360 mg/m2 per MD Cody for anticipated tolerance   Fluorouracil 2400 mg/m2 CIVI " "over 46-48 hours    C1D1 reduced dose by 10% to 2160 mg/m2 per MD Cody for anticipated tolerance   14-day cycle until progression or toxicity  *Dosing Reference*  NCCN guidelines for colon cancer v2.2019  (cetuximab + chemo) [place]oon[place] AP et al, OBEY 2017;317(23):6562-4830  (HER2 tx in HER2+ CRC) Kayleen RINALDI et al. Lancet Oncol. 2019 Apr;20(4):518-530.    Allergies: Patient has no known allergies.  /72   Pulse 81   Temp 36.6 °C (97.8 °F) (Temporal)   Resp 18   Ht 1.835 m (6' 0.24\")   Wt 109 kg (239 lb 10.2 oz)   SpO2 99%   BMI 32.28 kg/m²  Body surface area is 2.36 meters squared.     All lab results 7/23/21 within treatment parameters.     ECHO(OK for 6 months per Cody LEO order)  4/21/21 LVEF ~60 %    Drug Order   (Drug name, dose, route, IV Fluid & volume, frequency, number of doses) Cycle 36  Previous treatment: 7/12/21       Medication = Panitumumab (Vectibix)   Base Dose= 3 mg/kg  Calc Dose: Base Dose x 109kg = 327mg  Final Dose = 300mg  Route = IV  Fluid & Volume =  mL  Admin Duration = Over 60 minutes         Rounded to nearest vial size (within 10%) per rounding protocol, okay to treat with final dose     Medication = Trastuzumab-pkrb (Herzuma)  Base Dose = 4 mg/kg  Calc Dose: Base Dose x 109kg = 436mg  Final Dose = 436mg  Route = IV  Fluid & Volume =  mL  Admin Duration = Over 30 minutes          <10% difference, okay to treat with final dose   Medication = Leucovorin (Wellcovorin)  Base Dose = 400 mg/m²  Calc Dose: Base Dose x 2.36m² = 944mg  Final Dose = 944mg  Route = IV  Fluid & Volume = D5W 250 mL  Admin Duration = Over 30 minutes          <10% difference, okay to treat with final dose   Medication = Fluorouracil (5-FU)  Base Dose = 360 mg/m²  Calc Dose: Base Dose x 2.36m² = 849mg  Final Dose = 850mg  Route = IVP  Fluid & Volume = 17mL in syringe  Conc = 50 mg/mL  Admin Duration = Over 5 minutes          <10% difference, okay to treat with final dose     Medication = " Fluorouracil (5-FU)  Base Dose = 2160 mg/m²  Calc Dose:Base Dose x 2.36m² = 5097mg  Final Dose = 5100mg  Route = CIVI   Fluid & Volume = 102mL (+3 mL overfill)  Admin Duration = Over 46 hours to run at   2.2mL/hour    Via CADD pump for home infusion        <10% difference, okay to treat with final dose       By my signature below, I confirm this process was performed independently with the BSA and all final chemotherapy dosing calculations congruent. I have reviewed the above chemotherapy order and that my calculation of the final dose and BSA (when applicable) corroborate those calculations of the  pharmacist. Discrepancies of 10% or greater in the written dose have been addressed and documented within the EPIC Progress notes.    Shari Amador, AbranD

## 2021-07-26 NOTE — PROGRESS NOTES
Chemotherapy Verification - PRIMARY RN      Height = 183.5 cm  Weight = 109 kg  BSA = 2.36 m^2       Medication: panitumumab  Dose: 3 mg/kg  Calculated Dose: 327 mg                             (In mg/m2, AUC, mg/kg)     Medication: trastuzumab-pkrb  Dose: 4 mg/kg  Calculated Dose: 436 mg                             (In mg/m2, AUC, mg/kg)    Medication: flurouracil in syringe  Dose: 360 mg/m^2  Calculated Dose: 849.6 mg                             (In mg/m2, AUC, mg/kg)    Medication: fluorouracil in CADD pump Dose: 2160mg/m^2  Calculated Dose: 5097.6 mg over 46 hours                             (In mg/m2, AUC, mg/kg)          I confirm this process was performed independently with the BSA and all final chemotherapy dosing calculations congruent.  Any discrepancies of 10% or greater have been addressed with the chemotherapy pharmacist. The resolution of the discrepancy has been documented in the EPIC progress notes.

## 2021-07-28 ENCOUNTER — OUTPATIENT INFUSION SERVICES (OUTPATIENT)
Dept: ONCOLOGY | Facility: MEDICAL CENTER | Age: 56
End: 2021-07-28
Attending: INTERNAL MEDICINE
Payer: MEDICARE

## 2021-07-28 VITALS
WEIGHT: 238.32 LBS | RESPIRATION RATE: 18 BRPM | SYSTOLIC BLOOD PRESSURE: 123 MMHG | TEMPERATURE: 98.1 F | BODY MASS INDEX: 32.28 KG/M2 | HEART RATE: 80 BPM | OXYGEN SATURATION: 95 % | DIASTOLIC BLOOD PRESSURE: 73 MMHG | HEIGHT: 72 IN

## 2021-07-28 DIAGNOSIS — C18.9 METASTATIC COLON CANCER TO LIVER (HCC): ICD-10-CM

## 2021-07-28 DIAGNOSIS — C78.7 METASTATIC COLON CANCER TO LIVER (HCC): ICD-10-CM

## 2021-07-28 PROCEDURE — 96523 IRRIG DRUG DELIVERY DEVICE: CPT

## 2021-07-28 PROCEDURE — 700111 HCHG RX REV CODE 636 W/ 250 OVERRIDE (IP)

## 2021-07-28 RX ORDER — 0.9 % SODIUM CHLORIDE 0.9 %
20 VIAL (ML) INJECTION PRN
Status: DISCONTINUED | OUTPATIENT
Start: 2021-07-28 | End: 2021-07-28 | Stop reason: HOSPADM

## 2021-07-28 RX ORDER — HEPARIN SODIUM (PORCINE) LOCK FLUSH IV SOLN 100 UNIT/ML 100 UNIT/ML
SOLUTION INTRAVENOUS
Status: COMPLETED
Start: 2021-07-28 | End: 2021-07-28

## 2021-07-28 RX ORDER — HEPARIN SODIUM (PORCINE) LOCK FLUSH IV SOLN 100 UNIT/ML 100 UNIT/ML
500 SOLUTION INTRAVENOUS PRN
Status: DISCONTINUED | OUTPATIENT
Start: 2021-07-28 | End: 2021-07-28 | Stop reason: HOSPADM

## 2021-07-28 RX ADMIN — HEPARIN SODIUM (PORCINE) LOCK FLUSH IV SOLN 100 UNIT/ML 500 UNITS: 100 SOLUTION at 13:59

## 2021-07-28 RX ADMIN — HEPARIN 500 UNITS: 100 SYRINGE at 13:59

## 2021-07-28 ASSESSMENT — FIBROSIS 4 INDEX: FIB4 SCORE: 2.02

## 2021-07-28 NOTE — PROGRESS NOTES
Gurmeet is here for 5 FU pump disconnect after 46 hrs of continuous infusion. See chemotherapy flow sheet for volume / dose administration. Port flushed per protocol, site de-accessed, needle intact. Gauze/paper tape applied to site. Next appointment scheduled. Discharged to self care; no apparent distress noted.

## 2021-08-05 ENCOUNTER — HOSPITAL ENCOUNTER (OUTPATIENT)
Dept: LAB | Facility: MEDICAL CENTER | Age: 56
End: 2021-08-05
Attending: INTERNAL MEDICINE
Payer: MEDICARE

## 2021-08-05 LAB
ALBUMIN SERPL BCP-MCNC: 4.3 G/DL (ref 3.2–4.9)
ALBUMIN/GLOB SERPL: 1.7 G/DL
ALP SERPL-CCNC: 118 U/L (ref 30–99)
ALT SERPL-CCNC: 19 U/L (ref 2–50)
ANION GAP SERPL CALC-SCNC: 10 MMOL/L (ref 7–16)
AST SERPL-CCNC: 34 U/L (ref 12–45)
BASOPHILS # BLD AUTO: 0.7 % (ref 0–1.8)
BASOPHILS # BLD: 0.04 K/UL (ref 0–0.12)
BILIRUB SERPL-MCNC: 0.8 MG/DL (ref 0.1–1.5)
BUN SERPL-MCNC: 18 MG/DL (ref 8–22)
CALCIUM SERPL-MCNC: 9.2 MG/DL (ref 8.5–10.5)
CEA SERPL-MCNC: 49 NG/ML (ref 0–3)
CHLORIDE SERPL-SCNC: 106 MMOL/L (ref 96–112)
CO2 SERPL-SCNC: 23 MMOL/L (ref 20–33)
CREAT SERPL-MCNC: 0.83 MG/DL (ref 0.5–1.4)
EOSINOPHIL # BLD AUTO: 0.22 K/UL (ref 0–0.51)
EOSINOPHIL NFR BLD: 4.1 % (ref 0–6.9)
ERYTHROCYTE [DISTWIDTH] IN BLOOD BY AUTOMATED COUNT: 54.6 FL (ref 35.9–50)
GLOBULIN SER CALC-MCNC: 2.5 G/DL (ref 1.9–3.5)
GLUCOSE SERPL-MCNC: 116 MG/DL (ref 65–99)
HCT VFR BLD AUTO: 40.4 % (ref 42–52)
HGB BLD-MCNC: 13.7 G/DL (ref 14–18)
IMM GRANULOCYTES # BLD AUTO: 0.02 K/UL (ref 0–0.11)
IMM GRANULOCYTES NFR BLD AUTO: 0.4 % (ref 0–0.9)
LYMPHOCYTES # BLD AUTO: 1.09 K/UL (ref 1–4.8)
LYMPHOCYTES NFR BLD: 20.1 % (ref 22–41)
MAGNESIUM SERPL-MCNC: 2.1 MG/DL (ref 1.5–2.5)
MCH RBC QN AUTO: 33 PG (ref 27–33)
MCHC RBC AUTO-ENTMCNC: 33.9 G/DL (ref 33.7–35.3)
MCV RBC AUTO: 97.3 FL (ref 81.4–97.8)
MONOCYTES # BLD AUTO: 0.51 K/UL (ref 0–0.85)
MONOCYTES NFR BLD AUTO: 9.4 % (ref 0–13.4)
NEUTROPHILS # BLD AUTO: 3.54 K/UL (ref 1.82–7.42)
NEUTROPHILS NFR BLD: 65.3 % (ref 44–72)
NRBC # BLD AUTO: 0 K/UL
NRBC BLD-RTO: 0 /100 WBC
PLATELET # BLD AUTO: 169 K/UL (ref 164–446)
PMV BLD AUTO: 11 FL (ref 9–12.9)
POTASSIUM SERPL-SCNC: 3.7 MMOL/L (ref 3.6–5.5)
PROT SERPL-MCNC: 6.8 G/DL (ref 6–8.2)
RBC # BLD AUTO: 4.15 M/UL (ref 4.7–6.1)
SODIUM SERPL-SCNC: 139 MMOL/L (ref 135–145)
WBC # BLD AUTO: 5.4 K/UL (ref 4.8–10.8)

## 2021-08-05 PROCEDURE — 80053 COMPREHEN METABOLIC PANEL: CPT

## 2021-08-05 PROCEDURE — 82378 CARCINOEMBRYONIC ANTIGEN: CPT

## 2021-08-05 PROCEDURE — 85025 COMPLETE CBC W/AUTO DIFF WBC: CPT

## 2021-08-05 PROCEDURE — 36415 COLL VENOUS BLD VENIPUNCTURE: CPT

## 2021-08-05 PROCEDURE — 83735 ASSAY OF MAGNESIUM: CPT

## 2021-08-09 ENCOUNTER — APPOINTMENT (OUTPATIENT)
Dept: ONCOLOGY | Facility: MEDICAL CENTER | Age: 56
End: 2021-08-09
Attending: INTERNAL MEDICINE
Payer: MEDICARE

## 2021-08-11 ENCOUNTER — APPOINTMENT (OUTPATIENT)
Dept: ONCOLOGY | Facility: MEDICAL CENTER | Age: 56
End: 2021-08-11
Attending: INTERNAL MEDICINE
Payer: MEDICARE

## 2021-08-16 ENCOUNTER — HOSPITAL ENCOUNTER (OUTPATIENT)
Dept: LAB | Facility: MEDICAL CENTER | Age: 56
End: 2021-08-16
Attending: INTERNAL MEDICINE
Payer: MEDICARE

## 2021-08-16 LAB
ALBUMIN SERPL BCP-MCNC: 4.4 G/DL (ref 3.2–4.9)
ALBUMIN/GLOB SERPL: 1.7 G/DL
ALP SERPL-CCNC: 138 U/L (ref 30–99)
ALT SERPL-CCNC: 19 U/L (ref 2–50)
ANION GAP SERPL CALC-SCNC: 16 MMOL/L (ref 7–16)
AST SERPL-CCNC: 39 U/L (ref 12–45)
BASOPHILS # BLD AUTO: 0.8 % (ref 0–1.8)
BASOPHILS # BLD: 0.06 K/UL (ref 0–0.12)
BILIRUB SERPL-MCNC: 1.2 MG/DL (ref 0.1–1.5)
BUN SERPL-MCNC: 15 MG/DL (ref 8–22)
CALCIUM SERPL-MCNC: 9.6 MG/DL (ref 8.5–10.5)
CEA SERPL-MCNC: 52.9 NG/ML (ref 0–3)
CHLORIDE SERPL-SCNC: 105 MMOL/L (ref 96–112)
CO2 SERPL-SCNC: 20 MMOL/L (ref 20–33)
CREAT SERPL-MCNC: 0.72 MG/DL (ref 0.5–1.4)
EOSINOPHIL # BLD AUTO: 0.33 K/UL (ref 0–0.51)
EOSINOPHIL NFR BLD: 4.6 % (ref 0–6.9)
ERYTHROCYTE [DISTWIDTH] IN BLOOD BY AUTOMATED COUNT: 54.4 FL (ref 35.9–50)
GLOBULIN SER CALC-MCNC: 2.6 G/DL (ref 1.9–3.5)
GLUCOSE SERPL-MCNC: 107 MG/DL (ref 65–99)
HCT VFR BLD AUTO: 40.2 % (ref 42–52)
HGB BLD-MCNC: 13.4 G/DL (ref 14–18)
IMM GRANULOCYTES # BLD AUTO: 0.02 K/UL (ref 0–0.11)
IMM GRANULOCYTES NFR BLD AUTO: 0.3 % (ref 0–0.9)
LYMPHOCYTES # BLD AUTO: 1.16 K/UL (ref 1–4.8)
LYMPHOCYTES NFR BLD: 16.3 % (ref 22–41)
MAGNESIUM SERPL-MCNC: 2.1 MG/DL (ref 1.5–2.5)
MCH RBC QN AUTO: 32.4 PG (ref 27–33)
MCHC RBC AUTO-ENTMCNC: 33.3 G/DL (ref 33.7–35.3)
MCV RBC AUTO: 97.3 FL (ref 81.4–97.8)
MONOCYTES # BLD AUTO: 0.81 K/UL (ref 0–0.85)
MONOCYTES NFR BLD AUTO: 11.4 % (ref 0–13.4)
NEUTROPHILS # BLD AUTO: 4.72 K/UL (ref 1.82–7.42)
NEUTROPHILS NFR BLD: 66.6 % (ref 44–72)
NRBC # BLD AUTO: 0 K/UL
NRBC BLD-RTO: 0 /100 WBC
PLATELET # BLD AUTO: 166 K/UL (ref 164–446)
PMV BLD AUTO: 11.5 FL (ref 9–12.9)
POTASSIUM SERPL-SCNC: 4 MMOL/L (ref 3.6–5.5)
PROT SERPL-MCNC: 7 G/DL (ref 6–8.2)
RBC # BLD AUTO: 4.13 M/UL (ref 4.7–6.1)
SODIUM SERPL-SCNC: 141 MMOL/L (ref 135–145)
WBC # BLD AUTO: 7.1 K/UL (ref 4.8–10.8)

## 2021-08-16 PROCEDURE — 36415 COLL VENOUS BLD VENIPUNCTURE: CPT

## 2021-08-16 PROCEDURE — 83735 ASSAY OF MAGNESIUM: CPT

## 2021-08-16 PROCEDURE — 80053 COMPREHEN METABOLIC PANEL: CPT

## 2021-08-16 PROCEDURE — 85025 COMPLETE CBC W/AUTO DIFF WBC: CPT

## 2021-08-16 PROCEDURE — 82378 CARCINOEMBRYONIC ANTIGEN: CPT

## 2021-08-17 ENCOUNTER — OUTPATIENT INFUSION SERVICES (OUTPATIENT)
Dept: ONCOLOGY | Facility: MEDICAL CENTER | Age: 56
End: 2021-08-17
Attending: INTERNAL MEDICINE
Payer: MEDICARE

## 2021-08-17 VITALS
RESPIRATION RATE: 18 BRPM | TEMPERATURE: 98 F | WEIGHT: 229.72 LBS | BODY MASS INDEX: 31.11 KG/M2 | DIASTOLIC BLOOD PRESSURE: 85 MMHG | SYSTOLIC BLOOD PRESSURE: 132 MMHG | HEIGHT: 72 IN | OXYGEN SATURATION: 98 % | HEART RATE: 85 BPM

## 2021-08-17 DIAGNOSIS — C78.7 METASTATIC COLON CANCER TO LIVER (HCC): ICD-10-CM

## 2021-08-17 DIAGNOSIS — C18.9 METASTATIC COLON CANCER TO LIVER (HCC): ICD-10-CM

## 2021-08-17 PROCEDURE — 700111 HCHG RX REV CODE 636 W/ 250 OVERRIDE (IP): Performed by: INTERNAL MEDICINE

## 2021-08-17 PROCEDURE — 304540 HCHG NITRO SET VENT 2ND TUB

## 2021-08-17 PROCEDURE — 700105 HCHG RX REV CODE 258: Performed by: INTERNAL MEDICINE

## 2021-08-17 PROCEDURE — A4212 NON CORING NEEDLE OR STYLET: HCPCS

## 2021-08-17 PROCEDURE — 96375 TX/PRO/DX INJ NEW DRUG ADDON: CPT

## 2021-08-17 PROCEDURE — 96413 CHEMO IV INFUSION 1 HR: CPT

## 2021-08-17 PROCEDURE — 96415 CHEMO IV INFUSION ADDL HR: CPT

## 2021-08-17 RX ORDER — METHYLPREDNISOLONE SODIUM SUCCINATE 125 MG/2ML
125 INJECTION, POWDER, LYOPHILIZED, FOR SOLUTION INTRAMUSCULAR; INTRAVENOUS PRN
Status: CANCELLED | OUTPATIENT
Start: 2021-08-17

## 2021-08-17 RX ORDER — DIPHENHYDRAMINE HYDROCHLORIDE 50 MG/ML
50 INJECTION INTRAMUSCULAR; INTRAVENOUS PRN
Status: CANCELLED | OUTPATIENT
Start: 2021-08-17

## 2021-08-17 RX ORDER — HEPARIN SODIUM (PORCINE) LOCK FLUSH IV SOLN 100 UNIT/ML 100 UNIT/ML
500 SOLUTION INTRAVENOUS PRN
Status: DISCONTINUED | OUTPATIENT
Start: 2021-08-17 | End: 2021-08-17 | Stop reason: HOSPADM

## 2021-08-17 RX ORDER — EPINEPHRINE 1 MG/ML(1)
0.5 AMPUL (ML) INJECTION PRN
Status: CANCELLED | OUTPATIENT
Start: 2021-08-17

## 2021-08-17 RX ORDER — PROCHLORPERAZINE MALEATE 10 MG
10 TABLET ORAL EVERY 6 HOURS PRN
Status: CANCELLED | OUTPATIENT
Start: 2021-08-17

## 2021-08-17 RX ORDER — 0.9 % SODIUM CHLORIDE 0.9 %
VIAL (ML) INJECTION PRN
Status: CANCELLED | OUTPATIENT
Start: 2021-08-17

## 2021-08-17 RX ORDER — ONDANSETRON 2 MG/ML
4 INJECTION INTRAMUSCULAR; INTRAVENOUS PRN
Status: CANCELLED | OUTPATIENT
Start: 2021-08-17

## 2021-08-17 RX ORDER — 0.9 % SODIUM CHLORIDE 0.9 %
10 VIAL (ML) INJECTION PRN
Status: CANCELLED | OUTPATIENT
Start: 2021-08-17

## 2021-08-17 RX ORDER — HEPARIN SODIUM (PORCINE) LOCK FLUSH IV SOLN 100 UNIT/ML 100 UNIT/ML
500 SOLUTION INTRAVENOUS PRN
Status: CANCELLED | OUTPATIENT
Start: 2021-08-17

## 2021-08-17 RX ORDER — 0.9 % SODIUM CHLORIDE 0.9 %
3 VIAL (ML) INJECTION PRN
Status: CANCELLED | OUTPATIENT
Start: 2021-08-17

## 2021-08-17 RX ORDER — DEXTROSE MONOHYDRATE 50 MG/ML
INJECTION, SOLUTION INTRAVENOUS CONTINUOUS
Status: CANCELLED | OUTPATIENT
Start: 2021-08-17

## 2021-08-17 RX ORDER — ONDANSETRON 8 MG/1
8 TABLET, ORALLY DISINTEGRATING ORAL PRN
Status: CANCELLED | OUTPATIENT
Start: 2021-08-17

## 2021-08-17 RX ORDER — 0.9 % SODIUM CHLORIDE 0.9 %
VIAL (ML) INJECTION PRN
Status: DISCONTINUED | OUTPATIENT
Start: 2021-08-17 | End: 2021-08-17 | Stop reason: HOSPADM

## 2021-08-17 RX ADMIN — FAM-TRASTUZUMAB DERUXTECAN-NXKI 665.6 MG: 100 INJECTION, POWDER, LYOPHILIZED, FOR SOLUTION INTRAVENOUS at 16:52

## 2021-08-17 RX ADMIN — HEPARIN 500 UNITS: 100 SYRINGE at 18:36

## 2021-08-17 RX ADMIN — DEXAMETHASONE SODIUM PHOSPHATE 12 MG: 4 INJECTION, SOLUTION INTRA-ARTICULAR; INTRALESIONAL; INTRAMUSCULAR; INTRAVENOUS; SOFT TISSUE at 15:53

## 2021-08-17 RX ADMIN — ONDANSETRON HYDROCHLORIDE 16 MG: 2 SOLUTION INTRAMUSCULAR; INTRAVENOUS at 16:04

## 2021-08-17 ASSESSMENT — FIBROSIS 4 INDEX: FIB4 SCORE: 3.07

## 2021-08-17 NOTE — PROGRESS NOTES
Chemotherapy Verification - SECONDARY RN       Height = 183.5 cm  Weight = 104 kg  BSA = 2.3 m2       Medication: Enhertu  Dose: 6.4 mg/kg    Calculated Dose: 665.6 mg                           I confirm that this process was performed independently.

## 2021-08-17 NOTE — PROGRESS NOTES
Chemotherapy Verification - PRIMARY RN      Height = 183.5 cm  Weight = 104 kg  BSA = 2.3 m^2       Medication: fam-trastuzum deruxtecan (Enhertu)  Dose: 6.4 mg/kg  Calculated Dose: 665.6 mg                             (In mg/m2, AUC, mg/kg)     I confirm this process was performed independently with the BSA and all final chemotherapy dosing calculations congruent.  Any discrepancies of 10% or greater have been addressed with the chemotherapy pharmacist. The resolution of the discrepancy has been documented in the EPIC progress notes.

## 2021-08-17 NOTE — PROGRESS NOTES
"Pharmacy Chemotherapy Verification  Patient Name: Gurmeet Jo   Dx: Metastatic colorectal cancer [KRAS/NRAS wild type, ERBB2 (HER2) amplification]    Regimen: Fam-trastuzumab deruztecan-nxki  Fam-trastuzumab deruztecan-nxki 6.4 mg/kg IV on Day 1  21 day cycle until disease progression or unacceptable toxicity  NCCN Guidelines for Colon Cancer.V.2.2021  Sparkle S, et al. J Clin Oncol. 2020;38(15_suppl):Abstract 4000    Allergies: Patient has no known allergies.  /85   Pulse 85   Temp 36.7 °C (98 °F) (Temporal)   Resp 18   Ht 1.835 m (6' 0.24\")   Wt 104 kg (229 lb 11.5 oz)   SpO2 98%   BMI 30.95 kg/m²  Body surface area is 2.3 meters squared.     Labs 8/16/21  ANC 4720 Hgb 13.4 Plt 166k  SCr 0.72 CrCl >125 mL/min  AST/ALT/AP = 39/19/138 Tbili = 1.2  K = 4  Mg = 2.1    ECHO 7/13/21  LVEF 65%    Drug Order   (Drug name, dose, route, IV Fluid & volume, frequency, number of doses) Cycle 1  Previous treatment: C36 mFOLOFX + Vectibix + Herceptin 7/26/21       Medication = Fam-trastuzumab deruztecan-nxki  Base Dose= 6.4 mg/kg  Calc Dose: Base Dose x 104kg = 665.6 mg  Final Dose = 665.6 mg  Route = IV  Fluid & Volume = D5W 100 mL  Admin Duration = Over 90 minutes                By my signature below, I confirm this process was performed independently with the BSA and all final chemotherapy dosing calculations congruent. I have reviewed the above chemotherapy order and that my calculation of the final dose and BSA (when applicable) corroborate those calculations of the  pharmacist. Discrepancies of 10% or greater in the written dose have been addressed and documented within the [facility] Progress notes.    Jeni Ponce, PharmD, BCOP      "

## 2021-08-18 NOTE — PROGRESS NOTES
Gurmeet is here for for Day 1, Cycle 1 fam-trastuzumab deruxtecan (Enhertu). Right upper chest port accessed using sterile technique; brisk blood return noted. Labs previously drawn and reviewed today; labs within parameters to proceed with treatment today. am-trastuzumab deruxtecan (Enhertu) given per MAR. Heparin instilled prior to de-accessing port. Port de-accessed; gauze/tape an applied to site. Next appointment scheduled. Discharged to self care; no apparent distress noted.

## 2021-09-02 RX ORDER — 0.9 % SODIUM CHLORIDE 0.9 %
3 VIAL (ML) INJECTION PRN
Status: CANCELLED | OUTPATIENT
Start: 2021-09-06

## 2021-09-02 RX ORDER — 0.9 % SODIUM CHLORIDE 0.9 %
10 VIAL (ML) INJECTION PRN
Status: CANCELLED | OUTPATIENT
Start: 2021-09-06

## 2021-09-02 RX ORDER — HEPARIN SODIUM (PORCINE) LOCK FLUSH IV SOLN 100 UNIT/ML 100 UNIT/ML
500 SOLUTION INTRAVENOUS PRN
Status: CANCELLED | OUTPATIENT
Start: 2021-09-06

## 2021-09-02 RX ORDER — 0.9 % SODIUM CHLORIDE 0.9 %
VIAL (ML) INJECTION PRN
Status: CANCELLED | OUTPATIENT
Start: 2021-09-06

## 2021-09-03 RX ORDER — HEPARIN SODIUM (PORCINE) LOCK FLUSH IV SOLN 100 UNIT/ML 100 UNIT/ML
500 SOLUTION INTRAVENOUS PRN
Status: CANCELLED | OUTPATIENT
Start: 2021-09-07

## 2021-09-03 RX ORDER — METHYLPREDNISOLONE SODIUM SUCCINATE 125 MG/2ML
125 INJECTION, POWDER, LYOPHILIZED, FOR SOLUTION INTRAMUSCULAR; INTRAVENOUS PRN
Status: CANCELLED | OUTPATIENT
Start: 2021-09-07

## 2021-09-03 RX ORDER — ONDANSETRON 2 MG/ML
4 INJECTION INTRAMUSCULAR; INTRAVENOUS PRN
Status: CANCELLED | OUTPATIENT
Start: 2021-09-07

## 2021-09-03 RX ORDER — 0.9 % SODIUM CHLORIDE 0.9 %
VIAL (ML) INJECTION PRN
Status: CANCELLED | OUTPATIENT
Start: 2021-09-07

## 2021-09-03 RX ORDER — 0.9 % SODIUM CHLORIDE 0.9 %
3 VIAL (ML) INJECTION PRN
Status: CANCELLED | OUTPATIENT
Start: 2021-09-07

## 2021-09-03 RX ORDER — 0.9 % SODIUM CHLORIDE 0.9 %
10 VIAL (ML) INJECTION PRN
Status: CANCELLED | OUTPATIENT
Start: 2021-09-07

## 2021-09-03 RX ORDER — DIPHENHYDRAMINE HYDROCHLORIDE 50 MG/ML
50 INJECTION INTRAMUSCULAR; INTRAVENOUS PRN
Status: CANCELLED | OUTPATIENT
Start: 2021-09-07

## 2021-09-03 RX ORDER — DEXTROSE MONOHYDRATE 50 MG/ML
INJECTION, SOLUTION INTRAVENOUS CONTINUOUS
Status: CANCELLED | OUTPATIENT
Start: 2021-09-07

## 2021-09-03 RX ORDER — PROCHLORPERAZINE MALEATE 10 MG
10 TABLET ORAL EVERY 6 HOURS PRN
Status: CANCELLED | OUTPATIENT
Start: 2021-09-07

## 2021-09-03 RX ORDER — EPINEPHRINE 1 MG/ML(1)
0.5 AMPUL (ML) INJECTION PRN
Status: CANCELLED | OUTPATIENT
Start: 2021-09-07

## 2021-09-03 RX ORDER — ONDANSETRON 8 MG/1
8 TABLET, ORALLY DISINTEGRATING ORAL PRN
Status: CANCELLED | OUTPATIENT
Start: 2021-09-07

## 2021-09-06 ENCOUNTER — OUTPATIENT INFUSION SERVICES (OUTPATIENT)
Dept: ONCOLOGY | Facility: MEDICAL CENTER | Age: 56
End: 2021-09-06
Attending: INTERNAL MEDICINE
Payer: MEDICARE

## 2021-09-06 VITALS
HEART RATE: 59 BPM | RESPIRATION RATE: 18 BRPM | DIASTOLIC BLOOD PRESSURE: 74 MMHG | BODY MASS INDEX: 30.82 KG/M2 | WEIGHT: 227.51 LBS | HEIGHT: 72 IN | SYSTOLIC BLOOD PRESSURE: 130 MMHG | TEMPERATURE: 97 F | OXYGEN SATURATION: 99 %

## 2021-09-06 DIAGNOSIS — C18.9 METASTATIC COLON CANCER TO LIVER (HCC): ICD-10-CM

## 2021-09-06 DIAGNOSIS — C78.7 METASTATIC COLON CANCER TO LIVER (HCC): ICD-10-CM

## 2021-09-06 LAB
BASOPHILS # BLD AUTO: 1.1 % (ref 0–1.8)
BASOPHILS # BLD: 0.03 K/UL (ref 0–0.12)
EOSINOPHIL # BLD AUTO: 0.06 K/UL (ref 0–0.51)
EOSINOPHIL NFR BLD: 2.3 % (ref 0–6.9)
ERYTHROCYTE [DISTWIDTH] IN BLOOD BY AUTOMATED COUNT: 54.6 FL (ref 35.9–50)
HCT VFR BLD AUTO: 40.6 % (ref 42–52)
HGB BLD-MCNC: 13.5 G/DL (ref 14–18)
IMM GRANULOCYTES # BLD AUTO: 0.02 K/UL (ref 0–0.11)
IMM GRANULOCYTES NFR BLD AUTO: 0.8 % (ref 0–0.9)
LYMPHOCYTES # BLD AUTO: 0.77 K/UL (ref 1–4.8)
LYMPHOCYTES NFR BLD: 29.2 % (ref 22–41)
MCH RBC QN AUTO: 32.8 PG (ref 27–33)
MCHC RBC AUTO-ENTMCNC: 33.3 G/DL (ref 33.7–35.3)
MCV RBC AUTO: 98.5 FL (ref 81.4–97.8)
MONOCYTES # BLD AUTO: 0.58 K/UL (ref 0–0.85)
MONOCYTES NFR BLD AUTO: 22 % (ref 0–13.4)
NEUTROPHILS # BLD AUTO: 1.18 K/UL (ref 1.82–7.42)
NEUTROPHILS NFR BLD: 44.6 % (ref 44–72)
NRBC # BLD AUTO: 0 K/UL
NRBC BLD-RTO: 0 /100 WBC
PLATELET # BLD AUTO: 188 K/UL (ref 164–446)
PMV BLD AUTO: 10.5 FL (ref 9–12.9)
RBC # BLD AUTO: 4.12 M/UL (ref 4.7–6.1)
WBC # BLD AUTO: 2.6 K/UL (ref 4.8–10.8)

## 2021-09-06 PROCEDURE — 304540 HCHG NITRO SET VENT 2ND TUB

## 2021-09-06 PROCEDURE — A4212 NON CORING NEEDLE OR STYLET: HCPCS

## 2021-09-06 PROCEDURE — 96413 CHEMO IV INFUSION 1 HR: CPT

## 2021-09-06 PROCEDURE — 700105 HCHG RX REV CODE 258: Performed by: INTERNAL MEDICINE

## 2021-09-06 PROCEDURE — 700111 HCHG RX REV CODE 636 W/ 250 OVERRIDE (IP): Mod: JW,TB | Performed by: INTERNAL MEDICINE

## 2021-09-06 PROCEDURE — 700111 HCHG RX REV CODE 636 W/ 250 OVERRIDE (IP): Performed by: NURSE PRACTITIONER

## 2021-09-06 PROCEDURE — 96375 TX/PRO/DX INJ NEW DRUG ADDON: CPT

## 2021-09-06 PROCEDURE — 85025 COMPLETE CBC W/AUTO DIFF WBC: CPT

## 2021-09-06 RX ORDER — HEPARIN SODIUM (PORCINE) LOCK FLUSH IV SOLN 100 UNIT/ML 100 UNIT/ML
500 SOLUTION INTRAVENOUS PRN
Status: DISCONTINUED | OUTPATIENT
Start: 2021-09-06 | End: 2021-09-06 | Stop reason: HOSPADM

## 2021-09-06 RX ADMIN — DEXAMETHASONE SODIUM PHOSPHATE 12 MG: 4 INJECTION, SOLUTION INTRA-ARTICULAR; INTRALESIONAL; INTRAMUSCULAR; INTRAVENOUS; SOFT TISSUE at 10:24

## 2021-09-06 RX ADMIN — FAM-TRASTUZUMAB DERUXTECAN-NXKI 659.2 MG: 100 INJECTION, POWDER, LYOPHILIZED, FOR SOLUTION INTRAVENOUS at 11:36

## 2021-09-06 RX ADMIN — ONDANSETRON 16 MG: 2 INJECTION INTRAMUSCULAR; INTRAVENOUS at 10:40

## 2021-09-06 RX ADMIN — HEPARIN 500 UNITS: 100 SYRINGE at 12:15

## 2021-09-06 ASSESSMENT — FIBROSIS 4 INDEX: FIB4 SCORE: 3.07

## 2021-09-06 NOTE — PROGRESS NOTES
Patient arrived ambulatory to [facility] for C2 Enhertu.  Patient denies any s/s of infection or fevers.  Port accessed using sterile technique, brisk blood return noted.  CBC drawn per order and results within parameters to treat.  Pre medications given and Enhertu infused per order, patient tolerated well.  Port flushed, heparin instilled, and de-accessed per protocol.  Gauze/tape applied.  Confirmed next appointment and patient ambulated out of clinic in no apparent distress.

## 2021-09-06 NOTE — PROGRESS NOTES
Chemotherapy Verification - PRIMARY RN      Height = 184 cm  Weight = 103 kg  BSA = 2.29 m2       Medication: Enhertu  Dose: 6.4 mg/kg    Calculated Dose: 659.2 mg                            I confirm this process was performed independently with the BSA and all final chemotherapy dosing calculations congruent.  Any discrepancies of 10% or greater have been addressed with the chemotherapy pharmacist. The resolution of the discrepancy has been documented in the EPIC progress notes.

## 2021-09-06 NOTE — PROGRESS NOTES
"Pharmacy Chemotherapy Verification  Patient name: Gurmeet Jo  Dx: Metastatic colorectal cancer [KRAS/NRAS wild type, ERBB2 (HER2) amplification]  Regimen: Fam-trastuzumab deruxtecan    Cycle 2  Previous treatment: C1 on 8/17/21    Fam-trastuzumab deruxtecan 6.4 mg/kg IV over 90 minutes Day 1   21-day cycle until progression or toxicity  *Dosing Reference*  NCCN guidelines for Colon Cancer v2.2021  (cetuximab + chemo) [place] AP et al, OBEY 2017;317(23):0473-6287  (HER2 tx in HER2+ CRC) Kayleen RINALDI et al. Lancet Oncol. 2019 Apr;20(4):518-530.    Allergies: Patient has no known allergies.  /74   Pulse (!) 59   Temp 36.1 °C (97 °F) (Temporal)   Resp 18   Ht 1.84 m (6' 0.44\")   Wt 103 kg (227 lb 8.2 oz)   SpO2 99%   BMI 30.48 kg/m²  Body surface area is 2.29 meters squared.     ECHO (OK for 6 months per Cody LEO order)  4/21/21 LVEF ~60 %    Labs 9/6/21:  ANC~ 1180 Plt = 188k   Hgb = 13.5       Fam-trastuzumab deruxtecan (Enhertu) 6.4 mg/kg x 103 kg = 659.2 mg   <10% difference, okay to treat with final dose = 659.2 mg IV    José Marvin, PharmD    "

## 2021-09-06 NOTE — PROGRESS NOTES
Chemotherapy Verification - SECONDARY RN       Height = 184 cm  Weight = 103 kg  BSA = 2.29 m2       Medication: Enhertu  Dose: 6.4 mg/kg  Calculated Dose: 659.2 mg                             (In mg/m2, AUC, mg/kg)       I confirm that this process was performed independently.

## 2021-09-06 NOTE — PROGRESS NOTES
"Pharmacy Chemotherapy Verification  Patient name: Gurmeet Jo  Dx: Metastatic colorectal cancer   Regimen: Fam-trastuzumab deruxtecan    Fam-trastuzumab deruxtecan 6.4 mg/kg IV over 90 minutes Day 1   21-day cycle until progression or toxicity  *Dosing Reference*  NCCN guidelines for Colon Cancer v2.2021  (cetuximab + chemo) Christine ARGUELLO et al, OBEY 2017;317(23):4941-7371  (HER2 tx in HER2+ CRC) Kayleen RINALDI et al. Lancet Oncol. 2019 Apr;20(4):518-530.    Allergies: Patient has no known allergies.  /74   Pulse (!) 59   Temp 36.1 °C (97 °F) (Temporal)   Resp 18   Ht 1.84 m (6' 0.44\")   Wt 103 kg (227 lb 8.2 oz)   SpO2 99%   BMI 30.48 kg/m²  Body surface area is 2.29 meters squared.     Labs 9/6/21  Meet treatment parameters    ECHO (OK for 6 months per Cody LEO order)  4/21/21 LVEF ~60 %    Path:  KRAS/NRAS wild type   ERBB2 (HER2) amplification    Drug Order   (Drug name, dose, route, IV Fluid & volume, frequency, number of doses) Cycle 2  Previous treatment: 8/17/21         Medication = Fam-trastuzumab deruztecan-nxki  Base Dose= 6.4 mg/kg  Calc Dose: Base Dose x 103 kg = 659.2 mg  Final Dose = 659.2 mg  Route = IV  Fluid & Volume = D5W 100 mL  Admin Duration = Over 90 minutes            OK to treat with final dose       "

## 2021-09-21 RX ORDER — 0.9 % SODIUM CHLORIDE 0.9 %
10 VIAL (ML) INJECTION PRN
Status: CANCELLED | OUTPATIENT
Start: 2021-09-27

## 2021-09-21 RX ORDER — METHYLPREDNISOLONE SODIUM SUCCINATE 125 MG/2ML
125 INJECTION, POWDER, LYOPHILIZED, FOR SOLUTION INTRAMUSCULAR; INTRAVENOUS PRN
Status: CANCELLED | OUTPATIENT
Start: 2021-09-28

## 2021-09-21 RX ORDER — 0.9 % SODIUM CHLORIDE 0.9 %
VIAL (ML) INJECTION PRN
Status: CANCELLED | OUTPATIENT
Start: 2021-09-27

## 2021-09-21 RX ORDER — EPINEPHRINE 1 MG/ML(1)
0.5 AMPUL (ML) INJECTION PRN
Status: CANCELLED | OUTPATIENT
Start: 2021-09-28

## 2021-09-21 RX ORDER — HEPARIN SODIUM (PORCINE) LOCK FLUSH IV SOLN 100 UNIT/ML 100 UNIT/ML
500 SOLUTION INTRAVENOUS PRN
Status: CANCELLED | OUTPATIENT
Start: 2021-09-27

## 2021-09-21 RX ORDER — 0.9 % SODIUM CHLORIDE 0.9 %
10 VIAL (ML) INJECTION PRN
Status: CANCELLED | OUTPATIENT
Start: 2021-09-28

## 2021-09-21 RX ORDER — ONDANSETRON 8 MG/1
8 TABLET, ORALLY DISINTEGRATING ORAL PRN
Status: CANCELLED | OUTPATIENT
Start: 2021-09-28

## 2021-09-21 RX ORDER — DIPHENHYDRAMINE HYDROCHLORIDE 50 MG/ML
50 INJECTION INTRAMUSCULAR; INTRAVENOUS PRN
Status: CANCELLED | OUTPATIENT
Start: 2021-09-28

## 2021-09-21 RX ORDER — DEXTROSE MONOHYDRATE 50 MG/ML
INJECTION, SOLUTION INTRAVENOUS CONTINUOUS
Status: CANCELLED | OUTPATIENT
Start: 2021-09-28

## 2021-09-21 RX ORDER — PROCHLORPERAZINE MALEATE 10 MG
10 TABLET ORAL EVERY 6 HOURS PRN
Status: CANCELLED | OUTPATIENT
Start: 2021-09-28

## 2021-09-21 RX ORDER — 0.9 % SODIUM CHLORIDE 0.9 %
VIAL (ML) INJECTION PRN
Status: CANCELLED | OUTPATIENT
Start: 2021-09-28

## 2021-09-21 RX ORDER — HEPARIN SODIUM (PORCINE) LOCK FLUSH IV SOLN 100 UNIT/ML 100 UNIT/ML
500 SOLUTION INTRAVENOUS PRN
Status: CANCELLED | OUTPATIENT
Start: 2021-09-28

## 2021-09-21 RX ORDER — 0.9 % SODIUM CHLORIDE 0.9 %
3 VIAL (ML) INJECTION PRN
Status: CANCELLED | OUTPATIENT
Start: 2021-09-28

## 2021-09-21 RX ORDER — 0.9 % SODIUM CHLORIDE 0.9 %
3 VIAL (ML) INJECTION PRN
Status: CANCELLED | OUTPATIENT
Start: 2021-09-27

## 2021-09-21 RX ORDER — ONDANSETRON 2 MG/ML
4 INJECTION INTRAMUSCULAR; INTRAVENOUS PRN
Status: CANCELLED | OUTPATIENT
Start: 2021-09-28

## 2021-09-23 ENCOUNTER — HOSPITAL ENCOUNTER (OUTPATIENT)
Dept: LAB | Facility: MEDICAL CENTER | Age: 56
End: 2021-09-23
Attending: INTERNAL MEDICINE
Payer: MEDICARE

## 2021-09-23 LAB
ALBUMIN SERPL BCP-MCNC: 4.2 G/DL (ref 3.2–4.9)
ALBUMIN/GLOB SERPL: 1.6 G/DL
ALP SERPL-CCNC: 150 U/L (ref 30–99)
ALT SERPL-CCNC: 28 U/L (ref 2–50)
ANION GAP SERPL CALC-SCNC: 12 MMOL/L (ref 7–16)
AST SERPL-CCNC: 30 U/L (ref 12–45)
BASOPHILS # BLD AUTO: 1.5 % (ref 0–1.8)
BASOPHILS # BLD: 0.04 K/UL (ref 0–0.12)
BILIRUB SERPL-MCNC: 0.9 MG/DL (ref 0.1–1.5)
BUN SERPL-MCNC: 9 MG/DL (ref 8–22)
CALCIUM SERPL-MCNC: 9.4 MG/DL (ref 8.5–10.5)
CEA SERPL-MCNC: 16.5 NG/ML (ref 0–3)
CHLORIDE SERPL-SCNC: 105 MMOL/L (ref 96–112)
CO2 SERPL-SCNC: 22 MMOL/L (ref 20–33)
CREAT SERPL-MCNC: 0.7 MG/DL (ref 0.5–1.4)
EOSINOPHIL # BLD AUTO: 0.06 K/UL (ref 0–0.51)
EOSINOPHIL NFR BLD: 2.3 % (ref 0–6.9)
ERYTHROCYTE [DISTWIDTH] IN BLOOD BY AUTOMATED COUNT: 55.1 FL (ref 35.9–50)
GLOBULIN SER CALC-MCNC: 2.6 G/DL (ref 1.9–3.5)
GLUCOSE SERPL-MCNC: 127 MG/DL (ref 65–99)
HCT VFR BLD AUTO: 39.6 % (ref 42–52)
HGB BLD-MCNC: 13.4 G/DL (ref 14–18)
IMM GRANULOCYTES # BLD AUTO: 0.01 K/UL (ref 0–0.11)
IMM GRANULOCYTES NFR BLD AUTO: 0.4 % (ref 0–0.9)
LYMPHOCYTES # BLD AUTO: 0.71 K/UL (ref 1–4.8)
LYMPHOCYTES NFR BLD: 27.4 % (ref 22–41)
MAGNESIUM SERPL-MCNC: 2.2 MG/DL (ref 1.5–2.5)
MCH RBC QN AUTO: 33.3 PG (ref 27–33)
MCHC RBC AUTO-ENTMCNC: 33.8 G/DL (ref 33.7–35.3)
MCV RBC AUTO: 98.5 FL (ref 81.4–97.8)
MONOCYTES # BLD AUTO: 0.36 K/UL (ref 0–0.85)
MONOCYTES NFR BLD AUTO: 13.9 % (ref 0–13.4)
NEUTROPHILS # BLD AUTO: 1.41 K/UL (ref 1.82–7.42)
NEUTROPHILS NFR BLD: 54.5 % (ref 44–72)
NRBC # BLD AUTO: 0 K/UL
NRBC BLD-RTO: 0 /100 WBC
PLATELET # BLD AUTO: 136 K/UL (ref 164–446)
PMV BLD AUTO: 11.6 FL (ref 9–12.9)
POTASSIUM SERPL-SCNC: 3.8 MMOL/L (ref 3.6–5.5)
PROT SERPL-MCNC: 6.8 G/DL (ref 6–8.2)
RBC # BLD AUTO: 4.02 M/UL (ref 4.7–6.1)
SODIUM SERPL-SCNC: 139 MMOL/L (ref 135–145)
WBC # BLD AUTO: 2.6 K/UL (ref 4.8–10.8)

## 2021-09-23 PROCEDURE — 83735 ASSAY OF MAGNESIUM: CPT

## 2021-09-23 PROCEDURE — 85025 COMPLETE CBC W/AUTO DIFF WBC: CPT

## 2021-09-23 PROCEDURE — 36415 COLL VENOUS BLD VENIPUNCTURE: CPT

## 2021-09-23 PROCEDURE — 80053 COMPREHEN METABOLIC PANEL: CPT

## 2021-09-23 PROCEDURE — 82378 CARCINOEMBRYONIC ANTIGEN: CPT

## 2021-09-27 ENCOUNTER — OUTPATIENT INFUSION SERVICES (OUTPATIENT)
Dept: ONCOLOGY | Facility: MEDICAL CENTER | Age: 56
End: 2021-09-27
Attending: INTERNAL MEDICINE
Payer: MEDICARE

## 2021-09-27 VITALS
OXYGEN SATURATION: 97 % | BODY MASS INDEX: 29.89 KG/M2 | RESPIRATION RATE: 18 BRPM | HEART RATE: 62 BPM | TEMPERATURE: 97 F | SYSTOLIC BLOOD PRESSURE: 123 MMHG | DIASTOLIC BLOOD PRESSURE: 60 MMHG | WEIGHT: 225.53 LBS | HEIGHT: 73 IN

## 2021-09-27 DIAGNOSIS — C78.7 METASTATIC COLON CANCER TO LIVER (HCC): ICD-10-CM

## 2021-09-27 DIAGNOSIS — C18.9 METASTATIC COLON CANCER TO LIVER (HCC): ICD-10-CM

## 2021-09-27 PROCEDURE — 700105 HCHG RX REV CODE 258: Performed by: INTERNAL MEDICINE

## 2021-09-27 PROCEDURE — 700111 HCHG RX REV CODE 636 W/ 250 OVERRIDE (IP): Mod: JW,TB | Performed by: INTERNAL MEDICINE

## 2021-09-27 PROCEDURE — 96367 TX/PROPH/DG ADDL SEQ IV INF: CPT

## 2021-09-27 PROCEDURE — 96413 CHEMO IV INFUSION 1 HR: CPT

## 2021-09-27 PROCEDURE — A4212 NON CORING NEEDLE OR STYLET: HCPCS

## 2021-09-27 PROCEDURE — 96375 TX/PRO/DX INJ NEW DRUG ADDON: CPT

## 2021-09-27 RX ORDER — HEPARIN SODIUM (PORCINE) LOCK FLUSH IV SOLN 100 UNIT/ML 100 UNIT/ML
500 SOLUTION INTRAVENOUS PRN
Status: DISCONTINUED | OUTPATIENT
Start: 2021-09-27 | End: 2021-09-27 | Stop reason: HOSPADM

## 2021-09-27 RX ADMIN — ONDANSETRON 16 MG: 2 INJECTION INTRAMUSCULAR; INTRAVENOUS at 12:15

## 2021-09-27 RX ADMIN — DEXAMETHASONE SODIUM PHOSPHATE 12 MG: 4 INJECTION, SOLUTION INTRA-ARTICULAR; INTRALESIONAL; INTRAMUSCULAR; INTRAVENOUS; SOFT TISSUE at 12:02

## 2021-09-27 RX ADMIN — HEPARIN 500 UNITS: 100 SYRINGE at 13:45

## 2021-09-27 RX ADMIN — FAM-TRASTUZUMAB DERUXTECAN-NXKI 652.8 MG: 100 INJECTION, POWDER, LYOPHILIZED, FOR SOLUTION INTRAVENOUS at 12:55

## 2021-09-27 ASSESSMENT — FIBROSIS 4 INDEX: FIB4 SCORE: 2.38

## 2021-09-27 NOTE — PROGRESS NOTES
"Pharmacy Chemotherapy Verification  Patient name: Gurmeet Jo  Dx: Metastatic colorectal cancer   Regimen: Fam-trastuzumab deruxtecan    Fam-trastuzumab deruxtecan 6.4 mg/kg IV over 90 minutes Day 1   21-day cycle until progression or toxicity  *Dosing Reference*  NCCN guidelines for Colon Cancer v2.2021  (cetuximab + chemo) Christine ARGUELLO et al, OBEY 2017;317(23):9478-1593  (HER2 tx in HER2+ CRC) Kayleen RINALDI et al. Lancet Oncol. 2019 Apr;20(4):518-530.    Allergies: Patient has no known allergies.  /60   Pulse 62   Temp 36.1 °C (97 °F) (Temporal)   Resp 18   Ht 1.85 m (6' 0.84\")   Wt 102 kg (225 lb 8.5 oz)   SpO2 97%   BMI 29.89 kg/m²  Body surface area is 2.29 meters squared.     ECHO (OK for 6 months per Cody LEO order)  4/21/21 LVEF ~60 %  7/16/21 LVEF ~65%    Labs 9/23/21:  ANC~ 1410 Plt = 136k   Hgb = 13.4     SCr = 0.7 mg/dL CrCl >125 mL/min   AST/ALT/ALK = 30/28/150 TBili = 0.9     Drug Order   (Drug name, dose, route, IV Fluid & volume, frequency, number of doses) Cycle 3  Previous treatment: C2 on 9/6/21      Medication = Fam-trastuzumab deruztecan-nxki (Enhertu)  Base Dose= 6.4 mg/kg  Calc Dose: Base Dose x 102 kg = 652.8 mg  Final Dose = 652.8 mg  Route = IV  Fluid & Volume = D5W 100 mL  Admin Duration = Over 30  minutes            <10% difference, okay to treat with final dose     By my signature below, I confirm this process was performed independently with the BSA and all final chemotherapy dosing calculations congruent. I have reviewed the above chemotherapy order and that my calculation of the final dose and BSA (when applicable) corroborate those calculations of the  pharmacist. Discrepancies of 10% or greater in the written dose have been addressed and documented within the [facility] Progress notes.    José Marvin, PharmD      "

## 2021-09-27 NOTE — PROGRESS NOTES
Chemotherapy Verification - SECONDARY RN       Height = 185 cm  Weight = 102 kg  BSA = 2.29 m2       Medication: fam-trastuzumab deruxtecan ENHERTU  Dose: 6.4mg/kg  Calculated Dose: 652.8 mg (ordered dose= 652.8 mg, <10% difference)                             (In mg/m2, AUC, mg/kg)       I confirm that this process was performed independently.

## 2021-09-27 NOTE — PROGRESS NOTES
Chemotherapy Verification - PRIMARY RN    C3 D1    Height = 185 cm  Weight = 102 kg  BSA = 2.29 m^2   Echo on 7/13/2021 LVEF 65%       Medication: Enhertu  Dose: 6.4 mg/kg  Calculated Dose: 652.8 mg                             (In mg/m2, AUC, mg/kg)     I confirm this process was performed independently with the BSA and all final chemotherapy dosing calculations congruent.  Any discrepancies of 10% or greater have been addressed with the chemotherapy pharmacist. The resolution of the discrepancy has been documented in the EPIC progress notes.

## 2021-09-27 NOTE — PROGRESS NOTES
"Pharmacy Chemotherapy Verification  Patient name: Gurmeet Jo  Dx: Metastatic colorectal cancer [KRAS/NRAS wild type, ERBB2 (HER2) amplification]  Regimen: Fam-trastuzumab deruxtecan    Cycle 3  Previous treatment: 9/6/21    Fam-trastuzumab deruxtecan 6.4 mg/kg IV over 90 minutes Day 1  21-day cycle until progression or toxicity  NCCN guidelines for Colon Cancer v2.2021  (cetuximab + chemo) [place] AP et al, OBEY 2017;317(23):9683-2823  (HER2 tx in HER2+ CRC) Kayleen RINALDI et al. Lancet Oncol. 2019 Apr;20(4):518-530.    Allergies: Patient has no known allergies.  /60   Pulse 62   Temp 36.1 °C (97 °F) (Temporal)   Resp 18   Ht 1.85 m (6' 0.84\")   Wt 102 kg (225 lb 8.5 oz)   SpO2 97%   BMI 29.89 kg/m²  Body surface area is 2.29 meters squared.     Labs 9/23/21  ANC 1410 Hgb 13.4 Plt 136k  SCr 0.7 CrCl >125 mL/min   AST/ALT/AP = 30/28/150 Tbili 0.9    ECHO (OK for 6 months per Cody LEO order)  4/21/21 LVEF ~60 %      Fam-trastuzumab deruxtecan (Enhertu) 6.4 mg/kg x 102 kg = 652.8 mg   <10% difference, okay to treat with final dose = 652.8 mg IV    Jeni Ponce, PharmD, BCOP    "

## 2021-09-28 ENCOUNTER — HOSPITAL ENCOUNTER (OUTPATIENT)
Dept: CARDIOLOGY | Facility: MEDICAL CENTER | Age: 56
End: 2021-09-28
Attending: INTERNAL MEDICINE
Payer: MEDICARE

## 2021-09-28 DIAGNOSIS — C18.9 ADENOSQUAMOUS CARCINOMA OF COLON (HCC): ICD-10-CM

## 2021-09-28 LAB
LV EJECT FRACT  99904: 70
LV EJECT FRACT MOD 2C 99903: 57.45
LV EJECT FRACT MOD 4C 99902: 77.16
LV EJECT FRACT MOD BP 99901: 68.11

## 2021-09-28 PROCEDURE — 93306 TTE W/DOPPLER COMPLETE: CPT

## 2021-09-28 PROCEDURE — 93306 TTE W/DOPPLER COMPLETE: CPT | Mod: 26 | Performed by: INTERNAL MEDICINE

## 2021-09-28 NOTE — PROGRESS NOTES
Pt arrives to [facility] for cycle 3 of Enhertu.  Discussed plan of care with pt.  Pt denies s/sx of infection or pain.  Pt had labs drawn on 9/23/2021.  Labs reviewed and results meet parameters for treatment.  Echo on 7/13/2021 EF 65%.  RUC port accessed with sterile technique, flushed with NS and brisk blood return observed.   Pre-medications given.  Enhertu infused over 30 minutes without adverse reaction.  Port flushed with NS and heparin locked.  Adkins needle removed intact.  Site covered with gauze/paper tape.  Confirmed next appt with pt.  Pt dc home to self care.

## 2021-10-11 ENCOUNTER — PATIENT MESSAGE (OUTPATIENT)
Dept: OTHER | Facility: MEDICAL CENTER | Age: 56
End: 2021-10-11

## 2021-10-11 ENCOUNTER — PATIENT OUTREACH (OUTPATIENT)
Dept: OTHER | Facility: MEDICAL CENTER | Age: 56
End: 2021-10-11

## 2021-10-11 NOTE — PROGRESS NOTES
Pt left message on navigation line with number and wife's number to call back as soon as possible.  Return call and left message.  Call placed to wife's number, she reported unsure of what questions he had and to try and reach on his number.  Had already left message on patient's number.  Will wait for return call.

## 2021-10-13 ENCOUNTER — PATIENT MESSAGE (OUTPATIENT)
Dept: HEALTH INFORMATION MANAGEMENT | Facility: OTHER | Age: 56
End: 2021-10-13

## 2021-10-13 RX ORDER — DEXTROSE MONOHYDRATE 50 MG/ML
INJECTION, SOLUTION INTRAVENOUS CONTINUOUS
Status: CANCELLED | OUTPATIENT
Start: 2021-10-19

## 2021-10-13 RX ORDER — PROCHLORPERAZINE MALEATE 10 MG
10 TABLET ORAL EVERY 6 HOURS PRN
Status: CANCELLED | OUTPATIENT
Start: 2021-10-19

## 2021-10-13 RX ORDER — 0.9 % SODIUM CHLORIDE 0.9 %
3 VIAL (ML) INJECTION PRN
Status: CANCELLED | OUTPATIENT
Start: 2021-10-18

## 2021-10-13 RX ORDER — METHYLPREDNISOLONE SODIUM SUCCINATE 125 MG/2ML
125 INJECTION, POWDER, LYOPHILIZED, FOR SOLUTION INTRAMUSCULAR; INTRAVENOUS PRN
Status: CANCELLED | OUTPATIENT
Start: 2021-10-19

## 2021-10-13 RX ORDER — ONDANSETRON 2 MG/ML
4 INJECTION INTRAMUSCULAR; INTRAVENOUS PRN
Status: CANCELLED | OUTPATIENT
Start: 2021-10-19

## 2021-10-13 RX ORDER — 0.9 % SODIUM CHLORIDE 0.9 %
VIAL (ML) INJECTION PRN
Status: CANCELLED | OUTPATIENT
Start: 2021-10-18

## 2021-10-13 RX ORDER — DIPHENHYDRAMINE HYDROCHLORIDE 50 MG/ML
50 INJECTION INTRAMUSCULAR; INTRAVENOUS PRN
Status: CANCELLED | OUTPATIENT
Start: 2021-10-19

## 2021-10-13 RX ORDER — HEPARIN SODIUM (PORCINE) LOCK FLUSH IV SOLN 100 UNIT/ML 100 UNIT/ML
500 SOLUTION INTRAVENOUS PRN
Status: CANCELLED | OUTPATIENT
Start: 2021-10-18

## 2021-10-13 RX ORDER — HEPARIN SODIUM (PORCINE) LOCK FLUSH IV SOLN 100 UNIT/ML 100 UNIT/ML
500 SOLUTION INTRAVENOUS PRN
Status: CANCELLED | OUTPATIENT
Start: 2021-10-19

## 2021-10-13 RX ORDER — 0.9 % SODIUM CHLORIDE 0.9 %
VIAL (ML) INJECTION PRN
Status: CANCELLED | OUTPATIENT
Start: 2021-10-19

## 2021-10-13 RX ORDER — 0.9 % SODIUM CHLORIDE 0.9 %
3 VIAL (ML) INJECTION PRN
Status: CANCELLED | OUTPATIENT
Start: 2021-10-19

## 2021-10-13 RX ORDER — 0.9 % SODIUM CHLORIDE 0.9 %
10 VIAL (ML) INJECTION PRN
Status: CANCELLED | OUTPATIENT
Start: 2021-10-18

## 2021-10-13 RX ORDER — ONDANSETRON 8 MG/1
8 TABLET, ORALLY DISINTEGRATING ORAL PRN
Status: CANCELLED | OUTPATIENT
Start: 2021-10-19

## 2021-10-13 RX ORDER — 0.9 % SODIUM CHLORIDE 0.9 %
10 VIAL (ML) INJECTION PRN
Status: CANCELLED | OUTPATIENT
Start: 2021-10-19

## 2021-10-13 RX ORDER — EPINEPHRINE 1 MG/ML(1)
0.5 AMPUL (ML) INJECTION PRN
Status: CANCELLED | OUTPATIENT
Start: 2021-10-19

## 2021-10-13 NOTE — PROGRESS NOTES
Pt left message yesterday.  Return call and left message with direct number and navigation number to see how can best help patient.

## 2021-10-15 ENCOUNTER — HOSPITAL ENCOUNTER (OUTPATIENT)
Dept: LAB | Facility: MEDICAL CENTER | Age: 56
End: 2021-10-15
Attending: INTERNAL MEDICINE
Payer: MEDICARE

## 2021-10-15 LAB
BASOPHILS # BLD AUTO: 1 % (ref 0–1.8)
BASOPHILS # BLD: 0.03 K/UL (ref 0–0.12)
CEA SERPL-MCNC: 4.1 NG/ML (ref 0–3)
EOSINOPHIL # BLD AUTO: 0.11 K/UL (ref 0–0.51)
EOSINOPHIL NFR BLD: 3.8 % (ref 0–6.9)
ERYTHROCYTE [DISTWIDTH] IN BLOOD BY AUTOMATED COUNT: 56.7 FL (ref 35.9–50)
HCT VFR BLD AUTO: 40.9 % (ref 42–52)
HGB BLD-MCNC: 13.8 G/DL (ref 14–18)
IMM GRANULOCYTES # BLD AUTO: 0.01 K/UL (ref 0–0.11)
IMM GRANULOCYTES NFR BLD AUTO: 0.3 % (ref 0–0.9)
LYMPHOCYTES # BLD AUTO: 0.75 K/UL (ref 1–4.8)
LYMPHOCYTES NFR BLD: 25.8 % (ref 22–41)
MCH RBC QN AUTO: 32.9 PG (ref 27–33)
MCHC RBC AUTO-ENTMCNC: 33.7 G/DL (ref 33.7–35.3)
MCV RBC AUTO: 97.6 FL (ref 81.4–97.8)
MONOCYTES # BLD AUTO: 0.43 K/UL (ref 0–0.85)
MONOCYTES NFR BLD AUTO: 14.8 % (ref 0–13.4)
NEUTROPHILS # BLD AUTO: 1.58 K/UL (ref 1.82–7.42)
NEUTROPHILS NFR BLD: 54.3 % (ref 44–72)
NRBC # BLD AUTO: 0 K/UL
NRBC BLD-RTO: 0 /100 WBC
PLATELET # BLD AUTO: 129 K/UL (ref 164–446)
PMV BLD AUTO: 11.5 FL (ref 9–12.9)
RBC # BLD AUTO: 4.19 M/UL (ref 4.7–6.1)
WBC # BLD AUTO: 2.9 K/UL (ref 4.8–10.8)

## 2021-10-15 PROCEDURE — 83735 ASSAY OF MAGNESIUM: CPT

## 2021-10-15 PROCEDURE — 80053 COMPREHEN METABOLIC PANEL: CPT

## 2021-10-15 PROCEDURE — 82378 CARCINOEMBRYONIC ANTIGEN: CPT

## 2021-10-15 PROCEDURE — 85025 COMPLETE CBC W/AUTO DIFF WBC: CPT

## 2021-10-15 PROCEDURE — 36415 COLL VENOUS BLD VENIPUNCTURE: CPT

## 2021-10-16 LAB
ALBUMIN SERPL BCP-MCNC: 4.4 G/DL (ref 3.2–4.9)
ALBUMIN/GLOB SERPL: 1.9 G/DL
ALP SERPL-CCNC: 142 U/L (ref 30–99)
ALT SERPL-CCNC: 29 U/L (ref 2–50)
ANION GAP SERPL CALC-SCNC: 9 MMOL/L (ref 7–16)
AST SERPL-CCNC: 29 U/L (ref 12–45)
BILIRUB SERPL-MCNC: 0.8 MG/DL (ref 0.1–1.5)
BUN SERPL-MCNC: 9 MG/DL (ref 8–22)
CALCIUM SERPL-MCNC: 9.7 MG/DL (ref 8.5–10.5)
CHLORIDE SERPL-SCNC: 105 MMOL/L (ref 96–112)
CO2 SERPL-SCNC: 25 MMOL/L (ref 20–33)
CREAT SERPL-MCNC: 0.7 MG/DL (ref 0.5–1.4)
FASTING STATUS PATIENT QL REPORTED: NORMAL
GLOBULIN SER CALC-MCNC: 2.3 G/DL (ref 1.9–3.5)
GLUCOSE SERPL-MCNC: 107 MG/DL (ref 65–99)
MAGNESIUM SERPL-MCNC: 2.3 MG/DL (ref 1.5–2.5)
POTASSIUM SERPL-SCNC: 4 MMOL/L (ref 3.6–5.5)
PROT SERPL-MCNC: 6.7 G/DL (ref 6–8.2)
SODIUM SERPL-SCNC: 139 MMOL/L (ref 135–145)

## 2021-10-18 ENCOUNTER — OUTPATIENT INFUSION SERVICES (OUTPATIENT)
Dept: ONCOLOGY | Facility: MEDICAL CENTER | Age: 56
End: 2021-10-18
Attending: INTERNAL MEDICINE
Payer: MEDICARE

## 2021-10-18 VITALS
TEMPERATURE: 97 F | DIASTOLIC BLOOD PRESSURE: 71 MMHG | HEIGHT: 72 IN | SYSTOLIC BLOOD PRESSURE: 150 MMHG | RESPIRATION RATE: 18 BRPM | BODY MASS INDEX: 29.71 KG/M2 | WEIGHT: 219.36 LBS | OXYGEN SATURATION: 99 % | HEART RATE: 61 BPM

## 2021-10-18 DIAGNOSIS — C78.7 METASTATIC COLON CANCER TO LIVER (HCC): ICD-10-CM

## 2021-10-18 DIAGNOSIS — C18.9 METASTATIC COLON CANCER TO LIVER (HCC): ICD-10-CM

## 2021-10-18 PROCEDURE — 96375 TX/PRO/DX INJ NEW DRUG ADDON: CPT

## 2021-10-18 PROCEDURE — 700105 HCHG RX REV CODE 258: Performed by: INTERNAL MEDICINE

## 2021-10-18 PROCEDURE — 96367 TX/PROPH/DG ADDL SEQ IV INF: CPT

## 2021-10-18 PROCEDURE — 700111 HCHG RX REV CODE 636 W/ 250 OVERRIDE (IP): Mod: JW,TB | Performed by: INTERNAL MEDICINE

## 2021-10-18 PROCEDURE — 96413 CHEMO IV INFUSION 1 HR: CPT

## 2021-10-18 PROCEDURE — A4212 NON CORING NEEDLE OR STYLET: HCPCS

## 2021-10-18 RX ORDER — LIDOCAINE HYDROCHLORIDE 10 MG/ML
INJECTION, SOLUTION EPIDURAL; INFILTRATION; INTRACAUDAL; PERINEURAL
Status: DISCONTINUED
Start: 2021-10-18 | End: 2021-10-18 | Stop reason: HOSPADM

## 2021-10-18 RX ORDER — HEPARIN SODIUM (PORCINE) LOCK FLUSH IV SOLN 100 UNIT/ML 100 UNIT/ML
500 SOLUTION INTRAVENOUS PRN
Status: DISCONTINUED | OUTPATIENT
Start: 2021-10-18 | End: 2021-10-18 | Stop reason: HOSPADM

## 2021-10-18 RX ADMIN — DEXAMETHASONE SODIUM PHOSPHATE 12 MG: 4 INJECTION, SOLUTION INTRA-ARTICULAR; INTRALESIONAL; INTRAMUSCULAR; INTRAVENOUS; SOFT TISSUE at 09:30

## 2021-10-18 RX ADMIN — ONDANSETRON 16 MG: 2 INJECTION INTRAMUSCULAR; INTRAVENOUS at 09:46

## 2021-10-18 RX ADMIN — HEPARIN 500 UNITS: 100 SYRINGE at 11:20

## 2021-10-18 RX ADMIN — FAM-TRASTUZUMAB DERUXTECAN-NXKI 636.8 MG: 100 INJECTION, POWDER, LYOPHILIZED, FOR SOLUTION INTRAVENOUS at 10:27

## 2021-10-18 ASSESSMENT — FIBROSIS 4 INDEX: FIB4 SCORE: 2.38

## 2021-10-18 NOTE — PROGRESS NOTES
Chemotherapy Verification - SECONDARY RN     C4    Height = 184 cm  Weight = 99.5 kg  BSA = 2.26 m2 - EF 70%, done 9/28/21      Medication: fam-trastuzumab deruxtecan (Enhertu)  Dose: 6.4 mg/kg  Calculated Dose: 636.8 mg                                 I confirm that this process was performed independently.

## 2021-10-18 NOTE — PROGRESS NOTES
"Pharmacy Chemotherapy Verification  Patient name: Gurmeet Jo  Dx: Metastatic colorectal cancer [KRAS/NRAS wild type, ERBB2 (HER2) amplification]  Regimen: Fam-trastuzumab deruxtecan    Cycle 4  Previous treatment: C3 on 9/27/21    Fam-trastuzumab deruxtecan 6.4 mg/kg IV over 90 minutes Day 1  21-day cycle until progression or toxicity  NCCN guidelines for Colon Cancer v2.2021  (cetuximab + chemo) [place] AP et al, OBEY 2017;317(23):1450-6863  (HER2 tx in HER2+ CRC) Kayleen RINALDI et al. Lancet Oncol. 2019 Apr;20(4):518-530.    Allergies: Patient has no known allergies.  /71   Pulse 61   Temp 36.1 °C (97 °F) (Temporal)   Resp 18   Ht 1.84 m (6' 0.44\")   Wt 99.5 kg (219 lb 5.7 oz) Comment: Simultaneous filing. User may not have seen previous data.  SpO2 99%   BMI 29.39 kg/m²  Body surface area is 2.26 meters squared.     Labs 10/15/21:  ANC~ 1580 Plt = 129k   Hgb = 13.8     SCr = 0.7 mg/dL CrCl >125 mL/min   AST/ALT/ALK = 29/29/142 TBili = 0.8     ECHO (OK for 6 months per Cody LEO order)  4/21/21 LVEF ~60 %  7/13/21 LVEF ~65 %  9/28/21 LVEF ~70%      Fam-trastuzumab deruxtecan (Enhertu) 6.4 mg/kg x 99.5 kg = 636.8 mg   <10% difference, okay to treat with final dose = 636.8 mg IV    José Marvin, PharmD    "

## 2021-10-18 NOTE — PROGRESS NOTES
Chemotherapy Verification - PRIMARY RN      Height = 1.84m  Weight = 99.5kg  BSA = 2.26m2       Medication: fam-trastuzumab deruxtecan  Dose: 6.4mg/kg  Calculated Dose: 636. 8mg                            (In mg/m2, AUC, mg/kg)       I confirm this process was performed independently with the BSA and all final chemotherapy dosing calculations congruent.  Any discrepancies of 10% or greater have been addressed with the chemotherapy pharmacist. The resolution of the discrepancy has been documented in the EPIC progress notes.

## 2021-10-18 NOTE — PROGRESS NOTES
"Pharmacy Chemotherapy Verification    Patient name: Gurmeet Jo  Dx: Metastatic colorectal cancer     Regimen: Fam-trastuzumab deruxtecan  Fam-trastuzumab deruxtecan 6.4 mg/kg IV over 90 minutes Day 1   21-day cycle until progression or toxicity  *Dosing Reference*  NCCN guidelines for Colon Cancer v2.2021  (cetuximab + chemo) Christine Vu al, OBEY 2017;317(23):4781-3949  (HER2 tx in HER2+ CRC) Kayleen RINALDI et al. Lancet Oncol. 2019 Apr;20(4):518-530.    Allergies: Patient has no known allergies.  /71   Pulse 61   Temp 36.1 °C (97 °F) (Temporal)   Resp 18   Ht 1.84 m (6' 0.44\")   Wt 99.5 kg (219 lb 5.7 oz) Comment: Simultaneous filing. User may not have seen previous data.  SpO2 99%   BMI 29.39 kg/m²  Body surface area is 2.26 meters squared.     ECHO (OK for 6 months per Cody LEO order)  4/21/21 LVEF ~60 %  7/16/21 LVEF ~65%    All lab results 10/15/21 within treatment parameters.     Drug Order   (Drug name, dose, route, IV Fluid & volume, frequency, number of doses) Cycle 4  Previous treatment: C3 on 9/27/21      Medication = Fam-trastuzumab deruxtecan (Enhertu)  Base Dose= 6.4 mg/kg  Calc Dose: Base Dose x 99.5 kg = 636.8mg  Final Dose = 636.8 mg  Route = IV  Fluid & Volume = D5W 100 mL  Admin Duration = Over 30  minutes            <10% difference, okay to treat with final dose     By my signature below, I confirm this process was performed independently with the BSA and all final chemotherapy dosing calculations congruent. I have reviewed the above chemotherapy order and that my calculation of the final dose and BSA (when applicable) corroborate those calculations of the  pharmacist. Discrepancies of 10% or greater in the written dose have been addressed and documented within the [facility] Progress notes.    Shari Amador, PharmD      "

## 2021-10-18 NOTE — PROGRESS NOTES
Patient to \[facility]\"" for chemotherapy infusion. Labs previously drawn. Patient meets parameters for chemo. PORT accessed with sterile field, flushed with + blood return. Premeds given. Enhertu infused with no s/s of infusion reaction. PORT flushed with + blood return. SASH performed and port de-accessed. Gauze and paper tape applied as a dressing. Patient has future appointment. Patient to home in care of self.

## 2021-11-01 ENCOUNTER — HOSPITAL ENCOUNTER (OUTPATIENT)
Dept: RADIOLOGY | Facility: MEDICAL CENTER | Age: 56
End: 2021-11-01
Attending: INTERNAL MEDICINE
Payer: MEDICARE

## 2021-11-01 RX ORDER — PROCHLORPERAZINE MALEATE 10 MG
10 TABLET ORAL EVERY 6 HOURS PRN
Status: CANCELLED | OUTPATIENT
Start: 2021-11-08

## 2021-11-01 RX ORDER — 0.9 % SODIUM CHLORIDE 0.9 %
3 VIAL (ML) INJECTION PRN
Status: CANCELLED | OUTPATIENT
Start: 2021-11-08

## 2021-11-01 RX ORDER — 0.9 % SODIUM CHLORIDE 0.9 %
VIAL (ML) INJECTION PRN
Status: CANCELLED | OUTPATIENT
Start: 2021-11-08

## 2021-11-01 RX ORDER — METHYLPREDNISOLONE SODIUM SUCCINATE 125 MG/2ML
125 INJECTION, POWDER, LYOPHILIZED, FOR SOLUTION INTRAMUSCULAR; INTRAVENOUS PRN
Status: CANCELLED | OUTPATIENT
Start: 2021-11-08

## 2021-11-01 RX ORDER — ONDANSETRON 8 MG/1
8 TABLET, ORALLY DISINTEGRATING ORAL PRN
Status: CANCELLED | OUTPATIENT
Start: 2021-11-08

## 2021-11-01 RX ORDER — EPINEPHRINE 1 MG/ML(1)
0.5 AMPUL (ML) INJECTION PRN
Status: CANCELLED | OUTPATIENT
Start: 2021-11-08

## 2021-11-01 RX ORDER — ONDANSETRON 2 MG/ML
4 INJECTION INTRAMUSCULAR; INTRAVENOUS PRN
Status: CANCELLED | OUTPATIENT
Start: 2021-11-08

## 2021-11-01 RX ORDER — DEXTROSE MONOHYDRATE 50 MG/ML
INJECTION, SOLUTION INTRAVENOUS CONTINUOUS
Status: CANCELLED | OUTPATIENT
Start: 2021-11-08

## 2021-11-01 RX ORDER — 0.9 % SODIUM CHLORIDE 0.9 %
10 VIAL (ML) INJECTION PRN
Status: CANCELLED | OUTPATIENT
Start: 2021-11-08

## 2021-11-01 RX ORDER — HEPARIN SODIUM (PORCINE) LOCK FLUSH IV SOLN 100 UNIT/ML 100 UNIT/ML
500 SOLUTION INTRAVENOUS PRN
Status: CANCELLED | OUTPATIENT
Start: 2021-11-08

## 2021-11-01 RX ORDER — DIPHENHYDRAMINE HYDROCHLORIDE 50 MG/ML
50 INJECTION INTRAMUSCULAR; INTRAVENOUS PRN
Status: CANCELLED | OUTPATIENT
Start: 2021-11-08

## 2021-11-05 ENCOUNTER — HOSPITAL ENCOUNTER (OUTPATIENT)
Dept: LAB | Facility: MEDICAL CENTER | Age: 56
End: 2021-11-05
Attending: INTERNAL MEDICINE
Payer: MEDICARE

## 2021-11-05 LAB
ALBUMIN SERPL BCP-MCNC: 4.6 G/DL (ref 3.2–4.9)
ALBUMIN/GLOB SERPL: 1.8 G/DL
ALP SERPL-CCNC: 167 U/L (ref 30–99)
ALT SERPL-CCNC: 35 U/L (ref 2–50)
ANION GAP SERPL CALC-SCNC: 13 MMOL/L (ref 7–16)
AST SERPL-CCNC: 34 U/L (ref 12–45)
BASOPHILS # BLD AUTO: 0.8 % (ref 0–1.8)
BASOPHILS # BLD: 0.02 K/UL (ref 0–0.12)
BILIRUB SERPL-MCNC: 0.7 MG/DL (ref 0.1–1.5)
BUN SERPL-MCNC: 11 MG/DL (ref 8–22)
CALCIUM SERPL-MCNC: 9.8 MG/DL (ref 8.5–10.5)
CEA SERPL-MCNC: 2 NG/ML (ref 0–3)
CHLORIDE SERPL-SCNC: 106 MMOL/L (ref 96–112)
CO2 SERPL-SCNC: 23 MMOL/L (ref 20–33)
CREAT SERPL-MCNC: 0.61 MG/DL (ref 0.5–1.4)
EOSINOPHIL # BLD AUTO: 0.07 K/UL (ref 0–0.51)
EOSINOPHIL NFR BLD: 2.7 % (ref 0–6.9)
ERYTHROCYTE [DISTWIDTH] IN BLOOD BY AUTOMATED COUNT: 56.7 FL (ref 35.9–50)
GLOBULIN SER CALC-MCNC: 2.6 G/DL (ref 1.9–3.5)
GLUCOSE SERPL-MCNC: 101 MG/DL (ref 65–99)
HCT VFR BLD AUTO: 41 % (ref 42–52)
HGB BLD-MCNC: 13.9 G/DL (ref 14–18)
IMM GRANULOCYTES # BLD AUTO: 0 K/UL (ref 0–0.11)
IMM GRANULOCYTES NFR BLD AUTO: 0 % (ref 0–0.9)
LYMPHOCYTES # BLD AUTO: 0.6 K/UL (ref 1–4.8)
LYMPHOCYTES NFR BLD: 23.3 % (ref 22–41)
MAGNESIUM SERPL-MCNC: 2.2 MG/DL (ref 1.5–2.5)
MCH RBC QN AUTO: 32.5 PG (ref 27–33)
MCHC RBC AUTO-ENTMCNC: 33.9 G/DL (ref 33.7–35.3)
MCV RBC AUTO: 95.8 FL (ref 81.4–97.8)
MONOCYTES # BLD AUTO: 0.28 K/UL (ref 0–0.85)
MONOCYTES NFR BLD AUTO: 10.9 % (ref 0–13.4)
NEUTROPHILS # BLD AUTO: 1.6 K/UL (ref 1.82–7.42)
NEUTROPHILS NFR BLD: 62.3 % (ref 44–72)
NRBC # BLD AUTO: 0 K/UL
NRBC BLD-RTO: 0 /100 WBC
PLATELET # BLD AUTO: 119 K/UL (ref 164–446)
PMV BLD AUTO: 11.3 FL (ref 9–12.9)
POTASSIUM SERPL-SCNC: 3.9 MMOL/L (ref 3.6–5.5)
PROT SERPL-MCNC: 7.2 G/DL (ref 6–8.2)
RBC # BLD AUTO: 4.28 M/UL (ref 4.7–6.1)
SODIUM SERPL-SCNC: 142 MMOL/L (ref 135–145)
WBC # BLD AUTO: 2.6 K/UL (ref 4.8–10.8)

## 2021-11-05 PROCEDURE — 36415 COLL VENOUS BLD VENIPUNCTURE: CPT

## 2021-11-05 PROCEDURE — 83735 ASSAY OF MAGNESIUM: CPT

## 2021-11-05 PROCEDURE — 85025 COMPLETE CBC W/AUTO DIFF WBC: CPT

## 2021-11-05 PROCEDURE — 80053 COMPREHEN METABOLIC PANEL: CPT

## 2021-11-05 PROCEDURE — 82378 CARCINOEMBRYONIC ANTIGEN: CPT

## 2021-11-08 ENCOUNTER — OUTPATIENT INFUSION SERVICES (OUTPATIENT)
Dept: ONCOLOGY | Facility: MEDICAL CENTER | Age: 56
End: 2021-11-08
Attending: INTERNAL MEDICINE
Payer: MEDICARE

## 2021-11-08 VITALS
RESPIRATION RATE: 18 BRPM | WEIGHT: 212.96 LBS | HEIGHT: 72 IN | OXYGEN SATURATION: 97 % | HEART RATE: 77 BPM | DIASTOLIC BLOOD PRESSURE: 65 MMHG | TEMPERATURE: 97 F | SYSTOLIC BLOOD PRESSURE: 123 MMHG | BODY MASS INDEX: 28.85 KG/M2

## 2021-11-08 DIAGNOSIS — C18.9 METASTATIC COLON CANCER TO LIVER (HCC): ICD-10-CM

## 2021-11-08 DIAGNOSIS — C78.7 METASTATIC COLON CANCER TO LIVER (HCC): ICD-10-CM

## 2021-11-08 PROCEDURE — 96413 CHEMO IV INFUSION 1 HR: CPT

## 2021-11-08 PROCEDURE — 96375 TX/PRO/DX INJ NEW DRUG ADDON: CPT

## 2021-11-08 PROCEDURE — A4212 NON CORING NEEDLE OR STYLET: HCPCS

## 2021-11-08 PROCEDURE — 700111 HCHG RX REV CODE 636 W/ 250 OVERRIDE (IP): Performed by: INTERNAL MEDICINE

## 2021-11-08 PROCEDURE — 304540 HCHG NITRO SET VENT 2ND TUB

## 2021-11-08 PROCEDURE — 700105 HCHG RX REV CODE 258: Performed by: INTERNAL MEDICINE

## 2021-11-08 RX ORDER — HEPARIN SODIUM (PORCINE) LOCK FLUSH IV SOLN 100 UNIT/ML 100 UNIT/ML
500 SOLUTION INTRAVENOUS PRN
Status: DISCONTINUED | OUTPATIENT
Start: 2021-11-08 | End: 2021-11-08 | Stop reason: HOSPADM

## 2021-11-08 RX ADMIN — FAM-TRASTUZUMAB DERUXTECAN-NXKI 618.2 MG: 100 INJECTION, POWDER, LYOPHILIZED, FOR SOLUTION INTRAVENOUS at 10:40

## 2021-11-08 RX ADMIN — HEPARIN 500 UNITS: 100 SYRINGE at 11:26

## 2021-11-08 RX ADMIN — DEXAMETHASONE SODIUM PHOSPHATE 12 MG: 4 INJECTION, SOLUTION INTRA-ARTICULAR; INTRALESIONAL; INTRAMUSCULAR; INTRAVENOUS; SOFT TISSUE at 10:08

## 2021-11-08 RX ADMIN — ONDANSETRON 16 MG: 2 INJECTION INTRAMUSCULAR; INTRAVENOUS at 09:47

## 2021-11-08 ASSESSMENT — FIBROSIS 4 INDEX: FIB4 SCORE: 2.75

## 2021-11-08 NOTE — PROGRESS NOTES
"Pharmacy Chemotherapy Verification    Patient name: Gurmeet Jo  Dx: Metastatic colorectal cancer     Regimen: Fam-trastuzumab deruxtecan  Fam-trastuzumab deruxtecan 6.4 mg/kg IV over 90 minutes Day 1   21-day cycle until progression or toxicity  *Dosing Reference*  NCCN guidelines for Colon Cancer v2.2021  (cetuximab + chemo) Christine ARGUELLO et al, OBEY 2017;317(23):4038-2926  (HER2 tx in HER2+ CRC) Kayleen RINALDI et al. Lancet Oncol. 2019 Apr;20(4):518-530.    Allergies: Patient has no known allergies.  /65   Pulse 77   Temp 36.1 °C (97 °F) (Temporal)   Resp 18   Ht 1.84 m (6' 0.44\")   Wt 96.6 kg (212 lb 15.4 oz) Comment: Took WT twice  SpO2 97%   BMI 28.53 kg/m²  Body surface area is 2.22 meters squared.     ECHO (OK for 6 months per Cody LEO order)  9/28/21 LVEF ~70%   4/21/21 LVEF ~60 %  7/16/21 LVEF ~65%    Labs 11/5/21:  ANC~ 1600 Plt = 119k   Hgb = 13.9       Drug Order   (Drug name, dose, route, IV Fluid & volume, frequency, number of doses) Cycle 5  Previous treatment: C4 on 10/18/21      Medication = Fam-trastuzumab deruxtecan (Enhertu)  Base Dose= 6.4 mg/kg  Calc Dose: Base Dose x 96.6 kg = 618.24 mg  Final Dose = 618.2 mg  Route = IV  Fluid & Volume = D5W 100 mL  Admin Duration = Over 30  minutes            <10% difference, okay to treat with final dose     By my signature below, I confirm this process was performed independently with the BSA and all final chemotherapy dosing calculations congruent. I have reviewed the above chemotherapy order and that my calculation of the final dose and BSA (when applicable) corroborate those calculations of the  pharmacist. Discrepancies of 10% or greater in the written dose have been addressed and documented within the [facility] Progress notes.    José Marvin, PharmD      "

## 2021-11-08 NOTE — PROGRESS NOTES
Chemotherapy Verification - PRIMARY RN      Height = 184 cm  Weight = 96.6 kg  BSA = 2.22 m^2       Medication: fam-trastuzumab deruxtecan (Enhertu)  Dose: 6.4 mg/kg  Calculated Dose: 618.24 mg                             (In mg/m2, AUC, mg/kg)     I confirm this process was performed independently with the BSA and all final chemotherapy dosing calculations congruent.  Any discrepancies of 10% or greater have been addressed with the chemotherapy pharmacist. The resolution of the discrepancy has been documented in the EPIC progress notes.

## 2021-11-08 NOTE — PROGRESS NOTES
Gurmeet is here for Day 1, Cycle 5 fam-trastuzumab deruxtean (Enhertu). Right upper chest port accessed using sterile technique; brisk blood return noted. Labs drawn previously and reviewed today. Lab results are within parameters to proceed with treatment today. LVEF = 70% on 9/28/21. Pre-medications given per MAR. Enhertu given per MAR. Heparin instilled prior to de-accessing port. Port de-accessed; gauze/tape applied to site. Next appointment scheduled. Discharged to self care; no apparent distress noted.

## 2021-11-08 NOTE — PROGRESS NOTES
"Pharmacy Chemotherapy Verification  Patient name: Gurmeet Jo  Dx: Metastatic colorectal cancer [KRAS/NRAS wild type, ERBB2 (HER2) amplification]  Regimen: Fam-trastuzumab deruxtecan  Cycle 4  Previous treatment: 10/18/21    Fam-trastuzumab deruxtecan 6.4 mg/kg IV over 90 minutes Day 1   Q21-day cycle until progression or toxicity  NCCN guidelines for Colon Cancer v2.2021  (cetuximab + chemo) [place] AP et al, OBEY 2017;317(23):6827-3044  (HER2 tx in HER2+ CRC) Kayleen RINALDI et al. Lancet Oncol. 2019 Apr;20(4):518-530.    Allergies: Patient has no known allergies.  /65   Pulse 77   Temp 36.1 °C (97 °F) (Temporal)   Resp 18   Ht 1.84 m (6' 0.44\")   Wt 96.6 kg (212 lb 15.4 oz) Comment: Took WT twice  SpO2 97%   BMI 28.53 kg/m²  Body surface area is 2.22 meters squared.     Labs 11/5/21:  Meet treatment parameters    ECHO (OK for 6 months per Cody LEO order)  4/21/21 LVEF ~60 %  7/13/21 LVEF ~65 %  9/28/21 LVEF ~70%      Fam-trastuzumab deruxtecan (Enhertu) 6.4 mg/kg x 96.6 kg = 618.24 mg   Okay to treat with final dose = 618.2 mg IV      "

## 2021-11-08 NOTE — PROGRESS NOTES
Chemotherapy Verification - SECONDARY RN     D1C5    Height = 184 cm  Weight = 96.6 kg  BSA = 2.22 m2 - EF 70%, done 9/28/2021      Medication: fam-trastuzumab deruxtecan (Enhertu)  Dose: 6.4 mg/kg  Calculated Dose: 618.24 mg                            I confirm that this process was performed independently.

## 2021-12-03 ENCOUNTER — HOSPITAL ENCOUNTER (OUTPATIENT)
Dept: LAB | Facility: MEDICAL CENTER | Age: 56
End: 2021-12-03
Attending: INTERNAL MEDICINE
Payer: MEDICARE

## 2021-12-03 LAB
ALBUMIN SERPL BCP-MCNC: 4.2 G/DL (ref 3.2–4.9)
ALBUMIN/GLOB SERPL: 1.8 G/DL
ALP SERPL-CCNC: 164 U/L (ref 30–99)
ALT SERPL-CCNC: 34 U/L (ref 2–50)
ANION GAP SERPL CALC-SCNC: 12 MMOL/L (ref 7–16)
AST SERPL-CCNC: 40 U/L (ref 12–45)
BASOPHILS # BLD AUTO: 1 % (ref 0–1.8)
BASOPHILS # BLD: 0.03 K/UL (ref 0–0.12)
BILIRUB SERPL-MCNC: 0.8 MG/DL (ref 0.1–1.5)
BUN SERPL-MCNC: 8 MG/DL (ref 8–22)
CALCIUM SERPL-MCNC: 9.3 MG/DL (ref 8.5–10.5)
CEA SERPL-MCNC: 1.6 NG/ML (ref 0–3)
CHLORIDE SERPL-SCNC: 107 MMOL/L (ref 96–112)
CO2 SERPL-SCNC: 22 MMOL/L (ref 20–33)
CREAT SERPL-MCNC: 0.72 MG/DL (ref 0.5–1.4)
EOSINOPHIL # BLD AUTO: 0.11 K/UL (ref 0–0.51)
EOSINOPHIL NFR BLD: 3.5 % (ref 0–6.9)
ERYTHROCYTE [DISTWIDTH] IN BLOOD BY AUTOMATED COUNT: 60.8 FL (ref 35.9–50)
GLOBULIN SER CALC-MCNC: 2.4 G/DL (ref 1.9–3.5)
GLUCOSE SERPL-MCNC: 125 MG/DL (ref 65–99)
HCT VFR BLD AUTO: 40.4 % (ref 42–52)
HGB BLD-MCNC: 13.7 G/DL (ref 14–18)
IMM GRANULOCYTES # BLD AUTO: 0.01 K/UL (ref 0–0.11)
IMM GRANULOCYTES NFR BLD AUTO: 0.3 % (ref 0–0.9)
LYMPHOCYTES # BLD AUTO: 0.77 K/UL (ref 1–4.8)
LYMPHOCYTES NFR BLD: 24.7 % (ref 22–41)
MAGNESIUM SERPL-MCNC: 2.2 MG/DL (ref 1.5–2.5)
MCH RBC QN AUTO: 33.7 PG (ref 27–33)
MCHC RBC AUTO-ENTMCNC: 33.9 G/DL (ref 33.7–35.3)
MCV RBC AUTO: 99.5 FL (ref 81.4–97.8)
MONOCYTES # BLD AUTO: 0.5 K/UL (ref 0–0.85)
MONOCYTES NFR BLD AUTO: 16 % (ref 0–13.4)
NEUTROPHILS # BLD AUTO: 1.7 K/UL (ref 1.82–7.42)
NEUTROPHILS NFR BLD: 54.5 % (ref 44–72)
NRBC # BLD AUTO: 0 K/UL
NRBC BLD-RTO: 0 /100 WBC
PLATELET # BLD AUTO: 121 K/UL (ref 164–446)
PMV BLD AUTO: 11.8 FL (ref 9–12.9)
POTASSIUM SERPL-SCNC: 4.1 MMOL/L (ref 3.6–5.5)
PROT SERPL-MCNC: 6.6 G/DL (ref 6–8.2)
RBC # BLD AUTO: 4.06 M/UL (ref 4.7–6.1)
SODIUM SERPL-SCNC: 141 MMOL/L (ref 135–145)
WBC # BLD AUTO: 3.1 K/UL (ref 4.8–10.8)

## 2021-12-03 PROCEDURE — 82378 CARCINOEMBRYONIC ANTIGEN: CPT

## 2021-12-03 PROCEDURE — 83735 ASSAY OF MAGNESIUM: CPT

## 2021-12-03 PROCEDURE — 85025 COMPLETE CBC W/AUTO DIFF WBC: CPT

## 2021-12-03 PROCEDURE — 36415 COLL VENOUS BLD VENIPUNCTURE: CPT

## 2021-12-03 PROCEDURE — 80053 COMPREHEN METABOLIC PANEL: CPT

## 2021-12-03 RX ORDER — 0.9 % SODIUM CHLORIDE 0.9 %
10 VIAL (ML) INJECTION PRN
Status: CANCELLED | OUTPATIENT
Start: 2021-12-06

## 2021-12-03 RX ORDER — EPINEPHRINE 1 MG/ML(1)
0.5 AMPUL (ML) INJECTION PRN
Status: CANCELLED | OUTPATIENT
Start: 2021-12-06

## 2021-12-03 RX ORDER — 0.9 % SODIUM CHLORIDE 0.9 %
VIAL (ML) INJECTION PRN
Status: CANCELLED | OUTPATIENT
Start: 2021-12-06

## 2021-12-03 RX ORDER — DEXTROSE MONOHYDRATE 50 MG/ML
INJECTION, SOLUTION INTRAVENOUS CONTINUOUS
Status: CANCELLED | OUTPATIENT
Start: 2021-12-06

## 2021-12-03 RX ORDER — 0.9 % SODIUM CHLORIDE 0.9 %
10 VIAL (ML) INJECTION PRN
Status: CANCELLED | OUTPATIENT
Start: 2021-12-05

## 2021-12-03 RX ORDER — HEPARIN SODIUM (PORCINE) LOCK FLUSH IV SOLN 100 UNIT/ML 100 UNIT/ML
500 SOLUTION INTRAVENOUS PRN
Status: CANCELLED | OUTPATIENT
Start: 2021-12-05

## 2021-12-03 RX ORDER — METHYLPREDNISOLONE SODIUM SUCCINATE 125 MG/2ML
125 INJECTION, POWDER, LYOPHILIZED, FOR SOLUTION INTRAMUSCULAR; INTRAVENOUS PRN
Status: CANCELLED | OUTPATIENT
Start: 2021-12-06

## 2021-12-03 RX ORDER — 0.9 % SODIUM CHLORIDE 0.9 %
VIAL (ML) INJECTION PRN
Status: CANCELLED | OUTPATIENT
Start: 2021-12-05

## 2021-12-03 RX ORDER — ONDANSETRON 2 MG/ML
4 INJECTION INTRAMUSCULAR; INTRAVENOUS PRN
Status: CANCELLED | OUTPATIENT
Start: 2021-12-06

## 2021-12-03 RX ORDER — DIPHENHYDRAMINE HYDROCHLORIDE 50 MG/ML
50 INJECTION INTRAMUSCULAR; INTRAVENOUS PRN
Status: CANCELLED | OUTPATIENT
Start: 2021-12-06

## 2021-12-03 RX ORDER — HEPARIN SODIUM (PORCINE) LOCK FLUSH IV SOLN 100 UNIT/ML 100 UNIT/ML
500 SOLUTION INTRAVENOUS PRN
Status: CANCELLED | OUTPATIENT
Start: 2021-12-06

## 2021-12-03 RX ORDER — 0.9 % SODIUM CHLORIDE 0.9 %
3 VIAL (ML) INJECTION PRN
Status: CANCELLED | OUTPATIENT
Start: 2021-12-05

## 2021-12-03 RX ORDER — ONDANSETRON 8 MG/1
8 TABLET, ORALLY DISINTEGRATING ORAL PRN
Status: CANCELLED | OUTPATIENT
Start: 2021-12-06

## 2021-12-03 RX ORDER — 0.9 % SODIUM CHLORIDE 0.9 %
3 VIAL (ML) INJECTION PRN
Status: CANCELLED | OUTPATIENT
Start: 2021-12-06

## 2021-12-03 RX ORDER — PROCHLORPERAZINE MALEATE 10 MG
10 TABLET ORAL EVERY 6 HOURS PRN
Status: CANCELLED | OUTPATIENT
Start: 2021-12-06

## 2021-12-06 ENCOUNTER — OUTPATIENT INFUSION SERVICES (OUTPATIENT)
Dept: ONCOLOGY | Facility: MEDICAL CENTER | Age: 56
End: 2021-12-06
Attending: INTERNAL MEDICINE
Payer: MEDICARE

## 2021-12-06 VITALS
HEART RATE: 72 BPM | OXYGEN SATURATION: 99 % | BODY MASS INDEX: 27.99 KG/M2 | WEIGHT: 211.2 LBS | SYSTOLIC BLOOD PRESSURE: 137 MMHG | DIASTOLIC BLOOD PRESSURE: 61 MMHG | HEIGHT: 73 IN | TEMPERATURE: 97 F | RESPIRATION RATE: 18 BRPM

## 2021-12-06 DIAGNOSIS — C18.9 METASTATIC COLON CANCER TO LIVER (HCC): ICD-10-CM

## 2021-12-06 DIAGNOSIS — C78.7 METASTATIC COLON CANCER TO LIVER (HCC): ICD-10-CM

## 2021-12-06 PROCEDURE — 700105 HCHG RX REV CODE 258: Performed by: INTERNAL MEDICINE

## 2021-12-06 PROCEDURE — A4212 NON CORING NEEDLE OR STYLET: HCPCS

## 2021-12-06 PROCEDURE — 96375 TX/PRO/DX INJ NEW DRUG ADDON: CPT

## 2021-12-06 PROCEDURE — 700111 HCHG RX REV CODE 636 W/ 250 OVERRIDE (IP): Performed by: INTERNAL MEDICINE

## 2021-12-06 PROCEDURE — 96413 CHEMO IV INFUSION 1 HR: CPT

## 2021-12-06 PROCEDURE — 304540 HCHG NITRO SET VENT 2ND TUB

## 2021-12-06 RX ORDER — HEPARIN SODIUM (PORCINE) LOCK FLUSH IV SOLN 100 UNIT/ML 100 UNIT/ML
500 SOLUTION INTRAVENOUS PRN
Status: DISCONTINUED | OUTPATIENT
Start: 2021-12-06 | End: 2021-12-06 | Stop reason: HOSPADM

## 2021-12-06 RX ADMIN — DEXAMETHASONE SODIUM PHOSPHATE 12 MG: 4 INJECTION, SOLUTION INTRA-ARTICULAR; INTRALESIONAL; INTRAMUSCULAR; INTRAVENOUS; SOFT TISSUE at 09:37

## 2021-12-06 RX ADMIN — ONDANSETRON 16 MG: 2 INJECTION INTRAMUSCULAR; INTRAVENOUS at 09:50

## 2021-12-06 RX ADMIN — FAM-TRASTUZUMAB DERUXTECAN-NXKI 613.1 MG: 100 INJECTION, POWDER, LYOPHILIZED, FOR SOLUTION INTRAVENOUS at 10:30

## 2021-12-06 RX ADMIN — HEPARIN 500 UNITS: 100 SYRINGE at 11:15

## 2021-12-06 ASSESSMENT — FIBROSIS 4 INDEX: FIB4 SCORE: 3.23

## 2021-12-06 NOTE — PROGRESS NOTES
"Pharmacy Chemotherapy Verification    Patient name: Gurmeet Jo  Dx: Metastatic colorectal cancer     Regimen: Fam-trastuzumab deruxtecan  Fam-trastuzumab deruxtecan 6.4 mg/kg IV over 90 minutes Day 1   21-day cycle until progression or toxicity  *Dosing Reference*  NCCN guidelines for Colon Cancer v2.2021  (cetuximab + chemo) Christine ARGUELLO et al, OBEY 2017;317(23):3019-7112  (HER2 tx in HER2+ CRC) Kayleen RINALDI et al. Lancet Oncol. 2019 Apr;20(4):518-530.    Allergies: Patient has no known allergies.  /61   Pulse 72   Temp 36.1 °C (97 °F) (Temporal)   Resp 18   Ht 1.85 m (6' 0.84\")   Wt 95.8 kg (211 lb 3.2 oz)   SpO2 99%   BMI 27.99 kg/m²  Body surface area is 2.22 meters squared.     ECHO (OK for 6 months per Cody LEO order)  9/28/21 LVEF ~70%   4/21/21 LVEF ~60 %  7/16/21 LVEF ~65%    Labs 12/3/21:  ANC~ 1700 Plt = 121k   Hgb = 13.7     SCr = 0.72 mg/dL CrCl >125 mL/min   AST/ALT/ALK = 40/34/164 TBili = 0.8     Drug Order   (Drug name, dose, route, IV Fluid & volume, frequency, number of doses) Cycle 6  Previous treatment: C5 on 11/8/21      Medication = Fam-trastuzumab deruxtecan (Enhertu)  Base Dose= 6.4 mg/kg  Calc Dose: Base Dose x 95.8 kg = 613.12 mg  Final Dose = 613.1 mg  Route = IV  Fluid & Volume = D5W 100 mL  Admin Duration = Over 30  minutes            <10% difference, okay to treat with final dose     By my signature below, I confirm this process was performed independently with the BSA and all final chemotherapy dosing calculations congruent. I have reviewed the above chemotherapy order and that my calculation of the final dose and BSA (when applicable) corroborate those calculations of the  pharmacist. Discrepancies of 10% or greater in the written dose have been addressed and documented within the [facility] Progress notes.    José Marvin, PharmD      "

## 2021-12-06 NOTE — PROGRESS NOTES
Chemotherapy Verification - PRIMARY RN  C6 D1  ECHO 9-28-21, LVEF 70%      Height = 1.85 m  Weight = 95.8 kg  BSA = 2.22 m2       Medication: fam-trastuzumab deruxtecan  Dose: 6.4 mg/kg  Calculated Dose: 613.12 mg                             (In mg/m2, AUC, mg/kg)       I confirm this process was performed independently with the BSA and all final chemotherapy dosing calculations congruent.  Any discrepancies of 10% or greater have been addressed with the chemotherapy pharmacist. The resolution of the discrepancy has been documented in the EPIC progress notes.

## 2021-12-06 NOTE — PROGRESS NOTES
Chemotherapy Verification - SECONDARY RN       Height = 1.85 m  Weight = 95.8 kg   BSA = 2.22 m^2       Medication: fam-trastuzumab deruxtecan  Dose: 6.4 mg/kg  Calculated Dose: 613.12 mg                             (In mg/m2, AUC, mg/kg)       I confirm that this process was performed independently.

## 2021-12-06 NOTE — PROGRESS NOTES
Pt ambulatory to Infusion for C6 D1 of Enhertu for metastatic colon cancer.  Pt w/ no s/s of infection, pt has no complaints at this time.  Pt PORT accessed using sterile technique, w/ brisk blood return noted, flushed per protocol.  Pt had labs drawn 12-3, pt lab results w/n parameters for tx today.  Pre-meds given prior to therapy.  Enhertu infused w/ filter in place w/ no s/s of AE.  PORT w/ brisk blood return post-chemo, flushed, and HL, de-accessed per protocol, gauze bandage applied.  Pt left on foot in care of self in NAD.  Confirmed pt's next appt.

## 2021-12-20 RX ORDER — 0.9 % SODIUM CHLORIDE 0.9 %
3 VIAL (ML) INJECTION PRN
Status: CANCELLED | OUTPATIENT
Start: 2021-12-26

## 2021-12-20 RX ORDER — ONDANSETRON 2 MG/ML
4 INJECTION INTRAMUSCULAR; INTRAVENOUS PRN
Status: CANCELLED | OUTPATIENT
Start: 2021-12-27

## 2021-12-20 RX ORDER — 0.9 % SODIUM CHLORIDE 0.9 %
10 VIAL (ML) INJECTION PRN
Status: CANCELLED | OUTPATIENT
Start: 2021-12-26

## 2021-12-20 RX ORDER — DIPHENHYDRAMINE HYDROCHLORIDE 50 MG/ML
50 INJECTION INTRAMUSCULAR; INTRAVENOUS PRN
Status: CANCELLED | OUTPATIENT
Start: 2021-12-27

## 2021-12-20 RX ORDER — 0.9 % SODIUM CHLORIDE 0.9 %
VIAL (ML) INJECTION PRN
Status: CANCELLED | OUTPATIENT
Start: 2021-12-26

## 2021-12-20 RX ORDER — 0.9 % SODIUM CHLORIDE 0.9 %
10 VIAL (ML) INJECTION PRN
Status: CANCELLED | OUTPATIENT
Start: 2021-12-27

## 2021-12-20 RX ORDER — METHYLPREDNISOLONE SODIUM SUCCINATE 125 MG/2ML
125 INJECTION, POWDER, LYOPHILIZED, FOR SOLUTION INTRAMUSCULAR; INTRAVENOUS PRN
Status: CANCELLED | OUTPATIENT
Start: 2021-12-27

## 2021-12-20 RX ORDER — 0.9 % SODIUM CHLORIDE 0.9 %
VIAL (ML) INJECTION PRN
Status: CANCELLED | OUTPATIENT
Start: 2021-12-27

## 2021-12-20 RX ORDER — HEPARIN SODIUM (PORCINE) LOCK FLUSH IV SOLN 100 UNIT/ML 100 UNIT/ML
500 SOLUTION INTRAVENOUS PRN
Status: CANCELLED | OUTPATIENT
Start: 2021-12-26

## 2021-12-20 RX ORDER — 0.9 % SODIUM CHLORIDE 0.9 %
3 VIAL (ML) INJECTION PRN
Status: CANCELLED | OUTPATIENT
Start: 2021-12-27

## 2021-12-20 RX ORDER — HEPARIN SODIUM (PORCINE) LOCK FLUSH IV SOLN 100 UNIT/ML 100 UNIT/ML
500 SOLUTION INTRAVENOUS PRN
Status: CANCELLED | OUTPATIENT
Start: 2021-12-27

## 2021-12-20 RX ORDER — PROCHLORPERAZINE MALEATE 10 MG
10 TABLET ORAL EVERY 6 HOURS PRN
Status: CANCELLED | OUTPATIENT
Start: 2021-12-27

## 2021-12-20 RX ORDER — ONDANSETRON 8 MG/1
8 TABLET, ORALLY DISINTEGRATING ORAL PRN
Status: CANCELLED | OUTPATIENT
Start: 2021-12-27

## 2021-12-20 RX ORDER — EPINEPHRINE 1 MG/ML(1)
0.5 AMPUL (ML) INJECTION PRN
Status: CANCELLED | OUTPATIENT
Start: 2021-12-27

## 2021-12-20 RX ORDER — DEXTROSE MONOHYDRATE 50 MG/ML
INJECTION, SOLUTION INTRAVENOUS CONTINUOUS
Status: CANCELLED | OUTPATIENT
Start: 2021-12-27

## 2021-12-23 ENCOUNTER — HOSPITAL ENCOUNTER (OUTPATIENT)
Dept: LAB | Facility: MEDICAL CENTER | Age: 56
End: 2021-12-23
Attending: INTERNAL MEDICINE
Payer: MEDICARE

## 2021-12-23 LAB
ALBUMIN SERPL BCP-MCNC: 4 G/DL (ref 3.2–4.9)
ALBUMIN/GLOB SERPL: 1.7 G/DL
ALP SERPL-CCNC: 157 U/L (ref 30–99)
ALT SERPL-CCNC: 38 U/L (ref 2–50)
ANION GAP SERPL CALC-SCNC: 9 MMOL/L (ref 7–16)
AST SERPL-CCNC: 38 U/L (ref 12–45)
BASOPHILS # BLD AUTO: 1.2 % (ref 0–1.8)
BASOPHILS # BLD: 0.03 K/UL (ref 0–0.12)
BILIRUB SERPL-MCNC: 1.2 MG/DL (ref 0.1–1.5)
BUN SERPL-MCNC: 8 MG/DL (ref 8–22)
CALCIUM SERPL-MCNC: 9.2 MG/DL (ref 8.5–10.5)
CEA SERPL-MCNC: 1.7 NG/ML (ref 0–3)
CHLORIDE SERPL-SCNC: 106 MMOL/L (ref 96–112)
CO2 SERPL-SCNC: 25 MMOL/L (ref 20–33)
CREAT SERPL-MCNC: 0.68 MG/DL (ref 0.5–1.4)
EOSINOPHIL # BLD AUTO: 0.07 K/UL (ref 0–0.51)
EOSINOPHIL NFR BLD: 2.7 % (ref 0–6.9)
ERYTHROCYTE [DISTWIDTH] IN BLOOD BY AUTOMATED COUNT: 54.6 FL (ref 35.9–50)
GLOBULIN SER CALC-MCNC: 2.3 G/DL (ref 1.9–3.5)
GLUCOSE SERPL-MCNC: 101 MG/DL (ref 65–99)
HCT VFR BLD AUTO: 38.1 % (ref 42–52)
HGB BLD-MCNC: 12.5 G/DL (ref 14–18)
IMM GRANULOCYTES # BLD AUTO: 0.01 K/UL (ref 0–0.11)
IMM GRANULOCYTES NFR BLD AUTO: 0.4 % (ref 0–0.9)
LYMPHOCYTES # BLD AUTO: 0.55 K/UL (ref 1–4.8)
LYMPHOCYTES NFR BLD: 21.4 % (ref 22–41)
MAGNESIUM SERPL-MCNC: 2.1 MG/DL (ref 1.5–2.5)
MCH RBC QN AUTO: 32.1 PG (ref 27–33)
MCHC RBC AUTO-ENTMCNC: 32.8 G/DL (ref 33.7–35.3)
MCV RBC AUTO: 97.7 FL (ref 81.4–97.8)
MONOCYTES # BLD AUTO: 0.3 K/UL (ref 0–0.85)
MONOCYTES NFR BLD AUTO: 11.7 % (ref 0–13.4)
NEUTROPHILS # BLD AUTO: 1.61 K/UL (ref 1.82–7.42)
NEUTROPHILS NFR BLD: 62.6 % (ref 44–72)
NRBC # BLD AUTO: 0 K/UL
NRBC BLD-RTO: 0 /100 WBC
PLATELET # BLD AUTO: 99 K/UL (ref 164–446)
PMV BLD AUTO: 11.8 FL (ref 9–12.9)
POTASSIUM SERPL-SCNC: 3.7 MMOL/L (ref 3.6–5.5)
PROT SERPL-MCNC: 6.3 G/DL (ref 6–8.2)
RBC # BLD AUTO: 3.9 M/UL (ref 4.7–6.1)
SODIUM SERPL-SCNC: 140 MMOL/L (ref 135–145)
WBC # BLD AUTO: 2.6 K/UL (ref 4.8–10.8)

## 2021-12-23 PROCEDURE — 83735 ASSAY OF MAGNESIUM: CPT

## 2021-12-23 PROCEDURE — 36415 COLL VENOUS BLD VENIPUNCTURE: CPT

## 2021-12-23 PROCEDURE — 82378 CARCINOEMBRYONIC ANTIGEN: CPT

## 2021-12-23 PROCEDURE — 80053 COMPREHEN METABOLIC PANEL: CPT

## 2021-12-23 PROCEDURE — 85025 COMPLETE CBC W/AUTO DIFF WBC: CPT

## 2021-12-26 NOTE — PROGRESS NOTES
"Pharmacy Chemotherapy Verification  Patient name: Gurmeet Jo  Dx: mCRC  Regimen: Fam-trastuzumab deruxtecan  Cycle 7  Previous treatment: 12/6/21    Fam-trastuzumab deruxtecan 6.4 mg/kg IV over 90 minutes Day 1   Q21-day cycle until progression or toxicity  NCCN guidelines for Colon Cancer v3.2021  (cetuximab + chemo) Christine ARGUELLO et al, OBEY 2017;317(23):6546-1463  (HER2 tx in HER2+ CRC) Kayleen RINALDI et al. Lancet Oncol. 2019 Apr;20(4):518-530.    Allergies: Patient has no known allergies.  /82   Pulse 63   Temp 36.1 °C (97 °F) (Temporal)   Resp 18   Ht 1.85 m (6' 0.84\")   Wt 96.7 kg (213 lb 3 oz)   SpO2 100%   BMI 28.25 kg/m²  Body surface area is 2.23 meters squared.     Labs 12/27/21:  Meet treatment parameters    Path:  KRAS/NRAS wild type  ERBB2 (HER2) amplification    ECHO (OK for 6 months per Cody LEO order)  9/28/21 LVEF ~70%  7/13/21 LVEF ~65 %  4/21/21 LVEF ~60 %      Fam-trastuzumab deruxtecan (Enhertu) 6.4 mg/kg x 96.7 kg = 618.88 mg   Dose appropriately within 10% of ordered dose per [facility] Policies & Procedures 15127   Okay to treat with final dose = 600 mg IV      "

## 2021-12-27 ENCOUNTER — OUTPATIENT INFUSION SERVICES (OUTPATIENT)
Dept: ONCOLOGY | Facility: MEDICAL CENTER | Age: 56
End: 2021-12-27
Attending: INTERNAL MEDICINE
Payer: MEDICARE

## 2021-12-27 VITALS
TEMPERATURE: 97 F | HEART RATE: 63 BPM | BODY MASS INDEX: 28.25 KG/M2 | DIASTOLIC BLOOD PRESSURE: 82 MMHG | OXYGEN SATURATION: 100 % | WEIGHT: 213.19 LBS | HEIGHT: 73 IN | SYSTOLIC BLOOD PRESSURE: 139 MMHG | RESPIRATION RATE: 18 BRPM

## 2021-12-27 DIAGNOSIS — C78.7 METASTATIC COLON CANCER TO LIVER (HCC): ICD-10-CM

## 2021-12-27 DIAGNOSIS — C18.9 METASTATIC COLON CANCER TO LIVER (HCC): ICD-10-CM

## 2021-12-27 LAB
BASOPHILS # BLD AUTO: 1.3 % (ref 0–1.8)
BASOPHILS # BLD: 0.03 K/UL (ref 0–0.12)
EOSINOPHIL # BLD AUTO: 0.08 K/UL (ref 0–0.51)
EOSINOPHIL NFR BLD: 3.4 % (ref 0–6.9)
ERYTHROCYTE [DISTWIDTH] IN BLOOD BY AUTOMATED COUNT: 58.4 FL (ref 35.9–50)
HCT VFR BLD AUTO: 40.6 % (ref 42–52)
HGB BLD-MCNC: 13.8 G/DL (ref 14–18)
IMM GRANULOCYTES # BLD AUTO: 0.01 K/UL (ref 0–0.11)
IMM GRANULOCYTES NFR BLD AUTO: 0.4 % (ref 0–0.9)
LYMPHOCYTES # BLD AUTO: 0.48 K/UL (ref 1–4.8)
LYMPHOCYTES NFR BLD: 20.5 % (ref 22–41)
MCH RBC QN AUTO: 33.4 PG (ref 27–33)
MCHC RBC AUTO-ENTMCNC: 34 G/DL (ref 33.7–35.3)
MCV RBC AUTO: 98.3 FL (ref 81.4–97.8)
MONOCYTES # BLD AUTO: 0.29 K/UL (ref 0–0.85)
MONOCYTES NFR BLD AUTO: 12.4 % (ref 0–13.4)
NEUTROPHILS # BLD AUTO: 1.45 K/UL (ref 1.82–7.42)
NEUTROPHILS NFR BLD: 62 % (ref 44–72)
NRBC # BLD AUTO: 0 K/UL
NRBC BLD-RTO: 0 /100 WBC
PLATELET # BLD AUTO: 116 K/UL (ref 164–446)
PMV BLD AUTO: 11 FL (ref 9–12.9)
RBC # BLD AUTO: 4.13 M/UL (ref 4.7–6.1)
WBC # BLD AUTO: 2.3 K/UL (ref 4.8–10.8)

## 2021-12-27 PROCEDURE — 96413 CHEMO IV INFUSION 1 HR: CPT

## 2021-12-27 PROCEDURE — 700111 HCHG RX REV CODE 636 W/ 250 OVERRIDE (IP): Performed by: INTERNAL MEDICINE

## 2021-12-27 PROCEDURE — 700105 HCHG RX REV CODE 258: Performed by: INTERNAL MEDICINE

## 2021-12-27 PROCEDURE — 304540 HCHG NITRO SET VENT 2ND TUB

## 2021-12-27 PROCEDURE — A4212 NON CORING NEEDLE OR STYLET: HCPCS

## 2021-12-27 PROCEDURE — 85025 COMPLETE CBC W/AUTO DIFF WBC: CPT

## 2021-12-27 PROCEDURE — 96375 TX/PRO/DX INJ NEW DRUG ADDON: CPT

## 2021-12-27 RX ORDER — HEPARIN SODIUM (PORCINE) LOCK FLUSH IV SOLN 100 UNIT/ML 100 UNIT/ML
500 SOLUTION INTRAVENOUS PRN
Status: DISCONTINUED | OUTPATIENT
Start: 2021-12-27 | End: 2021-12-27 | Stop reason: HOSPADM

## 2021-12-27 RX ADMIN — DEXAMETHASONE SODIUM PHOSPHATE 12 MG: 4 INJECTION, SOLUTION INTRA-ARTICULAR; INTRALESIONAL; INTRAMUSCULAR; INTRAVENOUS; SOFT TISSUE at 10:30

## 2021-12-27 RX ADMIN — ONDANSETRON HYDROCHLORIDE 16 MG: 2 SOLUTION INTRAMUSCULAR; INTRAVENOUS at 10:45

## 2021-12-27 RX ADMIN — HEPARIN 500 UNITS: 100 SYRINGE at 12:14

## 2021-12-27 RX ADMIN — FAM-TRASTUZUMAB DERUXTECAN-NXKI 600 MG: 100 INJECTION, POWDER, LYOPHILIZED, FOR SOLUTION INTRAVENOUS at 11:30

## 2021-12-27 ASSESSMENT — FIBROSIS 4 INDEX: FIB4 SCORE: 3.55

## 2021-12-27 NOTE — PROGRESS NOTES
"Pharmacy Chemotherapy Verification    Patient name: Gurmeet Jo  Dx: Metastatic colorectal cancer     Regimen: Fam-trastuzumab deruxtecan  Fam-trastuzumab deruxtecan 6.4 mg/kg IV over 90 minutes Day 1   21-day cycle until progression or toxicity  *Dosing Reference*  NCCN guidelines for Colon Cancer v2.2021  (cetuximab + chemo) Christine ARGUELLO et al, OBEY 2017;317(23):2729-5323  (HER2 tx in HER2+ CRC) Kayleen RINALDI et al. Lancet Oncol. 2019 Apr;20(4):518-530.    Allergies: Patient has no known allergies.  /82   Pulse 63   Temp 36.1 °C (97 °F) (Temporal)   Resp 18   Ht 1.85 m (6' 0.84\")   Wt 96.7 kg (213 lb 3 oz)   SpO2 100%   BMI 28.25 kg/m²  Body surface area is 2.23 meters squared.     ECHO (OK for 6 months per Cody LEO order)  9/28/21 LVEF ~70%     All lab results 12/27/21 within treatment parameters.     Drug Order   (Drug name, dose, route, IV Fluid & volume, frequency, number of doses) Cycle 7  Previous treatment: C6 on 12/6/21      Medication = Fam-trastuzumab deruxtecan (Enhertu)  Base Dose= 6.4 mg/kg  Calc Dose: Base Dose x 96.7kg = 618.9mg  Final Dose = 600 mg  Route = IV  Fluid & Volume = D5W 100 mL  Admin Duration = Over 30  minutes            <10% difference, okay to treat with final dose     By my signature below, I confirm this process was performed independently with the BSA and all final chemotherapy dosing calculations congruent. I have reviewed the above chemotherapy order and that my calculation of the final dose and BSA (when applicable) corroborate those calculations of the  pharmacist. Discrepancies of 10% or greater in the written dose have been addressed and documented within the [facility] Progress notes.    Shari Amador, PharmD      "

## 2021-12-27 NOTE — PROGRESS NOTES
Gurmeet arrived ambulatory to [facility] for D1C7 Trastuzumab Deruxtecan. Port accessed in sterile fashion, brisk blood return noted. CBC drawn as ordered. Results reviewed, ok to proceed with treatment. Pt medicated per MAR. Pt tolerated infusion without s/s adverse reaction. Port with brisk blood return, flushed with NS then He[rickie. Port de-accessed, needle intact, gauze dressing applied. Pt discharged to self care, NAD. Pt's next appt confirmed.     E-mail sent to Moira THORNTON as pt need ECHO prior to next cycle. Pt does have ECHO appt 2/27/2022.

## 2021-12-27 NOTE — PROGRESS NOTES
Chemotherapy Verification - SECONDARY RN     C7 D1    Height = 185 cm  Weight = 96.7 kg  BSA = 2.23 m^2 Echo on 9/28/21 LVEF 70%      Medication: fam-trastuzumab deruxtecan (Enhertu)  Dose: 6.4 mg/kg  Calculated Dose: 618.88 mg                             (In mg/m2, AUC, mg/kg)     I confirm that this process was performed independently.

## 2021-12-27 NOTE — PROGRESS NOTES
Chemotherapy Verification - PRIMARY RN    D1C7    Height = 185cm  Weight = 96.7kg  BSA = 2.23m^2       Medication: fam-trastuzumab deruxtecan (Enhertu)  Dose: 6.4mg/kg  Calculated Dose: 618.88mg                                    I confirm this process was performed independently with the BSA and all final chemotherapy dosing calculations congruent.  Any discrepancies of 10% or greater have been addressed with the chemotherapy pharmacist. The resolution of the discrepancy has been documented in the EPIC progress notes.

## 2021-12-30 ENCOUNTER — APPOINTMENT (OUTPATIENT)
Dept: CARDIOLOGY | Facility: MEDICAL CENTER | Age: 56
End: 2021-12-30
Attending: INTERNAL MEDICINE
Payer: MEDICARE

## 2022-01-09 ENCOUNTER — HOSPITAL ENCOUNTER (OUTPATIENT)
Dept: RADIOLOGY | Facility: MEDICAL CENTER | Age: 57
End: 2022-01-09
Payer: MEDICARE

## 2022-01-15 RX ORDER — 0.9 % SODIUM CHLORIDE 0.9 %
10 VIAL (ML) INJECTION PRN
Status: CANCELLED | OUTPATIENT
Start: 2022-01-18

## 2022-01-15 RX ORDER — 0.9 % SODIUM CHLORIDE 0.9 %
3 VIAL (ML) INJECTION PRN
Status: CANCELLED | OUTPATIENT
Start: 2022-01-16

## 2022-01-15 RX ORDER — METHYLPREDNISOLONE SODIUM SUCCINATE 125 MG/2ML
125 INJECTION, POWDER, LYOPHILIZED, FOR SOLUTION INTRAMUSCULAR; INTRAVENOUS PRN
Status: CANCELLED | OUTPATIENT
Start: 2022-01-18

## 2022-01-15 RX ORDER — ONDANSETRON 8 MG/1
8 TABLET, ORALLY DISINTEGRATING ORAL PRN
Status: CANCELLED | OUTPATIENT
Start: 2022-01-18

## 2022-01-15 RX ORDER — 0.9 % SODIUM CHLORIDE 0.9 %
10 VIAL (ML) INJECTION PRN
Status: CANCELLED | OUTPATIENT
Start: 2022-01-16

## 2022-01-15 RX ORDER — HEPARIN SODIUM (PORCINE) LOCK FLUSH IV SOLN 100 UNIT/ML 100 UNIT/ML
500 SOLUTION INTRAVENOUS PRN
Status: CANCELLED | OUTPATIENT
Start: 2022-01-16

## 2022-01-15 RX ORDER — 0.9 % SODIUM CHLORIDE 0.9 %
VIAL (ML) INJECTION PRN
Status: CANCELLED | OUTPATIENT
Start: 2022-01-18

## 2022-01-15 RX ORDER — EPINEPHRINE 1 MG/ML(1)
0.5 AMPUL (ML) INJECTION PRN
Status: CANCELLED | OUTPATIENT
Start: 2022-01-18

## 2022-01-15 RX ORDER — PROCHLORPERAZINE MALEATE 10 MG
10 TABLET ORAL EVERY 6 HOURS PRN
Status: CANCELLED | OUTPATIENT
Start: 2022-01-18

## 2022-01-15 RX ORDER — HEPARIN SODIUM (PORCINE) LOCK FLUSH IV SOLN 100 UNIT/ML 100 UNIT/ML
500 SOLUTION INTRAVENOUS PRN
Status: CANCELLED | OUTPATIENT
Start: 2022-01-18

## 2022-01-15 RX ORDER — ONDANSETRON 2 MG/ML
4 INJECTION INTRAMUSCULAR; INTRAVENOUS PRN
Status: CANCELLED | OUTPATIENT
Start: 2022-01-18

## 2022-01-15 RX ORDER — DEXTROSE MONOHYDRATE 50 MG/ML
INJECTION, SOLUTION INTRAVENOUS CONTINUOUS
Status: CANCELLED | OUTPATIENT
Start: 2022-01-18

## 2022-01-15 RX ORDER — 0.9 % SODIUM CHLORIDE 0.9 %
3 VIAL (ML) INJECTION PRN
Status: CANCELLED | OUTPATIENT
Start: 2022-01-18

## 2022-01-15 RX ORDER — DIPHENHYDRAMINE HYDROCHLORIDE 50 MG/ML
50 INJECTION INTRAMUSCULAR; INTRAVENOUS PRN
Status: CANCELLED | OUTPATIENT
Start: 2022-01-18

## 2022-01-15 RX ORDER — 0.9 % SODIUM CHLORIDE 0.9 %
VIAL (ML) INJECTION PRN
Status: CANCELLED | OUTPATIENT
Start: 2022-01-16

## 2022-01-17 ENCOUNTER — OUTPATIENT INFUSION SERVICES (OUTPATIENT)
Dept: ONCOLOGY | Facility: MEDICAL CENTER | Age: 57
End: 2022-01-17
Attending: INTERNAL MEDICINE
Payer: MEDICARE

## 2022-01-17 VITALS
HEART RATE: 66 BPM | SYSTOLIC BLOOD PRESSURE: 111 MMHG | HEIGHT: 73 IN | DIASTOLIC BLOOD PRESSURE: 63 MMHG | RESPIRATION RATE: 18 BRPM | OXYGEN SATURATION: 100 % | WEIGHT: 202.6 LBS | TEMPERATURE: 97 F | BODY MASS INDEX: 26.85 KG/M2

## 2022-01-17 DIAGNOSIS — C78.7 METASTATIC COLON CANCER TO LIVER (HCC): ICD-10-CM

## 2022-01-17 DIAGNOSIS — C18.9 METASTATIC COLON CANCER TO LIVER (HCC): ICD-10-CM

## 2022-01-17 LAB
ALBUMIN SERPL BCP-MCNC: 4.2 G/DL (ref 3.2–4.9)
ALBUMIN/GLOB SERPL: 1.9 G/DL
ALP SERPL-CCNC: 150 U/L (ref 30–99)
ALT SERPL-CCNC: 31 U/L (ref 2–50)
ANION GAP SERPL CALC-SCNC: 13 MMOL/L (ref 7–16)
AST SERPL-CCNC: 37 U/L (ref 12–45)
BASOPHILS # BLD AUTO: 1 % (ref 0–1.8)
BASOPHILS # BLD: 0.02 K/UL (ref 0–0.12)
BILIRUB SERPL-MCNC: 1.5 MG/DL (ref 0.1–1.5)
BUN SERPL-MCNC: 8 MG/DL (ref 8–22)
CALCIUM SERPL-MCNC: 9.2 MG/DL (ref 8.5–10.5)
CEA SERPL-MCNC: 2.2 NG/ML (ref 0–3)
CHLORIDE SERPL-SCNC: 104 MMOL/L (ref 96–112)
CO2 SERPL-SCNC: 23 MMOL/L (ref 20–33)
CREAT SERPL-MCNC: 0.54 MG/DL (ref 0.5–1.4)
EOSINOPHIL # BLD AUTO: 0.04 K/UL (ref 0–0.51)
EOSINOPHIL NFR BLD: 1.9 % (ref 0–6.9)
ERYTHROCYTE [DISTWIDTH] IN BLOOD BY AUTOMATED COUNT: 55.9 FL (ref 35.9–50)
GLOBULIN SER CALC-MCNC: 2.2 G/DL (ref 1.9–3.5)
GLUCOSE SERPL-MCNC: 98 MG/DL (ref 65–99)
HCT VFR BLD AUTO: 38.6 % (ref 42–52)
HGB BLD-MCNC: 13.1 G/DL (ref 14–18)
IMM GRANULOCYTES # BLD AUTO: 0 K/UL (ref 0–0.11)
IMM GRANULOCYTES NFR BLD AUTO: 0 % (ref 0–0.9)
LYMPHOCYTES # BLD AUTO: 0.42 K/UL (ref 1–4.8)
LYMPHOCYTES NFR BLD: 20.2 % (ref 22–41)
MCH RBC QN AUTO: 32.8 PG (ref 27–33)
MCHC RBC AUTO-ENTMCNC: 33.9 G/DL (ref 33.7–35.3)
MCV RBC AUTO: 96.7 FL (ref 81.4–97.8)
MONOCYTES # BLD AUTO: 0.28 K/UL (ref 0–0.85)
MONOCYTES NFR BLD AUTO: 13.5 % (ref 0–13.4)
NEUTROPHILS # BLD AUTO: 1.32 K/UL (ref 1.82–7.42)
NEUTROPHILS NFR BLD: 63.4 % (ref 44–72)
NRBC # BLD AUTO: 0 K/UL
NRBC BLD-RTO: 0 /100 WBC
PLATELET # BLD AUTO: 104 K/UL (ref 164–446)
PMV BLD AUTO: 11.4 FL (ref 9–12.9)
POTASSIUM SERPL-SCNC: 3.5 MMOL/L (ref 3.6–5.5)
PROT SERPL-MCNC: 6.4 G/DL (ref 6–8.2)
RBC # BLD AUTO: 3.99 M/UL (ref 4.7–6.1)
SODIUM SERPL-SCNC: 140 MMOL/L (ref 135–145)
WBC # BLD AUTO: 2.1 K/UL (ref 4.8–10.8)

## 2022-01-17 PROCEDURE — 82378 CARCINOEMBRYONIC ANTIGEN: CPT

## 2022-01-17 PROCEDURE — 700105 HCHG RX REV CODE 258: Performed by: INTERNAL MEDICINE

## 2022-01-17 PROCEDURE — 700111 HCHG RX REV CODE 636 W/ 250 OVERRIDE (IP): Performed by: INTERNAL MEDICINE

## 2022-01-17 PROCEDURE — 80053 COMPREHEN METABOLIC PANEL: CPT

## 2022-01-17 PROCEDURE — 85025 COMPLETE CBC W/AUTO DIFF WBC: CPT

## 2022-01-17 PROCEDURE — 96413 CHEMO IV INFUSION 1 HR: CPT

## 2022-01-17 PROCEDURE — A4212 NON CORING NEEDLE OR STYLET: HCPCS

## 2022-01-17 PROCEDURE — 304540 HCHG NITRO SET VENT 2ND TUB

## 2022-01-17 PROCEDURE — 96375 TX/PRO/DX INJ NEW DRUG ADDON: CPT

## 2022-01-17 RX ORDER — HEPARIN SODIUM (PORCINE) LOCK FLUSH IV SOLN 100 UNIT/ML 100 UNIT/ML
500 SOLUTION INTRAVENOUS PRN
Status: DISCONTINUED | OUTPATIENT
Start: 2022-01-17 | End: 2022-01-17 | Stop reason: HOSPADM

## 2022-01-17 RX ADMIN — HEPARIN SODIUM (PORCINE) LOCK FLUSH IV SOLN 100 UNIT/ML 500 UNITS: 100 SOLUTION at 12:10

## 2022-01-17 RX ADMIN — FAM-TRASTUZUMAB DERUXTECAN-NXKI 600 MG: 100 INJECTION, POWDER, LYOPHILIZED, FOR SOLUTION INTRAVENOUS at 11:27

## 2022-01-17 RX ADMIN — ONDANSETRON 16 MG: 2 INJECTION INTRAMUSCULAR; INTRAVENOUS at 10:45

## 2022-01-17 ASSESSMENT — FIBROSIS 4 INDEX: FIB4 SCORE: 3.03

## 2022-01-17 NOTE — PROGRESS NOTES
Gurmeet arrives to [facility] for q 3 week ENHERTU for metastatic colon cancer to the liver. Patient voices no acute health concerns. Denies active infections. Denies open wounds and mouth sores. Right chest port accessed in sterile fashion. Port flushes easily and has brisk blood return. Labs drawn and reviewed. Patient meets parameters to proceed with treatment today. Per Dr. Ross, OK to proceed without ECHO. ECHO is scheduled for 2/21/22. Premedications administered. Enhertu infused, via NON-DEHP tubing and an inline 0.2 micron filter, over 30 mins without adverse s/s. Port with positive blood return post tx. Port then flushed, heparin locked, and de-accessed. Gauze and tape applied over site. Confirmed next appt on 2/7 with patient. Discharged home to self care in no apparent distress.

## 2022-01-17 NOTE — PROGRESS NOTES
"Pharmacy Chemotherapy Verification    Patient name: Gurmeet Jo  Dx: Metastatic colorectal cancer     Regimen: Fam-trastuzumab deruxtecan  Fam-trastuzumab deruxtecan 6.4 mg/kg IV over 90 minutes Day 1   21-day cycle until progression or toxicity  *Dosing Reference*  NCCN guidelines for Colon Cancer v2.2021  (cetuximab + chemo) Christine ARGUELLO et al, OBEY 2017;317(23):4664-5896  (HER2 tx in HER2+ CRC) Kayleen RINALDI et al. Lancet Oncol. 2019 Apr;20(4):518-530.    Allergies: Patient has no known allergies.  /63   Pulse 66   Temp 36.1 °C (97 °F) (Temporal)   Resp 18   Ht 1.85 m (6' 0.84\")   Wt 91.9 kg (202 lb 9.6 oz)   SpO2 100%   BMI 26.85 kg/m²  Body surface area is 2.17 meters squared.     ECHO (OK to treat 1/17/22 without echo per Cody LEO order)  9/28/21 LVEF ~70%     All lab results 1/17/22 within treatment parameters.     Drug Order   (Drug name, dose, route, IV Fluid & volume, frequency, number of doses) Cycle 8  Previous treatment: 12/27/21      Medication = Fam-trastuzumab deruxtecan (Enhertu)  Base Dose= 6.4 mg/kg  Calc Dose: Base Dose x 91.9 kg = 588 mg  Final Dose = 600 mg  Route = IV  Fluid & Volume = D5W 100 mL  Admin Duration = Over 30  minutes            <10% difference, okay to treat with final dose     By my signature below, I confirm this process was performed independently with the BSA and all final chemotherapy dosing calculations congruent. I have reviewed the above chemotherapy order and that my calculation of the final dose and BSA (when applicable) corroborate those calculations of the  pharmacist. Discrepancies of 10% or greater in the written dose have been addressed and documented within the [facility] Progress notes.    Man Anderson, PharmD      "

## 2022-01-17 NOTE — PROGRESS NOTES
Chemotherapy Verification - PRIMARY RN  Cycle 8, Day 1    Per Dr. Ross OK to proceed today without ECHO. Next ECHO scheduled 2/21/22.     Height = 185 cm  Weight = 91.9 kg  BSA = 2.17 m^2       Medication: fam-trastuzumab deruxtecan (ENHERTU)  Dose: 6.4 mg/kg  Calculated Dose: 588.16 mg                            (In mg/m2, AUC, mg/kg)       I confirm this process was performed independently with the BSA and all final chemotherapy dosing calculations congruent.  Any discrepancies of 10% or greater have been addressed with the chemotherapy pharmacist. The resolution of the discrepancy has been documented in the EPIC progress notes.

## 2022-01-17 NOTE — PROGRESS NOTES
"Pharmacy Chemotherapy Verification  Patient name: Gurmeet Jo  Dx: mCRC  Regimen: Fam-trastuzumab deruxtecan  Cycle 8  Previous treatment: 12/26/21    Fam-trastuzumab deruxtecan 6.4 mg/kg IV over 90 minutes Day 1   Q21-day cycle until progression or toxicity  NCCN guidelines for Colon Cancer v3.2021  (cetuximab + chemo) Christine ARGUELLO et al, OBEY 2017;317(23):4835-1596  (HER2 tx in HER2+ CRC) Kayleen RINALDI et al. Lancet Oncol. 2019 Apr;20(4):518-530.    Allergies: Patient has no known allergies.  /63   Pulse 66   Temp 36.1 °C (97 °F) (Temporal)   Resp 18   Ht 1.85 m (6' 0.84\")   Wt 91.9 kg (202 lb 9.6 oz)   SpO2 100%   BMI 26.85 kg/m²  Body surface area is 2.17 meters squared.     Labs 1/17/22:  Meet treatment parameters    Path:  KRAS/NRAS wild type  ERBB2 (HER2) amplification    ECHO (OK for 6 months per Cody LEO order)  9/28/21 LVEF ~70%  7/13/21 LVEF ~65 %  4/21/21 LVEF ~60 %      Fam-trastuzumab deruxtecan (Enhertu) 6.4 mg/kg x 91.9 kg = 588.16 mg   Dose appropriately within 10% of ordered dose per [facility] Policies & Procedures 22314   Okay to treat with final dose = 600 mg IV      "

## 2022-01-17 NOTE — PROGRESS NOTES
Chemotherapy Verification - SECONDARY RN       Height = 185 cm  Weight = 91.9 mg  BSA = 2.17 m2       Medication: Enhertu  Dose: 6.4 mg/kg  Calculated Dose: 588.16                             (In mg/m2, AUC, mg/kg)     I confirm that this process was performed independently.

## 2022-01-25 RX ORDER — 0.9 % SODIUM CHLORIDE 0.9 %
3 VIAL (ML) INJECTION PRN
Status: CANCELLED | OUTPATIENT
Start: 2022-02-07

## 2022-01-25 RX ORDER — 0.9 % SODIUM CHLORIDE 0.9 %
VIAL (ML) INJECTION PRN
Status: CANCELLED | OUTPATIENT
Start: 2022-02-07

## 2022-01-25 RX ORDER — 0.9 % SODIUM CHLORIDE 0.9 %
3 VIAL (ML) INJECTION PRN
Status: CANCELLED | OUTPATIENT
Start: 2022-02-08

## 2022-01-25 RX ORDER — METHYLPREDNISOLONE SODIUM SUCCINATE 125 MG/2ML
125 INJECTION, POWDER, LYOPHILIZED, FOR SOLUTION INTRAMUSCULAR; INTRAVENOUS PRN
Status: CANCELLED | OUTPATIENT
Start: 2022-02-08

## 2022-01-25 RX ORDER — HEPARIN SODIUM (PORCINE) LOCK FLUSH IV SOLN 100 UNIT/ML 100 UNIT/ML
500 SOLUTION INTRAVENOUS PRN
Status: CANCELLED | OUTPATIENT
Start: 2022-02-08

## 2022-01-25 RX ORDER — HEPARIN SODIUM (PORCINE) LOCK FLUSH IV SOLN 100 UNIT/ML 100 UNIT/ML
500 SOLUTION INTRAVENOUS PRN
Status: CANCELLED | OUTPATIENT
Start: 2022-02-07

## 2022-01-25 RX ORDER — 0.9 % SODIUM CHLORIDE 0.9 %
10 VIAL (ML) INJECTION PRN
Status: CANCELLED | OUTPATIENT
Start: 2022-02-07

## 2022-01-25 RX ORDER — 0.9 % SODIUM CHLORIDE 0.9 %
VIAL (ML) INJECTION PRN
Status: CANCELLED | OUTPATIENT
Start: 2022-02-08

## 2022-01-25 RX ORDER — ONDANSETRON 8 MG/1
8 TABLET, ORALLY DISINTEGRATING ORAL PRN
Status: CANCELLED | OUTPATIENT
Start: 2022-02-08

## 2022-01-25 RX ORDER — ONDANSETRON 2 MG/ML
4 INJECTION INTRAMUSCULAR; INTRAVENOUS PRN
Status: CANCELLED | OUTPATIENT
Start: 2022-02-08

## 2022-01-25 RX ORDER — EPINEPHRINE 1 MG/ML(1)
0.5 AMPUL (ML) INJECTION PRN
Status: CANCELLED | OUTPATIENT
Start: 2022-02-08

## 2022-01-25 RX ORDER — DIPHENHYDRAMINE HYDROCHLORIDE 50 MG/ML
50 INJECTION INTRAMUSCULAR; INTRAVENOUS PRN
Status: CANCELLED | OUTPATIENT
Start: 2022-02-08

## 2022-01-25 RX ORDER — 0.9 % SODIUM CHLORIDE 0.9 %
10 VIAL (ML) INJECTION PRN
Status: CANCELLED | OUTPATIENT
Start: 2022-02-08

## 2022-01-25 RX ORDER — DEXTROSE MONOHYDRATE 50 MG/ML
INJECTION, SOLUTION INTRAVENOUS CONTINUOUS
Status: CANCELLED | OUTPATIENT
Start: 2022-02-08

## 2022-01-25 RX ORDER — PROCHLORPERAZINE MALEATE 10 MG
10 TABLET ORAL EVERY 6 HOURS PRN
Status: CANCELLED | OUTPATIENT
Start: 2022-02-08

## 2022-02-07 ENCOUNTER — OUTPATIENT INFUSION SERVICES (OUTPATIENT)
Dept: ONCOLOGY | Facility: MEDICAL CENTER | Age: 57
End: 2022-02-07
Attending: INTERNAL MEDICINE
Payer: MEDICARE

## 2022-02-07 VITALS
RESPIRATION RATE: 18 BRPM | DIASTOLIC BLOOD PRESSURE: 71 MMHG | HEIGHT: 73 IN | TEMPERATURE: 97 F | HEART RATE: 70 BPM | OXYGEN SATURATION: 100 % | WEIGHT: 195.55 LBS | SYSTOLIC BLOOD PRESSURE: 118 MMHG | BODY MASS INDEX: 25.92 KG/M2

## 2022-02-07 DIAGNOSIS — C18.9 METASTATIC COLON CANCER TO LIVER (HCC): Primary | ICD-10-CM

## 2022-02-07 DIAGNOSIS — C78.7 METASTATIC COLON CANCER TO LIVER (HCC): Primary | ICD-10-CM

## 2022-02-07 LAB
ALBUMIN SERPL BCP-MCNC: 4.2 G/DL (ref 3.2–4.9)
ALBUMIN/GLOB SERPL: 2 G/DL
ALP SERPL-CCNC: 165 U/L (ref 30–99)
ALT SERPL-CCNC: 30 U/L (ref 2–50)
ANION GAP SERPL CALC-SCNC: 11 MMOL/L (ref 7–16)
AST SERPL-CCNC: 38 U/L (ref 12–45)
BASOPHILS # BLD AUTO: 1 % (ref 0–1.8)
BASOPHILS # BLD: 0.02 K/UL (ref 0–0.12)
BILIRUB SERPL-MCNC: 1.4 MG/DL (ref 0.1–1.5)
BUN SERPL-MCNC: 9 MG/DL (ref 8–22)
CALCIUM SERPL-MCNC: 9.2 MG/DL (ref 8.5–10.5)
CEA SERPL-MCNC: 3 NG/ML (ref 0–3)
CHLORIDE SERPL-SCNC: 107 MMOL/L (ref 96–112)
CO2 SERPL-SCNC: 23 MMOL/L (ref 20–33)
CREAT SERPL-MCNC: 0.53 MG/DL (ref 0.5–1.4)
EOSINOPHIL # BLD AUTO: 0.04 K/UL (ref 0–0.51)
EOSINOPHIL NFR BLD: 2.1 % (ref 0–6.9)
ERYTHROCYTE [DISTWIDTH] IN BLOOD BY AUTOMATED COUNT: 61.2 FL (ref 35.9–50)
GLOBULIN SER CALC-MCNC: 2.1 G/DL (ref 1.9–3.5)
GLUCOSE SERPL-MCNC: 107 MG/DL (ref 65–99)
HCT VFR BLD AUTO: 37 % (ref 42–52)
HGB BLD-MCNC: 12.9 G/DL (ref 14–18)
IMM GRANULOCYTES # BLD AUTO: 0 K/UL (ref 0–0.11)
IMM GRANULOCYTES NFR BLD AUTO: 0 % (ref 0–0.9)
LYMPHOCYTES # BLD AUTO: 0.36 K/UL (ref 1–4.8)
LYMPHOCYTES NFR BLD: 18.8 % (ref 22–41)
MCH RBC QN AUTO: 33.6 PG (ref 27–33)
MCHC RBC AUTO-ENTMCNC: 34.9 G/DL (ref 33.7–35.3)
MCV RBC AUTO: 96.4 FL (ref 81.4–97.8)
MONOCYTES # BLD AUTO: 0.27 K/UL (ref 0–0.85)
MONOCYTES NFR BLD AUTO: 14.1 % (ref 0–13.4)
NEUTROPHILS # BLD AUTO: 1.23 K/UL (ref 1.82–7.42)
NEUTROPHILS NFR BLD: 64 % (ref 44–72)
NRBC # BLD AUTO: 0 K/UL
NRBC BLD-RTO: 0 /100 WBC
PLATELET # BLD AUTO: 93 K/UL (ref 164–446)
PMV BLD AUTO: 10.9 FL (ref 9–12.9)
POTASSIUM SERPL-SCNC: 3.6 MMOL/L (ref 3.6–5.5)
PROT SERPL-MCNC: 6.3 G/DL (ref 6–8.2)
RBC # BLD AUTO: 3.84 M/UL (ref 4.7–6.1)
SODIUM SERPL-SCNC: 141 MMOL/L (ref 135–145)
WBC # BLD AUTO: 1.9 K/UL (ref 4.8–10.8)

## 2022-02-07 PROCEDURE — 82378 CARCINOEMBRYONIC ANTIGEN: CPT

## 2022-02-07 PROCEDURE — 80053 COMPREHEN METABOLIC PANEL: CPT

## 2022-02-07 PROCEDURE — 700111 HCHG RX REV CODE 636 W/ 250 OVERRIDE (IP)

## 2022-02-07 PROCEDURE — 700111 HCHG RX REV CODE 636 W/ 250 OVERRIDE (IP): Performed by: INTERNAL MEDICINE

## 2022-02-07 PROCEDURE — 85025 COMPLETE CBC W/AUTO DIFF WBC: CPT

## 2022-02-07 PROCEDURE — 700105 HCHG RX REV CODE 258: Performed by: INTERNAL MEDICINE

## 2022-02-07 RX ORDER — HEPARIN SODIUM (PORCINE) LOCK FLUSH IV SOLN 100 UNIT/ML 100 UNIT/ML
SOLUTION INTRAVENOUS
Status: COMPLETED
Start: 2022-02-07 | End: 2022-02-07

## 2022-02-07 RX ADMIN — FAM-TRASTUZUMAB DERUXTECAN-NXKI 567.7 MG: 100 INJECTION, POWDER, LYOPHILIZED, FOR SOLUTION INTRAVENOUS at 13:06

## 2022-02-07 RX ADMIN — HEPARIN 500 UNITS: 100 SYRINGE at 13:54

## 2022-02-07 RX ADMIN — DEXAMETHASONE SODIUM PHOSPHATE 12 MG: 4 INJECTION, SOLUTION INTRA-ARTICULAR; INTRALESIONAL; INTRAMUSCULAR; INTRAVENOUS; SOFT TISSUE at 12:21

## 2022-02-07 RX ADMIN — ONDANSETRON 16 MG: 2 INJECTION INTRAMUSCULAR; INTRAVENOUS at 12:35

## 2022-02-07 ASSESSMENT — FIBROSIS 4 INDEX: FIB4 SCORE: 3.64

## 2022-02-07 NOTE — PROGRESS NOTES
Patient presents to Infusion services for labs and fam-trastuzumab deruxtecan (Enhertu) infusion. Port accessed using sterile technique and labs drawn as ordered. Labs verified, Dr. Ross notified of platelet count of 93K, OK to proceed with treatment today. Dr. Ross also notified of patient's most recent echo being from 09/2021, per Dr. Ross OK to treat. Pre-meds given as ordered. Treatment infused as ordered. Port flushed, heparin instilled, port de-accessed, patient discharged to self care in no apparent distress. Next appointment confirmed.     Ericka from Lab called with critical result of WBC 1.9 at 1114. Critical lab result read back to Ericka.   This critical lab result is within parameters established by Dr. Ross for this patient.

## 2022-02-07 NOTE — PROGRESS NOTES
"Pharmacy Chemotherapy Verification    Patient name: Gurmeet Jo  Dx: Metastatic colorectal cancer     Regimen: Fam-trastuzumab deruxtecan  Fam-trastuzumab deruxtecan 6.4 mg/kg IV over 90 minutes Day 1  21-day cycle until progression or toxicity  NCCN guidelines for Colon Cancer v2.2021  (cetuximab + chemo) Christine ARGUELLO et al, OBEY 2017;317(23):2947-7665  (HER2 tx in HER2+ CRC) Kayleen RINALDI et al. Lancet Oncol. 2019 Apr;20(4):518-530.    Allergies: Patient has no known allergies.  /71   Pulse 70   Temp 36.1 °C (97 °F) (Temporal)   Resp 18   Ht 1.85 m (6' 0.84\")   Wt 88.7 kg (195 lb 8.8 oz)   SpO2 100%   BMI 25.92 kg/m²  Body surface area is 2.13 meters squared.     Labs 2/7/22  ANC 1230 Hgb 12.9 Plt 93k   SCr  0.53 CrCl >125 mL/min   AST/ALT/AP = 38/30/165 Tbili = 1.4    ECHO 9/28/21 (OK to treat 1/17/22 without echo per Cody LEO order)  LVEF ~70%     **MD aware, OK to proceed with treatment as outlined below.**    Drug Order   (Drug name, dose, route, IV Fluid & volume, frequency, number of doses) Cycle 9  Previous treatment: 1/17/22      Medication = Fam-trastuzumab deruxtecan (Enhertu)  Base Dose= 6.4 mg/kg  Calc Dose: Base Dose x 88.7 kg = 567.68 mg  Final Dose = 567.7 mg  Route = IV  Fluid & Volume = D5W 100 mL  Admin Duration = Over 30  minutes            <10% difference, okay to treat with final dose     By my signature below, I confirm this process was performed independently with the BSA and all final chemotherapy dosing calculations congruent. I have reviewed the above chemotherapy order and that my calculation of the final dose and BSA (when applicable) corroborate those calculations of the  pharmacist. Discrepancies of 10% or greater in the written dose have been addressed and documented within the [facility] Progress notes.    Jeni Ponce, PharmD, BCOP      "

## 2022-02-07 NOTE — PROGRESS NOTES
Chemotherapy Verification - PRIMARY RN      Height = 1.85m Weight = 88.7kg  BSA = 2.13m^2       Medication: fam-trastuzumab deruxtecan (Enhertu)  Dose: 6.4mg/kg  Calculated Dose: 567.68mg                            (In mg/m2, AUC, mg/kg)     I confirm this process was performed independently with the BSA and all final chemotherapy dosing calculations congruent.  Any discrepancies of 10% or greater have been addressed with the chemotherapy pharmacist. The resolution of the discrepancy has been documented in the EPIC progress notes.

## 2022-02-07 NOTE — PROGRESS NOTES
"Pharmacy Chemotherapy Verification  Patient name: Gurmeet Jo  Dx: mCRC      Cycle 9  Previous treatment: 1/17/22    Regimen: Fam-trastuzumab deruxtecan  Fam-trastuzumab deruxtecan 6.4 mg/kg IV over 90 minutes Day 1   Q21-day cycle until progression or toxicity  NCCN guidelines for Colon Cancer v3.2021  (cetuximab + chemo) Christine ARGUELLO et al, OBEY 2017;317(23):0424-0324  (HER2 tx in HER2+ CRC) Kayleen RINALDI et al. Lancet Oncol. 2019 Apr;20(4):518-530.    Allergies: Patient has no known allergies.  /71   Pulse 70   Temp 36.1 °C (97 °F) (Temporal)   Resp 18   Ht 1.85 m (6' 0.84\")   Wt 88.7 kg (195 lb 8.8 oz)   SpO2 100%   BMI 25.92 kg/m²  Body surface area is 2.13 meters squared.     All lab results 2/7/22 within treatment parameters. Excpet PLt < 100k  MD aware of all current lab results. Orders received to proceed with treatment .      ECHO (OK for 6 months per Cody LEO order)  9/28/21 LVEF ~70%( repeat ECHO sched for 2/21/22, ok to proceed)      Fam-trastuzumab deruxtecan (Enhertu) 6.4 mg/kg x 88.7 kg = 567.7mg   <10% difference, ok to treat with final dose = 567.7mg IV      RAHEEM Amador Pharm.D.      "

## 2022-02-07 NOTE — PROGRESS NOTES
Chemotherapy Verification - SECONDARY RN       Height = 185 cm  Weight = 88.7 kg  BSA = 2.13 m2       Medication: Herzuma  Dose: 6.4 mg/kg  Calculated Dose: 567.68 mg                             (In mg/m2, AUC, mg/kg)     I confirm that this process was performed independently.

## 2022-02-21 ENCOUNTER — HOSPITAL ENCOUNTER (OUTPATIENT)
Dept: CARDIOLOGY | Facility: MEDICAL CENTER | Age: 57
End: 2022-02-21
Attending: INTERNAL MEDICINE
Payer: MEDICARE

## 2022-02-21 DIAGNOSIS — C18.9 MALIGNANT NEOPLASM OF COLON, UNSPECIFIED PART OF COLON (HCC): ICD-10-CM

## 2022-02-21 LAB
LV EJECT FRACT  99904: 65
LV EJECT FRACT MOD 2C 99903: 60.47
LV EJECT FRACT MOD 4C 99902: 70.39
LV EJECT FRACT MOD BP 99901: 66.88

## 2022-02-21 PROCEDURE — 93306 TTE W/DOPPLER COMPLETE: CPT | Mod: 26 | Performed by: INTERNAL MEDICINE

## 2022-02-21 PROCEDURE — 93306 TTE W/DOPPLER COMPLETE: CPT

## 2022-02-22 RX ORDER — 0.9 % SODIUM CHLORIDE 0.9 %
10 VIAL (ML) INJECTION PRN
Status: CANCELLED | OUTPATIENT
Start: 2022-03-21

## 2022-02-22 RX ORDER — ONDANSETRON 8 MG/1
8 TABLET, ORALLY DISINTEGRATING ORAL PRN
Status: CANCELLED | OUTPATIENT
Start: 2022-03-01

## 2022-02-22 RX ORDER — 0.9 % SODIUM CHLORIDE 0.9 %
10 VIAL (ML) INJECTION PRN
Status: CANCELLED | OUTPATIENT
Start: 2022-02-28

## 2022-02-22 RX ORDER — 0.9 % SODIUM CHLORIDE 0.9 %
10 VIAL (ML) INJECTION PRN
Status: CANCELLED | OUTPATIENT
Start: 2022-03-20

## 2022-02-22 RX ORDER — EPINEPHRINE 1 MG/ML(1)
0.5 AMPUL (ML) INJECTION PRN
Status: CANCELLED | OUTPATIENT
Start: 2022-03-21

## 2022-02-22 RX ORDER — DEXTROSE MONOHYDRATE 50 MG/ML
INJECTION, SOLUTION INTRAVENOUS CONTINUOUS
Status: CANCELLED | OUTPATIENT
Start: 2022-03-01

## 2022-02-22 RX ORDER — DIPHENHYDRAMINE HYDROCHLORIDE 50 MG/ML
50 INJECTION INTRAMUSCULAR; INTRAVENOUS PRN
Status: CANCELLED | OUTPATIENT
Start: 2022-03-21

## 2022-02-22 RX ORDER — DIPHENHYDRAMINE HYDROCHLORIDE 50 MG/ML
50 INJECTION INTRAMUSCULAR; INTRAVENOUS PRN
Status: CANCELLED | OUTPATIENT
Start: 2022-03-01

## 2022-02-22 RX ORDER — PROCHLORPERAZINE MALEATE 10 MG
10 TABLET ORAL EVERY 6 HOURS PRN
Status: CANCELLED | OUTPATIENT
Start: 2022-03-21

## 2022-02-22 RX ORDER — HEPARIN SODIUM (PORCINE) LOCK FLUSH IV SOLN 100 UNIT/ML 100 UNIT/ML
500 SOLUTION INTRAVENOUS PRN
Status: CANCELLED | OUTPATIENT
Start: 2022-02-28

## 2022-02-22 RX ORDER — 0.9 % SODIUM CHLORIDE 0.9 %
VIAL (ML) INJECTION PRN
Status: CANCELLED | OUTPATIENT
Start: 2022-03-20

## 2022-02-22 RX ORDER — 0.9 % SODIUM CHLORIDE 0.9 %
3 VIAL (ML) INJECTION PRN
Status: CANCELLED | OUTPATIENT
Start: 2022-02-28

## 2022-02-22 RX ORDER — DEXTROSE MONOHYDRATE 50 MG/ML
INJECTION, SOLUTION INTRAVENOUS CONTINUOUS
Status: CANCELLED | OUTPATIENT
Start: 2022-03-21

## 2022-02-22 RX ORDER — EPINEPHRINE 1 MG/ML(1)
0.5 AMPUL (ML) INJECTION PRN
Status: CANCELLED | OUTPATIENT
Start: 2022-03-01

## 2022-02-22 RX ORDER — 0.9 % SODIUM CHLORIDE 0.9 %
VIAL (ML) INJECTION PRN
Status: CANCELLED | OUTPATIENT
Start: 2022-03-01

## 2022-02-22 RX ORDER — 0.9 % SODIUM CHLORIDE 0.9 %
3 VIAL (ML) INJECTION PRN
Status: CANCELLED | OUTPATIENT
Start: 2022-03-20

## 2022-02-22 RX ORDER — 0.9 % SODIUM CHLORIDE 0.9 %
3 VIAL (ML) INJECTION PRN
Status: CANCELLED | OUTPATIENT
Start: 2022-03-21

## 2022-02-22 RX ORDER — HEPARIN SODIUM (PORCINE) LOCK FLUSH IV SOLN 100 UNIT/ML 100 UNIT/ML
500 SOLUTION INTRAVENOUS PRN
Status: CANCELLED | OUTPATIENT
Start: 2022-03-20

## 2022-02-22 RX ORDER — PROCHLORPERAZINE MALEATE 10 MG
10 TABLET ORAL EVERY 6 HOURS PRN
Status: CANCELLED | OUTPATIENT
Start: 2022-03-01

## 2022-02-22 RX ORDER — ONDANSETRON 2 MG/ML
4 INJECTION INTRAMUSCULAR; INTRAVENOUS PRN
Status: CANCELLED | OUTPATIENT
Start: 2022-03-01

## 2022-02-22 RX ORDER — 0.9 % SODIUM CHLORIDE 0.9 %
VIAL (ML) INJECTION PRN
Status: CANCELLED | OUTPATIENT
Start: 2022-03-21

## 2022-02-22 RX ORDER — 0.9 % SODIUM CHLORIDE 0.9 %
10 VIAL (ML) INJECTION PRN
Status: CANCELLED | OUTPATIENT
Start: 2022-03-01

## 2022-02-22 RX ORDER — METHYLPREDNISOLONE SODIUM SUCCINATE 125 MG/2ML
125 INJECTION, POWDER, LYOPHILIZED, FOR SOLUTION INTRAMUSCULAR; INTRAVENOUS PRN
Status: CANCELLED | OUTPATIENT
Start: 2022-03-21

## 2022-02-22 RX ORDER — 0.9 % SODIUM CHLORIDE 0.9 %
VIAL (ML) INJECTION PRN
Status: CANCELLED | OUTPATIENT
Start: 2022-02-28

## 2022-02-22 RX ORDER — METHYLPREDNISOLONE SODIUM SUCCINATE 125 MG/2ML
125 INJECTION, POWDER, LYOPHILIZED, FOR SOLUTION INTRAMUSCULAR; INTRAVENOUS PRN
Status: CANCELLED | OUTPATIENT
Start: 2022-03-01

## 2022-02-22 RX ORDER — 0.9 % SODIUM CHLORIDE 0.9 %
3 VIAL (ML) INJECTION PRN
Status: CANCELLED | OUTPATIENT
Start: 2022-03-01

## 2022-02-22 RX ORDER — HEPARIN SODIUM (PORCINE) LOCK FLUSH IV SOLN 100 UNIT/ML 100 UNIT/ML
500 SOLUTION INTRAVENOUS PRN
Status: CANCELLED | OUTPATIENT
Start: 2022-03-01

## 2022-02-22 RX ORDER — ONDANSETRON 8 MG/1
8 TABLET, ORALLY DISINTEGRATING ORAL PRN
Status: CANCELLED | OUTPATIENT
Start: 2022-03-21

## 2022-02-22 RX ORDER — HEPARIN SODIUM (PORCINE) LOCK FLUSH IV SOLN 100 UNIT/ML 100 UNIT/ML
500 SOLUTION INTRAVENOUS PRN
Status: CANCELLED | OUTPATIENT
Start: 2022-03-21

## 2022-02-22 RX ORDER — ONDANSETRON 2 MG/ML
4 INJECTION INTRAMUSCULAR; INTRAVENOUS PRN
Status: CANCELLED | OUTPATIENT
Start: 2022-03-21

## 2022-02-25 ENCOUNTER — HOSPITAL ENCOUNTER (OUTPATIENT)
Dept: LAB | Facility: MEDICAL CENTER | Age: 57
End: 2022-02-25
Attending: INTERNAL MEDICINE
Payer: MEDICARE

## 2022-02-25 LAB
ALBUMIN SERPL BCP-MCNC: 4.2 G/DL (ref 3.2–4.9)
ALBUMIN/GLOB SERPL: 1.9 G/DL
ALP SERPL-CCNC: 164 U/L (ref 30–99)
ALT SERPL-CCNC: 32 U/L (ref 2–50)
ANION GAP SERPL CALC-SCNC: 9 MMOL/L (ref 7–16)
ANISOCYTOSIS BLD QL SMEAR: ABNORMAL
AST SERPL-CCNC: 43 U/L (ref 12–45)
BASOPHILS # BLD AUTO: 2.6 % (ref 0–1.8)
BASOPHILS # BLD: 0.06 K/UL (ref 0–0.12)
BILIRUB SERPL-MCNC: 0.9 MG/DL (ref 0.1–1.5)
BUN SERPL-MCNC: 9 MG/DL (ref 8–22)
CALCIUM SERPL-MCNC: 9.1 MG/DL (ref 8.5–10.5)
CHLORIDE SERPL-SCNC: 107 MMOL/L (ref 96–112)
CO2 SERPL-SCNC: 24 MMOL/L (ref 20–33)
CREAT SERPL-MCNC: 0.62 MG/DL (ref 0.5–1.4)
EOSINOPHIL # BLD AUTO: 0.16 K/UL (ref 0–0.51)
EOSINOPHIL NFR BLD: 7 % (ref 0–6.9)
ERYTHROCYTE [DISTWIDTH] IN BLOOD BY AUTOMATED COUNT: 64.6 FL (ref 35.9–50)
GLOBULIN SER CALC-MCNC: 2.2 G/DL (ref 1.9–3.5)
GLUCOSE SERPL-MCNC: 123 MG/DL (ref 65–99)
HCT VFR BLD AUTO: 37.6 % (ref 42–52)
HGB BLD-MCNC: 12.6 G/DL (ref 14–18)
LYMPHOCYTES # BLD AUTO: 0.48 K/UL (ref 1–4.8)
LYMPHOCYTES NFR BLD: 20.9 % (ref 22–41)
MACROCYTES BLD QL SMEAR: ABNORMAL
MAGNESIUM SERPL-MCNC: 2.2 MG/DL (ref 1.5–2.5)
MANUAL DIFF BLD: NORMAL
MCH RBC QN AUTO: 33.8 PG (ref 27–33)
MCHC RBC AUTO-ENTMCNC: 33.5 G/DL (ref 33.7–35.3)
MCV RBC AUTO: 100.8 FL (ref 81.4–97.8)
MONOCYTES # BLD AUTO: 0.1 K/UL (ref 0–0.85)
MONOCYTES NFR BLD AUTO: 4.3 % (ref 0–13.4)
MORPHOLOGY BLD-IMP: NORMAL
NEUTROPHILS # BLD AUTO: 1.5 K/UL (ref 1.82–7.42)
NEUTROPHILS NFR BLD: 65.2 % (ref 44–72)
NRBC # BLD AUTO: 0 K/UL
NRBC BLD-RTO: 0 /100 WBC
OVALOCYTES BLD QL SMEAR: NORMAL
PLATELET # BLD AUTO: 109 K/UL (ref 164–446)
PLATELET BLD QL SMEAR: NORMAL
PMV BLD AUTO: 12.3 FL (ref 9–12.9)
POIKILOCYTOSIS BLD QL SMEAR: NORMAL
POTASSIUM SERPL-SCNC: 3.8 MMOL/L (ref 3.6–5.5)
PROT SERPL-MCNC: 6.4 G/DL (ref 6–8.2)
RBC # BLD AUTO: 3.73 M/UL (ref 4.7–6.1)
RBC BLD AUTO: PRESENT
SODIUM SERPL-SCNC: 140 MMOL/L (ref 135–145)
WBC # BLD AUTO: 2.3 K/UL (ref 4.8–10.8)

## 2022-02-25 PROCEDURE — 80053 COMPREHEN METABOLIC PANEL: CPT

## 2022-02-25 PROCEDURE — 83735 ASSAY OF MAGNESIUM: CPT

## 2022-02-25 PROCEDURE — 36415 COLL VENOUS BLD VENIPUNCTURE: CPT

## 2022-02-25 PROCEDURE — 85007 BL SMEAR W/DIFF WBC COUNT: CPT

## 2022-02-25 PROCEDURE — 85027 COMPLETE CBC AUTOMATED: CPT

## 2022-02-25 PROCEDURE — 82378 CARCINOEMBRYONIC ANTIGEN: CPT

## 2022-02-26 LAB — CEA SERPL-MCNC: 3.6 NG/ML (ref 0–3)

## 2022-02-27 NOTE — PROGRESS NOTES
"Pharmacy Chemotherapy Verification  Patient name: Gurmeet Jo  Dx: mCRC    Cycle 10  Previous treatment: 2/7/22    Regimen: Fam-trastuzumab deruxtecan  Fam-trastuzumab deruxtecan 6.4 mg/kg IV over 90 minutes Day 1   Q21-day cycle until progression or toxicity  NCCN guidelines for Colon Cancer v3.2021  (cetuximab + chemo) Christine ARGUELLO et al, OBEY 2017;317(23):7618-6618  (HER2 tx in HER2+ CRC) Kayleen RINALDI et al. Lancet Oncol. 2019 Apr;20(4):518-530.    Allergies: Patient has no known allergies.  /61   Pulse 63   Temp 36.1 °C (97 °F) (Temporal)   Resp 18   Ht 1.855 m (6' 1.03\")   Wt 86.8 kg (191 lb 5.8 oz)   SpO2 97%   BMI 25.23 kg/m²  Body surface area is 2.11 meters squared.     ECHO (OK for 6 months per Cody LEO order)  2/21/22 LVEF ~65%  9/28/21 LVEF ~70%  7/13/21 LVEF ~65%  4/21/21 LVEF ~60%    Labs 2/25/22:  ANC~ 1500 Plt = 109k   Hgb = 12.6     SCr = 0.62 mg/dL CrCl >125 mL/min   AST/ALT/ALK = 43/32/164 TBili = 0.9     Fam-trastuzumab deruxtecan (Enhertu) 6.4 mg/kg x 86.8 kg = 555.52 mg   <10% difference, ok to treat with final dose = 555.5 mg IV      José Marvin, PharmD        "

## 2022-02-28 ENCOUNTER — OUTPATIENT INFUSION SERVICES (OUTPATIENT)
Dept: ONCOLOGY | Facility: MEDICAL CENTER | Age: 57
End: 2022-02-28
Attending: INTERNAL MEDICINE
Payer: MEDICARE

## 2022-02-28 VITALS
SYSTOLIC BLOOD PRESSURE: 107 MMHG | BODY MASS INDEX: 25.36 KG/M2 | WEIGHT: 191.36 LBS | HEART RATE: 63 BPM | DIASTOLIC BLOOD PRESSURE: 61 MMHG | TEMPERATURE: 97 F | HEIGHT: 73 IN | RESPIRATION RATE: 18 BRPM | OXYGEN SATURATION: 97 %

## 2022-02-28 DIAGNOSIS — C78.7 METASTATIC COLON CANCER TO LIVER (HCC): ICD-10-CM

## 2022-02-28 DIAGNOSIS — C18.9 METASTATIC COLON CANCER TO LIVER (HCC): ICD-10-CM

## 2022-02-28 PROCEDURE — 700111 HCHG RX REV CODE 636 W/ 250 OVERRIDE (IP): Performed by: INTERNAL MEDICINE

## 2022-02-28 PROCEDURE — A4212 NON CORING NEEDLE OR STYLET: HCPCS

## 2022-02-28 PROCEDURE — 96375 TX/PRO/DX INJ NEW DRUG ADDON: CPT

## 2022-02-28 PROCEDURE — 304540 HCHG NITRO SET VENT 2ND TUB

## 2022-02-28 PROCEDURE — 700105 HCHG RX REV CODE 258: Performed by: INTERNAL MEDICINE

## 2022-02-28 PROCEDURE — 96413 CHEMO IV INFUSION 1 HR: CPT

## 2022-02-28 RX ORDER — HEPARIN SODIUM (PORCINE) LOCK FLUSH IV SOLN 100 UNIT/ML 100 UNIT/ML
500 SOLUTION INTRAVENOUS PRN
Status: DISCONTINUED | OUTPATIENT
Start: 2022-02-28 | End: 2022-02-28 | Stop reason: HOSPADM

## 2022-02-28 RX ADMIN — ONDANSETRON 16 MG: 2 INJECTION INTRAMUSCULAR; INTRAVENOUS at 10:20

## 2022-02-28 RX ADMIN — FAM-TRASTUZUMAB DERUXTECAN-NXKI 555.5 MG: 100 INJECTION, POWDER, LYOPHILIZED, FOR SOLUTION INTRAVENOUS at 10:46

## 2022-02-28 RX ADMIN — DEXAMETHASONE SODIUM PHOSPHATE 12 MG: 4 INJECTION, SOLUTION INTRA-ARTICULAR; INTRALESIONAL; INTRAMUSCULAR; INTRAVENOUS; SOFT TISSUE at 10:07

## 2022-02-28 RX ADMIN — HEPARIN 500 UNITS: 100 SYRINGE at 11:34

## 2022-02-28 ASSESSMENT — FIBROSIS 4 INDEX: FIB4 SCORE: 3.98

## 2022-02-28 NOTE — PROGRESS NOTES
Patient arrived ambulatory to IS for Enhertu.  Patient denies any active infections.  Port accessed using sterile technique, brisk blood return noted.  Labs drawn 2/25/22 and results within parameters to treat.  Pre medications given and Enhertu infused per order, patient tolerated well.  Port flushed, heparin instilled, and de-accessed per protocol.  Gauze/tape applied.  Next appointment scheduled and patient ambulated out of clinic in no apparent distress.

## 2022-02-28 NOTE — PROGRESS NOTES
Chemotherapy Verification - PRIMARY RN      Height = 185.5 cm  Weight = 86.8 kg  BSA = 2.11 m2       Medication: Enhertu  Dose: 6.4 mg/kg  Calculated Dose: 555.5 mg                             (In mg/m2, AUC, mg/kg)           I confirm this process was performed independently with the BSA and all final chemotherapy dosing calculations congruent.  Any discrepancies of 10% or greater have been addressed with the chemotherapy pharmacist. The resolution of the discrepancy has been documented in the EPIC progress notes.

## 2022-02-28 NOTE — PROGRESS NOTES
"Pharmacy Chemotherapy Verification    Patient name: Gurmeet Jo  Dx: Metastatic colorectal cancer     Regimen: Fam-trastuzumab deruxtecan  Fam-trastuzumab deruxtecan 6.4 mg/kg IV over 90 minutes Day 1  21-day cycle until progression or toxicity  NCCN guidelines for Colon Cancer v2.2021  (cetuximab + chemo) Christine ARGUELLO et al, OBEY 2017;317(23):2521-6329  (HER2 tx in HER2+ CRC) Kayleen RINALDI et al. Lancet Oncol. 2019 Apr;20(4):518-530.    Allergies: Patient has no known allergies.  /61   Pulse 63   Temp 36.1 °C (97 °F) (Temporal)   Resp 18   Ht 1.855 m (6' 1.03\")   Wt 86.8 kg (191 lb 5.8 oz)   SpO2 97%   BMI 25.23 kg/m²  Body surface area is 2.11 meters squared.     Labs 2/25/22:  ANC~ 1500 Plt = 109k   Hgb = 12.6     SCr = 0.62 mg/dL CrCl > 125 mL/min   AST/ALT/AP = 43/32/164 TBili = 0.9  Mag = 2.2  K+ = 3.8    ECHO 9/28/21 (OK to treat 1/17/22 without echo per Cody LEO order)  LVEF ~70%     Drug Order   (Drug name, dose, route, IV Fluid & volume, frequency, number of doses) Cycle 10  Previous treatment: 2/7/22      Medication = Fam-trastuzumab deruxtecan (Enhertu)  Base Dose= 6.4 mg/kg  Calc Dose: Base Dose x 86.8 kg = 555.5 mg  Final Dose = 555.5 mg  Route = IV  Fluid & Volume = D5W 100 mL  Admin Duration = Over 30  minutes            <10% difference, okay to treat with final dose     By my signature below, I confirm this process was performed independently with the BSA and all final chemotherapy dosing calculations congruent. I have reviewed the above chemotherapy order and that my calculation of the final dose and BSA (when applicable) corroborate those calculations of the  pharmacist. Discrepancies of 10% or greater in the written dose have been addressed and documented within the [facility] Progress notes.    Chandrika Robles, PharmD, BCOP      "

## 2022-02-28 NOTE — PROGRESS NOTES
Chemotherapy Verification - SECONDARY RN       Height = 185.5 cm  Weight = 86.8 kg  BSA = 2.11 m2       Medication: Enjertu  Dose: 6.4 mg/kg  Calculated Dose: 555.5 mg                             (In mg/m2, AUC, mg/kg)       I confirm that this process was performed independently.

## 2022-03-18 ENCOUNTER — HOSPITAL ENCOUNTER (OUTPATIENT)
Dept: LAB | Facility: MEDICAL CENTER | Age: 57
End: 2022-03-18
Attending: INTERNAL MEDICINE
Payer: MEDICARE

## 2022-03-18 LAB
ALBUMIN SERPL BCP-MCNC: 4.1 G/DL (ref 3.2–4.9)
ALBUMIN/GLOB SERPL: 2.4 G/DL
ALP SERPL-CCNC: 200 U/L (ref 30–99)
ALT SERPL-CCNC: 34 U/L (ref 2–50)
ANION GAP SERPL CALC-SCNC: 12 MMOL/L (ref 7–16)
AST SERPL-CCNC: 38 U/L (ref 12–45)
BILIRUB SERPL-MCNC: 1.5 MG/DL (ref 0.1–1.5)
BUN SERPL-MCNC: 7 MG/DL (ref 8–22)
CALCIUM SERPL-MCNC: 8.7 MG/DL (ref 8.5–10.5)
CEA SERPL-MCNC: 5 NG/ML (ref 0–3)
CHLORIDE SERPL-SCNC: 105 MMOL/L (ref 96–112)
CO2 SERPL-SCNC: 22 MMOL/L (ref 20–33)
CREAT SERPL-MCNC: 0.59 MG/DL (ref 0.5–1.4)
GFR SERPLBLD CREATININE-BSD FMLA CKD-EPI: 113 ML/MIN/1.73 M 2
GLOBULIN SER CALC-MCNC: 1.7 G/DL (ref 1.9–3.5)
GLUCOSE SERPL-MCNC: 134 MG/DL (ref 65–99)
MAGNESIUM SERPL-MCNC: 2.1 MG/DL (ref 1.5–2.5)
POTASSIUM SERPL-SCNC: 4 MMOL/L (ref 3.6–5.5)
PROT SERPL-MCNC: 5.8 G/DL (ref 6–8.2)
SODIUM SERPL-SCNC: 139 MMOL/L (ref 135–145)

## 2022-03-18 PROCEDURE — 36415 COLL VENOUS BLD VENIPUNCTURE: CPT

## 2022-03-18 PROCEDURE — 85025 COMPLETE CBC W/AUTO DIFF WBC: CPT

## 2022-03-18 PROCEDURE — 82378 CARCINOEMBRYONIC ANTIGEN: CPT

## 2022-03-18 PROCEDURE — 80053 COMPREHEN METABOLIC PANEL: CPT

## 2022-03-18 PROCEDURE — 83735 ASSAY OF MAGNESIUM: CPT

## 2022-03-19 LAB
ANISOCYTOSIS BLD QL SMEAR: ABNORMAL
BASOPHILS # BLD AUTO: 1.3 % (ref 0–1.8)
BASOPHILS # BLD: 0.03 K/UL (ref 0–0.12)
COMMENT 1642: NORMAL
EOSINOPHIL # BLD AUTO: 0.07 K/UL (ref 0–0.51)
EOSINOPHIL NFR BLD: 3.1 % (ref 0–6.9)
ERYTHROCYTE [DISTWIDTH] IN BLOOD BY AUTOMATED COUNT: 65.7 FL (ref 35.9–50)
HCT VFR BLD AUTO: 35.6 % (ref 42–52)
HGB BLD-MCNC: 11.8 G/DL (ref 14–18)
IMM GRANULOCYTES # BLD AUTO: 0.01 K/UL (ref 0–0.11)
IMM GRANULOCYTES NFR BLD AUTO: 0.4 % (ref 0–0.9)
LYMPHOCYTES # BLD AUTO: 0.47 K/UL (ref 1–4.8)
LYMPHOCYTES NFR BLD: 20.9 % (ref 22–41)
MACROCYTES BLD QL SMEAR: ABNORMAL
MCH RBC QN AUTO: 33.9 PG (ref 27–33)
MCHC RBC AUTO-ENTMCNC: 33.1 G/DL (ref 33.7–35.3)
MCV RBC AUTO: 102.3 FL (ref 81.4–97.8)
MONOCYTES # BLD AUTO: 0.31 K/UL (ref 0–0.85)
MONOCYTES NFR BLD AUTO: 13.8 % (ref 0–13.4)
MORPHOLOGY BLD-IMP: NORMAL
NEUTROPHILS # BLD AUTO: 1.36 K/UL (ref 1.82–7.42)
NEUTROPHILS NFR BLD: 60.5 % (ref 44–72)
NRBC # BLD AUTO: 0 K/UL
NRBC BLD-RTO: 0 /100 WBC
OVALOCYTES BLD QL SMEAR: NORMAL
PLATELET # BLD AUTO: 92 K/UL (ref 164–446)
PLATELET BLD QL SMEAR: NORMAL
PMV BLD AUTO: 12.1 FL (ref 9–12.9)
POIKILOCYTOSIS BLD QL SMEAR: NORMAL
RBC # BLD AUTO: 3.48 M/UL (ref 4.7–6.1)
RBC BLD AUTO: PRESENT
WBC # BLD AUTO: 2.3 K/UL (ref 4.8–10.8)

## 2022-03-20 NOTE — PROGRESS NOTES
"Pharmacy Chemotherapy Verification  Patient name: Gurmeet Jo  Dx: mCRC  Regimen: Fam-trastuzumab deruxtecan    Fam-trastuzumab deruxtecan 6.4 mg/kg IV over 90 minutes Day 1   Q21-day cycle until progression or toxicity  NCCN guidelines for Colon Cancer v3.2021  (cetuximab + chemo) Christine Vu al, OBEY 2017;317(23):7527-6099  (HER2 tx in HER2+ CRC) Kayleen RINALDI et al. Lancet Oncol. 2019 Apr;20(4):518-530.    Allergies: Patient has no known allergies.  /67   Pulse 85   Temp 36.5 °C (97.7 °F) (Temporal)   Resp 18   Ht 1.84 m (6' 0.44\")   Wt 85.2 kg (187 lb 13.3 oz)   SpO2 97%   BMI 25.17 kg/m²  Body surface area is 2.09 meters squared.     Labs 3/18/22:  OK to treat with PLTs of 92k  All other lab values meet treatment parameters    Path:  KRAS/NRAS wild type  ERBB2 (HER2) amplification    ECHO (OK for 6 months per Cody LEO order)  9/28/21 LVEF ~70%  7/13/21 LVEF ~65 %  4/21/21 LVEF ~60 %      Drug Order   (Drug name, dose, route, IV Fluid & volume, frequency, number of doses) Cycle 11  Previous treatment: 2/28/22       Medication = Fam-trastuzumab deruxtecan (Enhertu)  Base Dose = 6.4 mg/kg  Calc Dose: Base Dose x 85.2 kg = 545.28 mg  Final Dose = 545.3 mg  Route = IV  Fluid & Volume = D5W 100 mL  Admin Duration = Over 30  minutes     Okay to treat with final dose       "

## 2022-03-21 ENCOUNTER — OUTPATIENT INFUSION SERVICES (OUTPATIENT)
Dept: ONCOLOGY | Facility: MEDICAL CENTER | Age: 57
End: 2022-03-21
Attending: INTERNAL MEDICINE
Payer: MEDICARE

## 2022-03-21 ENCOUNTER — PHARMACY VISIT (OUTPATIENT)
Dept: PHARMACY | Facility: MEDICAL CENTER | Age: 57
End: 2022-03-21
Payer: COMMERCIAL

## 2022-03-21 VITALS
WEIGHT: 187.83 LBS | DIASTOLIC BLOOD PRESSURE: 67 MMHG | HEART RATE: 85 BPM | SYSTOLIC BLOOD PRESSURE: 120 MMHG | TEMPERATURE: 97.7 F | HEIGHT: 72 IN | RESPIRATION RATE: 18 BRPM | BODY MASS INDEX: 25.44 KG/M2 | OXYGEN SATURATION: 97 %

## 2022-03-21 DIAGNOSIS — C18.9 METASTATIC COLON CANCER TO LIVER (HCC): ICD-10-CM

## 2022-03-21 DIAGNOSIS — C78.7 METASTATIC COLON CANCER TO LIVER (HCC): ICD-10-CM

## 2022-03-21 PROCEDURE — RXMED WILLOW AMBULATORY MEDICATION CHARGE: Performed by: NURSE PRACTITIONER

## 2022-03-21 PROCEDURE — 96375 TX/PRO/DX INJ NEW DRUG ADDON: CPT

## 2022-03-21 PROCEDURE — A4212 NON CORING NEEDLE OR STYLET: HCPCS

## 2022-03-21 PROCEDURE — 96413 CHEMO IV INFUSION 1 HR: CPT

## 2022-03-21 PROCEDURE — 700105 HCHG RX REV CODE 258: Performed by: INTERNAL MEDICINE

## 2022-03-21 PROCEDURE — 304540 HCHG NITRO SET VENT 2ND TUB

## 2022-03-21 PROCEDURE — 700111 HCHG RX REV CODE 636 W/ 250 OVERRIDE (IP)

## 2022-03-21 PROCEDURE — 700111 HCHG RX REV CODE 636 W/ 250 OVERRIDE (IP): Performed by: INTERNAL MEDICINE

## 2022-03-21 RX ORDER — HEPARIN SODIUM (PORCINE) LOCK FLUSH IV SOLN 100 UNIT/ML 100 UNIT/ML
SOLUTION INTRAVENOUS
Status: COMPLETED
Start: 2022-03-21 | End: 2022-03-21

## 2022-03-21 RX ORDER — LEVOFLOXACIN 500 MG/1
500 TABLET, FILM COATED ORAL DAILY
Qty: 7 TABLET | Refills: 0 | Status: SHIPPED | OUTPATIENT
Start: 2022-03-21 | End: 2022-04-28

## 2022-03-21 RX ADMIN — DEXAMETHASONE SODIUM PHOSPHATE 12 MG: 4 INJECTION, SOLUTION INTRA-ARTICULAR; INTRALESIONAL; INTRAMUSCULAR; INTRAVENOUS; SOFT TISSUE at 09:20

## 2022-03-21 RX ADMIN — Medication 500 UNITS: at 10:32

## 2022-03-21 RX ADMIN — FAM-TRASTUZUMAB DERUXTECAN-NXKI 545.3 MG: 100 INJECTION, POWDER, LYOPHILIZED, FOR SOLUTION INTRAVENOUS at 09:45

## 2022-03-21 RX ADMIN — ONDANSETRON 16 MG: 2 INJECTION INTRAMUSCULAR; INTRAVENOUS at 09:02

## 2022-03-21 ASSESSMENT — FIBROSIS 4 INDEX: FIB4 SCORE: 4.04

## 2022-03-21 NOTE — PROGRESS NOTES
"Pharmacy Chemotherapy Verification  Patient name: Gurmeet Jo  Dx: mCRC    Cycle 11  Previous treatment: 2/28/22    Regimen: Fam-trastuzumab deruxtecan  Fam-trastuzumab deruxtecan 6.4 mg/kg IV over 90 minutes Day 1   Q21-day cycle until progression or toxicity  NCCN guidelines for Colon Cancer v3.2021  (cetuximab + chemo) Christine ARGUELLO et al, OBEY 2017;317(23):9488-3186  (HER2 tx in HER2+ CRC) Kayleen RINALDI et al. Lancet Oncol. 2019 Apr;20(4):518-530.    Allergies: Patient has no known allergies.  /67   Pulse 85   Temp 36.5 °C (97.7 °F) (Temporal)   Resp 18   Ht 1.84 m (6' 0.44\")   Wt 85.2 kg (187 lb 13.3 oz)   SpO2 97%   BMI 25.17 kg/m²  Body surface area is 2.09 meters squared.     ECHO (OK for 6 months per Cody LEO order)  2/21/22 LVEF ~65%  9/28/21 LVEF ~70%  7/13/21 LVEF ~65%  4/21/21 LVEF ~60%    Labs from 3/21/22 reviewed - all within treatment parameters.    Fam-trastuzumab deruxtecan (Enhertu) 6.4 mg/kg x 85.2 kg = 545.28 mg   <10% difference, ok to treat with final dose = 545.3 mg IV      Elsy Londono, PharmD, BCOP        "

## 2022-03-21 NOTE — PROGRESS NOTES
Chemotherapy Verification - SECONDARY RN       Height = 184 cm  Weight = 85.2 kg  BSA = 2.09 m2       Medication: Enhertu  Dose: 6.4 mg/kg  Calculated Dose: 545.3 mg                             (In mg/m2, AUC, mg/kg)     I confirm that this process was performed independently.

## 2022-03-21 NOTE — PROGRESS NOTES
Chemotherapy Verification - SECONDARY RN       Height = 184 cm  Weight = 85.2 kg  BSA = 2.09m2       Medication: Enhertu  Dose: 6.4 mg/kg  Calculated Dose: 545.3 mg                             (In mg/m2, AUC, mg/kg)         I confirm that this process was performed independently.

## 2022-03-21 NOTE — PROGRESS NOTES
Pt arrived ambulatory to  for Enhertu infusion.  POC discussed and labs reviewed from 3/18.  Platelets reported to SAM Carbajal.  Also reported patient's productive cough symptoms.  He denies fevers/chills.  Received order to proceed with treatment today.  Antibiotic called into patients pharmacy, he is aware to  prescription after today's infusion.  R chest port in place with EMLA cream, accessed in sterile field.  Brisk blood return noted.  Premedications given followed by Enhertu.  Treatment completed without issue.  Port flushed per policy, de-accessed, and site covered with gauze and tape.  Next appointment confirmed.  Pt discharged from IS in NAD under self care.

## 2022-03-29 ENCOUNTER — OFFICE VISIT (OUTPATIENT)
Dept: MEDICAL GROUP | Facility: CLINIC | Age: 57
End: 2022-03-29
Payer: MEDICARE

## 2022-03-29 VITALS
OXYGEN SATURATION: 80 % | DIASTOLIC BLOOD PRESSURE: 72 MMHG | HEART RATE: 80 BPM | WEIGHT: 180.2 LBS | BODY MASS INDEX: 23.13 KG/M2 | TEMPERATURE: 97.9 F | HEIGHT: 74 IN | SYSTOLIC BLOOD PRESSURE: 124 MMHG

## 2022-03-29 DIAGNOSIS — E11.69 TYPE 2 DIABETES MELLITUS WITH OTHER SPECIFIED COMPLICATION, WITH LONG-TERM CURRENT USE OF INSULIN (HCC): Primary | ICD-10-CM

## 2022-03-29 DIAGNOSIS — C78.7 METASTATIC COLON CANCER TO LIVER (HCC): ICD-10-CM

## 2022-03-29 DIAGNOSIS — C18.9 METASTATIC COLON CANCER TO LIVER (HCC): ICD-10-CM

## 2022-03-29 DIAGNOSIS — E78.5 HYPERLIPIDEMIA, UNSPECIFIED HYPERLIPIDEMIA TYPE: ICD-10-CM

## 2022-03-29 DIAGNOSIS — Z79.4 TYPE 2 DIABETES MELLITUS WITH OTHER SPECIFIED COMPLICATION, WITH LONG-TERM CURRENT USE OF INSULIN (HCC): Primary | ICD-10-CM

## 2022-03-29 DIAGNOSIS — Z12.5 PROSTATE CANCER SCREENING: ICD-10-CM

## 2022-03-29 LAB
HBA1C MFR BLD: 5.2 % (ref 0–5.6)
INT CON NEG: NEGATIVE
INT CON POS: POSITIVE

## 2022-03-29 PROCEDURE — 83036 HEMOGLOBIN GLYCOSYLATED A1C: CPT | Performed by: PHYSICIAN ASSISTANT

## 2022-03-29 PROCEDURE — 99214 OFFICE O/P EST MOD 30 MIN: CPT | Performed by: PHYSICIAN ASSISTANT

## 2022-03-29 RX ORDER — ONDANSETRON 2 MG/ML
INJECTION INTRAMUSCULAR; INTRAVENOUS
COMMUNITY
Start: 2022-02-28 | End: 2022-09-05 | Stop reason: CLARIF

## 2022-03-29 RX ORDER — SODIUM CHLORIDE 9 MG/ML
INJECTION, SOLUTION INTRAVENOUS
COMMUNITY
Start: 2022-02-28 | End: 2022-09-05 | Stop reason: CLARIF

## 2022-03-29 RX ORDER — EMPAGLIFLOZIN 10 MG/1
1 TABLET, FILM COATED ORAL DAILY
Qty: 90 TABLET | Refills: 2 | Status: SHIPPED | OUTPATIENT
Start: 2022-03-29 | End: 2022-12-30 | Stop reason: SDUPTHER

## 2022-03-29 RX ORDER — FAM-TRASTUZUMAB DERUXTECAN-NXKI 100 MG/5ML
INJECTION, POWDER, LYOPHILIZED, FOR SOLUTION INTRAVENOUS
COMMUNITY
Start: 2022-02-28 | End: 2022-12-19

## 2022-03-29 RX ORDER — TESTOSTERONE CYPIONATE 200 MG/ML
INJECTION INTRAMUSCULAR
COMMUNITY
Start: 2022-02-28 | End: 2022-09-05 | Stop reason: CLARIF

## 2022-03-29 RX ORDER — DEXTROSE MONOHYDRATE 50 MG/ML
INJECTION, SOLUTION INTRAVENOUS
COMMUNITY
Start: 2022-02-28 | End: 2022-09-05 | Stop reason: CLARIF

## 2022-03-29 ASSESSMENT — FIBROSIS 4 INDEX: FIB4 SCORE: 4.04

## 2022-03-29 ASSESSMENT — PATIENT HEALTH QUESTIONNAIRE - PHQ9: CLINICAL INTERPRETATION OF PHQ2 SCORE: 0

## 2022-03-29 NOTE — PROGRESS NOTES
cc:  diabetes    Subjective:     Gurmeet Jo is a 56 y.o. male presenting for diabetes      Patient presents to the office for diabetes.  Patient states that he was seeing Haley Maurice.  He states that the office is closed.  He is needing a new endocrinologist.  He states that his oncologist tried to refill a couple of times and tried to refer but it was $300 a visit.  He oncology for metastatic colon cancer to the liver.   He states it has been over 6 months since he has been seen by endocrine.  He was switched off insulin and placed on jardiance.   He states that his diabetes was controlled the last time he was seen and sugars are usually .  He states that his a1c was 6.0  At last check.  A1c in office today is 5.2.     Review of systems:  See above.   Denies any symptoms unless previously indicated.        Current Outpatient Medications:   •  dexamethasone (DECADRON) 120 MG/30ML Solution, , Disp: , Rfl:   •  dextrose 5% 5 % Solution, , Disp: , Rfl:   •  ENHERTU 100 MG Recon Soln, , Disp: , Rfl:   •  ondansetron (ZOFRAN) 40 MG/20ML Solution injection, , Disp: , Rfl:   •  Sodium Chloride (NS) Solution, , Disp: , Rfl:   •  Empagliflozin (JARDIANCE) 10 MG Tab, Take 1 Tablet by mouth every day., Disp: 90 Tablet, Rfl: 2  •  nystatin (MYCOSTATIN) 148141 UNIT/ML Suspension, SWISH AND SWALLOW 5 MILLILITERS PO QID, Disp: 473 mL, Rfl: 2  •  levoFLOXacin (LEVAQUIN) 500 MG tablet, Take 1 Tablet by mouth every day., Disp: 7 Tablet, Rfl: 0  •  hydrOXYzine HCl (ATARAX) 50 MG Tab, TAKE 1 TABLET BY MOUTH TWICE DAILY AS NEEDED FOR ITCHING, Disp: , Rfl:   •  glucose blood (TRUE METRIX BLOOD GLUCOSE TEST) strip, TRUE METRIX BLOOD GLUCOSE TEST STRP, Disp: , Rfl:   •  POTASSIUM CHLORIDE PO, Take 20 mEq by mouth every day., Disp: , Rfl:   •  Lancets, Lancets order: Lancets for  meter. Sig: use bid and prn ssx high or low sugar. #100 RF x 3, Disp: 100 Each, Rfl: 0  •  Blood Glucose Test Strips, Test strips order: Test strips  for  meter. Sig: use bid and prn ssx high or low sugar #100 RF x 3, Disp: 100 Each, Rfl: 0  •  Blood Glucose Monitoring Suppl Device, Meter: Dispense Device of Insurance Preference. Sig. Use as directed for blood sugar monitoring. #1. NR., Disp: 1 Device, Rfl: 0  •  Insulin Pen Needle (PEN NEEDLES) 31G X 6 MM Misc, Use as directed with insulin pen, Disp: 1 Each, Rfl: 2  •  ondansetron (ZOFRAN ODT) 4 MG TABLET DISPERSIBLE, Take 4 mg by mouth every 6 hours as needed for Nausea., Disp: , Rfl:   •  lidocaine-prilocaine (EMLA) 2.5-2.5 % Cream, , Disp: , Rfl:   •  acetaminophen (TYLENOL) 500 MG Tab, Take 500-1,000 mg by mouth every 6 hours as needed., Disp: , Rfl:   •  LORazepam (ATIVAN) 1 MG Tab, Take 1 mg by mouth 1 time a day as needed (nausea)., Disp: , Rfl:   •  baclofen (LIORESAL) 10 MG Tab, Take 10 mg by mouth 3 times a day as needed., Disp: , Rfl:   •  citalopram (CELEXA) 10 MG tablet, Take 10 mg by mouth every day. (Patient not taking: No sig reported), Disp: , Rfl:   •  ondansetron (ZOFRAN ODT) 8 MG TABLET DISPERSIBLE, DISSOLVE ONE TABLET BY MOUTH EVERY 12 HOURS AS NEEDED FOR NAUSEA, Disp: , Rfl:   •  doxycycline (VIBRAMYCIN) 100 MG Tab, Take 100 mg by mouth. (Patient not taking: Reported on 3/29/2022), Disp: , Rfl:   •  insulin glargine (LANTUS SOLOSTAR) 100 UNIT/ML Solution Pen-injector injection, Inject 20 Units under the skin every evening. (Patient not taking: No sig reported), Disp: 3 Each, Rfl: 1  •  hydrOXYzine pamoate (VISTARIL) 50 MG Cap, Take 1 Cap by mouth 2 Times a Day. (Patient not taking: No sig reported), Disp: 180 Cap, Rfl: 2  •  TRUE METRIX BLOOD GLUCOSE TEST strip, USE AS DIRECTED TO CHECK BLOOD SUGAR B ID AND FOR HIGH OR SUGAR, Disp: , Rfl:   •  NOVOLIN R 100 UNIT/ML Solution, , Disp: , Rfl:   •  mupirocin (BACTROBAN) 2 % Ointment, as needed. For rash from vectibix (Patient not taking: No sig reported), Disp: , Rfl:   •  therapeutic multivitamin-minerals (THERAGRAN-M) Tab, Take 1 Tab by mouth  "every day., Disp: , Rfl:     Allergies, past medical history, past surgical history, family history, social history reviewed and updated    Objective:     Vitals: /72 (BP Location: Left arm, Patient Position: Sitting, BP Cuff Size: Adult)   Pulse 80   Temp 36.6 °C (97.9 °F) (Temporal)   Ht 1.88 m (6' 2\")   Wt 81.7 kg (180 lb 3.2 oz)   SpO2 (!) 80%   BMI 23.14 kg/m²   General: Alert, pleasant, NAD  EYES:   PERRL, EOMI, no icterus or pallor.  Conjunctivae and lids normal.   HENT:  Normocephalic.  External ears normal.  Neck supple.    Respiratory: Normal respiratory effort.   Abdomen: Not obese  Skin: Warm, dry, no rashes.  Musculoskeletal: Gait is normal.  Moves all extremities well.    Extremities: normal range of motion all extremities.   Neurological: No tremors, sensation grossly intact,  CN2-12 intact.  Psych:  Affect/mood is normal, judgement is good, memory is intact, grooming is appropriate.    Assessment/Plan:     Gurmeet was seen today for diabetes.    Diagnoses and all orders for this visit:    Type 2 diabetes mellitus with other specified complication, with long-term current use of insulin (HCC)  -     Lipid Profile; Future  -     MICROALBUMIN CREAT RATIO URINE; Future  -     HEMOGLOBIN A1C; Future  -     TSH; Future  -     FREE THYROXINE; Future  -     Empagliflozin (JARDIANCE) 10 MG Tab; Take 1 Tablet by mouth every day.  Hyperlipidemia, unspecified hyperlipidemia type  -     Lipid Profile; Future  -     TSH; Future  -     FREE THYROXINE; Future    Overall patient is controlled.  I do not think that we need to refer to endocrinology at this time but will do so if patient experiences worsening symptoms or labs are uncontrolled.  In the meantime we will refill his Jardiance and we will follow-up in 4 weeks with labs.    Metastatic colon cancer to liver (HCC)  -     nystatin (MYCOSTATIN) 584152 UNIT/ML Suspension; SWISH AND SWALLOW 5 MILLILITERS PO QID    Medication filled.    Prostate cancer " screening  -     PROSTATE SPECIFIC AG SCREENING; Future      Labs have been ordered to evaluate further.      Return in about 4 weeks (around 4/26/2022), or if symptoms worsen or fail to improve.    Please note that this dictation was created using voice recognition software. I have made every reasonable attempt to correct obvious errors, but expect that there are errors of grammar and possible content that I did not discover before finalizing note.

## 2022-04-03 ENCOUNTER — HOSPITAL ENCOUNTER (OUTPATIENT)
Dept: RADIOLOGY | Facility: MEDICAL CENTER | Age: 57
End: 2022-04-03
Attending: INTERNAL MEDICINE
Payer: MEDICARE

## 2022-04-06 RX ORDER — PROCHLORPERAZINE MALEATE 10 MG
10 TABLET ORAL EVERY 6 HOURS PRN
Status: CANCELLED | OUTPATIENT
Start: 2022-04-11

## 2022-04-06 RX ORDER — EPINEPHRINE 1 MG/ML(1)
0.5 AMPUL (ML) INJECTION PRN
Status: CANCELLED | OUTPATIENT
Start: 2022-04-11

## 2022-04-06 RX ORDER — DIPHENHYDRAMINE HYDROCHLORIDE 50 MG/ML
50 INJECTION INTRAMUSCULAR; INTRAVENOUS PRN
Status: CANCELLED | OUTPATIENT
Start: 2022-04-11

## 2022-04-06 RX ORDER — 0.9 % SODIUM CHLORIDE 0.9 %
3 VIAL (ML) INJECTION PRN
Status: CANCELLED | OUTPATIENT
Start: 2022-04-11

## 2022-04-06 RX ORDER — ONDANSETRON 8 MG/1
8 TABLET, ORALLY DISINTEGRATING ORAL PRN
Status: CANCELLED | OUTPATIENT
Start: 2022-04-11

## 2022-04-06 RX ORDER — 0.9 % SODIUM CHLORIDE 0.9 %
VIAL (ML) INJECTION PRN
Status: CANCELLED | OUTPATIENT
Start: 2022-04-10

## 2022-04-06 RX ORDER — METHYLPREDNISOLONE SODIUM SUCCINATE 125 MG/2ML
125 INJECTION, POWDER, LYOPHILIZED, FOR SOLUTION INTRAMUSCULAR; INTRAVENOUS PRN
Status: CANCELLED | OUTPATIENT
Start: 2022-04-11

## 2022-04-06 RX ORDER — 0.9 % SODIUM CHLORIDE 0.9 %
10 VIAL (ML) INJECTION PRN
Status: CANCELLED | OUTPATIENT
Start: 2022-04-10

## 2022-04-06 RX ORDER — 0.9 % SODIUM CHLORIDE 0.9 %
3 VIAL (ML) INJECTION PRN
Status: CANCELLED | OUTPATIENT
Start: 2022-04-10

## 2022-04-06 RX ORDER — 0.9 % SODIUM CHLORIDE 0.9 %
10 VIAL (ML) INJECTION PRN
Status: CANCELLED | OUTPATIENT
Start: 2022-04-11

## 2022-04-06 RX ORDER — DEXTROSE MONOHYDRATE 50 MG/ML
INJECTION, SOLUTION INTRAVENOUS CONTINUOUS
Status: CANCELLED | OUTPATIENT
Start: 2022-04-11

## 2022-04-06 RX ORDER — HEPARIN SODIUM (PORCINE) LOCK FLUSH IV SOLN 100 UNIT/ML 100 UNIT/ML
500 SOLUTION INTRAVENOUS PRN
Status: CANCELLED | OUTPATIENT
Start: 2022-04-11

## 2022-04-06 RX ORDER — HEPARIN SODIUM (PORCINE) LOCK FLUSH IV SOLN 100 UNIT/ML 100 UNIT/ML
500 SOLUTION INTRAVENOUS PRN
Status: CANCELLED | OUTPATIENT
Start: 2022-04-10

## 2022-04-06 RX ORDER — ONDANSETRON 2 MG/ML
4 INJECTION INTRAMUSCULAR; INTRAVENOUS PRN
Status: CANCELLED | OUTPATIENT
Start: 2022-04-11

## 2022-04-06 RX ORDER — 0.9 % SODIUM CHLORIDE 0.9 %
VIAL (ML) INJECTION PRN
Status: CANCELLED | OUTPATIENT
Start: 2022-04-11

## 2022-04-11 ENCOUNTER — OUTPATIENT INFUSION SERVICES (OUTPATIENT)
Dept: ONCOLOGY | Facility: MEDICAL CENTER | Age: 57
End: 2022-04-11
Attending: INTERNAL MEDICINE
Payer: MEDICARE

## 2022-04-11 VITALS
SYSTOLIC BLOOD PRESSURE: 110 MMHG | TEMPERATURE: 97 F | OXYGEN SATURATION: 98 % | HEIGHT: 73 IN | WEIGHT: 188.49 LBS | RESPIRATION RATE: 18 BRPM | DIASTOLIC BLOOD PRESSURE: 90 MMHG | BODY MASS INDEX: 24.98 KG/M2 | HEART RATE: 60 BPM

## 2022-04-11 DIAGNOSIS — C18.9 METASTATIC COLON CANCER TO LIVER (HCC): ICD-10-CM

## 2022-04-11 DIAGNOSIS — C78.7 METASTATIC COLON CANCER TO LIVER (HCC): ICD-10-CM

## 2022-04-11 LAB
ALBUMIN SERPL BCP-MCNC: 3.8 G/DL (ref 3.2–4.9)
ALBUMIN/GLOB SERPL: 1.9 G/DL
ALP SERPL-CCNC: 177 U/L (ref 30–99)
ALT SERPL-CCNC: 29 U/L (ref 2–50)
ANION GAP SERPL CALC-SCNC: 9 MMOL/L (ref 7–16)
ANISOCYTOSIS BLD QL SMEAR: ABNORMAL
AST SERPL-CCNC: 43 U/L (ref 12–45)
BASOPHILS # BLD AUTO: 0.5 % (ref 0–1.8)
BASOPHILS # BLD: 0.01 K/UL (ref 0–0.12)
BILIRUB SERPL-MCNC: 0.9 MG/DL (ref 0.1–1.5)
BUN SERPL-MCNC: 7 MG/DL (ref 8–22)
CALCIUM SERPL-MCNC: 8.8 MG/DL (ref 8.5–10.5)
CEA SERPL-MCNC: 8.6 NG/ML (ref 0–3)
CHLORIDE SERPL-SCNC: 112 MMOL/L (ref 96–112)
CO2 SERPL-SCNC: 25 MMOL/L (ref 20–33)
COMMENT 1642: NORMAL
CREAT SERPL-MCNC: 0.5 MG/DL (ref 0.5–1.4)
EOSINOPHIL # BLD AUTO: 0.05 K/UL (ref 0–0.51)
EOSINOPHIL NFR BLD: 2.5 % (ref 0–6.9)
ERYTHROCYTE [DISTWIDTH] IN BLOOD BY AUTOMATED COUNT: 67.6 FL (ref 35.9–50)
GFR SERPLBLD CREATININE-BSD FMLA CKD-EPI: 118 ML/MIN/1.73 M 2
GLOBULIN SER CALC-MCNC: 2 G/DL (ref 1.9–3.5)
GLUCOSE SERPL-MCNC: 121 MG/DL (ref 65–99)
HCT VFR BLD AUTO: 31.5 % (ref 42–52)
HGB BLD-MCNC: 10.6 G/DL (ref 14–18)
IMM GRANULOCYTES # BLD AUTO: 0 K/UL (ref 0–0.11)
IMM GRANULOCYTES NFR BLD AUTO: 0 % (ref 0–0.9)
LYMPHOCYTES # BLD AUTO: 0.41 K/UL (ref 1–4.8)
LYMPHOCYTES NFR BLD: 20.7 % (ref 22–41)
MACROCYTES BLD QL SMEAR: ABNORMAL
MCH RBC QN AUTO: 34.5 PG (ref 27–33)
MCHC RBC AUTO-ENTMCNC: 33.7 G/DL (ref 33.7–35.3)
MCV RBC AUTO: 102.6 FL (ref 81.4–97.8)
MICROCYTES BLD QL SMEAR: ABNORMAL
MONOCYTES # BLD AUTO: 0.24 K/UL (ref 0–0.85)
MONOCYTES NFR BLD AUTO: 12.1 % (ref 0–13.4)
MORPHOLOGY BLD-IMP: NORMAL
NEUTROPHILS # BLD AUTO: 1.27 K/UL (ref 1.82–7.42)
NEUTROPHILS NFR BLD: 64.2 % (ref 44–72)
NRBC # BLD AUTO: 0 K/UL
NRBC BLD-RTO: 0 /100 WBC
OVALOCYTES BLD QL SMEAR: NORMAL
PLATELET # BLD AUTO: 88 K/UL (ref 164–446)
PLATELET BLD QL SMEAR: NORMAL
PMV BLD AUTO: 11.8 FL (ref 9–12.9)
POIKILOCYTOSIS BLD QL SMEAR: NORMAL
POTASSIUM SERPL-SCNC: 3.9 MMOL/L (ref 3.6–5.5)
PROT SERPL-MCNC: 5.8 G/DL (ref 6–8.2)
RBC # BLD AUTO: 3.07 M/UL (ref 4.7–6.1)
RBC BLD AUTO: PRESENT
SODIUM SERPL-SCNC: 146 MMOL/L (ref 135–145)
WBC # BLD AUTO: 2 K/UL (ref 4.8–10.8)

## 2022-04-11 PROCEDURE — 96375 TX/PRO/DX INJ NEW DRUG ADDON: CPT

## 2022-04-11 PROCEDURE — 700111 HCHG RX REV CODE 636 W/ 250 OVERRIDE (IP): Mod: JW,TB | Performed by: INTERNAL MEDICINE

## 2022-04-11 PROCEDURE — 82378 CARCINOEMBRYONIC ANTIGEN: CPT

## 2022-04-11 PROCEDURE — 96413 CHEMO IV INFUSION 1 HR: CPT

## 2022-04-11 PROCEDURE — 700105 HCHG RX REV CODE 258: Performed by: INTERNAL MEDICINE

## 2022-04-11 PROCEDURE — 85025 COMPLETE CBC W/AUTO DIFF WBC: CPT

## 2022-04-11 PROCEDURE — 700101 HCHG RX REV CODE 250: Performed by: INTERNAL MEDICINE

## 2022-04-11 PROCEDURE — A4212 NON CORING NEEDLE OR STYLET: HCPCS

## 2022-04-11 PROCEDURE — 80053 COMPREHEN METABOLIC PANEL: CPT

## 2022-04-11 RX ORDER — HEPARIN SODIUM (PORCINE) LOCK FLUSH IV SOLN 100 UNIT/ML 100 UNIT/ML
500 SOLUTION INTRAVENOUS PRN
Status: DISCONTINUED | OUTPATIENT
Start: 2022-04-11 | End: 2022-04-11 | Stop reason: HOSPADM

## 2022-04-11 RX ADMIN — Medication 500 UNITS: at 16:56

## 2022-04-11 RX ADMIN — LIDOCAINE HYDROCHLORIDE 0.5 ML: 10 INJECTION, SOLUTION EPIDURAL; INFILTRATION; INTRACAUDAL; PERINEURAL at 13:38

## 2022-04-11 RX ADMIN — DEXAMETHASONE SODIUM PHOSPHATE 12 MG: 4 INJECTION, SOLUTION INTRA-ARTICULAR; INTRALESIONAL; INTRAMUSCULAR; INTRAVENOUS; SOFT TISSUE at 15:30

## 2022-04-11 RX ADMIN — ONDANSETRON 16 MG: 2 INJECTION INTRAMUSCULAR; INTRAVENOUS at 15:45

## 2022-04-11 RX ADMIN — FAM-TRASTUZUMAB DERUXTECAN-NXKI 547.2 MG: 100 INJECTION, POWDER, LYOPHILIZED, FOR SOLUTION INTRAVENOUS at 16:08

## 2022-04-11 ASSESSMENT — FIBROSIS 4 INDEX: FIB4 SCORE: 4.04

## 2022-04-11 NOTE — PROGRESS NOTES
Chemotherapy Verification - PRIMARY RN      Height = 1.85 m  Weight = 85.5 kg  BSA = 2.1 m^2       Medication: fam-tastuzumab deruxtecan  Dose: 6.4 mg/kg  Calculated Dose: 547.2 mg                             (In mg/m2, AUC, mg/kg)       I confirm this process was performed independently with the BSA and all final chemotherapy dosing calculations congruent.  Any discrepancies of 10% or greater have been addressed with the chemotherapy pharmacist. The resolution of the discrepancy has been documented in the EPIC progress notes.

## 2022-04-11 NOTE — PROGRESS NOTES
Pt presents to [facility] for fam-trastuzumab deruxtecan infusion. R chest port accessed using sterile technique; brisk blood return observed and pt tolerated well. Labs drawn and not within treatable parameters. This RN SAM Carbajal and orders received to proceed with treatment today. Pre meds administered and fam-trastuzumab deruxtecan infusing with no s/s of adverse reactions thus far. Report given to LINDSAY Simpson; all questions answered at this time.

## 2022-04-11 NOTE — PROGRESS NOTES
Chemotherapy Verification - SECONDARY RN       Height = 195 cm  Weight = 85.5 kg  BSA = 2.1 m2       Medication: Enhertu  Dose: 6.4 mg/kg  Calculated Dose: 547.2 kg                             (In mg/m2, AUC, mg/kg)     I confirm that this process was performed independently.

## 2022-04-11 NOTE — PROGRESS NOTES
"Pharmacy Chemotherapy Verification  Patient name: Gurmeet Jo  Dx: mCRC    Cycle 12  Previous treatment: 3/21/22    Regimen: Fam-trastuzumab deruxtecan  Fam-trastuzumab deruxtecan 6.4 mg/kg IV over 90 minutes Day 1   Q21-day cycle until progression or toxicity  NCCN guidelines for Colon Cancer v3.2021  (cetuximab + chemo) Christine ARGUELLO et al, OBEY 2017;317(23):5381-4980  (HER2 tx in HER2+ CRC) Kayleen RINALDI et al. Lancet Oncol. 2019 Apr;20(4):518-530.    Allergies: Patient has no known allergies.  /90   Pulse 60   Temp 36.1 °C (97 °F) (Temporal)   Resp 18   Ht 1.85 m (6' 0.84\")   Wt 85.5 kg (188 lb 7.9 oz)   SpO2 98%   BMI 24.98 kg/m²  Body surface area is 2.1 meters squared.     ECHO (OK for 6 months per Cody LEO order)  2/21/22 LVEF ~65%  9/28/21 LVEF ~70%  7/13/21 LVEF ~65%  4/21/21 LVEF ~60%    Labs 4/11/22:  ANC~ 1270 Plt = 88k   Hgb = 10.6     SCr = 0.5 mg/dL CrCl ~ 141 mL/min   AST/ALT/ALK= 43/29/177 TBili = 0.9     **MD aware of current labs, okay to proceed with treatment today*    Fam-trastuzumab deruxtecan (Enhertu) 6.4 mg/kg x 85.5 kg = 547.2 mg   <10% difference, ok to treat with final dose = 547.2 mg IV      José Marvin, PharmD        "

## 2022-04-12 NOTE — PROGRESS NOTES
This nurse assumed care of pt during Enhertu infusion.  Enhertu completed without adverse reaction.  Port flushed with NS and heparin locked.  Adkins needle removed intact.  Site covered with gauze/tape.  Gave pt a copy of schedule.  Pt dc home to self care.

## 2022-04-27 RX ORDER — 0.9 % SODIUM CHLORIDE 0.9 %
3 VIAL (ML) INJECTION PRN
Status: CANCELLED | OUTPATIENT
Start: 2022-05-02

## 2022-04-27 RX ORDER — HEPARIN SODIUM (PORCINE) LOCK FLUSH IV SOLN 100 UNIT/ML 100 UNIT/ML
500 SOLUTION INTRAVENOUS PRN
Status: CANCELLED | OUTPATIENT
Start: 2022-05-01

## 2022-04-27 RX ORDER — EPINEPHRINE 1 MG/ML(1)
0.5 AMPUL (ML) INJECTION PRN
Status: CANCELLED | OUTPATIENT
Start: 2022-05-02

## 2022-04-27 RX ORDER — HEPARIN SODIUM (PORCINE) LOCK FLUSH IV SOLN 100 UNIT/ML 100 UNIT/ML
500 SOLUTION INTRAVENOUS PRN
Status: CANCELLED | OUTPATIENT
Start: 2022-05-02

## 2022-04-27 RX ORDER — 0.9 % SODIUM CHLORIDE 0.9 %
VIAL (ML) INJECTION PRN
Status: CANCELLED | OUTPATIENT
Start: 2022-05-02

## 2022-04-27 RX ORDER — PROCHLORPERAZINE MALEATE 10 MG
10 TABLET ORAL EVERY 6 HOURS PRN
Status: CANCELLED | OUTPATIENT
Start: 2022-05-02

## 2022-04-27 RX ORDER — 0.9 % SODIUM CHLORIDE 0.9 %
10 VIAL (ML) INJECTION PRN
Status: CANCELLED | OUTPATIENT
Start: 2022-05-01

## 2022-04-27 RX ORDER — DEXTROSE MONOHYDRATE 50 MG/ML
INJECTION, SOLUTION INTRAVENOUS CONTINUOUS
Status: CANCELLED | OUTPATIENT
Start: 2022-05-02

## 2022-04-27 RX ORDER — ONDANSETRON 2 MG/ML
4 INJECTION INTRAMUSCULAR; INTRAVENOUS PRN
Status: CANCELLED | OUTPATIENT
Start: 2022-05-02

## 2022-04-27 RX ORDER — DIPHENHYDRAMINE HYDROCHLORIDE 50 MG/ML
50 INJECTION INTRAMUSCULAR; INTRAVENOUS PRN
Status: CANCELLED | OUTPATIENT
Start: 2022-05-02

## 2022-04-27 RX ORDER — METHYLPREDNISOLONE SODIUM SUCCINATE 125 MG/2ML
125 INJECTION, POWDER, LYOPHILIZED, FOR SOLUTION INTRAMUSCULAR; INTRAVENOUS PRN
Status: CANCELLED | OUTPATIENT
Start: 2022-05-02

## 2022-04-27 RX ORDER — ONDANSETRON 8 MG/1
8 TABLET, ORALLY DISINTEGRATING ORAL PRN
Status: CANCELLED | OUTPATIENT
Start: 2022-05-02

## 2022-04-27 RX ORDER — 0.9 % SODIUM CHLORIDE 0.9 %
3 VIAL (ML) INJECTION PRN
Status: CANCELLED | OUTPATIENT
Start: 2022-05-01

## 2022-04-27 RX ORDER — 0.9 % SODIUM CHLORIDE 0.9 %
10 VIAL (ML) INJECTION PRN
Status: CANCELLED | OUTPATIENT
Start: 2022-05-02

## 2022-04-27 RX ORDER — 0.9 % SODIUM CHLORIDE 0.9 %
VIAL (ML) INJECTION PRN
Status: CANCELLED | OUTPATIENT
Start: 2022-05-01

## 2022-04-28 ENCOUNTER — OFFICE VISIT (OUTPATIENT)
Dept: MEDICAL GROUP | Facility: CLINIC | Age: 57
End: 2022-04-28
Payer: MEDICARE

## 2022-04-28 ENCOUNTER — HOSPITAL ENCOUNTER (OUTPATIENT)
Dept: LAB | Facility: MEDICAL CENTER | Age: 57
End: 2022-04-28
Attending: INTERNAL MEDICINE
Payer: MEDICARE

## 2022-04-28 VITALS
SYSTOLIC BLOOD PRESSURE: 100 MMHG | RESPIRATION RATE: 16 BRPM | OXYGEN SATURATION: 99 % | HEART RATE: 74 BPM | HEIGHT: 74 IN | DIASTOLIC BLOOD PRESSURE: 60 MMHG | TEMPERATURE: 97.2 F | BODY MASS INDEX: 23.66 KG/M2 | WEIGHT: 184.4 LBS

## 2022-04-28 DIAGNOSIS — Z79.4 TYPE 2 DIABETES MELLITUS WITH OTHER SPECIFIED COMPLICATION, WITH LONG-TERM CURRENT USE OF INSULIN (HCC): ICD-10-CM

## 2022-04-28 DIAGNOSIS — E78.5 HYPERLIPIDEMIA, UNSPECIFIED HYPERLIPIDEMIA TYPE: ICD-10-CM

## 2022-04-28 DIAGNOSIS — C18.9 METASTATIC COLON CANCER TO LIVER (HCC): ICD-10-CM

## 2022-04-28 DIAGNOSIS — E11.69 TYPE 2 DIABETES MELLITUS WITH OTHER SPECIFIED COMPLICATION, WITH LONG-TERM CURRENT USE OF INSULIN (HCC): ICD-10-CM

## 2022-04-28 DIAGNOSIS — C78.7 METASTATIC COLON CANCER TO LIVER (HCC): ICD-10-CM

## 2022-04-28 DIAGNOSIS — D50.8 OTHER IRON DEFICIENCY ANEMIA: ICD-10-CM

## 2022-04-28 PROBLEM — D50.9 IRON DEFICIENCY ANEMIA: Status: ACTIVE | Noted: 2022-04-28

## 2022-04-28 LAB
ALBUMIN SERPL BCP-MCNC: 3.8 G/DL (ref 3.2–4.9)
ALBUMIN/GLOB SERPL: 2.1 G/DL
ALP SERPL-CCNC: 204 U/L (ref 30–99)
ALT SERPL-CCNC: 34 U/L (ref 2–50)
ANION GAP SERPL CALC-SCNC: 9 MMOL/L (ref 7–16)
AST SERPL-CCNC: 42 U/L (ref 12–45)
BILIRUB SERPL-MCNC: 1.2 MG/DL (ref 0.1–1.5)
BUN SERPL-MCNC: 8 MG/DL (ref 8–22)
CALCIUM SERPL-MCNC: 9.2 MG/DL (ref 8.5–10.5)
CEA SERPL-MCNC: 10.8 NG/ML (ref 0–3)
CHLORIDE SERPL-SCNC: 105 MMOL/L (ref 96–112)
CO2 SERPL-SCNC: 25 MMOL/L (ref 20–33)
CREAT SERPL-MCNC: 0.57 MG/DL (ref 0.5–1.4)
GFR SERPLBLD CREATININE-BSD FMLA CKD-EPI: 114 ML/MIN/1.73 M 2
GLOBULIN SER CALC-MCNC: 1.8 G/DL (ref 1.9–3.5)
GLUCOSE SERPL-MCNC: 126 MG/DL (ref 65–99)
MAGNESIUM SERPL-MCNC: 2.1 MG/DL (ref 1.5–2.5)
POTASSIUM SERPL-SCNC: 3.6 MMOL/L (ref 3.6–5.5)
PROT SERPL-MCNC: 5.6 G/DL (ref 6–8.2)
SODIUM SERPL-SCNC: 139 MMOL/L (ref 135–145)

## 2022-04-28 PROCEDURE — 99214 OFFICE O/P EST MOD 30 MIN: CPT | Performed by: PHYSICIAN ASSISTANT

## 2022-04-28 PROCEDURE — 36415 COLL VENOUS BLD VENIPUNCTURE: CPT

## 2022-04-28 PROCEDURE — 82378 CARCINOEMBRYONIC ANTIGEN: CPT

## 2022-04-28 PROCEDURE — 80053 COMPREHEN METABOLIC PANEL: CPT

## 2022-04-28 PROCEDURE — 85025 COMPLETE CBC W/AUTO DIFF WBC: CPT

## 2022-04-28 PROCEDURE — 83735 ASSAY OF MAGNESIUM: CPT

## 2022-04-28 ASSESSMENT — FIBROSIS 4 INDEX: FIB4 SCORE: 5.17

## 2022-04-28 NOTE — PROGRESS NOTES
cc:  diabetes    Subjective:     Gurmeet Jo is a 56 y.o. male presenting for diabetes      Patient presents to the office for diabetes. Overall patient is doing well.   He was switched off of insulin to Jardiance and we are following up to make sure patient is doing well.  He is doing well.  He is not having any problems with the medications.  He does have an appointment to meet with oncology today for colon cancer.  He is due for routine vaccines.  He denies any symptoms or complaints at this time.  He does experience some nausea with his colon cancer treatment.  He states that he does have medication for his nausea.    Review of systems:  See above.   Denies any symptoms unless previously indicated.        Current Outpatient Medications:   •  ENHERTU 100 MG Recon Soln, , Disp: , Rfl:   •  ondansetron (ZOFRAN) 40 MG/20ML Solution injection, , Disp: , Rfl:   •  Sodium Chloride (NS) Solution, , Disp: , Rfl:   •  Empagliflozin (JARDIANCE) 10 MG Tab, Take 1 Tablet by mouth every day., Disp: 90 Tablet, Rfl: 2  •  hydrOXYzine HCl (ATARAX) 50 MG Tab, TAKE 1 TABLET BY MOUTH TWICE DAILY AS NEEDED FOR ITCHING, Disp: , Rfl:   •  glucose blood (TRUE METRIX BLOOD GLUCOSE TEST) strip, TRUE METRIX BLOOD GLUCOSE TEST STRP, Disp: , Rfl:   •  ondansetron (ZOFRAN ODT) 8 MG TABLET DISPERSIBLE, DISSOLVE ONE TABLET BY MOUTH EVERY 12 HOURS AS NEEDED FOR NAUSEA, Disp: , Rfl:   •  TRUE METRIX BLOOD GLUCOSE TEST strip, USE AS DIRECTED TO CHECK BLOOD SUGAR B ID AND FOR HIGH OR SUGAR, Disp: , Rfl:   •  POTASSIUM CHLORIDE PO, Take 20 mEq by mouth every day., Disp: , Rfl:   •  Lancets, Lancets order: Lancets for  meter. Sig: use bid and prn ssx high or low sugar. #100 RF x 3, Disp: 100 Each, Rfl: 0  •  Blood Glucose Test Strips, Test strips order: Test strips for  meter. Sig: use bid and prn ssx high or low sugar #100 RF x 3, Disp: 100 Each, Rfl: 0  •  Blood Glucose Monitoring Suppl Device, Meter: Dispense Device of Insurance Preference.  "Sig. Use as directed for blood sugar monitoring. #1. NR., Disp: 1 Device, Rfl: 0  •  ondansetron (ZOFRAN ODT) 4 MG TABLET DISPERSIBLE, Take 4 mg by mouth every 6 hours as needed for Nausea., Disp: , Rfl:   •  LORazepam (ATIVAN) 1 MG Tab, Take 1 mg by mouth 1 time a day as needed (nausea)., Disp: , Rfl:   •  baclofen (LIORESAL) 10 MG Tab, Take 10 mg by mouth 3 times a day as needed., Disp: , Rfl:   •  therapeutic multivitamin-minerals (THERAGRAN-M) Tab, Take 1 Tab by mouth every day., Disp: , Rfl:   •  dexamethasone (DECADRON) 120 MG/30ML Solution, , Disp: , Rfl:   •  dextrose 5% 5 % Solution, , Disp: , Rfl:   •  nystatin (MYCOSTATIN) 281851 UNIT/ML Suspension, SWISH AND SWALLOW 5 MILLILITERS PO QID (Patient not taking: Reported on 4/28/2022), Disp: 473 mL, Rfl: 2  •  mupirocin (BACTROBAN) 2 % Ointment, as needed. For rash from vectibix (Patient not taking: No sig reported), Disp: , Rfl:   •  lidocaine-prilocaine (EMLA) 2.5-2.5 % Cream, , Disp: , Rfl:   •  acetaminophen (TYLENOL) 500 MG Tab, Take 500-1,000 mg by mouth every 6 hours as needed. (Patient not taking: Reported on 4/28/2022), Disp: , Rfl:     Allergies, past medical history, past surgical history, family history, social history reviewed and updated    Objective:     Vitals: /60 (BP Location: Left arm, Patient Position: Sitting, BP Cuff Size: Large adult)   Pulse 74   Temp 36.2 °C (97.2 °F) (Temporal)   Resp 16   Ht 1.88 m (6' 2\")   Wt 83.6 kg (184 lb 6.4 oz)   SpO2 99%   BMI 23.68 kg/m²   General: Alert, pleasant, NAD  EYES:   PERRL, EOMI, no icterus or pallor.  Conjunctivae and lids normal.   HENT:  Normocephalic.  External ears normal.  Neck supple.  Respiratory: Normal respiratory effort.   Abdomen: Not obese  Skin: Warm, dry, no rashes.  Musculoskeletal: Gait is normal.  Moves all extremities well.    Extremities: normal range of motion all extremities.   Neurological: No tremors, sensation grossly intact,  CN2-12 intact.  Psych:  " Affect/mood is normal, judgement is good, memory is intact, grooming is appropriate.    Assessment/Plan:     Gurmeet was seen today for diabetes follow-up.    Diagnoses and all orders for this visit:    Type 2 diabetes mellitus with other specified complication, with long-term current use of insulin (HCC)  -     Lipid Profile; Future  -     Comp Metabolic Panel; Future  -     HEMOGLOBIN A1C; Future    Hyperlipidemia, unspecified hyperlipidemia type  -     Lipid Profile; Future  -     Comp Metabolic Panel; Future  -     HEMOGLOBIN A1C; Future    Metastatic colon cancer to liver (HCC)    Other iron deficiency anemia      All the labs do show that patient is anemic most likely due to colon cancer, overall his labs show that diabetes is controlled.  We will go ahead and repeat labs in 4 to 6 months he will contact us sooner with any issues.  And we will refill medications as long as patient is up-to-date with lab work.  He will contact us sooner with any issues or concerns.  Do not believe patient needs an endocrinologist at this time.  We will continue to monitor labs.      Return in about 6 months (around 10/28/2022), or if symptoms worsen or fail to improve, for 4- 6 months.    Please note that this dictation was created using voice recognition software. I have made every reasonable attempt to correct obvious errors, but expect that there are errors of grammar and possible content that I did not discover before finalizing note.

## 2022-04-29 LAB
BASOPHILS # BLD AUTO: 1.5 % (ref 0–1.8)
BASOPHILS # BLD: 0.03 K/UL (ref 0–0.12)
EOSINOPHIL # BLD AUTO: 0.04 K/UL (ref 0–0.51)
EOSINOPHIL NFR BLD: 1.9 % (ref 0–6.9)
ERYTHROCYTE [DISTWIDTH] IN BLOOD BY AUTOMATED COUNT: 62.4 FL (ref 35.9–50)
HCT VFR BLD AUTO: 32.2 % (ref 42–52)
HGB BLD-MCNC: 10.7 G/DL (ref 14–18)
IMM GRANULOCYTES # BLD AUTO: 0.01 K/UL (ref 0–0.11)
IMM GRANULOCYTES NFR BLD AUTO: 0.5 % (ref 0–0.9)
LYMPHOCYTES # BLD AUTO: 0.49 K/UL (ref 1–4.8)
LYMPHOCYTES NFR BLD: 23.8 % (ref 22–41)
MCH RBC QN AUTO: 34.1 PG (ref 27–33)
MCHC RBC AUTO-ENTMCNC: 33.2 G/DL (ref 33.7–35.3)
MCV RBC AUTO: 102.5 FL (ref 81.4–97.8)
MONOCYTES # BLD AUTO: 0.26 K/UL (ref 0–0.85)
MONOCYTES NFR BLD AUTO: 12.6 % (ref 0–13.4)
NEUTROPHILS # BLD AUTO: 1.23 K/UL (ref 1.82–7.42)
NEUTROPHILS NFR BLD: 59.7 % (ref 44–72)
NRBC # BLD AUTO: 0 K/UL
NRBC BLD-RTO: 0 /100 WBC
PLATELET # BLD AUTO: 98 K/UL (ref 164–446)
PMV BLD AUTO: 12.4 FL (ref 9–12.9)
RBC # BLD AUTO: 3.14 M/UL (ref 4.7–6.1)
WBC # BLD AUTO: 2.1 K/UL (ref 4.8–10.8)

## 2022-05-01 NOTE — PROGRESS NOTES
"Pharmacy Chemotherapy Verification  Patient name: Gurmeet Jo  Dx: mCRC  Regimen: Fam-trastuzumab deruxtecan    Fam-trastuzumab deruxtecan 6.4 mg/kg IV over 90 minutes Day 1   Q21-day cycle until progression or toxicity  NCCN guidelines for Colon Cancer v3.2021  (cetuximab + chemo) Christine Vu al, OBEY 2017;317(23):4848-8760  (HER2 tx in HER2+ CRC) Kayleen RINALDI et al. Lancet Oncol. 2019 Apr;20(4):518-530.    Allergies: Patient has no known allergies.  /68   Pulse 77   Temp 36.7 °C (98 °F) (Temporal)   Resp 18   Ht 1.85 m (6' 0.84\")   Wt 83.2 kg (183 lb 6.8 oz)   SpO2 98%   BMI 24.31 kg/m²  Body surface area is 2.07 meters squared.     Labs 4/28/22:  OK to treat with PLTs of 98k  All other lab values meet treatment parameters    Path:  KRAS/NRAS wild type  ERBB2 (HER2) amplification    ECHO (OK for 6 months per Cody LEO order)  2/21/22 TTE LVEF ~65%  9/28/21 LVEF ~70%  7/13/21 LVEF ~65 %  4/21/21 LVEF ~60 %      Drug Order   (Drug name, dose, route, IV Fluid & volume, frequency, number of doses) Cycle 13   Previous treatment: 4/11/22       Medication = Fam-trastuzumab deruxtecan (Enhertu)  Base Dose = 6.4 mg/kg  Calc Dose: Base Dose x 83.2 kg = 532.48 mg  Final Dose = 532.5 mg  Route = IV  Fluid & Volume = D5W 100 mL  Admin Duration = Over 30  minutes     Okay to treat with final dose       "

## 2022-05-02 ENCOUNTER — OUTPATIENT INFUSION SERVICES (OUTPATIENT)
Dept: ONCOLOGY | Facility: MEDICAL CENTER | Age: 57
End: 2022-05-02
Attending: INTERNAL MEDICINE
Payer: MEDICARE

## 2022-05-02 VITALS
BODY MASS INDEX: 24.31 KG/M2 | OXYGEN SATURATION: 98 % | SYSTOLIC BLOOD PRESSURE: 131 MMHG | DIASTOLIC BLOOD PRESSURE: 68 MMHG | HEIGHT: 73 IN | TEMPERATURE: 98 F | WEIGHT: 183.42 LBS | HEART RATE: 77 BPM | RESPIRATION RATE: 18 BRPM

## 2022-05-02 DIAGNOSIS — C78.7 METASTATIC COLON CANCER TO LIVER (HCC): ICD-10-CM

## 2022-05-02 DIAGNOSIS — C18.9 METASTATIC COLON CANCER TO LIVER (HCC): ICD-10-CM

## 2022-05-02 PROCEDURE — 96413 CHEMO IV INFUSION 1 HR: CPT

## 2022-05-02 PROCEDURE — 96375 TX/PRO/DX INJ NEW DRUG ADDON: CPT

## 2022-05-02 PROCEDURE — 96367 TX/PROPH/DG ADDL SEQ IV INF: CPT

## 2022-05-02 PROCEDURE — 700105 HCHG RX REV CODE 258: Performed by: INTERNAL MEDICINE

## 2022-05-02 PROCEDURE — A4212 NON CORING NEEDLE OR STYLET: HCPCS

## 2022-05-02 PROCEDURE — 304540 HCHG NITRO SET VENT 2ND TUB

## 2022-05-02 PROCEDURE — 700111 HCHG RX REV CODE 636 W/ 250 OVERRIDE (IP): Mod: JW,TB | Performed by: INTERNAL MEDICINE

## 2022-05-02 RX ORDER — HEPARIN SODIUM (PORCINE) LOCK FLUSH IV SOLN 100 UNIT/ML 100 UNIT/ML
500 SOLUTION INTRAVENOUS PRN
Status: DISCONTINUED | OUTPATIENT
Start: 2022-05-02 | End: 2022-05-02 | Stop reason: HOSPADM

## 2022-05-02 RX ADMIN — HEPARIN 500 UNITS: 100 SYRINGE at 10:45

## 2022-05-02 RX ADMIN — ONDANSETRON 16 MG: 2 INJECTION INTRAMUSCULAR; INTRAVENOUS at 09:05

## 2022-05-02 RX ADMIN — DEXAMETHASONE SODIUM PHOSPHATE 12 MG: 4 INJECTION, SOLUTION INTRAMUSCULAR; INTRAVENOUS at 08:52

## 2022-05-02 RX ADMIN — FAM-TRASTUZUMAB DERUXTECAN-NXKI 532.5 MG: 100 INJECTION, POWDER, LYOPHILIZED, FOR SOLUTION INTRAVENOUS at 09:55

## 2022-05-02 ASSESSMENT — FIBROSIS 4 INDEX: FIB4 SCORE: 4.26

## 2022-05-02 NOTE — PROGRESS NOTES
Chemotherapy Verification - SECONDARY RN  C13 D1      Height = 1.85 m  Weight = 83.2 kg  BSA = 2.07 m2       Medication: fam-trastuzumab deruxtecan  Dose: 6.4 mg/kg  Calculated Dose: 532.48 mg                             (In mg/m2, AUC, mg/kg)     I confirm that this process was performed independently.

## 2022-05-02 NOTE — PROGRESS NOTES
Pt arrives to [facility] for cycle 13 of Enhertu.  Discussed plan of care with pt.  Pt denies s/sx of infection or pain at this time.  [facility] port accessed with sterile technique, flushed with NS and brisk blood return observed.  Labs were drawn on 4/29/2022.  Labs reviewed and platelets do not meet parameters for treatment at 98,000.  Informed Dr. Ross of platelets.  Received telephone order-ok to treat today and change platelet parameters to call MD if platelets are less than 75,000 for future cycles.  Treatment plan orders adjusted and informed pt of plan of care. Pre-medications given.  Enhertu infused without adverse reaction.  Port flushed with NS and heparin locked.  Adkins needle removed intact.  Site covered with gauze/tape.  Confirmed next appt with pt.  Informed pt to call [facility] office for order for next Echocardiogram. Pt left message with Dr. Ross's office while here in infusion services. Pt dc home to self care.

## 2022-05-02 NOTE — PROGRESS NOTES
Chemotherapy Verification - PRIMARY RN    C13 D1    Height = 185cm  Weight = 83.2kg  BSA = 2.07m^2 Echocardiogram on 2/21/2022 with LVEF 65%      Medication: fam-trastuzumab deruxtecan (Enhertu)  Dose: 6.4 mg/kg  Calculated Dose: 532.48 mg                             (In mg/m2, AUC, mg/kg)     I confirm this process was performed independently with the BSA and all final chemotherapy dosing calculations congruent.  Any discrepancies of 10% or greater have been addressed with the chemotherapy pharmacist. The resolution of the discrepancy has been documented in the EPIC progress notes.

## 2022-05-02 NOTE — PROGRESS NOTES
"Pharmacy Chemotherapy Verification  Patient name: Gurmeet Jo  Dx: mCRC    Cycle 13  Previous treatment: 4/11/22    Regimen: Fam-trastuzumab deruxtecan  Fam-trastuzumab deruxtecan 6.4 mg/kg IV over 90 minutes Day 1   Q21-day cycle until progression or toxicity  NCCN guidelines for Colon Cancer v3.2021  (cetuximab + chemo) Christine ARGUELLO et al, OBEY 2017;317(23):0329-6380  (HER2 tx in HER2+ CRC) Kayleen RINALDI et al. Lancet Oncol. 2019 Apr;20(4):518-530.    Allergies: Patient has no known allergies.  /68   Pulse 77   Temp 36.7 °C (98 °F) (Temporal)   Resp 18   Ht 1.85 m (6' 0.84\")   Wt 83.2 kg (183 lb 6.8 oz)   SpO2 98%   BMI 24.31 kg/m²  Body surface area is 2.07 meters squared.     ECHO (OK for 6 months per Cody LEO order)  2/21/22 LVEF ~65%  9/28/21 LVEF ~70%  7/13/21 LVEF ~65%  4/21/21 LVEF ~60%    Labs 4/28/22:  ANC~ 1230 Plt = 98k   Hgb = 10.7     SCr = 0.57 mg/dL CrCl >125 mL/min   AST/ALT/ALK = 42/34/204 TBili = 1.2     Fam-trastuzumab deruxtecan (Enhertu) 6.4 mg/kg x 83.2 kg = 532.5 mg   <10% difference, ok to treat with final dose = 532.5  mg IV      José Marvin, PharmD        "

## 2022-05-06 NOTE — PROGRESS NOTES
Interventional Oncology Consultation      Re: Gurmeet Jo     MRN: [id number]   : 1965    Gurmeet Jo was referred by Juan Ross MD. He is a 57 y.o. male seen in clinic for evaluation and possible intervention of unresectable metastatic colon cancer to the liver. He is also under the care of Dunia Smith PA-C and Winston Thomas MD.    History of Present Illness:   presents with progression of hepatic metastases while on systemic therapy. He has a history of colon cancer and a biopsy in 2018 confirmed metastatic disease to the liver. His cancer has been controlled with systemic therapy but updated imaging revealed progression of disease with size increase in the liver mets and an increasing CEA.  has been referred to interventional radiology for evaluation and management with locoregional therapy. Today, he is on chemotherapy every 3 weeks, with his last infusion on May 2. His next dose of Enhertu is on May 23 and is relatively well tolerated compared to his prior therapies. He describes fatigue the first day after infusion accompanied by nausea that is controlled with lorazepam and zofran. He has a port that functions well. He lives in [place] and is not currently working outside the home. He is unaccompanied by a support person but asked to record the session for his daughter at today's consultation.    He is seen today for review of imaging studies and discussion of possible intervention with image-guided ablation, drug eluting bead chemoembolization, or Y90  radioembolization.     Past Medical History:   Diagnosis Date   • Bowel habit changes     constipation   • Cancer (HCC) 2018    Colon CA with Liver Mets   • Dental disorder     dentures    • Hiatus hernia syndrome    • Hypertension     No meds at this time   • Indigestion    • Pain     liver    • Sleep apnea     cpap    • Snoring      Past Surgical History:   Procedure Laterality Date   • COLONOSCOPY       Social  History     Socioeconomic History   • Marital status:      Spouse name: Not on file   • Number of children: Not on file   • Years of education: Not on file   • Highest education level: Not on file   Occupational History   • Not on file   Tobacco Use   • Smoking status: Former Smoker     Packs/day: 1.00     Years: 15.00     Pack years: 15.00     Quit date: 1998     Years since quittin.3   • Smokeless tobacco: Never Used   Vaping Use   • Vaping Use: Never used   Substance and Sexual Activity   • Alcohol use: No   • Drug use: Yes     Types: Inhaled, Marijuana     Comment: somtimes   • Sexual activity: Not on file   Other Topics Concern   • Not on file   Social History Narrative   • Not on file     Social Determinants of Health     Financial Resource Strain: Not on file   Food Insecurity: Not on file   Transportation Needs: Not on file   Physical Activity: Not on file   Stress: Not on file   Social Connections: Not on file   Intimate Partner Violence: Not on file   Housing Stability: Not on file     Family History   Problem Relation Age of Onset   • Hypertension Mother    • Hypertension Father    • Diabetes Father    • Diabetes Brother        Review of Systems   Constitutional: Positive for malaise/fatigue and weight loss. Negative for chills, diaphoresis and fever.   Respiratory: Negative.    Cardiovascular: Negative.    Gastrointestinal: Positive for nausea (with chemo infusion).   Skin: Negative.    Neurological: Negative for sensory change, speech change, focal weakness and weakness.   Psychiatric/Behavioral: Negative for substance abuse.       A comprehensive 14-point review of systems was negative except as described above.     Labs:      Ref. Range 3/18/2022 06:44 3/29/2022 07:42 2022 13:52 2022 07:58   WBC Latest Ref Range: 4.8 - 10.8 K/uL 2.3 (LL)  2.0 (LL) 2.1 (LL)   RBC Latest Ref Range: 4.70 - 6.10 M/uL 3.48 (L)  3.07 (L) 3.14 (L)   Hemoglobin Latest Ref Range: 14.0 - 18.0 g/dL  11.8 (L)  10.6 (L) 10.7 (L)   Hematocrit Latest Ref Range: 42.0 - 52.0 % 35.6 (L)  31.5 (L) 32.2 (L)   MCV Latest Ref Range: 81.4 - 97.8 fL 102.3 (H)  102.6 (H) 102.5 (H)   MCH Latest Ref Range: 27.0 - 33.0 pg 33.9 (H)  34.5 (H) 34.1 (H)   MCHC Latest Ref Range: 33.7 - 35.3 g/dL 33.1 (L)  33.7 33.2 (L)   RDW Latest Ref Range: 35.9 - 50.0 fL 65.7 (H)  67.6 (H) 62.4 (H)   Platelet Count Latest Ref Range: 164 - 446 K/uL 92 (L)  88 (L) 98 (L)   MPV Latest Ref Range: 9.0 - 12.9 fL 12.1  11.8 12.4   Neutrophils-Polys Latest Ref Range: 44.00 - 72.00 % 60.50  64.20 59.70   Neutrophils (Absolute) Latest Ref Range: 1.82 - 7.42 K/uL 1.36 (L)  1.27 (L) 1.23 (L)   Lymphocytes Latest Ref Range: 22.00 - 41.00 % 20.90 (L)  20.70 (L) 23.80   Lymphs (Absolute) Latest Ref Range: 1.00 - 4.80 K/uL 0.47 (L)  0.41 (L) 0.49 (L)   Monocytes Latest Ref Range: 0.00 - 13.40 % 13.80 (H)  12.10 12.60   Monos (Absolute) Latest Ref Range: 0.00 - 0.85 K/uL 0.31  0.24 0.26   Eosinophils Latest Ref Range: 0.00 - 6.90 % 3.10  2.50 1.90   Eos (Absolute) Latest Ref Range: 0.00 - 0.51 K/uL 0.07  0.05 0.04   Basophils Latest Ref Range: 0.00 - 1.80 % 1.30  0.50 1.50   Baso (Absolute) Latest Ref Range: 0.00 - 0.12 K/uL 0.03  0.01 0.03   Immature Granulocytes Latest Ref Range: 0.00 - 0.90 % 0.40  0.00 0.50   Immature Granulocytes (abs) Latest Ref Range: 0.00 - 0.11 K/uL 0.01  0.00 0.01   Nucleated RBC Latest Units: /100 WBC 0.00  0.00 0.00   NRBC (Absolute) Latest Units: K/uL 0.00  0.00 0.00   Plt Estimation Unknown Decreased  Decreased    RBC Morphology Unknown Present  Present    Anisocytosis Unknown 1+  2+ (A)    Macrocytosis Unknown 1+  2+ (A)    Microcytosis Unknown   1+    Poikilocytosis Unknown 1+  1+    Ovalocytes Unknown 1+  1+    Comments-Diff Unknown see below  see below    Peripheral Smear Review Unknown see below  see below    Sodium Latest Ref Range: 135 - 145 mmol/L 139  146 (H) 139   Potassium Latest Ref Range: 3.6 - 5.5 mmol/L 4.0  3.9 3.6    Chloride Latest Ref Range: 96 - 112 mmol/L 105  112 105   Co2 Latest Ref Range: 20 - 33 mmol/L 22  25 25   Anion Gap Latest Ref Range: 7.0 - 16.0  12.0  9.0 9.0   Glucose Latest Ref Range: 65 - 99 mg/dL 134 (H)  121 (H) 126 (H)   Bun Latest Ref Range: 8 - 22 mg/dL 7 (L)  7 (L) 8   Creatinine Latest Ref Range: 0.50 - 1.40 mg/dL 0.59  0.50 0.57   GFR (CKD-EPI) Latest Ref Range: >60 mL/min/1.73 m 2 113  118 114   Calcium Latest Ref Range: 8.5 - 10.5 mg/dL 8.7  8.8 9.2   AST(SGOT) Latest Ref Range: 12 - 45 U/L 38  43 42   ALT(SGPT) Latest Ref Range: 2 - 50 U/L 34  29 34   Alkaline Phosphatase Latest Ref Range: 30 - 99 U/L 200 (H)  177 (H) 204 (H)   Total Bilirubin Latest Ref Range: 0.1 - 1.5 mg/dL 1.5  0.9 1.2   Albumin Latest Ref Range: 3.2 - 4.9 g/dL 4.1  3.8 3.8   Total Protein Latest Ref Range: 6.0 - 8.2 g/dL 5.8 (L)  5.8 (L) 5.6 (L)   Globulin Latest Ref Range: 1.9 - 3.5 g/dL 1.7 (L)  2.0 1.8 (L)   A-G Ratio Latest Units: g/dL 2.4  1.9 2.1   Magnesium Latest Ref Range: 1.5 - 2.5 mg/dL 2.1   2.1   Glycohemoglobin Latest Ref Range: 0.0 - 5.6 %  5.2        Ref. Range 2/7/2022 10:50 2/25/2022 09:11 3/18/2022 06:44 4/11/2022 13:52 4/28/2022 07:58   Carcinoembryonic Antigen Latest Ref Range: 0.0 - 3.0 ng/mL 3.0 3.6 (H) 5.0 (H) 8.6 (H) 10.8 (H)     Pathology:  11/14/2018 at [facility]:  A. Liver biopsy cores:          Metastatic, moderately differentiated adenocarcinoma with an IHC           panel favoring colorectal origin.     Radiology:   CT CAP on 4/1/22 at [facility]:          CT CAP 1/6/22 at [facility] (imaging only, no report):      CT CAP 7/29/2020 at [facility]:  Multiple hepatic lesions compatible with metastatic disease are again noted, with some decreased in size while others is stable compared to prior. No new hepatic lesions are identified.     No thoracic metastatic disease is seen.     Splenomegaly and portal hypertension are again noted.     Trace ascites is decreased compared to prior.     Moderate amount colonic  stool.     Atherosclerotic plaque.      Current Outpatient Medications   Medication Sig Dispense Refill   • dexamethasone (DECADRON) 120 MG/30ML Solution  (Patient not taking: Reported on 5/2/2022)     • dextrose 5% 5 % Solution  (Patient not taking: No sig reported)     • ENHERTU 100 MG Recon Soln      • ondansetron (ZOFRAN) 40 MG/20ML Solution injection  (Patient not taking: Reported on 5/2/2022)     • Sodium Chloride (NS) Solution  (Patient not taking: Reported on 5/2/2022)     • Empagliflozin (JARDIANCE) 10 MG Tab Take 1 Tablet by mouth every day. 90 Tablet 2   • nystatin (MYCOSTATIN) 286089 UNIT/ML Suspension SWISH AND SWALLOW 5 MILLILITERS PO QID (Patient not taking: No sig reported) 473 mL 2   • hydrOXYzine HCl (ATARAX) 50 MG Tab TAKE 1 TABLET BY MOUTH TWICE DAILY AS NEEDED FOR ITCHING     • glucose blood (TRUE METRIX BLOOD GLUCOSE TEST) strip TRUE METRIX BLOOD GLUCOSE TEST STRP     • ondansetron (ZOFRAN ODT) 8 MG TABLET DISPERSIBLE DISSOLVE ONE TABLET BY MOUTH EVERY 12 HOURS AS NEEDED FOR NAUSEA     • TRUE METRIX BLOOD GLUCOSE TEST strip USE AS DIRECTED TO CHECK BLOOD SUGAR B ID AND FOR HIGH OR SUGAR     • mupirocin (BACTROBAN) 2 % Ointment as needed. For rash from vectibix     • POTASSIUM CHLORIDE PO Take 20 mEq by mouth every day.     • Lancets Lancets order: Lancets for  meter. Sig: use bid and prn ssx high or low sugar. #100 RF x 3 100 Each 0   • Blood Glucose Test Strips Test strips order: Test strips for  meter. Sig: use bid and prn ssx high or low sugar #100 RF x 3 100 Each 0   • Blood Glucose Monitoring Suppl Device Meter: Dispense Device of Insurance Preference. Sig. Use as directed for blood sugar monitoring. #1. NR. 1 Device 0   • ondansetron (ZOFRAN ODT) 4 MG TABLET DISPERSIBLE Take 4 mg by mouth every 6 hours as needed for Nausea.     • lidocaine-prilocaine (EMLA) 2.5-2.5 % Cream      • acetaminophen (TYLENOL) 500 MG Tab Take 500-1,000 mg by mouth every 6 hours as needed.     • LORazepam  (ATIVAN) 1 MG Tab Take 1 mg by mouth 1 time a day as needed (nausea).     • baclofen (LIORESAL) 10 MG Tab Take 10 mg by mouth 3 times a day as needed.     • therapeutic multivitamin-minerals (THERAGRAN-M) Tab Take 1 Tab by mouth every day.       No current facility-administered medications for this visit.       Allergies   Allergen Reactions   • Bee Venom Swelling   • Compazine    • Oxaliplatin        Physical Exam  Constitutional:       General: He is not in acute distress.     Appearance: He is not diaphoretic.   HENT:      Head: Normocephalic.   Eyes:      General: No scleral icterus.  Pulmonary:      Effort: Pulmonary effort is normal. No respiratory distress.   Abdominal:      General: There is no distension.   Skin:     General: Skin is warm and dry.      Coloration: Skin is pale. Skin is not jaundiced.      Findings: No erythema or rash.   Neurological:      General: No focal deficit present.      Mental Status: He is alert, oriented to person, place, and time and easily aroused. He is not disoriented.      Cranial Nerves: No cranial nerve deficit or facial asymmetry.      Sensory: Sensation is intact.      Motor: No weakness or tremor.      Coordination: Coordination normal.      Gait: Gait is intact. Gait normal.   Psychiatric:         Mood and Affect: Mood and affect normal.         Behavior: Behavior normal.         Thought Content: Thought content normal.         Cognition and Memory: Memory normal.         Judgment: Judgment normal.       ECOG Performance Status 0    Impression:   1. Unresectable metastatic colon cancer to liver.  2. Thrombocytopenia.  3. Neutropenia.  4. Obstructive sleep apnea.  5. Diabetes mellitus.  6. Hyperlipidemia.  7. Peripheral neuropathy.    Plan:   Con Crenshaw MD, MD has reviewed 's history and imaging studies, examined the patient, and discussed treatment options.  is a candidate for palliative transarterial radioembolization of unresectable metastatic  colon cancer to the liver. We discussed the method of the procedure at length including angiography and embolization as well as the expected clinical course with the possibility of post-embolization syndrome nausea and pain and hospitalization. We explained that this procedure is palliative and not curative. We discussed the possibility of tumor recurrence and development of future tumors.  We additionally discussed the risks, including bleeding and infection, damage to the arteries or adjacent tissue, reaction to any medications given during the procedure, potential side effects of contrast administration including renal damage, non-target embolization, radiation-induced ulcers or liver disease in the setting of radioembolization, liver failure, and death. There is a risk the procedure will not be effective. We discussed alternatives to the procedure including surveillance with no intervention and TACE. Our recommendation is for Y90, and we explained it may require more than one dose administration to address his tumors. The mapping will be helpful for more specific treatment planning. A referral to the radiation oncology service was made to assist in radiation dosing. The patient verbalizes understanding of risks, benefits, and alternatives to IR intervention and elects to proceed. All questions were answered. Written pre- and post- procedure care instructions were provided. We explained that the patient will need to have ongoing surveillance after the procedure, which will likely be managed by his oncologist, and that future treatment depends on multiple factors including lab studies, imaging, and performance status. We have coordinated his intervention around his infusions.     The plan is as follows, pending insurance authorization:  · Mapping on 6/17/22.   · Y90 6/21/22.  · Clinic follow up after 2 weeks.  · Subsequent Y90 intervention TBD.     SAM Morris with Con Crenshaw MD  Interventional  Radiology   [address] ([id number])  [place] [address]  [phone]

## 2022-05-10 ENCOUNTER — HOSPITAL ENCOUNTER (OUTPATIENT)
Dept: RADIOLOGY | Facility: MEDICAL CENTER | Age: 57
End: 2022-05-10
Attending: INTERNAL MEDICINE
Payer: MEDICARE

## 2022-05-10 DIAGNOSIS — C18.9 METASTATIC COLON CANCER TO LIVER (HCC): Primary | ICD-10-CM

## 2022-05-10 DIAGNOSIS — C78.7 METASTATIC COLON CANCER TO LIVER (HCC): Primary | ICD-10-CM

## 2022-05-10 DIAGNOSIS — C18.9 MALIGNANT NEOPLASM OF COLON, UNSPECIFIED PART OF COLON (HCC): ICD-10-CM

## 2022-05-10 ASSESSMENT — ENCOUNTER SYMPTOMS
DIAPHORESIS: 0
SENSORY CHANGE: 0
NAUSEA: 1
SPEECH CHANGE: 0
WEAKNESS: 0
CHILLS: 0
CARDIOVASCULAR NEGATIVE: 1
WEIGHT LOSS: 1
RESPIRATORY NEGATIVE: 1
FEVER: 0
FOCAL WEAKNESS: 0

## 2022-05-10 ASSESSMENT — LIFESTYLE VARIABLES: SUBSTANCE_ABUSE: 0

## 2022-05-19 RX ORDER — HEPARIN SODIUM (PORCINE) LOCK FLUSH IV SOLN 100 UNIT/ML 100 UNIT/ML
500 SOLUTION INTRAVENOUS PRN
Status: CANCELLED | OUTPATIENT
Start: 2022-05-23

## 2022-05-19 RX ORDER — 0.9 % SODIUM CHLORIDE 0.9 %
VIAL (ML) INJECTION PRN
Status: CANCELLED | OUTPATIENT
Start: 2022-05-23

## 2022-05-19 RX ORDER — 0.9 % SODIUM CHLORIDE 0.9 %
10 VIAL (ML) INJECTION PRN
Status: CANCELLED | OUTPATIENT
Start: 2022-05-23

## 2022-05-19 RX ORDER — METHYLPREDNISOLONE SODIUM SUCCINATE 125 MG/2ML
125 INJECTION, POWDER, LYOPHILIZED, FOR SOLUTION INTRAMUSCULAR; INTRAVENOUS PRN
Status: CANCELLED | OUTPATIENT
Start: 2022-05-23

## 2022-05-19 RX ORDER — ONDANSETRON 8 MG/1
8 TABLET, ORALLY DISINTEGRATING ORAL PRN
Status: CANCELLED | OUTPATIENT
Start: 2022-05-23

## 2022-05-19 RX ORDER — 0.9 % SODIUM CHLORIDE 0.9 %
10 VIAL (ML) INJECTION PRN
Status: CANCELLED | OUTPATIENT
Start: 2022-05-22

## 2022-05-19 RX ORDER — HEPARIN SODIUM (PORCINE) LOCK FLUSH IV SOLN 100 UNIT/ML 100 UNIT/ML
500 SOLUTION INTRAVENOUS PRN
Status: CANCELLED | OUTPATIENT
Start: 2022-05-22

## 2022-05-19 RX ORDER — DEXTROSE MONOHYDRATE 50 MG/ML
INJECTION, SOLUTION INTRAVENOUS CONTINUOUS
Status: CANCELLED | OUTPATIENT
Start: 2022-05-23

## 2022-05-19 RX ORDER — 0.9 % SODIUM CHLORIDE 0.9 %
3 VIAL (ML) INJECTION PRN
Status: CANCELLED | OUTPATIENT
Start: 2022-05-22

## 2022-05-19 RX ORDER — PROCHLORPERAZINE MALEATE 10 MG
10 TABLET ORAL EVERY 6 HOURS PRN
Status: CANCELLED | OUTPATIENT
Start: 2022-05-23

## 2022-05-19 RX ORDER — 0.9 % SODIUM CHLORIDE 0.9 %
VIAL (ML) INJECTION PRN
Status: CANCELLED | OUTPATIENT
Start: 2022-05-22

## 2022-05-19 RX ORDER — EPINEPHRINE 1 MG/ML(1)
0.5 AMPUL (ML) INJECTION PRN
Status: CANCELLED | OUTPATIENT
Start: 2022-05-23

## 2022-05-19 RX ORDER — ONDANSETRON 2 MG/ML
4 INJECTION INTRAMUSCULAR; INTRAVENOUS PRN
Status: CANCELLED | OUTPATIENT
Start: 2022-05-23

## 2022-05-19 RX ORDER — 0.9 % SODIUM CHLORIDE 0.9 %
3 VIAL (ML) INJECTION PRN
Status: CANCELLED | OUTPATIENT
Start: 2022-05-23

## 2022-05-19 RX ORDER — DIPHENHYDRAMINE HYDROCHLORIDE 50 MG/ML
50 INJECTION INTRAMUSCULAR; INTRAVENOUS PRN
Status: CANCELLED | OUTPATIENT
Start: 2022-05-23

## 2022-05-20 ENCOUNTER — HOSPITAL ENCOUNTER (OUTPATIENT)
Dept: LAB | Facility: MEDICAL CENTER | Age: 57
End: 2022-05-20
Attending: INTERNAL MEDICINE
Payer: MEDICARE

## 2022-05-20 LAB
ALBUMIN SERPL BCP-MCNC: 4 G/DL (ref 3.2–4.9)
ALBUMIN/GLOB SERPL: 1.7 G/DL
ALP SERPL-CCNC: 221 U/L (ref 30–99)
ALT SERPL-CCNC: 36 U/L (ref 2–50)
ANION GAP SERPL CALC-SCNC: 12 MMOL/L (ref 7–16)
AST SERPL-CCNC: 48 U/L (ref 12–45)
BILIRUB SERPL-MCNC: 1.3 MG/DL (ref 0.1–1.5)
BUN SERPL-MCNC: 11 MG/DL (ref 8–22)
CALCIUM SERPL-MCNC: 9.3 MG/DL (ref 8.5–10.5)
CEA SERPL-MCNC: 18.3 NG/ML (ref 0–3)
CHLORIDE SERPL-SCNC: 106 MMOL/L (ref 96–112)
CO2 SERPL-SCNC: 24 MMOL/L (ref 20–33)
CREAT SERPL-MCNC: 0.48 MG/DL (ref 0.5–1.4)
GFR SERPLBLD CREATININE-BSD FMLA CKD-EPI: 120 ML/MIN/1.73 M 2
GLOBULIN SER CALC-MCNC: 2.4 G/DL (ref 1.9–3.5)
GLUCOSE SERPL-MCNC: 94 MG/DL (ref 65–99)
MAGNESIUM SERPL-MCNC: 2.1 MG/DL (ref 1.5–2.5)
POTASSIUM SERPL-SCNC: 3.5 MMOL/L (ref 3.6–5.5)
PROT SERPL-MCNC: 6.4 G/DL (ref 6–8.2)
SODIUM SERPL-SCNC: 142 MMOL/L (ref 135–145)

## 2022-05-20 PROCEDURE — 82378 CARCINOEMBRYONIC ANTIGEN: CPT

## 2022-05-20 PROCEDURE — 80053 COMPREHEN METABOLIC PANEL: CPT

## 2022-05-20 PROCEDURE — 85007 BL SMEAR W/DIFF WBC COUNT: CPT

## 2022-05-20 PROCEDURE — 36415 COLL VENOUS BLD VENIPUNCTURE: CPT

## 2022-05-20 PROCEDURE — 85025 COMPLETE CBC W/AUTO DIFF WBC: CPT

## 2022-05-20 PROCEDURE — 83735 ASSAY OF MAGNESIUM: CPT

## 2022-05-21 LAB
ANISOCYTOSIS BLD QL SMEAR: ABNORMAL
BASOPHILS # BLD AUTO: 1.8 % (ref 0–1.8)
BASOPHILS # BLD: 0.05 K/UL (ref 0–0.12)
EOSINOPHIL # BLD AUTO: 0.09 K/UL (ref 0–0.51)
EOSINOPHIL NFR BLD: 3.5 % (ref 0–6.9)
ERYTHROCYTE [DISTWIDTH] IN BLOOD BY AUTOMATED COUNT: 65.6 FL (ref 35.9–50)
GIANT PLATELETS BLD QL SMEAR: NORMAL
HCT VFR BLD AUTO: 35.6 % (ref 42–52)
HGB BLD-MCNC: 11.9 G/DL (ref 14–18)
LYMPHOCYTES # BLD AUTO: 0.44 K/UL (ref 1–4.8)
LYMPHOCYTES NFR BLD: 16.8 % (ref 22–41)
MACROCYTES BLD QL SMEAR: ABNORMAL
MANUAL DIFF BLD: NORMAL
MCH RBC QN AUTO: 35.4 PG (ref 27–33)
MCHC RBC AUTO-ENTMCNC: 33.4 G/DL (ref 33.7–35.3)
MCV RBC AUTO: 106 FL (ref 81.4–97.8)
MICROCYTES BLD QL SMEAR: ABNORMAL
MONOCYTES # BLD AUTO: 0.05 K/UL (ref 0–0.85)
MONOCYTES NFR BLD AUTO: 1.8 % (ref 0–13.4)
MORPHOLOGY BLD-IMP: NORMAL
NEUTROPHILS # BLD AUTO: 1.98 K/UL (ref 1.82–7.42)
NEUTROPHILS NFR BLD: 76.1 % (ref 44–72)
NRBC # BLD AUTO: 0 K/UL
NRBC BLD-RTO: 0 /100 WBC
OVALOCYTES BLD QL SMEAR: NORMAL
PLATELET # BLD AUTO: 103 K/UL (ref 164–446)
PLATELET BLD QL SMEAR: NORMAL
PMV BLD AUTO: 11.9 FL (ref 9–12.9)
POIKILOCYTOSIS BLD QL SMEAR: NORMAL
RBC # BLD AUTO: 3.36 M/UL (ref 4.7–6.1)
RBC BLD AUTO: PRESENT
WBC # BLD AUTO: 2.6 K/UL (ref 4.8–10.8)

## 2022-05-22 NOTE — PROGRESS NOTES
Pharmacy Chemotherapy Verification  Patient name: Gurmeet Jo  Dx: mCRC    Cycle 14  Previous treatment: 5/2/22    Regimen: Fam-trastuzumab deruxtecan  Fam-trastuzumab deruxtecan 6.4 mg/kg IV over 90 minutes Day 1   Q21-day cycle until progression or toxicity  NCCN guidelines for Colon Cancer v3.2021  (cetuximab + chemo) Christine ARGUELLO et al, OBEY 2017;317(23):5686-4320  (HER2 tx in HER2+ CRC) Kayleen RINALDI et al. Lancet Oncol. 2019 Apr;20(4):518-530.    Allergies: Patient has no known allergies.  /69   Pulse 65   Temp 36.3 °C (97.4 °F) (Temporal)   Resp 18   Ht 1.829 m (6')   Wt 81.7 kg (180 lb 1.9 oz)   SpO2 100%   BMI 24.43 kg/m²  Body surface area is 2.04 meters squared.     ECHO (OK for 6 months per Coyd LEO order)  2/21/22 LVEF ~65%  9/28/21 LVEF ~70%  7/13/21 LVEF ~65%  4/21/21 LVEF ~60%    Labs 5/20/22:  ANC~ 1980 Plt = 103k   Hgb = 11.9     SCr = 0.48 mg/dL CrCl >125 mL/min   AST/ALT/ALK = 48/36/221 TBili = 1.3     Fam-trastuzumab deruxtecan (Enhertu) 6.4 mg/kg x 81.7 kg = 522.88 mg   <10% difference, ok to treat with final dose = 522.9 mg IV      José Marvin, PharmD

## 2022-05-23 ENCOUNTER — OUTPATIENT INFUSION SERVICES (OUTPATIENT)
Dept: ONCOLOGY | Facility: MEDICAL CENTER | Age: 57
End: 2022-05-23
Attending: INTERNAL MEDICINE
Payer: MEDICARE

## 2022-05-23 VITALS
OXYGEN SATURATION: 100 % | BODY MASS INDEX: 24.4 KG/M2 | TEMPERATURE: 97.4 F | DIASTOLIC BLOOD PRESSURE: 69 MMHG | WEIGHT: 180.12 LBS | HEART RATE: 65 BPM | RESPIRATION RATE: 18 BRPM | SYSTOLIC BLOOD PRESSURE: 121 MMHG | HEIGHT: 72 IN

## 2022-05-23 DIAGNOSIS — C78.7 METASTATIC COLON CANCER TO LIVER (HCC): ICD-10-CM

## 2022-05-23 DIAGNOSIS — C18.9 METASTATIC COLON CANCER TO LIVER (HCC): ICD-10-CM

## 2022-05-23 PROCEDURE — A4212 NON CORING NEEDLE OR STYLET: HCPCS

## 2022-05-23 PROCEDURE — 304540 HCHG NITRO SET VENT 2ND TUB

## 2022-05-23 PROCEDURE — 700105 HCHG RX REV CODE 258: Performed by: INTERNAL MEDICINE

## 2022-05-23 PROCEDURE — 96367 TX/PROPH/DG ADDL SEQ IV INF: CPT

## 2022-05-23 PROCEDURE — 700111 HCHG RX REV CODE 636 W/ 250 OVERRIDE (IP): Mod: JW,TB | Performed by: INTERNAL MEDICINE

## 2022-05-23 PROCEDURE — 96413 CHEMO IV INFUSION 1 HR: CPT

## 2022-05-23 PROCEDURE — 96375 TX/PRO/DX INJ NEW DRUG ADDON: CPT

## 2022-05-23 RX ORDER — HEPARIN SODIUM (PORCINE) LOCK FLUSH IV SOLN 100 UNIT/ML 100 UNIT/ML
500 SOLUTION INTRAVENOUS PRN
Status: DISCONTINUED | OUTPATIENT
Start: 2022-05-23 | End: 2022-05-23 | Stop reason: HOSPADM

## 2022-05-23 RX ADMIN — FAM-TRASTUZUMAB DERUXTECAN-NXKI 522.9 MG: 100 INJECTION, POWDER, LYOPHILIZED, FOR SOLUTION INTRAVENOUS at 10:15

## 2022-05-23 RX ADMIN — ONDANSETRON 16 MG: 2 INJECTION INTRAMUSCULAR; INTRAVENOUS at 08:54

## 2022-05-23 RX ADMIN — DEXAMETHASONE SODIUM PHOSPHATE 12 MG: 4 INJECTION, SOLUTION INTRA-ARTICULAR; INTRALESIONAL; INTRAMUSCULAR; INTRAVENOUS; SOFT TISSUE at 08:42

## 2022-05-23 RX ADMIN — HEPARIN 500 UNITS: 100 SYRINGE at 11:08

## 2022-05-23 ASSESSMENT — FIBROSIS 4 INDEX: FIB4 SCORE: 4.5

## 2022-05-23 NOTE — PROGRESS NOTES
Pharmacy Chemotherapy Verification  Patient name: Gurmeet Jo  Dx: mCRC  Regimen: Fam-trastuzumab deruxtecan    Fam-trastuzumab deruxtecan 6.4 mg/kg IV over 90 minutes Day 1  Q21-day cycle until progression or toxicity  NCCN guidelines for Colon Cancer v3.2021  (cetuximab + chemo) Christine ARGUELLO et al, OBEY 2017;317(23):1541-2467  (HER2 tx in HER2+ CRC) Kayleen RINALDI, et al. Lancet Oncol. 2019 Apr;20(4):518-530.    Allergies: Patient has no known allergies.  /69   Pulse 65   Temp 36.3 °C (97.4 °F) (Temporal)   Resp 18   Ht 1.829 m (6')   Wt 81.7 kg (180 lb 1.9 oz)   SpO2 100%   BMI 24.43 kg/m²  Body surface area is 2.04 meters squared.     Labs 5/20/22  ANC 1980 Hgb 1.19 Plt 103k  SCr 0.48 CrCl >125 mL/min   AST/ALT/AP = 48/36/221 Tbili 1.3     ECHO (OK for 6 months per Cody LEO order)  2/21/22 TTE LVEF ~65%  9/28/21 LVEF ~70%  7/13/21 LVEF ~65 %  4/21/21 LVEF ~60 %      Drug Order   (Drug name, dose, route, IV Fluid & volume, frequency, number of doses) Cycle 14  Previous treatment: 5/2/22      Medication = Fam-trastuzumab deruxtecan (Enhertu)  Base Dose = 6.4 mg/kg  Calc Dose: Base Dose x 81.7kg = 522.88 mg  Final Dose = 522.9 mg  Route = IV  Fluid & Volume = D5W 100 mL  Admin Duration = Over 30  minutes      <10% difference, OK to treat with final dose      Jeni Ponce, PharmD, BCOP

## 2022-05-23 NOTE — PROGRESS NOTES
Chemotherapy Verification - SECONDARY RN       Height = 182.9 cm  Weight = 81.7 kg  BSA = 2.04 m2       Medication: Enhertu  Dose: 6.4 mg/kg  Calculated Dose: 522.88 mg                             (In mg/m2, AUC, mg/kg)     I confirm that this process was performed independently.

## 2022-05-23 NOTE — PROGRESS NOTES
Pt arrives to [facility] for cycle 13 of Enhertu.  Discussed plan of care with pt.  Pt denies s/sx of infection or pain at this time.  [facility] port accessed with sterile technique, flushed with NS and brisk blood return observed.  Labs were drawn on 5/21/2022.  Labs reviewed and results meet parameters for treatment today.  Last echocardiogram was on 2/21/22 with LVEF of 65%.  Pre-medications given.  Enhertu infused without adverse reaction.  Port flushed with NS and heparin locked.  Adkins needle removed intact.  Site covered with gauze/tape.  Confirmed next appt with pt. Pt dc home to self care.

## 2022-05-23 NOTE — PROGRESS NOTES
Chemotherapy Verification - PRIMARY RN    C14 D1    Height = 182.9 cm  Weight = 81.7 kg  BSA = 2.04 m^2,  Echocardiogram on 2/21/22 with LVEF 65%      Medication: fam-trastuzumab deruxtecan (Enhertu)  Dose: 6.4 mg/kg  Calculated Dose: 522.9 mg                             (In mg/m2, AUC, mg/kg)     I confirm this process was performed independently with the BSA and all final chemotherapy dosing calculations congruent.  Any discrepancies of 10% or greater have been addressed with the chemotherapy pharmacist. The resolution of the discrepancy has been documented in the EPIC progress notes.

## 2022-05-26 ENCOUNTER — PRE-ADMISSION TESTING (OUTPATIENT)
Dept: ADMISSIONS | Facility: MEDICAL CENTER | Age: 57
End: 2022-05-26
Attending: STUDENT IN AN ORGANIZED HEALTH CARE EDUCATION/TRAINING PROGRAM
Payer: MEDICARE

## 2022-05-26 VITALS — WEIGHT: 178 LBS | HEIGHT: 74 IN | BODY MASS INDEX: 22.84 KG/M2

## 2022-05-26 ASSESSMENT — FIBROSIS 4 INDEX: FIB4 SCORE: 4.5

## 2022-06-07 RX ORDER — METHYLPREDNISOLONE SODIUM SUCCINATE 125 MG/2ML
125 INJECTION, POWDER, LYOPHILIZED, FOR SOLUTION INTRAMUSCULAR; INTRAVENOUS PRN
Status: CANCELLED | OUTPATIENT
Start: 2022-07-04

## 2022-06-07 RX ORDER — 0.9 % SODIUM CHLORIDE 0.9 %
3 VIAL (ML) INJECTION PRN
Status: CANCELLED | OUTPATIENT
Start: 2022-07-04

## 2022-06-07 RX ORDER — 0.9 % SODIUM CHLORIDE 0.9 %
VIAL (ML) INJECTION PRN
Status: CANCELLED | OUTPATIENT
Start: 2022-07-03

## 2022-06-07 RX ORDER — 0.9 % SODIUM CHLORIDE 0.9 %
VIAL (ML) INJECTION PRN
Status: CANCELLED | OUTPATIENT
Start: 2022-06-12

## 2022-06-07 RX ORDER — 0.9 % SODIUM CHLORIDE 0.9 %
3 VIAL (ML) INJECTION PRN
Status: CANCELLED | OUTPATIENT
Start: 2022-06-13

## 2022-06-07 RX ORDER — HEPARIN SODIUM (PORCINE) LOCK FLUSH IV SOLN 100 UNIT/ML 100 UNIT/ML
500 SOLUTION INTRAVENOUS PRN
Status: CANCELLED | OUTPATIENT
Start: 2022-06-12

## 2022-06-07 RX ORDER — DIPHENHYDRAMINE HYDROCHLORIDE 50 MG/ML
50 INJECTION INTRAMUSCULAR; INTRAVENOUS PRN
Status: CANCELLED | OUTPATIENT
Start: 2022-06-13

## 2022-06-07 RX ORDER — DIPHENHYDRAMINE HYDROCHLORIDE 50 MG/ML
50 INJECTION INTRAMUSCULAR; INTRAVENOUS PRN
Status: CANCELLED | OUTPATIENT
Start: 2022-07-04

## 2022-06-07 RX ORDER — 0.9 % SODIUM CHLORIDE 0.9 %
10 VIAL (ML) INJECTION PRN
Status: CANCELLED | OUTPATIENT
Start: 2022-07-04

## 2022-06-07 RX ORDER — 0.9 % SODIUM CHLORIDE 0.9 %
10 VIAL (ML) INJECTION PRN
Status: CANCELLED | OUTPATIENT
Start: 2022-06-12

## 2022-06-07 RX ORDER — PROCHLORPERAZINE MALEATE 10 MG
10 TABLET ORAL EVERY 6 HOURS PRN
Status: CANCELLED | OUTPATIENT
Start: 2022-06-13

## 2022-06-07 RX ORDER — 0.9 % SODIUM CHLORIDE 0.9 %
10 VIAL (ML) INJECTION PRN
Status: CANCELLED | OUTPATIENT
Start: 2022-06-13

## 2022-06-07 RX ORDER — DEXTROSE MONOHYDRATE 50 MG/ML
INJECTION, SOLUTION INTRAVENOUS CONTINUOUS
Status: CANCELLED | OUTPATIENT
Start: 2022-07-04

## 2022-06-07 RX ORDER — 0.9 % SODIUM CHLORIDE 0.9 %
VIAL (ML) INJECTION PRN
Status: CANCELLED | OUTPATIENT
Start: 2022-07-04

## 2022-06-07 RX ORDER — 0.9 % SODIUM CHLORIDE 0.9 %
10 VIAL (ML) INJECTION PRN
Status: CANCELLED | OUTPATIENT
Start: 2022-07-03

## 2022-06-07 RX ORDER — 0.9 % SODIUM CHLORIDE 0.9 %
VIAL (ML) INJECTION PRN
Status: CANCELLED | OUTPATIENT
Start: 2022-06-13

## 2022-06-07 RX ORDER — ONDANSETRON 8 MG/1
8 TABLET, ORALLY DISINTEGRATING ORAL PRN
Status: CANCELLED | OUTPATIENT
Start: 2022-07-04

## 2022-06-07 RX ORDER — HEPARIN SODIUM (PORCINE) LOCK FLUSH IV SOLN 100 UNIT/ML 100 UNIT/ML
500 SOLUTION INTRAVENOUS PRN
Status: CANCELLED | OUTPATIENT
Start: 2022-06-13

## 2022-06-07 RX ORDER — 0.9 % SODIUM CHLORIDE 0.9 %
3 VIAL (ML) INJECTION PRN
Status: CANCELLED | OUTPATIENT
Start: 2022-06-12

## 2022-06-07 RX ORDER — DEXTROSE MONOHYDRATE 50 MG/ML
INJECTION, SOLUTION INTRAVENOUS CONTINUOUS
Status: CANCELLED | OUTPATIENT
Start: 2022-06-13

## 2022-06-07 RX ORDER — EPINEPHRINE 1 MG/ML(1)
0.5 AMPUL (ML) INJECTION PRN
Status: CANCELLED | OUTPATIENT
Start: 2022-07-04

## 2022-06-07 RX ORDER — PROCHLORPERAZINE MALEATE 10 MG
10 TABLET ORAL EVERY 6 HOURS PRN
Status: CANCELLED | OUTPATIENT
Start: 2022-07-04

## 2022-06-07 RX ORDER — EPINEPHRINE 1 MG/ML(1)
0.5 AMPUL (ML) INJECTION PRN
Status: CANCELLED | OUTPATIENT
Start: 2022-06-13

## 2022-06-07 RX ORDER — ONDANSETRON 2 MG/ML
4 INJECTION INTRAMUSCULAR; INTRAVENOUS PRN
Status: CANCELLED | OUTPATIENT
Start: 2022-06-13

## 2022-06-07 RX ORDER — 0.9 % SODIUM CHLORIDE 0.9 %
3 VIAL (ML) INJECTION PRN
Status: CANCELLED | OUTPATIENT
Start: 2022-07-03

## 2022-06-07 RX ORDER — ONDANSETRON 2 MG/ML
4 INJECTION INTRAMUSCULAR; INTRAVENOUS PRN
Status: CANCELLED | OUTPATIENT
Start: 2022-07-04

## 2022-06-07 RX ORDER — HEPARIN SODIUM (PORCINE) LOCK FLUSH IV SOLN 100 UNIT/ML 100 UNIT/ML
500 SOLUTION INTRAVENOUS PRN
Status: CANCELLED | OUTPATIENT
Start: 2022-07-04

## 2022-06-07 RX ORDER — HEPARIN SODIUM (PORCINE) LOCK FLUSH IV SOLN 100 UNIT/ML 100 UNIT/ML
500 SOLUTION INTRAVENOUS PRN
Status: CANCELLED | OUTPATIENT
Start: 2022-07-03

## 2022-06-07 RX ORDER — ONDANSETRON 8 MG/1
8 TABLET, ORALLY DISINTEGRATING ORAL PRN
Status: CANCELLED | OUTPATIENT
Start: 2022-06-13

## 2022-06-07 RX ORDER — METHYLPREDNISOLONE SODIUM SUCCINATE 125 MG/2ML
125 INJECTION, POWDER, LYOPHILIZED, FOR SOLUTION INTRAMUSCULAR; INTRAVENOUS PRN
Status: CANCELLED | OUTPATIENT
Start: 2022-06-13

## 2022-06-10 ENCOUNTER — PRE-ADMISSION TESTING (OUTPATIENT)
Dept: ADMISSIONS | Facility: MEDICAL CENTER | Age: 57
End: 2022-06-10
Attending: STUDENT IN AN ORGANIZED HEALTH CARE EDUCATION/TRAINING PROGRAM
Payer: MEDICARE

## 2022-06-10 ENCOUNTER — HOSPITAL ENCOUNTER (OUTPATIENT)
Dept: LAB | Facility: MEDICAL CENTER | Age: 57
End: 2022-06-10
Attending: NURSE PRACTITIONER
Payer: MEDICARE

## 2022-06-10 DIAGNOSIS — Z01.812 PRE-OPERATIVE LABORATORY EXAMINATION: ICD-10-CM

## 2022-06-10 LAB
ANION GAP SERPL CALC-SCNC: 11 MMOL/L (ref 7–16)
BASOPHILS # BLD AUTO: 1.3 % (ref 0–1.8)
BASOPHILS # BLD: 0.03 K/UL (ref 0–0.12)
BUN SERPL-MCNC: 8 MG/DL (ref 8–22)
CALCIUM SERPL-MCNC: 9 MG/DL (ref 8.5–10.5)
CHLORIDE SERPL-SCNC: 108 MMOL/L (ref 96–112)
CO2 SERPL-SCNC: 24 MMOL/L (ref 20–33)
CREAT SERPL-MCNC: 0.47 MG/DL (ref 0.5–1.4)
EOSINOPHIL # BLD AUTO: 0.06 K/UL (ref 0–0.51)
EOSINOPHIL NFR BLD: 2.5 % (ref 0–6.9)
ERYTHROCYTE [DISTWIDTH] IN BLOOD BY AUTOMATED COUNT: 62.4 FL (ref 35.9–50)
GFR SERPLBLD CREATININE-BSD FMLA CKD-EPI: 120 ML/MIN/1.73 M 2
GLUCOSE SERPL-MCNC: 91 MG/DL (ref 65–99)
HCT VFR BLD AUTO: 34.4 % (ref 42–52)
HGB BLD-MCNC: 11.7 G/DL (ref 14–18)
IMM GRANULOCYTES # BLD AUTO: 0.01 K/UL (ref 0–0.11)
IMM GRANULOCYTES NFR BLD AUTO: 0.4 % (ref 0–0.9)
INR PPP: 1.13 (ref 0.87–1.13)
LYMPHOCYTES # BLD AUTO: 0.52 K/UL (ref 1–4.8)
LYMPHOCYTES NFR BLD: 21.8 % (ref 22–41)
MCH RBC QN AUTO: 34.9 PG (ref 27–33)
MCHC RBC AUTO-ENTMCNC: 34 G/DL (ref 33.7–35.3)
MCV RBC AUTO: 102.7 FL (ref 81.4–97.8)
MONOCYTES # BLD AUTO: 0.27 K/UL (ref 0–0.85)
MONOCYTES NFR BLD AUTO: 11.3 % (ref 0–13.4)
NEUTROPHILS # BLD AUTO: 1.49 K/UL (ref 1.82–7.42)
NEUTROPHILS NFR BLD: 62.7 % (ref 44–72)
NRBC # BLD AUTO: 0 K/UL
NRBC BLD-RTO: 0 /100 WBC
PLATELET # BLD AUTO: 93 K/UL (ref 164–446)
PMV BLD AUTO: 11.9 FL (ref 9–12.9)
POTASSIUM SERPL-SCNC: 3.9 MMOL/L (ref 3.6–5.5)
PROTHROMBIN TIME: 14.2 SEC (ref 12–14.6)
RBC # BLD AUTO: 3.35 M/UL (ref 4.7–6.1)
SODIUM SERPL-SCNC: 143 MMOL/L (ref 135–145)
WBC # BLD AUTO: 2.4 K/UL (ref 4.8–10.8)

## 2022-06-10 PROCEDURE — 85025 COMPLETE CBC W/AUTO DIFF WBC: CPT | Mod: GA

## 2022-06-10 PROCEDURE — 36415 COLL VENOUS BLD VENIPUNCTURE: CPT | Mod: GA

## 2022-06-10 PROCEDURE — 80048 BASIC METABOLIC PNL TOTAL CA: CPT

## 2022-06-10 PROCEDURE — 85610 PROTHROMBIN TIME: CPT | Mod: GA

## 2022-06-12 NOTE — PROGRESS NOTES
"Pharmacy Chemotherapy Verification  Patient name: Gurmeet Jo  Dx: mCRC    Cycle 15  Previous treatment: 5/23/22    Regimen: Fam-trastuzumab deruxtecan  Fam-trastuzumab deruxtecan 6.4 mg/kg IV over 90 minutes Day 1   Q21-day cycle until progression or toxicity  NCCN guidelines for Colon Cancer v3.2021  (cetuximab + chemo) Crhistine ARGUELLO et al, OBEY 2017;317(23):9016-4467  (HER2 tx in HER2+ CRC) Kayleen RINALDI et al. Lancet Oncol. 2019 Apr;20(4):518-530.    Allergies: Patient has no known allergies.  /60   Pulse 61   Temp 36.2 °C (97.1 °F) (Temporal)   Resp 18   Ht 1.86 m (6' 1.23\")   Wt 83.1 kg (183 lb 3.2 oz)   SpO2 99%   BMI 24.02 kg/m²  Body surface area is 2.07 meters squared.     ECHO (OK for 6 months per Cody LEO order)  2/21/22 LVEF ~65%  9/28/21 LVEF ~70%  7/13/21 LVEF ~65%  4/21/21 LVEF ~60%    Labs from 6/10/22 reviewed- results within treatment parameters   Platelets OK if > 75K    Fam-trastuzumab deruxtecan (Enhertu) 6.4 mg/kg x 83.1kg kg = 531 mg   <10% difference, ok to treat with final dose = 531.8 mg IV    Man Anderson, PharmD        "

## 2022-06-13 ENCOUNTER — OUTPATIENT INFUSION SERVICES (OUTPATIENT)
Dept: ONCOLOGY | Facility: MEDICAL CENTER | Age: 57
End: 2022-06-13
Attending: INTERNAL MEDICINE
Payer: MEDICARE

## 2022-06-13 VITALS
DIASTOLIC BLOOD PRESSURE: 60 MMHG | TEMPERATURE: 97.1 F | SYSTOLIC BLOOD PRESSURE: 118 MMHG | WEIGHT: 183.2 LBS | HEART RATE: 61 BPM | HEIGHT: 73 IN | OXYGEN SATURATION: 99 % | RESPIRATION RATE: 18 BRPM | BODY MASS INDEX: 24.28 KG/M2

## 2022-06-13 DIAGNOSIS — C18.9 METASTATIC COLON CANCER TO LIVER (HCC): ICD-10-CM

## 2022-06-13 DIAGNOSIS — C78.7 METASTATIC COLON CANCER TO LIVER (HCC): ICD-10-CM

## 2022-06-13 PROCEDURE — 700105 HCHG RX REV CODE 258: Performed by: INTERNAL MEDICINE

## 2022-06-13 PROCEDURE — 96375 TX/PRO/DX INJ NEW DRUG ADDON: CPT

## 2022-06-13 PROCEDURE — A4212 NON CORING NEEDLE OR STYLET: HCPCS

## 2022-06-13 PROCEDURE — 700111 HCHG RX REV CODE 636 W/ 250 OVERRIDE (IP): Performed by: INTERNAL MEDICINE

## 2022-06-13 PROCEDURE — 96413 CHEMO IV INFUSION 1 HR: CPT

## 2022-06-13 RX ORDER — HEPARIN SODIUM (PORCINE) LOCK FLUSH IV SOLN 100 UNIT/ML 100 UNIT/ML
500 SOLUTION INTRAVENOUS PRN
Status: DISCONTINUED | OUTPATIENT
Start: 2022-06-13 | End: 2022-06-13 | Stop reason: HOSPADM

## 2022-06-13 RX ADMIN — FAM-TRASTUZUMAB DERUXTECAN-NXKI 531.8 MG: 100 INJECTION, POWDER, LYOPHILIZED, FOR SOLUTION INTRAVENOUS at 09:30

## 2022-06-13 RX ADMIN — DEXAMETHASONE SODIUM PHOSPHATE 12 MG: 4 INJECTION, SOLUTION INTRAMUSCULAR; INTRAVENOUS at 08:45

## 2022-06-13 RX ADMIN — HEPARIN 500 UNITS: 100 SYRINGE at 10:16

## 2022-06-13 RX ADMIN — ONDANSETRON 16 MG: 2 INJECTION INTRAMUSCULAR; INTRAVENOUS at 08:58

## 2022-06-13 ASSESSMENT — FIBROSIS 4 INDEX: FIB4 SCORE: 4.99

## 2022-06-13 NOTE — PROGRESS NOTES
"Pharmacy Chemotherapy Verification  Patient name: Gurmeet Jo  Dx: mCRC  Regimen: Fam-trastuzumab deruxtecan    Fam-trastuzumab deruxtecan 6.4 mg/kg IV over 90 minutes Day 1  Q21-day cycle until progression or toxicity  NCCN guidelines for Colon Cancer v3.2021  (cetuximab + chemo) Christine ARGUELLO et al, OBEY 2017;317(23):4980-1019  (HER2 tx in HER2+ CRC) Kayleen RINALDI et al. Lancet Oncol. 2019 Apr;20(4):518-530.    Allergies: Patient has no known allergies.  /60   Pulse 61   Temp 36.2 °C (97.1 °F) (Temporal)   Resp 18   Ht 1.86 m (6' 1.23\")   Wt 83.1 kg (183 lb 3.2 oz)   SpO2 99%   BMI 24.02 kg/m²  Body surface area is 2.07 meters squared.     Labs 6/10/22:  ANC~ 1490 Plt = 93k   Hgb = 11.7     SCr = 0.47 mg/dL CrCl >125 mL/min      ECHO (OK for 6 months per Cody LEO order)  2/21/22 TTE LVEF ~65%  9/28/21 LVEF ~70%  7/13/21 LVEF ~65 %  4/21/21 LVEF ~60 %      Drug Order   (Drug name, dose, route, IV Fluid & volume, frequency, number of doses) Cycle 15  Previous treatment: C14 on 5/23/22      Medication = Fam-trastuzumab deruxtecan (Enhertu)  Base Dose = 6.4 mg/kg  Calc Dose: Base Dose x 83.1 kg = 531.84 mg  Final Dose = 531.8 mg  Route = IV  Fluid & Volume = D5W 100 mL  Admin Duration = Over 30  minutes     <10% difference, okay to treat with final dose     By my signature below, I confirm this process was performed independently with the BSA and all final chemotherapy dosing calculations congruent. I have reviewed the above chemotherapy order and that my calculation of the final dose and BSA (when applicable) corroborate those calculations of the  pharmacist. Discrepancies of 10% or greater in the written dose have been addressed and documented within the [facility] Progress notes.    José Marvin, PharmD        "

## 2022-06-13 NOTE — PROGRESS NOTES
Chemotherapy Verification - SECONDARY RN       Height = 186 cm  Weight = 83.1 kg  BSA = 2.07 m2       Medication: Enhertu  Dose: 6.4 mg/kg  Calculated Dose: 531.84 mg                             (In mg/m2, AUC, mg/kg)         I confirm that this process was performed independently.

## 2022-06-13 NOTE — PROGRESS NOTES
Chemotherapy Verification - PRIMARY RN      Height = 186 cm  Weight = 83.1 kg  BSA = 2.07 m^2       Medication: fam-trastuzumab deruztecan (Enhertu)  Dose: 6.4 mg/kg  Calculated Dose: 531.84 mg                             (In mg/m2, AUC, mg/kg)       I confirm this process was performed independently with the BSA and all final chemotherapy dosing calculations congruent.  Any discrepancies of 10% or greater have been addressed with the chemotherapy pharmacist. The resolution of the discrepancy has been documented in the EPIC progress notes.

## 2022-06-14 NOTE — PROGRESS NOTES
Pt arrives to IS for cycle 15 of Enhertu.  Discussed plan of care with pt.  Pt denies s/sx of infection or pain at this time.  [facility] port accessed with sterile technique, flushed with NS and brisk blood return observed.  Labs were drawn on 6/10/2022.  Labs reviewed and results meet parameters for treatment today.  Last echocardiogram was on 2/21/22 with LVEF of 65%.  Pt has next echo scheduled for 6/16/2022.  Pre-medications given.  Enhertu infused without adverse reaction.  Port flushed with NS and heparin locked.  Adkins needle removed intact.  Site covered with gauze/tape.  Confirmed next appt with pt. Pt dc home to self care.

## 2022-06-15 ENCOUNTER — HOSPITAL ENCOUNTER (OUTPATIENT)
Dept: RADIATION ONCOLOGY | Facility: MEDICAL CENTER | Age: 57
End: 2022-06-30
Attending: RADIOLOGY
Payer: MEDICARE

## 2022-06-15 ENCOUNTER — PRE-ADMISSION TESTING (OUTPATIENT)
Dept: ADMISSIONS | Facility: MEDICAL CENTER | Age: 57
End: 2022-06-15
Attending: STUDENT IN AN ORGANIZED HEALTH CARE EDUCATION/TRAINING PROGRAM
Payer: MEDICARE

## 2022-06-15 DIAGNOSIS — Z01.812 PRE-OPERATIVE LABORATORY EXAMINATION: ICD-10-CM

## 2022-06-15 LAB
ALBUMIN SERPL BCP-MCNC: 4 G/DL (ref 3.2–4.9)
ALBUMIN/GLOB SERPL: 2 G/DL
ALP SERPL-CCNC: 196 U/L (ref 30–99)
ALT SERPL-CCNC: 51 U/L (ref 2–50)
ANION GAP SERPL CALC-SCNC: 8 MMOL/L (ref 7–16)
AST SERPL-CCNC: 59 U/L (ref 12–45)
BASOPHILS # BLD AUTO: 0 % (ref 0–1.8)
BASOPHILS # BLD: 0 K/UL (ref 0–0.12)
BILIRUB SERPL-MCNC: 1.4 MG/DL (ref 0.1–1.5)
BUN SERPL-MCNC: 16 MG/DL (ref 8–22)
CALCIUM SERPL-MCNC: 9.2 MG/DL (ref 8.5–10.5)
CHLORIDE SERPL-SCNC: 106 MMOL/L (ref 96–112)
CO2 SERPL-SCNC: 25 MMOL/L (ref 20–33)
CREAT SERPL-MCNC: 0.5 MG/DL (ref 0.5–1.4)
EOSINOPHIL # BLD AUTO: 0 K/UL (ref 0–0.51)
EOSINOPHIL NFR BLD: 0 % (ref 0–6.9)
ERYTHROCYTE [DISTWIDTH] IN BLOOD BY AUTOMATED COUNT: 64.7 FL (ref 35.9–50)
GFR SERPLBLD CREATININE-BSD FMLA CKD-EPI: 118 ML/MIN/1.73 M 2
GLOBULIN SER CALC-MCNC: 2 G/DL (ref 1.9–3.5)
GLUCOSE SERPL-MCNC: 114 MG/DL (ref 65–99)
HCT VFR BLD AUTO: 36.1 % (ref 42–52)
HGB BLD-MCNC: 12 G/DL (ref 14–18)
IMM GRANULOCYTES # BLD AUTO: 0.02 K/UL (ref 0–0.11)
IMM GRANULOCYTES NFR BLD AUTO: 0.6 % (ref 0–0.9)
INR PPP: 1.12 (ref 0.87–1.13)
LYMPHOCYTES # BLD AUTO: 0.3 K/UL (ref 1–4.8)
LYMPHOCYTES NFR BLD: 9 % (ref 22–41)
MCH RBC QN AUTO: 35.2 PG (ref 27–33)
MCHC RBC AUTO-ENTMCNC: 33.2 G/DL (ref 33.7–35.3)
MCV RBC AUTO: 105.9 FL (ref 81.4–97.8)
MONOCYTES # BLD AUTO: 0.25 K/UL (ref 0–0.85)
MONOCYTES NFR BLD AUTO: 7.5 % (ref 0–13.4)
NEUTROPHILS # BLD AUTO: 2.76 K/UL (ref 1.82–7.42)
NEUTROPHILS NFR BLD: 82.9 % (ref 44–72)
NRBC # BLD AUTO: 0 K/UL
NRBC BLD-RTO: 0 /100 WBC
PLATELET # BLD AUTO: 83 K/UL (ref 164–446)
PMV BLD AUTO: 10.8 FL (ref 9–12.9)
POTASSIUM SERPL-SCNC: 4 MMOL/L (ref 3.6–5.5)
PROT SERPL-MCNC: 6 G/DL (ref 6–8.2)
PROTHROMBIN TIME: 14.1 SEC (ref 12–14.6)
RBC # BLD AUTO: 3.41 M/UL (ref 4.7–6.1)
SODIUM SERPL-SCNC: 139 MMOL/L (ref 135–145)
WBC # BLD AUTO: 3.3 K/UL (ref 4.8–10.8)

## 2022-06-15 PROCEDURE — 77470 SPECIAL RADIATION TREATMENT: CPT | Performed by: RADIOLOGY

## 2022-06-15 PROCEDURE — 85610 PROTHROMBIN TIME: CPT

## 2022-06-15 PROCEDURE — 36415 COLL VENOUS BLD VENIPUNCTURE: CPT

## 2022-06-15 PROCEDURE — 80053 COMPREHEN METABOLIC PANEL: CPT

## 2022-06-15 PROCEDURE — 85025 COMPLETE CBC W/AUTO DIFF WBC: CPT

## 2022-06-15 PROCEDURE — 77263 THER RADIOLOGY TX PLNG CPLX: CPT | Performed by: RADIOLOGY

## 2022-06-15 PROCEDURE — 77470 SPECIAL RADIATION TREATMENT: CPT | Mod: 26 | Performed by: RADIOLOGY

## 2022-06-16 ENCOUNTER — HOSPITAL ENCOUNTER (OUTPATIENT)
Dept: CARDIOLOGY | Facility: MEDICAL CENTER | Age: 57
End: 2022-06-16
Attending: INTERNAL MEDICINE
Payer: MEDICARE

## 2022-06-16 DIAGNOSIS — C18.9 MALIGNANT NEOPLASM OF COLON, UNSPECIFIED PART OF COLON (HCC): ICD-10-CM

## 2022-06-16 LAB
LV EJECT FRACT  99904: 60
LV EJECT FRACT MOD 2C 99903: 74.2
LV EJECT FRACT MOD 4C 99902: 57.9
LV EJECT FRACT MOD BP 99901: 68.18

## 2022-06-16 PROCEDURE — 93306 TTE W/DOPPLER COMPLETE: CPT

## 2022-06-16 PROCEDURE — 93306 TTE W/DOPPLER COMPLETE: CPT | Mod: 26 | Performed by: INTERNAL MEDICINE

## 2022-06-17 ENCOUNTER — HOSPITAL ENCOUNTER (OUTPATIENT)
Facility: MEDICAL CENTER | Age: 57
End: 2022-06-17
Attending: STUDENT IN AN ORGANIZED HEALTH CARE EDUCATION/TRAINING PROGRAM | Admitting: STUDENT IN AN ORGANIZED HEALTH CARE EDUCATION/TRAINING PROGRAM
Payer: MEDICARE

## 2022-06-17 ENCOUNTER — APPOINTMENT (OUTPATIENT)
Dept: RADIOLOGY | Facility: MEDICAL CENTER | Age: 57
End: 2022-06-17
Attending: STUDENT IN AN ORGANIZED HEALTH CARE EDUCATION/TRAINING PROGRAM
Payer: MEDICARE

## 2022-06-17 VITALS
HEART RATE: 58 BPM | OXYGEN SATURATION: 95 % | BODY MASS INDEX: 22.8 KG/M2 | DIASTOLIC BLOOD PRESSURE: 55 MMHG | RESPIRATION RATE: 16 BRPM | WEIGHT: 177.69 LBS | TEMPERATURE: 96.8 F | SYSTOLIC BLOOD PRESSURE: 107 MMHG | HEIGHT: 74 IN

## 2022-06-17 DIAGNOSIS — C78.7 METASTATIC COLON CANCER TO LIVER (HCC): ICD-10-CM

## 2022-06-17 DIAGNOSIS — C18.9 METASTATIC COLON CANCER TO LIVER (HCC): ICD-10-CM

## 2022-06-17 LAB
ALBUMIN SERPL BCP-MCNC: 3.4 G/DL (ref 3.2–4.9)
ALP SERPL-CCNC: 169 U/L (ref 30–99)
ALT SERPL-CCNC: 44 U/L (ref 2–50)
AST SERPL-CCNC: 49 U/L (ref 12–45)
BILIRUB CONJ SERPL-MCNC: 0.4 MG/DL (ref 0.1–0.5)
BILIRUB INDIRECT SERPL-MCNC: 1.1 MG/DL (ref 0–1)
BILIRUB SERPL-MCNC: 1.5 MG/DL (ref 0.1–1.5)
GLUCOSE BLD STRIP.AUTO-MCNC: 95 MG/DL (ref 65–99)
PROT SERPL-MCNC: 5.2 G/DL (ref 6–8.2)

## 2022-06-17 PROCEDURE — A9540 TC99M MAA: HCPCS

## 2022-06-17 PROCEDURE — 700117 HCHG RX CONTRAST REV CODE 255: Performed by: STUDENT IN AN ORGANIZED HEALTH CARE EDUCATION/TRAINING PROGRAM

## 2022-06-17 PROCEDURE — 75726 ARTERY X-RAYS ABDOMEN: CPT | Mod: XS

## 2022-06-17 PROCEDURE — 700111 HCHG RX REV CODE 636 W/ 250 OVERRIDE (IP): Performed by: STUDENT IN AN ORGANIZED HEALTH CARE EDUCATION/TRAINING PROGRAM

## 2022-06-17 PROCEDURE — 36247 INS CATH ABD/L-EXT ART 3RD: CPT

## 2022-06-17 PROCEDURE — 36245 INS CATH ABD/L-EXT ART 1ST: CPT

## 2022-06-17 PROCEDURE — 99153 MOD SED SAME PHYS/QHP EA: CPT

## 2022-06-17 PROCEDURE — 82962 GLUCOSE BLOOD TEST: CPT

## 2022-06-17 PROCEDURE — 700105 HCHG RX REV CODE 258: Performed by: STUDENT IN AN ORGANIZED HEALTH CARE EDUCATION/TRAINING PROGRAM

## 2022-06-17 PROCEDURE — 700111 HCHG RX REV CODE 636 W/ 250 OVERRIDE (IP)

## 2022-06-17 PROCEDURE — 160046 HCHG PACU - 1ST 60 MINS PHASE II

## 2022-06-17 PROCEDURE — 160036 HCHG PACU - EA ADDL 30 MINS PHASE I

## 2022-06-17 PROCEDURE — 160035 HCHG PACU - 1ST 60 MINS PHASE I

## 2022-06-17 PROCEDURE — 160002 HCHG RECOVERY MINUTES (STAT)

## 2022-06-17 PROCEDURE — 80076 HEPATIC FUNCTION PANEL: CPT

## 2022-06-17 RX ORDER — ONDANSETRON 2 MG/ML
4 INJECTION INTRAMUSCULAR; INTRAVENOUS PRN
Status: ACTIVE | OUTPATIENT
Start: 2022-06-17 | End: 2022-06-17

## 2022-06-17 RX ORDER — MIDAZOLAM HYDROCHLORIDE 1 MG/ML
.5-2 INJECTION INTRAMUSCULAR; INTRAVENOUS PRN
Status: ACTIVE | OUTPATIENT
Start: 2022-06-17 | End: 2022-06-17

## 2022-06-17 RX ORDER — SODIUM CHLORIDE 9 MG/ML
INJECTION, SOLUTION INTRAVENOUS CONTINUOUS
Status: DISCONTINUED | OUTPATIENT
Start: 2022-06-17 | End: 2022-06-17 | Stop reason: HOSPADM

## 2022-06-17 RX ORDER — LIDOCAINE AND PRILOCAINE 25; 25 MG/G; MG/G
CREAM TOPICAL PRN
Status: DISCONTINUED | OUTPATIENT
Start: 2022-06-17 | End: 2022-06-17 | Stop reason: HOSPADM

## 2022-06-17 RX ORDER — MIDAZOLAM HYDROCHLORIDE 1 MG/ML
INJECTION INTRAMUSCULAR; INTRAVENOUS
Status: COMPLETED
Start: 2022-06-17 | End: 2022-06-17

## 2022-06-17 RX ORDER — LIDOCAINE HYDROCHLORIDE 10 MG/ML
INJECTION, SOLUTION EPIDURAL; INFILTRATION; INTRACAUDAL; PERINEURAL
Status: COMPLETED
Start: 2022-06-17 | End: 2022-06-17

## 2022-06-17 RX ORDER — SODIUM CHLORIDE 9 MG/ML
500 INJECTION, SOLUTION INTRAVENOUS
Status: ACTIVE | OUTPATIENT
Start: 2022-06-17 | End: 2022-06-17

## 2022-06-17 RX ORDER — ONDANSETRON 2 MG/ML
8 INJECTION INTRAMUSCULAR; INTRAVENOUS ONCE
Status: DISCONTINUED | OUTPATIENT
Start: 2022-06-17 | End: 2022-06-17

## 2022-06-17 RX ADMIN — FENTANYL CITRATE 50 MCG: 50 INJECTION, SOLUTION INTRAMUSCULAR; INTRAVENOUS at 08:28

## 2022-06-17 RX ADMIN — MIDAZOLAM HYDROCHLORIDE 0.5 MG: 1 INJECTION, SOLUTION INTRAMUSCULAR; INTRAVENOUS at 08:55

## 2022-06-17 RX ADMIN — IOHEXOL 100 ML: 350 INJECTION, SOLUTION INTRAVENOUS at 09:30

## 2022-06-17 RX ADMIN — MIDAZOLAM HYDROCHLORIDE 1 MG: 1 INJECTION, SOLUTION INTRAMUSCULAR; INTRAVENOUS at 08:28

## 2022-06-17 RX ADMIN — FENTANYL CITRATE 25 MCG: 50 INJECTION, SOLUTION INTRAMUSCULAR; INTRAVENOUS at 09:12

## 2022-06-17 RX ADMIN — SODIUM CHLORIDE: 9 INJECTION, SOLUTION INTRAVENOUS at 07:21

## 2022-06-17 RX ADMIN — LIDOCAINE HYDROCHLORIDE 5 ML: 10 INJECTION, SOLUTION EPIDURAL; INFILTRATION; INTRACAUDAL; PERINEURAL at 07:21

## 2022-06-17 ASSESSMENT — FIBROSIS 4 INDEX: FIB4 SCORE: 5.77

## 2022-06-17 NOTE — H&P
History and Physical    Date: 2022    PCP: Dunia Smith P.A.-C.      CC: Metastatic CRC    HPI: This is a 57 y.o. male who is presenting with metastatic CRC    Past Medical History:   Diagnosis Date   • Bowel habit changes 06/10/2022    constipation intermittently   • Cancer (HCC) 2018    Colon CA with Liver Mets   • Dental disorder 2022    full upper and lower plates   • Diabetes (HCC) 2022    medicated   • Hiatus hernia syndrome    • Indigestion    • Pain     liver    • Pain 06/10/2022    left shoulder pain   • Psychiatric problem 2022    depression/anxiety   • Sleep apnea 06/10/2022    no longer using CPAP   • Snoring        Past Surgical History:   Procedure Laterality Date   • COLONOSCOPY         Current Facility-Administered Medications   Medication Dose Route Frequency Provider Last Rate Last Admin   • NS infusion   Intravenous Continuous Con Crenshaw M.D. 125 mL/hr at 22 0721 New Bag at 22 0721   • lidocaine-prilocaine (EMLA) 2.5-2.5 % cream   Topical PRN Con Crenshaw M.D.            Social History     Socioeconomic History   • Marital status:      Spouse name: Not on file   • Number of children: Not on file   • Years of education: Not on file   • Highest education level: Not on file   Occupational History   • Not on file   Tobacco Use   • Smoking status: Former Smoker     Packs/day: 1.00     Years: 15.00     Pack years: 15.00     Quit date: 1998     Years since quittin.4   • Smokeless tobacco: Never Used   Vaping Use   • Vaping Use: Never used   Substance and Sexual Activity   • Alcohol use: No   • Drug use: Yes     Types: Inhaled, Marijuana     Comment: daily at , last use    • Sexual activity: Not on file   Other Topics Concern   • Not on file   Social History Narrative   • Not on file     Social Determinants of Health     Financial Resource Strain: Not on file   Food Insecurity: Not on file   Transportation Needs: Not on file    Physical Activity: Not on file   Stress: Not on file   Social Connections: Not on file   Intimate Partner Violence: Not on file   Housing Stability: Not on file       Family History   Problem Relation Age of Onset   • Hypertension Mother    • Hypertension Father    • Diabetes Father    • Diabetes Brother        Allergies:  Compazine, Oxaliplatin, and Bee venom    Review of Systems:  Negative except for none    Physical Exam    Vital Signs  Blood Pressure: (!) 99/51   Temperature: 36.3 °C (97.3 °F)   Pulse: (!) 56   Respiration: 16   Pulse Oximetry: 95 %        Labs:  Recent Labs     06/15/22  0931   WBC 3.3*   RBC 3.41*   HEMOGLOBIN 12.0*   HEMATOCRIT 36.1*   .9*   MCH 35.2*   MCHC 33.2*   RDW 64.7*   PLATELETCT 83*   MPV 10.8     Recent Labs     06/15/22  0931   SODIUM 139   POTASSIUM 4.0   CHLORIDE 106   CO2 25   GLUCOSE 114*   BUN 16   CREATININE 0.50   CALCIUM 9.2     Recent Labs     06/15/22  0931   INR 1.12     Recent Labs     06/15/22  0931   ASTSGOT 59*   ALTSGPT 51*   TBILIRUBIN 1.4   ALKPHOSPHAT 196*   GLOBULIN 2.0   INR 1.12       Radiology:  NM-QUANTITATIVE LUNG SCAN    (Results Pending)   IR-EMBOLIZATION    (Results Pending)             Assessment and Plan:This is a 57 y.o. with metastatic CRC for planning hepatic arteriogram and MAA injection

## 2022-06-17 NOTE — PROGRESS NOTES
Patient in pre-op, assessment completed, patient and daughter Gem updated on plan of care, all questions answered, no further needs at this time, call light within reach.

## 2022-06-17 NOTE — DISCHARGE INSTRUCTIONS
"  ACTIVITY: Rest and take it easy for the first 24 hours.  A responsible adult is recommended to remain with you during that time.  It is normal to feel sleepy.  We encourage you to not do anything that requires balance, judgment or coordination.    MILD FLU-LIKE SYMPTOMS ARE NORMAL. YOU MAY EXPERIENCE GENERALIZED MUSCLE ACHES, THROAT IRRITATION, HEADACHE AND/OR SOME NAUSEA.    FOR 24 HOURS DO NOT:  Drive, operate machinery or run household appliances.  Drink beer or alcoholic beverages.   Make important decisions or sign legal documents.    SPECIAL INSTRUCTIONS: Post Angiogram Groin Care Instructions     INSTRUCTIONS  Examine (look and feel) the site of your incision site TODAY so you can recognize changes that should be called to your doctor (see below).  Avoid straining either by lifting or pulling objects for 4-5 days. Avoid lifting over 5 pounds.   For at least 72 hours, if you should sneeze or cough, please hold pressure over your groin area.  If you should begin to have oozing from the catheterization site, please hold firm pressure and call your doctor's office immediately.  If profuse bleeding occurs from the catheterization site, hold firm pressure and call \"911\" immediately for assistance.  Remove bandage after 24 hours.     ACTIVITY  Limit activity as instructed by your doctor.  No driving or very limited driving with frequent stops for one week.   If you must take a long car ride, stop every hour and walk around the car.   Warm showers or baths are permitted after the bandage is removed. Avoid hot showers, baths, hot tubs, and swimming for one week.    PLEASE CALL YOUR DOCTOR IF:  Temperature elevation occurs.  Catheterization site becomes reddened or begins to drain.   Bruising appears to be new or not resolving. The bruise may move down your leg. This is normal.  The small round lump in the groin increases in size.  Any leg numbness, aching, or discomfort (immediately).  Increasing discomfort in the " leg at the insertion site.  Chest pains, even if relieved by Nitroglycerin.    MISCELLANEOUS INSTRUCTIONS  Bruising may occur as a result of heart catheterization. Some of the discoloration may travel down the leg, going from blue to green in color.  A small round lump under the catheterization site will remain for up to six weeks.  If any questions arise call your physician's office.   You should call 911 if you develop problems with breathing or chest pain.      DIET: To avoid nausea, slowly advance diet as tolerated, avoiding spicy or greasy foods for the first day.  Add more substantial food to your diet according to your physician's instructions.  Babies can be fed formula or breast milk as soon as they are hungry.  INCREASE FLUIDS AND FIBER TO AVOID CONSTIPATION.    SURGICAL DRESSING/BATHING: may remove dressing in 24 hours and then shower    FOLLOW-UP APPOINTMENT:  A follow-up appointment should be arranged with your doctor in ; call to schedule.    You should CALL YOUR PHYSICIAN if you develop:  Fever greater than 101 degrees F.  Pain not relieved by medication, or persistent nausea or vomiting.  Excessive bleeding (blood soaking through dressing) or unexpected drainage from the wound.  Extreme redness or swelling around the incision site, drainage of pus or foul smelling drainage.  Inability to urinate or empty your bladder within 8 hours.  Problems with breathing or chest pain.    You should call 911 if you develop problems with breathing or chest pain.  If you are unable to contact your doctor or surgical center, you should go to the nearest emergency room or urgent care center.  Physician's telephone #: [phone]    If any questions arise, call your doctor.  If your doctor is not available, please feel free to call the Surgical Center at [phone].     A registered nurse may call you a few days after your surgery to see how you are doing after your procedure.    MEDICATIONS: Resume taking daily  medication.  Take prescribed pain medication with food.  If no medication is prescribed, you may take non-aspirin pain medication if needed.  PAIN MEDICATION CAN BE VERY CONSTIPATING.  Take a stool softener or laxative such as senokot, pericolace, or milk of magnesia if needed.    If your physician has prescribed pain medication that includes Acetaminophen (Tylenol), do not take additional Acetaminophen (Tylenol) while taking the prescribed medication.    Depression / Suicide Risk    As you are discharged from this [facility] facility, it is important to learn how to keep safe from harming yourself.    Recognize the warning signs:  Abrupt changes in personality, positive or negative- including increase in energy   Giving away possessions  Change in eating patterns- significant weight changes-  positive or negative  Change in sleeping patterns- unable to sleep or sleeping all the time   Unwillingness or inability to communicate  Depression  Unusual sadness, discouragement and loneliness  Talk of wanting to die  Neglect of personal appearance   Rebelliousness- reckless behavior  Withdrawal from people/activities they love  Confusion- inability to concentrate     If you or a loved one observes any of these behaviors or has concerns about self-harm, here's what you can do:  Talk about it- your feelings and reasons for harming yourself  Remove any means that you might use to hurt yourself (examples: pills, rope, extension cords, firearm)  Get professional help from the community (Mental Health, Substance Abuse, psychological counseling)  Do not be alone:Call your Safe Contact- someone whom you trust who will be there for you.  Call your local CRISIS HOTLINE 403-0177 or [phone]  Call your local Children's Mobile Crisis Response Team [place] (335) 546-6169 or www.[employer]  Call the toll free National Suicide Prevention Hotlines   National Suicide Prevention Lifeline 878-463-EFWO (6243)  National [place]  Line Network 800-SUICIDE (849-6430)

## 2022-06-17 NOTE — OR SURGEON
Immediate Post- Operative Note        Findings: Right lobe CRC mets      Procedure(s): Planning hepatic angiogram and MAA injection      Estimated Blood Loss: Less than 5 ml        Complications: None            6/17/2022     9:41 AM     Con Crenshaw M.D.

## 2022-06-17 NOTE — OR NURSING
PACU note- Respirations easy.  Right groin clean and dry, no hematoma.  Palpable pedal pulses.  Patient knows to lay flat with right leg straight.

## 2022-06-17 NOTE — OR NURSING
1125  Right groin wthout bleeding or hematoma, palpable pedal pulses, denies pain and nausea.  Voided 425 cc clear lucero urine in urinal.

## 2022-06-17 NOTE — Clinical Note
NucMed Scan continues with approx 4 min to go.  Patient is tolerating well and without incident.  Report called to PACU LINDSAY Hardy.  IRISH

## 2022-06-17 NOTE — Clinical Note
Patient to Nuclear Medicine for post MMA administration scan.  Patient is alert and oriented, VSS, in NAD.  R Groin site clean dry and intact, soft with no hematoma.  Patient denies any needs at this time.  Nuc Med Scan in progress.  SDJ

## 2022-06-17 NOTE — PROGRESS NOTES
Patient to IR for Y-90 Angiogram with Mapping.  Patient met in [facility] Pre[facility] identified per hospital standards and interviewed for pre procedure assessment.  Patient allergies/chart/labs/meds/history all reviewed.  Dr. Crenshaw was at bedside for informed consent.  Procedure has been explained to patient and all questions have been answered.  Signed and witnessed consent form present and confirmed on patient chart.  Patient denies any questions and verbalized understanding.  Patient to IR-2 and placed on table and prepped per sterile fashion.  All safety/positioning/monitoring equipment present and in use.  Please see Epic flowsheets/IR narrator for complete case details.  IRISH    Successful completion of Y-90 Angiogram/Mapping.  Patient tolerated procedure and sedation well and without incident.  Post procedure nuclear medicine scan completed as well without incident.  Patient accompanied to [facility] PACU by this RN in NAD, with VSS.  IRISH

## 2022-06-17 NOTE — Clinical Note
Angiograms remain in progress - Patient tolerating procedure well and without incident.  He denies any pain/discomfort/needs at this time.  IRISH

## 2022-06-17 NOTE — OR NURSING
Bedrest completed per orders. Pt verbalizes readiness for discharge. Instructions reviewed with pt and pt's family by Omar MONTOYA.   Pt taken to car via wheelchair by RN. No further needs.

## 2022-06-18 PROCEDURE — 75726 ARTERY X-RAYS ABDOMEN: CPT | Mod: XS

## 2022-06-27 ENCOUNTER — HOSPITAL ENCOUNTER (OUTPATIENT)
Dept: RADIOLOGY | Facility: MEDICAL CENTER | Age: 57
End: 2022-06-27
Attending: STUDENT IN AN ORGANIZED HEALTH CARE EDUCATION/TRAINING PROGRAM | Admitting: STUDENT IN AN ORGANIZED HEALTH CARE EDUCATION/TRAINING PROGRAM
Payer: MEDICARE

## 2022-06-27 DIAGNOSIS — C78.7 METASTATIC COLON CANCER TO LIVER (HCC): ICD-10-CM

## 2022-06-27 DIAGNOSIS — C18.9 METASTATIC COLON CANCER TO LIVER (HCC): ICD-10-CM

## 2022-07-01 ENCOUNTER — HOSPITAL ENCOUNTER (OUTPATIENT)
Dept: LAB | Facility: MEDICAL CENTER | Age: 57
End: 2022-07-01
Attending: INTERNAL MEDICINE
Payer: MEDICARE

## 2022-07-01 LAB
ALBUMIN SERPL BCP-MCNC: 3.9 G/DL (ref 3.2–4.9)
ALBUMIN/GLOB SERPL: 1.9 G/DL
ALP SERPL-CCNC: 231 U/L (ref 30–99)
ALT SERPL-CCNC: 50 U/L (ref 2–50)
ANION GAP SERPL CALC-SCNC: 8 MMOL/L (ref 7–16)
AST SERPL-CCNC: 60 U/L (ref 12–45)
BASOPHILS # BLD AUTO: 0.9 % (ref 0–1.8)
BASOPHILS # BLD: 0.02 K/UL (ref 0–0.12)
BILIRUB SERPL-MCNC: 1.2 MG/DL (ref 0.1–1.5)
BUN SERPL-MCNC: 11 MG/DL (ref 8–22)
CALCIUM SERPL-MCNC: 8.9 MG/DL (ref 8.5–10.5)
CEA SERPL-MCNC: 49.1 NG/ML (ref 0–3)
CHLORIDE SERPL-SCNC: 107 MMOL/L (ref 96–112)
CO2 SERPL-SCNC: 25 MMOL/L (ref 20–33)
CREAT SERPL-MCNC: 0.52 MG/DL (ref 0.5–1.4)
EOSINOPHIL # BLD AUTO: 0.09 K/UL (ref 0–0.51)
EOSINOPHIL NFR BLD: 4.2 % (ref 0–6.9)
ERYTHROCYTE [DISTWIDTH] IN BLOOD BY AUTOMATED COUNT: 61.3 FL (ref 35.9–50)
GFR SERPLBLD CREATININE-BSD FMLA CKD-EPI: 117 ML/MIN/1.73 M 2
GLOBULIN SER CALC-MCNC: 2.1 G/DL (ref 1.9–3.5)
GLUCOSE SERPL-MCNC: 96 MG/DL (ref 65–99)
HCT VFR BLD AUTO: 34.5 % (ref 42–52)
HGB BLD-MCNC: 11.4 G/DL (ref 14–18)
IMM GRANULOCYTES # BLD AUTO: 0.01 K/UL (ref 0–0.11)
IMM GRANULOCYTES NFR BLD AUTO: 0.5 % (ref 0–0.9)
LYMPHOCYTES # BLD AUTO: 0.46 K/UL (ref 1–4.8)
LYMPHOCYTES NFR BLD: 21.4 % (ref 22–41)
MAGNESIUM SERPL-MCNC: 2 MG/DL (ref 1.5–2.5)
MCH RBC QN AUTO: 34.4 PG (ref 27–33)
MCHC RBC AUTO-ENTMCNC: 33 G/DL (ref 33.7–35.3)
MCV RBC AUTO: 104.2 FL (ref 81.4–97.8)
MONOCYTES # BLD AUTO: 0.29 K/UL (ref 0–0.85)
MONOCYTES NFR BLD AUTO: 13.5 % (ref 0–13.4)
NEUTROPHILS # BLD AUTO: 1.28 K/UL (ref 1.82–7.42)
NEUTROPHILS NFR BLD: 59.5 % (ref 44–72)
NRBC # BLD AUTO: 0 K/UL
NRBC BLD-RTO: 0 /100 WBC
PLATELET # BLD AUTO: 85 K/UL (ref 164–446)
PMV BLD AUTO: 12.3 FL (ref 9–12.9)
POTASSIUM SERPL-SCNC: 3.6 MMOL/L (ref 3.6–5.5)
PROT SERPL-MCNC: 6 G/DL (ref 6–8.2)
RBC # BLD AUTO: 3.31 M/UL (ref 4.7–6.1)
SODIUM SERPL-SCNC: 140 MMOL/L (ref 135–145)
WBC # BLD AUTO: 2.2 K/UL (ref 4.8–10.8)

## 2022-07-01 PROCEDURE — 85025 COMPLETE CBC W/AUTO DIFF WBC: CPT

## 2022-07-01 PROCEDURE — 82378 CARCINOEMBRYONIC ANTIGEN: CPT

## 2022-07-01 PROCEDURE — 80053 COMPREHEN METABOLIC PANEL: CPT

## 2022-07-01 PROCEDURE — 83735 ASSAY OF MAGNESIUM: CPT

## 2022-07-01 PROCEDURE — 36415 COLL VENOUS BLD VENIPUNCTURE: CPT

## 2022-07-04 ENCOUNTER — OUTPATIENT INFUSION SERVICES (OUTPATIENT)
Dept: ONCOLOGY | Facility: MEDICAL CENTER | Age: 57
End: 2022-07-04
Attending: INTERNAL MEDICINE
Payer: MEDICARE

## 2022-07-04 VITALS
HEIGHT: 73 IN | HEART RATE: 70 BPM | WEIGHT: 181.44 LBS | DIASTOLIC BLOOD PRESSURE: 62 MMHG | BODY MASS INDEX: 24.05 KG/M2 | RESPIRATION RATE: 18 BRPM | TEMPERATURE: 97.2 F | OXYGEN SATURATION: 98 % | SYSTOLIC BLOOD PRESSURE: 119 MMHG

## 2022-07-04 DIAGNOSIS — C78.7 METASTATIC COLON CANCER TO LIVER (HCC): ICD-10-CM

## 2022-07-04 DIAGNOSIS — C18.9 METASTATIC COLON CANCER TO LIVER (HCC): ICD-10-CM

## 2022-07-04 PROCEDURE — 96375 TX/PRO/DX INJ NEW DRUG ADDON: CPT

## 2022-07-04 PROCEDURE — 700105 HCHG RX REV CODE 258: Performed by: INTERNAL MEDICINE

## 2022-07-04 PROCEDURE — 700111 HCHG RX REV CODE 636 W/ 250 OVERRIDE (IP): Performed by: INTERNAL MEDICINE

## 2022-07-04 PROCEDURE — 96413 CHEMO IV INFUSION 1 HR: CPT

## 2022-07-04 PROCEDURE — A4212 NON CORING NEEDLE OR STYLET: HCPCS

## 2022-07-04 PROCEDURE — 304540 HCHG NITRO SET VENT 2ND TUB

## 2022-07-04 RX ORDER — CITALOPRAM HYDROBROMIDE 10 MG/1
TABLET ORAL
COMMUNITY
Start: 2022-06-02 | End: 2022-11-03

## 2022-07-04 RX ORDER — HEPARIN SODIUM (PORCINE) LOCK FLUSH IV SOLN 100 UNIT/ML 100 UNIT/ML
500 SOLUTION INTRAVENOUS PRN
Status: DISCONTINUED | OUTPATIENT
Start: 2022-07-04 | End: 2022-07-04 | Stop reason: HOSPADM

## 2022-07-04 RX ADMIN — DEXAMETHASONE SODIUM PHOSPHATE 12 MG: 4 INJECTION, SOLUTION INTRA-ARTICULAR; INTRALESIONAL; INTRAMUSCULAR; INTRAVENOUS; SOFT TISSUE at 08:44

## 2022-07-04 RX ADMIN — FAM-TRASTUZUMAB DERUXTECAN-NXKI 526.7 MG: 100 INJECTION, POWDER, LYOPHILIZED, FOR SOLUTION INTRAVENOUS at 09:06

## 2022-07-04 RX ADMIN — HEPARIN 500 UNITS: 100 SYRINGE at 09:52

## 2022-07-04 RX ADMIN — ONDANSETRON HYDROCHLORIDE 16 MG: 2 INJECTION, SOLUTION INTRAMUSCULAR; INTRAVENOUS at 08:19

## 2022-07-04 ASSESSMENT — FIBROSIS 4 INDEX: FIB4 SCORE: 5.79

## 2022-07-04 NOTE — PROGRESS NOTES
Chemotherapy Verification - PRIMARY RN      Height = 186 cm  Weight = 82.3 kg  BSA = 2.06 m2       Medication: fam-trastuzumab deruztecan (Enhertu)  Dose: 6.4 mg/kg  Calculated Dose: 526.72 mg                             (In mg/m2, AUC, mg/kg)       I confirm this process was performed independently with the BSA and all final chemotherapy dosing calculations congruent.  Any discrepancies of 10% or greater have been addressed with the chemotherapy pharmacist. The resolution of the discrepancy has been documented in the EPIC progress notes.

## 2022-07-04 NOTE — PROGRESS NOTES
"Pharmacy Chemotherapy Verification  Patient name: Gurmeet Jo  Dx: mCRC    Regimen: Fam-trastuzumab deruxtecan  Fam-trastuzumab deruxtecan 6.4 mg/kg IV over 90 minutes Day 1  Q21-day cycle until progression or toxicity  NCCN guidelines for Colon Cancer v3.2021  (cetuximab + chemo) Christine ARGUELLO et al, OBEY 2017;317(23):0378-6186  (HER2 tx in HER2+ CRC) Kayleen RINALDI et al. Lancet Oncol. 2019 Apr;20(4):518-530.    Allergies: Patient has no known allergies.  /62   Pulse 70   Temp 36.2 °C (97.2 °F) (Temporal)   Resp 18   Ht 1.86 m (6' 1.23\")   Wt 82.3 kg (181 lb 7 oz)   SpO2 98%   BMI 23.79 kg/m²  Body surface area is 2.06 meters squared.     All lab results 7/1/22 within treatment parameters.      ECHO (OK for 6 months per Cody LEO order)  2/21/22 TTE LVEF ~65%      Drug Order   (Drug name, dose, route, IV Fluid & volume, frequency, number of doses) Cycle 16  Previous treatment: 6/13/22      Medication = Fam-trastuzumab deruxtecan (Enhertu)  Base Dose = 6.4 mg/kg  Calc Dose: Base Dose x 82.3kg = 526.7mg  Final Dose = 526.7mg  Route = IV  Fluid & Volume = D5W 100 mL  Admin Duration = Over 30  minutes     <10% difference, okay to treat with final dose     By my signature below, I confirm this process was performed independently with the BSA and all final chemotherapy dosing calculations congruent. I have reviewed the above chemotherapy order and that my calculation of the final dose and BSA (when applicable) corroborate those calculations of the  pharmacist. Discrepancies of 10% or greater in the written dose have been addressed and documented within the [facility] Progress notes.    Shari Amador, PharmD        "

## 2022-07-04 NOTE — PROGRESS NOTES
"Pharmacy Chemotherapy Verification  Patient name: Gurmeet Jo  Dx: mCRC    Cycle 16  Previous treatment: 6/13/22    Regimen: Fam-trastuzumab deruxtecan  Fam-trastuzumab deruxtecan 6.4 mg/kg IV over 90 minutes Day 1   Q21-day cycle until progression or toxicity  NCCN guidelines for Colon Cancer v3.2021  (cetuximab + chemo) Christine ARGUELLO et al, OBEY 2017;317(23):1786-2839  (HER2 tx in HER2+ CRC) Kayleen RINALDI et al. Lancet Oncol. 2019 Apr;20(4):518-530.    Allergies: Patient has no known allergies.  /62   Pulse 70   Temp 36.2 °C (97.2 °F) (Temporal)   Resp 18   Ht 1.86 m (6' 1.23\")   Wt 82.3 kg (181 lb 7 oz)   SpO2 98%   BMI 23.79 kg/m²  Body surface area is 2.06 meters squared.     ECHO (OK for 6 months per Cody LEO order)  6/16/22 LVEF 60%    Labs from 7/1/22 reviewed - all within treatment parameters.      Fam-trastuzumab deruxtecan (Enhertu) 6.4 mg/kg x 82.3 kg kg = 526.7 mg   <10% difference, ok to treat with final dose = 526.7 mg IV    Elsy Londono, PharmD, BCOP        "

## 2022-07-04 NOTE — PROGRESS NOTES
Chemotherapy Verification - SECONDARY RN       Height = 186 cm  Weight = 82.3 kg  BSA = 2.06 m2       Medication: Enhertu  Dose: 6.4 mg/kg  Calculated Dose: 526.72 mg                             (In mg/m2, AUC, mg/kg)     I confirm that this process was performed independently.

## 2022-07-04 NOTE — PROGRESS NOTES
Patient in the clinic for C16 Enhertu for his colon cancer. He denies any acute concerns. Neuropathy within his baseline and has not affected his ADLs. Echocardiogram done 6/16/22 with LVEF of 60%. Labs from 7/1/22 within parameters for today's treatment. Port accessed using sterile technique. Site asymptomatic. Flushes well with good blood return. Enhertu given per MAR. Tolerated well. Port flushed with saline and heparin per protocol then de-accessed. Site asymptomatic. RTC in 3 weeks for next cycle as scheduled.

## 2022-07-08 ENCOUNTER — HOSPITAL ENCOUNTER (OUTPATIENT)
Facility: MEDICAL CENTER | Age: 57
End: 2022-07-08
Attending: STUDENT IN AN ORGANIZED HEALTH CARE EDUCATION/TRAINING PROGRAM | Admitting: STUDENT IN AN ORGANIZED HEALTH CARE EDUCATION/TRAINING PROGRAM
Payer: MEDICARE

## 2022-07-08 ENCOUNTER — APPOINTMENT (OUTPATIENT)
Dept: RADIOLOGY | Facility: MEDICAL CENTER | Age: 57
End: 2022-07-08
Attending: STUDENT IN AN ORGANIZED HEALTH CARE EDUCATION/TRAINING PROGRAM
Payer: MEDICARE

## 2022-07-08 VITALS
OXYGEN SATURATION: 98 % | RESPIRATION RATE: 15 BRPM | DIASTOLIC BLOOD PRESSURE: 66 MMHG | HEIGHT: 74 IN | HEART RATE: 65 BPM | TEMPERATURE: 96.8 F | WEIGHT: 177.47 LBS | BODY MASS INDEX: 22.78 KG/M2 | SYSTOLIC BLOOD PRESSURE: 115 MMHG

## 2022-07-08 DIAGNOSIS — C18.9 METASTATIC COLON CANCER TO LIVER (HCC): ICD-10-CM

## 2022-07-08 DIAGNOSIS — C78.7 METASTATIC COLON CANCER TO LIVER (HCC): ICD-10-CM

## 2022-07-08 LAB
GLUCOSE BLD STRIP.AUTO-MCNC: 101 MG/DL (ref 65–99)
INR PPP: 1.17 (ref 0.87–1.13)
PROTHROMBIN TIME: 14.7 SEC (ref 12–14.6)

## 2022-07-08 PROCEDURE — 160036 HCHG PACU - EA ADDL 30 MINS PHASE I

## 2022-07-08 PROCEDURE — 99153 MOD SED SAME PHYS/QHP EA: CPT

## 2022-07-08 PROCEDURE — 700117 HCHG RX CONTRAST REV CODE 255: Performed by: STUDENT IN AN ORGANIZED HEALTH CARE EDUCATION/TRAINING PROGRAM

## 2022-07-08 PROCEDURE — 82962 GLUCOSE BLOOD TEST: CPT

## 2022-07-08 PROCEDURE — 160002 HCHG RECOVERY MINUTES (STAT)

## 2022-07-08 PROCEDURE — 700111 HCHG RX REV CODE 636 W/ 250 OVERRIDE (IP): Performed by: STUDENT IN AN ORGANIZED HEALTH CARE EDUCATION/TRAINING PROGRAM

## 2022-07-08 PROCEDURE — 36247 INS CATH ABD/L-EXT ART 3RD: CPT

## 2022-07-08 PROCEDURE — 700101 HCHG RX REV CODE 250: Performed by: STUDENT IN AN ORGANIZED HEALTH CARE EDUCATION/TRAINING PROGRAM

## 2022-07-08 PROCEDURE — 85610 PROTHROMBIN TIME: CPT

## 2022-07-08 PROCEDURE — 700111 HCHG RX REV CODE 636 W/ 250 OVERRIDE (IP)

## 2022-07-08 PROCEDURE — 160046 HCHG PACU - 1ST 60 MINS PHASE II

## 2022-07-08 PROCEDURE — 160035 HCHG PACU - 1ST 60 MINS PHASE I

## 2022-07-08 PROCEDURE — 700105 HCHG RX REV CODE 258: Performed by: STUDENT IN AN ORGANIZED HEALTH CARE EDUCATION/TRAINING PROGRAM

## 2022-07-08 PROCEDURE — 78803 RP LOCLZJ TUM SPECT 1 AREA: CPT | Mod: MH

## 2022-07-08 RX ORDER — ONDANSETRON 2 MG/ML
8 INJECTION INTRAMUSCULAR; INTRAVENOUS
Status: DISCONTINUED | OUTPATIENT
Start: 2022-07-08 | End: 2022-07-08

## 2022-07-08 RX ORDER — MIDAZOLAM HYDROCHLORIDE 1 MG/ML
INJECTION INTRAMUSCULAR; INTRAVENOUS
Status: COMPLETED
Start: 2022-07-08 | End: 2022-07-08

## 2022-07-08 RX ORDER — ONDANSETRON 2 MG/ML
4 INJECTION INTRAMUSCULAR; INTRAVENOUS PRN
Status: ACTIVE | OUTPATIENT
Start: 2022-07-08 | End: 2022-07-08

## 2022-07-08 RX ORDER — SODIUM CHLORIDE 9 MG/ML
INJECTION, SOLUTION INTRAVENOUS CONTINUOUS
Status: DISCONTINUED | OUTPATIENT
Start: 2022-07-08 | End: 2022-07-08 | Stop reason: HOSPADM

## 2022-07-08 RX ORDER — SODIUM CHLORIDE 9 MG/ML
1000 INJECTION, SOLUTION INTRAVENOUS
Status: DISCONTINUED | OUTPATIENT
Start: 2022-07-08 | End: 2022-07-08

## 2022-07-08 RX ORDER — MIDAZOLAM HYDROCHLORIDE 1 MG/ML
.5-2 INJECTION INTRAMUSCULAR; INTRAVENOUS PRN
Status: ACTIVE | OUTPATIENT
Start: 2022-07-08 | End: 2022-07-08

## 2022-07-08 RX ORDER — SODIUM CHLORIDE 9 MG/ML
500 INJECTION, SOLUTION INTRAVENOUS
Status: ACTIVE | OUTPATIENT
Start: 2022-07-08 | End: 2022-07-08

## 2022-07-08 RX ADMIN — MIDAZOLAM HYDROCHLORIDE 2 MG: 1 INJECTION INTRAMUSCULAR; INTRAVENOUS at 08:55

## 2022-07-08 RX ADMIN — FENTANYL CITRATE 50 MCG: 50 INJECTION, SOLUTION INTRAMUSCULAR; INTRAVENOUS at 08:55

## 2022-07-08 RX ADMIN — WATER 2 G: 1000 INJECTION, SOLUTION INTRAVENOUS at 07:33

## 2022-07-08 RX ADMIN — MIDAZOLAM HYDROCHLORIDE 2 MG: 1 INJECTION, SOLUTION INTRAMUSCULAR; INTRAVENOUS at 08:55

## 2022-07-08 RX ADMIN — SODIUM CHLORIDE: 9 INJECTION, SOLUTION INTRAVENOUS at 07:32

## 2022-07-08 RX ADMIN — IOHEXOL 80 ML: 350 INJECTION, SOLUTION INTRAVENOUS at 11:15

## 2022-07-08 ASSESSMENT — PAIN DESCRIPTION - PAIN TYPE: TYPE: SURGICAL PAIN

## 2022-07-08 ASSESSMENT — FIBROSIS 4 INDEX: FIB4 SCORE: 5.79

## 2022-07-08 NOTE — OR SURGEON
Immediate Post- Operative Note        Findings: Right hepatic lobe CRC mets      Procedure(s): Y90 radioembolization      Estimated Blood Loss: Less than 5 ml        Complications: None            7/8/2022     10:43 AM     Con Crenshaw M.D.

## 2022-07-08 NOTE — OR NURSING
Dr. Crenshaw notified that pt took Zofran 8 mg PO @ 0640.  Received order to discontinue IV zofran order.

## 2022-07-08 NOTE — OR NURSING
1124: Pt arrived from OR, handoff received from anesthesiologist and RN. Dressing to R groin with gauze and tegaderm C/D/I, soft. 2+ pedal pulse palpated.     1215: Call made to patient's daughter to update patient status.     1255: Handoff to LINDSAY Eubanks in phase 2.

## 2022-07-08 NOTE — PROGRESS NOTES
Patient to IR for Y-90 Radioembolization treatment with moderate sedation.  Patient met in [place] [facility] identified per hospital standards and interviewed for procedure.  Patient chart/allergies/labs/meds/history all reviewed. Signed and witnessed consent form present and is on the chart.  Patient brought to IR2 and placed on table and prepped per sterile fashion.  All safety/positioning/monitoring equipment present and in use.  Please see intra procedure narrator/EPIC flowsheets for complete case details.  IRISH    0854 - Procedure delay due to late MD arrival.  Room Ready at 0829 MD arrival 0854.  IRISH    Successful completion of procedure as outlined above to anterior and posterior right hepatic artery.  Patient tolerated procedure and sedation well and without incident. R Fem access site sealed with AngioSeal as documented via Epic.  Report called to Chayo Hyatt PACU RN. Patient to Nuclear medicine at 1057 for post administration scan and will be escorted to PACU when complete.  Patient is alert and oriented with VSS in NAD and denies any pain/discomfort and or needs at this time.  IRISH

## 2022-07-08 NOTE — DISCHARGE INSTRUCTIONS
What to Expect Post Anesthesia    Rest and take it easy for the first 24 hours.  A responsible adult is recommended to remain with you during that time.  It is normal to feel sleepy.  We encourage you to not do anything that requires balance, judgment or coordination.    FOR 24 HOURS DO NOT:  Drive, operate machinery or run household appliances.  Drink beer or alcoholic beverages.  Make important decisions or sign legal documents.    To avoid nausea, slowly advance diet as tolerated, avoiding spicy or greasy foods for the first day.  Add more substantial food to your diet according to your provider's instructions.  Babies can be fed formula or breast milk as soon as they are hungry.  INCREASE FLUIDS AND FIBER TO AVOID CONSTIPATION.    MILD FLU-LIKE SYMPTOMS ARE NORMAL.  YOU MAY EXPERIENCE GENERALIZED MUSCLE ACHES, THROAT IRRITATION, HEADACHE AND/OR SOME NAUSEA.    Internal Radiation Therapy, Care After  This sheet gives you information about how to care for yourself after your procedure. Your health care provider may also give you more specific instructions. If you have problems or questions, contact your health care provider.  What can I expect after the procedure?  After the procedure, it is common to have:  Soreness, bruising, or swelling.  Nausea or vomiting.  Diarrhea.  Fatigue.  Other side effects may occur, depending on which part of your body was exposed to radiation and how much radiation was used. Most side effects are usually temporary and get better over time. It can take up to 3-4 weeks for you to regain your energy or for side effects to get better.  Follow these instructions at home:  Activity  Most people can return to normal activities a few days or weeks after the procedure. Ask your health care provider what activities are safe for you.  Rest as told by your health care provider.  Driving    Do not drive until your health care provider approves.  If you were given a medicine to help you relax  (sedative) during your procedure, do not drive for 24 hours.  Do not drive or use heavy machinery while taking prescription pain medicine.  Incision and delivery site care    If you have an incision, follow instructions from your health care provider about how to take care of your incision. Make sure you:  Change your bandage (dressing) as told by your health care provider.  Wash your hands with soap and water before you change your dressing. If soap and water are not available, use hand .  Leave stitches (sutures), skin glue, or adhesive strips in place. These skin closures may need to stay in place for 2 weeks or longer. If adhesive strip edges start to loosen and curl up, you may trim the loose edges. Do not remove adhesive strips completely unless your health care provider tells you to do that.  If you have an incision, do not take baths, swim, or use a hot tub until your health care provider approves. Ask your health care provider if you may take showers. You may only be allowed to take sponge baths.  Check the area where your implant was placed (delivery site) every day for signs of infection. If you have an incision, you should also check that area every day. Check for:  Redness, swelling, or pain.  Fluid or blood.  Warmth.  Pus or a bad smell.  Medicines  Take over-the-counter and prescription medicines only as told by your health care provider.  If you were prescribed an antibiotic medicine, take it as told by your health care provider.  Lifestyle  Do not use any products that contain nicotine or tobacco, such as cigarettes and e-cigarettes. These can delay healing. If you need help quitting, ask your health care provider.  Try to eat regular, healthy meals. Some of your treatments might affect your appetite.  General instructions  If you are taking prescription pain medicine, take actions to prevent or treat constipation. Your health care provider may recommend that you:  Drink enough fluid to  keep your urine pale yellow.  Eat foods that are high in fiber, such as fresh fruits and vegetables, whole grains, and beans.  Limit foods that are high in fat and processed sugars, such as fried or sweet foods.  Take an over-the-counter or prescription medicine for constipation.  Certain types of implants may set off metal or radiation detectors at airport security checkpoints. Carry a note from your health care provider to explain that you have an implant.  Ask your health care provider if you should avoid contact with others (especially children and pregnant women) for a period of time after treatment.  Keep all follow-up visits as told by your health care provider. This is important. You will need follow-up to determine how well the radiation therapy worked.  Contact a health care provider if you have:  A fever.  Any of the following around your delivery site or incision:  Redness, swelling, or pain.  Fluid or blood.  Warmth.  Pus or a bad smell.  Nausea or fatigue that does not go away after several days.  Get help right away if you:  Have difficulty breathing.  Feel like you are going to faint.  Have severe pain.  Have severe vomiting or diarrhea.  Summary  Most people can return to normal activities a few days or weeks after treatment. Ask your health care provider what activities are safe for you.  Ask your health care provider if you should avoid contact with children and pregnant women for a period of time after your treatment.  Check your delivery site every day for signs of infection. If you have an incision, check that area every day too.  Keep all follow-up visits as told by your health care provider. This is important.  Make sure you know what symptoms should cause you to contact a health care provider or get help right away.  This information is not intended to replace advice given to you by your health care provider. Make sure you discuss any questions you have with your health care  "provider.  Document Released: 08/03/2005 Document Revised: 12/26/2019 Document Reviewed: 11/12/2018  [employer] Patient Education © 2020 [employer]      Groin Care Instructions     INSTRUCTIONS  Examine (look and feel) the site of your incision site TODAY so you can recognize changes that should be called to your doctor (see below).  Avoid straining either by lifting or pulling objects for 4-5 days. Avoid lifting over 5 pounds.   For at least 72 hours, if you should sneeze or cough, please hold pressure over your groin area.  If you should begin to have oozing from the catheterization site, please hold firm pressure and call your doctor's office immediately.  If profuse bleeding occurs from the catheterization site, hold firm pressure and call \"911\" immediately for assistance.  Remove bandage after 24 hours.     ACTIVITY  Limit activity as instructed by your doctor.  No driving or very limited driving with frequent stops for one week.   If you must take a long car ride, stop every hour and walk around the car.   Warm showers or baths are permitted after the bandage is removed. Avoid hot showers, baths, hot tubs, and swimming for one week.    PLEASE CALL YOUR DOCTOR IF:  Temperature elevation occurs.  Catheterization site becomes reddened or begins to drain.   Bruising appears to be new or not resolving. The bruise may move down your leg. This is normal.  The small round lump in the groin increases in size.  Any leg numbness, aching, or discomfort (immediately).  Increasing discomfort in the leg at the insertion site.  Chest pains, even if relieved by Nitroglycerin.    MISCELLANEOUS INSTRUCTIONS  Bruising may occur as a result of heart catheterization. Some of the discoloration may travel down the leg, going from blue to green in color.  A small round lump under the catheterization site will remain for up to six weeks.  If any questions arise call your physician's office. The Contact Center is open Monday through " Friday 7AM to 5PM and may speak to a nurse at [phone], or toll free at [phone].   You should call 911 if you develop problems with breathing or chest pain.    FOR PROBLEMS CALL MIKE Crenshaw AT: [phone]    I acknowledge receipt and understanding of these Home Care instructions.

## 2022-07-08 NOTE — OR NURSING
Pt arrived in Phase 2 at 1259, ambulated to recliner chair, VSS, right groin gaaue/transparent film DSG CDI, soft, right pedal pulse 2+, ambulated to BR, able to void, d/c instructions reviewed with pt and daughter, he verbalized understanding of instructions, PIV removed, tip intact, discharged via w/c to home with daughter at 1331.

## 2022-07-11 ENCOUNTER — HOSPITAL ENCOUNTER (OUTPATIENT)
Dept: RADIATION ONCOLOGY | Facility: MEDICAL CENTER | Age: 57
End: 2022-07-31
Attending: RADIOLOGY
Payer: MEDICARE

## 2022-07-11 PROCEDURE — 77295 3-D RADIOTHERAPY PLAN: CPT | Mod: 26 | Performed by: RADIOLOGY

## 2022-07-11 PROCEDURE — 77295 3-D RADIOTHERAPY PLAN: CPT | Performed by: RADIOLOGY

## 2022-07-12 DIAGNOSIS — C78.7 METASTATIC COLON CANCER TO LIVER (HCC): ICD-10-CM

## 2022-07-12 DIAGNOSIS — K76.9 LIVER DISEASE: ICD-10-CM

## 2022-07-12 DIAGNOSIS — C18.9 METASTATIC COLON CANCER TO LIVER (HCC): ICD-10-CM

## 2022-07-12 PROCEDURE — 77370 RADIATION PHYSICS CONSULT: CPT | Performed by: RADIOLOGY

## 2022-07-13 ENCOUNTER — TELEPHONE (OUTPATIENT)
Dept: RADIATION ONCOLOGY | Facility: MEDICAL CENTER | Age: 57
End: 2022-07-13
Payer: MEDICAID

## 2022-07-14 ENCOUNTER — HOSPITAL ENCOUNTER (OUTPATIENT)
Dept: RADIOLOGY | Facility: MEDICAL CENTER | Age: 57
End: 2022-07-14
Attending: INTERNAL MEDICINE
Payer: COMMERCIAL

## 2022-07-19 RX ORDER — DEXTROSE MONOHYDRATE 50 MG/ML
INJECTION, SOLUTION INTRAVENOUS CONTINUOUS
Status: CANCELLED | OUTPATIENT
Start: 2022-07-26

## 2022-07-19 RX ORDER — 0.9 % SODIUM CHLORIDE 0.9 %
10 VIAL (ML) INJECTION PRN
Status: CANCELLED | OUTPATIENT
Start: 2022-07-26

## 2022-07-19 RX ORDER — 0.9 % SODIUM CHLORIDE 0.9 %
VIAL (ML) INJECTION PRN
Status: CANCELLED | OUTPATIENT
Start: 2022-07-24

## 2022-07-19 RX ORDER — HEPARIN SODIUM (PORCINE) LOCK FLUSH IV SOLN 100 UNIT/ML 100 UNIT/ML
500 SOLUTION INTRAVENOUS PRN
Status: CANCELLED | OUTPATIENT
Start: 2022-07-26

## 2022-07-19 RX ORDER — 0.9 % SODIUM CHLORIDE 0.9 %
10 VIAL (ML) INJECTION PRN
Status: CANCELLED | OUTPATIENT
Start: 2022-07-24

## 2022-07-19 RX ORDER — METHYLPREDNISOLONE SODIUM SUCCINATE 125 MG/2ML
125 INJECTION, POWDER, LYOPHILIZED, FOR SOLUTION INTRAMUSCULAR; INTRAVENOUS PRN
Status: CANCELLED | OUTPATIENT
Start: 2022-07-26

## 2022-07-19 RX ORDER — 0.9 % SODIUM CHLORIDE 0.9 %
3 VIAL (ML) INJECTION PRN
Status: CANCELLED | OUTPATIENT
Start: 2022-07-24

## 2022-07-19 RX ORDER — PROCHLORPERAZINE MALEATE 10 MG
10 TABLET ORAL EVERY 6 HOURS PRN
Status: CANCELLED | OUTPATIENT
Start: 2022-07-26

## 2022-07-19 RX ORDER — 0.9 % SODIUM CHLORIDE 0.9 %
VIAL (ML) INJECTION PRN
Status: CANCELLED | OUTPATIENT
Start: 2022-07-26

## 2022-07-19 RX ORDER — EPINEPHRINE 1 MG/ML(1)
0.5 AMPUL (ML) INJECTION PRN
Status: CANCELLED | OUTPATIENT
Start: 2022-07-26

## 2022-07-19 RX ORDER — ONDANSETRON 2 MG/ML
4 INJECTION INTRAMUSCULAR; INTRAVENOUS PRN
Status: CANCELLED | OUTPATIENT
Start: 2022-07-26

## 2022-07-19 RX ORDER — DIPHENHYDRAMINE HYDROCHLORIDE 50 MG/ML
50 INJECTION INTRAMUSCULAR; INTRAVENOUS PRN
Status: CANCELLED | OUTPATIENT
Start: 2022-07-26

## 2022-07-19 RX ORDER — HEPARIN SODIUM (PORCINE) LOCK FLUSH IV SOLN 100 UNIT/ML 100 UNIT/ML
500 SOLUTION INTRAVENOUS PRN
Status: CANCELLED | OUTPATIENT
Start: 2022-07-24

## 2022-07-19 RX ORDER — 0.9 % SODIUM CHLORIDE 0.9 %
3 VIAL (ML) INJECTION PRN
Status: CANCELLED | OUTPATIENT
Start: 2022-07-26

## 2022-07-19 RX ORDER — ONDANSETRON 8 MG/1
8 TABLET, ORALLY DISINTEGRATING ORAL PRN
Status: CANCELLED | OUTPATIENT
Start: 2022-07-26

## 2022-07-24 NOTE — PROGRESS NOTES
"Pharmacy Chemotherapy Verification  Patient name: Gurmeet Jo  Dx: mCRC    Regimen: Fam-trastuzumab deruxtecan  Fam-trastuzumab deruxtecan 6.4 mg/kg IV over 90 minutes Day 1  Q21-day cycle until progression or toxicity  NCCN guidelines for Colon Cancer v3.2021  (cetuximab + chemo) Christine ARGUELLO et al, OBEY 2017;317(23):4294-1882  (HER2 tx in HER2+ CRC) Kayleen RINALDI et al. Lancet Oncol. 2019 Apr;20(4):518-530.    Allergies: Patient has no known allergies.  /66   Pulse 72   Temp 36.8 °C (98.3 °F) (Temporal)   Resp 18   Ht 1.86 m (6' 1.23\")   Wt 81 kg (178 lb 9.2 oz)   SpO2 98%   BMI 23.41 kg/m²  Body surface area is 2.05 meters squared.     Labs 7/25/2022:  ANC~ 1800 Plt = 115k   Hgb = 11.4     SCr = 0.48mg/dL CrCl ~ 131mL/min (min Scr 0.7 used)  AST/ALT/ALP = 58/33/236 TBili = 1.2        ECHO (OK for 6 months per Cody LEO order)  2/21/22 TTE LVEF ~65%      Drug Order   (Drug name, dose, route, IV Fluid & volume, frequency, number of doses) Cycle 17  Previous treatment: 7/4/2022      Medication = Fam-trastuzumab deruxtecan (Enhertu)  Base Dose = 6.4 mg/kg  Calc Dose: Base Dose x 81kg =518.4mg  Final Dose =518.4mg  Route = IV  Fluid & Volume = D5W 100 mL  Admin Duration = Over 30  minutes     <10% difference, okay to treat with final dose     By my signature below, I confirm this process was performed independently with the BSA and all final chemotherapy dosing calculations congruent. I have reviewed the above chemotherapy order and that my calculation of the final dose and BSA (when applicable) corroborate those calculations of the  pharmacist. Discrepancies of 10% or greater in the written dose have been addressed and documented within the [facility] Progress notes.    Nadia Mitchell, PharmD, BCPS        "

## 2022-07-25 ENCOUNTER — OUTPATIENT INFUSION SERVICES (OUTPATIENT)
Dept: ONCOLOGY | Facility: MEDICAL CENTER | Age: 57
End: 2022-07-25
Attending: INTERNAL MEDICINE
Payer: MEDICARE

## 2022-07-25 VITALS
SYSTOLIC BLOOD PRESSURE: 108 MMHG | DIASTOLIC BLOOD PRESSURE: 66 MMHG | OXYGEN SATURATION: 98 % | RESPIRATION RATE: 18 BRPM | HEART RATE: 72 BPM | HEIGHT: 73 IN | WEIGHT: 178.57 LBS | TEMPERATURE: 98.3 F | BODY MASS INDEX: 23.67 KG/M2

## 2022-07-25 DIAGNOSIS — C18.9 METASTATIC COLON CANCER TO LIVER (HCC): ICD-10-CM

## 2022-07-25 DIAGNOSIS — C78.7 METASTATIC COLON CANCER TO LIVER (HCC): ICD-10-CM

## 2022-07-25 LAB
ALBUMIN SERPL BCP-MCNC: 3.6 G/DL (ref 3.2–4.9)
ALBUMIN/GLOB SERPL: 1.8 G/DL
ALP SERPL-CCNC: 236 U/L (ref 30–99)
ALT SERPL-CCNC: 33 U/L (ref 2–50)
ANION GAP SERPL CALC-SCNC: 10 MMOL/L (ref 7–16)
AST SERPL-CCNC: 58 U/L (ref 12–45)
BASOPHILS # BLD AUTO: 1.1 % (ref 0–1.8)
BASOPHILS # BLD: 0.02 K/UL (ref 0–0.12)
BILIRUB SERPL-MCNC: 1.2 MG/DL (ref 0.1–1.5)
BUN SERPL-MCNC: 9 MG/DL (ref 8–22)
CALCIUM SERPL-MCNC: 9.1 MG/DL (ref 8.5–10.5)
CEA SERPL-MCNC: 41.8 NG/ML (ref 0–3)
CHLORIDE SERPL-SCNC: 107 MMOL/L (ref 96–112)
CO2 SERPL-SCNC: 23 MMOL/L (ref 20–33)
CREAT SERPL-MCNC: 0.48 MG/DL (ref 0.5–1.4)
EOSINOPHIL # BLD AUTO: 0.06 K/UL (ref 0–0.51)
EOSINOPHIL NFR BLD: 3.4 % (ref 0–6.9)
ERYTHROCYTE [DISTWIDTH] IN BLOOD BY AUTOMATED COUNT: 62.6 FL (ref 35.9–50)
GFR SERPLBLD CREATININE-BSD FMLA CKD-EPI: 120 ML/MIN/1.73 M 2
GLOBULIN SER CALC-MCNC: 2 G/DL (ref 1.9–3.5)
GLUCOSE SERPL-MCNC: 173 MG/DL (ref 65–99)
HCT VFR BLD AUTO: 33.5 % (ref 42–52)
HGB BLD-MCNC: 11.4 G/DL (ref 14–18)
IMM GRANULOCYTES # BLD AUTO: 0 K/UL (ref 0–0.11)
IMM GRANULOCYTES NFR BLD AUTO: 0 % (ref 0–0.9)
LYMPHOCYTES # BLD AUTO: 0.32 K/UL (ref 1–4.8)
LYMPHOCYTES NFR BLD: 18.2 % (ref 22–41)
MCH RBC QN AUTO: 35.3 PG (ref 27–33)
MCHC RBC AUTO-ENTMCNC: 34 G/DL (ref 33.7–35.3)
MCV RBC AUTO: 103.7 FL (ref 81.4–97.8)
MONOCYTES # BLD AUTO: 0.28 K/UL (ref 0–0.85)
MONOCYTES NFR BLD AUTO: 15.9 % (ref 0–13.4)
NEUTROPHILS # BLD AUTO: 1.08 K/UL (ref 1.82–7.42)
NEUTROPHILS NFR BLD: 61.4 % (ref 44–72)
NRBC # BLD AUTO: 0 K/UL
NRBC BLD-RTO: 0 /100 WBC
PLATELET # BLD AUTO: 115 K/UL (ref 164–446)
PMV BLD AUTO: 11.5 FL (ref 9–12.9)
POTASSIUM SERPL-SCNC: 3.6 MMOL/L (ref 3.6–5.5)
PROT SERPL-MCNC: 5.6 G/DL (ref 6–8.2)
RBC # BLD AUTO: 3.23 M/UL (ref 4.7–6.1)
SODIUM SERPL-SCNC: 140 MMOL/L (ref 135–145)
WBC # BLD AUTO: 1.8 K/UL (ref 4.8–10.8)

## 2022-07-25 PROCEDURE — 85025 COMPLETE CBC W/AUTO DIFF WBC: CPT

## 2022-07-25 PROCEDURE — 96413 CHEMO IV INFUSION 1 HR: CPT

## 2022-07-25 PROCEDURE — 80053 COMPREHEN METABOLIC PANEL: CPT

## 2022-07-25 PROCEDURE — 96375 TX/PRO/DX INJ NEW DRUG ADDON: CPT

## 2022-07-25 PROCEDURE — 700105 HCHG RX REV CODE 258: Performed by: INTERNAL MEDICINE

## 2022-07-25 PROCEDURE — 82378 CARCINOEMBRYONIC ANTIGEN: CPT

## 2022-07-25 PROCEDURE — 700111 HCHG RX REV CODE 636 W/ 250 OVERRIDE (IP): Mod: JW,TB | Performed by: INTERNAL MEDICINE

## 2022-07-25 PROCEDURE — A4212 NON CORING NEEDLE OR STYLET: HCPCS

## 2022-07-25 RX ORDER — HEPARIN SODIUM (PORCINE) LOCK FLUSH IV SOLN 100 UNIT/ML 100 UNIT/ML
500 SOLUTION INTRAVENOUS PRN
Status: DISCONTINUED | OUTPATIENT
Start: 2022-07-25 | End: 2022-07-25 | Stop reason: HOSPADM

## 2022-07-25 RX ADMIN — FAM-TRASTUZUMAB DERUXTECAN-NXKI 518.4 MG: 100 INJECTION, POWDER, LYOPHILIZED, FOR SOLUTION INTRAVENOUS at 10:50

## 2022-07-25 RX ADMIN — HEPARIN 500 UNITS: 100 SYRINGE at 11:30

## 2022-07-25 RX ADMIN — DEXAMETHASONE SODIUM PHOSPHATE 12 MG: 4 INJECTION, SOLUTION INTRA-ARTICULAR; INTRALESIONAL; INTRAMUSCULAR; INTRAVENOUS; SOFT TISSUE at 09:12

## 2022-07-25 RX ADMIN — ONDANSETRON HYDROCHLORIDE 16 MG: 2 INJECTION, SOLUTION INTRAMUSCULAR; INTRAVENOUS at 09:25

## 2022-07-25 ASSESSMENT — FIBROSIS 4 INDEX: FIB4 SCORE: 5.79

## 2022-07-25 NOTE — PROGRESS NOTES
Chemotherapy Verification - PRIMARY RN      Height = 1.86m  Weight = 81kg  BSA = 2.05m^2       Medication: fam-trastuzumab deruxtecan (Enhertu)  Dose: 6.4mg/kg  Calculated Dose: 518.4mg                             (In mg/m2, AUC, mg/kg)         I confirm this process was performed independently with the BSA and all final chemotherapy dosing calculations congruent.  Any discrepancies of 10% or greater have been addressed with the chemotherapy pharmacist. The resolution of the discrepancy has been documented in the EPIC progress notes.

## 2022-07-25 NOTE — PROGRESS NOTES
"Pharmacy Chemotherapy Verification  Patient name: Gurmeet Jo  Dx: mCRC    Cycle 17  Previous treatment: 7/4/22    Regimen: Fam-trastuzumab deruxtecan  Fam-trastuzumab deruxtecan 6.4 mg/kg IV over 90 minutes Day 1   Q21-day cycle until progression or toxicity  NCCN guidelines for Colon Cancer v3.2021  (cetuximab + chemo) Christine ARGUELLO et al, OBEY 2017;317(23):6337-3920  (HER2 tx in HER2+ CRC) Kayleen RINALDI et al. Lancet Oncol. 2019 Apr;20(4):518-530.    Allergies: Patient has no known allergies.  /66   Pulse 72   Temp 36.8 °C (98.3 °F) (Temporal)   Resp 18   Ht 1.86 m (6' 1.23\")   Wt 81 kg (178 lb 9.2 oz)   SpO2 98%   BMI 23.41 kg/m²  Body surface area is 2.05 meters squared.     ECHO (OK for 6 months per Cody LEO order)  6/16/22 LVEF 60%    Labs 7/25/22:  ANC~ 1080 Plt = 115k   Hgb = 11.4       Fam-trastuzumab deruxtecan (Enhertu) 6.4 mg/kg x 81 kg = 518.4 mg   <10% difference, ok to treat with final dose = 518.4 mg IV    José Marvin, PharmD        "

## 2022-07-25 NOTE — PROGRESS NOTES
Patient presents for Day 1 Cycle 17 of Enhertu. Port accessed using sterile technique. Labs drawn as ordered.  Labs reviewed and patient meets parameters for treatment. Echo completed 6/16/2022 and LVEF 60%. Pre-medications given as ordered. Enhertu infused as ordered. Patient tolerated well. Port flushed, heparin instilled, port de-accessed, gauzed and tape to site. Patient discharged to self care in no apparent distress. Scheduling emailed regarding further appointments.     Edwin from Lab called with critical result of WBC 1.8 at 0854. Critical lab result read back to Edwin.   Dr. Ross notified of critical lab result at 0918, no new orders received, ok to proceed with treatment.

## 2022-07-25 NOTE — PROGRESS NOTES
Chemotherapy Verification - SECONDARY RN       Height = 1.86 m  Weight = 81 kg  BSA = 2.05 m^2       Medication: fam-trastuzumab deruxtecan  Dose: 6.4 mg/kg  Calculated Dose: 518.4 mg                             (In mg/m2, AUC, mg/kg)     I confirm that this process was performed independently.

## 2022-08-09 RX ORDER — 0.9 % SODIUM CHLORIDE 0.9 %
3 VIAL (ML) INJECTION PRN
Status: CANCELLED | OUTPATIENT
Start: 2022-08-16

## 2022-08-09 RX ORDER — 0.9 % SODIUM CHLORIDE 0.9 %
VIAL (ML) INJECTION PRN
Status: CANCELLED | OUTPATIENT
Start: 2022-08-15

## 2022-08-09 RX ORDER — 0.9 % SODIUM CHLORIDE 0.9 %
10 VIAL (ML) INJECTION PRN
Status: CANCELLED | OUTPATIENT
Start: 2022-08-15

## 2022-08-09 RX ORDER — DIPHENHYDRAMINE HYDROCHLORIDE 50 MG/ML
50 INJECTION INTRAMUSCULAR; INTRAVENOUS PRN
Status: CANCELLED | OUTPATIENT
Start: 2022-08-16

## 2022-08-09 RX ORDER — PROCHLORPERAZINE MALEATE 10 MG
10 TABLET ORAL EVERY 6 HOURS PRN
Status: CANCELLED | OUTPATIENT
Start: 2022-08-16

## 2022-08-09 RX ORDER — 0.9 % SODIUM CHLORIDE 0.9 %
3 VIAL (ML) INJECTION PRN
Status: CANCELLED | OUTPATIENT
Start: 2022-08-15

## 2022-08-09 RX ORDER — EPINEPHRINE 1 MG/ML(1)
0.5 AMPUL (ML) INJECTION PRN
Status: CANCELLED | OUTPATIENT
Start: 2022-08-16

## 2022-08-09 RX ORDER — DEXTROSE MONOHYDRATE 50 MG/ML
INJECTION, SOLUTION INTRAVENOUS CONTINUOUS
Status: CANCELLED | OUTPATIENT
Start: 2022-08-16

## 2022-08-09 RX ORDER — ONDANSETRON 8 MG/1
8 TABLET, ORALLY DISINTEGRATING ORAL PRN
Status: CANCELLED | OUTPATIENT
Start: 2022-08-16

## 2022-08-09 RX ORDER — 0.9 % SODIUM CHLORIDE 0.9 %
10 VIAL (ML) INJECTION PRN
Status: CANCELLED | OUTPATIENT
Start: 2022-08-16

## 2022-08-09 RX ORDER — ONDANSETRON 2 MG/ML
4 INJECTION INTRAMUSCULAR; INTRAVENOUS PRN
Status: CANCELLED | OUTPATIENT
Start: 2022-08-16

## 2022-08-09 RX ORDER — HEPARIN SODIUM (PORCINE) LOCK FLUSH IV SOLN 100 UNIT/ML 100 UNIT/ML
500 SOLUTION INTRAVENOUS PRN
Status: CANCELLED | OUTPATIENT
Start: 2022-08-15

## 2022-08-09 RX ORDER — METHYLPREDNISOLONE SODIUM SUCCINATE 125 MG/2ML
125 INJECTION, POWDER, LYOPHILIZED, FOR SOLUTION INTRAMUSCULAR; INTRAVENOUS PRN
Status: CANCELLED | OUTPATIENT
Start: 2022-08-16

## 2022-08-09 RX ORDER — 0.9 % SODIUM CHLORIDE 0.9 %
VIAL (ML) INJECTION PRN
Status: CANCELLED | OUTPATIENT
Start: 2022-08-16

## 2022-08-09 RX ORDER — HEPARIN SODIUM (PORCINE) LOCK FLUSH IV SOLN 100 UNIT/ML 100 UNIT/ML
500 SOLUTION INTRAVENOUS PRN
Status: CANCELLED | OUTPATIENT
Start: 2022-08-16

## 2022-08-12 ENCOUNTER — HOSPITAL ENCOUNTER (OUTPATIENT)
Facility: MEDICAL CENTER | Age: 57
End: 2022-08-12
Attending: INTERNAL MEDICINE
Payer: MEDICARE

## 2022-08-12 ENCOUNTER — HOSPITAL ENCOUNTER (OUTPATIENT)
Dept: LAB | Facility: MEDICAL CENTER | Age: 57
End: 2022-08-12
Attending: NURSE PRACTITIONER
Payer: MEDICARE

## 2022-08-12 DIAGNOSIS — C18.9 METASTATIC COLON CANCER TO LIVER (HCC): ICD-10-CM

## 2022-08-12 DIAGNOSIS — C78.7 METASTATIC COLON CANCER TO LIVER (HCC): ICD-10-CM

## 2022-08-12 LAB
BASOPHILS # BLD AUTO: 1 % (ref 0–1.8)
BASOPHILS # BLD: 0.02 K/UL (ref 0–0.12)
EOSINOPHIL # BLD AUTO: 0.08 K/UL (ref 0–0.51)
EOSINOPHIL NFR BLD: 3.9 % (ref 0–6.9)
ERYTHROCYTE [DISTWIDTH] IN BLOOD BY AUTOMATED COUNT: 63.2 FL (ref 35.9–50)
HCT VFR BLD AUTO: 31.8 % (ref 42–52)
HGB BLD-MCNC: 10.6 G/DL (ref 14–18)
IMM GRANULOCYTES # BLD AUTO: 0.01 K/UL (ref 0–0.11)
IMM GRANULOCYTES NFR BLD AUTO: 0.5 % (ref 0–0.9)
INR PPP: 1.22 (ref 0.87–1.13)
LYMPHOCYTES # BLD AUTO: 0.31 K/UL (ref 1–4.8)
LYMPHOCYTES NFR BLD: 15.2 % (ref 22–41)
MCH RBC QN AUTO: 35 PG (ref 27–33)
MCHC RBC AUTO-ENTMCNC: 33.3 G/DL (ref 33.7–35.3)
MCV RBC AUTO: 105 FL (ref 81.4–97.8)
MONOCYTES # BLD AUTO: 0.31 K/UL (ref 0–0.85)
MONOCYTES NFR BLD AUTO: 15.2 % (ref 0–13.4)
NEUTROPHILS # BLD AUTO: 1.31 K/UL (ref 1.82–7.42)
NEUTROPHILS NFR BLD: 64.2 % (ref 44–72)
NRBC # BLD AUTO: 0 K/UL
NRBC BLD-RTO: 0 /100 WBC
PLATELET # BLD AUTO: 81 K/UL (ref 164–446)
PMV BLD AUTO: 12.2 FL (ref 9–12.9)
PROTHROMBIN TIME: 15.2 SEC (ref 12–14.6)
RBC # BLD AUTO: 3.03 M/UL (ref 4.7–6.1)
WBC # BLD AUTO: 2 K/UL (ref 4.8–10.8)

## 2022-08-12 PROCEDURE — 80053 COMPREHEN METABOLIC PANEL: CPT

## 2022-08-12 PROCEDURE — 82378 CARCINOEMBRYONIC ANTIGEN: CPT

## 2022-08-12 PROCEDURE — 85025 COMPLETE CBC W/AUTO DIFF WBC: CPT

## 2022-08-12 PROCEDURE — 83735 ASSAY OF MAGNESIUM: CPT

## 2022-08-12 PROCEDURE — 85610 PROTHROMBIN TIME: CPT

## 2022-08-12 PROCEDURE — 36415 COLL VENOUS BLD VENIPUNCTURE: CPT

## 2022-08-13 LAB
ALBUMIN SERPL BCP-MCNC: 3.6 G/DL (ref 3.2–4.9)
ALBUMIN/GLOB SERPL: 1.7 G/DL
ALP SERPL-CCNC: 256 U/L (ref 30–99)
ALT SERPL-CCNC: 26 U/L (ref 2–50)
ANION GAP SERPL CALC-SCNC: 9 MMOL/L (ref 7–16)
AST SERPL-CCNC: 48 U/L (ref 12–45)
BILIRUB SERPL-MCNC: 1.2 MG/DL (ref 0.1–1.5)
BUN SERPL-MCNC: 9 MG/DL (ref 8–22)
CALCIUM SERPL-MCNC: 8.7 MG/DL (ref 8.5–10.5)
CEA SERPL-MCNC: 12.4 NG/ML (ref 0–3)
CHLORIDE SERPL-SCNC: 110 MMOL/L (ref 96–112)
CO2 SERPL-SCNC: 23 MMOL/L (ref 20–33)
CREAT SERPL-MCNC: 0.47 MG/DL (ref 0.5–1.4)
GFR SERPLBLD CREATININE-BSD FMLA CKD-EPI: 120 ML/MIN/1.73 M 2
GLOBULIN SER CALC-MCNC: 2.1 G/DL (ref 1.9–3.5)
GLUCOSE SERPL-MCNC: 109 MG/DL (ref 65–99)
MAGNESIUM SERPL-MCNC: 1.9 MG/DL (ref 1.5–2.5)
POTASSIUM SERPL-SCNC: 3.5 MMOL/L (ref 3.6–5.5)
PROT SERPL-MCNC: 5.7 G/DL (ref 6–8.2)
SODIUM SERPL-SCNC: 142 MMOL/L (ref 135–145)

## 2022-08-13 NOTE — PROGRESS NOTES
"Pharmacy Chemotherapy Verification  Patient name: Gurmeet Jo  Dx: mCRC    Cycle 18  Previous treatment: 7/25/22    Regimen: Fam-trastuzumab deruxtecan  Fam-trastuzumab deruxtecan 6.4 mg/kg IV over 90 minutes Day 1  Q21-day cycle until progression or toxicity  NCCN guidelines for Colon Cancer v3.2021  (cetuximab + chemo) Christine ARGUELLO et al, OBEY 2017;317(23):9393-0573  (HER2 tx in HER2+ CRC) Kayleen RINALDI et al. Lancet Oncol. 2019 Apr;20(4):518-530.    Allergies: Patient has no known allergies.  /61   Pulse 61   Temp 36.6 °C (97.8 °F) (Temporal)   Resp 18   Ht 1.85 m (6' 0.84\")   Wt 89.1 kg (196 lb 6.9 oz)   SpO2 98%   BMI 26.03 kg/m²  Body surface area is 2.14 meters squared.     Labs 8/12/22  ANC 1310 Hgb 10.6 Plt 81k    ECHO (OK for 6 months per Cody LEO order)  6/16/22 LVEF 60%    Fam-trastuzumab deruxtecan (Enhertu) 6.4 mg/kg x 89.1 kg = 570.24 mg   <10% difference, ok to treat with final dose = 570.2 mg IV    Jeni Ponce, PharmD, BCOP        "

## 2022-08-15 ENCOUNTER — OUTPATIENT INFUSION SERVICES (OUTPATIENT)
Dept: ONCOLOGY | Facility: MEDICAL CENTER | Age: 57
End: 2022-08-15
Attending: INTERNAL MEDICINE
Payer: MEDICARE

## 2022-08-15 VITALS
SYSTOLIC BLOOD PRESSURE: 109 MMHG | DIASTOLIC BLOOD PRESSURE: 61 MMHG | RESPIRATION RATE: 18 BRPM | HEIGHT: 73 IN | OXYGEN SATURATION: 98 % | WEIGHT: 196.43 LBS | HEART RATE: 61 BPM | BODY MASS INDEX: 26.03 KG/M2 | TEMPERATURE: 97.8 F

## 2022-08-15 DIAGNOSIS — C78.7 METASTATIC COLON CANCER TO LIVER (HCC): ICD-10-CM

## 2022-08-15 DIAGNOSIS — C18.9 METASTATIC COLON CANCER TO LIVER (HCC): ICD-10-CM

## 2022-08-15 PROCEDURE — 700105 HCHG RX REV CODE 258: Performed by: INTERNAL MEDICINE

## 2022-08-15 PROCEDURE — 96413 CHEMO IV INFUSION 1 HR: CPT

## 2022-08-15 PROCEDURE — 96375 TX/PRO/DX INJ NEW DRUG ADDON: CPT

## 2022-08-15 PROCEDURE — A4212 NON CORING NEEDLE OR STYLET: HCPCS

## 2022-08-15 PROCEDURE — 304540 HCHG NITRO SET VENT 2ND TUB

## 2022-08-15 PROCEDURE — 96367 TX/PROPH/DG ADDL SEQ IV INF: CPT

## 2022-08-15 PROCEDURE — 700111 HCHG RX REV CODE 636 W/ 250 OVERRIDE (IP): Performed by: INTERNAL MEDICINE

## 2022-08-15 RX ORDER — HEPARIN SODIUM (PORCINE) LOCK FLUSH IV SOLN 100 UNIT/ML 100 UNIT/ML
500 SOLUTION INTRAVENOUS PRN
Status: DISCONTINUED | OUTPATIENT
Start: 2022-08-15 | End: 2022-08-15 | Stop reason: HOSPADM

## 2022-08-15 RX ADMIN — DEXAMETHASONE SODIUM PHOSPHATE 12 MG: 4 INJECTION, SOLUTION INTRA-ARTICULAR; INTRALESIONAL; INTRAMUSCULAR; INTRAVENOUS; SOFT TISSUE at 08:53

## 2022-08-15 RX ADMIN — ONDANSETRON HYDROCHLORIDE 16 MG: 2 INJECTION, SOLUTION INTRAMUSCULAR; INTRAVENOUS at 08:27

## 2022-08-15 RX ADMIN — HEPARIN 500 UNITS: 100 SYRINGE at 10:31

## 2022-08-15 RX ADMIN — FAM-TRASTUZUMAB DERUXTECAN-NXKI 570.2 MG: 100 INJECTION, POWDER, LYOPHILIZED, FOR SOLUTION INTRAVENOUS at 09:43

## 2022-08-15 ASSESSMENT — FIBROSIS 4 INDEX: FIB4 SCORE: 6.74

## 2022-08-15 NOTE — PROGRESS NOTES
Chemotherapy Verification - PRIMARY RN      Height = 185  Weight = 89.1  BSA = 2.14 m2       Medication: fam-trastuzumab deruxtecan  Dose: 6.4 mg/kg    Calculated Dose: 570.2 mg                              I confirm this process was performed independently with the BSA and all final chemotherapy dosing calculations congruent.  Any discrepancies of 10% or greater have been addressed with the chemotherapy pharmacist. The resolution of the discrepancy has been documented in the EPIC progress notes.

## 2022-08-15 NOTE — PROGRESS NOTES
Patient presented to [facility] for Cycle 18 Enhertu.  Patient denied changes in condition.  Port accessed in sterile fashion. Flushed easily, ashley briskly. Pre-treatment labs drawn on 8/12/22. Results within treatable parameters. Potassium was noted to be 3.5. Patient takes potassium supplement at home. He was educated about supplementing potassium through his diet. Pre-meds given as ordered. Enhertu infused over 30 minutes through inline filter.  Treatment tolerated well. Port flushed with NS and Heparin per policy, then de-accessed. Adkins needle tip remained intact. Gauze dressing to site. Patient left [facility] in no apparent distress. Next appointment confirmed prior to departure.

## 2022-08-15 NOTE — PROGRESS NOTES
"Pharmacy Chemotherapy Verification  Patient name: Gurmeet Jo  Dx: mCRC    Regimen: Fam-trastuzumab deruxtecan  Fam-trastuzumab deruxtecan 6.4 mg/kg IV over 90 minutes Day 1  Q21-day cycle until progression or toxicity  NCCN guidelines for Colon Cancer v3.2021  (cetuximab + chemo) Christine ARGUELLO et al, OBEY 2017;317(23):0119-2289  (HER2 tx in HER2+ CRC) Kayleen RINALDI et al. Lancet Oncol. 2019 Apr;20(4):518-530.    Allergies: Patient has no known allergies.  /61   Pulse 61   Temp 36.6 °C (97.8 °F) (Temporal)   Resp 18   Ht 1.85 m (6' 0.84\")   Wt 89.1 kg (196 lb 6.9 oz)   SpO2 98%   BMI 26.03 kg/m²  Body surface area is 2.14 meters squared.     Labs 8/12/22:  ANC~ 1310 Plt = 81k   Hgb = 10.6     SCr = 0.47mg/dL CrCl ~ 144 mL/min (using min. Scr of 0.7)   AST/ALT/AP = 48/26/256 TBili = 1.2        ECHO (OK for 6 months per Cody LEO order)  6/16/22 TTE LVEF ~60%      Drug Order   (Drug name, dose, route, IV Fluid & volume, frequency, number of doses) Cycle 18  Previous treatment: C17 7/25/2022      Medication = Fam-trastuzumab deruxtecan (Enhertu)  Base Dose = 6.4 mg/kg  Calc Dose: Base Dose x 89.1 kg =570.2 mg  Final Dose = 570.2 mg  Route = IV  Fluid & Volume = D5W 100 mL  Admin Duration = Over 30  minutes     <10% difference, okay to treat with final dose     By my signature below, I confirm this process was performed independently with the BSA and all final chemotherapy dosing calculations congruent. I have reviewed the above chemotherapy order and that my calculation of the final dose and BSA (when applicable) corroborate those calculations of the  pharmacist. Discrepancies of 10% or greater in the written dose have been addressed and documented within the [facility] Progress notes.    Chris Hurtado, PharmD        "

## 2022-08-15 NOTE — PROGRESS NOTES
Chemotherapy Verification - SECONDARY RN       Height = 185 cm  Weight = 89.1 kg  BSA = 2.14 m2       Medication: Enhertu  Dose: 6.4 mg/kg  Calculated Dose: 570.2 mg                             (In mg/m2, AUC, mg/kg)     I confirm that this process was performed independently.

## 2022-08-26 ENCOUNTER — APPOINTMENT (OUTPATIENT)
Dept: RADIOLOGY | Facility: MEDICAL CENTER | Age: 57
End: 2022-08-26
Attending: NURSE PRACTITIONER
Payer: MEDICARE

## 2022-08-30 ENCOUNTER — APPOINTMENT (OUTPATIENT)
Dept: RADIOLOGY | Facility: MEDICAL CENTER | Age: 57
End: 2022-08-30
Attending: NURSE PRACTITIONER
Payer: MEDICARE

## 2022-08-31 RX ORDER — ONDANSETRON 2 MG/ML
4 INJECTION INTRAMUSCULAR; INTRAVENOUS PRN
Status: CANCELLED | OUTPATIENT
Start: 2022-09-06

## 2022-08-31 RX ORDER — 0.9 % SODIUM CHLORIDE 0.9 %
VIAL (ML) INJECTION PRN
Status: CANCELLED | OUTPATIENT
Start: 2022-09-06

## 2022-08-31 RX ORDER — HEPARIN SODIUM (PORCINE) LOCK FLUSH IV SOLN 100 UNIT/ML 100 UNIT/ML
500 SOLUTION INTRAVENOUS PRN
Status: CANCELLED | OUTPATIENT
Start: 2022-09-06

## 2022-08-31 RX ORDER — 0.9 % SODIUM CHLORIDE 0.9 %
3 VIAL (ML) INJECTION PRN
Status: CANCELLED | OUTPATIENT
Start: 2022-09-05

## 2022-08-31 RX ORDER — PROCHLORPERAZINE MALEATE 10 MG
10 TABLET ORAL EVERY 6 HOURS PRN
Status: CANCELLED | OUTPATIENT
Start: 2022-09-06

## 2022-08-31 RX ORDER — 0.9 % SODIUM CHLORIDE 0.9 %
10 VIAL (ML) INJECTION PRN
Status: CANCELLED | OUTPATIENT
Start: 2022-09-06

## 2022-08-31 RX ORDER — ONDANSETRON 8 MG/1
8 TABLET, ORALLY DISINTEGRATING ORAL PRN
Status: CANCELLED | OUTPATIENT
Start: 2022-09-06

## 2022-08-31 RX ORDER — 0.9 % SODIUM CHLORIDE 0.9 %
VIAL (ML) INJECTION PRN
Status: CANCELLED | OUTPATIENT
Start: 2022-09-05

## 2022-08-31 RX ORDER — 0.9 % SODIUM CHLORIDE 0.9 %
10 VIAL (ML) INJECTION PRN
Status: CANCELLED | OUTPATIENT
Start: 2022-09-05

## 2022-08-31 RX ORDER — METHYLPREDNISOLONE SODIUM SUCCINATE 125 MG/2ML
125 INJECTION, POWDER, LYOPHILIZED, FOR SOLUTION INTRAMUSCULAR; INTRAVENOUS PRN
Status: CANCELLED | OUTPATIENT
Start: 2022-09-06

## 2022-08-31 RX ORDER — 0.9 % SODIUM CHLORIDE 0.9 %
3 VIAL (ML) INJECTION PRN
Status: CANCELLED | OUTPATIENT
Start: 2022-09-06

## 2022-08-31 RX ORDER — DIPHENHYDRAMINE HYDROCHLORIDE 50 MG/ML
50 INJECTION INTRAMUSCULAR; INTRAVENOUS PRN
Status: CANCELLED | OUTPATIENT
Start: 2022-09-06

## 2022-08-31 RX ORDER — EPINEPHRINE 1 MG/ML(1)
0.5 AMPUL (ML) INJECTION PRN
Status: CANCELLED | OUTPATIENT
Start: 2022-09-06

## 2022-08-31 RX ORDER — DEXTROSE MONOHYDRATE 50 MG/ML
INJECTION, SOLUTION INTRAVENOUS CONTINUOUS
Status: CANCELLED | OUTPATIENT
Start: 2022-09-06

## 2022-08-31 RX ORDER — HEPARIN SODIUM (PORCINE) LOCK FLUSH IV SOLN 100 UNIT/ML 100 UNIT/ML
500 SOLUTION INTRAVENOUS PRN
Status: CANCELLED | OUTPATIENT
Start: 2022-09-05

## 2022-09-02 ENCOUNTER — HOSPITAL ENCOUNTER (OUTPATIENT)
Dept: LAB | Facility: MEDICAL CENTER | Age: 57
End: 2022-09-02
Attending: INTERNAL MEDICINE
Payer: MEDICARE

## 2022-09-02 LAB
ALBUMIN SERPL BCP-MCNC: 3.3 G/DL (ref 3.2–4.9)
ALBUMIN/GLOB SERPL: 1.7 G/DL
ALP SERPL-CCNC: 288 U/L (ref 30–99)
ALT SERPL-CCNC: 29 U/L (ref 2–50)
ANION GAP SERPL CALC-SCNC: 10 MMOL/L (ref 7–16)
AST SERPL-CCNC: 49 U/L (ref 12–45)
BASOPHILS # BLD AUTO: 1 % (ref 0–1.8)
BASOPHILS # BLD: 0.02 K/UL (ref 0–0.12)
BILIRUB SERPL-MCNC: 1.7 MG/DL (ref 0.1–1.5)
BUN SERPL-MCNC: 10 MG/DL (ref 8–22)
CALCIUM SERPL-MCNC: 8.8 MG/DL (ref 8.5–10.5)
CEA SERPL-MCNC: 9.3 NG/ML (ref 0–3)
CHLORIDE SERPL-SCNC: 108 MMOL/L (ref 96–112)
CO2 SERPL-SCNC: 24 MMOL/L (ref 20–33)
CREAT SERPL-MCNC: 0.57 MG/DL (ref 0.5–1.4)
EOSINOPHIL # BLD AUTO: 0.08 K/UL (ref 0–0.51)
EOSINOPHIL NFR BLD: 4.1 % (ref 0–6.9)
ERYTHROCYTE [DISTWIDTH] IN BLOOD BY AUTOMATED COUNT: 63.7 FL (ref 35.9–50)
GFR SERPLBLD CREATININE-BSD FMLA CKD-EPI: 113 ML/MIN/1.73 M 2
GLOBULIN SER CALC-MCNC: 2 G/DL (ref 1.9–3.5)
GLUCOSE SERPL-MCNC: 118 MG/DL (ref 65–99)
HCT VFR BLD AUTO: 33 % (ref 42–52)
HGB BLD-MCNC: 11.1 G/DL (ref 14–18)
IMM GRANULOCYTES # BLD AUTO: 0 K/UL (ref 0–0.11)
IMM GRANULOCYTES NFR BLD AUTO: 0 % (ref 0–0.9)
LYMPHOCYTES # BLD AUTO: 0.31 K/UL (ref 1–4.8)
LYMPHOCYTES NFR BLD: 16 % (ref 22–41)
MAGNESIUM SERPL-MCNC: 1.8 MG/DL (ref 1.5–2.5)
MCH RBC QN AUTO: 35.9 PG (ref 27–33)
MCHC RBC AUTO-ENTMCNC: 33.6 G/DL (ref 33.7–35.3)
MCV RBC AUTO: 106.8 FL (ref 81.4–97.8)
MONOCYTES # BLD AUTO: 0.25 K/UL (ref 0–0.85)
MONOCYTES NFR BLD AUTO: 12.9 % (ref 0–13.4)
NEUTROPHILS # BLD AUTO: 1.28 K/UL (ref 1.82–7.42)
NEUTROPHILS NFR BLD: 66 % (ref 44–72)
NRBC # BLD AUTO: 0 K/UL
NRBC BLD-RTO: 0 /100 WBC
PLATELET # BLD AUTO: 89 K/UL (ref 164–446)
PMV BLD AUTO: 11.9 FL (ref 9–12.9)
POTASSIUM SERPL-SCNC: 3.6 MMOL/L (ref 3.6–5.5)
PROT SERPL-MCNC: 5.3 G/DL (ref 6–8.2)
RBC # BLD AUTO: 3.09 M/UL (ref 4.7–6.1)
SODIUM SERPL-SCNC: 142 MMOL/L (ref 135–145)
WBC # BLD AUTO: 1.9 K/UL (ref 4.8–10.8)

## 2022-09-02 PROCEDURE — 82378 CARCINOEMBRYONIC ANTIGEN: CPT

## 2022-09-02 PROCEDURE — 80053 COMPREHEN METABOLIC PANEL: CPT

## 2022-09-02 PROCEDURE — 85025 COMPLETE CBC W/AUTO DIFF WBC: CPT

## 2022-09-02 PROCEDURE — 36415 COLL VENOUS BLD VENIPUNCTURE: CPT

## 2022-09-02 PROCEDURE — 83735 ASSAY OF MAGNESIUM: CPT

## 2022-09-04 NOTE — PROGRESS NOTES
"Pharmacy Chemotherapy Verification  Patient name: Gurmeet Jo  Dx: mCRC    Cycle 19  Previous treatment: C18 on 8/15/22    Regimen: Fam-trastuzumab deruxtecan  Fam-trastuzumab deruxtecan 6.4 mg/kg IV over 90 minutes Day 1  Q21-day cycle until progression or toxicity  NCCN guidelines for Colon Cancer v3.2021  (cetuximab + chemo) Christine ARGUELLO et al, OBEY 2017;317(23):6138-6466  (HER2 tx in HER2+ CRC) Kayleen RINALDI et al. Lancet Oncol. 2019 Apr;20(4):518-530.    Allergies: Patient has no known allergies.  /75   Pulse 77   Temp 36.7 °C (98.1 °F) (Temporal)   Resp 18   Ht 1.85 m (6' 0.84\")   Wt 94 kg (207 lb 3.7 oz)   SpO2 99%   BMI 27.47 kg/m²  Body surface area is 2.2 meters squared.     Labs 9/2/22  ANC~ 1280 Plt = 89k   Hgb = 11.1     SCr = 0.57 mg/dL CrCl >125 mL/min   AST/ALT/ALK  = 49/29/288 TBili = 1.7     ECHO (OK for 6 months per Cody LEO order)  6/16/22 LVEF 60%    Fam-trastuzumab deruxtecan (Enhertu) 6.4 mg/kg x 94 kg = 601.6 mg  Rounded to nearest vial size (within 10%) per rounding protocol  Okay to treat with final dose = 600 mg IV    José Marvin, PharmD        "

## 2022-09-05 ENCOUNTER — OUTPATIENT INFUSION SERVICES (OUTPATIENT)
Dept: ONCOLOGY | Facility: MEDICAL CENTER | Age: 57
End: 2022-09-05
Attending: INTERNAL MEDICINE
Payer: MEDICARE

## 2022-09-05 ENCOUNTER — HOSPITAL ENCOUNTER (OUTPATIENT)
Dept: CARDIOLOGY | Facility: MEDICAL CENTER | Age: 57
End: 2022-09-05
Attending: INTERNAL MEDICINE
Payer: MEDICARE

## 2022-09-05 VITALS
SYSTOLIC BLOOD PRESSURE: 127 MMHG | OXYGEN SATURATION: 99 % | RESPIRATION RATE: 18 BRPM | WEIGHT: 207.23 LBS | BODY MASS INDEX: 27.47 KG/M2 | DIASTOLIC BLOOD PRESSURE: 75 MMHG | HEIGHT: 73 IN | HEART RATE: 77 BPM | TEMPERATURE: 98.1 F

## 2022-09-05 DIAGNOSIS — C18.9 METASTATIC COLON CANCER TO LIVER (HCC): ICD-10-CM

## 2022-09-05 DIAGNOSIS — C18.9 MALIGNANT NEOPLASM OF COLON, UNSPECIFIED PART OF COLON (HCC): ICD-10-CM

## 2022-09-05 DIAGNOSIS — C78.7 METASTATIC COLON CANCER TO LIVER (HCC): ICD-10-CM

## 2022-09-05 LAB
LV EJECT FRACT MOD 2C 99903: 73.77
LV EJECT FRACT MOD 4C 99902: 63.37
LV EJECT FRACT MOD BP 99901: 67.71

## 2022-09-05 PROCEDURE — 96375 TX/PRO/DX INJ NEW DRUG ADDON: CPT

## 2022-09-05 PROCEDURE — 96413 CHEMO IV INFUSION 1 HR: CPT

## 2022-09-05 PROCEDURE — A4212 NON CORING NEEDLE OR STYLET: HCPCS

## 2022-09-05 PROCEDURE — 700105 HCHG RX REV CODE 258: Performed by: INTERNAL MEDICINE

## 2022-09-05 PROCEDURE — 700111 HCHG RX REV CODE 636 W/ 250 OVERRIDE (IP): Performed by: INTERNAL MEDICINE

## 2022-09-05 PROCEDURE — 304540 HCHG NITRO SET VENT 2ND TUB

## 2022-09-05 PROCEDURE — 93306 TTE W/DOPPLER COMPLETE: CPT

## 2022-09-05 PROCEDURE — 93306 TTE W/DOPPLER COMPLETE: CPT | Mod: 26 | Performed by: INTERNAL MEDICINE

## 2022-09-05 RX ORDER — HEPARIN SODIUM (PORCINE) LOCK FLUSH IV SOLN 100 UNIT/ML 100 UNIT/ML
500 SOLUTION INTRAVENOUS PRN
Status: DISCONTINUED | OUTPATIENT
Start: 2022-09-05 | End: 2022-09-05 | Stop reason: HOSPADM

## 2022-09-05 RX ADMIN — ONDANSETRON HYDROCHLORIDE 16 MG: 2 INJECTION, SOLUTION INTRAMUSCULAR; INTRAVENOUS at 08:34

## 2022-09-05 RX ADMIN — HEPARIN 500 UNITS: 100 SYRINGE at 09:52

## 2022-09-05 RX ADMIN — DEXAMETHASONE SODIUM PHOSPHATE 12 MG: 4 INJECTION, SOLUTION INTRA-ARTICULAR; INTRALESIONAL; INTRAMUSCULAR; INTRAVENOUS; SOFT TISSUE at 08:21

## 2022-09-05 RX ADMIN — FAM-TRASTUZUMAB DERUXTECAN-NXKI 600 MG: 100 INJECTION, POWDER, LYOPHILIZED, FOR SOLUTION INTRAVENOUS at 09:00

## 2022-09-05 ASSESSMENT — FIBROSIS 4 INDEX: FIB4 SCORE: 5.93

## 2022-09-05 NOTE — PROGRESS NOTES
Chemotherapy Verification - SECONDARY RN       Height = 1.85m  Weight = 94kg  BSA = 2.2m2       Medication: fam-trastuzumab  Dose: 6.4mg/kg  Calculated Dose: 601.6mg                             (In mg/m2, AUC, mg/kg)       I confirm that this process was performed independently.

## 2022-09-05 NOTE — PROGRESS NOTES
"Pharmacy Chemotherapy Verification  Patient name: Gurmeet Jo  Dx: mCRC    Regimen: Fam-trastuzumab deruxtecan  Fam-trastuzumab deruxtecan 6.4 mg/kg IV over 90 minutes Day 1  Q21-day cycle until progression or toxicity  NCCN guidelines for Colon Cancer v3.2021  (cetuximab + chemo) Christine ARGUELLO et al, OBEY 2017;317(23):8069-1804  (HER2 tx in HER2+ CRC) Kayleen RINALDI et al. Lancet Oncol. 2019 Apr;20(4):518-530.    Allergies: Patient has no known allergies.  /75   Pulse 77   Temp 36.7 °C (98.1 °F) (Temporal)   Resp 18   Ht 1.85 m (6' 0.84\")   Wt 94 kg (207 lb 3.7 oz)   SpO2 99%   BMI 27.47 kg/m²  Body surface area is 2.2 meters squared.     Labs 9/2/22:  ANC~ 1280 Plt = 89 k   Hgb = 11.1     SCr = 0.57 mg/dL CrCl > 125 mL/min (min Scr 0.7 used)  AST/ALT/AP = 49/29/288 TBili = 1.7  Mag = 1.8  K+ = 3.6    ECHO (OK for 6 months per Cody LEO order)  6/16/22 TTE LVEF ~60%      Drug Order   (Drug name, dose, route, IV Fluid & volume, frequency, number of doses) Cycle 19  Previous treatment: C18 on  8/15/2022      Medication = Fam-trastuzumab deruxtecan (Enhertu)  Base Dose = 6.4  mg/kg  Calc Dose: Base Dose x 94 kg = 601.6 mg  Final Dose = 600 mg  Route = IV  Fluid & Volume = D5W 100 mL  Admin Duration = Over 30  minutes     <10% difference, okay to treat with final dose     By my signature below, I confirm this process was performed independently with the BSA and all final chemotherapy dosing calculations congruent. I have reviewed the above chemotherapy order and that my calculation of the final dose and BSA (when applicable) corroborate those calculations of the  pharmacist. Discrepancies of 10% or greater in the written dose have been addressed and documented within the [facility] Progress notes.    Chandrika Robles, PharmD, BCOP           "

## 2022-09-05 NOTE — PROGRESS NOTES
Chemotherapy Verification - PRIMARY RN    C19 D1    Height = 185 cm  Weight = 94 kg  BSA = 2.2 m^2  Echo on 6/16/22 LVEF 60%      Medication: Enhertu  Dose: 6.4 mg/kg  Calculated Dose: 601.6 mg                             (In mg/m2, AUC, mg/kg)     I confirm this process was performed independently with the BSA and all final chemotherapy dosing calculations congruent.  Any discrepancies of 10% or greater have been addressed with the chemotherapy pharmacist. The resolution of the discrepancy has been documented in the EPIC progress notes.

## 2022-09-05 NOTE — PROGRESS NOTES
Pt arrives to [facility] for cycle 19 of Enhertu.  Discussed plan of care with pt.  Pt denies s/sx of infection, nausea or pain at this time.  EMLA cream was applied to port site by the pt.  EMLA cream wiped away.  [facility] port accessed with sterile technique, flushed with NS and brisk blood return observed.  Labs were drawn on 09/02/2022.  Labs reviewed and results meet parameters for treatment today.  Last echocardiogram was on 6/16/22 with LVEF of 60%.  Pt has next echo scheduled for later this afternoon.   Pre-medications given.  Enhertu infused without adverse reaction.  Port flushed with 30ml of D5W post infusion.  Port flushed with NS and heparin locked.  Adkins needle removed intact.  Site covered with gauze/tape.  Confirmed next appt with pt.  Pt dc home to self care.

## 2022-09-20 RX ORDER — 0.9 % SODIUM CHLORIDE 0.9 %
VIAL (ML) INJECTION PRN
Status: CANCELLED | OUTPATIENT
Start: 2022-09-26

## 2022-09-20 RX ORDER — PROCHLORPERAZINE MALEATE 10 MG
10 TABLET ORAL EVERY 6 HOURS PRN
Status: CANCELLED | OUTPATIENT
Start: 2022-09-26

## 2022-09-20 RX ORDER — 0.9 % SODIUM CHLORIDE 0.9 %
10 VIAL (ML) INJECTION PRN
Status: CANCELLED | OUTPATIENT
Start: 2022-09-26

## 2022-09-20 RX ORDER — DIPHENHYDRAMINE HYDROCHLORIDE 50 MG/ML
50 INJECTION INTRAMUSCULAR; INTRAVENOUS PRN
Status: CANCELLED | OUTPATIENT
Start: 2022-09-26

## 2022-09-20 RX ORDER — METHYLPREDNISOLONE SODIUM SUCCINATE 125 MG/2ML
125 INJECTION, POWDER, LYOPHILIZED, FOR SOLUTION INTRAMUSCULAR; INTRAVENOUS PRN
Status: CANCELLED | OUTPATIENT
Start: 2022-09-26

## 2022-09-20 RX ORDER — 0.9 % SODIUM CHLORIDE 0.9 %
3 VIAL (ML) INJECTION PRN
Status: CANCELLED | OUTPATIENT
Start: 2022-09-26

## 2022-09-20 RX ORDER — DEXTROSE MONOHYDRATE 50 MG/ML
INJECTION, SOLUTION INTRAVENOUS CONTINUOUS
Status: CANCELLED | OUTPATIENT
Start: 2022-09-26

## 2022-09-20 RX ORDER — EPINEPHRINE 1 MG/ML(1)
0.5 AMPUL (ML) INJECTION PRN
Status: CANCELLED | OUTPATIENT
Start: 2022-09-26

## 2022-09-20 RX ORDER — HEPARIN SODIUM (PORCINE) LOCK FLUSH IV SOLN 100 UNIT/ML 100 UNIT/ML
500 SOLUTION INTRAVENOUS PRN
Status: CANCELLED | OUTPATIENT
Start: 2022-09-26

## 2022-09-20 RX ORDER — ONDANSETRON 8 MG/1
8 TABLET, ORALLY DISINTEGRATING ORAL PRN
Status: CANCELLED | OUTPATIENT
Start: 2022-09-26

## 2022-09-20 RX ORDER — ONDANSETRON 2 MG/ML
4 INJECTION INTRAMUSCULAR; INTRAVENOUS PRN
Status: CANCELLED | OUTPATIENT
Start: 2022-09-26

## 2022-09-26 ENCOUNTER — OUTPATIENT INFUSION SERVICES (OUTPATIENT)
Dept: ONCOLOGY | Facility: MEDICAL CENTER | Age: 57
End: 2022-09-26
Attending: INTERNAL MEDICINE
Payer: MEDICARE

## 2022-09-26 VITALS
HEIGHT: 73 IN | DIASTOLIC BLOOD PRESSURE: 62 MMHG | RESPIRATION RATE: 16 BRPM | OXYGEN SATURATION: 98 % | HEART RATE: 70 BPM | WEIGHT: 210.1 LBS | BODY MASS INDEX: 27.85 KG/M2 | SYSTOLIC BLOOD PRESSURE: 110 MMHG | TEMPERATURE: 97.3 F

## 2022-09-26 DIAGNOSIS — C18.9 METASTATIC COLON CANCER TO LIVER (HCC): ICD-10-CM

## 2022-09-26 DIAGNOSIS — C78.7 METASTATIC COLON CANCER TO LIVER (HCC): ICD-10-CM

## 2022-09-26 LAB
ALBUMIN SERPL BCP-MCNC: 3.3 G/DL (ref 3.2–4.9)
ALBUMIN/GLOB SERPL: 1.8 G/DL
ALP SERPL-CCNC: 274 U/L (ref 30–99)
ALT SERPL-CCNC: 26 U/L (ref 2–50)
ANION GAP SERPL CALC-SCNC: 8 MMOL/L (ref 7–16)
AST SERPL-CCNC: 46 U/L (ref 12–45)
BASOPHILS # BLD AUTO: 1 % (ref 0–1.8)
BASOPHILS # BLD: 0.02 K/UL (ref 0–0.12)
BILIRUB SERPL-MCNC: 1.7 MG/DL (ref 0.1–1.5)
BUN SERPL-MCNC: 8 MG/DL (ref 8–22)
CALCIUM SERPL-MCNC: 8.6 MG/DL (ref 8.5–10.5)
CEA SERPL-MCNC: 11 NG/ML (ref 0–3)
CHLORIDE SERPL-SCNC: 109 MMOL/L (ref 96–112)
CO2 SERPL-SCNC: 24 MMOL/L (ref 20–33)
CREAT SERPL-MCNC: 0.44 MG/DL (ref 0.5–1.4)
EOSINOPHIL # BLD AUTO: 0.09 K/UL (ref 0–0.51)
EOSINOPHIL NFR BLD: 4.6 % (ref 0–6.9)
ERYTHROCYTE [DISTWIDTH] IN BLOOD BY AUTOMATED COUNT: 65.1 FL (ref 35.9–50)
GFR SERPLBLD CREATININE-BSD FMLA CKD-EPI: 123 ML/MIN/1.73 M 2
GLOBULIN SER CALC-MCNC: 1.8 G/DL (ref 1.9–3.5)
GLUCOSE SERPL-MCNC: 107 MG/DL (ref 65–99)
HCT VFR BLD AUTO: 33 % (ref 42–52)
HGB BLD-MCNC: 11.1 G/DL (ref 14–18)
IMM GRANULOCYTES # BLD AUTO: 0.01 K/UL (ref 0–0.11)
IMM GRANULOCYTES NFR BLD AUTO: 0.5 % (ref 0–0.9)
LYMPHOCYTES # BLD AUTO: 0.36 K/UL (ref 1–4.8)
LYMPHOCYTES NFR BLD: 18.3 % (ref 22–41)
MCH RBC QN AUTO: 36 PG (ref 27–33)
MCHC RBC AUTO-ENTMCNC: 33.6 G/DL (ref 33.7–35.3)
MCV RBC AUTO: 107.1 FL (ref 81.4–97.8)
MONOCYTES # BLD AUTO: 0.3 K/UL (ref 0–0.85)
MONOCYTES NFR BLD AUTO: 15.2 % (ref 0–13.4)
NEUTROPHILS # BLD AUTO: 1.19 K/UL (ref 1.82–7.42)
NEUTROPHILS NFR BLD: 60.4 % (ref 44–72)
NRBC # BLD AUTO: 0 K/UL
NRBC BLD-RTO: 0 /100 WBC
PLATELET # BLD AUTO: 89 K/UL (ref 164–446)
PMV BLD AUTO: 11.5 FL (ref 9–12.9)
POTASSIUM SERPL-SCNC: 3.7 MMOL/L (ref 3.6–5.5)
PROT SERPL-MCNC: 5.1 G/DL (ref 6–8.2)
RBC # BLD AUTO: 3.08 M/UL (ref 4.7–6.1)
SODIUM SERPL-SCNC: 141 MMOL/L (ref 135–145)
WBC # BLD AUTO: 2 K/UL (ref 4.8–10.8)

## 2022-09-26 PROCEDURE — 96375 TX/PRO/DX INJ NEW DRUG ADDON: CPT

## 2022-09-26 PROCEDURE — 82378 CARCINOEMBRYONIC ANTIGEN: CPT

## 2022-09-26 PROCEDURE — 96413 CHEMO IV INFUSION 1 HR: CPT

## 2022-09-26 PROCEDURE — A4212 NON CORING NEEDLE OR STYLET: HCPCS

## 2022-09-26 PROCEDURE — 80053 COMPREHEN METABOLIC PANEL: CPT

## 2022-09-26 PROCEDURE — 700105 HCHG RX REV CODE 258: Performed by: INTERNAL MEDICINE

## 2022-09-26 PROCEDURE — 85025 COMPLETE CBC W/AUTO DIFF WBC: CPT

## 2022-09-26 PROCEDURE — 700111 HCHG RX REV CODE 636 W/ 250 OVERRIDE (IP): Performed by: INTERNAL MEDICINE

## 2022-09-26 PROCEDURE — 304540 HCHG NITRO SET VENT 2ND TUB

## 2022-09-26 RX ORDER — DEXTROSE MONOHYDRATE 50 MG/ML
INJECTION, SOLUTION INTRAVENOUS CONTINUOUS
Status: DISCONTINUED | OUTPATIENT
Start: 2022-09-26 | End: 2022-09-26 | Stop reason: HOSPADM

## 2022-09-26 RX ORDER — HEPARIN SODIUM (PORCINE) LOCK FLUSH IV SOLN 100 UNIT/ML 100 UNIT/ML
500 SOLUTION INTRAVENOUS PRN
Status: DISCONTINUED | OUTPATIENT
Start: 2022-09-26 | End: 2022-09-26 | Stop reason: HOSPADM

## 2022-09-26 RX ADMIN — HEPARIN 500 UNITS: 100 SYRINGE at 10:52

## 2022-09-26 RX ADMIN — DEXTROSE MONOHYDRATE: 50 INJECTION, SOLUTION INTRAVENOUS at 09:20

## 2022-09-26 RX ADMIN — FAM-TRASTUZUMAB DERUXTECAN-NXKI 600 MG: 100 INJECTION, POWDER, LYOPHILIZED, FOR SOLUTION INTRAVENOUS at 10:06

## 2022-09-26 RX ADMIN — ONDANSETRON 16 MG: 2 INJECTION INTRAMUSCULAR; INTRAVENOUS at 09:33

## 2022-09-26 RX ADMIN — DEXAMETHASONE SODIUM PHOSPHATE 12 MG: 4 INJECTION, SOLUTION INTRA-ARTICULAR; INTRALESIONAL; INTRAMUSCULAR; INTRAVENOUS; SOFT TISSUE at 09:21

## 2022-09-26 ASSESSMENT — FIBROSIS 4 INDEX: FIB4 SCORE: 5.93

## 2022-09-26 NOTE — PROGRESS NOTES
Chemotherapy Verification - SECONDARY RN       Height = 185 cm  Weight = 95.3 kg  BSA = 2.21 m2       Medication: Enhertu  Dose: 6.4 mg/kg  Calculated Dose: 609.9 mg                             (In mg/m2, AUC, mg/kg)     I confirm that this process was performed independently.

## 2022-09-26 NOTE — PROGRESS NOTES
"Pharmacy Chemotherapy Verification  Patient name: Gurmeet Jo  Dx: mCRC    Cycle 20  Previous treatment: C19 on 9/5/2022    Regimen: Fam-trastuzumab deruxtecan  Fam-trastuzumab deruxtecan 6.4 mg/kg IV over 90 minutes Day 1  Q21-day cycle until progression or toxicity  NCCN guidelines for Colon Cancer v3.2021  (cetuximab + chemo) Christine ARGUELLO et al, OBEY 2017;317(23):2748-4807  (HER2 tx in HER2+ CRC) Kayleen RINALDI et al. Lancet Oncol. 2019 Apr;20(4):518-530.    Allergies: Patient has no known allergies.  /62   Pulse 70   Temp 36.3 °C (97.3 °F) (Temporal)   Resp 16   Ht 1.85 m (6' 0.84\")   Wt 95.3 kg (210 lb 1.6 oz)   SpO2 98%   BMI 27.84 kg/m²  Body surface area is 2.21 meters squared.     Labs 9/26/22:  ANC~ 1190 Plt = 89k   Hgb = 11.1     SCr = 0.44mg/dL CrCl ~ 154.29mL/min (min Scr 0.7 used)  AST/ALT/ALP= 46/26/274 TBili = 1.7       ECHO (OK for 6 months per Cody LEO order)  9/5/22 LVEF 60%    Fam-trastuzumab deruxtecan (Enhertu) 6.4 mg/kg x 95.3 kg = 609.92mg  Rounded to nearest vial size (within 10%) per rounding protocol  Okay to treat with final dose = 600 mg IV    Nadia Mitchell, PharmD, BCPS        "

## 2022-09-26 NOTE — PROGRESS NOTES
Patient presented to [facility] for C20D1 Enhertu. Port accessed in sterile fashion. Flushed easily, ashley briskly. Pre-treatment labs drawn. Results within treatable parameters. Patient received pre-meds as ordered. Enhertu infused over 30 minutes through inline filter. Treatment tolerated well. Port flushed with NS and Heparin per policy, then de-accessed. Adkins needle tip remained intact. Gauze dressing to site. Patient left [facility] in no apparent distress. Next appointment confirmed prior to departure.

## 2022-09-26 NOTE — PROGRESS NOTES
Chemotherapy Verification - PRIMARY RN      Height = 185 cm  Weight = 95.3 kg  BSA = 2.21 m2     Cycle 20 Day 1    Medication: fam-trastuzumab deruxtecan  Dose: 6.4 mg/kg    Calculated Dose: 610 mg                                 I confirm this process was performed independently with the BSA and all final chemotherapy dosing calculations congruent.  Any discrepancies of 10% or greater have been addressed with the chemotherapy pharmacist. The resolution of the discrepancy has been documented in the EPIC progress notes.

## 2022-09-26 NOTE — PROGRESS NOTES
"Pharmacy Chemotherapy Verification  Patient name: Gurmeet Jo  Dx: mCRC    Regimen: Fam-trastuzumab deruxtecan  Fam-trastuzumab deruxtecan 6.4 mg/kg IV over 90 minutes Day 1  Q21-day cycle until progression or toxicity  NCCN guidelines for Colon Cancer v3.2021  (cetuximab + chemo) Christine Vu al, OBEY 2017;317(23):5415-5616  (HER2 tx in HER2+ CRC) Kayleen RINALDI et al. Lancet Oncol. 2019 Apr;20(4):518-530.    Allergies: Patient has no known allergies.  /62   Pulse 70   Temp 36.3 °C (97.3 °F) (Temporal)   Resp 16   Ht 1.85 m (6' 0.84\")   Wt 95.3 kg (210 lb 1.6 oz)   SpO2 98%   BMI 27.84 kg/m²  Body surface area is 2.21 meters squared.     Labs 9/26/22:  ANC~ 1190 Plt = 89k   Hgb = 11.1     SCr = 0.44 mg/dL CrCl >125 mL/min   LFT's = 46/26/274 TBili = 1.7     ECHO (OK for 6 months per Cody LEO order)  6/16/22 TTE LVEF ~60%      Drug Order   (Drug name, dose, route, IV Fluid & volume, frequency, number of doses) Cycle 20  Previous treatment: C19 on 9/5/22      Medication = Fam-trastuzumab deruxtecan (Enhertu)  Base Dose = 6.4  mg/kg  Calc Dose: Base Dose x 95.3 kg = 609.92 mg  Final Dose = 600 mg  Route = IV  Fluid & Volume = D5W 100 mL  Admin Duration = Over 30  minutes     <10% difference, okay to treat with final dose     By my signature below, I confirm this process was performed independently with the BSA and all final chemotherapy dosing calculations congruent. I have reviewed the above chemotherapy order and that my calculation of the final dose and BSA (when applicable) corroborate those calculations of the  pharmacist. Discrepancies of 10% or greater in the written dose have been addressed and documented within the [facility] Progress notes.    José Marvin, PharmD            "

## 2022-09-28 ENCOUNTER — HOSPITAL ENCOUNTER (OUTPATIENT)
Dept: RADIOLOGY | Facility: MEDICAL CENTER | Age: 57
End: 2022-09-28
Attending: NURSE PRACTITIONER
Payer: MEDICARE

## 2022-09-28 DIAGNOSIS — K76.9 LIVER DISEASE: ICD-10-CM

## 2022-09-28 PROCEDURE — 74183 MRI ABD W/O CNTR FLWD CNTR: CPT

## 2022-09-28 PROCEDURE — 700117 HCHG RX CONTRAST REV CODE 255

## 2022-09-28 PROCEDURE — A9576 INJ PROHANCE MULTIPACK: HCPCS

## 2022-09-28 RX ADMIN — GADOTERIDOL 20 ML: 279.3 INJECTION, SOLUTION INTRAVENOUS at 14:52

## 2022-10-06 ENCOUNTER — HOSPITAL ENCOUNTER (OUTPATIENT)
Dept: RADIOLOGY | Facility: MEDICAL CENTER | Age: 57
End: 2022-10-06
Attending: INTERNAL MEDICINE
Payer: MEDICARE

## 2022-10-13 RX ORDER — 0.9 % SODIUM CHLORIDE 0.9 %
VIAL (ML) INJECTION PRN
Status: CANCELLED | OUTPATIENT
Start: 2022-10-16

## 2022-10-13 RX ORDER — METHYLPREDNISOLONE SODIUM SUCCINATE 125 MG/2ML
125 INJECTION, POWDER, LYOPHILIZED, FOR SOLUTION INTRAMUSCULAR; INTRAVENOUS PRN
Status: CANCELLED | OUTPATIENT
Start: 2022-10-17

## 2022-10-13 RX ORDER — 0.9 % SODIUM CHLORIDE 0.9 %
VIAL (ML) INJECTION PRN
Status: CANCELLED | OUTPATIENT
Start: 2022-10-17

## 2022-10-13 RX ORDER — 0.9 % SODIUM CHLORIDE 0.9 %
3 VIAL (ML) INJECTION PRN
Status: CANCELLED | OUTPATIENT
Start: 2022-10-16

## 2022-10-13 RX ORDER — 0.9 % SODIUM CHLORIDE 0.9 %
10 VIAL (ML) INJECTION PRN
Status: CANCELLED | OUTPATIENT
Start: 2022-10-17

## 2022-10-13 RX ORDER — EPINEPHRINE 1 MG/ML(1)
0.5 AMPUL (ML) INJECTION PRN
Status: CANCELLED | OUTPATIENT
Start: 2022-10-17

## 2022-10-13 RX ORDER — HEPARIN SODIUM (PORCINE) LOCK FLUSH IV SOLN 100 UNIT/ML 100 UNIT/ML
500 SOLUTION INTRAVENOUS PRN
Status: CANCELLED | OUTPATIENT
Start: 2022-10-17

## 2022-10-13 RX ORDER — 0.9 % SODIUM CHLORIDE 0.9 %
3 VIAL (ML) INJECTION PRN
Status: CANCELLED | OUTPATIENT
Start: 2022-10-17

## 2022-10-13 RX ORDER — 0.9 % SODIUM CHLORIDE 0.9 %
10 VIAL (ML) INJECTION PRN
Status: CANCELLED | OUTPATIENT
Start: 2022-10-16

## 2022-10-13 RX ORDER — DIPHENHYDRAMINE HYDROCHLORIDE 50 MG/ML
50 INJECTION INTRAMUSCULAR; INTRAVENOUS PRN
Status: CANCELLED | OUTPATIENT
Start: 2022-10-17

## 2022-10-13 RX ORDER — ONDANSETRON 8 MG/1
8 TABLET, ORALLY DISINTEGRATING ORAL PRN
Status: CANCELLED | OUTPATIENT
Start: 2022-10-17

## 2022-10-13 RX ORDER — DEXTROSE MONOHYDRATE 50 MG/ML
INJECTION, SOLUTION INTRAVENOUS CONTINUOUS
Status: CANCELLED | OUTPATIENT
Start: 2022-10-17

## 2022-10-13 RX ORDER — HEPARIN SODIUM (PORCINE) LOCK FLUSH IV SOLN 100 UNIT/ML 100 UNIT/ML
500 SOLUTION INTRAVENOUS PRN
Status: CANCELLED | OUTPATIENT
Start: 2022-10-16

## 2022-10-13 RX ORDER — ONDANSETRON 2 MG/ML
4 INJECTION INTRAMUSCULAR; INTRAVENOUS PRN
Status: CANCELLED | OUTPATIENT
Start: 2022-10-17

## 2022-10-13 RX ORDER — PROCHLORPERAZINE MALEATE 10 MG
10 TABLET ORAL EVERY 6 HOURS PRN
Status: CANCELLED | OUTPATIENT
Start: 2022-10-17

## 2022-10-14 ENCOUNTER — HOSPITAL ENCOUNTER (OUTPATIENT)
Dept: LAB | Facility: MEDICAL CENTER | Age: 57
End: 2022-10-14
Attending: INTERNAL MEDICINE
Payer: MEDICARE

## 2022-10-14 LAB
ALBUMIN SERPL BCP-MCNC: 3.7 G/DL (ref 3.2–4.9)
ALBUMIN/GLOB SERPL: 1.9 G/DL
ALP SERPL-CCNC: 301 U/L (ref 30–99)
ALT SERPL-CCNC: 27 U/L (ref 2–50)
ANION GAP SERPL CALC-SCNC: 8 MMOL/L (ref 7–16)
AST SERPL-CCNC: 50 U/L (ref 12–45)
BASOPHILS # BLD AUTO: 1.3 % (ref 0–1.8)
BASOPHILS # BLD: 0.03 K/UL (ref 0–0.12)
BILIRUB SERPL-MCNC: 2.1 MG/DL (ref 0.1–1.5)
BUN SERPL-MCNC: 7 MG/DL (ref 8–22)
CALCIUM SERPL-MCNC: 8.8 MG/DL (ref 8.5–10.5)
CHLORIDE SERPL-SCNC: 108 MMOL/L (ref 96–112)
CO2 SERPL-SCNC: 24 MMOL/L (ref 20–33)
CREAT SERPL-MCNC: 0.63 MG/DL (ref 0.5–1.4)
EOSINOPHIL # BLD AUTO: 0.13 K/UL (ref 0–0.51)
EOSINOPHIL NFR BLD: 5.4 % (ref 0–6.9)
ERYTHROCYTE [DISTWIDTH] IN BLOOD BY AUTOMATED COUNT: 63 FL (ref 35.9–50)
GFR SERPLBLD CREATININE-BSD FMLA CKD-EPI: 110 ML/MIN/1.73 M 2
GLOBULIN SER CALC-MCNC: 2 G/DL (ref 1.9–3.5)
GLUCOSE SERPL-MCNC: 100 MG/DL (ref 65–99)
HCT VFR BLD AUTO: 35.2 % (ref 42–52)
HGB BLD-MCNC: 11.9 G/DL (ref 14–18)
IMM GRANULOCYTES # BLD AUTO: 0.01 K/UL (ref 0–0.11)
IMM GRANULOCYTES NFR BLD AUTO: 0.4 % (ref 0–0.9)
LYMPHOCYTES # BLD AUTO: 0.46 K/UL (ref 1–4.8)
LYMPHOCYTES NFR BLD: 19.2 % (ref 22–41)
MAGNESIUM SERPL-MCNC: 2 MG/DL (ref 1.5–2.5)
MCH RBC QN AUTO: 35.6 PG (ref 27–33)
MCHC RBC AUTO-ENTMCNC: 33.8 G/DL (ref 33.7–35.3)
MCV RBC AUTO: 105.4 FL (ref 81.4–97.8)
MONOCYTES # BLD AUTO: 0.33 K/UL (ref 0–0.85)
MONOCYTES NFR BLD AUTO: 13.8 % (ref 0–13.4)
NEUTROPHILS # BLD AUTO: 1.43 K/UL (ref 1.82–7.42)
NEUTROPHILS NFR BLD: 59.9 % (ref 44–72)
NRBC # BLD AUTO: 0 K/UL
NRBC BLD-RTO: 0 /100 WBC
PLATELET # BLD AUTO: 120 K/UL (ref 164–446)
PMV BLD AUTO: 11.7 FL (ref 9–12.9)
POTASSIUM SERPL-SCNC: 3.5 MMOL/L (ref 3.6–5.5)
PROT SERPL-MCNC: 5.7 G/DL (ref 6–8.2)
RBC # BLD AUTO: 3.34 M/UL (ref 4.7–6.1)
SODIUM SERPL-SCNC: 140 MMOL/L (ref 135–145)
WBC # BLD AUTO: 2.4 K/UL (ref 4.8–10.8)

## 2022-10-14 PROCEDURE — 85025 COMPLETE CBC W/AUTO DIFF WBC: CPT

## 2022-10-14 PROCEDURE — 80053 COMPREHEN METABOLIC PANEL: CPT

## 2022-10-14 PROCEDURE — 82378 CARCINOEMBRYONIC ANTIGEN: CPT

## 2022-10-14 PROCEDURE — 36415 COLL VENOUS BLD VENIPUNCTURE: CPT

## 2022-10-14 PROCEDURE — 83735 ASSAY OF MAGNESIUM: CPT

## 2022-10-15 LAB — CEA SERPL-MCNC: 13.7 NG/ML (ref 0–3)

## 2022-10-17 ENCOUNTER — OUTPATIENT INFUSION SERVICES (OUTPATIENT)
Dept: ONCOLOGY | Facility: MEDICAL CENTER | Age: 57
End: 2022-10-17
Attending: INTERNAL MEDICINE
Payer: MEDICARE

## 2022-10-17 VITALS
HEART RATE: 63 BPM | BODY MASS INDEX: 25.74 KG/M2 | WEIGHT: 194.22 LBS | HEIGHT: 73 IN | SYSTOLIC BLOOD PRESSURE: 104 MMHG | DIASTOLIC BLOOD PRESSURE: 61 MMHG | RESPIRATION RATE: 16 BRPM | TEMPERATURE: 97.4 F | OXYGEN SATURATION: 98 %

## 2022-10-17 DIAGNOSIS — C78.7 METASTATIC COLON CANCER TO LIVER (HCC): ICD-10-CM

## 2022-10-17 DIAGNOSIS — C18.9 METASTATIC COLON CANCER TO LIVER (HCC): ICD-10-CM

## 2022-10-17 PROCEDURE — 700111 HCHG RX REV CODE 636 W/ 250 OVERRIDE (IP): Performed by: INTERNAL MEDICINE

## 2022-10-17 PROCEDURE — 96413 CHEMO IV INFUSION 1 HR: CPT

## 2022-10-17 PROCEDURE — 304540 HCHG NITRO SET VENT 2ND TUB

## 2022-10-17 PROCEDURE — A4212 NON CORING NEEDLE OR STYLET: HCPCS

## 2022-10-17 PROCEDURE — 96375 TX/PRO/DX INJ NEW DRUG ADDON: CPT

## 2022-10-17 PROCEDURE — 700105 HCHG RX REV CODE 258: Performed by: INTERNAL MEDICINE

## 2022-10-17 RX ORDER — HEPARIN SODIUM (PORCINE) LOCK FLUSH IV SOLN 100 UNIT/ML 100 UNIT/ML
500 SOLUTION INTRAVENOUS PRN
Status: DISCONTINUED | OUTPATIENT
Start: 2022-10-17 | End: 2022-10-17 | Stop reason: HOSPADM

## 2022-10-17 RX ADMIN — FAM-TRASTUZUMAB DERUXTECAN-NXKI 563.8 MG: 100 INJECTION, POWDER, LYOPHILIZED, FOR SOLUTION INTRAVENOUS at 09:18

## 2022-10-17 RX ADMIN — ONDANSETRON 16 MG: 2 INJECTION INTRAMUSCULAR; INTRAVENOUS at 08:45

## 2022-10-17 RX ADMIN — HEPARIN 500 UNITS: 100 SYRINGE at 10:22

## 2022-10-17 RX ADMIN — DEXAMETHASONE SODIUM PHOSPHATE 12 MG: 4 INJECTION, SOLUTION INTRA-ARTICULAR; INTRALESIONAL; INTRAMUSCULAR; INTRAVENOUS; SOFT TISSUE at 08:32

## 2022-10-17 ASSESSMENT — FIBROSIS 4 INDEX: FIB4 SCORE: 4.65

## 2022-10-17 NOTE — PROGRESS NOTES
Chemotherapy Verification - PRIMARY RN    C21 D1    Height = 185 cm  Weight = 88.1 kg  BSA = 2.13 m^2       Medication: Enhertu  Dose: 6.4 mg/kg  Calculated Dose: 563.84 mg                             (In mg/m2, AUC, mg/kg)       I confirm this process was performed independently with the BSA and all final chemotherapy dosing calculations congruent.  Any discrepancies of 10% or greater have been addressed with the chemotherapy pharmacist. The resolution of the discrepancy has been documented in the EPIC progress notes.

## 2022-10-17 NOTE — PROGRESS NOTES
Pt arrives to [facility] for cycle 19 of Enhertu.  Discussed plan of care with pt.  Pt denies s/sx of infection, nausea or pain at this time.  EMLA cream was applied to port site by the pt.  EMLA cream wiped away.  [facility] port accessed with sterile technique, flushed with NS and brisk blood return observed.  Labs were drawn on 10/14/2022.  Labs reviewed and results meet parameters for treatment today.  Last echocardiogram was on 9/05/22 with LVEF of 65%.  Dr. Ross notified of trending up levels of T Bilirubin and alk phos labs.  No treatment parameters to hold/adjust dose based on those lab results.  Dr. Ross responded, ok to treat today.  Pre-medications given.  Enhertu infused without adverse reaction.  Port flushed with 30ml of D5W post infusion.  Port flushed with NS and heparin locked.  Adkins needle removed intact.  Site covered with gauze/tape.  Confirmed next appt with pt.  Pt dc home to self care.

## 2022-10-17 NOTE — PROGRESS NOTES
"Pharmacy Chemotherapy Verification  Patient name: Gurmeet Jo  Dx: mCRC    Cycle 21  Previous treatment: 9/26/2022    Regimen: Fam-trastuzumab deruxtecan  Fam-trastuzumab deruxtecan 6.4 mg/kg IV over 90 minutes Day 1  Q21-day cycle until progression or toxicity  NCCN guidelines for Colon Cancer v3.2021  (cetuximab + chemo) Christine ARGUELLO et al, OBEY 2017;317(23):7085-9339  (HER2 tx in HER2+ CRC) Kayleen RINALDI et al. Lancet Oncol. 2019 Apr;20(4):518-530.    Allergies: Patient has no known allergies.  /61   Pulse 63   Temp 36.3 °C (97.4 °F) (Temporal)   Resp 16   Ht 1.85 m (6' 0.84\")   Wt 88.1 kg (194 lb 3.6 oz)   SpO2 98%   BMI 25.74 kg/m²  Body surface area is 2.13 meters squared.     Labs from 10/14/22 reviewed- results within treatment parameters   TBili = 2.1 (1.4 x ULN), MD to be notified of rising TBili, no Treatment Parameters.     ECHO (OK for 6 months per Cody LEO order)  9/5/22 LVEF 60%    Fam-trastuzumab deruxtecan (Enhertu) 6.4 mg/kg x 88.1 kg = 564 mg  Rounded to nearest vial size (within 10%) per rounding protocol  Okay to treat with final dose = 563.8 mg IV    Man Anderson, PharmD    "

## 2022-10-17 NOTE — PROGRESS NOTES
Chemotherapy Verification - SECONDARY RN       Height = 185 cm  Weight = 88.1 kg  BSA = 2.13 m2       Medication: Enhertu  Dose: 6.4 mg/kg  Calculated Dose: 563.84 mg                             (In mg/m2, AUC, mg/kg)     I confirm that this process was performed independently.

## 2022-10-17 NOTE — PROGRESS NOTES
"Pharmacy Chemotherapy Verification  Patient name: Gurmeet Jo  Dx: mCRC    Regimen: Fam-trastuzumab deruxtecan  Fam-trastuzumab deruxtecan 6.4 mg/kg IV over 90 minutes Day 1  Q21-day cycle until progression or toxicity  NCCN guidelines for Colon Cancer v3.2021  (cetuximab + chemo) Christine ARGUELLO et al, OBEY 2017;317(23):8448-2692  (HER2 tx in HER2+ CRC) Kayleen RINALDI et al. Lancet Oncol. 2019 Apr;20(4):518-530.    Allergies: Patient has no known allergies.  /61   Pulse 63   Temp 36.3 °C (97.4 °F) (Temporal)   Resp 16   Ht 1.85 m (6' 0.84\")   Wt 88.1 kg (194 lb 3.6 oz)   SpO2 98%   BMI 25.74 kg/m²  Body surface area is 2.13 meters squared.     ECHO (OK for 6 months per Cody LEO order)  6/16/22 TTE LVEF ~60%    Labs 10/14/22:  ANC~ 1430 Plt = 120k   Hgb = 11.9     SCr = 0.63 mg/dL CrCl >125 mL/min   AST/ALT/ALK  = 50/27/301 TBili = 2.1     Drug Order   (Drug name, dose, route, IV Fluid & volume, frequency, number of doses) Cycle 21  Previous treatment: C20 on 9/26/22      Medication = Fam-trastuzumab deruxtecan (Enhertu)  Base Dose = 6.4  mg/kg  Calc Dose: Base Dose x 88.1 kg = 563.84 mg  Final Dose = 563.8 mg  Route = IV  Fluid & Volume = D5W 100 mL  Admin Duration = Over 30 minutes     <10% difference, okay to treat with final dose     By my signature below, I confirm this process was performed independently with the BSA and all final chemotherapy dosing calculations congruent. I have reviewed the above chemotherapy order and that my calculation of the final dose and BSA (when applicable) corroborate those calculations of the  pharmacist. Discrepancies of 10% or greater in the written dose have been addressed and documented within the [facility] Progress notes.    José Marvin, PharmD            "

## 2022-11-02 RX ORDER — EPINEPHRINE 1 MG/ML(1)
0.5 AMPUL (ML) INJECTION PRN
Status: CANCELLED | OUTPATIENT
Start: 2022-11-22

## 2022-11-02 RX ORDER — ONDANSETRON 2 MG/ML
4 INJECTION INTRAMUSCULAR; INTRAVENOUS PRN
Status: CANCELLED | OUTPATIENT
Start: 2022-11-22

## 2022-11-02 RX ORDER — METHYLPREDNISOLONE SODIUM SUCCINATE 125 MG/2ML
125 INJECTION, POWDER, LYOPHILIZED, FOR SOLUTION INTRAMUSCULAR; INTRAVENOUS PRN
Status: CANCELLED | OUTPATIENT
Start: 2022-11-22

## 2022-11-02 RX ORDER — SODIUM CHLORIDE 9 MG/ML
INJECTION, SOLUTION INTRAVENOUS CONTINUOUS
Status: CANCELLED | OUTPATIENT
Start: 2022-11-22

## 2022-11-02 RX ORDER — 0.9 % SODIUM CHLORIDE 0.9 %
10 VIAL (ML) INJECTION PRN
Status: CANCELLED | OUTPATIENT
Start: 2022-11-22

## 2022-11-02 RX ORDER — DIPHENHYDRAMINE HYDROCHLORIDE 50 MG/ML
50 INJECTION INTRAMUSCULAR; INTRAVENOUS PRN
Status: CANCELLED | OUTPATIENT
Start: 2022-11-22

## 2022-11-02 RX ORDER — HEPARIN SODIUM (PORCINE) LOCK FLUSH IV SOLN 100 UNIT/ML 100 UNIT/ML
500 SOLUTION INTRAVENOUS PRN
Status: CANCELLED | OUTPATIENT
Start: 2022-11-22

## 2022-11-02 RX ORDER — ONDANSETRON 8 MG/1
8 TABLET, ORALLY DISINTEGRATING ORAL PRN
Status: CANCELLED | OUTPATIENT
Start: 2022-11-22

## 2022-11-02 RX ORDER — HEPARIN SODIUM (PORCINE) LOCK FLUSH IV SOLN 100 UNIT/ML 100 UNIT/ML
500 SOLUTION INTRAVENOUS PRN
Status: CANCELLED | OUTPATIENT
Start: 2022-11-06

## 2022-11-02 RX ORDER — 0.9 % SODIUM CHLORIDE 0.9 %
10 VIAL (ML) INJECTION PRN
Status: CANCELLED | OUTPATIENT
Start: 2022-11-06

## 2022-11-02 RX ORDER — 0.9 % SODIUM CHLORIDE 0.9 %
3 VIAL (ML) INJECTION PRN
Status: CANCELLED | OUTPATIENT
Start: 2022-11-22

## 2022-11-02 RX ORDER — 0.9 % SODIUM CHLORIDE 0.9 %
3 VIAL (ML) INJECTION PRN
Status: CANCELLED | OUTPATIENT
Start: 2022-11-06

## 2022-11-02 RX ORDER — 0.9 % SODIUM CHLORIDE 0.9 %
VIAL (ML) INJECTION PRN
Status: CANCELLED | OUTPATIENT
Start: 2022-11-06

## 2022-11-02 RX ORDER — 0.9 % SODIUM CHLORIDE 0.9 %
VIAL (ML) INJECTION PRN
Status: CANCELLED | OUTPATIENT
Start: 2022-11-22

## 2022-11-02 RX ORDER — PROCHLORPERAZINE MALEATE 10 MG
10 TABLET ORAL EVERY 6 HOURS PRN
Status: CANCELLED | OUTPATIENT
Start: 2022-11-22

## 2022-11-03 ENCOUNTER — OFFICE VISIT (OUTPATIENT)
Dept: MEDICAL GROUP | Facility: CLINIC | Age: 57
End: 2022-11-03
Payer: MEDICARE

## 2022-11-03 VITALS
DIASTOLIC BLOOD PRESSURE: 70 MMHG | HEIGHT: 73 IN | OXYGEN SATURATION: 98 % | BODY MASS INDEX: 25.42 KG/M2 | TEMPERATURE: 99.5 F | SYSTOLIC BLOOD PRESSURE: 102 MMHG | RESPIRATION RATE: 18 BRPM | WEIGHT: 191.8 LBS | HEART RATE: 72 BPM

## 2022-11-03 DIAGNOSIS — U07.1 COVID-19: Primary | ICD-10-CM

## 2022-11-03 LAB
EXTERNAL QUALITY CONTROL: ABNORMAL
FLUAV+FLUBV AG SPEC QL IA: NEGATIVE
INT CON NEG: ABNORMAL
INT CON NEG: NORMAL
INT CON NEG: NORMAL
INT CON POS: ABNORMAL
INT CON POS: NORMAL
INT CON POS: NORMAL
S PYO AG THROAT QL: NEGATIVE
SARS-COV+SARS-COV-2 AG RESP QL IA.RAPID: POSITIVE

## 2022-11-03 PROCEDURE — 87804 INFLUENZA ASSAY W/OPTIC: CPT | Performed by: PHYSICIAN ASSISTANT

## 2022-11-03 PROCEDURE — 99214 OFFICE O/P EST MOD 30 MIN: CPT | Mod: CS | Performed by: PHYSICIAN ASSISTANT

## 2022-11-03 PROCEDURE — 87426 SARSCOV CORONAVIRUS AG IA: CPT | Performed by: PHYSICIAN ASSISTANT

## 2022-11-03 PROCEDURE — 87880 STREP A ASSAY W/OPTIC: CPT | Performed by: PHYSICIAN ASSISTANT

## 2022-11-03 RX ORDER — POTASSIUM CHLORIDE 750 MG/1
10 CAPSULE, EXTENDED RELEASE ORAL DAILY
COMMUNITY
Start: 2022-10-09

## 2022-11-03 RX ORDER — TRIFLURIDINE AND TIPIRACIL 15; 6.14 MG/1; MG/1
TABLET, FILM COATED ORAL
COMMUNITY
Start: 2022-10-14 | End: 2023-03-06

## 2022-11-03 RX ORDER — HEPARIN SODIUM (PORCINE) LOCK FLUSH IV SOLN 100 UNIT/ML 100 UNIT/ML
SOLUTION INTRAVENOUS
COMMUNITY
Start: 2022-08-15 | End: 2023-01-16 | Stop reason: CLARIF

## 2022-11-03 RX ORDER — EMPAGLIFLOZIN 25 MG/1
1 TABLET, FILM COATED ORAL
COMMUNITY
Start: 2022-09-26 | End: 2022-12-30

## 2022-11-03 ASSESSMENT — FIBROSIS 4 INDEX: FIB4 SCORE: 4.65

## 2022-11-03 NOTE — PROGRESS NOTES
cc:  cough    Subjective:     Gurmeet Jo is a 57 y.o. male presenting for cough      Patient presents to the office for cough.  He has had a productive cough, pressure in his head and is feeling tired and run down.  He states that this started about 2 days ago.  He states that he left his fan on at night and developed a sore throat.  Symptoms worsened from there.  He denies fever and chills.  He denies back pain and pain with urination.  He denies feeling congested and stuffy.      Review of systems:  See above.   Denies any symptoms unless previously indicated.        Current Outpatient Medications:     potassium chloride (MICRO-K) 10 MEQ capsule, , Disp: , Rfl:     JARDIANCE 25 MG Tab, Take 1 Tablet by mouth every day., Disp: , Rfl:     LONSURF 15-6.14 MG Tab, , Disp: , Rfl:     heparin lock flush 100 unit/mL Solution, , Disp: , Rfl:     Nirmatrelvir&Ritonavir 300/100 20 x 150 MG & 10 x 100MG Tablet Therapy Pack, Take 300 mg nirmatrelvir (two 150 mg tablets) with 100 mg ritonavir (one 100 mg tablet) by mouth, with all three tablets taken together twice daily for 5 days., Disp: 30 Each, Rfl: 0    ENHERTU 100 MG Recon Soln, Q 3 weeks, Disp: , Rfl:     Empagliflozin (JARDIANCE) 10 MG Tab, Take 1 Tablet by mouth every day. (Patient taking differently: Take 25 mg by mouth every day.), Disp: 90 Tablet, Rfl: 2    nystatin (MYCOSTATIN) 138940 UNIT/ML Suspension, SWISH AND SWALLOW 5 MILLILITERS PO QID, Disp: 473 mL, Rfl: 2    glucose blood (TRUE METRIX BLOOD GLUCOSE TEST) strip, TRUE METRIX BLOOD GLUCOSE TEST STRP, Disp: , Rfl:     ondansetron (ZOFRAN ODT) 8 MG TABLET DISPERSIBLE, DISSOLVE ONE TABLET BY MOUTH EVERY 12 HOURS AS NEEDED FOR NAUSEA, Disp: , Rfl:     TRUE METRIX BLOOD GLUCOSE TEST strip, USE AS DIRECTED TO CHECK BLOOD SUGAR B ID AND FOR HIGH OR SUGAR, Disp: , Rfl:     Lancets, Lancets order: Lancets for  meter. Sig: use bid and prn ssx high or low sugar. #100 RF x 3, Disp: 100 Each, Rfl: 0    Blood  "Glucose Test Strips, Test strips order: Test strips for  meter. Sig: use bid and prn ssx high or low sugar #100 RF x 3, Disp: 100 Each, Rfl: 0    Blood Glucose Monitoring Suppl Device, Meter: Dispense Device of Insurance Preference. Sig. Use as directed for blood sugar monitoring. #1. NR., Disp: 1 Device, Rfl: 0    lidocaine-prilocaine (EMLA) 2.5-2.5 % Cream, , Disp: , Rfl:     acetaminophen (TYLENOL) 500 MG Tab, Take 500-1,000 mg by mouth every 6 hours as needed., Disp: , Rfl:     LORazepam (ATIVAN) 1 MG Tab, Take 1 mg by mouth 1 time a day as needed (nausea)., Disp: , Rfl:     baclofen (LIORESAL) 10 MG Tab, Take 10 mg by mouth 3 times a day as needed., Disp: , Rfl:     therapeutic multivitamin-minerals (THERAGRAN-M) Tab, Take 1 Tab by mouth every day., Disp: , Rfl:     Allergies, past medical history, past surgical history, family history, social history reviewed and updated    Objective:     Vitals: /70 (BP Location: Left arm, Patient Position: Sitting, BP Cuff Size: Adult)   Pulse 72   Temp 37.5 °C (99.5 °F) (Temporal)   Resp 18   Ht 1.854 m (6' 1\")   Wt 87 kg (191 lb 12.8 oz)   SpO2 98%   BMI 25.30 kg/m²   General: Alert, pleasant, NAD  EYES:   PERRL, EOMI, no icterus or pallor.  Conjunctivae and lids normal.   HENT:  Normocephalic.  External ears normal. Tympanic membranes pearly, opaque.  No nasal drainage present.  Oropharynx non-erythematous, mucous membranes moist.  Neck supple.   No thyromegaly or masses palpated. No cervical or supraclavicular lymphadenopathy.  Heart: Regular rate and rhythm.  S1 and S2 normal.  No murmurs appreciated.  Respiratory: Normal respiratory effort.  Clear to auscultation bilaterally.  Abdomen: Non-distended, soft, non-tender, no guarding/rebound. Bowel sounds present.  No hepatosplenomegaly.  No hernias.  Skin: Warm, dry, no rashes.  Musculoskeletal: Gait is normal.  Moves all extremities well.      Neurological: No tremors, sensation grossly intact, patellar " reflexes 2+ symmetric, tone/strength normal, gait is normal, CN2-12 intact.  Psych:  Affect/mood is normal, judgement is good, memory is intact, grooming is appropriate.  Strep:  negative  Flu: Negative  COVID: Positive    Assessment/Plan:     Gurmeet was seen today for cough.    Diagnoses and all orders for this visit:    COVID-19  -     Nirmatrelvir&Ritonavir 300/100 20 x 150 MG & 10 x 100MG Tablet Therapy Pack; Take 300 mg nirmatrelvir (two 150 mg tablets) with 100 mg ritonavir (one 100 mg tablet) by mouth, with all three tablets taken together twice daily for 5 days.    Patient is high risk as he does have colon cancer.  We will start him on Paxil of it.  Side effects and risks of medication discussed with patient.  Any worsening symptoms he is to go to the ER ASAP.  Advised patient rest fluids and self-isolation for 5 days, longer if symptoms persist.  Patient to let us know with any questions or concerns.  Also as patient is undergoing treatment for both liver and colon cancer, I have advised him to speak with his hematologist oncologist before starting medication to make sure that it is all right for him to do so.      No follow-ups on file.    Please note that this dictation was created using voice recognition software. I have made every reasonable attempt to correct obvious errors, but expect that there are errors of grammar and possible content that I did not discover before finalizing note.

## 2022-11-07 ENCOUNTER — APPOINTMENT (OUTPATIENT)
Dept: ONCOLOGY | Facility: MEDICAL CENTER | Age: 57
End: 2022-11-07
Attending: INTERNAL MEDICINE
Payer: MEDICARE

## 2022-11-09 RX ORDER — CALCIUM CITRATE/VITAMIN D3 200MG-6.25
TABLET ORAL
Qty: 400 STRIP | Refills: 2 | Status: SHIPPED | OUTPATIENT
Start: 2022-11-09

## 2022-11-09 NOTE — TELEPHONE ENCOUNTER
Received request via: Pharmacy    Was the patient seen in the last year in this department? Yes    Does the patient have an active prescription (recently filled or refills available) for medication(s) requested? No    Does the patient have assisted Plus and need 100 day supply (blood pressure, diabetes and cholesterol meds only)? Patient does not have SCP    Last OV: 11/3/22  Last labs: 11/3/22

## 2022-11-17 RX ORDER — ONDANSETRON 8 MG/1
8 TABLET, ORALLY DISINTEGRATING ORAL PRN
Status: CANCELLED | OUTPATIENT
Start: 2022-12-06

## 2022-11-17 RX ORDER — 0.9 % SODIUM CHLORIDE 0.9 %
3 VIAL (ML) INJECTION PRN
Status: CANCELLED | OUTPATIENT
Start: 2022-12-05

## 2022-11-17 RX ORDER — 0.9 % SODIUM CHLORIDE 0.9 %
10 VIAL (ML) INJECTION PRN
Status: CANCELLED | OUTPATIENT
Start: 2022-12-06

## 2022-11-17 RX ORDER — EPINEPHRINE 1 MG/ML(1)
0.5 AMPUL (ML) INJECTION PRN
Status: CANCELLED | OUTPATIENT
Start: 2022-12-06

## 2022-11-17 RX ORDER — PROCHLORPERAZINE MALEATE 10 MG
10 TABLET ORAL EVERY 6 HOURS PRN
Status: CANCELLED | OUTPATIENT
Start: 2022-12-06

## 2022-11-17 RX ORDER — HEPARIN SODIUM (PORCINE) LOCK FLUSH IV SOLN 100 UNIT/ML 100 UNIT/ML
500 SOLUTION INTRAVENOUS PRN
Status: CANCELLED | OUTPATIENT
Start: 2022-12-06

## 2022-11-17 RX ORDER — 0.9 % SODIUM CHLORIDE 0.9 %
10 VIAL (ML) INJECTION PRN
Status: CANCELLED | OUTPATIENT
Start: 2022-11-21

## 2022-11-17 RX ORDER — 0.9 % SODIUM CHLORIDE 0.9 %
VIAL (ML) INJECTION PRN
Status: CANCELLED | OUTPATIENT
Start: 2022-11-21

## 2022-11-17 RX ORDER — ONDANSETRON 2 MG/ML
4 INJECTION INTRAMUSCULAR; INTRAVENOUS PRN
Status: CANCELLED | OUTPATIENT
Start: 2022-12-06

## 2022-11-17 RX ORDER — DIPHENHYDRAMINE HYDROCHLORIDE 50 MG/ML
50 INJECTION INTRAMUSCULAR; INTRAVENOUS PRN
Status: CANCELLED | OUTPATIENT
Start: 2022-12-06

## 2022-11-17 RX ORDER — METHYLPREDNISOLONE SODIUM SUCCINATE 125 MG/2ML
125 INJECTION, POWDER, LYOPHILIZED, FOR SOLUTION INTRAMUSCULAR; INTRAVENOUS PRN
Status: CANCELLED | OUTPATIENT
Start: 2022-12-06

## 2022-11-17 RX ORDER — 0.9 % SODIUM CHLORIDE 0.9 %
VIAL (ML) INJECTION PRN
Status: CANCELLED | OUTPATIENT
Start: 2022-12-05

## 2022-11-17 RX ORDER — 0.9 % SODIUM CHLORIDE 0.9 %
10 VIAL (ML) INJECTION PRN
Status: CANCELLED | OUTPATIENT
Start: 2022-12-05

## 2022-11-17 RX ORDER — SODIUM CHLORIDE 9 MG/ML
INJECTION, SOLUTION INTRAVENOUS CONTINUOUS
Status: CANCELLED | OUTPATIENT
Start: 2022-12-06

## 2022-11-17 RX ORDER — HEPARIN SODIUM (PORCINE) LOCK FLUSH IV SOLN 100 UNIT/ML 100 UNIT/ML
500 SOLUTION INTRAVENOUS PRN
Status: CANCELLED | OUTPATIENT
Start: 2022-12-05

## 2022-11-17 RX ORDER — 0.9 % SODIUM CHLORIDE 0.9 %
VIAL (ML) INJECTION PRN
Status: CANCELLED | OUTPATIENT
Start: 2022-12-06

## 2022-11-17 RX ORDER — 0.9 % SODIUM CHLORIDE 0.9 %
3 VIAL (ML) INJECTION PRN
Status: CANCELLED | OUTPATIENT
Start: 2022-11-21

## 2022-11-17 RX ORDER — HEPARIN SODIUM (PORCINE) LOCK FLUSH IV SOLN 100 UNIT/ML 100 UNIT/ML
500 SOLUTION INTRAVENOUS PRN
Status: CANCELLED | OUTPATIENT
Start: 2022-11-21

## 2022-11-17 RX ORDER — 0.9 % SODIUM CHLORIDE 0.9 %
3 VIAL (ML) INJECTION PRN
Status: CANCELLED | OUTPATIENT
Start: 2022-12-06

## 2022-11-18 ENCOUNTER — HOSPITAL ENCOUNTER (OUTPATIENT)
Dept: LAB | Facility: MEDICAL CENTER | Age: 57
End: 2022-11-18
Attending: INTERNAL MEDICINE
Payer: MEDICARE

## 2022-11-18 PROCEDURE — 82378 CARCINOEMBRYONIC ANTIGEN: CPT

## 2022-11-18 PROCEDURE — 85025 COMPLETE CBC W/AUTO DIFF WBC: CPT

## 2022-11-18 PROCEDURE — 83735 ASSAY OF MAGNESIUM: CPT

## 2022-11-18 PROCEDURE — 36415 COLL VENOUS BLD VENIPUNCTURE: CPT

## 2022-11-18 PROCEDURE — 80053 COMPREHEN METABOLIC PANEL: CPT

## 2022-11-19 LAB
ALBUMIN SERPL BCP-MCNC: 3.4 G/DL (ref 3.2–4.9)
ALBUMIN/GLOB SERPL: 1.6 G/DL
ALP SERPL-CCNC: 233 U/L (ref 30–99)
ALT SERPL-CCNC: 27 U/L (ref 2–50)
ANION GAP SERPL CALC-SCNC: 9 MMOL/L (ref 7–16)
AST SERPL-CCNC: 52 U/L (ref 12–45)
BASOPHILS # BLD AUTO: 0.9 % (ref 0–1.8)
BASOPHILS # BLD: 0.03 K/UL (ref 0–0.12)
BILIRUB SERPL-MCNC: 2 MG/DL (ref 0.1–1.5)
BUN SERPL-MCNC: 9 MG/DL (ref 8–22)
CALCIUM SERPL-MCNC: 8.8 MG/DL (ref 8.5–10.5)
CEA SERPL-MCNC: 42.5 NG/ML (ref 0–3)
CHLORIDE SERPL-SCNC: 108 MMOL/L (ref 96–112)
CO2 SERPL-SCNC: 24 MMOL/L (ref 20–33)
CREAT SERPL-MCNC: 0.46 MG/DL (ref 0.5–1.4)
EOSINOPHIL # BLD AUTO: 0.1 K/UL (ref 0–0.51)
EOSINOPHIL NFR BLD: 2.9 % (ref 0–6.9)
ERYTHROCYTE [DISTWIDTH] IN BLOOD BY AUTOMATED COUNT: 63.7 FL (ref 35.9–50)
GFR SERPLBLD CREATININE-BSD FMLA CKD-EPI: 121 ML/MIN/1.73 M 2
GLOBULIN SER CALC-MCNC: 2.1 G/DL (ref 1.9–3.5)
GLUCOSE SERPL-MCNC: 87 MG/DL (ref 65–99)
HCT VFR BLD AUTO: 33.8 % (ref 42–52)
HGB BLD-MCNC: 11.1 G/DL (ref 14–18)
IMM GRANULOCYTES # BLD AUTO: 0.01 K/UL (ref 0–0.11)
IMM GRANULOCYTES NFR BLD AUTO: 0.3 % (ref 0–0.9)
LYMPHOCYTES # BLD AUTO: 0.6 K/UL (ref 1–4.8)
LYMPHOCYTES NFR BLD: 17.6 % (ref 22–41)
MAGNESIUM SERPL-MCNC: 1.9 MG/DL (ref 1.5–2.5)
MCH RBC QN AUTO: 35.6 PG (ref 27–33)
MCHC RBC AUTO-ENTMCNC: 32.8 G/DL (ref 33.7–35.3)
MCV RBC AUTO: 108.3 FL (ref 81.4–97.8)
MONOCYTES # BLD AUTO: 0.55 K/UL (ref 0–0.85)
MONOCYTES NFR BLD AUTO: 16.1 % (ref 0–13.4)
NEUTROPHILS # BLD AUTO: 2.12 K/UL (ref 1.82–7.42)
NEUTROPHILS NFR BLD: 62.2 % (ref 44–72)
NRBC # BLD AUTO: 0 K/UL
NRBC BLD-RTO: 0 /100 WBC
PLATELET # BLD AUTO: 87 K/UL (ref 164–446)
PMV BLD AUTO: 13.8 FL (ref 9–12.9)
POTASSIUM SERPL-SCNC: 3.7 MMOL/L (ref 3.6–5.5)
PROT SERPL-MCNC: 5.5 G/DL (ref 6–8.2)
RBC # BLD AUTO: 3.12 M/UL (ref 4.7–6.1)
SODIUM SERPL-SCNC: 141 MMOL/L (ref 135–145)
WBC # BLD AUTO: 3.4 K/UL (ref 4.8–10.8)

## 2022-11-19 NOTE — PROGRESS NOTES
"Pharmacy Chemotherapy Calculation    Dx: mCRC         Protocol: Trifluridine and Tipiracil + Bevacizumab     Trifluridine and tipiracil (Lonsurf) 35 mg/m2 (max 80 mg) based on trifluridine component PO twice daily on Days 1-5 and 8-12  Bevacizumab 5 mg/kg IV on Days 1 and 15  28-day cycle until DP/UT (entered into [place] as 14-day cycle)  NCCN Guidelines for Colon Cancer V.1.2022.  Sarah P, et al. Lancet Oncol. 2020;21(3):412-420.      Allergies:  Compazine, Oxaliplatin, and Bee venom       /59   Pulse 62   Temp 35.8 °C (96.5 °F) (Temporal)   Resp (!) 6   Ht 1.85 m (6' 0.84\")   Wt 85.7 kg (188 lb 15 oz)   SpO2 99%   BMI 25.04 kg/m²  Body surface area is 2.1 meters squared.    Labs 11/18/22:  ANC~ 2120 Plt = 87k*   Hgb = 11.1     SCr = 0.46 mg/dL CrCl >125 mL/min   AST/ALT/AP = 52/27/233 TBili = 2.0   Urine protein = negative BP = 110/59  *Ok to proceed with treatment with plts from 11/18 per Dr. Ross     Drug Order   (Drug name, dose, route, IV Fluid & volume, frequency, number of doses) Cycle: 1      Previous treatment: Enhertu x21 cycles - last 10/17/22)     Medication = Bevacizumab   Base Dose= 5 mg/kg  Calc Dose: Base Dose x 85.7 kg = 428.5 mg  Final Dose = 400 mg  Route = IV  Fluid & Volume =  mL  Admin Duration = Over 90 minutes          <10% difference, OK to treat with final dose     By my signature below, I confirm this process was performed independently with the BSA and all final chemotherapy dosing calculations congruent. I have reviewed the above chemotherapy order and that my calculation of the final dose and BSA (when applicable) corroborate those calculations of the  pharmacist. Discrepancies of 10% or greater in the written dose have been addressed and documented within the [facility] Progress notes.    Shon Hurtado, PharmD  "

## 2022-11-20 NOTE — PROGRESS NOTES
"Pharmacy Chemotherapy Verification  Patient name: Gurmeet Jo  Dx: mCRC    Cycle 1 day 1  Previous treatment: 10/17/22    Regimen: bevacizumab + trifluridine and tipiracil   trifluridine and tipiracil 35mg/m2(max 80mg) based on the trifluridine component PO BID on days 1-5 and 8-12(total dose not to exceed 160mg/day)  **provided as home prescription  Bevacizumab 5mg/kg IV on days 1 and 15  Q28 days until DP/UT  NCCN guidelines for Colon Cancer V.1.2022  Charo P, et al - Lancet Oncol. 2020 Mar;21(3):412-420.    Allergies: Patient has no known allergies.  /59   Pulse 62   Temp 35.8 °C (96.5 °F) (Temporal)   Resp (!) 6   Ht 1.85 m (6' 0.84\")   Wt 85.7 kg (188 lb 15 oz)   SpO2 99%   BMI 25.04 kg/m²  Body surface area is 2.1 meters squared.     All lab results 11/18/22, BP and urine protein 11/21/22 within treatment parameters. Except PLt < 100k.  MD aware of all current lab results. Orders received to proceed with treatment.       Bevacizumab 5 mg/kg x 85.7kg = 428mg   Rounded to vial size(within 10%) per dose rounding protocol.   ok to treat with final dose = 400mg IV    Shari Amador, PharmD    "

## 2022-11-21 ENCOUNTER — OUTPATIENT INFUSION SERVICES (OUTPATIENT)
Dept: ONCOLOGY | Facility: MEDICAL CENTER | Age: 57
End: 2022-11-21
Attending: INTERNAL MEDICINE
Payer: MEDICARE

## 2022-11-21 VITALS
RESPIRATION RATE: 6 BRPM | TEMPERATURE: 96.5 F | HEIGHT: 73 IN | SYSTOLIC BLOOD PRESSURE: 110 MMHG | HEART RATE: 62 BPM | WEIGHT: 188.93 LBS | DIASTOLIC BLOOD PRESSURE: 59 MMHG | OXYGEN SATURATION: 99 % | BODY MASS INDEX: 25.04 KG/M2

## 2022-11-21 DIAGNOSIS — C78.7 METASTATIC COLON CANCER TO LIVER (HCC): ICD-10-CM

## 2022-11-21 DIAGNOSIS — C18.9 METASTATIC COLON CANCER TO LIVER (HCC): ICD-10-CM

## 2022-11-21 DIAGNOSIS — C18.9 STAGE IV CARCINOMA OF COLON (HCC): ICD-10-CM

## 2022-11-21 LAB
APPEARANCE UR: CLEAR
COLOR UR AUTO: YELLOW
GLUCOSE UR QL STRIP.AUTO: >=1000 MG/DL
KETONES UR QL STRIP.AUTO: NEGATIVE MG/DL
LEUKOCYTE ESTERASE UR QL STRIP.AUTO: NEGATIVE
NITRITE UR QL STRIP.AUTO: NEGATIVE
PH UR STRIP.AUTO: 5.5 [PH] (ref 5–8)
PROT UR QL STRIP: NEGATIVE MG/DL
RBC UR QL AUTO: NEGATIVE
SP GR UR STRIP.AUTO: 1.02 (ref 1–1.03)

## 2022-11-21 PROCEDURE — 81002 URINALYSIS NONAUTO W/O SCOPE: CPT

## 2022-11-21 PROCEDURE — A4212 NON CORING NEEDLE OR STYLET: HCPCS

## 2022-11-21 PROCEDURE — 700105 HCHG RX REV CODE 258: Performed by: INTERNAL MEDICINE

## 2022-11-21 PROCEDURE — 96413 CHEMO IV INFUSION 1 HR: CPT

## 2022-11-21 PROCEDURE — 700111 HCHG RX REV CODE 636 W/ 250 OVERRIDE (IP): Mod: TB | Performed by: INTERNAL MEDICINE

## 2022-11-21 PROCEDURE — 96415 CHEMO IV INFUSION ADDL HR: CPT

## 2022-11-21 RX ORDER — HEPARIN SODIUM (PORCINE) LOCK FLUSH IV SOLN 100 UNIT/ML 100 UNIT/ML
500 SOLUTION INTRAVENOUS PRN
Status: DISCONTINUED | OUTPATIENT
Start: 2022-11-21 | End: 2022-11-21 | Stop reason: HOSPADM

## 2022-11-21 RX ADMIN — HEPARIN 500 UNITS: 100 SYRINGE at 11:35

## 2022-11-21 RX ADMIN — SODIUM CHLORIDE 400 MG: 9 INJECTION, SOLUTION INTRAVENOUS at 09:46

## 2022-11-21 ASSESSMENT — FIBROSIS 4 INDEX: FIB4 SCORE: 6.67

## 2022-11-21 NOTE — PROGRESS NOTES
Pt to [facility] for D1C1 Zirabev. POC discussed and pt verbalized understanding. Labs from 11/18/22 reviewed and POC UA collected from pt, processed. Protein negative in urine. BP reviewed and within defined parmaeters for treatment today. Zirabev administered per MAR over 90 mins and pt tolerated well. No s/s of reaction or complications noted. PORT flushed per policy and de-accessed with needle intact, sterile gauze/ster strips applied to site and pt tolerated well. Pt discharged home ambulatory in NAD with next appt confirmed.

## 2022-11-21 NOTE — PROGRESS NOTES
Chemotherapy Verification - SECONDARY RN       Height = 185 cm  Weight = 85.7 kg  BSA = 2.1 m2       Medication: Zirabev  Dose: 5 mg/kg  Calculated Dose: 428.5 mg                             (In mg/m2, AUC, mg/kg)     I confirm that this process was performed independently.

## 2022-11-21 NOTE — PROGRESS NOTES
Chemotherapy Verification - PRIMARY RN      Height = 185 cm  Weight = 85.7 kg  BSA = 2.098 or 2.10 m2      Medication: bevacizumab-bvzr (Zirabev)  Dose: 5mg/kg  Calculated Dose: 428.5 mg                             (In mg/m2, AUC, mg/kg)       I confirm this process was performed independently with the BSA and all final chemotherapy dosing calculations congruent.  Any discrepancies of 10% or greater have been addressed with the chemotherapy pharmacist. The resolution of the discrepancy has been documented in the EPIC progress notes.

## 2022-11-22 ENCOUNTER — TELEPHONE (OUTPATIENT)
Dept: ONCOLOGY | Facility: MEDICAL CENTER | Age: 57
End: 2022-11-22
Payer: MEDICARE

## 2022-11-23 NOTE — TELEPHONE ENCOUNTER
Confirmed with Dr Ross that pt is every 2 weeks for his treatment cycles, left mesg for pt and number to call with questions. Next appt confirmed for 12/5

## 2022-12-03 NOTE — PROGRESS NOTES
"Pharmacy Chemotherapy Verification  Patient name: Gurmeet Jo  Dx: mCRC    Cycle 2 day 1  Previous treatment: 11/21/22    Regimen: bevacizumab + trifluridine and tipiracil   trifluridine and tipiracil 35mg/m2(max 80mg) based on the trifluridine component PO BID on days 1-5 and 8-12(total dose not to exceed 160mg/day)  **provided as home prescription  Bevacizumab 5mg/kg IV on days 1 and 15  Q28 days until DP/UT  NCCN guidelines for Colon Cancer V.1.2022  Charo P, et al - Lancet Oncol. 2020 Mar;21(3):412-420.    Allergies: Patient has no known allergies.  /59   Pulse 70   Temp 36.7 °C (98 °F) (Temporal)   Resp 16   Ht 1.85 m (6' 0.84\")   Wt 87 kg (191 lb 12.8 oz)   SpO2 99%   BMI 25.42 kg/m²  Body surface area is 2.11 meters squared.     Labs 12/5/22:  ANC~ 2090 Plt = 82k*   Hgb = 11.9     SCr = 0.38 mg/dL CrCl >125 mL/min   AST/ALT/AP = 62/35/233 TBili = 2.8   Urine protein = negative BP = 114/59  *Ok to proceed with treatment today per Dr. Ross    Bevacizumab 5 mg/kg x 87 kg = 435 mg   Rounded to vial size(within 10%) per dose rounding protocol.   ok to treat with final dose = 400 mg IV    Shon Hurtado, PharmD  "

## 2022-12-05 ENCOUNTER — OUTPATIENT INFUSION SERVICES (OUTPATIENT)
Dept: ONCOLOGY | Facility: MEDICAL CENTER | Age: 57
End: 2022-12-05
Attending: INTERNAL MEDICINE
Payer: MEDICARE

## 2022-12-05 VITALS
HEART RATE: 70 BPM | OXYGEN SATURATION: 99 % | SYSTOLIC BLOOD PRESSURE: 114 MMHG | RESPIRATION RATE: 16 BRPM | WEIGHT: 191.8 LBS | BODY MASS INDEX: 25.42 KG/M2 | DIASTOLIC BLOOD PRESSURE: 59 MMHG | TEMPERATURE: 98 F | HEIGHT: 73 IN

## 2022-12-05 DIAGNOSIS — C18.9 STAGE IV CARCINOMA OF COLON (HCC): ICD-10-CM

## 2022-12-05 DIAGNOSIS — C18.9 METASTATIC COLON CANCER TO LIVER (HCC): ICD-10-CM

## 2022-12-05 DIAGNOSIS — C78.7 METASTATIC COLON CANCER TO LIVER (HCC): ICD-10-CM

## 2022-12-05 LAB
ALBUMIN SERPL BCP-MCNC: 3.7 G/DL (ref 3.2–4.9)
ALBUMIN/GLOB SERPL: 1.8 G/DL
ALP SERPL-CCNC: 233 U/L (ref 30–99)
ALT SERPL-CCNC: 35 U/L (ref 2–50)
ANION GAP SERPL CALC-SCNC: 10 MMOL/L (ref 7–16)
APPEARANCE UR: CLEAR
AST SERPL-CCNC: 62 U/L (ref 12–45)
BASOPHILS # BLD AUTO: 0.9 % (ref 0–1.8)
BASOPHILS # BLD: 0.03 K/UL (ref 0–0.12)
BILIRUB SERPL-MCNC: 2.8 MG/DL (ref 0.1–1.5)
BUN SERPL-MCNC: 10 MG/DL (ref 8–22)
CALCIUM SERPL-MCNC: 8.8 MG/DL (ref 8.5–10.5)
CHLORIDE SERPL-SCNC: 106 MMOL/L (ref 96–112)
CO2 SERPL-SCNC: 23 MMOL/L (ref 20–33)
COLOR UR AUTO: ABNORMAL
CREAT SERPL-MCNC: 0.38 MG/DL (ref 0.5–1.4)
EOSINOPHIL # BLD AUTO: 0.21 K/UL (ref 0–0.51)
EOSINOPHIL NFR BLD: 6.4 % (ref 0–6.9)
ERYTHROCYTE [DISTWIDTH] IN BLOOD BY AUTOMATED COUNT: 55.7 FL (ref 35.9–50)
GFR SERPLBLD CREATININE-BSD FMLA CKD-EPI: 128 ML/MIN/1.73 M 2
GLOBULIN SER CALC-MCNC: 2.1 G/DL (ref 1.9–3.5)
GLUCOSE SERPL-MCNC: 91 MG/DL (ref 65–99)
GLUCOSE UR QL STRIP.AUTO: 500 MG/DL
HCT VFR BLD AUTO: 34.6 % (ref 42–52)
HGB BLD-MCNC: 11.9 G/DL (ref 14–18)
IMM GRANULOCYTES # BLD AUTO: 0.01 K/UL (ref 0–0.11)
IMM GRANULOCYTES NFR BLD AUTO: 0.3 % (ref 0–0.9)
KETONES UR QL STRIP.AUTO: NEGATIVE MG/DL
LEUKOCYTE ESTERASE UR QL STRIP.AUTO: NEGATIVE
LYMPHOCYTES # BLD AUTO: 0.59 K/UL (ref 1–4.8)
LYMPHOCYTES NFR BLD: 17.9 % (ref 22–41)
MCH RBC QN AUTO: 35.5 PG (ref 27–33)
MCHC RBC AUTO-ENTMCNC: 34.4 G/DL (ref 33.7–35.3)
MCV RBC AUTO: 103.3 FL (ref 81.4–97.8)
MONOCYTES # BLD AUTO: 0.37 K/UL (ref 0–0.85)
MONOCYTES NFR BLD AUTO: 11.2 % (ref 0–13.4)
NEUTROPHILS # BLD AUTO: 2.09 K/UL (ref 1.82–7.42)
NEUTROPHILS NFR BLD: 63.3 % (ref 44–72)
NITRITE UR QL STRIP.AUTO: NEGATIVE
NRBC # BLD AUTO: 0 K/UL
NRBC BLD-RTO: 0 /100 WBC
OUTPT INFUS CBC COMMENT OICOM: ABNORMAL
PH UR STRIP.AUTO: 5.5 [PH] (ref 5–8)
PLATELET # BLD AUTO: 82 K/UL (ref 164–446)
PMV BLD AUTO: 12 FL (ref 9–12.9)
POTASSIUM SERPL-SCNC: 3.6 MMOL/L (ref 3.6–5.5)
PROT SERPL-MCNC: 5.8 G/DL (ref 6–8.2)
PROT UR QL STRIP: NEGATIVE MG/DL
RBC # BLD AUTO: 3.35 M/UL (ref 4.7–6.1)
RBC UR QL AUTO: ABNORMAL
SODIUM SERPL-SCNC: 139 MMOL/L (ref 135–145)
SP GR UR STRIP.AUTO: >=1.03 (ref 1–1.03)
WBC # BLD AUTO: 3.3 K/UL (ref 4.8–10.8)

## 2022-12-05 PROCEDURE — 700111 HCHG RX REV CODE 636 W/ 250 OVERRIDE (IP): Mod: TB | Performed by: INTERNAL MEDICINE

## 2022-12-05 PROCEDURE — 81002 URINALYSIS NONAUTO W/O SCOPE: CPT

## 2022-12-05 PROCEDURE — 85025 COMPLETE CBC W/AUTO DIFF WBC: CPT

## 2022-12-05 PROCEDURE — 80053 COMPREHEN METABOLIC PANEL: CPT

## 2022-12-05 PROCEDURE — 700105 HCHG RX REV CODE 258: Performed by: INTERNAL MEDICINE

## 2022-12-05 PROCEDURE — A4212 NON CORING NEEDLE OR STYLET: HCPCS

## 2022-12-05 PROCEDURE — 96413 CHEMO IV INFUSION 1 HR: CPT

## 2022-12-05 RX ORDER — MULTIVITAMIN WITH IRON
1 TABLET ORAL
COMMUNITY
Start: 2022-11-17 | End: 2023-01-09

## 2022-12-05 RX ORDER — HEPARIN SODIUM (PORCINE) LOCK FLUSH IV SOLN 100 UNIT/ML 100 UNIT/ML
500 SOLUTION INTRAVENOUS PRN
Status: DISCONTINUED | OUTPATIENT
Start: 2022-12-05 | End: 2022-12-05 | Stop reason: HOSPADM

## 2022-12-05 RX ADMIN — HEPARIN 500 UNITS: 100 SYRINGE at 11:23

## 2022-12-05 RX ADMIN — SODIUM CHLORIDE 400 MG: 9 INJECTION, SOLUTION INTRAVENOUS at 10:05

## 2022-12-05 ASSESSMENT — FIBROSIS 4 INDEX: FIB4 SCORE: 6.67

## 2022-12-05 NOTE — PROGRESS NOTES
Pt presents to [facility] for Zirabev treatment for Colon CA. R U chest port accessed using sterile technique; brisk blood return observed and pt tolerated well. Labs drawn, platelets at 82K. Dr. Cody lopez to treat. All other labs and UA within treatable parameters. No pre medications today, Zirabev infused over an hour, with no s/s of adverse reactions. Brisk blood return observed from port before flushed, heparin locked, and de-accessed; gauze and tape dressing applied. Next appointment to be scheduled in two weeks, schedulers emailed, patient uses [facility] and knows to look there for appt notifications. Pt discharged to self care with all personal belongings and in NAD.

## 2022-12-05 NOTE — PROGRESS NOTES
"Pharmacy Chemotherapy Calculation    Dx: mCRC         Protocol: Trifluridine and Tipiracil + Bevacizumab     Trifluridine and tipiracil (Lonsurf) 35 mg/m2 (max 80 mg) based on trifluridine component PO twice daily on Days 1-5 and 8-12  Bevacizumab 5 mg/kg IV on Days 1 and 15  28-day cycle until DP/UT (entered into [place] as 14-day cycle)  NCCN Guidelines for Colon Cancer V.1.2022.  Sarah P, et al. Lancet Oncol. 2020;21(3):412-420.      Allergies:  Compazine, Oxaliplatin, and Bee venom       /59   Pulse 70   Temp 36.7 °C (98 °F) (Temporal)   Resp 16   Ht 1.85 m (6' 0.84\")   Wt 87 kg (191 lb 12.8 oz)   SpO2 99%   BMI 25.42 kg/m²  Body surface area is 2.11 meters squared.    Labs 12/5/22:  ANC~ 2090 Plt = 82k   Hgb = 11.9     SCr = 0.38 mg/dL CrCl >125 mL/min   AST/ALT/ALK  = 62/35/233 TBili = 2.8  Urine Protein = negative BP = 114/59    *MD aware of current labs, okay to proceed with treatment today*     Drug Order   (Drug name, dose, route, IV Fluid & volume, frequency, number of doses) Cycle 2  Previous treatment: C1 on 11/21/22     Medication = Bevacizumab-bvzr (Zirabev)  Base Dose= 5 mg/kg  Calc Dose: Base Dose x 87 kg = 435 mg  Final Dose = 400  mg  Route = IV  Fluid & Volume =  mL  Admin Duration = Over 60 minutes          Rounded to nearest vial size (within 10%) per rounding protocol, okay to treat with final dose     By my signature below, I confirm this process was performed independently with the BSA and all final chemotherapy dosing calculations congruent. I have reviewed the above chemotherapy order and that my calculation of the final dose and BSA (when applicable) corroborate those calculations of the  pharmacist. Discrepancies of 10% or greater in the written dose have been addressed and documented within the [facility] Progress notes.    José Marvin, PharmD  "

## 2022-12-05 NOTE — PROGRESS NOTES
Chemotherapy Verification - SECONDARY RN       Height = 185 cm  Weight = 87 kg  BSA = 2.11 m2       Medication: Zirabev  Dose: 5 mg/kg  Calculated Dose: 435 mg                             (In mg/m2, AUC, mg/kg)     I confirm that this process was performed independently.

## 2022-12-05 NOTE — PROGRESS NOTES
Chemotherapy Verification - PRIMARY RN    C2 D1    Height = 185cm  Weight = 87kg  BSA = 2.11m^2       Medication: bevacizumab-bvzr (Zirabev)  Dose: 5mg/kg    Calculated Dose: 435mg                             (In mg/m2, AUC, mg/kg)     I confirm this process was performed independently with the BSA and all final chemotherapy dosing calculations congruent.  Any discrepancies of 10% or greater have been addressed with the chemotherapy pharmacist. The resolution of the discrepancy has been documented in the EPIC progress notes.

## 2022-12-16 RX ORDER — ONDANSETRON 2 MG/ML
4 INJECTION INTRAMUSCULAR; INTRAVENOUS PRN
Status: CANCELLED | OUTPATIENT
Start: 2023-01-03

## 2022-12-16 RX ORDER — 0.9 % SODIUM CHLORIDE 0.9 %
10 VIAL (ML) INJECTION PRN
Status: CANCELLED | OUTPATIENT
Start: 2023-01-02

## 2022-12-16 RX ORDER — METHYLPREDNISOLONE SODIUM SUCCINATE 125 MG/2ML
125 INJECTION, POWDER, LYOPHILIZED, FOR SOLUTION INTRAMUSCULAR; INTRAVENOUS PRN
Status: CANCELLED | OUTPATIENT
Start: 2023-01-03

## 2022-12-16 RX ORDER — 0.9 % SODIUM CHLORIDE 0.9 %
10 VIAL (ML) INJECTION PRN
Status: CANCELLED | OUTPATIENT
Start: 2022-12-19

## 2022-12-16 RX ORDER — 0.9 % SODIUM CHLORIDE 0.9 %
VIAL (ML) INJECTION PRN
Status: CANCELLED | OUTPATIENT
Start: 2023-01-02

## 2022-12-16 RX ORDER — SODIUM CHLORIDE 9 MG/ML
INJECTION, SOLUTION INTRAVENOUS CONTINUOUS
Status: CANCELLED | OUTPATIENT
Start: 2022-12-20

## 2022-12-16 RX ORDER — 0.9 % SODIUM CHLORIDE 0.9 %
3 VIAL (ML) INJECTION PRN
Status: CANCELLED | OUTPATIENT
Start: 2022-12-19

## 2022-12-16 RX ORDER — HEPARIN SODIUM (PORCINE) LOCK FLUSH IV SOLN 100 UNIT/ML 100 UNIT/ML
500 SOLUTION INTRAVENOUS PRN
Status: CANCELLED | OUTPATIENT
Start: 2022-12-19

## 2022-12-16 RX ORDER — EPINEPHRINE 1 MG/ML(1)
0.5 AMPUL (ML) INJECTION PRN
Status: CANCELLED | OUTPATIENT
Start: 2023-01-03

## 2022-12-16 RX ORDER — HEPARIN SODIUM (PORCINE) LOCK FLUSH IV SOLN 100 UNIT/ML 100 UNIT/ML
500 SOLUTION INTRAVENOUS PRN
Status: CANCELLED | OUTPATIENT
Start: 2023-01-03

## 2022-12-16 RX ORDER — 0.9 % SODIUM CHLORIDE 0.9 %
VIAL (ML) INJECTION PRN
Status: CANCELLED | OUTPATIENT
Start: 2022-12-19

## 2022-12-16 RX ORDER — 0.9 % SODIUM CHLORIDE 0.9 %
10 VIAL (ML) INJECTION PRN
Status: CANCELLED | OUTPATIENT
Start: 2022-12-20

## 2022-12-16 RX ORDER — 0.9 % SODIUM CHLORIDE 0.9 %
3 VIAL (ML) INJECTION PRN
Status: CANCELLED | OUTPATIENT
Start: 2022-12-20

## 2022-12-16 RX ORDER — HEPARIN SODIUM (PORCINE) LOCK FLUSH IV SOLN 100 UNIT/ML 100 UNIT/ML
500 SOLUTION INTRAVENOUS PRN
Status: CANCELLED | OUTPATIENT
Start: 2023-01-02

## 2022-12-16 RX ORDER — 0.9 % SODIUM CHLORIDE 0.9 %
VIAL (ML) INJECTION PRN
Status: CANCELLED | OUTPATIENT
Start: 2022-12-20

## 2022-12-16 RX ORDER — SODIUM CHLORIDE 9 MG/ML
INJECTION, SOLUTION INTRAVENOUS CONTINUOUS
Status: CANCELLED | OUTPATIENT
Start: 2023-01-03

## 2022-12-16 RX ORDER — ONDANSETRON 8 MG/1
8 TABLET, ORALLY DISINTEGRATING ORAL PRN
Status: CANCELLED | OUTPATIENT
Start: 2022-12-20

## 2022-12-16 RX ORDER — DIPHENHYDRAMINE HYDROCHLORIDE 50 MG/ML
50 INJECTION INTRAMUSCULAR; INTRAVENOUS PRN
Status: CANCELLED | OUTPATIENT
Start: 2022-12-20

## 2022-12-16 RX ORDER — PROCHLORPERAZINE MALEATE 10 MG
10 TABLET ORAL EVERY 6 HOURS PRN
Status: CANCELLED | OUTPATIENT
Start: 2023-01-03

## 2022-12-16 RX ORDER — EPINEPHRINE 1 MG/ML(1)
0.5 AMPUL (ML) INJECTION PRN
Status: CANCELLED | OUTPATIENT
Start: 2022-12-20

## 2022-12-16 RX ORDER — HEPARIN SODIUM (PORCINE) LOCK FLUSH IV SOLN 100 UNIT/ML 100 UNIT/ML
500 SOLUTION INTRAVENOUS PRN
Status: CANCELLED | OUTPATIENT
Start: 2022-12-20

## 2022-12-16 RX ORDER — ONDANSETRON 8 MG/1
8 TABLET, ORALLY DISINTEGRATING ORAL PRN
Status: CANCELLED | OUTPATIENT
Start: 2023-01-03

## 2022-12-16 RX ORDER — 0.9 % SODIUM CHLORIDE 0.9 %
3 VIAL (ML) INJECTION PRN
Status: CANCELLED | OUTPATIENT
Start: 2023-01-02

## 2022-12-16 RX ORDER — 0.9 % SODIUM CHLORIDE 0.9 %
10 VIAL (ML) INJECTION PRN
Status: CANCELLED | OUTPATIENT
Start: 2023-01-03

## 2022-12-16 RX ORDER — DIPHENHYDRAMINE HYDROCHLORIDE 50 MG/ML
50 INJECTION INTRAMUSCULAR; INTRAVENOUS PRN
Status: CANCELLED | OUTPATIENT
Start: 2023-01-03

## 2022-12-16 RX ORDER — METHYLPREDNISOLONE SODIUM SUCCINATE 125 MG/2ML
125 INJECTION, POWDER, LYOPHILIZED, FOR SOLUTION INTRAMUSCULAR; INTRAVENOUS PRN
Status: CANCELLED | OUTPATIENT
Start: 2022-12-20

## 2022-12-16 RX ORDER — 0.9 % SODIUM CHLORIDE 0.9 %
VIAL (ML) INJECTION PRN
Status: CANCELLED | OUTPATIENT
Start: 2023-01-03

## 2022-12-16 RX ORDER — PROCHLORPERAZINE MALEATE 10 MG
10 TABLET ORAL EVERY 6 HOURS PRN
Status: CANCELLED | OUTPATIENT
Start: 2022-12-20

## 2022-12-16 RX ORDER — ONDANSETRON 2 MG/ML
4 INJECTION INTRAMUSCULAR; INTRAVENOUS PRN
Status: CANCELLED | OUTPATIENT
Start: 2022-12-20

## 2022-12-16 RX ORDER — 0.9 % SODIUM CHLORIDE 0.9 %
3 VIAL (ML) INJECTION PRN
Status: CANCELLED | OUTPATIENT
Start: 2023-01-03

## 2022-12-17 NOTE — PROGRESS NOTES
"Pharmacy Chemotherapy Calculation    Dx: mCRC         Protocol: Trifluridine and Tipiracil + Bevacizumab     Trifluridine and tipiracil (Lonsurf) 35 mg/m2 (max 80 mg) based on trifluridine component PO twice daily on Days 1-5 and 8-12  Bevacizumab 5 mg/kg IV on Days 1 and 15  28-day cycle until DP/UT (entered into [place] as 14-day cycle)  NCCN Guidelines for Colon Cancer V.1.2022.  Sarah P, et al. Lancet Oncol. 2020;21(3):412-420.      Allergies:  Compazine, Oxaliplatin, and Bee venom       /69   Pulse 74   Temp 36.4 °C (97.6 °F) (Temporal)   Resp 18   Ht 1.85 m (6' 0.84\")   Wt 88.8 kg (195 lb 12.3 oz)   SpO2 98%   BMI 25.95 kg/m²  Body surface area is 2.14 meters squared.    Labs 12/19/22:  ANC~ 960* Plt = 67k*   Hgb = 12.4     SCr = 0.42 mg/dL CrCl >125 mL/min   AST/ALT/AP = 67/40/253 TBili = 2.2   Urine protein = negative BP = 129/69  *OK to proceed with treatment today per Dr. Ross     Drug Order   (Drug name, dose, route, IV Fluid & volume, frequency, number of doses) Cycle 3  Previous treatment: C2 on 12/5/22     Medication = Bevacizumab-bvzr (Zirabev)  Base Dose= 5 mg/kg  Calc Dose: Base Dose x 88.8 kg = 444 mg  Final Dose = 400 mg  Route = IV  Fluid & Volume =  mL  Admin Duration = Over 30 minutes          Rounded to nearest vial size (within 10%) per rounding protocol, okay to treat with final dose     By my signature below, I confirm this process was performed independently with the BSA and all final chemotherapy dosing calculations congruent. I have reviewed the above chemotherapy order and that my calculation of the final dose and BSA (when applicable) corroborate those calculations of the  pharmacist. Discrepancies of 10% or greater in the written dose have been addressed and documented within the [facility] Progress notes.    Shon Hurtado, PharmD  "

## 2022-12-19 ENCOUNTER — OUTPATIENT INFUSION SERVICES (OUTPATIENT)
Dept: ONCOLOGY | Facility: MEDICAL CENTER | Age: 57
End: 2022-12-19
Attending: INTERNAL MEDICINE
Payer: MEDICARE

## 2022-12-19 VITALS
SYSTOLIC BLOOD PRESSURE: 129 MMHG | TEMPERATURE: 97.6 F | HEART RATE: 74 BPM | HEIGHT: 73 IN | DIASTOLIC BLOOD PRESSURE: 69 MMHG | OXYGEN SATURATION: 98 % | BODY MASS INDEX: 25.95 KG/M2 | RESPIRATION RATE: 18 BRPM | WEIGHT: 195.77 LBS

## 2022-12-19 DIAGNOSIS — C78.7 METASTATIC COLON CANCER TO LIVER (HCC): ICD-10-CM

## 2022-12-19 DIAGNOSIS — C18.9 METASTATIC COLON CANCER TO LIVER (HCC): ICD-10-CM

## 2022-12-19 DIAGNOSIS — C18.9 STAGE IV CARCINOMA OF COLON (HCC): ICD-10-CM

## 2022-12-19 LAB
ALBUMIN SERPL BCP-MCNC: 3.3 G/DL (ref 3.2–4.9)
ALBUMIN/GLOB SERPL: 1.5 G/DL
ALP SERPL-CCNC: 253 U/L (ref 30–99)
ALT SERPL-CCNC: 40 U/L (ref 2–50)
ANION GAP SERPL CALC-SCNC: 9 MMOL/L (ref 7–16)
APPEARANCE UR: CLEAR
AST SERPL-CCNC: 67 U/L (ref 12–45)
BASOPHILS # BLD AUTO: 1.1 % (ref 0–1.8)
BASOPHILS # BLD: 0.02 K/UL (ref 0–0.12)
BILIRUB SERPL-MCNC: 2.2 MG/DL (ref 0.1–1.5)
BUN SERPL-MCNC: 10 MG/DL (ref 8–22)
CALCIUM ALBUM COR SERPL-MCNC: 9.3 MG/DL (ref 8.5–10.5)
CALCIUM SERPL-MCNC: 8.7 MG/DL (ref 8.5–10.5)
CEA SERPL-MCNC: 50.6 NG/ML (ref 0–3)
CHLORIDE SERPL-SCNC: 108 MMOL/L (ref 96–112)
CO2 SERPL-SCNC: 22 MMOL/L (ref 20–33)
COLOR UR AUTO: ABNORMAL
CREAT SERPL-MCNC: 0.42 MG/DL (ref 0.5–1.4)
EOSINOPHIL # BLD AUTO: 0.12 K/UL (ref 0–0.51)
EOSINOPHIL NFR BLD: 6.7 % (ref 0–6.9)
ERYTHROCYTE [DISTWIDTH] IN BLOOD BY AUTOMATED COUNT: 55.5 FL (ref 35.9–50)
GFR SERPLBLD CREATININE-BSD FMLA CKD-EPI: 124 ML/MIN/1.73 M 2
GLOBULIN SER CALC-MCNC: 2.2 G/DL (ref 1.9–3.5)
GLUCOSE SERPL-MCNC: 97 MG/DL (ref 65–99)
GLUCOSE UR QL STRIP.AUTO: 500 MG/DL
HCT VFR BLD AUTO: 36.6 % (ref 42–52)
HGB BLD-MCNC: 12.4 G/DL (ref 14–18)
IMM GRANULOCYTES # BLD AUTO: 0.01 K/UL (ref 0–0.11)
IMM GRANULOCYTES NFR BLD AUTO: 0.6 % (ref 0–0.9)
KETONES UR QL STRIP.AUTO: NEGATIVE MG/DL
LEUKOCYTE ESTERASE UR QL STRIP.AUTO: NEGATIVE
LYMPHOCYTES # BLD AUTO: 0.36 K/UL (ref 1–4.8)
LYMPHOCYTES NFR BLD: 20.2 % (ref 22–41)
MCH RBC QN AUTO: 35.2 PG (ref 27–33)
MCHC RBC AUTO-ENTMCNC: 33.9 G/DL (ref 33.7–35.3)
MCV RBC AUTO: 104 FL (ref 81.4–97.8)
MONOCYTES # BLD AUTO: 0.31 K/UL (ref 0–0.85)
MONOCYTES NFR BLD AUTO: 17.4 % (ref 0–13.4)
NEUTROPHILS # BLD AUTO: 0.96 K/UL (ref 1.82–7.42)
NEUTROPHILS NFR BLD: 54 % (ref 44–72)
NITRITE UR QL STRIP.AUTO: NEGATIVE
NRBC # BLD AUTO: 0 K/UL
NRBC BLD-RTO: 0 /100 WBC
OUTPT INFUS CBC COMMENT OICOM: ABNORMAL
PH UR STRIP.AUTO: 5.5 [PH] (ref 5–8)
PLATELET # BLD AUTO: 67 K/UL (ref 164–446)
PMV BLD AUTO: 12.3 FL (ref 9–12.9)
POTASSIUM SERPL-SCNC: 3.5 MMOL/L (ref 3.6–5.5)
PROT SERPL-MCNC: 5.5 G/DL (ref 6–8.2)
PROT UR QL STRIP: NEGATIVE MG/DL
RBC # BLD AUTO: 3.52 M/UL (ref 4.7–6.1)
RBC UR QL AUTO: NEGATIVE
SODIUM SERPL-SCNC: 139 MMOL/L (ref 135–145)
SP GR UR STRIP.AUTO: 1.02 (ref 1–1.03)
WBC # BLD AUTO: 1.8 K/UL (ref 4.8–10.8)

## 2022-12-19 PROCEDURE — 82378 CARCINOEMBRYONIC ANTIGEN: CPT

## 2022-12-19 PROCEDURE — 85025 COMPLETE CBC W/AUTO DIFF WBC: CPT

## 2022-12-19 PROCEDURE — A4212 NON CORING NEEDLE OR STYLET: HCPCS

## 2022-12-19 PROCEDURE — 700105 HCHG RX REV CODE 258: Performed by: INTERNAL MEDICINE

## 2022-12-19 PROCEDURE — 700111 HCHG RX REV CODE 636 W/ 250 OVERRIDE (IP): Mod: TB | Performed by: INTERNAL MEDICINE

## 2022-12-19 PROCEDURE — 81002 URINALYSIS NONAUTO W/O SCOPE: CPT

## 2022-12-19 PROCEDURE — 80053 COMPREHEN METABOLIC PANEL: CPT

## 2022-12-19 PROCEDURE — 96413 CHEMO IV INFUSION 1 HR: CPT

## 2022-12-19 RX ORDER — HEPARIN SODIUM (PORCINE) LOCK FLUSH IV SOLN 100 UNIT/ML 100 UNIT/ML
500 SOLUTION INTRAVENOUS PRN
Status: DISCONTINUED | OUTPATIENT
Start: 2022-12-19 | End: 2022-12-19 | Stop reason: HOSPADM

## 2022-12-19 RX ADMIN — SODIUM CHLORIDE 400 MG: 9 INJECTION, SOLUTION INTRAVENOUS at 09:58

## 2022-12-19 RX ADMIN — HEPARIN 500 UNITS: 100 SYRINGE at 10:44

## 2022-12-19 ASSESSMENT — FIBROSIS 4 INDEX: FIB4 SCORE: 7.41

## 2022-12-19 NOTE — PROGRESS NOTES
Chemotherapy Verification - PRIMARY RN    C3 D1    Height = 185 cm  Weight = 88.8 kg  BSA = 2.14 m^2       Medication: Bevacizumab-bvzr (Zirabev)  Dose: 5 mg/kg  Calculated Dose: 444 mg                             (In mg/m2, AUC, mg/kg)     I confirm this process was performed independently with the BSA and all final chemotherapy dosing calculations congruent.  Any discrepancies of 10% or greater have been addressed with the chemotherapy pharmacist. The resolution of the discrepancy has been documented in the EPIC progress notes.

## 2022-12-19 NOTE — PROGRESS NOTES
Chemotherapy Verification - SECONDARY RN       Height = 185 cm  Weight = 88.8 kg  BSA = 2.14 m2       Medication: Zirabev  Dose: 5 mg/kg  Calculated Dose: 444 mg                             (In mg/m2, AUC, mg/kg)     I confirm that this process was performed independently.

## 2022-12-19 NOTE — PROGRESS NOTES
"Pharmacy Chemotherapy Verification  Patient name: Gurmeet Jo  Dx: mCRC    Cycle 3   Previous treatment: C2 on 12/5/22    Regimen: bevacizumab + trifluridine and tipiracil   trifluridine and tipiracil 35mg/m2(max 80mg) based on the trifluridine component PO BID on days 1-5 and 8-12(total dose not to exceed 160mg/day)  **provided as home prescription  Bevacizumab 5mg/kg IV on days 1 and 15  Q28 days until DP/UT  NCCN guidelines for Colon Cancer V.1.2022  Charo P, et al - Lancet Oncol. 2020 Mar;21(3):412-420.    Allergies: Patient has no known allergies.  /69   Pulse 74   Temp 36.4 °C (97.6 °F) (Temporal)   Resp 18   Ht 1.85 m (6' 0.84\")   Wt 88.8 kg (195 lb 12.3 oz)   SpO2 98%   BMI 25.95 kg/m²  Body surface area is 2.14 meters squared.     Labs 12/19/22:  ANC~ 960 Plt = 67k   Hgb = 12.4     SCr = 0.42 mg/dL CrCl >125 mL/min   AST/ALT/ALK  = 67/40/253 TBili = 2.2   Urine protein = negative BP = 129/69    *MD aware of current labs, okay to proceed with current labs, changed PLTs to <50K for future cycles*    Bevacizumab-bvzr (Zirabev) 5 mg/kg x 88.8 kg = 444 mg   Rounded to vial size(within 10%) per dose rounding protocol.   ok to treat with final dose = 400 mg IV    José Marvin, PharmD  "

## 2022-12-19 NOTE — PROGRESS NOTES
Pt arrives to [facility] for cycle 3 of Zirabev and oral Lonsurf.  Discussed plan of care with pt.  Pt denies s/sx of infection, nausea or pain at this time.  EMLA cream was applied to port site by the pt.  EMLA cream wiped away.  RUC port accessed with sterile technique, flushed with NS and brisk blood return observed.  Labs were drawn via port.  UA collected and urine protein is negative.  BP is WNL.  Labs reviewed and results do not meet parameters for treatment today.  ANC is 960 and platelets are 67,000 today.  Informed Dr. Ross of lab results and that pt took morning dose of oral Lonsurf.  Received telephone order from Dr. Ross that it is ok to treat today, advise pt to hold Lonsurf for 1 week.  Also received order to reduce platelet parameters to call MD if less than 50,000 for future cycles.  Pt denies new signs of bleeding.  Zirabev infused over 30 minutes without adverse reaction.  Port flushed with NS and heparin locked.  Adkins needle removed intact.  Site covered with gauze/tape.  Confirmed next appt on 01/02/2023 with pt.  Pt dc home to self care.

## 2022-12-27 ENCOUNTER — HOSPITAL ENCOUNTER (OUTPATIENT)
Dept: LAB | Facility: MEDICAL CENTER | Age: 57
End: 2022-12-27
Attending: INTERNAL MEDICINE
Payer: MEDICARE

## 2022-12-27 LAB
BASOPHILS # BLD AUTO: 1.2 % (ref 0–1.8)
BASOPHILS # BLD: 0.03 K/UL (ref 0–0.12)
EOSINOPHIL # BLD AUTO: 0.13 K/UL (ref 0–0.51)
EOSINOPHIL NFR BLD: 5.3 % (ref 0–6.9)
ERYTHROCYTE [DISTWIDTH] IN BLOOD BY AUTOMATED COUNT: 56.3 FL (ref 35.9–50)
HCT VFR BLD AUTO: 38.4 % (ref 42–52)
HGB BLD-MCNC: 13.1 G/DL (ref 14–18)
IMM GRANULOCYTES # BLD AUTO: 0 K/UL (ref 0–0.11)
IMM GRANULOCYTES NFR BLD AUTO: 0 % (ref 0–0.9)
LYMPHOCYTES # BLD AUTO: 0.55 K/UL (ref 1–4.8)
LYMPHOCYTES NFR BLD: 22.5 % (ref 22–41)
MCH RBC QN AUTO: 35.6 PG (ref 27–33)
MCHC RBC AUTO-ENTMCNC: 34.1 G/DL (ref 33.7–35.3)
MCV RBC AUTO: 104.3 FL (ref 81.4–97.8)
MONOCYTES # BLD AUTO: 0.38 K/UL (ref 0–0.85)
MONOCYTES NFR BLD AUTO: 15.6 % (ref 0–13.4)
NEUTROPHILS # BLD AUTO: 1.35 K/UL (ref 1.82–7.42)
NEUTROPHILS NFR BLD: 55.4 % (ref 44–72)
NRBC # BLD AUTO: 0 K/UL
NRBC BLD-RTO: 0 /100 WBC
PLATELET # BLD AUTO: 58 K/UL (ref 164–446)
PMV BLD AUTO: 11.5 FL (ref 9–12.9)
RBC # BLD AUTO: 3.68 M/UL (ref 4.7–6.1)
WBC # BLD AUTO: 2.4 K/UL (ref 4.8–10.8)

## 2022-12-27 PROCEDURE — 36415 COLL VENOUS BLD VENIPUNCTURE: CPT

## 2022-12-27 PROCEDURE — 85025 COMPLETE CBC W/AUTO DIFF WBC: CPT

## 2022-12-30 DIAGNOSIS — E11.69 TYPE 2 DIABETES MELLITUS WITH OTHER SPECIFIED COMPLICATION, WITH LONG-TERM CURRENT USE OF INSULIN (HCC): ICD-10-CM

## 2022-12-30 DIAGNOSIS — Z79.4 TYPE 2 DIABETES MELLITUS WITH OTHER SPECIFIED COMPLICATION, WITH LONG-TERM CURRENT USE OF INSULIN (HCC): ICD-10-CM

## 2022-12-30 RX ORDER — EMPAGLIFLOZIN 10 MG/1
25 TABLET, FILM COATED ORAL DAILY
Qty: 75 TABLET | Refills: 1 | Status: SHIPPED | OUTPATIENT
Start: 2022-12-30 | End: 2023-01-09

## 2022-12-31 NOTE — TELEPHONE ENCOUNTER
Received request via: Patient    Was the patient seen in the last year in this department? Yes    Does the patient have an active prescription (recently filled or refills available) for medication(s) requested? No    Does the patient have [facility] and need 100 day supply (blood pressure, diabetes and cholesterol meds only)? Patient does not have SCP    Pt and daughter called stating he is completely out.

## 2023-01-02 ENCOUNTER — OUTPATIENT INFUSION SERVICES (OUTPATIENT)
Dept: ONCOLOGY | Facility: MEDICAL CENTER | Age: 58
End: 2023-01-02
Attending: INTERNAL MEDICINE
Payer: MEDICARE

## 2023-01-02 VITALS
WEIGHT: 208.11 LBS | OXYGEN SATURATION: 99 % | TEMPERATURE: 97.5 F | RESPIRATION RATE: 18 BRPM | SYSTOLIC BLOOD PRESSURE: 123 MMHG | DIASTOLIC BLOOD PRESSURE: 70 MMHG | BODY MASS INDEX: 27.58 KG/M2 | HEART RATE: 63 BPM | HEIGHT: 73 IN

## 2023-01-02 DIAGNOSIS — C78.7 METASTATIC COLON CANCER TO LIVER (HCC): ICD-10-CM

## 2023-01-02 DIAGNOSIS — C18.9 STAGE IV CARCINOMA OF COLON (HCC): ICD-10-CM

## 2023-01-02 DIAGNOSIS — C18.9 METASTATIC COLON CANCER TO LIVER (HCC): ICD-10-CM

## 2023-01-02 LAB
ALBUMIN SERPL BCP-MCNC: 3.3 G/DL (ref 3.2–4.9)
ALBUMIN/GLOB SERPL: 1.5 G/DL
ALP SERPL-CCNC: 248 U/L (ref 30–99)
ALT SERPL-CCNC: 37 U/L (ref 2–50)
ANION GAP SERPL CALC-SCNC: 8 MMOL/L (ref 7–16)
APPEARANCE UR: CLEAR
AST SERPL-CCNC: 73 U/L (ref 12–45)
BASOPHILS # BLD AUTO: 0.7 % (ref 0–1.8)
BASOPHILS # BLD: 0.02 K/UL (ref 0–0.12)
BILIRUB SERPL-MCNC: 2 MG/DL (ref 0.1–1.5)
BUN SERPL-MCNC: 11 MG/DL (ref 8–22)
CALCIUM ALBUM COR SERPL-MCNC: 9.2 MG/DL (ref 8.5–10.5)
CALCIUM SERPL-MCNC: 8.6 MG/DL (ref 8.5–10.5)
CHLORIDE SERPL-SCNC: 109 MMOL/L (ref 96–112)
CO2 SERPL-SCNC: 22 MMOL/L (ref 20–33)
COLOR UR AUTO: ABNORMAL
CREAT SERPL-MCNC: 0.34 MG/DL (ref 0.5–1.4)
EOSINOPHIL # BLD AUTO: 0.13 K/UL (ref 0–0.51)
EOSINOPHIL NFR BLD: 4.5 % (ref 0–6.9)
ERYTHROCYTE [DISTWIDTH] IN BLOOD BY AUTOMATED COUNT: 53.8 FL (ref 35.9–50)
GFR SERPLBLD CREATININE-BSD FMLA CKD-EPI: 132 ML/MIN/1.73 M 2
GLOBULIN SER CALC-MCNC: 2.2 G/DL (ref 1.9–3.5)
GLUCOSE SERPL-MCNC: 99 MG/DL (ref 65–99)
GLUCOSE UR QL STRIP.AUTO: NEGATIVE MG/DL
HCT VFR BLD AUTO: 36 % (ref 42–52)
HGB BLD-MCNC: 12.2 G/DL (ref 14–18)
IMM GRANULOCYTES # BLD AUTO: 0.01 K/UL (ref 0–0.11)
IMM GRANULOCYTES NFR BLD AUTO: 0.3 % (ref 0–0.9)
KETONES UR QL STRIP.AUTO: NEGATIVE MG/DL
LEUKOCYTE ESTERASE UR QL STRIP.AUTO: NEGATIVE
LYMPHOCYTES # BLD AUTO: 0.43 K/UL (ref 1–4.8)
LYMPHOCYTES NFR BLD: 14.8 % (ref 22–41)
MCH RBC QN AUTO: 35 PG (ref 27–33)
MCHC RBC AUTO-ENTMCNC: 33.9 G/DL (ref 33.7–35.3)
MCV RBC AUTO: 103.2 FL (ref 81.4–97.8)
MONOCYTES # BLD AUTO: 0.29 K/UL (ref 0–0.85)
MONOCYTES NFR BLD AUTO: 10 % (ref 0–13.4)
NEUTROPHILS # BLD AUTO: 2.03 K/UL (ref 1.82–7.42)
NEUTROPHILS NFR BLD: 69.7 % (ref 44–72)
NITRITE UR QL STRIP.AUTO: NEGATIVE
NRBC # BLD AUTO: 0 K/UL
NRBC BLD-RTO: 0 /100 WBC
OUTPT INFUS CBC COMMENT OICOM: ABNORMAL
PH UR STRIP.AUTO: 5.5 [PH] (ref 5–8)
PLATELET # BLD AUTO: 55 K/UL (ref 164–446)
PMV BLD AUTO: 12.9 FL (ref 9–12.9)
POTASSIUM SERPL-SCNC: 3.9 MMOL/L (ref 3.6–5.5)
PROT SERPL-MCNC: 5.5 G/DL (ref 6–8.2)
PROT UR QL STRIP: NEGATIVE MG/DL
RBC # BLD AUTO: 3.49 M/UL (ref 4.7–6.1)
RBC UR QL AUTO: NEGATIVE
SODIUM SERPL-SCNC: 139 MMOL/L (ref 135–145)
SP GR UR STRIP.AUTO: 1.02 (ref 1–1.03)
WBC # BLD AUTO: 2.9 K/UL (ref 4.8–10.8)

## 2023-01-02 PROCEDURE — 700111 HCHG RX REV CODE 636 W/ 250 OVERRIDE (IP): Performed by: INTERNAL MEDICINE

## 2023-01-02 PROCEDURE — 80053 COMPREHEN METABOLIC PANEL: CPT

## 2023-01-02 PROCEDURE — 96413 CHEMO IV INFUSION 1 HR: CPT

## 2023-01-02 PROCEDURE — 700105 HCHG RX REV CODE 258: Performed by: INTERNAL MEDICINE

## 2023-01-02 PROCEDURE — 85025 COMPLETE CBC W/AUTO DIFF WBC: CPT

## 2023-01-02 PROCEDURE — A4212 NON CORING NEEDLE OR STYLET: HCPCS

## 2023-01-02 PROCEDURE — 81002 URINALYSIS NONAUTO W/O SCOPE: CPT

## 2023-01-02 RX ORDER — HEPARIN SODIUM (PORCINE) LOCK FLUSH IV SOLN 100 UNIT/ML 100 UNIT/ML
500 SOLUTION INTRAVENOUS PRN
Status: DISCONTINUED | OUTPATIENT
Start: 2023-01-02 | End: 2023-01-02 | Stop reason: HOSPADM

## 2023-01-02 RX ADMIN — HEPARIN 500 UNITS: 100 SYRINGE at 18:13

## 2023-01-02 RX ADMIN — SODIUM CHLORIDE 500 MG: 9 INJECTION, SOLUTION INTRAVENOUS at 17:14

## 2023-01-02 ASSESSMENT — FIBROSIS 4 INDEX: FIB4 SCORE: 10.59

## 2023-01-02 NOTE — PROGRESS NOTES
"Pharmacy Chemotherapy Calculation    Dx: mCRC         Protocol: Trifluridine and Tipiracil + Bevacizumab     Trifluridine and tipiracil (Lonsurf) 35 mg/m2 (max 80 mg) based on trifluridine component PO twice daily on Days 1-5 and 8-12  Bevacizumab 5 mg/kg IV on Days 1 and 15  28-day cycle until DP/UT (entered into [place] as 14-day cycle)  NCCN Guidelines for Colon Cancer V.1.2022.  Sarah P, et al. Lancet Oncol. 2020;21(3):412-420.      Allergies:  Compazine, Oxaliplatin, and Bee venom       /70   Pulse 63   Temp 36.4 °C (97.5 °F) (Temporal)   Resp 18   Ht 1.85 m (6' 0.84\")   Wt 94.4 kg (208 lb 1.8 oz)   SpO2 99%   BMI 27.58 kg/m²  Body surface area is 2.2 meters squared.    Labs 1/2/23:  ANC~ 2030 Plt = 55k   Hgb = 12.2     SCr = 0.34 mg/dL CrCl > 125 mL/min (min SCr 0.7 used)   AST/ALT/AP = 73/37/248 TBili = 2     BP = 123/70    Urine protein = negative      Drug Order   (Drug name, dose, route, IV Fluid & volume, frequency, number of doses) Cycle 4  Previous treatment: C3 on 12/20/22     Medication = Bevacizumab-bvzr (Zirabev)  Base Dose= 5 mg/kg  Calc Dose: Base Dose x 94.4 kg = 472 mg  Final Dose = 500 mg  Route = IV  Fluid & Volume =  mL  Admin Duration = Over 30 minutes          Rounded to nearest vial size (within 10%) per rounding protocol, okay to treat with final dose     By my signature below, I confirm this process was performed independently with the BSA and all final chemotherapy dosing calculations congruent. I have reviewed the above chemotherapy order and that my calculation of the final dose and BSA (when applicable) corroborate those calculations of the  pharmacist. Discrepancies of 10% or greater in the written dose have been addressed and documented within the [facility] Progress notes.    Chandrika Robles, PharmD  "

## 2023-01-02 NOTE — PROGRESS NOTES
"Pharmacy Chemotherapy Verification  Patient name: Gurmeet Jo  Dx: mCRC    Cycle 4  Previous treatment: C3 on 12/19/22    Regimen: bevacizumab + trifluridine and tipiracil   trifluridine and tipiracil 35mg/m2(max 80mg) based on the trifluridine component PO BID on days 1-5 and 8-12(total dose not to exceed 160mg/day)  **provided as home prescription  Bevacizumab 5mg/kg IV on days 1 and 15  Q28 days until DP/UT  NCCN guidelines for Colon Cancer V.1.2022  Charo P, et al - Lancet Oncol. 2020 Mar;21(3):412-420.    Allergies: Patient has no known allergies.  /70   Pulse 63   Temp 36.4 °C (97.5 °F) (Temporal)   Resp 18   Ht 1.85 m (6' 0.84\")   Wt 94.4 kg (208 lb 1.8 oz)   SpO2 99%   BMI 27.58 kg/m²  Body surface area is 2.2 meters squared.     Labs 1/2/23:  ANC~ 2030 Plt = 55k   Hgb = 12.2     SCr = 0.34  mg/dL CrCl >125 mL/min   AST/ALT/ALK  = 73/37/248 TBili = 2   Urine protein = negative BP = 123/70    Bevacizumab-bvzr (Zirabev) 5 mg/kg x 94.4 kg = 472 mg   Rounded to vial size(within 10%) per dose rounding protocol.   Okay to treat with final dose = 500 mg IV    José Marvin, PharmD  "

## 2023-01-03 NOTE — PROGRESS NOTES
Chemotherapy Verification - SECONDARY RN       Height = 185 cm  Weight = 94.4 kg  BSA = 2.2 m2       Medication: Zirabev  Dose: 5 mg/kg  Calculated Dose: 472 mg                             (In mg/m2, AUC, mg/kg)     I confirm that this process was performed independently.

## 2023-01-03 NOTE — PROGRESS NOTES
Pt arrived ambulatory to IS for Bevacizumab. POC discussed, pt acknowledged understanding, confirms he has his PO chemotherapy at home and will be discussing restarting this with [facility] (phone call placed by pt). PORT accessed per protocol, brisk blood return noted, line flushes with ease, labs drawn and pt tolerated well. POC urine collected by pt and processed. Pt BP WNL, pt labs/UA reviewed. Bevacizumab administered per MAR and pt tolerated well. No s/s of reactions or complications noted. PORT flushed per protocol (see MAR) and de-accessed with needle intact. Sterile gauze/paper tape applied to site and pt tolerated well. Pt confirmed next appt and discharged home ambulatory in [facility].

## 2023-01-03 NOTE — PROGRESS NOTES
Chemotherapy Verification - PRIMARY RN      Height = 185cm  Weight = 94.4 kg  BSA = 2.2m2       Medication: Bevacizumab-bvzr  Dose: 5mg/kg  Calculated Dose: 472 mg                             (In mg/m2, AUC, mg/kg)       I confirm this process was performed independently with the BSA and all final chemotherapy dosing calculations congruent.  Any discrepancies of 10% or greater have been addressed with the chemotherapy pharmacist. The resolution of the discrepancy has been documented in the EPIC progress notes.

## 2023-01-09 ENCOUNTER — OFFICE VISIT (OUTPATIENT)
Dept: ENDOCRINOLOGY | Facility: MEDICAL CENTER | Age: 58
End: 2023-01-09
Payer: MEDICARE

## 2023-01-09 VITALS
DIASTOLIC BLOOD PRESSURE: 60 MMHG | HEART RATE: 80 BPM | OXYGEN SATURATION: 97 % | BODY MASS INDEX: 26.77 KG/M2 | WEIGHT: 202 LBS | SYSTOLIC BLOOD PRESSURE: 110 MMHG | HEIGHT: 73 IN

## 2023-01-09 DIAGNOSIS — E55.9 VITAMIN D DEFICIENCY: ICD-10-CM

## 2023-01-09 DIAGNOSIS — E11.9 TYPE 2 DIABETES MELLITUS WITHOUT COMPLICATION, WITHOUT LONG-TERM CURRENT USE OF INSULIN (HCC): ICD-10-CM

## 2023-01-09 LAB
HBA1C MFR BLD: 4.7 % (ref 0–5.6)
INT CON NEG: NORMAL
INT CON POS: NORMAL

## 2023-01-09 PROCEDURE — 99204 OFFICE O/P NEW MOD 45 MIN: CPT

## 2023-01-09 PROCEDURE — 99212 OFFICE O/P EST SF 10 MIN: CPT

## 2023-01-09 PROCEDURE — 83036 HEMOGLOBIN GLYCOSYLATED A1C: CPT

## 2023-01-09 RX ORDER — EMPAGLIFLOZIN 25 MG/1
25 TABLET, FILM COATED ORAL DAILY
COMMUNITY
End: 2023-01-09

## 2023-01-09 RX ORDER — EMPAGLIFLOZIN 25 MG/1
25 TABLET, FILM COATED ORAL DAILY
Qty: 90 TABLET | Refills: 1 | Status: SHIPPED | OUTPATIENT
Start: 2023-01-09 | End: 2023-03-06 | Stop reason: SDUPTHER

## 2023-01-09 ASSESSMENT — FIBROSIS 4 INDEX: FIB4 SCORE: 12.66

## 2023-01-09 NOTE — PROGRESS NOTES
Monofilament testing with a 10 gram force: sensation intact: decreased on left  Visual Inspection: Feet with maceration, ulcers, fissures.  Toes curled and toe nails thick since chemo.  Pedal pulses: intact bilaterally

## 2023-01-09 NOTE — PROGRESS NOTES
"Chief Complaint:  Consult requested by Dunia Smith P.A.-C. for initial evaluation of Type 2 Diabetes Mellitus    HPI:   Gurmeet Jo is a 57 y.o. male with Type 2 Diabetes Mellitus diagnosed in 2019.  He denies hospitalizations for DKA in the past.  Patient presents with history of colon cancer stage IV mets to the liver diagnosed in 2019 currently on oral chemo.    Type 2 Diabetes Mellitus   Latest Reference Range & Units 20 10:52 22 07:42   Glycohemoglobin 0.0 - 5.6 % 7.1 (H) 5.2       Current Medications:  Jardiance 25mg daily    Patient states tolerating well.     He monitors blood glucose  1 times per day. Blood glucose values range:Fastin-90          Using True Metrix glucose meter.     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   He denies episodes of severe hypoglycemia requiring third party assistance.    He  is not wearing a medical alert bracelet or necklace.   He does not a glucagon emergency kit.    He denies attending diabetes education classes.  Diet: \"healthy\" diet  in general.     Diabetes Complications   He  denies history of retinopathy.    He denies laser eye surgery.  Last eye exam: 2022 with [facility] in [place], [place]      He reports history of peripheral sensory neuropathy.    He reports numbness in left foot.    He denies history of foot sores.     He denies history of kidney damage.    He is not on ACE inhibitor or ARB.    Latest Reference Range & Units 23 15:35   GFR (CKD-EPI) >60 mL/min/1.73 m 2 132     He denies history of coronary artery disease.    He  denies history of stroke and denies TIA.    He denies history of PAD.  He denies history of hyperlipidemia.        ROS:     CONS:     No fever, no chills, no weight loss, no fatigue   EYES:      No diplopia, no blurry vision, no redness of eyes, no swelling of eyelids   ENT:    No hearing loss, No ear pain, No sore throat, no dysphagia, no neck swelling "   CV:     No chest pain, no palpitations, no claudication, no orthopnea, no PND   PULM:    No SOB, no cough, no hemoptysis, no wheezing    GI:   No nausea, no vomiting, no diarrhea, no constipation, no bloody stools   :  Passing urine well, no dysuria, no hematuria   ENDO:   No polyuria, no polydipsia, no heat intolerance, no cold intolerance   NEURO: No headaches, no dizziness, no convulsions, no tremors   MUSC:  No joint swellings, no arthralgias, no myalgias, no weakness   SKIN:   No rash, no ulcers, no dry skin   PSYCH:   No depression, no anxiety, no difficulty sleeping       Past Medical History:  Patient Active Problem List    Diagnosis Date Noted    COVID-19 11/03/2022    Iron deficiency anemia 04/28/2022    Onychomycosis 07/29/2020    Hyperlipidemia 07/29/2020    Stage IV carcinoma of colon (HCC) 07/29/2020    Type 2 diabetes mellitus, with long-term current use of insulin (HCC) 05/27/2020    Drug-induced skin rash 04/03/2020    Twitching 08/07/2019    Metastatic colon cancer to liver (HCC) 12/05/2018       Past Surgical History:  Past Surgical History:   Procedure Laterality Date    COLONOSCOPY  2015        Allergies:  Compazine, Oxaliplatin, and Bee venom     Current Medications:    Current Outpatient Medications:     Empagliflozin (JARDIANCE) 25 MG Tab, Take 25 mg by mouth every day., Disp: 90 Tablet, Rfl: 1    TRUE METRIX BLOOD GLUCOSE TEST strip, USE AS DIRECTED TO CHECK BLOOD SUGAR B ID AND FOR HIGH OR SUGAR, Disp: 400 Strip, Rfl: 2    potassium chloride (MICRO-K) 10 MEQ capsule, 10 mEq every day., Disp: , Rfl:     LONSURF 15-6.14 MG Tab, , Disp: , Rfl:     heparin lock flush 100 unit/mL Solution, , Disp: , Rfl:     nystatin (MYCOSTATIN) 156656 UNIT/ML Suspension, SWISH AND SWALLOW 5 MILLILITERS PO QID, Disp: 473 mL, Rfl: 2    glucose blood (TRUE METRIX BLOOD GLUCOSE TEST) strip, TRUE METRIX BLOOD GLUCOSE TEST STRP, Disp: , Rfl:     ondansetron (ZOFRAN ODT) 8 MG TABLET DISPERSIBLE, DISSOLVE ONE  TABLET BY MOUTH EVERY 12 HOURS AS NEEDED FOR NAUSEA, Disp: , Rfl:     Lancets, Lancets order: Lancets for  meter. Sig: use bid and prn ssx high or low sugar. #100 RF x 3, Disp: 100 Each, Rfl: 0    Blood Glucose Test Strips, Test strips order: Test strips for  meter. Sig: use bid and prn ssx high or low sugar #100 RF x 3, Disp: 100 Each, Rfl: 0    Blood Glucose Monitoring Suppl Device, Meter: Dispense Device of Insurance Preference. Sig. Use as directed for blood sugar monitoring. #1. NR., Disp: 1 Device, Rfl: 0    lidocaine-prilocaine (EMLA) 2.5-2.5 % Cream, , Disp: , Rfl:     acetaminophen (TYLENOL) 500 MG Tab, Take 500-1,000 mg by mouth every 6 hours as needed., Disp: , Rfl:     LORazepam (ATIVAN) 1 MG Tab, Take 1 mg by mouth 1 time a day as needed (nausea)., Disp: , Rfl:     baclofen (LIORESAL) 10 MG Tab, Take 10 mg by mouth 3 times a day as needed., Disp: , Rfl:     therapeutic multivitamin-minerals (THERAGRAN-M) Tab, Take 1 Tab by mouth every day., Disp: , Rfl:     Social History:  Social History     Socioeconomic History    Marital status:      Spouse name: Not on file    Number of children: Not on file    Years of education: Not on file    Highest education level: Not on file   Occupational History    Not on file   Tobacco Use    Smoking status: Former     Packs/day: 1.00     Years: 15.00     Pack years: 15.00     Types: Cigarettes     Quit date: 1998     Years since quittin.0    Smokeless tobacco: Never   Vaping Use    Vaping Use: Never used   Substance and Sexual Activity    Alcohol use: No    Drug use: Yes     Types: Inhaled, Marijuana     Comment: daily at , last use     Sexual activity: Not on file   Other Topics Concern    Not on file   Social History Narrative    Not on file     Social Determinants of Health     Financial Resource Strain: Not on file   Food Insecurity: Not on file   Transportation Needs: Not on file   Physical Activity: Not on file   Stress: Not on file   Social  "Connections: Not on file   Intimate Partner Violence: Not on file   Housing Stability: Not on file        Family History:   Family History   Problem Relation Age of Onset    Hypertension Mother     Hypertension Father     Diabetes Father     Diabetes Brother        PHYSICAL EXAM:   Vital signs: /60   Pulse 80   Ht 1.854 m (6' 1\")   Wt 91.6 kg (202 lb)   SpO2 97%   BMI 26.65 kg/m²   GENERAL: Well-developed, well-nourished  in no apparent distress.   EYE: No ocular and eyelid asymmetry, Anicteric sclerae,  PERRL, No exophthalmos or lidlag  HENT: Hearing grossly intact, Normocephalic, atraumatic. Pink, moist mucous membranes, No exudate  NECK: Supple. Trachea midline. thyroid is normal in size without nodules or tenderness  CARDIOVASCULAR: Regular rate and rhythm. No murmurs, rubs, or gallops.   LUNGS: Clear to auscultation bilaterally   ABDOMEN: Soft, nontender with positive bowel sounds.   EXTREMITIES: No clubbing, cyanosis, or edema.   NEUROLOGICAL: Cranial nerves II-XII are grossly intact   Symmetric reflexes at the patella no proximal muscle weakness, No visible tremor with both outstretched hands  LYMPH: No cervical, supraclavicular,  adenopathy palpated.   SKIN: No rashes, lesions. Turgor is normal.  FOOT: Normal sensation to monofilament testing, normal pulses, no ulcers.  Normal Vibration quantitative sensation test.    Labs:  Lab Results   Component Value Date/Time    HBA1C 5.2 03/29/2022 0742    AVGLUC 157 12/11/2020 1052       Lab Results   Component Value Date/Time    WBC 2.9 (L) 01/02/2023 03:35 PM    RBC 3.49 (L) 01/02/2023 03:35 PM    HEMOGLOBIN 12.2 (L) 01/02/2023 03:35 PM    .2 (H) 01/02/2023 03:35 PM    MCH 35.0 (H) 01/02/2023 03:35 PM    MCHC 33.9 01/02/2023 03:35 PM    RDW 53.8 (H) 01/02/2023 03:35 PM    MPV 12.9 01/02/2023 03:35 PM       Lab Results   Component Value Date/Time    SODIUM 139 01/02/2023 03:35 PM    POTASSIUM 3.9 01/02/2023 03:35 PM    CHLORIDE 109 01/02/2023 03:35 " PM    CO2 22 01/02/2023 03:35 PM    ANION 8.0 01/02/2023 03:35 PM    GLUCOSE 99 01/02/2023 03:35 PM    BUN 11 01/02/2023 03:35 PM    CREATININE 0.34 (L) 01/02/2023 03:35 PM    CALCIUM 8.6 01/02/2023 03:35 PM    ASTSGOT 73 (H) 01/02/2023 03:35 PM    ALTSGPT 37 01/02/2023 03:35 PM    TBILIRUBIN 2.0 (H) 01/02/2023 03:35 PM    ALBUMIN 3.3 01/02/2023 03:35 PM    TOTPROTEIN 5.5 (L) 01/02/2023 03:35 PM    GLOBULIN 2.2 01/02/2023 03:35 PM    AGRATIO 1.5 01/02/2023 03:35 PM       No results found for: CHOLSTRLTOT, TRIGLYCERIDE, HDL, LDL, CHOLHDLRAT, NONHDL    No results found for: MICROALBCALC, MALBCRT, MALBEXCR, BTGRNV33, MICROALBUR, MICRALB, UMICROALBUM, MICROALBTIM     No results found for: TSHULTRASEN  No results found for: FREEDIR  No results found for: FREET3  No results found for: THYSTIMIG      ASSESSMENT/PLAN:     1. Type 2 diabetes mellitus without complication, without long-term current use of insulin (HCC)  Stable  Continue taking Jardiance 25 mg daily  Educated patient to drink plenty of water with Jardiance.  Discussed healthy diet options with patient.  Handouts for my plate planner, snack ideas, and education on hyper and hypoglycemia given to patient.  Patient's hemoglobin A1c in clinic today is 4.7 however patient presents with low hemoglobin therefore this A1c is falsely low.  I will order fructosamine to get an accurate A1c.  - FRUCTOSAMINE; Future  - Comp Metabolic Panel; Future  - FREE THYROXINE; Future  - Lipid Profile; Future  - MICROALBUMIN CREAT RATIO URINE; Future  - TSH; Future  - Empagliflozin (JARDIANCE) 25 MG Tab; Take 25 mg by mouth every day.  Dispense: 90 Tablet; Refill: 1  - Diabetic Monofilament LE Exam  - POCT Hemoglobin A1C    2. Vitamin D deficiency  Unstable  Continue with multivitamin.  No recent labs for vitamin D I will obtain blood work for evaluation  - VITAMIN D,25 HYDROXY (DEFICIENCY); Future     Return in about 2 months (around 3/9/2023).  Patient will have blood work completed  1 to 2 weeks prior to next appointment in 2 months.      Thank you kindly for allowing me to participate in the diabetes care plan for this patient.    Viral Shipman, APRN   01/09/23    CC:   Dunia Smith P.A.-C.

## 2023-01-13 ENCOUNTER — HOSPITAL ENCOUNTER (OUTPATIENT)
Dept: LAB | Facility: MEDICAL CENTER | Age: 58
End: 2023-01-13
Attending: INTERNAL MEDICINE
Payer: MEDICARE

## 2023-01-13 LAB
ALBUMIN SERPL BCP-MCNC: 3.6 G/DL (ref 3.2–4.9)
ALBUMIN/GLOB SERPL: 1.5 G/DL
ALP SERPL-CCNC: 290 U/L (ref 30–99)
ALT SERPL-CCNC: 37 U/L (ref 2–50)
ANION GAP SERPL CALC-SCNC: 10 MMOL/L (ref 7–16)
AST SERPL-CCNC: 66 U/L (ref 12–45)
BASOPHILS # BLD AUTO: 0.7 % (ref 0–1.8)
BASOPHILS # BLD: 0.02 K/UL (ref 0–0.12)
BILIRUB SERPL-MCNC: 2 MG/DL (ref 0.1–1.5)
BUN SERPL-MCNC: 9 MG/DL (ref 8–22)
CALCIUM ALBUM COR SERPL-MCNC: 9.2 MG/DL (ref 8.5–10.5)
CALCIUM SERPL-MCNC: 8.9 MG/DL (ref 8.5–10.5)
CEA SERPL-MCNC: 59.1 NG/ML (ref 0–3)
CHLORIDE SERPL-SCNC: 106 MMOL/L (ref 96–112)
CO2 SERPL-SCNC: 24 MMOL/L (ref 20–33)
CREAT SERPL-MCNC: 0.57 MG/DL (ref 0.5–1.4)
EOSINOPHIL # BLD AUTO: 0.2 K/UL (ref 0–0.51)
EOSINOPHIL NFR BLD: 7.2 % (ref 0–6.9)
ERYTHROCYTE [DISTWIDTH] IN BLOOD BY AUTOMATED COUNT: 52.9 FL (ref 35.9–50)
GFR SERPLBLD CREATININE-BSD FMLA CKD-EPI: 113 ML/MIN/1.73 M 2
GLOBULIN SER CALC-MCNC: 2.4 G/DL (ref 1.9–3.5)
GLUCOSE SERPL-MCNC: 92 MG/DL (ref 65–99)
HCT VFR BLD AUTO: 40.7 % (ref 42–52)
HGB BLD-MCNC: 13.7 G/DL (ref 14–18)
IMM GRANULOCYTES # BLD AUTO: 0.01 K/UL (ref 0–0.11)
IMM GRANULOCYTES NFR BLD AUTO: 0.4 % (ref 0–0.9)
LYMPHOCYTES # BLD AUTO: 0.51 K/UL (ref 1–4.8)
LYMPHOCYTES NFR BLD: 18.4 % (ref 22–41)
MAGNESIUM SERPL-MCNC: 2 MG/DL (ref 1.5–2.5)
MCH RBC QN AUTO: 34.3 PG (ref 27–33)
MCHC RBC AUTO-ENTMCNC: 33.7 G/DL (ref 33.7–35.3)
MCV RBC AUTO: 101.8 FL (ref 81.4–97.8)
MONOCYTES # BLD AUTO: 0.32 K/UL (ref 0–0.85)
MONOCYTES NFR BLD AUTO: 11.6 % (ref 0–13.4)
NEUTROPHILS # BLD AUTO: 1.71 K/UL (ref 1.82–7.42)
NEUTROPHILS NFR BLD: 61.7 % (ref 44–72)
NRBC # BLD AUTO: 0 K/UL
NRBC BLD-RTO: 0 /100 WBC
PLATELET # BLD AUTO: 63 K/UL (ref 164–446)
PMV BLD AUTO: 12.4 FL (ref 9–12.9)
POTASSIUM SERPL-SCNC: 3.8 MMOL/L (ref 3.6–5.5)
PROT SERPL-MCNC: 6 G/DL (ref 6–8.2)
RBC # BLD AUTO: 4 M/UL (ref 4.7–6.1)
SODIUM SERPL-SCNC: 140 MMOL/L (ref 135–145)
WBC # BLD AUTO: 2.8 K/UL (ref 4.8–10.8)

## 2023-01-13 PROCEDURE — 85025 COMPLETE CBC W/AUTO DIFF WBC: CPT

## 2023-01-13 PROCEDURE — 82378 CARCINOEMBRYONIC ANTIGEN: CPT

## 2023-01-13 PROCEDURE — 83735 ASSAY OF MAGNESIUM: CPT

## 2023-01-13 PROCEDURE — 80053 COMPREHEN METABOLIC PANEL: CPT

## 2023-01-13 PROCEDURE — 36415 COLL VENOUS BLD VENIPUNCTURE: CPT

## 2023-01-13 RX ORDER — EPINEPHRINE 1 MG/ML(1)
0.5 AMPUL (ML) INJECTION PRN
Status: CANCELLED | OUTPATIENT
Start: 2023-01-31

## 2023-01-13 RX ORDER — 0.9 % SODIUM CHLORIDE 0.9 %
10 VIAL (ML) INJECTION PRN
Status: CANCELLED | OUTPATIENT
Start: 2023-01-31

## 2023-01-13 RX ORDER — HEPARIN SODIUM (PORCINE) LOCK FLUSH IV SOLN 100 UNIT/ML 100 UNIT/ML
500 SOLUTION INTRAVENOUS PRN
Status: CANCELLED | OUTPATIENT
Start: 2023-01-16

## 2023-01-13 RX ORDER — 0.9 % SODIUM CHLORIDE 0.9 %
VIAL (ML) INJECTION PRN
Status: CANCELLED | OUTPATIENT
Start: 2023-01-16

## 2023-01-13 RX ORDER — DIPHENHYDRAMINE HYDROCHLORIDE 50 MG/ML
50 INJECTION INTRAMUSCULAR; INTRAVENOUS PRN
Status: CANCELLED | OUTPATIENT
Start: 2023-01-31

## 2023-01-13 RX ORDER — 0.9 % SODIUM CHLORIDE 0.9 %
10 VIAL (ML) INJECTION PRN
Status: CANCELLED | OUTPATIENT
Start: 2023-01-16

## 2023-01-13 RX ORDER — PROCHLORPERAZINE MALEATE 10 MG
10 TABLET ORAL EVERY 6 HOURS PRN
Status: CANCELLED | OUTPATIENT
Start: 2023-01-31

## 2023-01-13 RX ORDER — 0.9 % SODIUM CHLORIDE 0.9 %
10 VIAL (ML) INJECTION PRN
Status: CANCELLED | OUTPATIENT
Start: 2023-01-17

## 2023-01-13 RX ORDER — 0.9 % SODIUM CHLORIDE 0.9 %
3 VIAL (ML) INJECTION PRN
Status: CANCELLED | OUTPATIENT
Start: 2023-01-17

## 2023-01-13 RX ORDER — ONDANSETRON 2 MG/ML
4 INJECTION INTRAMUSCULAR; INTRAVENOUS PRN
Status: CANCELLED | OUTPATIENT
Start: 2023-01-17

## 2023-01-13 RX ORDER — PROCHLORPERAZINE MALEATE 10 MG
10 TABLET ORAL EVERY 6 HOURS PRN
Status: CANCELLED | OUTPATIENT
Start: 2023-01-17

## 2023-01-13 RX ORDER — 0.9 % SODIUM CHLORIDE 0.9 %
10 VIAL (ML) INJECTION PRN
Status: CANCELLED | OUTPATIENT
Start: 2023-01-30

## 2023-01-13 RX ORDER — ONDANSETRON 8 MG/1
8 TABLET, ORALLY DISINTEGRATING ORAL PRN
Status: CANCELLED | OUTPATIENT
Start: 2023-01-17

## 2023-01-13 RX ORDER — HEPARIN SODIUM (PORCINE) LOCK FLUSH IV SOLN 100 UNIT/ML 100 UNIT/ML
500 SOLUTION INTRAVENOUS PRN
Status: CANCELLED | OUTPATIENT
Start: 2023-01-17

## 2023-01-13 RX ORDER — 0.9 % SODIUM CHLORIDE 0.9 %
VIAL (ML) INJECTION PRN
Status: CANCELLED | OUTPATIENT
Start: 2023-01-31

## 2023-01-13 RX ORDER — 0.9 % SODIUM CHLORIDE 0.9 %
3 VIAL (ML) INJECTION PRN
Status: CANCELLED | OUTPATIENT
Start: 2023-01-16

## 2023-01-13 RX ORDER — METHYLPREDNISOLONE SODIUM SUCCINATE 125 MG/2ML
125 INJECTION, POWDER, LYOPHILIZED, FOR SOLUTION INTRAMUSCULAR; INTRAVENOUS PRN
Status: CANCELLED | OUTPATIENT
Start: 2023-01-17

## 2023-01-13 RX ORDER — SODIUM CHLORIDE 9 MG/ML
INJECTION, SOLUTION INTRAVENOUS CONTINUOUS
Status: CANCELLED | OUTPATIENT
Start: 2023-01-31

## 2023-01-13 RX ORDER — DIPHENHYDRAMINE HYDROCHLORIDE 50 MG/ML
50 INJECTION INTRAMUSCULAR; INTRAVENOUS PRN
Status: CANCELLED | OUTPATIENT
Start: 2023-01-17

## 2023-01-13 RX ORDER — EPINEPHRINE 1 MG/ML(1)
0.5 AMPUL (ML) INJECTION PRN
Status: CANCELLED | OUTPATIENT
Start: 2023-01-17

## 2023-01-13 RX ORDER — HEPARIN SODIUM (PORCINE) LOCK FLUSH IV SOLN 100 UNIT/ML 100 UNIT/ML
500 SOLUTION INTRAVENOUS PRN
Status: CANCELLED | OUTPATIENT
Start: 2023-01-30

## 2023-01-13 RX ORDER — 0.9 % SODIUM CHLORIDE 0.9 %
VIAL (ML) INJECTION PRN
Status: CANCELLED | OUTPATIENT
Start: 2023-01-17

## 2023-01-13 RX ORDER — METHYLPREDNISOLONE SODIUM SUCCINATE 125 MG/2ML
125 INJECTION, POWDER, LYOPHILIZED, FOR SOLUTION INTRAMUSCULAR; INTRAVENOUS PRN
Status: CANCELLED | OUTPATIENT
Start: 2023-01-31

## 2023-01-13 RX ORDER — ONDANSETRON 8 MG/1
8 TABLET, ORALLY DISINTEGRATING ORAL PRN
Status: CANCELLED | OUTPATIENT
Start: 2023-01-31

## 2023-01-13 RX ORDER — SODIUM CHLORIDE 9 MG/ML
INJECTION, SOLUTION INTRAVENOUS CONTINUOUS
Status: CANCELLED | OUTPATIENT
Start: 2023-01-17

## 2023-01-13 RX ORDER — HEPARIN SODIUM (PORCINE) LOCK FLUSH IV SOLN 100 UNIT/ML 100 UNIT/ML
500 SOLUTION INTRAVENOUS PRN
Status: CANCELLED | OUTPATIENT
Start: 2023-01-31

## 2023-01-13 RX ORDER — 0.9 % SODIUM CHLORIDE 0.9 %
3 VIAL (ML) INJECTION PRN
Status: CANCELLED | OUTPATIENT
Start: 2023-01-31

## 2023-01-13 RX ORDER — 0.9 % SODIUM CHLORIDE 0.9 %
VIAL (ML) INJECTION PRN
Status: CANCELLED | OUTPATIENT
Start: 2023-01-30

## 2023-01-13 RX ORDER — 0.9 % SODIUM CHLORIDE 0.9 %
3 VIAL (ML) INJECTION PRN
Status: CANCELLED | OUTPATIENT
Start: 2023-01-30

## 2023-01-13 RX ORDER — ONDANSETRON 2 MG/ML
4 INJECTION INTRAMUSCULAR; INTRAVENOUS PRN
Status: CANCELLED | OUTPATIENT
Start: 2023-01-31

## 2023-01-14 NOTE — PROGRESS NOTES
"Pharmacy Chemotherapy Verification  Patient name: Gurmeet Jo  Dx: mCRC    Cycle 5  Previous treatment: C4 on 1/2/23    Regimen: bevacizumab + trifluridine and tipiracil  Trifluridine and tipiracil 35mg/m2(max 80mg) based on the trifluridine component PO BID on days 1-5 and 8-12(total dose not to exceed 160mg/day)  **provided as home prescription  Bevacizumab 5mg/kg IV on days 1 and 15  Q28 days until DP/UT (14-day cycle in [place])  NCCN guidelines for Colon Cancer V.1.2022  Charo P, et al - Lancet Oncol. 2020 Mar;21(3):412-420.    Allergies: Patient has no known allergies.  /72   Pulse 79   Temp 36.6 °C (97.8 °F) (Temporal)   Resp 18   Ht 1.85 m (6' 0.84\")   Wt 89.4 kg (197 lb 1.5 oz)   SpO2 96%   BMI 26.12 kg/m²  Body surface area is 2.14 meters squared.     Labs 1/13/23:  ANC~ 1710 Plt = 63k   Hgb = 13.7     SCr = 0.57 mg/dL CrCl >125 mL/min   AST/ALT/AP = 66/37/290 TBili = 2.0   Urine protein = negative BP = 111/72    Bevacizumab-bvzr (Zirabev) 5 mg/kg x 89.4 kg = 447 mg   Unable to round to vial size within 10%,  Okay to treat with final dose = 440 mg IV    Shon Hurtado, PharmD  "

## 2023-01-16 ENCOUNTER — OUTPATIENT INFUSION SERVICES (OUTPATIENT)
Dept: ONCOLOGY | Facility: MEDICAL CENTER | Age: 58
End: 2023-01-16
Attending: INTERNAL MEDICINE
Payer: MEDICARE

## 2023-01-16 VITALS
BODY MASS INDEX: 26.12 KG/M2 | HEART RATE: 79 BPM | HEIGHT: 73 IN | WEIGHT: 197.09 LBS | TEMPERATURE: 97.8 F | DIASTOLIC BLOOD PRESSURE: 72 MMHG | SYSTOLIC BLOOD PRESSURE: 111 MMHG | RESPIRATION RATE: 18 BRPM | OXYGEN SATURATION: 96 %

## 2023-01-16 DIAGNOSIS — C18.9 METASTATIC COLON CANCER TO LIVER (HCC): ICD-10-CM

## 2023-01-16 DIAGNOSIS — C78.7 METASTATIC COLON CANCER TO LIVER (HCC): ICD-10-CM

## 2023-01-16 DIAGNOSIS — C18.9 STAGE IV CARCINOMA OF COLON (HCC): ICD-10-CM

## 2023-01-16 LAB
APPEARANCE UR: CLEAR
COLOR UR AUTO: ABNORMAL
GLUCOSE UR QL STRIP.AUTO: 500 MG/DL
KETONES UR QL STRIP.AUTO: NEGATIVE MG/DL
LEUKOCYTE ESTERASE UR QL STRIP.AUTO: NEGATIVE
NITRITE UR QL STRIP.AUTO: NEGATIVE
PH UR STRIP.AUTO: 6 [PH] (ref 5–8)
PROT UR QL STRIP: NEGATIVE MG/DL
RBC UR QL AUTO: NEGATIVE
SP GR UR STRIP.AUTO: 1.02 (ref 1–1.03)

## 2023-01-16 PROCEDURE — A4212 NON CORING NEEDLE OR STYLET: HCPCS

## 2023-01-16 PROCEDURE — 81002 URINALYSIS NONAUTO W/O SCOPE: CPT

## 2023-01-16 PROCEDURE — 96413 CHEMO IV INFUSION 1 HR: CPT

## 2023-01-16 PROCEDURE — 700105 HCHG RX REV CODE 258: Performed by: INTERNAL MEDICINE

## 2023-01-16 PROCEDURE — 700111 HCHG RX REV CODE 636 W/ 250 OVERRIDE (IP): Mod: JW,TB | Performed by: INTERNAL MEDICINE

## 2023-01-16 RX ORDER — HEPARIN SODIUM (PORCINE) LOCK FLUSH IV SOLN 100 UNIT/ML 100 UNIT/ML
500 SOLUTION INTRAVENOUS PRN
Status: DISCONTINUED | OUTPATIENT
Start: 2023-01-16 | End: 2023-01-16 | Stop reason: HOSPADM

## 2023-01-16 RX ADMIN — SODIUM CHLORIDE 440 MG: 9 INJECTION, SOLUTION INTRAVENOUS at 09:00

## 2023-01-16 RX ADMIN — HEPARIN 500 UNITS: 100 SYRINGE at 09:46

## 2023-01-16 ASSESSMENT — FIBROSIS 4 INDEX: FIB4 SCORE: 9.99

## 2023-01-16 NOTE — PROGRESS NOTES
Pt arrives to [facility] for cycle 5 of Zirabev and oral Lonsurf.  Discussed plan of care with pt.  Pt denies s/sx of infection, nausea or pain at this time. Pt had an episode of nausea this morning and he took Ondansetron at home.  EMLA cream was applied to port site by the pt.  EMLA cream wiped away.  RUC port accessed with sterile technique, flushed with NS and brisk blood return observed.  Labs were drawn via port.  UA collected and urine protein is negative.  BP is WNL.  Labs drawn on 1/13/2023 were reviewed and results do not meet parameters for treatment today.  Pt denies new signs of bleeding.  Zirabev infused over 30 minutes without adverse reaction.  Port flushed with NS and heparin locked.  Adkins needle removed intact.  Site covered with gauze/tape.  Confirmed next appt on 01/30/2023 with pt.  Pt dc home to self care.  Email sent to scheduling dept to set up additional appts for treatment.

## 2023-01-16 NOTE — PROGRESS NOTES
"Pharmacy Chemotherapy Verification    Dx: mCRC         Protocol: Trifluridine and Tipiracil + Bevacizumab     Trifluridine and tipiracil (Lonsurf) 35 mg/m2 (max 80 mg) based on trifluridine component PO twice daily on Days 1-5 and 8-12  Bevacizumab 5 mg/kg IV on Days 1 and 15  28-day cycle until DP/UT (entered into [facility] as 14-day cycle)  NCCN Guidelines for Colon Cancer V.1.2022.  Sarah P, et al. Lancet Oncol. 2020;21(3):412-420.    Allergies:  Compazine, Oxaliplatin, and Bee venom     /72   Pulse 79   Temp 36.6 °C (97.8 °F) (Temporal)   Resp 18   Ht 1.85 m (6' 0.84\")   Wt 89.4 kg (197 lb 1.5 oz)   SpO2 96%   BMI 26.12 kg/m²  Body surface area is 2.14 meters squared.    Labs 1/13/23  ANC 1710 Hgb 13.7 Plt 63k  SCr 0.57 CrCl >125 mL/min   AST/ALT/AP = 66/37/290 Tbili 2   01/16/23 08:31   POC Protein Negative     Drug Order   (Drug name, dose, route, IV Fluid & volume, frequency, number of doses) Cycle 5  Previous treatment: C4 1/2/23     Medication = Bevacizumab-bvzr (Zirabev)  Base Dose= 5 mg/kg  Calc Dose: Base Dose x 89.4 kg = 447 mg  Final Dose = 440 mg  Route = IV  Fluid & Volume =  mL  Admin Duration = Over 30 minutes          Rounded to nearest vial size (within 10%) per rounding protocol, okay to treat with final dose     By my signature below, I confirm this process was performed independently with the BSA and all final chemotherapy dosing calculations congruent. I have reviewed the above chemotherapy order and that my calculation of the final dose and BSA (when applicable) corroborate those calculations of the  pharmacist. Discrepancies of 10% or greater in the written dose have been addressed and documented within the [facility] Progress notes.    Jeni Ponce, PharmD, BCOP  "

## 2023-01-16 NOTE — PROGRESS NOTES
Chemotherapy Verification - PRIMARY RN    C5 D1    Height = 185 cm  Weight = 89.4 kg  BSA = 2.14 m^2       Medication: Bevacizumab-bvzr (Zirabev) Dose: 5 mg/kg  Calculated Dose: 447 mg                             (In mg/m2, AUC, mg/kg)     I confirm this process was performed independently with the BSA and all final chemotherapy dosing calculations congruent.  Any discrepancies of 10% or greater have been addressed with the chemotherapy pharmacist. The resolution of the discrepancy has been documented in the EPIC progress notes.

## 2023-01-16 NOTE — PROGRESS NOTES
Chemotherapy Verification - SECONDARY RN       Height = 185 cm  Weight = 89.4 kg  BSA = 2.14 m2       Medication: Zirabev  Dose: 5 mg/kg  Calculated Dose: 447 mg                             (In mg/m2, AUC, mg/kg)     I confirm that this process was performed independently.

## 2023-01-19 ENCOUNTER — HOSPITAL ENCOUNTER (OUTPATIENT)
Dept: RADIOLOGY | Facility: MEDICAL CENTER | Age: 58
End: 2023-01-19
Payer: MEDICARE

## 2023-01-30 ENCOUNTER — APPOINTMENT (OUTPATIENT)
Dept: ONCOLOGY | Facility: MEDICAL CENTER | Age: 58
End: 2023-01-30
Attending: INTERNAL MEDICINE
Payer: MEDICARE

## 2023-02-07 RX ORDER — ONDANSETRON 8 MG/1
8 TABLET, ORALLY DISINTEGRATING ORAL PRN
Status: CANCELLED | OUTPATIENT
Start: 2023-02-20

## 2023-02-07 RX ORDER — 0.9 % SODIUM CHLORIDE 0.9 %
3 VIAL (ML) INJECTION PRN
Status: CANCELLED | OUTPATIENT
Start: 2023-02-20

## 2023-02-07 RX ORDER — ONDANSETRON 2 MG/ML
4 INJECTION INTRAMUSCULAR; INTRAVENOUS PRN
Status: CANCELLED | OUTPATIENT
Start: 2023-02-20

## 2023-02-07 RX ORDER — SODIUM CHLORIDE 9 MG/ML
INJECTION, SOLUTION INTRAVENOUS CONTINUOUS
Status: CANCELLED | OUTPATIENT
Start: 2023-02-20

## 2023-02-07 RX ORDER — PROCHLORPERAZINE MALEATE 10 MG
10 TABLET ORAL EVERY 6 HOURS PRN
Status: CANCELLED | OUTPATIENT
Start: 2023-02-20

## 2023-02-07 RX ORDER — HEPARIN SODIUM (PORCINE) LOCK FLUSH IV SOLN 100 UNIT/ML 100 UNIT/ML
500 SOLUTION INTRAVENOUS PRN
Status: CANCELLED | OUTPATIENT
Start: 2023-02-20

## 2023-02-07 RX ORDER — 0.9 % SODIUM CHLORIDE 0.9 %
10 VIAL (ML) INJECTION PRN
Status: CANCELLED | OUTPATIENT
Start: 2023-02-20

## 2023-02-07 RX ORDER — 0.9 % SODIUM CHLORIDE 0.9 %
VIAL (ML) INJECTION PRN
Status: CANCELLED | OUTPATIENT
Start: 2023-02-20

## 2023-02-10 ENCOUNTER — ANCILLARY PROCEDURE (OUTPATIENT)
Dept: CARDIOLOGY | Facility: MEDICAL CENTER | Age: 58
End: 2023-02-10
Attending: INTERNAL MEDICINE
Payer: MEDICARE

## 2023-02-10 DIAGNOSIS — C18.9 MALIGNANT NEOPLASM OF COLON, UNSPECIFIED PART OF COLON (HCC): ICD-10-CM

## 2023-02-10 LAB
LV EJECT FRACT  99904: 60
LV EJECT FRACT MOD 2C 99903: 55.27
LV EJECT FRACT MOD 4C 99902: 70.35
LV EJECT FRACT MOD BP 99901: 61.14

## 2023-02-10 PROCEDURE — 93306 TTE W/DOPPLER COMPLETE: CPT | Mod: 26 | Performed by: INTERNAL MEDICINE

## 2023-02-10 PROCEDURE — 93306 TTE W/DOPPLER COMPLETE: CPT

## 2023-02-13 ENCOUNTER — APPOINTMENT (OUTPATIENT)
Dept: ONCOLOGY | Facility: MEDICAL CENTER | Age: 58
End: 2023-02-13
Attending: INTERNAL MEDICINE
Payer: MEDICARE

## 2023-02-19 NOTE — PROGRESS NOTES
"Pharmacy Chemotherapy Calculation    Patient Name: Gurmeet Jo   Protocol: trastuzumab + tucatinib (Tukysa)     *Dosing Reference*  Trastuzumab 8 mg/kg IV for C1D1 followed by   Trastuzumab 6 mg/kg starting with C2D1  Tucatinib 300 mg PO BID Days 1-21  21 day cycle until DP or UT  NCCN guidelines for colon cancer V.3.2022  Carlos Enrique RODRIGUEZ, et al. Annals of Oncology. 2022;33:D021-H091    Dx: Dx: Metastatic colorectal cancer       Allergies:  Compazine, Oxaliplatin, and Bee venom       /76   Pulse 72   Temp 36.1 °C (97 °F) (Temporal)   Resp 18   Ht 1.86 m (6' 1.23\")   Wt 94.4 kg (208 lb 1.8 oz)   SpO2 98%   BMI 27.29 kg/m²  Body surface area is 2.21 meters squared.   Tx Plan wt = 89.4 kg    No lab parameters for trastuzumab   2/10/23 LVEF by Echo ~ 60%     Drug Order   (Drug name, dose, route, IV Fluid & volume, frequency, number of doses) Cycle: 1, day 1      Previous treatment: Lonsurf + Bevacizumab 1/16/23     Medication = Trastuzumab-pkrb (Herzuma)  Base Dose= 8 mg/kg  Calc Dose: Base Dose x 94.4 kg = 755 mg  Final Dose = 715 mg  Route = IV  Fluid & Volume =  mL  Admin Duration = Over 90 minutes          <10% difference, OK to treat with final dose     By my signature below, I confirm this process was performed independently with the BSA and all final chemotherapy dosing calculations congruent. I have reviewed the above chemotherapy order and that my calculation of the final dose and BSA (when applicable) corroborate those calculations of the  pharmacist. Discrepancies of 5% or greater in the written dose have been addressed and documented within the [facility] Progress notes.    Signature: Man Anderson, PharmD    "

## 2023-02-20 NOTE — PROGRESS NOTES
"Pharmacy Chemotherapy Calculations    Dx: mCRC  Cycle 1, Day 1  Previous treatment = Trifluridine and Tipiracil + Bevacizumab x5 cycles, last 1/16/23    Protocol: Tucatinib + Trastuzumab  Tucatinib 300 mg PO twice daily on Days 1/21  Trastuzumab 8 mg/kg IV over 90 minutes on Day 1 of Cycle 1 followed by  Trastuzumab 6 mg/kg IV over 30 minutes on Day 1 beginning with Cycle 2  21-day cycle until DP/UT  NCCN Guidelines for Colon Cancer V.3.2022.  Carlos Enrique RODRIGUEZ, et al. Annals of Oncology. 2022;33:K497-Y300.    Allergies:  Compazine, Oxaliplatin, and Bee venom       /76   Pulse 72   Temp 36.1 °C (97 °F) (Temporal)   Resp 18   Ht 1.86 m (6' 1.23\")   Wt 94.4 kg (208 lb 1.8 oz)   SpO2 98%   BMI 27.29 kg/m²  Body surface area is 2.21 meters squared.    ECHO:  2/10/23: LVEF 60%    No lab parameters    Trastuzumab 8 mg/kg x 94.4 kg = 755.2 mg   <10% difference, OK to treat with final dose = 715 mg    Shon Hurtado, PharmD  "

## 2023-02-21 ENCOUNTER — OUTPATIENT INFUSION SERVICES (OUTPATIENT)
Dept: ONCOLOGY | Facility: MEDICAL CENTER | Age: 58
End: 2023-02-21
Attending: INTERNAL MEDICINE
Payer: MEDICARE

## 2023-02-21 VITALS
HEIGHT: 73 IN | DIASTOLIC BLOOD PRESSURE: 76 MMHG | WEIGHT: 208.11 LBS | BODY MASS INDEX: 27.58 KG/M2 | OXYGEN SATURATION: 98 % | HEART RATE: 72 BPM | TEMPERATURE: 97 F | SYSTOLIC BLOOD PRESSURE: 127 MMHG | RESPIRATION RATE: 18 BRPM

## 2023-02-21 DIAGNOSIS — C78.7 METASTATIC COLON CANCER TO LIVER (HCC): ICD-10-CM

## 2023-02-21 DIAGNOSIS — C18.9 METASTATIC COLON CANCER TO LIVER (HCC): ICD-10-CM

## 2023-02-21 DIAGNOSIS — C18.9 STAGE IV CARCINOMA OF COLON (HCC): ICD-10-CM

## 2023-02-21 LAB
ALBUMIN SERPL BCP-MCNC: 3.5 G/DL (ref 3.2–4.9)
ALBUMIN/GLOB SERPL: 1.5 G/DL
ALP SERPL-CCNC: 303 U/L (ref 30–99)
ALT SERPL-CCNC: 39 U/L (ref 2–50)
ANION GAP SERPL CALC-SCNC: 10 MMOL/L (ref 7–16)
AST SERPL-CCNC: 75 U/L (ref 12–45)
BASOPHILS # BLD AUTO: 0.8 % (ref 0–1.8)
BASOPHILS # BLD: 0.03 K/UL (ref 0–0.12)
BILIRUB SERPL-MCNC: 2.8 MG/DL (ref 0.1–1.5)
BUN SERPL-MCNC: 10 MG/DL (ref 8–22)
CALCIUM ALBUM COR SERPL-MCNC: 9.2 MG/DL (ref 8.5–10.5)
CALCIUM SERPL-MCNC: 8.8 MG/DL (ref 8.5–10.5)
CEA SERPL-MCNC: 96.7 NG/ML (ref 0–3)
CHLORIDE SERPL-SCNC: 105 MMOL/L (ref 96–112)
CO2 SERPL-SCNC: 23 MMOL/L (ref 20–33)
CREAT SERPL-MCNC: 0.39 MG/DL (ref 0.5–1.4)
EOSINOPHIL # BLD AUTO: 0.14 K/UL (ref 0–0.51)
EOSINOPHIL NFR BLD: 3.7 % (ref 0–6.9)
ERYTHROCYTE [DISTWIDTH] IN BLOOD BY AUTOMATED COUNT: 54.7 FL (ref 35.9–50)
GFR SERPLBLD CREATININE-BSD FMLA CKD-EPI: 127 ML/MIN/1.73 M 2
GLOBULIN SER CALC-MCNC: 2.4 G/DL (ref 1.9–3.5)
GLUCOSE SERPL-MCNC: 97 MG/DL (ref 65–99)
HCT VFR BLD AUTO: 39.5 % (ref 42–52)
HGB BLD-MCNC: 13.6 G/DL (ref 14–18)
IMM GRANULOCYTES # BLD AUTO: 0.01 K/UL (ref 0–0.11)
IMM GRANULOCYTES NFR BLD AUTO: 0.3 % (ref 0–0.9)
LYMPHOCYTES # BLD AUTO: 0.5 K/UL (ref 1–4.8)
LYMPHOCYTES NFR BLD: 13.2 % (ref 22–41)
MAGNESIUM SERPL-MCNC: 1.6 MG/DL (ref 1.5–2.5)
MCH RBC QN AUTO: 33.7 PG (ref 27–33)
MCHC RBC AUTO-ENTMCNC: 34.4 G/DL (ref 33.7–35.3)
MCV RBC AUTO: 98 FL (ref 81.4–97.8)
MONOCYTES # BLD AUTO: 0.45 K/UL (ref 0–0.85)
MONOCYTES NFR BLD AUTO: 11.9 % (ref 0–13.4)
NEUTROPHILS # BLD AUTO: 2.66 K/UL (ref 1.82–7.42)
NEUTROPHILS NFR BLD: 70.1 % (ref 44–72)
NRBC # BLD AUTO: 0 K/UL
NRBC BLD-RTO: 0 /100 WBC
PLATELET # BLD AUTO: 68 K/UL (ref 164–446)
PMV BLD AUTO: 11.9 FL (ref 9–12.9)
POTASSIUM SERPL-SCNC: 3.8 MMOL/L (ref 3.6–5.5)
PROT SERPL-MCNC: 5.9 G/DL (ref 6–8.2)
RBC # BLD AUTO: 4.03 M/UL (ref 4.7–6.1)
SODIUM SERPL-SCNC: 138 MMOL/L (ref 135–145)
WBC # BLD AUTO: 3.8 K/UL (ref 4.8–10.8)

## 2023-02-21 PROCEDURE — 96413 CHEMO IV INFUSION 1 HR: CPT

## 2023-02-21 PROCEDURE — 700105 HCHG RX REV CODE 258: Performed by: INTERNAL MEDICINE

## 2023-02-21 PROCEDURE — 83735 ASSAY OF MAGNESIUM: CPT

## 2023-02-21 PROCEDURE — 82378 CARCINOEMBRYONIC ANTIGEN: CPT

## 2023-02-21 PROCEDURE — 700111 HCHG RX REV CODE 636 W/ 250 OVERRIDE (IP): Performed by: INTERNAL MEDICINE

## 2023-02-21 PROCEDURE — 85025 COMPLETE CBC W/AUTO DIFF WBC: CPT

## 2023-02-21 PROCEDURE — 80053 COMPREHEN METABOLIC PANEL: CPT

## 2023-02-21 PROCEDURE — A4212 NON CORING NEEDLE OR STYLET: HCPCS

## 2023-02-21 RX ORDER — HEPARIN SODIUM (PORCINE) LOCK FLUSH IV SOLN 100 UNIT/ML 100 UNIT/ML
500 SOLUTION INTRAVENOUS PRN
Status: DISCONTINUED | OUTPATIENT
Start: 2023-02-21 | End: 2023-02-21 | Stop reason: HOSPADM

## 2023-02-21 RX ADMIN — HEPARIN 500 UNITS: 100 SYRINGE at 10:44

## 2023-02-21 RX ADMIN — TRASTUZUMAB 715 MG: KIT at 08:56

## 2023-02-21 ASSESSMENT — FIBROSIS 4 INDEX: FIB4 SCORE: 9.99

## 2023-02-21 NOTE — PROGRESS NOTES
Chemotherapy Verification - SECONDARY RN       Height = 186 cm  Weight = 94.4 kg  BSA = 2.21 m2       Medication: Herzuma  Dose: 8 mg/kg  Calculated Dose: 755.2 mg                             (In mg/m2, AUC, mg/kg)     I confirm that this process was performed independently.

## 2023-02-21 NOTE — PROGRESS NOTES
Gurmeet presents to IS for Day 1 Cycle 1 Herzuma.  Gurmeet voices no complaints.  POC discussed, Gurmeet verbalizes understanding and agreement.  Right upper chest port accessed in sterile fashion, flushes well with + blood return.  Labs collected per MD order.  Echo completed 02/10/2023 LVEF 60%.  Herzuma administered as ordered over 90 minutes, Gurmeet tolerated well. Port flushed with NS,heparin instilled.  Port de-accessed, gauze and tape to site.  Discharged in NAD,next appointment confirmed.

## 2023-02-21 NOTE — PROGRESS NOTES
Chemotherapy Verification - PRIMARY RN      Height = 186 cm  Weight = 94.4 kg  BSA = 2.21 m2       Medication: Herzuma  Dose: 8 mg/kg  Calculated Dose: 755.2 mg                             (In mg/m2, AUC, mg/kg)       I confirm this process was performed independently with the BSA and all final chemotherapy dosing calculations congruent.  Any discrepancies of 10% or greater have been addressed with the chemotherapy pharmacist. The resolution of the discrepancy has been documented in the EPIC progress notes.

## 2023-03-01 ENCOUNTER — HOSPITAL ENCOUNTER (OUTPATIENT)
Dept: LAB | Facility: MEDICAL CENTER | Age: 58
End: 2023-03-01
Payer: MEDICARE

## 2023-03-01 DIAGNOSIS — E55.9 VITAMIN D DEFICIENCY: ICD-10-CM

## 2023-03-01 DIAGNOSIS — E11.9 TYPE 2 DIABETES MELLITUS WITHOUT COMPLICATION, WITHOUT LONG-TERM CURRENT USE OF INSULIN (HCC): ICD-10-CM

## 2023-03-01 LAB
25(OH)D3 SERPL-MCNC: 31 NG/ML (ref 30–100)
CREAT UR-MCNC: 84.51 MG/DL
MICROALBUMIN UR-MCNC: <1.2 MG/DL
MICROALBUMIN/CREAT UR: NORMAL MG/G (ref 0–30)
T4 FREE SERPL-MCNC: 1.31 NG/DL (ref 0.93–1.7)
TSH SERPL DL<=0.005 MIU/L-ACNC: 1.78 UIU/ML (ref 0.38–5.33)

## 2023-03-01 PROCEDURE — 82043 UR ALBUMIN QUANTITATIVE: CPT

## 2023-03-01 PROCEDURE — 82570 ASSAY OF URINE CREATININE: CPT

## 2023-03-01 PROCEDURE — 82306 VITAMIN D 25 HYDROXY: CPT

## 2023-03-01 PROCEDURE — 80061 LIPID PANEL: CPT

## 2023-03-01 PROCEDURE — 36415 COLL VENOUS BLD VENIPUNCTURE: CPT

## 2023-03-01 PROCEDURE — 82985 ASSAY OF GLYCATED PROTEIN: CPT

## 2023-03-01 PROCEDURE — 80053 COMPREHEN METABOLIC PANEL: CPT

## 2023-03-01 PROCEDURE — 84439 ASSAY OF FREE THYROXINE: CPT

## 2023-03-01 PROCEDURE — 84443 ASSAY THYROID STIM HORMONE: CPT

## 2023-03-02 LAB
ALBUMIN SERPL BCP-MCNC: 3.5 G/DL (ref 3.2–4.9)
ALBUMIN/GLOB SERPL: 1.2 G/DL
ALP SERPL-CCNC: 308 U/L (ref 30–99)
ALT SERPL-CCNC: 44 U/L (ref 2–50)
ANION GAP SERPL CALC-SCNC: 10 MMOL/L (ref 7–16)
AST SERPL-CCNC: 74 U/L (ref 12–45)
BILIRUB SERPL-MCNC: 3.2 MG/DL (ref 0.1–1.5)
BUN SERPL-MCNC: 12 MG/DL (ref 8–22)
CALCIUM ALBUM COR SERPL-MCNC: 9.3 MG/DL (ref 8.5–10.5)
CALCIUM SERPL-MCNC: 8.9 MG/DL (ref 8.5–10.5)
CHLORIDE SERPL-SCNC: 104 MMOL/L (ref 96–112)
CHOLEST SERPL-MCNC: 140 MG/DL (ref 100–199)
CO2 SERPL-SCNC: 23 MMOL/L (ref 20–33)
CREAT SERPL-MCNC: 0.64 MG/DL (ref 0.5–1.4)
FASTING STATUS PATIENT QL REPORTED: NORMAL
GFR SERPLBLD CREATININE-BSD FMLA CKD-EPI: 109 ML/MIN/1.73 M 2
GLOBULIN SER CALC-MCNC: 2.9 G/DL (ref 1.9–3.5)
GLUCOSE SERPL-MCNC: 96 MG/DL (ref 65–99)
HDLC SERPL-MCNC: 61 MG/DL
LDLC SERPL CALC-MCNC: 65 MG/DL
POTASSIUM SERPL-SCNC: 4.3 MMOL/L (ref 3.6–5.5)
PROT SERPL-MCNC: 6.4 G/DL (ref 6–8.2)
SODIUM SERPL-SCNC: 137 MMOL/L (ref 135–145)
TRIGL SERPL-MCNC: 69 MG/DL (ref 0–149)

## 2023-03-03 LAB — FRUCTOSAMINE SERPL-SCNC: 304 UMOL/L (ref 205–285)

## 2023-03-06 ENCOUNTER — OFFICE VISIT (OUTPATIENT)
Dept: ENDOCRINOLOGY | Facility: MEDICAL CENTER | Age: 58
End: 2023-03-06
Payer: MEDICARE

## 2023-03-06 VITALS
DIASTOLIC BLOOD PRESSURE: 60 MMHG | HEIGHT: 73 IN | BODY MASS INDEX: 27.83 KG/M2 | OXYGEN SATURATION: 96 % | WEIGHT: 210 LBS | SYSTOLIC BLOOD PRESSURE: 106 MMHG | HEART RATE: 80 BPM

## 2023-03-06 DIAGNOSIS — E55.9 VITAMIN D DEFICIENCY: ICD-10-CM

## 2023-03-06 DIAGNOSIS — E11.9 TYPE 2 DIABETES MELLITUS WITHOUT COMPLICATION, WITHOUT LONG-TERM CURRENT USE OF INSULIN (HCC): ICD-10-CM

## 2023-03-06 DIAGNOSIS — Z79.84 LONG TERM (CURRENT) USE OF ORAL HYPOGLYCEMIC DRUGS: ICD-10-CM

## 2023-03-06 PROCEDURE — 99214 OFFICE O/P EST MOD 30 MIN: CPT

## 2023-03-06 PROCEDURE — 99212 OFFICE O/P EST SF 10 MIN: CPT

## 2023-03-06 RX ORDER — EMPAGLIFLOZIN 25 MG/1
25 TABLET, FILM COATED ORAL DAILY
Qty: 90 TABLET | Refills: 1 | Status: SHIPPED | OUTPATIENT
Start: 2023-03-06

## 2023-03-06 ASSESSMENT — FIBROSIS 4 INDEX: FIB4 SCORE: 9.52

## 2023-03-06 NOTE — PROGRESS NOTES
CHIEF COMPLAINT: Patient is here for follow up of Type 2 Diabetes Mellitus    HPI:     Gurmeet Jo is a 57 y.o. male with Type 2 Diabetes Mellitus here for follow up. Patient presents with history of colon cancer stage IV mets to the liver diagnosed in 2019 currently on oral chemo. Last Chemo infusion 2 weeks ago.     Type 2 diabetes mellitus without complication, without long-term current use of insulin (HCC)    A1c according to fructomsamine is 6.7   Latest Reference Range & Units 23 06:14   Fructosamine 205 - 285 umol/L 304 (H)     POC A1c on 23 was 4.7%    Current Medications:  Jardiance 25mg daily    BG Diary:23 using True Metrix glucose meter  Fastin-104    Weight has been stable    Diabetes Complications   Retinopathy: No known retinopathy.  Last eye exam: 2022 next  with Dr. Walden in [place]. [place] [facility] [place]  Neuropathy: reports paresthesias or numbness in bilateral hands. Left foot is better.  Denies any foot wounds.  Exercise: Minimal.  Diet: more protein in diet.      Latest Reference Range & Units 23 06:14   Micro Alb Creat Ratio 0 - 30 mg/g see below   Creatinine, Urine mg/dL 84.51   Microalbumin, Urine Random mg/dL <1.2      Latest Reference Range & Units 23 06:14   GFR (CKD-EPI) >60 mL/min/1.73 m 2 109      Latest Reference Range & Units 23 06:14   Cholesterol,Tot 100 - 199 mg/dL 140   Triglycerides 0 - 149 mg/dL 69   HDL >=40 mg/dL 61   LDL <100 mg/dL 65     2. Vitamin D deficiency  Currently taking multivitamin   Latest Reference Range & Units 23 06:14   25-Hydroxy   Vitamin D 25 30 - 100 ng/mL 31     Patient's medications, allergies, and social histories were reviewed and updated as appropriate.    ROS:     CONS:     No fever, no chills   EYES:     No diplopia, no blurry vision   CV:           No chest pain, no palpitations   PULM:     No SOB, no cough, no hemoptysis.   GI:            No nausea, no vomiting, no diarrhea, no  "constipation   ENDO:     No polyuria, no polydipsia, no heat intolerance, no cold intolerance       Past Medical History:  Problem List:  2023: Vitamin D deficiency  2023: Long term (current) use of oral hypoglycemic drugs  2022: COVID-19  2022: Iron deficiency anemia  2020: Uncontrolled type 2 diabetes mellitus  2020: Onychomycosis  2020: Hyperlipidemia  2020: Stage IV carcinoma of colon (HCC)  2020: Type 2 diabetes mellitus without complication, without long-  term current use of insulin (HCC)  2020: Drug-induced skin rash  2019: Twitching  2018: Metastatic colon cancer to liver (HCC)  2018: Elevated blood pressure reading      Past Surgical History:  Past Surgical History:   Procedure Laterality Date    COLONOSCOPY          Allergies:  Compazine, Oxaliplatin, and Bee venom     Social History:  Social History     Tobacco Use    Smoking status: Former     Packs/day: 1.00     Years: 15.00     Pack years: 15.00     Types: Cigarettes     Quit date: 1998     Years since quittin.1    Smokeless tobacco: Never   Vaping Use    Vaping Use: Never used   Substance Use Topics    Alcohol use: No    Drug use: Yes     Types: Inhaled, Marijuana     Comment: daily at , last use         Family History:   family history includes Diabetes in his brother and father; Hypertension in his father and mother.      PHYSICAL EXAM:   OBJECTIVE:  Vital signs: /60   Pulse 80   Ht 1.854 m (6' 1\")   Wt 95.3 kg (210 lb)   SpO2 96%   BMI 27.71 kg/m²   GENERAL: Well-developed, well-nourished in no apparent distress.   EYE:  No ocular asymmetry, PERRLA  HENT: Pink, moist mucous membranes.    NECK: No thyromegaly.   CARDIOVASCULAR:  No murmurs  LUNGS: Clear breath sounds  ABDOMEN: Soft, nontender   EXTREMITIES: No clubbing, cyanosis, or edema.   NEUROLOGICAL: No gross focal motor abnormalities   LYMPH: No cervical adenopathy palpated.   SKIN: No rashes, lesions.     Labs:  Lab Results "   Component Value Date/Time    HBA1C 4.7 01/09/2023 09:58 AM        Lab Results   Component Value Date/Time    WBC 3.8 (L) 02/21/2023 07:20 AM    RBC 4.03 (L) 02/21/2023 07:20 AM    HEMOGLOBIN 13.6 (L) 02/21/2023 07:20 AM    MCV 98.0 (H) 02/21/2023 07:20 AM    MCH 33.7 (H) 02/21/2023 07:20 AM    MCHC 34.4 02/21/2023 07:20 AM    RDW 54.7 (H) 02/21/2023 07:20 AM    MPV 11.9 02/21/2023 07:20 AM       Lab Results   Component Value Date/Time    SODIUM 137 03/01/2023 06:14 AM    POTASSIUM 4.3 03/01/2023 06:14 AM    CHLORIDE 104 03/01/2023 06:14 AM    CO2 23 03/01/2023 06:14 AM    ANION 10.0 03/01/2023 06:14 AM    GLUCOSE 96 03/01/2023 06:14 AM    BUN 12 03/01/2023 06:14 AM    CREATININE 0.64 03/01/2023 06:14 AM    CALCIUM 8.9 03/01/2023 06:14 AM    ASTSGOT 74 (H) 03/01/2023 06:14 AM    ALTSGPT 44 03/01/2023 06:14 AM    TBILIRUBIN 3.2 (H) 03/01/2023 06:14 AM    ALBUMIN 3.5 03/01/2023 06:14 AM    TOTPROTEIN 6.4 03/01/2023 06:14 AM    GLOBULIN 2.9 03/01/2023 06:14 AM    AGRATIO 1.2 03/01/2023 06:14 AM       Lab Results   Component Value Date/Time    CHOLSTRLTOT 140 03/01/2023 0614    TRIGLYCERIDE 69 03/01/2023 0614    HDL 61 03/01/2023 0614    LDL 65 03/01/2023 0614       Lab Results   Component Value Date/Time    MALBCRT see below 03/01/2023 06:14 AM    MICROALBUR <1.2 03/01/2023 06:14 AM        Lab Results   Component Value Date/Time    TSHULTRASEN 1.780 03/01/2023 0614     No results found for: FREEDIR  No results found for: FREET3  No results found for: THYSTIMIG        ASSESSMENT/PLAN:     1. Type 2 diabetes mellitus without complication, without long-term current use of insulin (HCC)  Stable  Continue current regimen  Fructosamine on March 1 was 304, which is A1c of 6.7.  Recommend drinking plenty of water with Jardiance  Recommend eating a low-fat low-carb high-protein diet  Recommend checking feet daily for open sores and wounds  Recommend exercise 30 minutes daily  - CBC WITH DIFFERENTIAL; Future  - Comp Metabolic  Panel; Future  - FREE THYROXINE; Future  - Lipid Profile; Future  - MICROALBUMIN CREAT RATIO URINE; Future  - TSH; Future  - Empagliflozin (JARDIANCE) 25 MG Tab; Take 25 mg by mouth every day.  Dispense: 90 Tablet; Refill: 1  - FRUCTOSAMINE; Future    2. Vitamin D deficiency  Unstable  Continue with multivitamin  Recommend 1000 IU daily  - VITAMIN D,25 HYDROXY (DEFICIENCY); Future    3. Long term (current) use of oral hypoglycemic drugs  Patient is on a long-term oral agent for treatment of diabetes type 2    Return in about 6 months (around 9/6/2023).  Patient will have blood work completed 1 week prior to next appointment in 6 months      Thank you kindly for allowing me to participate in the diabetes care plan for this patient.    Viral Shipman, SAM   03/06/23    CC:   Dunia Smith P.A.-C.

## 2023-03-06 NOTE — PROGRESS NOTES
RN-CDE Note    Subjective:   Endocrinology Clinic Progress Note  PCP: Dunia Smith P.A.-C.    HPI:  Gurmeet Jo is a 57 y.o. old patient who is seen today by the Diabetes Nurse Specialist for review of Type 2 Diabetes.  Recent changes in health: History of colon cancer that with mets to the liver.  States having some abdominal pain.  DM:   Last A1c:   Lab Results   Component Value Date/Time    HBA1C 4.7 2023 09:58 AM      Previous A1c was 5.2 on 3/29/22  Fructosamine = 304 ( 6.78)  A1C GOAL: < 7    Diabetes Medications:   Jardiance 25 mg daily      Exercise: Walking and working on cars  Diet: Trying to put on weight.  Cereal for breakfast.  Lunch usually is sandwich or goes out.  Dinner is protein, vegetables, and carbohydrates.  Patient's body mass index is unknown because there is no height or weight on file. Exercise and nutrition counseling were performed at this visit.    Glucose monitoring frequency: Testing fasting  Fastin's-110  Hypoglycemic episodes: no  Last Retinal Exam: on file and up-to-date  Daily Foot Exam: Yes   Foot Exam:  Monofilament: done  Lab Results   Component Value Date/Time    MALBCRT see below 2023 06:14 AM    MICROALBUR <1.2 2023 06:14 AM        ACR Albumin/Creatinine Ratio goal <30     HTN:   Blood pressure goal <130/<80 .   Currently Rx ACE/ARB: No     Dyslipidemia:    Lab Results   Component Value Date/Time    CHOLSTRLTOT 140 2023 06:14 AM    LDL 65 2023 06:14 AM    HDL 61 2023 06:14 AM    TRIGLYCERIDE 69 2023 06:14 AM         Currently Rx Statin: No     He  reports that he quit smoking about 25 years ago. His smoking use included cigarettes. He has a 15.00 pack-year smoking history. He has never used smokeless tobacco.      Plan:     Discussed and educated on:   - All medications, side effects and compliance (discussed carefully)  - Annual eye examinations at Ophthalmology  - Home glucose monitoring emphasized  - Weight control and  daily exercise    Recommended medication changes: No changes at this time.

## 2023-03-13 NOTE — PROGRESS NOTES
"Pharmacy Chemotherapy Calculations    Dx: mCRC  Cycle 2  Previous treatment = 2/21/23    Protocol: Tucatinib + Trastuzumab  Tucatinib 300 mg PO twice daily on Days 1/21  Trastuzumab 8 mg/kg IV over 90 minutes on Day 1 of Cycle 1 followed by  Trastuzumab 6 mg/kg IV over 30 minutes on Day 1 beginning with Cycle 2  21-day cycle until DP/UT  NCCN Guidelines for Colon Cancer V.3.2022.  Carlos Enrique RODRIGUEZ, et al. Annals of Oncology. 2022;33:N226-V681.    Allergies:  Compazine, Oxaliplatin, and Bee venom       /77   Pulse 61   Temp 36.5 °C (97.7 °F) (Temporal)   Resp 18   Ht 1.86 m (6' 1.23\")   Wt 92.3 kg (203 lb 7.8 oz)   SpO2 98%   BMI 26.68 kg/m²  Body surface area is 2.18 meters squared.     ECHO:  2/10/23: LVEF 60%    No lab parameters    Trastuzumab 6 mg/kg x 92.3 kg = 554 mg   <10% difference, OK to treat with final dose = 536 mg IV    Man Anderson, PharmD  "

## 2023-03-14 ENCOUNTER — OUTPATIENT INFUSION SERVICES (OUTPATIENT)
Dept: ONCOLOGY | Facility: MEDICAL CENTER | Age: 58
End: 2023-03-14
Attending: INTERNAL MEDICINE
Payer: MEDICARE

## 2023-03-14 VITALS
BODY MASS INDEX: 26.97 KG/M2 | OXYGEN SATURATION: 98 % | HEART RATE: 61 BPM | RESPIRATION RATE: 18 BRPM | DIASTOLIC BLOOD PRESSURE: 77 MMHG | WEIGHT: 203.48 LBS | SYSTOLIC BLOOD PRESSURE: 132 MMHG | HEIGHT: 73 IN | TEMPERATURE: 97.7 F

## 2023-03-14 DIAGNOSIS — C18.9 STAGE IV CARCINOMA OF COLON (HCC): ICD-10-CM

## 2023-03-14 DIAGNOSIS — C18.9 METASTATIC COLON CANCER TO LIVER (HCC): ICD-10-CM

## 2023-03-14 DIAGNOSIS — C78.7 METASTATIC COLON CANCER TO LIVER (HCC): ICD-10-CM

## 2023-03-14 LAB
ALBUMIN SERPL BCP-MCNC: 3.3 G/DL (ref 3.2–4.9)
ALBUMIN/GLOB SERPL: 1.2 G/DL
ALP SERPL-CCNC: 326 U/L (ref 30–99)
ALT SERPL-CCNC: 43 U/L (ref 2–50)
ANION GAP SERPL CALC-SCNC: 10 MMOL/L (ref 7–16)
AST SERPL-CCNC: 66 U/L (ref 12–45)
BASOPHILS # BLD AUTO: 0.9 % (ref 0–1.8)
BASOPHILS # BLD: 0.03 K/UL (ref 0–0.12)
BILIRUB SERPL-MCNC: 1.9 MG/DL (ref 0.1–1.5)
BUN SERPL-MCNC: 11 MG/DL (ref 8–22)
CALCIUM ALBUM COR SERPL-MCNC: 9.4 MG/DL (ref 8.5–10.5)
CALCIUM SERPL-MCNC: 8.8 MG/DL (ref 8.5–10.5)
CEA SERPL-MCNC: 34.8 NG/ML (ref 0–3)
CHLORIDE SERPL-SCNC: 109 MMOL/L (ref 96–112)
CO2 SERPL-SCNC: 21 MMOL/L (ref 20–33)
CREAT SERPL-MCNC: 0.63 MG/DL (ref 0.5–1.4)
EOSINOPHIL # BLD AUTO: 0.2 K/UL (ref 0–0.51)
EOSINOPHIL NFR BLD: 6 % (ref 0–6.9)
ERYTHROCYTE [DISTWIDTH] IN BLOOD BY AUTOMATED COUNT: 55.8 FL (ref 35.9–50)
GFR SERPLBLD CREATININE-BSD FMLA CKD-EPI: 110 ML/MIN/1.73 M 2
GLOBULIN SER CALC-MCNC: 2.7 G/DL (ref 1.9–3.5)
GLUCOSE SERPL-MCNC: 107 MG/DL (ref 65–99)
HCT VFR BLD AUTO: 37.8 % (ref 42–52)
HGB BLD-MCNC: 12.7 G/DL (ref 14–18)
IMM GRANULOCYTES # BLD AUTO: 0.01 K/UL (ref 0–0.11)
IMM GRANULOCYTES NFR BLD AUTO: 0.3 % (ref 0–0.9)
LYMPHOCYTES # BLD AUTO: 0.46 K/UL (ref 1–4.8)
LYMPHOCYTES NFR BLD: 13.9 % (ref 22–41)
MAGNESIUM SERPL-MCNC: 1.7 MG/DL (ref 1.5–2.5)
MCH RBC QN AUTO: 33.4 PG (ref 27–33)
MCHC RBC AUTO-ENTMCNC: 33.6 G/DL (ref 33.7–35.3)
MCV RBC AUTO: 99.5 FL (ref 81.4–97.8)
MONOCYTES # BLD AUTO: 0.36 K/UL (ref 0–0.85)
MONOCYTES NFR BLD AUTO: 10.9 % (ref 0–13.4)
NEUTROPHILS # BLD AUTO: 2.25 K/UL (ref 1.82–7.42)
NEUTROPHILS NFR BLD: 68 % (ref 44–72)
NRBC # BLD AUTO: 0 K/UL
NRBC BLD-RTO: 0 /100 WBC
PLATELET # BLD AUTO: 78 K/UL (ref 164–446)
PMV BLD AUTO: 10.1 FL (ref 9–12.9)
POTASSIUM SERPL-SCNC: 3.8 MMOL/L (ref 3.6–5.5)
PROT SERPL-MCNC: 6 G/DL (ref 6–8.2)
RBC # BLD AUTO: 3.8 M/UL (ref 4.7–6.1)
SODIUM SERPL-SCNC: 140 MMOL/L (ref 135–145)
WBC # BLD AUTO: 3.3 K/UL (ref 4.8–10.8)

## 2023-03-14 PROCEDURE — 700111 HCHG RX REV CODE 636 W/ 250 OVERRIDE (IP): Mod: TB | Performed by: INTERNAL MEDICINE

## 2023-03-14 PROCEDURE — 85025 COMPLETE CBC W/AUTO DIFF WBC: CPT

## 2023-03-14 PROCEDURE — 82378 CARCINOEMBRYONIC ANTIGEN: CPT

## 2023-03-14 PROCEDURE — 96413 CHEMO IV INFUSION 1 HR: CPT

## 2023-03-14 PROCEDURE — 83735 ASSAY OF MAGNESIUM: CPT

## 2023-03-14 PROCEDURE — 80053 COMPREHEN METABOLIC PANEL: CPT

## 2023-03-14 PROCEDURE — 700105 HCHG RX REV CODE 258: Performed by: INTERNAL MEDICINE

## 2023-03-14 PROCEDURE — 700111 HCHG RX REV CODE 636 W/ 250 OVERRIDE (IP)

## 2023-03-14 RX ORDER — ONDANSETRON 8 MG/1
8 TABLET, ORALLY DISINTEGRATING ORAL PRN
Status: CANCELLED | OUTPATIENT
Start: 2023-03-14

## 2023-03-14 RX ORDER — 0.9 % SODIUM CHLORIDE 0.9 %
3 VIAL (ML) INJECTION PRN
Status: CANCELLED | OUTPATIENT
Start: 2023-03-14

## 2023-03-14 RX ORDER — 0.9 % SODIUM CHLORIDE 0.9 %
VIAL (ML) INJECTION PRN
Status: CANCELLED | OUTPATIENT
Start: 2023-03-14

## 2023-03-14 RX ORDER — PROCHLORPERAZINE MALEATE 10 MG
10 TABLET ORAL EVERY 6 HOURS PRN
Status: CANCELLED | OUTPATIENT
Start: 2023-03-14

## 2023-03-14 RX ORDER — SODIUM CHLORIDE 9 MG/ML
INJECTION, SOLUTION INTRAVENOUS CONTINUOUS
Status: CANCELLED | OUTPATIENT
Start: 2023-03-14

## 2023-03-14 RX ORDER — 0.9 % SODIUM CHLORIDE 0.9 %
10 VIAL (ML) INJECTION PRN
Status: CANCELLED | OUTPATIENT
Start: 2023-03-14

## 2023-03-14 RX ORDER — HEPARIN SODIUM (PORCINE) LOCK FLUSH IV SOLN 100 UNIT/ML 100 UNIT/ML
SOLUTION INTRAVENOUS
Status: COMPLETED
Start: 2023-03-14 | End: 2023-03-14

## 2023-03-14 RX ORDER — HEPARIN SODIUM (PORCINE) LOCK FLUSH IV SOLN 100 UNIT/ML 100 UNIT/ML
500 SOLUTION INTRAVENOUS PRN
Status: CANCELLED | OUTPATIENT
Start: 2023-03-14

## 2023-03-14 RX ORDER — ONDANSETRON 2 MG/ML
4 INJECTION INTRAMUSCULAR; INTRAVENOUS PRN
Status: CANCELLED | OUTPATIENT
Start: 2023-03-14

## 2023-03-14 RX ADMIN — HEPARIN 100 UNITS: 100 SYRINGE at 09:20

## 2023-03-14 RX ADMIN — TRASTUZUMAB 536 MG: KIT at 08:39

## 2023-03-14 ASSESSMENT — FIBROSIS 4 INDEX: FIB4 SCORE: 9.52

## 2023-03-14 NOTE — PROGRESS NOTES
Chemotherapy Verification - SECONDARY RN       Height = 186 cm  Weight = 92.3 kg  BSA = 2.18 m^2       Medication: trastuzumab-pkrb (Herzuma)  Dose: 6 mg/kg  Calculated Dose: 553.8 mg                             (In mg/m2, AUC, mg/kg)     I confirm that this process was performed independently.

## 2023-03-14 NOTE — PROGRESS NOTES
"Pharmacy Chemotherapy Calculation  Patient Name: Gurmeet Jo     Dx: Metastatic colorectal cancer         Protocol: trastuzumab + tucatinib (Tukysa)     *Dosing Reference*  Trastuzumab 8 mg/kg IV for C1D1 followed by   Trastuzumab 6 mg/kg starting with C2D1  Tucatinib 300 mg PO BID Days 1-21  21 day cycle until DP or UT  NCCN guidelines for colon cancer V.3.2022  Carlos Enrique RODRIGUEZ, et al. Annals of Oncology. 2022;33:N319-T609    Allergies:  Compazine, Oxaliplatin, and Bee venom       /77   Pulse 61   Temp 36.5 °C (97.7 °F) (Temporal)   Resp 18   Ht 1.86 m (6' 1.23\")   Wt 92.3 kg (203 lb 7.8 oz)   SpO2 98%   BMI 26.68 kg/m²  Body surface area is 2.18 meters squared.      No lab parameters for trastuzumab   2/10/23 LVEF by Echo ~ 60%     Drug Order   (Drug name, dose, route, IV Fluid & volume, frequency, number of doses) Cycle: 2      Previous treatment: C1 pm 2/20/23 (Lonsurf + Bevacizumab 1/16/23)     Medication = Trastuzumab-pkrb (Herzuma)  Base Dose= 6 mg/kg  Calc Dose: Base Dose x 92.3 kg = 553.8 mg  Final Dose = 536 mg  Route = IV  Fluid & Volume =  mL  Admin Duration = Over 30 minutes          <10% difference, OK to treat with final dose     By my signature below, I confirm this process was performed independently with the BSA and all final chemotherapy dosing calculations congruent. I have reviewed the above chemotherapy order and that my calculation of the final dose and BSA (when applicable) corroborate those calculations of the  pharmacist. Discrepancies of 5% or greater in the written dose have been addressed and documented within the [facility] Progress notes.    Signature: Chandrika Robles, PharmD    "

## 2023-03-14 NOTE — PROGRESS NOTES
Chemotherapy Verification - PRIMARY RN      Height = 1.86m  Weight = 92.3kgs  BSA = 2.18m2       Medication: herzuma  Dose: 6mg/kg  Calculated Dose: 553.8mgs                             (In mg/m2, AUC, mg/kg)       I confirm this process was performed independently with the BSA and all final chemotherapy dosing calculations congruent.  Any discrepancies of 10% or greater have been addressed with the chemotherapy pharmacist. The resolution of the discrepancy has been documented in the EPIC progress notes.

## 2023-03-14 NOTE — PROGRESS NOTES
Pt in clinic today for Herzuma pt ambulatory and stable denies pain, no complaints voiced.    Right upper chest port accessed in sterile fashion, flushes well with + blood return.  Labs collected per MD order.  Echo completed 02/10/2023 LVEF 60%.  Herzuma administered as ordered over 30 minutes, Gurmeet tolerated well. Port flushed with NS and hep locked, deaccessed, pt left clinic stable with next appt confirmed.

## 2023-03-27 ENCOUNTER — OFFICE VISIT (OUTPATIENT)
Dept: MEDICAL GROUP | Facility: CLINIC | Age: 58
End: 2023-03-27
Payer: MEDICARE

## 2023-03-27 ENCOUNTER — HOSPITAL ENCOUNTER (OUTPATIENT)
Facility: MEDICAL CENTER | Age: 58
End: 2023-03-27
Attending: PHYSICIAN ASSISTANT
Payer: MEDICARE

## 2023-03-27 VITALS
BODY MASS INDEX: 25.9 KG/M2 | HEART RATE: 62 BPM | DIASTOLIC BLOOD PRESSURE: 70 MMHG | TEMPERATURE: 99.1 F | WEIGHT: 195.4 LBS | HEIGHT: 73 IN | OXYGEN SATURATION: 100 % | RESPIRATION RATE: 20 BRPM | SYSTOLIC BLOOD PRESSURE: 102 MMHG

## 2023-03-27 DIAGNOSIS — Z11.3 ROUTINE SCREENING FOR STI (SEXUALLY TRANSMITTED INFECTION): ICD-10-CM

## 2023-03-27 DIAGNOSIS — Z00.00 MEDICARE ANNUAL WELLNESS VISIT, SUBSEQUENT: Primary | ICD-10-CM

## 2023-03-27 DIAGNOSIS — E11.9 TYPE 2 DIABETES MELLITUS WITHOUT COMPLICATION, WITHOUT LONG-TERM CURRENT USE OF INSULIN (HCC): ICD-10-CM

## 2023-03-27 DIAGNOSIS — C18.9 STAGE IV CARCINOMA OF COLON (HCC): ICD-10-CM

## 2023-03-27 DIAGNOSIS — Z20.2 EXPOSURE TO TRICHOMONAS: ICD-10-CM

## 2023-03-27 DIAGNOSIS — C78.7 METASTATIC COLON CANCER TO LIVER (HCC): ICD-10-CM

## 2023-03-27 DIAGNOSIS — C18.9 METASTATIC COLON CANCER TO LIVER (HCC): ICD-10-CM

## 2023-03-27 DIAGNOSIS — E78.5 HYPERLIPIDEMIA, UNSPECIFIED HYPERLIPIDEMIA TYPE: ICD-10-CM

## 2023-03-27 PROBLEM — R73.9 HYPERGLYCEMIA: Status: ACTIVE | Noted: 2023-03-27

## 2023-03-27 PROBLEM — R73.9 HYPERGLYCEMIA: Status: RESOLVED | Noted: 2023-03-27 | Resolved: 2023-03-27

## 2023-03-27 PROCEDURE — 87661 TRICHOMONAS VAGINALIS AMPLIF: CPT | Mod: GY

## 2023-03-27 PROCEDURE — 99213 OFFICE O/P EST LOW 20 MIN: CPT | Mod: 25 | Performed by: PHYSICIAN ASSISTANT

## 2023-03-27 PROCEDURE — G0439 PPPS, SUBSEQ VISIT: HCPCS | Performed by: PHYSICIAN ASSISTANT

## 2023-03-27 RX ORDER — METRONIDAZOLE 500 MG/1
500 TABLET ORAL
Qty: 14 TABLET | Refills: 0 | Status: SHIPPED | OUTPATIENT
Start: 2023-03-27 | End: 2023-04-03

## 2023-03-27 RX ORDER — SODIUM CHLORIDE 9 MG/ML
INJECTION, SOLUTION INTRAVENOUS
COMMUNITY
Start: 2023-02-21

## 2023-03-27 RX ORDER — TRASTUZUMAB 420 MG
KIT INTRAVENOUS
COMMUNITY
Start: 2023-02-21

## 2023-03-27 RX ORDER — TUCATINIB 150 MG/1
TABLET ORAL
Status: ON HOLD | COMMUNITY
Start: 2023-03-24 | End: 2023-06-13

## 2023-03-27 RX ORDER — BEVACIZUMAB-BVZR 100 MG/4ML
INJECTION, SOLUTION INTRAVENOUS
Status: ON HOLD | COMMUNITY
Start: 2023-01-16 | End: 2023-06-13

## 2023-03-27 RX ORDER — HEPARIN SODIUM (PORCINE) LOCK FLUSH IV SOLN 100 UNIT/ML 100 UNIT/ML
SOLUTION INTRAVENOUS
COMMUNITY
Start: 2023-01-16

## 2023-03-27 RX ORDER — TUCATINIB 50 MG/1
TABLET ORAL
Status: ON HOLD | COMMUNITY
Start: 2023-03-24 | End: 2023-06-13

## 2023-03-27 RX ORDER — BEVACIZUMAB-BVZR 400 MG/16ML
INJECTION, SOLUTION INTRAVENOUS
Status: ON HOLD | COMMUNITY
Start: 2023-01-16 | End: 2023-06-13

## 2023-03-27 ASSESSMENT — ENCOUNTER SYMPTOMS: GENERAL WELL-BEING: FAIR

## 2023-03-27 ASSESSMENT — PATIENT HEALTH QUESTIONNAIRE - PHQ9: CLINICAL INTERPRETATION OF PHQ2 SCORE: 0

## 2023-03-27 ASSESSMENT — FIBROSIS 4 INDEX: FIB4 SCORE: 7.48

## 2023-03-27 ASSESSMENT — ACTIVITIES OF DAILY LIVING (ADL): BATHING_REQUIRES_ASSISTANCE: 0

## 2023-03-27 NOTE — PROGRESS NOTES
Chief Complaint   Patient presents with    Medicare Annual Wellness     Annual Wellness Visit       HPI:  Gurmeet Jo is a 57 y.o. here for Medicare Annual Wellness Visit.  Patient indicates that his partner recently tested positive for trichomonas and he is needing treatment.  He states that he has a rash but is also undergoing chemotherapy for colon cancer and is not sure if this is related.  He is a type II diabetic with stage IV colon cancer with metastases to the liver.  He also has hyperlipidemia.  He does see endocrinology for his diabetes and hematology/oncology for colon cancer.    Patient Active Problem List    Diagnosis Date Noted    Routine screening for STI (sexually transmitted infection) 03/27/2023    Vitamin D deficiency 03/06/2023    Long term (current) use of oral hypoglycemic drugs 03/06/2023    COVID-19 11/03/2022    Iron deficiency anemia 04/28/2022    Onychomycosis 07/29/2020    Hyperlipidemia 07/29/2020    Stage IV carcinoma of colon (HCC) 07/29/2020    Type 2 diabetes mellitus without complication, without long-term current use of insulin (HCC) 05/27/2020    Drug-induced skin rash 04/03/2020    Twitching 08/07/2019    Metastatic colon cancer to liver (HCC) 12/05/2018       Current Outpatient Medications   Medication Sig Dispense Refill    TUKYSA 50 MG Tab       TUKYSA 150 MG Tab       HERZUMA 420 MG Recon Soln injection       Sodium Chloride (NS) Solution       heparin lock flush 100 unit/mL Solution       ZIRABEV 400 MG/16ML Solution injection       ZIRABEV 100 MG/4ML Solution injection       metroNIDAZOLE (FLAGYL) 500 MG Tab Take 1 Tablet by mouth 2 times a day for 7 days. 14 Tablet 0    Empagliflozin (JARDIANCE) 25 MG Tab Take 25 mg by mouth every day. 90 Tablet 1    TRUE METRIX BLOOD GLUCOSE TEST strip USE AS DIRECTED TO CHECK BLOOD SUGAR B ID AND FOR HIGH OR SUGAR 400 Strip 2    potassium chloride (MICRO-K) 10 MEQ capsule 10 mEq every day.      nystatin (MYCOSTATIN) 079923 UNIT/ML  Suspension SWISH AND SWALLOW 5 MILLILITERS PO  mL 2    glucose blood (TRUE METRIX BLOOD GLUCOSE TEST) strip TRUE METRIX BLOOD GLUCOSE TEST STRP      ondansetron (ZOFRAN ODT) 8 MG TABLET DISPERSIBLE DISSOLVE ONE TABLET BY MOUTH EVERY 12 HOURS AS NEEDED FOR NAUSEA      Lancets Lancets order: Lancets for  meter. Sig: use bid and prn ssx high or low sugar. #100 RF x 3 100 Each 0    Blood Glucose Test Strips Test strips order: Test strips for  meter. Sig: use bid and prn ssx high or low sugar #100 RF x 3 100 Each 0    Blood Glucose Monitoring Suppl Device Meter: Dispense Device of Insurance Preference. Sig. Use as directed for blood sugar monitoring. #1. NR. 1 Device 0    lidocaine-prilocaine (EMLA) 2.5-2.5 % Cream       acetaminophen (TYLENOL) 500 MG Tab Take 500-1,000 mg by mouth every 6 hours as needed.      LORazepam (ATIVAN) 1 MG Tab Take 1 mg by mouth 1 time a day as needed (nausea).      baclofen (LIORESAL) 10 MG Tab Take 10 mg by mouth 3 times a day as needed.      therapeutic multivitamin-minerals (THERAGRAN-M) Tab Take 1 Tab by mouth every day.       No current facility-administered medications for this visit.          Current supplements as per medication list.     Allergies: Compazine, Oxaliplatin, and Bee venom    Current social contact/activities: Motorcycle riding, walks     He  reports that he quit smoking about 25 years ago. His smoking use included cigarettes. He has a 15.00 pack-year smoking history. He has never used smokeless tobacco. He reports current drug use. Drugs: Inhaled and Marijuana. He reports that he does not drink alcohol.  Counseling given: Not Answered      ROS:    Gait: Uses no assistive device  Ostomy: No  Other tubes: No  Amputations: No  Chronic oxygen use: No  Last eye exam: 4/20/22  Wears hearing aids: No   : Denies any urinary leakage during the last 6 months    Screening:    Depression Screening  Little interest or pleasure in doing things?  0 - not at all  Feeling  down, depressed , or hopeless? 0 - not at all  Patient Health Questionnaire Score: 0     If depressive symptoms identified deferred to follow up visit unless specifically addressed in assessment and plan.    Interpretation of PHQ-9 Total Score   Score Severity   1-4 No Depression   5-9 Mild Depression   10-14 Moderate Depression   15-19 Moderately Severe Depression   20-27 Severe Depression    Screening for Cognitive Impairment  Three Minute Recall (daughter, heaven, mountain) 33/3    [place] clock face with all 12 numbers and set the hands to show 10 past 11.  YesYes.   Cognitive concerns identified deferred for follow up unless specifically addressed in assessment and plan.    Fall Risk Assessment  Has the patient had two or more falls in the last year or any fall with injury in the last year?  No    Safety Assessment  Throw rugs on floor.  No  Handrails on all stairs.  No  Good lighting in all hallways.  Yes  Difficulty hearing.  No  Patient counseled about all safety risks that were identified.    Functional Assessment ADLs  Are there any barriers preventing you from cooking for yourself or meeting nutritional needs?  NoNo.    Are there any barriers preventing you from driving safely or obtaining transportation?  NoNo.    Are there any barriers preventing you from using a telephone or calling for help?  NoNo.    Are there any barriers preventing you from shopping?  NoNo.    Are there any barriers preventing you from taking care of your own finances?  NoNo.    Are there any barriers preventing you from managing your medications?  NoNo.    Are there any barriers preventing you from showering, bathing or dressing yourself?  NoNo.    Are you currently engaging in any exercise or physical activity?  YesYes.     What is your perception of your health?  Fair.Fair    Advance Care Planning  Do you have an Advance Directive, Living Will, Durable Power of , or POLST? No No.                 Health Maintenance Summary             Overdue - Annual Wellness Visit (Every 366 Days) Overdue - never done      No completion history exists for this topic.              Overdue - IMM HEP B VACCINE (1 of 3 - 3-dose series) Overdue - never done      No completion history exists for this topic.              Ordered - RETINAL SCREENING (Yearly) Ordered on 3/27/2023      No completion history exists for this topic.              Overdue - IMM DTaP/Tdap/Td Vaccine (1 - Tdap) Overdue - never done      No completion history exists for this topic.              Overdue - IMM ZOSTER VACCINES (1 of 2) Overdue - never done      No completion history exists for this topic.              A1C SCREENING (Every 6 Months) Next due on 7/9/2023 01/09/2023  POCT Hemoglobin A1C    03/29/2022  POCT Hemoglobin A1C    07/01/2021  Outside Procedure: HEMOGLOBIN A1C    12/11/2020  HEMOGLOBIN A1C    08/07/2020  HEMOGLOBIN A1C    Only the first 5 history entries have been loaded, but more history exists.              DIABETES MONOFILAMENT / LE EXAM (Yearly) Next due on 1/9/2024 01/09/2023  Smart[employer]: WORKFLOW - DIABETES - DIABETIC FOOT EXAM PERFORMED    01/09/2023  Diabetic Monofilament LE Exam              Ordered - FASTING LIPID PROFILE (Yearly) Ordered on 3/6/2023      03/01/2023  Lipid Profile              Ordered - URINE ACR / MICROALBUMIN (Yearly) Ordered on 3/6/2023      03/01/2023  MICROALBUMIN CREAT RATIO URINE              Ordered - SERUM CREATININE (Yearly) Ordered on 3/27/2023      03/14/2023  Comp Metabolic Panel    03/01/2023  Comp Metabolic Panel    02/21/2023  Comp Metabolic Panel    01/13/2023  Comp Metabolic Panel    01/02/2023  CMP    Only the first 5 history entries have been loaded, but more history exists.              COLORECTAL CANCER SCREENING (COLONOSCOPY - Every 10 Years) Next due on 12/6/2028 12/06/2018  REFERRAL TO GI FOR COLONOSCOPY              HEPATITIS C SCREENING  Completed      11/08/2018  HEP C VIRUS ANTIBODY               IMM INFLUENZA (Series Information) Completed      10/18/2022  Imm Admin: Influenza Vac Subunit Quad Inj (Pf)    10/15/2021  Imm Admin: Influenza Seasonal Injectable - Historical Data    2020  Imm Admin: Influenza Vaccine Quad Inj (Pf)    2019  Imm Admin: Influenza Vac Subunit Quad Inj (Pf)    2018  Imm Admin: Influenza Vaccine Quad Inj (Pf)    Only the first 5 history entries have been loaded, but more history exists.              IMM PNEUMOCOCCAL VACCINE: 0-64 Years (Series Information) Completed      10/18/2022  Imm Admin: Pneumococcal Conjugate Vaccine (PCV20)    2018  Imm Admin: Pneumococcal Conjugate Vaccine (Prevnar/PCV-13)              COVID-19 Vaccine (Series Information) Completed      2022  Imm Admin: PFIZER BIVALENT BOOSTER SARS-COV-2 VACCINE (12+)    11/15/2021  Imm Admin: PFIZER PURPLE CAP SARS-COV-2 VACCINATION (12+)    2021  Imm Admin: PFIZER PURPLE CAP SARS-COV-2 VACCINATION (12+)    2021  Imm Admin: PFIZER PURPLE CAP SARS-COV-2 VACCINATION (12+)              IMM MENINGOCOCCAL ACWY VACCINE (Series Information) Aged Out      No completion history exists for this topic.                    Patient Care Team:  Dunia Smith P.A.-C. as PCP - General (Physician Assistant)  Winston Thomas M.D. as Consulting Physician (Gastroenterology)  Juan Ross M.D. as Consulting Physician (Medical Oncology)        Social History     Tobacco Use    Smoking status: Former     Packs/day: 1.00     Years: 15.00     Pack years: 15.00     Types: Cigarettes     Quit date: 1998     Years since quittin.2    Smokeless tobacco: Never   Vaping Use    Vaping Use: Never used   Substance Use Topics    Alcohol use: No    Drug use: Yes     Types: Inhaled, Marijuana     Comment: daily at [facility], last use      Family History   Problem Relation Age of Onset    Hypertension Mother     Hypertension Father     Diabetes Father     Diabetes Brother      He  has a past medical history of  "Bowel habit changes (06/10/2022), Cancer (HCC) (11/2018), Dental disorder (05/26/2022), Diabetes (HCC) (05/26/2022), Hiatus hernia syndrome, Indigestion, Pain, Pain (06/10/2022), Psychiatric problem (05/26/2022), Sleep apnea (06/10/2022), and Snoring.   Past Surgical History:   Procedure Laterality Date    COLONOSCOPY  2015       Exam:   /70 (BP Location: Left arm, Patient Position: Sitting, BP Cuff Size: Adult)   Pulse 62   Temp 37.3 °C (99.1 °F) (Temporal)   Resp 20   Ht 1.854 m (6' 1\")   Wt 88.6 kg (195 lb 6.4 oz)   SpO2 100%  Body mass index is 25.78 kg/m².    Hearing good.    Dentition upper and lower dentures  Alert, oriented in no acute distress.  Eye contact is good, speech goal directed, affect calm    Assessment and Plan. The following treatment and monitoring plan is recommended:    1. Type 2 diabetes mellitus without complication, without long-term current use of insulin (HCC)  - POCT Retinal Eye Exam  - Comp Metabolic Panel; Future    2. Stage IV carcinoma of colon (HCC)    3. Metastatic colon cancer to liver (HCC)    4. Hyperlipidemia, unspecified hyperlipidemia type    5. Routine screening for STI (sexually transmitted infection)  - TRICHOMONAS CULTURE  - metroNIDAZOLE (FLAGYL) 500 MG Tab; Take 1 Tablet by mouth 2 times a day for 7 days.  Dispense: 14 Tablet; Refill: 0    Other orders  - TUKYSA 50 MG Tab  - TUKYSA 150 MG Tab  - HERZUMA 420 MG Recon Soln injection  - Sodium Chloride (NS) Solution  - heparin lock flush 100 unit/mL Solution  - ZIRABEV 400 MG/16ML Solution injection  - ZIRABEV 100 MG/4ML Solution injection    Addendum 3-30-23 at 17:46.  Patient was exposed to trichomonas.  Diagnosis code added.    Services suggested: No services needed at this time  Health Care Screening: Age-appropriate preventive services recommended by USPTF and ACIP covered by Medicare were discussed today. Services ordered if indicated and agreed upon by the patient.  Referrals offered: Community-based " lifestyle interventions to reduce health risks and promote self-management and wellness, fall prevention, nutrition, physical activity, tobacco-use cessation, weight loss, and mental health services as per orders if indicated.    Discussion today about general wellness and lifestyle habits:    Continue to follow up with oncology    Follow-up: No follow-ups on file.

## 2023-03-29 LAB
SPEC CONTAINER SPEC: NORMAL
SPECIMEN SOURCE: NORMAL
T VAGINALIS RRNA SPEC QL NAA+PROBE: NEGATIVE

## 2023-03-30 ENCOUNTER — TELEPHONE (OUTPATIENT)
Dept: MEDICAL GROUP | Facility: PHYSICIAN GROUP | Age: 58
End: 2023-03-30
Payer: MEDICARE

## 2023-03-30 DIAGNOSIS — E11.9 TYPE 2 DIABETES MELLITUS WITHOUT COMPLICATION, WITHOUT LONG-TERM CURRENT USE OF INSULIN (HCC): ICD-10-CM

## 2023-03-30 PROBLEM — Z20.2 EXPOSURE TO TRICHOMONAS: Status: ACTIVE | Noted: 2023-03-30

## 2023-03-30 RX ORDER — ONDANSETRON 2 MG/ML
4 INJECTION INTRAMUSCULAR; INTRAVENOUS PRN
Status: CANCELLED | OUTPATIENT
Start: 2023-04-03

## 2023-03-30 RX ORDER — 0.9 % SODIUM CHLORIDE 0.9 %
10 VIAL (ML) INJECTION PRN
Status: CANCELLED | OUTPATIENT
Start: 2023-04-24

## 2023-03-30 RX ORDER — SODIUM CHLORIDE 9 MG/ML
INJECTION, SOLUTION INTRAVENOUS CONTINUOUS
Status: CANCELLED | OUTPATIENT
Start: 2023-04-03

## 2023-03-30 RX ORDER — ONDANSETRON 8 MG/1
8 TABLET, ORALLY DISINTEGRATING ORAL PRN
Status: CANCELLED | OUTPATIENT
Start: 2023-04-24

## 2023-03-30 RX ORDER — HEPARIN SODIUM (PORCINE) LOCK FLUSH IV SOLN 100 UNIT/ML 100 UNIT/ML
500 SOLUTION INTRAVENOUS PRN
Status: CANCELLED | OUTPATIENT
Start: 2023-04-03

## 2023-03-30 RX ORDER — 0.9 % SODIUM CHLORIDE 0.9 %
3 VIAL (ML) INJECTION PRN
Status: CANCELLED | OUTPATIENT
Start: 2023-04-24

## 2023-03-30 RX ORDER — ONDANSETRON 8 MG/1
8 TABLET, ORALLY DISINTEGRATING ORAL PRN
Status: CANCELLED | OUTPATIENT
Start: 2023-04-03

## 2023-03-30 RX ORDER — PROCHLORPERAZINE MALEATE 10 MG
10 TABLET ORAL EVERY 6 HOURS PRN
Status: CANCELLED | OUTPATIENT
Start: 2023-04-03

## 2023-03-30 RX ORDER — SODIUM CHLORIDE 9 MG/ML
INJECTION, SOLUTION INTRAVENOUS CONTINUOUS
Status: CANCELLED | OUTPATIENT
Start: 2023-04-24

## 2023-03-30 RX ORDER — 0.9 % SODIUM CHLORIDE 0.9 %
3 VIAL (ML) INJECTION PRN
Status: CANCELLED | OUTPATIENT
Start: 2023-04-03

## 2023-03-30 RX ORDER — 0.9 % SODIUM CHLORIDE 0.9 %
VIAL (ML) INJECTION PRN
Status: CANCELLED | OUTPATIENT
Start: 2023-04-24

## 2023-03-30 RX ORDER — 0.9 % SODIUM CHLORIDE 0.9 %
10 VIAL (ML) INJECTION PRN
Status: CANCELLED | OUTPATIENT
Start: 2023-04-03

## 2023-03-30 RX ORDER — HEPARIN SODIUM (PORCINE) LOCK FLUSH IV SOLN 100 UNIT/ML 100 UNIT/ML
500 SOLUTION INTRAVENOUS PRN
Status: CANCELLED | OUTPATIENT
Start: 2023-04-24

## 2023-03-30 RX ORDER — ONDANSETRON 2 MG/ML
4 INJECTION INTRAMUSCULAR; INTRAVENOUS PRN
Status: CANCELLED | OUTPATIENT
Start: 2023-04-24

## 2023-03-30 RX ORDER — 0.9 % SODIUM CHLORIDE 0.9 %
VIAL (ML) INJECTION PRN
Status: CANCELLED | OUTPATIENT
Start: 2023-04-03

## 2023-03-30 RX ORDER — PROCHLORPERAZINE MALEATE 10 MG
10 TABLET ORAL EVERY 6 HOURS PRN
Status: CANCELLED | OUTPATIENT
Start: 2023-04-24

## 2023-03-30 NOTE — TELEPHONE ENCOUNTER
Phone Number Called: Gurmeet Stevan Pierson      Call outcome: Spoke to patient regarding message below.    Message: Informed patient that POCT Retinal Scan was inadequate and that a referral was sent to Ophthalmology. Pt acknowledged understanding and did mention he had appt with [facility].

## 2023-03-30 NOTE — TELEPHONE ENCOUNTER
1. Caller Name: Gurmeet Stevan Pierson                          Call Back Number: [phone] (home)         How would the patient prefer to be contacted with a response: Aurora Brandshart message     2. SPECIFIC Action To Be Taken: Referral pending, please sign.    3. Diagnosis/Clinical Reason for Request: DM     4. Specialty & Provider Name/Lab/Imaging Location: ?    5. Is appointment scheduled for requested order/referral: no    Patient was not informed they will receive a return phone call from the office ONLY if there are any questions before processing their request. Advised to call back if they haven't received a call from the referral department in 5 days.

## 2023-04-03 NOTE — PROGRESS NOTES
"Pharmacy Chemotherapy Calculations    Dx: mCRC  Cycle 3  Previous treatment = 3/14/23    Protocol: Tucatinib + Trastuzumab  Tucatinib 300 mg PO twice daily on Days 1/21  Trastuzumab 8 mg/kg IV over 90 minutes on Day 1 of Cycle 1 followed by  Trastuzumab 6 mg/kg IV over 30 minutes on Day 1 beginning with Cycle 2  21-day cycle until DP/UT  NCCN Guidelines for Colon Cancer V.3.2022.  Carlos Enrique RODRIGUEZ, et al. Annals of Oncology. 2022;33:K542-W756.    Allergies:  Compazine, Oxaliplatin, and Bee venom       /72   Pulse 78   Temp 36.7 °C (98 °F) (Temporal)   Resp 18   Ht 1.86 m (6' 1.23\")   Wt 91 kg (200 lb 9.9 oz)   SpO2 96%   BMI 26.30 kg/m²  Body surface area is 2.17 meters squared.     ECHO:  2/10/23: LVEF 60%    No lab parameters    Trastuzumab 6 mg/kg x 91 kg = 546 mg   <10% difference, OK to treat with final dose = 536 mg IV    Man Anderson, PharmD  "

## 2023-04-03 NOTE — PROGRESS NOTES
"Pharmacy Chemotherapy Calculation  Patient Name: Gurmeet Jo     Dx: Metastatic colorectal cancer         Protocol: trastuzumab + tucatinib (Tukysa)     *Dosing Reference*  Trastuzumab 8 mg/kg IV for C1D1 followed by   Trastuzumab 6 mg/kg starting with C2D1  Tucatinib 300 mg PO BID Days 1-21  21 day cycle until DP or UT  NCCN guidelines for colon cancer V.3.2022  Carlos Enrique RODRIGUEZ, et al. Annals of Oncology. 2022;33:C417-T666    Allergies:  Compazine, Oxaliplatin, and Bee venom       /72   Pulse 78   Temp 36.7 °C (98 °F) (Temporal)   Resp 18   Ht 1.86 m (6' 1.23\")   Wt 91 kg (200 lb 9.9 oz)   SpO2 96%   BMI 26.30 kg/m²  Body surface area is 2.17 meters squared.      No lab parameters for trastuzumab   2/10/23 LVEF by Echo ~ 60%     Drug Order   (Drug name, dose, route, IV Fluid & volume, frequency, number of doses) Cycle: 3- DEFER 1 WEEK FOR NEUTROPENIA  Previous treatment: C2 3/14/23            <10% difference, OK to treat with final dose     By my signature below, I confirm this process was performed independently with the BSA and all final chemotherapy dosing calculations congruent. I have reviewed the above chemotherapy order and that my calculation of the final dose and BSA (when applicable) corroborate those calculations of the  pharmacist. Discrepancies of 5% or greater in the written dose have been addressed and documented within the [facility] Progress notes.    Signature: Shari Amador PharmD    "

## 2023-04-04 ENCOUNTER — OUTPATIENT INFUSION SERVICES (OUTPATIENT)
Dept: ONCOLOGY | Facility: MEDICAL CENTER | Age: 58
End: 2023-04-04
Attending: INTERNAL MEDICINE
Payer: MEDICARE

## 2023-04-04 VITALS
HEIGHT: 73 IN | DIASTOLIC BLOOD PRESSURE: 72 MMHG | TEMPERATURE: 98 F | BODY MASS INDEX: 26.59 KG/M2 | SYSTOLIC BLOOD PRESSURE: 131 MMHG | HEART RATE: 78 BPM | WEIGHT: 200.62 LBS | RESPIRATION RATE: 18 BRPM | OXYGEN SATURATION: 96 %

## 2023-04-04 DIAGNOSIS — C18.9 METASTATIC COLON CANCER TO LIVER (HCC): ICD-10-CM

## 2023-04-04 DIAGNOSIS — C78.7 METASTATIC COLON CANCER TO LIVER (HCC): ICD-10-CM

## 2023-04-04 DIAGNOSIS — C18.9 STAGE IV CARCINOMA OF COLON (HCC): ICD-10-CM

## 2023-04-04 LAB
ALBUMIN SERPL BCP-MCNC: 3.3 G/DL (ref 3.2–4.9)
ALBUMIN/GLOB SERPL: 1.2 G/DL
ALP SERPL-CCNC: 247 U/L (ref 30–99)
ALT SERPL-CCNC: 28 U/L (ref 2–50)
ANION GAP SERPL CALC-SCNC: 9 MMOL/L (ref 7–16)
ANISOCYTOSIS BLD QL SMEAR: ABNORMAL
AST SERPL-CCNC: 46 U/L (ref 12–45)
BASOPHILS # BLD AUTO: 1.7 % (ref 0–1.8)
BASOPHILS # BLD: 0.02 K/UL (ref 0–0.12)
BILIRUB SERPL-MCNC: 2 MG/DL (ref 0.1–1.5)
BUN SERPL-MCNC: 11 MG/DL (ref 8–22)
CALCIUM ALBUM COR SERPL-MCNC: 9.1 MG/DL (ref 8.5–10.5)
CALCIUM SERPL-MCNC: 8.5 MG/DL (ref 8.5–10.5)
CEA SERPL-MCNC: 32.1 NG/ML (ref 0–3)
CHLORIDE SERPL-SCNC: 107 MMOL/L (ref 96–112)
CO2 SERPL-SCNC: 23 MMOL/L (ref 20–33)
CREAT SERPL-MCNC: 0.64 MG/DL (ref 0.5–1.4)
EOSINOPHIL # BLD AUTO: 0.17 K/UL (ref 0–0.51)
EOSINOPHIL NFR BLD: 11.9 % (ref 0–6.9)
ERYTHROCYTE [DISTWIDTH] IN BLOOD BY AUTOMATED COUNT: 54.8 FL (ref 35.9–50)
GFR SERPLBLD CREATININE-BSD FMLA CKD-EPI: 109 ML/MIN/1.73 M 2
GLOBULIN SER CALC-MCNC: 2.8 G/DL (ref 1.9–3.5)
GLUCOSE SERPL-MCNC: 174 MG/DL (ref 65–99)
HCT VFR BLD AUTO: 38.8 % (ref 42–52)
HGB BLD-MCNC: 13.2 G/DL (ref 14–18)
LYMPHOCYTES # BLD AUTO: 0.43 K/UL (ref 1–4.8)
LYMPHOCYTES NFR BLD: 30.5 % (ref 22–41)
MACROCYTES BLD QL SMEAR: ABNORMAL
MAGNESIUM SERPL-MCNC: 1.8 MG/DL (ref 1.5–2.5)
MANUAL DIFF BLD: NORMAL
MCH RBC QN AUTO: 33.1 PG (ref 27–33)
MCHC RBC AUTO-ENTMCNC: 34 G/DL (ref 33.7–35.3)
MCV RBC AUTO: 97.2 FL (ref 81.4–97.8)
MICROCYTES BLD QL SMEAR: ABNORMAL
MONOCYTES # BLD AUTO: 0.47 K/UL (ref 0–0.85)
MONOCYTES NFR BLD AUTO: 33.9 % (ref 0–13.4)
MORPHOLOGY BLD-IMP: NORMAL
NEUTROPHILS # BLD AUTO: 0.31 K/UL (ref 1.82–7.42)
NEUTROPHILS NFR BLD: 17.8 % (ref 44–72)
NEUTS BAND NFR BLD MANUAL: 4.2 % (ref 0–10)
NRBC # BLD AUTO: 0 K/UL
NRBC BLD-RTO: 0 /100 WBC
OVALOCYTES BLD QL SMEAR: NORMAL
PLATELET # BLD AUTO: 67 K/UL (ref 164–446)
PLATELET BLD QL SMEAR: NORMAL
PMV BLD AUTO: 10.7 FL (ref 9–12.9)
POIKILOCYTOSIS BLD QL SMEAR: NORMAL
POLYCHROMASIA BLD QL SMEAR: NORMAL
POTASSIUM SERPL-SCNC: 3.7 MMOL/L (ref 3.6–5.5)
PROT SERPL-MCNC: 6.1 G/DL (ref 6–8.2)
RBC # BLD AUTO: 3.99 M/UL (ref 4.7–6.1)
RBC BLD AUTO: PRESENT
SODIUM SERPL-SCNC: 139 MMOL/L (ref 135–145)
WBC # BLD AUTO: 1.4 K/UL (ref 4.8–10.8)

## 2023-04-04 PROCEDURE — 80053 COMPREHEN METABOLIC PANEL: CPT

## 2023-04-04 PROCEDURE — 700111 HCHG RX REV CODE 636 W/ 250 OVERRIDE (IP): Performed by: INTERNAL MEDICINE

## 2023-04-04 PROCEDURE — A4212 NON CORING NEEDLE OR STYLET: HCPCS

## 2023-04-04 PROCEDURE — 83735 ASSAY OF MAGNESIUM: CPT

## 2023-04-04 PROCEDURE — 82378 CARCINOEMBRYONIC ANTIGEN: CPT

## 2023-04-04 PROCEDURE — 85007 BL SMEAR W/DIFF WBC COUNT: CPT

## 2023-04-04 PROCEDURE — 85025 COMPLETE CBC W/AUTO DIFF WBC: CPT

## 2023-04-04 PROCEDURE — 36591 DRAW BLOOD OFF VENOUS DEVICE: CPT

## 2023-04-04 RX ORDER — 0.9 % SODIUM CHLORIDE 0.9 %
VIAL (ML) INJECTION PRN
Status: CANCELLED | OUTPATIENT
Start: 2023-04-11

## 2023-04-04 RX ORDER — 0.9 % SODIUM CHLORIDE 0.9 %
10 VIAL (ML) INJECTION PRN
Status: CANCELLED | OUTPATIENT
Start: 2023-04-11

## 2023-04-04 RX ORDER — HEPARIN SODIUM (PORCINE) LOCK FLUSH IV SOLN 100 UNIT/ML 100 UNIT/ML
500 SOLUTION INTRAVENOUS PRN
Status: DISCONTINUED | OUTPATIENT
Start: 2023-04-04 | End: 2023-05-13

## 2023-04-04 RX ORDER — PROCHLORPERAZINE MALEATE 10 MG
10 TABLET ORAL EVERY 6 HOURS PRN
Status: CANCELLED | OUTPATIENT
Start: 2023-04-11

## 2023-04-04 RX ORDER — HEPARIN SODIUM (PORCINE) LOCK FLUSH IV SOLN 100 UNIT/ML 100 UNIT/ML
500 SOLUTION INTRAVENOUS PRN
Status: CANCELLED | OUTPATIENT
Start: 2023-04-11

## 2023-04-04 RX ORDER — ONDANSETRON 8 MG/1
8 TABLET, ORALLY DISINTEGRATING ORAL PRN
Status: CANCELLED | OUTPATIENT
Start: 2023-04-11

## 2023-04-04 RX ORDER — 0.9 % SODIUM CHLORIDE 0.9 %
3 VIAL (ML) INJECTION PRN
Status: CANCELLED | OUTPATIENT
Start: 2023-04-11

## 2023-04-04 RX ORDER — SODIUM CHLORIDE 9 MG/ML
INJECTION, SOLUTION INTRAVENOUS CONTINUOUS
Status: CANCELLED | OUTPATIENT
Start: 2023-04-11

## 2023-04-04 RX ORDER — ONDANSETRON 2 MG/ML
4 INJECTION INTRAMUSCULAR; INTRAVENOUS PRN
Status: CANCELLED | OUTPATIENT
Start: 2023-04-11

## 2023-04-04 RX ADMIN — HEPARIN 500 UNITS: 100 SYRINGE at 09:05

## 2023-04-04 ASSESSMENT — FIBROSIS 4 INDEX: FIB4 SCORE: 7.48

## 2023-04-04 NOTE — PROGRESS NOTES
Dr. Ross called to report ANC: 0.31. Received orders to defer Herzuma treatment for 1 week and have patient return for repeat labs and possible treatment pending results on 4/11. Patient to hold oral Tukysa for 1 week as well. RN to call 4/11 CBC results to Dr. Ross to determine if treatment can be given.

## 2023-04-04 NOTE — PROGRESS NOTES
Chemotherapy Verification - PRIMARY RN      Height = 1.86m  Weight = 91kg  BSA = 2.17m2       Medication: trastuzumab-pkrb  Dose: 6mg/kg  Calculated Dose: 546mg                             (In mg/m2, AUC, mg/kg)       I confirm this process was performed independently with the BSA and all final chemotherapy dosing calculations congruent.  Any discrepancies of 10% or greater have been addressed with the chemotherapy pharmacist. The resolution of the discrepancy has been documented in the EPIC progress notes.

## 2023-04-04 NOTE — PROGRESS NOTES
Gurmeet to infusion for Q21D Herzuma infusion. Reports tolerating treatment well so far, reports headache and intermittent diarrhea from oral Tukysa which MD is aware of. Port accessed using sterile technique, flushed with NS with positive blood and labs drawn. Labs reviewed by RN, no parameters set for Herzuma treatment but ANC: 310 called to Dr. Ross. Orders received to defer treatment 1 week (both Herzuma and oral Tukysa) and patient to return in 1 week for repeat labs and possible treatment. Patient updated with plan and educated on neutropenic precautions to follow. Port heparinized and d/cristi with tip intact, covered with gauze and tape. Patient left in stable condition, scheduling contacted to make appt for 4/11, patient confirmed he will check [facility] for appointment time.

## 2023-04-04 NOTE — PROGRESS NOTES
"Pharmacy Chemotherapy Calculations  Chemotherapy HELD, MD decision    Dx: mCRC  Previous treatment = 3/14/23    Protocol: Tucatinib + Trastuzumab  Tucatinib 300 mg PO twice daily on Days 1/21  Trastuzumab 8 mg/kg IV over 90 minutes on Day 1 of Cycle 1 followed by  Trastuzumab 6 mg/kg IV over 30 minutes on Day 1 beginning with Cycle 2  21-day cycle until DP/UT  NCCN Guidelines for Colon Cancer V.3.2022.  Carlos Enrique RODRIGUEZ, et al. Annals of Oncology. 2022;33:J349-H465.    Allergies:  Compazine, Oxaliplatin, and Bee venom       /72   Pulse 78   Temp 36.7 °C (98 °F) (Temporal)   Resp 18   Ht 1.86 m (6' 1.23\")   Wt 91 kg (200 lb 9.9 oz)   SpO2 96%   BMI 26.30 kg/m²  Body surface area is 2.17 meters squared.     ECHO:  2/10/23: LVEF 60%    No lab parameters      Man Anderson, PharmD    "

## 2023-04-04 NOTE — PROGRESS NOTES
Chemotherapy Verification - SECONDARY RN       Height = 1.86 m  Weight = 91 kg  BSA = 2.17 m^2       Medication: trastuzumab-pkrb  Dose: 6 mg/kg  Calculated Dose: 546 mg                             (In mg/m2, AUC, mg/kg)     I confirm that this process was performed independently.

## 2023-04-05 ENCOUNTER — OFFICE VISIT (OUTPATIENT)
Dept: MEDICAL GROUP | Facility: CLINIC | Age: 58
End: 2023-04-05
Payer: MEDICARE

## 2023-04-05 ENCOUNTER — APPOINTMENT (OUTPATIENT)
Dept: RADIOLOGY | Facility: IMAGING CENTER | Age: 58
End: 2023-04-05
Attending: PHYSICIAN ASSISTANT
Payer: MEDICARE

## 2023-04-05 ENCOUNTER — NON-PROVIDER VISIT (OUTPATIENT)
Dept: URGENT CARE | Facility: PHYSICIAN GROUP | Age: 58
End: 2023-04-05
Payer: MEDICARE

## 2023-04-05 VITALS
WEIGHT: 199.8 LBS | OXYGEN SATURATION: 97 % | TEMPERATURE: 99.1 F | HEIGHT: 73 IN | DIASTOLIC BLOOD PRESSURE: 70 MMHG | BODY MASS INDEX: 26.48 KG/M2 | SYSTOLIC BLOOD PRESSURE: 118 MMHG | HEART RATE: 77 BPM | RESPIRATION RATE: 16 BRPM

## 2023-04-05 DIAGNOSIS — R05.1 ACUTE COUGH: ICD-10-CM

## 2023-04-05 DIAGNOSIS — J20.9 ACUTE BRONCHITIS, UNSPECIFIED ORGANISM: ICD-10-CM

## 2023-04-05 DIAGNOSIS — R68.89 FLU-LIKE SYMPTOMS: Primary | ICD-10-CM

## 2023-04-05 LAB
FLUAV RNA SPEC QL NAA+PROBE: NEGATIVE
FLUBV RNA SPEC QL NAA+PROBE: NEGATIVE
RSV RNA SPEC QL NAA+PROBE: NEGATIVE
S PYO DNA SPEC NAA+PROBE: NOT DETECTED
SARS-COV-2 RNA RESP QL NAA+PROBE: NEGATIVE

## 2023-04-05 PROCEDURE — 0241U POCT CEPHEID COV-2, FLU A/B, RSV - PCR: CPT | Performed by: PHYSICIAN ASSISTANT

## 2023-04-05 PROCEDURE — 99214 OFFICE O/P EST MOD 30 MIN: CPT | Performed by: PHYSICIAN ASSISTANT

## 2023-04-05 PROCEDURE — 71046 X-RAY EXAM CHEST 2 VIEWS: CPT | Mod: TC,FY | Performed by: FAMILY MEDICINE

## 2023-04-05 PROCEDURE — 87651 STREP A DNA AMP PROBE: CPT | Performed by: PHYSICIAN ASSISTANT

## 2023-04-05 RX ORDER — AZITHROMYCIN 250 MG/1
TABLET, FILM COATED ORAL
Qty: 6 TABLET | Refills: 0 | Status: ON HOLD | OUTPATIENT
Start: 2023-04-05 | End: 2023-06-13

## 2023-04-05 RX ORDER — ALBUTEROL SULFATE 90 UG/1
2 AEROSOL, METERED RESPIRATORY (INHALATION) EVERY 4 HOURS PRN
Qty: 6.7 G | Refills: 0 | Status: SHIPPED | OUTPATIENT
Start: 2023-04-05 | End: 2023-04-19 | Stop reason: SDUPTHER

## 2023-04-05 ASSESSMENT — FIBROSIS 4 INDEX: FIB4 SCORE: 7.53

## 2023-04-05 NOTE — PROGRESS NOTES
Chief Complaint   Patient presents with    Flu Like Symptoms     Pt c/o fever, cough, and chills x 4-5 days        HPI: Patient is a 57 y.o. male complaining of 4 days of illness including: chills, nonproductive cough, shortness of breath, sore throat.   Mucus is: cloudy.  Similarly ill exposures: no.  Treatments tried: Vibha-seltzer Plus cold   He  reports that he quit smoking about 25 years ago. His smoking use included cigarettes. He has a 15.00 pack-year smoking history. He has never used smokeless tobacco..     ROS:  No nausea, changes in bowel movements or skin rash.      I reviewed the patient's medications, allergies and medical history:  Current Outpatient Medications   Medication Sig Dispense Refill    azithromycin (ZITHROMAX) 250 MG Tab Take two (2) tablets by mouth on first day then one (1) tablet a day until complete. 6 Tablet 0    albuterol 108 (90 Base) MCG/ACT Aero Soln inhalation aerosol Inhale 2 Puffs every four hours as needed for Shortness of Breath. 6.7 g 0    TUKYSA 50 MG Tab       TUKYSA 150 MG Tab       HERZUMA 420 MG Recon Soln injection       Sodium Chloride (NS) Solution       heparin lock flush 100 unit/mL Solution       ZIRABEV 400 MG/16ML Solution injection       ZIRABEV 100 MG/4ML Solution injection       Empagliflozin (JARDIANCE) 25 MG Tab Take 25 mg by mouth every day. 90 Tablet 1    TRUE METRIX BLOOD GLUCOSE TEST strip USE AS DIRECTED TO CHECK BLOOD SUGAR B ID AND FOR HIGH OR SUGAR 400 Strip 2    potassium chloride (MICRO-K) 10 MEQ capsule 10 mEq every day.      nystatin (MYCOSTATIN) 929336 UNIT/ML Suspension SWISH AND SWALLOW 5 MILLILITERS PO  mL 2    glucose blood (TRUE METRIX BLOOD GLUCOSE TEST) strip TRUE METRIX BLOOD GLUCOSE TEST STRP      ondansetron (ZOFRAN ODT) 8 MG TABLET DISPERSIBLE DISSOLVE ONE TABLET BY MOUTH EVERY 12 HOURS AS NEEDED FOR NAUSEA      Lancets Lancets order: Lancets for  meter. Sig: use bid and prn ssx high or low sugar. #100 RF x 3 100 Each 0     "Blood Glucose Test Strips Test strips order: Test strips for  meter. Sig: use bid and prn ssx high or low sugar #100 RF x 3 100 Each 0    Blood Glucose Monitoring Suppl Device Meter: Dispense Device of Insurance Preference. Sig. Use as directed for blood sugar monitoring. #1. NR. 1 Device 0    lidocaine-prilocaine (EMLA) 2.5-2.5 % Cream       acetaminophen (TYLENOL) 500 MG Tab Take 500-1,000 mg by mouth every 6 hours as needed.      LORazepam (ATIVAN) 1 MG Tab Take 1 mg by mouth 1 time a day as needed (nausea).      baclofen (LIORESAL) 10 MG Tab Take 10 mg by mouth 3 times a day as needed.      therapeutic multivitamin-minerals (THERAGRAN-M) Tab Take 1 Tab by mouth every day.       No current facility-administered medications for this visit.     Facility-Administered Medications Ordered in Other Visits   Medication Dose Route Frequency Provider Last Rate Last Admin    heparin lock flush 100 unit/mL injection 500 Units  500 Units Intracatheter PRN Juan Ross M.D.   500 Units at 04/04/23 0905     Compazine, Oxaliplatin, and Bee venom  Past Medical History:   Diagnosis Date    Bowel habit changes 06/10/2022    constipation intermittently    Cancer (HCC) 11/2018    Colon CA with Liver Mets    Dental disorder 05/26/2022    full upper and lower plates    Diabetes (HCC) 05/26/2022    medicated    Hiatus hernia syndrome     Indigestion     Pain     liver     Pain 06/10/2022    left shoulder pain    Psychiatric problem 05/26/2022    depression/anxiety    Sleep apnea 06/10/2022    no longer using CPAP    Snoring         EXAM:  /70 (BP Location: Right arm, Patient Position: Sitting, BP Cuff Size: Adult)   Pulse 77   Temp 37.3 °C (99.1 °F) (Temporal)   Resp 16   Ht 1.854 m (6' 1\")   Wt 90.6 kg (199 lb 12.8 oz)   SpO2 97%   General: Alert, no conversational dyspnea or audible wheeze, non-toxic appearance.  Eyes: PERRL, conjunctiva slightly injected, no eye discharge.  Ears: Normal pinnae,TM's normal " bilaterally.  Nares: Patent with cloudy mucus.  Sinuses: non tender over maxillary / frontal sinuses.  Throat: Erythematous injection without exudate.   Neck: Supple, with no adenopathy.  Lungs: Clear to auscultation bilaterally, no wheeze, crackles or rhonchi.   Heart: Regular rate without murmur.  Skin: Warm and dry without rash.     ASSESSMENT:   1. Flu-like symptoms    2. Acute bronchitis, unspecified organism    3. Acute cough       POCT testing for Strep, COVID, RSV and influenza is NEGATIVE     PLAN:  1. As symptoms have been worsening over the last week and given underlying medical conditions, will treat with antibiotics.  2. Twice daily use of nasal saline rinse or Neti-Pot.  3. OTC anti-pyretics and decongestants as needed.  4. Follow-up in office or urgent care for worsening symptoms, difficulty breathing, lack of expected recovery, or should new symptoms or problems arise.

## 2023-04-05 NOTE — PATIENT INSTRUCTIONS
Acute Bronchitis, Adult  Acute bronchitis is when air tubes (bronchi) in the lungs suddenly get swollen. The condition can make it hard to breathe. It can also cause these symptoms:  A cough.  Coughing up clear, yellow, or green mucus.  Wheezing.  Chest congestion.  Shortness of breath.  A fever.  Body aches.  Chills.  A sore throat.  Follow these instructions at home:    Medicines  Take over-the-counter and prescription medicines only as told by your doctor.  If you were prescribed an antibiotic medicine, take it as told by your doctor. Do not stop taking the antibiotic even if you start to feel better.  General instructions  Rest.  Drink enough fluids to keep your pee (urine) pale yellow.  Avoid smoking and secondhand smoke. If you smoke and you need help quitting, ask your doctor. Quitting will help your lungs heal faster.  Use an inhaler, cool mist vaporizer, or humidifier as told by your doctor.  Keep all follow-up visits as told by your doctor. This is important.  How is this prevented?  To lower your risk of getting this condition again:  Wash your hands often with soap and water. If you cannot use soap and water, use hand .  Avoid contact with people who have cold symptoms.  Try not to touch your hands to your mouth, nose, or eyes.  Make sure to get the flu shot every year.  Contact a doctor if:  Your symptoms do not get better in 2 weeks.  Get help right away if:  You cough up blood.  You have chest pain.  You have very bad shortness of breath.  You become dehydrated.  You faint (pass out) or keep feeling like you are going to pass out.  You keep throwing up (vomiting).  You have a very bad headache.  Your fever or chills gets worse.  This information is not intended to replace advice given to you by your health care provider. Make sure you discuss any questions you have with your health care provider.  Document Released: 06/05/2009 Document Revised: 11/30/2018 Document Reviewed:  06/07/2017  Elsevier Patient Education © 2020 Elsevier Inc.    Cough, Adult  Coughing is a reflex that clears your throat and your airways (respiratory system). Coughing helps to heal and protect your lungs. It is normal to cough occasionally, but a cough that happens with other symptoms or lasts a long time may be a sign of a condition that needs treatment. An acute cough may only last 2-3 weeks, while a chronic cough may last 8 or more weeks.  Coughing is commonly caused by:  Infection of the respiratory systemby viruses or bacteria.  Breathing in substances that irritate your lungs.  Allergies.  Asthma.  Mucus that runs down the back of your throat (postnasal drip).  Smoking.  Acid backing up from the stomach into the esophagus (gastroesophageal reflux).  Certain medicines.  Chronic lung problems.  Other medical conditions such as heart failure or a blood clot in the lung (pulmonary embolism).  Follow these instructions at home:  Medicines  Take over-the-counter and prescription medicines only as told by your health care provider.  Talk with your health care provider before you take a cough suppressant medicine.  Lifestyle    Avoid cigarette smoke. Do not use any products that contain nicotine or tobacco, such as cigarettes, e-cigarettes, and chewing tobacco. If you need help quitting, ask your health care provider.  Drink enough fluid to keep your urine pale yellow.  Avoid caffeine.  Do not drink alcohol if your health care provider tells you not to drink.  General instructions    Pay close attention to changes in your cough. Tell your health care provider about them.  Always cover your mouth when you cough.  Avoid things that make you cough, such as perfume, candles, cleaning products, or campfire or tobacco smoke.  If the air is dry, use a cool mist vaporizer or humidifier in your bedroom or your home to help loosen secretions.  If your cough is worse at night, try to sleep in a semi-upright position.  Rest as  needed.  Keep all follow-up visits as told by your health care provider. This is important.  Contact a health care provider if you:  Have new symptoms.  Cough up pus.  Have a cough that does not get better after 2-3 weeks or gets worse.  Cannot control your cough with cough suppressant medicines and you are losing sleep.  Have pain that gets worse or pain that is not helped with medicine.  Have a fever.  Have unexplained weight loss.  Have night sweats.  Get help right away if:  You cough up blood.  You have difficulty breathing.  Your heartbeat is very fast.  These symptoms may represent a serious problem that is an emergency. Do not wait to see if the symptoms will go away. Get medical help right away. Call your local emergency services (911 in the [place] Do not drive yourself to the hospital.  Summary  Coughing is a reflex that clears your throat and your airways. It is normal to cough occasionally, but a cough that happens with other symptoms or lasts a long time may be a sign of a condition that needs treatment.  Take over-the-counter and prescription medicines only as told by your health care provider.  Always cover your mouth when you cough.  Contact a health care provider if you have new symptoms or a cough that does not get better after 2-3 weeks or gets worse.  This information is not intended to replace advice given to you by your health care provider. Make sure you discuss any questions you have with your health care provider.  Document Released: 06/15/2012 Document Revised: 01/06/2020 Document Reviewed: 01/06/2020  Elsevier Patient Education © 2020 Elsevier Inc.

## 2023-04-10 NOTE — PROGRESS NOTES
"Pharmacy Chemotherapy Calculations    Dx: mCRC  Cycle 3- deferred 1 week for neutropenia  Previous treatment = 3/14/23    Protocol: Tucatinib + Trastuzumab  Tucatinib 300 mg PO twice daily on Days 1/21  Trastuzumab 8 mg/kg IV over 90 minutes on Day 1 of Cycle 1 followed by  Trastuzumab 6 mg/kg IV over 30 minutes on Day 1 beginning with Cycle 2  21-day cycle until DP/UT  NCCN Guidelines for Colon Cancer V.3.2022.  Carlos Enrique RODRIGUEZ, et al. Annals of Oncology. 2022;33:R923-X076.    Allergies:  Compazine, Oxaliplatin, and Bee venom       /69   Pulse 81   Temp 36.6 °C (97.9 °F) (Temporal)   Resp 18   Ht 1.86 m (6' 1.23\")   Wt 90.5 kg (199 lb 8.3 oz)   SpO2 97%   BMI 26.16 kg/m²  Body surface area is 2.16 meters squared.     ECHO:  2/10/23: LVEF 60%    No lab parameters    Trastuzumab 6 mg/kg x 90.5kg = 543 mg   <10% difference, OK to treat with final dose = 536mg IV    Shari Amador, PharmD  "

## 2023-04-11 ENCOUNTER — OUTPATIENT INFUSION SERVICES (OUTPATIENT)
Dept: ONCOLOGY | Facility: MEDICAL CENTER | Age: 58
End: 2023-04-11
Attending: INTERNAL MEDICINE
Payer: MEDICARE

## 2023-04-11 VITALS
HEIGHT: 73 IN | WEIGHT: 199.52 LBS | HEART RATE: 81 BPM | DIASTOLIC BLOOD PRESSURE: 69 MMHG | OXYGEN SATURATION: 97 % | SYSTOLIC BLOOD PRESSURE: 132 MMHG | BODY MASS INDEX: 26.44 KG/M2 | RESPIRATION RATE: 18 BRPM | TEMPERATURE: 97.9 F

## 2023-04-11 DIAGNOSIS — C18.9 METASTATIC COLON CANCER TO LIVER (HCC): ICD-10-CM

## 2023-04-11 DIAGNOSIS — C78.7 METASTATIC COLON CANCER TO LIVER (HCC): ICD-10-CM

## 2023-04-11 DIAGNOSIS — C18.9 STAGE IV CARCINOMA OF COLON (HCC): ICD-10-CM

## 2023-04-11 LAB
ALBUMIN SERPL BCP-MCNC: 3.2 G/DL (ref 3.2–4.9)
ALBUMIN/GLOB SERPL: 1.1 G/DL
ALP SERPL-CCNC: 285 U/L (ref 30–99)
ALT SERPL-CCNC: 32 U/L (ref 2–50)
ANION GAP SERPL CALC-SCNC: 11 MMOL/L (ref 7–16)
AST SERPL-CCNC: 63 U/L (ref 12–45)
BASOPHILS # BLD AUTO: 0.5 % (ref 0–1.8)
BASOPHILS # BLD: 0.02 K/UL (ref 0–0.12)
BILIRUB SERPL-MCNC: 1.8 MG/DL (ref 0.1–1.5)
BUN SERPL-MCNC: 12 MG/DL (ref 8–22)
CALCIUM ALBUM COR SERPL-MCNC: 8.9 MG/DL (ref 8.5–10.5)
CALCIUM SERPL-MCNC: 8.3 MG/DL (ref 8.5–10.5)
CEA SERPL-MCNC: 44.7 NG/ML (ref 0–3)
CHLORIDE SERPL-SCNC: 106 MMOL/L (ref 96–112)
CO2 SERPL-SCNC: 22 MMOL/L (ref 20–33)
CREAT SERPL-MCNC: 0.48 MG/DL (ref 0.5–1.4)
EOSINOPHIL # BLD AUTO: 0.12 K/UL (ref 0–0.51)
EOSINOPHIL NFR BLD: 2.9 % (ref 0–6.9)
ERYTHROCYTE [DISTWIDTH] IN BLOOD BY AUTOMATED COUNT: 54.5 FL (ref 35.9–50)
GFR SERPLBLD CREATININE-BSD FMLA CKD-EPI: 119 ML/MIN/1.73 M 2
GLOBULIN SER CALC-MCNC: 3 G/DL (ref 1.9–3.5)
GLUCOSE SERPL-MCNC: 161 MG/DL (ref 65–99)
HCT VFR BLD AUTO: 36.4 % (ref 42–52)
HGB BLD-MCNC: 12.2 G/DL (ref 14–18)
IMM GRANULOCYTES # BLD AUTO: 0.03 K/UL (ref 0–0.11)
IMM GRANULOCYTES NFR BLD AUTO: 0.7 % (ref 0–0.9)
LYMPHOCYTES # BLD AUTO: 0.44 K/UL (ref 1–4.8)
LYMPHOCYTES NFR BLD: 10.5 % (ref 22–41)
MAGNESIUM SERPL-MCNC: 1.9 MG/DL (ref 1.5–2.5)
MCH RBC QN AUTO: 32.9 PG (ref 27–33)
MCHC RBC AUTO-ENTMCNC: 33.5 G/DL (ref 33.7–35.3)
MCV RBC AUTO: 98.1 FL (ref 81.4–97.8)
MONOCYTES # BLD AUTO: 0.31 K/UL (ref 0–0.85)
MONOCYTES NFR BLD AUTO: 7.4 % (ref 0–13.4)
NEUTROPHILS # BLD AUTO: 3.26 K/UL (ref 1.82–7.42)
NEUTROPHILS NFR BLD: 78 % (ref 44–72)
NRBC # BLD AUTO: 0 K/UL
NRBC BLD-RTO: 0 /100 WBC
PLATELET # BLD AUTO: 72 K/UL (ref 164–446)
PMV BLD AUTO: 10.6 FL (ref 9–12.9)
POTASSIUM SERPL-SCNC: 3.7 MMOL/L (ref 3.6–5.5)
PROT SERPL-MCNC: 6.2 G/DL (ref 6–8.2)
RBC # BLD AUTO: 3.71 M/UL (ref 4.7–6.1)
SODIUM SERPL-SCNC: 139 MMOL/L (ref 135–145)
WBC # BLD AUTO: 4.2 K/UL (ref 4.8–10.8)

## 2023-04-11 PROCEDURE — 83735 ASSAY OF MAGNESIUM: CPT

## 2023-04-11 PROCEDURE — 700105 HCHG RX REV CODE 258: Performed by: INTERNAL MEDICINE

## 2023-04-11 PROCEDURE — 96413 CHEMO IV INFUSION 1 HR: CPT

## 2023-04-11 PROCEDURE — A4212 NON CORING NEEDLE OR STYLET: HCPCS

## 2023-04-11 PROCEDURE — 85025 COMPLETE CBC W/AUTO DIFF WBC: CPT

## 2023-04-11 PROCEDURE — 80053 COMPREHEN METABOLIC PANEL: CPT

## 2023-04-11 PROCEDURE — 700111 HCHG RX REV CODE 636 W/ 250 OVERRIDE (IP): Performed by: INTERNAL MEDICINE

## 2023-04-11 PROCEDURE — 82378 CARCINOEMBRYONIC ANTIGEN: CPT

## 2023-04-11 RX ORDER — HEPARIN SODIUM (PORCINE) LOCK FLUSH IV SOLN 100 UNIT/ML 100 UNIT/ML
500 SOLUTION INTRAVENOUS PRN
Status: DISCONTINUED | OUTPATIENT
Start: 2023-04-11 | End: 2023-04-11 | Stop reason: HOSPADM

## 2023-04-11 RX ADMIN — TRASTUZUMAB 536 MG: KIT at 09:37

## 2023-04-11 RX ADMIN — HEPARIN 500 UNITS: 100 SYRINGE at 10:15

## 2023-04-11 ASSESSMENT — FIBROSIS 4 INDEX: FIB4 SCORE: 7.53

## 2023-04-11 NOTE — PROGRESS NOTES
Chemotherapy Verification - PRIMARY RN      Height = 1.86 m  Weight = 90.5 kg  BSA = 2.16 m2       Medication: trastuzumab - pkrb   Dose: 6mg/kg  Calculated Dose: 543 mg                             (In mg/m2, AUC, mg/kg)         I confirm this process was performed independently with the BSA and all final chemotherapy dosing calculations congruent.  Any discrepancies of 10% or greater have been addressed with the chemotherapy pharmacist. The resolution of the discrepancy has been documented in the EPIC progress notes.

## 2023-04-11 NOTE — PROGRESS NOTES
Mr Jo is here today for trastuzumab infusion. He had bronchitis, course of antibiotics and reports he is feeling better.    Medi port to the right upper chest, flushed well with good blood return.  Labs today are within parameters for treatment.  Dr. Ross called with ANC report of 3260 TORB to proceed with today's treatment. Also, MD advised pt to resume oral Tukysa , pt verbalized understanding.    Trastuzumab infused according to MAR and was tolerated well.     Medi port was flushed and HL. Adkins was removed intact. Gauze and tape applied to the site.

## 2023-04-11 NOTE — PROGRESS NOTES
Chemotherapy Verification - SECONDARY RN       Height = 1.86m  Weight = 90.5kg  BSA = 2.16m2       Medication: trastuzumab-pkrb  Dose: 6mg/kg  Calculated Dose: 543 mg                             (In mg/m2, AUC, mg/kg)       I confirm that this process was performed independently.

## 2023-04-11 NOTE — PROGRESS NOTES
"Pharmacy Chemotherapy Verification  Patient Name: Gurmeet Jo     Dx: Metastatic colorectal cancer         Protocol: trastuzumab + tucatinib (Tukysa)     Trastuzumab 8 mg/kg IV for C1D1 followed by   Trastuzumab 6 mg/kg starting with C2D1  Tucatinib 300 mg PO BID Days 1-21  21 day cycle until DP or UT  NCCN guidelines for colon cancer V.3.2022  Carlos Enrique RODRIGUEZ, et al. Annals of Oncology. 2022;33:Z260-C694    Allergies:  Compazine, Oxaliplatin, and Bee venom     /69   Pulse 81   Temp 36.6 °C (97.9 °F) (Temporal)   Resp 18   Ht 1.86 m (6' 1.23\")   Wt 90.5 kg (199 lb 8.3 oz)   SpO2 97%   BMI 26.16 kg/m²  Body surface area is 2.16 meters squared.      No lab parameters for trastuzumab     ECHO 2/10/23  LVEF ~ 60%     Drug Order   (Drug name, dose, route, IV Fluid & volume, frequency, number of doses) Cycle: 3  Previous treatment: C2 3/14/23   Medication = Trastuzumab-pkrb (Herzuma)  Base Dose= 6 mg/kg  Calc Dose: Base Dose x 90.5kg = 543 mg  Final Dose = 536 mg  Route = IV  Fluid & Volume =  mL  Admin Duration = Over 30 minutes          <10% difference, OK to treat with final dose     By my signature below, I confirm this process was performed independently with the BSA and all final chemotherapy dosing calculations congruent. I have reviewed the above chemotherapy order and that my calculation of the final dose and BSA (when applicable) corroborate those calculations of the  pharmacist. Discrepancies of 5% or greater in the written dose have been addressed and documented within the [facility] Progress notes.    Signature: Jeni Ponce, PharmD, BCOP    "

## 2023-04-19 DIAGNOSIS — J20.9 ACUTE BRONCHITIS, UNSPECIFIED ORGANISM: ICD-10-CM

## 2023-04-19 DIAGNOSIS — R05.1 ACUTE COUGH: ICD-10-CM

## 2023-04-19 RX ORDER — ALBUTEROL SULFATE 90 UG/1
2 AEROSOL, METERED RESPIRATORY (INHALATION) EVERY 4 HOURS PRN
Qty: 18 G | Refills: 2 | Status: SHIPPED | OUTPATIENT
Start: 2023-04-19

## 2023-04-27 NOTE — PROGRESS NOTES
"Pharmacy Chemotherapy Verification  Patient Name: Gurmeet Jo     Dx: Metastatic colorectal cancer         Protocol: trastuzumab + tucatinib (Tukysa)     Trastuzumab 8 mg/kg IV for C1D1 followed by   Trastuzumab 6 mg/kg starting with C2D1  Tucatinib 300 mg PO BID Days 1-21  21 day cycle until DP or UT  NCCN guidelines for colon cancer V.3.2022  Carlos Enrique RODRIGUEZ, et al. Annals of Oncology. 2022;33:P647-E839    Allergies:  Compazine, Oxaliplatin, and Bee venom     /70   Pulse 74   Temp 36.4 °C (97.6 °F) (Temporal)   Resp 18   Ht 1.86 m (6' 1.23\")   Wt 88.3 kg (194 lb 10.7 oz)   SpO2 98%   BMI 25.52 kg/m²  Body surface area is 2.14 meters squared.      No lab parameters for trastuzumab       ECHO 2/10/23  LVEF ~ 60%     Drug Order   (Drug name, dose, route, IV Fluid & volume, frequency, number of doses) Cycle: 4  Previous treatment: C3 4/11/23   Medication = Trastuzumab-pkrb (Herzuma)  Base Dose= 6 mg/kg  Calc Dose: Base Dose x 88.3kg = 529.8 mg  Final Dose = 536 mg  Route = IV  Fluid & Volume =  mL  Admin Duration = Over 30 minutes          <10% difference, OK to treat with final dose     By my signature below, I confirm this process was performed independently with the BSA and all final chemotherapy dosing calculations congruent. I have reviewed the above chemotherapy order and that my calculation of the final dose and BSA (when applicable) corroborate those calculations of the  pharmacist. Discrepancies of 5% or greater in the written dose have been addressed and documented within the [facility] Progress notes.    Signature: Tamika Merrill, PharmD, BCOP    "

## 2023-05-01 NOTE — PROGRESS NOTES
"Pharmacy Chemotherapy Calculations    Dx: mCRC  Cycle 3  Previous treatment = 4/11/23    Protocol: Tucatinib + Trastuzumab  Tucatinib 300 mg PO twice daily on Days 1/21  Trastuzumab 8 mg/kg IV over 90 minutes on Day 1 of Cycle 1 followed by  Trastuzumab 6 mg/kg IV over 30 minutes on Day 1 beginning with Cycle 2  21-day cycle until DP/UT  NCCN Guidelines for Colon Cancer V.3.2022.  Carlos Enrique RODRIGUEZ, et al. Annals of Oncology. 2022;33:B077-K892.    Allergies:  Compazine, Oxaliplatin, and Bee venom       /70   Pulse 74   Temp 36.4 °C (97.6 °F) (Temporal)   Resp 18   Ht 1.86 m (6' 1.23\")   Wt 88.3 kg (194 lb 10.7 oz)   SpO2 98%   BMI 25.52 kg/m²  Body surface area is 2.14 meters squared.     ECHO:  2/10/23: LVEF 60%    No lab parameters    Trastuzumab 6 mg/kg x 88.3 kg = 529.8 mg   <10% difference, OK to treat with final dose = 536 mg IV    Shon Hurtado, PharmD  "

## 2023-05-02 ENCOUNTER — OUTPATIENT INFUSION SERVICES (OUTPATIENT)
Dept: ONCOLOGY | Facility: MEDICAL CENTER | Age: 58
End: 2023-05-02
Attending: INTERNAL MEDICINE
Payer: MEDICARE

## 2023-05-02 VITALS
WEIGHT: 194.67 LBS | HEIGHT: 73 IN | OXYGEN SATURATION: 98 % | TEMPERATURE: 97.6 F | RESPIRATION RATE: 18 BRPM | DIASTOLIC BLOOD PRESSURE: 70 MMHG | SYSTOLIC BLOOD PRESSURE: 124 MMHG | BODY MASS INDEX: 25.8 KG/M2 | HEART RATE: 74 BPM

## 2023-05-02 DIAGNOSIS — C18.9 STAGE IV CARCINOMA OF COLON (HCC): ICD-10-CM

## 2023-05-02 DIAGNOSIS — C78.7 METASTATIC COLON CANCER TO LIVER (HCC): ICD-10-CM

## 2023-05-02 DIAGNOSIS — C18.9 METASTATIC COLON CANCER TO LIVER (HCC): ICD-10-CM

## 2023-05-02 LAB
ALBUMIN SERPL BCP-MCNC: 3.3 G/DL (ref 3.2–4.9)
ALBUMIN/GLOB SERPL: 1.2 G/DL
ALP SERPL-CCNC: 284 U/L (ref 30–99)
ALT SERPL-CCNC: 39 U/L (ref 2–50)
ANION GAP SERPL CALC-SCNC: 10 MMOL/L (ref 7–16)
AST SERPL-CCNC: 74 U/L (ref 12–45)
BASOPHILS # BLD AUTO: 0.7 % (ref 0–1.8)
BASOPHILS # BLD: 0.01 K/UL (ref 0–0.12)
BILIRUB SERPL-MCNC: 1.8 MG/DL (ref 0.1–1.5)
BUN SERPL-MCNC: 8 MG/DL (ref 8–22)
CALCIUM ALBUM COR SERPL-MCNC: 9.1 MG/DL (ref 8.5–10.5)
CALCIUM SERPL-MCNC: 8.5 MG/DL (ref 8.5–10.5)
CEA SERPL-MCNC: 55.2 NG/ML (ref 0–3)
CHLORIDE SERPL-SCNC: 105 MMOL/L (ref 96–112)
CO2 SERPL-SCNC: 22 MMOL/L (ref 20–33)
CREAT SERPL-MCNC: 0.57 MG/DL (ref 0.5–1.4)
EOSINOPHIL # BLD AUTO: 0.13 K/UL (ref 0–0.51)
EOSINOPHIL NFR BLD: 9.3 % (ref 0–6.9)
ERYTHROCYTE [DISTWIDTH] IN BLOOD BY AUTOMATED COUNT: 56 FL (ref 35.9–50)
GFR SERPLBLD CREATININE-BSD FMLA CKD-EPI: 113 ML/MIN/1.73 M 2
GLOBULIN SER CALC-MCNC: 2.8 G/DL (ref 1.9–3.5)
GLUCOSE SERPL-MCNC: 131 MG/DL (ref 65–99)
HCT VFR BLD AUTO: 36.6 % (ref 42–52)
HGB BLD-MCNC: 12.2 G/DL (ref 14–18)
IMM GRANULOCYTES # BLD AUTO: 0 K/UL (ref 0–0.11)
IMM GRANULOCYTES NFR BLD AUTO: 0 % (ref 0–0.9)
LYMPHOCYTES # BLD AUTO: 0.34 K/UL (ref 1–4.8)
LYMPHOCYTES NFR BLD: 24.3 % (ref 22–41)
MAGNESIUM SERPL-MCNC: 1.8 MG/DL (ref 1.5–2.5)
MCH RBC QN AUTO: 32.8 PG (ref 27–33)
MCHC RBC AUTO-ENTMCNC: 33.3 G/DL (ref 33.7–35.3)
MCV RBC AUTO: 98.4 FL (ref 81.4–97.8)
MONOCYTES # BLD AUTO: 0.45 K/UL (ref 0–0.85)
MONOCYTES NFR BLD AUTO: 32.1 % (ref 0–13.4)
NEUTROPHILS # BLD AUTO: 0.47 K/UL (ref 1.82–7.42)
NEUTROPHILS NFR BLD: 33.6 % (ref 44–72)
NRBC # BLD AUTO: 0 K/UL
NRBC BLD-RTO: 0 /100 WBC
PLATELET # BLD AUTO: 80 K/UL (ref 164–446)
PMV BLD AUTO: 10.3 FL (ref 9–12.9)
POTASSIUM SERPL-SCNC: 4 MMOL/L (ref 3.6–5.5)
PROT SERPL-MCNC: 6.1 G/DL (ref 6–8.2)
RBC # BLD AUTO: 3.72 M/UL (ref 4.7–6.1)
SODIUM SERPL-SCNC: 137 MMOL/L (ref 135–145)
WBC # BLD AUTO: 1.4 K/UL (ref 4.8–10.8)

## 2023-05-02 PROCEDURE — 85025 COMPLETE CBC W/AUTO DIFF WBC: CPT

## 2023-05-02 PROCEDURE — A4212 NON CORING NEEDLE OR STYLET: HCPCS

## 2023-05-02 PROCEDURE — 80053 COMPREHEN METABOLIC PANEL: CPT

## 2023-05-02 PROCEDURE — 700105 HCHG RX REV CODE 258: Mod: UD | Performed by: INTERNAL MEDICINE

## 2023-05-02 PROCEDURE — 96413 CHEMO IV INFUSION 1 HR: CPT

## 2023-05-02 PROCEDURE — 82378 CARCINOEMBRYONIC ANTIGEN: CPT

## 2023-05-02 PROCEDURE — 700111 HCHG RX REV CODE 636 W/ 250 OVERRIDE (IP): Mod: UD | Performed by: INTERNAL MEDICINE

## 2023-05-02 PROCEDURE — 83735 ASSAY OF MAGNESIUM: CPT

## 2023-05-02 RX ORDER — 0.9 % SODIUM CHLORIDE 0.9 %
10 VIAL (ML) INJECTION PRN
Status: CANCELLED | OUTPATIENT
Start: 2023-05-03

## 2023-05-02 RX ORDER — ONDANSETRON 8 MG/1
8 TABLET, ORALLY DISINTEGRATING ORAL PRN
Status: CANCELLED | OUTPATIENT
Start: 2023-05-03

## 2023-05-02 RX ORDER — SODIUM CHLORIDE 9 MG/ML
INJECTION, SOLUTION INTRAVENOUS CONTINUOUS
Status: CANCELLED | OUTPATIENT
Start: 2023-05-03

## 2023-05-02 RX ORDER — PROCHLORPERAZINE MALEATE 10 MG
10 TABLET ORAL EVERY 6 HOURS PRN
Status: CANCELLED | OUTPATIENT
Start: 2023-05-03

## 2023-05-02 RX ORDER — 0.9 % SODIUM CHLORIDE 0.9 %
3 VIAL (ML) INJECTION PRN
Status: CANCELLED | OUTPATIENT
Start: 2023-05-03

## 2023-05-02 RX ORDER — ONDANSETRON 2 MG/ML
4 INJECTION INTRAMUSCULAR; INTRAVENOUS PRN
Status: CANCELLED | OUTPATIENT
Start: 2023-05-03

## 2023-05-02 RX ORDER — 0.9 % SODIUM CHLORIDE 0.9 %
VIAL (ML) INJECTION PRN
Status: CANCELLED | OUTPATIENT
Start: 2023-05-03

## 2023-05-02 RX ADMIN — TRASTUZUMAB 536 MG: KIT at 09:38

## 2023-05-02 RX ADMIN — HEPARIN 500 UNITS: 100 SYRINGE at 10:24

## 2023-05-02 ASSESSMENT — FIBROSIS 4 INDEX: FIB4 SCORE: 9.13

## 2023-05-02 NOTE — PROGRESS NOTES
Chemotherapy Verification - SECONDARY RN       Height = 186 cm  Weight = 88.3 kg  BSA = 2.14 m2       Medication: Herzuma  Dose: 6 mg/kg  Calculated Dose: 529.8 mg                             (In mg/m2, AUC, mg/kg)       I confirm that this process was performed independently.

## 2023-05-02 NOTE — PROGRESS NOTES
Pt arrived ambulatory to [facility] for Trastuzumab-pkrb infusion for metastatic colon cancer to liver. POC discussed with pt and he agrees with plan. Pt with call light in reach for safety. Pt verbalized understanding to call for needs/assist.    Pt place EMLA cream to port site PTA. Port accessed in sterile fashion, brisk blood return noted. Labs drawn as ordered. Pt medicated with trastuzumab-pkrb as ordered. Pt tolerated treatment without s/s adverse reaction. Port with brisk blood return, flushed with NS then heparin. Port de-accessed, gauze dressing applied. Scheduling e-mailed for next appt. Pt to monitor My Chart for appt info.     Lab contacted this RN r/t WBC 1.4 and ANC 0.47 today. Dr Ross contacted by Mirtha arias RN. Pt to hold oral Tukysa at this time until follow up with dr Ross. Message left for pt with these instructions by Mirtha MONTOYA.

## 2023-05-02 NOTE — PROGRESS NOTES
Chemotherapy Verification - PRIMARY RN      Height = 186cm  Weight = 88.3kg  BSA = 2.14m^2       Medication: trastuzumab-pkrb  Dose: 6mg/kg  Calculated Dose: 529.8mg                                  I confirm this process was performed independently with the BSA and all final chemotherapy dosing calculations congruent.  Any discrepancies of 10% or greater have been addressed with the chemotherapy pharmacist. The resolution of the discrepancy has been documented in the EPIC progress notes.

## 2023-05-02 NOTE — PROGRESS NOTES
ANC reported to Dr. Ross, pt aware to hold oral therapy and follow up with MD for follow up instructions and when to resume medications.

## 2023-05-04 ENCOUNTER — HOSPITAL ENCOUNTER (OUTPATIENT)
Dept: RADIOLOGY | Facility: MEDICAL CENTER | Age: 58
End: 2023-05-04
Attending: INTERNAL MEDICINE
Payer: MEDICARE

## 2023-05-17 RX ORDER — LOPERAMIDE HYDROCHLORIDE 2 MG/1
4 CAPSULE ORAL PRN
Status: CANCELLED | OUTPATIENT
Start: 2023-05-23

## 2023-05-17 RX ORDER — ONDANSETRON 8 MG/1
8 TABLET, ORALLY DISINTEGRATING ORAL PRN
Status: CANCELLED | OUTPATIENT
Start: 2023-05-23

## 2023-05-17 RX ORDER — 0.9 % SODIUM CHLORIDE 0.9 %
VIAL (ML) INJECTION PRN
Status: CANCELLED | OUTPATIENT
Start: 2023-05-23

## 2023-05-17 RX ORDER — ONDANSETRON 2 MG/ML
4 INJECTION INTRAMUSCULAR; INTRAVENOUS PRN
Status: CANCELLED | OUTPATIENT
Start: 2023-05-23

## 2023-05-17 RX ORDER — 0.9 % SODIUM CHLORIDE 0.9 %
10 VIAL (ML) INJECTION PRN
Status: CANCELLED | OUTPATIENT
Start: 2023-05-22

## 2023-05-17 RX ORDER — 0.9 % SODIUM CHLORIDE 0.9 %
10 VIAL (ML) INJECTION PRN
Status: CANCELLED | OUTPATIENT
Start: 2023-05-23

## 2023-05-17 RX ORDER — 0.9 % SODIUM CHLORIDE 0.9 %
VIAL (ML) INJECTION PRN
Status: CANCELLED | OUTPATIENT
Start: 2023-05-22

## 2023-05-17 RX ORDER — PROCHLORPERAZINE MALEATE 10 MG
10 TABLET ORAL EVERY 6 HOURS PRN
Status: CANCELLED | OUTPATIENT
Start: 2023-05-23

## 2023-05-17 RX ORDER — DIPHENHYDRAMINE HYDROCHLORIDE 50 MG/ML
50 INJECTION INTRAMUSCULAR; INTRAVENOUS PRN
Status: CANCELLED | OUTPATIENT
Start: 2023-05-23

## 2023-05-17 RX ORDER — EPINEPHRINE 1 MG/ML(1)
0.5 AMPUL (ML) INJECTION PRN
Status: CANCELLED | OUTPATIENT
Start: 2023-05-23

## 2023-05-17 RX ORDER — 0.9 % SODIUM CHLORIDE 0.9 %
3 VIAL (ML) INJECTION PRN
Status: CANCELLED | OUTPATIENT
Start: 2023-05-23

## 2023-05-17 RX ORDER — LOPERAMIDE HYDROCHLORIDE 2 MG/1
2 CAPSULE ORAL
Status: CANCELLED | OUTPATIENT
Start: 2023-05-23

## 2023-05-17 RX ORDER — METHYLPREDNISOLONE SODIUM SUCCINATE 125 MG/2ML
125 INJECTION, POWDER, LYOPHILIZED, FOR SOLUTION INTRAMUSCULAR; INTRAVENOUS PRN
Status: CANCELLED | OUTPATIENT
Start: 2023-05-23

## 2023-05-17 RX ORDER — SODIUM CHLORIDE 9 MG/ML
INJECTION, SOLUTION INTRAVENOUS CONTINUOUS
Status: CANCELLED | OUTPATIENT
Start: 2023-05-23

## 2023-05-17 RX ORDER — 0.9 % SODIUM CHLORIDE 0.9 %
3 VIAL (ML) INJECTION PRN
Status: CANCELLED | OUTPATIENT
Start: 2023-05-22

## 2023-05-22 ENCOUNTER — HOSPITAL ENCOUNTER (OUTPATIENT)
Dept: LAB | Facility: MEDICAL CENTER | Age: 58
End: 2023-05-22
Attending: INTERNAL MEDICINE
Payer: MEDICARE

## 2023-05-22 LAB
ALBUMIN SERPL BCP-MCNC: 3.5 G/DL (ref 3.2–4.9)
ALBUMIN/GLOB SERPL: 1.2 G/DL
ALP SERPL-CCNC: 313 U/L (ref 30–99)
ALT SERPL-CCNC: 34 U/L (ref 2–50)
ANION GAP SERPL CALC-SCNC: 10 MMOL/L (ref 7–16)
AST SERPL-CCNC: 74 U/L (ref 12–45)
BASOPHILS # BLD AUTO: 1 % (ref 0–1.8)
BASOPHILS # BLD: 0.04 K/UL (ref 0–0.12)
BILIRUB SERPL-MCNC: 1.8 MG/DL (ref 0.1–1.5)
BUN SERPL-MCNC: 12 MG/DL (ref 8–22)
CALCIUM ALBUM COR SERPL-MCNC: 9.1 MG/DL (ref 8.5–10.5)
CALCIUM SERPL-MCNC: 8.7 MG/DL (ref 8.5–10.5)
CEA SERPL-MCNC: 95 NG/ML (ref 0–3)
CHLORIDE SERPL-SCNC: 107 MMOL/L (ref 96–112)
CO2 SERPL-SCNC: 22 MMOL/L (ref 20–33)
COMMENT 1642: NORMAL
CREAT SERPL-MCNC: 0.58 MG/DL (ref 0.5–1.4)
EOSINOPHIL # BLD AUTO: 0.21 K/UL (ref 0–0.51)
EOSINOPHIL NFR BLD: 5.1 % (ref 0–6.9)
ERYTHROCYTE [DISTWIDTH] IN BLOOD BY AUTOMATED COUNT: 53.1 FL (ref 35.9–50)
GFR SERPLBLD CREATININE-BSD FMLA CKD-EPI: 112 ML/MIN/1.73 M 2
GLOBULIN SER CALC-MCNC: 3 G/DL (ref 1.9–3.5)
GLUCOSE SERPL-MCNC: 94 MG/DL (ref 65–99)
HCT VFR BLD AUTO: 40.1 % (ref 42–52)
HGB BLD-MCNC: 13.1 G/DL (ref 14–18)
IMM GRANULOCYTES # BLD AUTO: 0.01 K/UL (ref 0–0.11)
IMM GRANULOCYTES NFR BLD AUTO: 0.2 % (ref 0–0.9)
LYMPHOCYTES # BLD AUTO: 0.52 K/UL (ref 1–4.8)
LYMPHOCYTES NFR BLD: 12.7 % (ref 22–41)
MAGNESIUM SERPL-MCNC: 2 MG/DL (ref 1.5–2.5)
MCH RBC QN AUTO: 32.3 PG (ref 27–33)
MCHC RBC AUTO-ENTMCNC: 32.7 G/DL (ref 32.3–36.5)
MCV RBC AUTO: 98.8 FL (ref 81.4–97.8)
MONOCYTES # BLD AUTO: 0.47 K/UL (ref 0–0.85)
MONOCYTES NFR BLD AUTO: 11.5 % (ref 0–13.4)
MORPHOLOGY BLD-IMP: NORMAL
NEUTROPHILS # BLD AUTO: 2.84 K/UL (ref 1.82–7.42)
NEUTROPHILS NFR BLD: 69.5 % (ref 44–72)
NRBC # BLD AUTO: 0 K/UL
NRBC BLD-RTO: 0 /100 WBC (ref 0–0.2)
OVALOCYTES BLD QL SMEAR: NORMAL
PLATELET # BLD AUTO: 96 K/UL (ref 164–446)
PLATELET BLD QL SMEAR: NORMAL
PLATELETS.RETICULATED NFR BLD AUTO: 4.3 % (ref 0.6–13.1)
PMV BLD AUTO: 11.1 FL (ref 9–12.9)
POIKILOCYTOSIS BLD QL SMEAR: NORMAL
POTASSIUM SERPL-SCNC: 3.9 MMOL/L (ref 3.6–5.5)
PROT SERPL-MCNC: 6.5 G/DL (ref 6–8.2)
RBC # BLD AUTO: 4.06 M/UL (ref 4.7–6.1)
RBC BLD AUTO: PRESENT
SODIUM SERPL-SCNC: 139 MMOL/L (ref 135–145)
WBC # BLD AUTO: 4.1 K/UL (ref 4.8–10.8)

## 2023-05-22 PROCEDURE — 83735 ASSAY OF MAGNESIUM: CPT

## 2023-05-22 PROCEDURE — 85055 RETICULATED PLATELET ASSAY: CPT

## 2023-05-22 PROCEDURE — 80053 COMPREHEN METABOLIC PANEL: CPT

## 2023-05-22 PROCEDURE — 82378 CARCINOEMBRYONIC ANTIGEN: CPT

## 2023-05-22 PROCEDURE — 36415 COLL VENOUS BLD VENIPUNCTURE: CPT

## 2023-05-22 PROCEDURE — 85025 COMPLETE CBC W/AUTO DIFF WBC: CPT

## 2023-05-22 NOTE — PROGRESS NOTES
"Pharmacy Chemotherapy Calculations    Dx: mCRC  Cycle 1, Day 1  Previous treatment = Tucatinib + Trastuzumab x3 cycles, last 5/2/23    Protocol: Panitumumab + Irinotecan (+ Trastuzumab)  Panitumumab  IV over 60 minutes on Day 1, followed by  - Dose reduction starting C1 to 3 mg/kg due to prior h/o rash with panitumumab  Irinotecan  IV over 30-90 minutes on Day 1   - Dose reduction starting C1 to 135 mg/m² d/t prior intolerance  Trastuzumab 6 mg/kg IV over 90 minutes on Day 1 of Cycle 1  Trastuzumab 4 mg/kg IV over 30 minutes on Day 1 starting Cycle 2  14-day cycles until disease progression or unacceptable toxicity     NCCN Guidelines for Colon Cancer. V.1.2022.  Buddy MT, et al. Lancet Oncol. 2013;14(8):749-59.  Candace M, et al. J Clin Oncol. 2010;28(31):4706-13.  Leonel T, et al. Elisa Oncol. 2013;24(2):412-9.     NCCN Guidelines for Colon Cancer V.3.2022.  Carlos Enrique RODRIGUEZ, et al. Annals of Oncology. 2022;33:N003-614.      Allergies:  Compazine, Oxaliplatin, and Bee venom       /72   Pulse 74   Temp 36.7 °C (98 °F) (Temporal)   Resp 18   Ht 1.85 m (6' 0.84\")   Wt 92 kg (202 lb 13.2 oz)   SpO2 98%   BMI 26.88 kg/m²  Body surface area is 2.17 meters squared.    ECHO  2/10/23: LVEF 60%    Labs 5/22/23:  ANC~ 2840 Plt = 96k   Hgb = 13.1     SCr = 0.58 mg/dL CrCl >125 mL/min   AST/ALT/AP = 74/34/313 TBili = 1.8*  *Ok to proceed with treatment today per MD    Panitumumab 3 mg/kg x 92 kg = 276 mg   <10% difference, OK to treat with final dose = 300 mg    Irinotecan 135 mg/m² x 2.17 m² = 292.95 mg   <10% difference, OK to treat with final dose = 289 mg    Trastuzumab 6 mg/kg x 92 kg = 552 mg   <10% difference, OK to treat with final dose = 530 mg    Shon Hurtado, PharmD  "

## 2023-05-23 ENCOUNTER — OUTPATIENT INFUSION SERVICES (OUTPATIENT)
Dept: ONCOLOGY | Facility: MEDICAL CENTER | Age: 58
End: 2023-05-23
Attending: INTERNAL MEDICINE
Payer: MEDICARE

## 2023-05-23 VITALS
RESPIRATION RATE: 18 BRPM | OXYGEN SATURATION: 98 % | SYSTOLIC BLOOD PRESSURE: 121 MMHG | HEIGHT: 73 IN | DIASTOLIC BLOOD PRESSURE: 72 MMHG | TEMPERATURE: 98 F | HEART RATE: 74 BPM | BODY MASS INDEX: 26.88 KG/M2 | WEIGHT: 202.82 LBS

## 2023-05-23 DIAGNOSIS — C78.7 METASTATIC COLON CANCER TO LIVER (HCC): ICD-10-CM

## 2023-05-23 DIAGNOSIS — C18.9 METASTATIC COLON CANCER TO LIVER (HCC): ICD-10-CM

## 2023-05-23 DIAGNOSIS — C18.9 STAGE IV CARCINOMA OF COLON (HCC): ICD-10-CM

## 2023-05-23 PROCEDURE — 96417 CHEMO IV INFUS EACH ADDL SEQ: CPT

## 2023-05-23 PROCEDURE — 96413 CHEMO IV INFUSION 1 HR: CPT

## 2023-05-23 PROCEDURE — 700105 HCHG RX REV CODE 258: Mod: UD | Performed by: INTERNAL MEDICINE

## 2023-05-23 PROCEDURE — A4212 NON CORING NEEDLE OR STYLET: HCPCS

## 2023-05-23 PROCEDURE — 700111 HCHG RX REV CODE 636 W/ 250 OVERRIDE (IP): Mod: UD | Performed by: INTERNAL MEDICINE

## 2023-05-23 PROCEDURE — 700111 HCHG RX REV CODE 636 W/ 250 OVERRIDE (IP): Mod: UD

## 2023-05-23 PROCEDURE — 96415 CHEMO IV INFUSION ADDL HR: CPT

## 2023-05-23 PROCEDURE — 96375 TX/PRO/DX INJ NEW DRUG ADDON: CPT

## 2023-05-23 RX ADMIN — DEXAMETHASONE SODIUM PHOSPHATE 12 MG: 4 INJECTION, SOLUTION INTRA-ARTICULAR; INTRALESIONAL; INTRAMUSCULAR; INTRAVENOUS; SOFT TISSUE at 10:28

## 2023-05-23 RX ADMIN — TRASTUZUMAB 530 MG: KIT at 14:24

## 2023-05-23 RX ADMIN — PANITUMUMAB 300 MG: 100 SOLUTION INTRAVENOUS at 11:10

## 2023-05-23 RX ADMIN — ATROPINE SULFATE 0.5 MG: 1 INJECTION, SOLUTION INTRAVENOUS at 12:33

## 2023-05-23 RX ADMIN — ONDANSETRON 16 MG: 2 INJECTION INTRAMUSCULAR; INTRAVENOUS at 10:40

## 2023-05-23 RX ADMIN — HEPARIN 500 UNITS: 100 SYRINGE at 15:05

## 2023-05-23 RX ADMIN — IRINOTECAN HYDROCHLORIDE 289 MG: 20 INJECTION, SOLUTION INTRAVENOUS at 12:35

## 2023-05-23 RX ADMIN — ATROPINE SULFATE 0.5 MG: 1 INJECTION, SOLUTION INTRAVENOUS at 12:26

## 2023-05-23 ASSESSMENT — FIBROSIS 4 INDEX: FIB4 SCORE: 7.8

## 2023-05-23 NOTE — PROGRESS NOTES
Gurmeet presents to [facility] for Day 1 Cycle 1 irinotecan + vectibix + herzuma.  Gurmeet voices no complaints.  POC discussed, uGrmeet verbalizes understanding and agreement.  Right upper chest port accessed in sterile fashion, flushes well with + blood return.  Labs drawn 05/22/2023 reviewed. Echo completed 02/10/2023 LVEF 60%.  Platelets 96K, order received to proceed with treatment today and to reduce platelet parameter to 70K per Dr Ross, treatment plan adjusted. Vectibix administered with NS as ordered over 60 minutes with in-line filter in place, Gurmeet tolerated well. Atropine administered prior to Irinotecan.  Irinotecan administered per MAR with D5% dextrose over 90 minutes.  Herzuma administered per MAR with NS over 30minutes.  Gurmeet tolerated all treatments well  Port flushed with NS,heparin instilled.  Port de-accessed, gauze and tape to site.  Discharged in NAD,next appointment confirmed.

## 2023-05-23 NOTE — PROGRESS NOTES
Chemotherapy Verification - SECONDARY RN     C1 D1    Height = 185 cm  Weight = 92 kg  BSA = 2.17 m^2       Medication: Panitumumab (Vectibix)  Dose: 3 mg/kg  Calculated Dose: 276 mg                             (In mg/m2, AUC, mg/kg)     Medication: Irinotecan  Dose: 135 mg/m^2  Calculated Dose: 292.95 mg                             (In mg/m2, AUC, mg/kg)    Medication: trastuzumab-pkrb (Herzuma)  Dose: 6 mg/kg  Calculated Dose: 552 mg                             (In mg/m2, AUC, mg/kg)    I confirm that this process was performed independently.

## 2023-05-23 NOTE — PROGRESS NOTES
"Pharmacy Chemotherapy Calculation:    Dx: Metastatic colorectal cancer       Protocol: Panitumumab + IRINOtecan (+ trastuzumab)  Panitumumab 6 mg/mg IV over 60 minutes on Day 1, followed by        -5/22/23 Dose reduction starting C1 to 3 mg/kg due to prior h/o rash with panitumumab  Irinotecan 180 mg/m² IV over 30-90 minutes on Day 1        - 5/22/23 Dose reduction starting C1 to 135 mg/m² d/t prior intolerance  Trastuzumab 6 mg/kg IV over 90 minutes on Day 1 of Cycle 1   -ok to infuse over 30 min, pt previously on trastuzumab   Trastuzumab 4 mg/kg IV over 30 minutes on Day 1 starting Cycle 2  14-day cycles until disease progression or unacceptable toxicity     NCCN Guidelines for Colon Cancer. V.1.2022.  Buddy MT, et al. Lancet Oncol. 2013;14(8):749-59.  Candace M, et al. J Clin Oncol. 2010;28(31):4706-13.  Leonel T, et al. Elisa Oncol. 2013;24(2):412-9.     NCCN Guidelines for Colon Cancer V.3.2022.  Carlos Enrique RODRIGUEZ et al. Annals of Oncology. 2022;33:Z367-451.    Allergies:  Compazine, Oxaliplatin, and Bee venom     /72   Pulse 74   Temp 36.7 °C (98 °F) (Temporal)   Resp 18   Ht 1.85 m (6' 0.84\")   Wt 92 kg (202 lb 13.2 oz)   SpO2 98%   BMI 26.88 kg/m²  Body surface area is 2.17 meters squared.    Labs 5/22/23:  ANC~ 2840 Plt = 96k   Hgb = 13.1     SCr = 0.58 mg/dL CrCl > 125 mL/min (min SCr 0.7 used)  AST/ALT/AP = 74/34/313 TBili = 1.8  Mag = 2  K+ = 3.9  Dr. Ross aware of TBili of 1.8, orders received to proceed with treatment 5/23/23.       2/10/23 ECHO LVEF 60% next due 8/10/23    Drug Order   (Drug name, dose, route, IV Fluid & volume, frequency, number of doses) Cycle 1    Previous treatment: (s/p 4 cycles of trastuzumab + tucatinib last 5/2/23)     Medication = Panitumumab   Base Dose = 3 mg/kg  Calc Dose: Base Dose x 92 kg = 276 mg  Final Dose = 300 mg  Route = IV  Fluid & Volume =  mL  Admin Duration = Over 60 min             Rounded to vial size (within 10%) per dose rounding protocol. " Ok to treat with final dose.    Medication = Irinotecan   Base Dose = 135 mg/m²  Calc Dose: Base Dose x 2.17 m² = 293 mg  Final Dose = 289 mg  Route = IV  Fluid & Volume =  mL  Admin Duration = Over 90 min           <10% difference, okay to treat with final dose     Medication = Trastuzumab (Herzuma)  Base Dose = 6 mg/kg   Calc Dose:Base Dose x 92 kg = 552 mg  Final Dose = 530 mg  Route = IV  Fluid & Volume =  mL  Admin Duration = Over 30 min             <10% difference, okay to treat with final dose       By my signature below, I confirm this process was performed independently with the BSA and all final chemotherapy dosing calculations congruent. I have reviewed the above chemotherapy order and that my calculation of the final dose and BSA (when applicable) corroborate those calculations of the  pharmacist. Discrepancies of 10% or greater in the written dose have been addressed and documented within the [facility] Progress notes.      Chandrika Robles, PharmD, BCOP

## 2023-05-31 ENCOUNTER — PATIENT MESSAGE (OUTPATIENT)
Dept: MEDICAL GROUP | Facility: CLINIC | Age: 58
End: 2023-05-31
Payer: MEDICARE

## 2023-05-31 RX ORDER — BACLOFEN 10 MG/1
10 TABLET ORAL 3 TIMES DAILY PRN
Qty: 90 TABLET | Refills: 0 | Status: SHIPPED | OUTPATIENT
Start: 2023-05-31

## 2023-05-31 NOTE — PATIENT COMMUNICATION
Received request via: Patient    Was the patient seen in the last year in this department? Yes    Does the patient have an active prescription (recently filled or refills available) for medication(s) requested? No    Does the patient have longterm Plus and need 100 day supply (blood pressure, diabetes and cholesterol meds only)? Patient does not have SCP      Last Ov 4/5/23  Last Labs 5/22/23

## 2023-06-01 RX ORDER — 0.9 % SODIUM CHLORIDE 0.9 %
3 VIAL (ML) INJECTION PRN
Status: CANCELLED | OUTPATIENT
Start: 2023-06-06

## 2023-06-01 RX ORDER — LOPERAMIDE HYDROCHLORIDE 2 MG/1
4 CAPSULE ORAL PRN
Status: CANCELLED | OUTPATIENT
Start: 2023-06-06

## 2023-06-01 RX ORDER — LOPERAMIDE HYDROCHLORIDE 2 MG/1
2 CAPSULE ORAL
Status: CANCELLED | OUTPATIENT
Start: 2023-06-06

## 2023-06-01 RX ORDER — 0.9 % SODIUM CHLORIDE 0.9 %
VIAL (ML) INJECTION PRN
Status: CANCELLED | OUTPATIENT
Start: 2023-06-06

## 2023-06-01 RX ORDER — METHYLPREDNISOLONE SODIUM SUCCINATE 125 MG/2ML
125 INJECTION, POWDER, LYOPHILIZED, FOR SOLUTION INTRAMUSCULAR; INTRAVENOUS PRN
Status: CANCELLED | OUTPATIENT
Start: 2023-06-06

## 2023-06-01 RX ORDER — DIPHENHYDRAMINE HYDROCHLORIDE 50 MG/ML
50 INJECTION INTRAMUSCULAR; INTRAVENOUS PRN
Status: CANCELLED | OUTPATIENT
Start: 2023-06-06

## 2023-06-01 RX ORDER — 0.9 % SODIUM CHLORIDE 0.9 %
10 VIAL (ML) INJECTION PRN
Status: CANCELLED | OUTPATIENT
Start: 2023-06-06

## 2023-06-01 RX ORDER — EPINEPHRINE 1 MG/ML(1)
0.5 AMPUL (ML) INJECTION PRN
Status: CANCELLED | OUTPATIENT
Start: 2023-06-06

## 2023-06-01 RX ORDER — 0.9 % SODIUM CHLORIDE 0.9 %
10 VIAL (ML) INJECTION PRN
Status: CANCELLED | OUTPATIENT
Start: 2023-06-05

## 2023-06-01 RX ORDER — SODIUM CHLORIDE 9 MG/ML
INJECTION, SOLUTION INTRAVENOUS CONTINUOUS
Status: CANCELLED | OUTPATIENT
Start: 2023-06-06

## 2023-06-01 RX ORDER — PROCHLORPERAZINE MALEATE 10 MG
10 TABLET ORAL EVERY 6 HOURS PRN
Status: CANCELLED | OUTPATIENT
Start: 2023-06-06

## 2023-06-01 RX ORDER — 0.9 % SODIUM CHLORIDE 0.9 %
VIAL (ML) INJECTION PRN
Status: CANCELLED | OUTPATIENT
Start: 2023-06-05

## 2023-06-01 RX ORDER — ONDANSETRON 2 MG/ML
4 INJECTION INTRAMUSCULAR; INTRAVENOUS PRN
Status: CANCELLED | OUTPATIENT
Start: 2023-06-06

## 2023-06-01 RX ORDER — 0.9 % SODIUM CHLORIDE 0.9 %
3 VIAL (ML) INJECTION PRN
Status: CANCELLED | OUTPATIENT
Start: 2023-06-05

## 2023-06-01 RX ORDER — ONDANSETRON 8 MG/1
8 TABLET, ORALLY DISINTEGRATING ORAL PRN
Status: CANCELLED | OUTPATIENT
Start: 2023-06-06

## 2023-06-02 ENCOUNTER — HOSPITAL ENCOUNTER (OUTPATIENT)
Dept: RADIOLOGY | Facility: MEDICAL CENTER | Age: 58
End: 2023-06-02
Attending: INTERNAL MEDICINE
Payer: MEDICARE

## 2023-06-02 DIAGNOSIS — C18.9 METASTATIC COLON CANCER TO LIVER (HCC): ICD-10-CM

## 2023-06-02 DIAGNOSIS — C78.7 METASTATIC COLON CANCER TO LIVER (HCC): ICD-10-CM

## 2023-06-02 PROCEDURE — 700111 HCHG RX REV CODE 636 W/ 250 OVERRIDE (IP): Mod: JG,UD

## 2023-06-02 PROCEDURE — P9047 ALBUMIN (HUMAN), 25%, 50ML: HCPCS | Mod: JG,UD

## 2023-06-02 PROCEDURE — 49083 ABD PARACENTESIS W/IMAGING: CPT

## 2023-06-02 RX ORDER — ALBUMIN (HUMAN) 12.5 G/50ML
75 SOLUTION INTRAVENOUS ONCE
Status: COMPLETED | OUTPATIENT
Start: 2023-06-02 | End: 2023-06-02

## 2023-06-02 RX ORDER — ALBUMIN (HUMAN) 12.5 G/50ML
SOLUTION INTRAVENOUS
Status: COMPLETED
Start: 2023-06-02 | End: 2023-06-02

## 2023-06-02 RX ADMIN — ALBUMIN (HUMAN) 75 G: 12.5 SOLUTION INTRAVENOUS at 15:00

## 2023-06-02 RX ADMIN — HEPARIN 100 UNITS/ML: 100 SYRINGE at 16:55

## 2023-06-02 RX ADMIN — ALBUMIN (HUMAN) 75 G: 5 SOLUTION INTRAVENOUS at 15:00

## 2023-06-02 NOTE — PROGRESS NOTES
"Pharmacy Chemotherapy Calculation:    Dx: Metastatic colorectal cancer       Protocol: Panitumumab + IRINOtecan (+ trastuzumab)  Panitumumab 6 mg/mg IV over 60 minutes on Day 1, followed by        -5/22/23 Dose reduction starting C1 to 3 mg/kg due to prior h/o rash with panitumumab  Irinotecan 180 mg/m² IV over 30-90 minutes on Day 1        - 5/22/23 Dose reduction starting C1 to 135 mg/m² d/t prior intolerance       -6/6/23 Dose reduced further to 126 mg/m² for tolerance  Trastuzumab 6 mg/kg IV over 90 minutes on Day 1 of Cycle 1   -ok to infuse over 30 min, pt previously on trastuzumab   Trastuzumab 4 mg/kg IV over 30 minutes on Day 1 starting Cycle 2  14-day cycles until disease progression or unacceptable toxicity     NCCN Guidelines for Colon Cancer. V.1.2022.  Buddy MT, et al. Lancet Oncol. 2013;14(8):749-59.  Candace M, et al. J Clin Oncol. 2010;28(31):4706-13.  Leonel T, et al. Elisa Oncol. 2013;24(2):412-9.     NCCN Guidelines for Colon Cancer V.3.2022.  Carlos Enrique RODRIGUEZ, et al. Annals of Oncology. 2022;33:X837-393.    Allergies:  Compazine, Oxaliplatin, and Bee venom     /74   Pulse 79   Temp 36.3 °C (97.3 °F) (Temporal)   Resp 18   Ht 1.85 m (6' 0.84\")   Wt 92 kg (202 lb 13.2 oz)   SpO2 92%   BMI 26.88 kg/m²  Body surface area is 2.17 meters squared.    Labs 6/5/23:  ANC~ 1880 Plt =102 k   Hgb = 12.4    SCr = 0.52 mg/dL CrCl > 125 mL/min (min SCr 0.7 used)  AST/ALT/AP = 63/44/282 TBili = 1.2  Mag =2.1 K+ = 4.2        2/10/23 ECHO LVEF 60% next due 8/10/23    Drug Order   (Drug name, dose, route, IV Fluid & volume, frequency, number of doses) Cycle 2    Previous treatment: C1 5/23/23     Medication = Panitumumab   Base Dose = 3 mg/kg  Calc Dose: Base Dose x 92 kg = 276 mg  Final Dose = 300 mg  Route = IV  Fluid & Volume =  mL  Admin Duration = Over 60 min             Rounded to vial size (within 10%) per dose rounding protocol. Ok to treat with final dose.    Medication = Irinotecan   Base " Dose = 126 mg/m²  Calc Dose: Base Dose x 2.17 m² = 273.4 mg  Final Dose = 269.6 mg  Route = IV  Fluid & Volume =  mL  Admin Duration = Over 90 min           <10% difference, okay to treat with final dose     Medication = Trastuzumab (Herzuma)  Base Dose = 4 mg/kg   Calc Dose:Base Dose x 92 kg = 368 mg  Final Dose = 353 mg  Route = IV  Fluid & Volume =  mL  Admin Duration = Over 30 min             <10% difference, okay to treat with final dose       By my signature below, I confirm this process was performed independently with the BSA and all final chemotherapy dosing calculations congruent. I have reviewed the above chemotherapy order and that my calculation of the final dose and BSA (when applicable) corroborate those calculations of the  pharmacist. Discrepancies of 10% or greater in the written dose have been addressed and documented within the EPIC Progress notes.      Tamika Merrill, PharmD

## 2023-06-03 NOTE — PROGRESS NOTES
US guided therapeutic paracentesis done by Dr. Watts;(no H&P required as this is a NON SEDATION procedure) right LQ access site; dressing CDI; 9570 mL obtained and discarded. Pt tolerated the procedure well; pt hemodynamically stable pre/intra/post procedure. IV started, albumin 6g given per protocol, IV d/c'ed upon completion of albumin administration. All questions and concerns answered prior to d/c. Patient provided with all appropriate education for procedure. Pt d/c home.

## 2023-06-05 ENCOUNTER — HOSPITAL ENCOUNTER (OUTPATIENT)
Dept: LAB | Facility: MEDICAL CENTER | Age: 58
End: 2023-06-05
Attending: INTERNAL MEDICINE
Payer: MEDICARE

## 2023-06-05 LAB
ALBUMIN SERPL BCP-MCNC: 3.6 G/DL (ref 3.2–4.9)
ALBUMIN/GLOB SERPL: 1.6 G/DL
ALP SERPL-CCNC: 282 U/L (ref 30–99)
ALT SERPL-CCNC: 44 U/L (ref 2–50)
ANION GAP SERPL CALC-SCNC: 9 MMOL/L (ref 7–16)
AST SERPL-CCNC: 63 U/L (ref 12–45)
BASOPHILS # BLD AUTO: 1.4 % (ref 0–1.8)
BASOPHILS # BLD: 0.04 K/UL (ref 0–0.12)
BILIRUB SERPL-MCNC: 1.2 MG/DL (ref 0.1–1.5)
BUN SERPL-MCNC: 17 MG/DL (ref 8–22)
CALCIUM ALBUM COR SERPL-MCNC: 9.2 MG/DL (ref 8.5–10.5)
CALCIUM SERPL-MCNC: 8.9 MG/DL (ref 8.5–10.5)
CEA SERPL-MCNC: 51.2 NG/ML (ref 0–3)
CHLORIDE SERPL-SCNC: 107 MMOL/L (ref 96–112)
CO2 SERPL-SCNC: 24 MMOL/L (ref 20–33)
CREAT SERPL-MCNC: 0.52 MG/DL (ref 0.5–1.4)
EOSINOPHIL # BLD AUTO: 0.18 K/UL (ref 0–0.51)
EOSINOPHIL NFR BLD: 6.2 % (ref 0–6.9)
ERYTHROCYTE [DISTWIDTH] IN BLOOD BY AUTOMATED COUNT: 51.6 FL (ref 35.9–50)
GFR SERPLBLD CREATININE-BSD FMLA CKD-EPI: 116 ML/MIN/1.73 M 2
GLOBULIN SER CALC-MCNC: 2.3 G/DL (ref 1.9–3.5)
GLUCOSE SERPL-MCNC: 121 MG/DL (ref 65–99)
HCT VFR BLD AUTO: 38.8 % (ref 42–52)
HGB BLD-MCNC: 12.4 G/DL (ref 14–18)
IMM GRANULOCYTES # BLD AUTO: 0.01 K/UL (ref 0–0.11)
IMM GRANULOCYTES NFR BLD AUTO: 0.3 % (ref 0–0.9)
LYMPHOCYTES # BLD AUTO: 0.46 K/UL (ref 1–4.8)
LYMPHOCYTES NFR BLD: 15.9 % (ref 22–41)
MAGNESIUM SERPL-MCNC: 2.1 MG/DL (ref 1.5–2.5)
MCH RBC QN AUTO: 31.5 PG (ref 27–33)
MCHC RBC AUTO-ENTMCNC: 32 G/DL (ref 32.3–36.5)
MCV RBC AUTO: 98.5 FL (ref 81.4–97.8)
MONOCYTES # BLD AUTO: 0.33 K/UL (ref 0–0.85)
MONOCYTES NFR BLD AUTO: 11.4 % (ref 0–13.4)
NEUTROPHILS # BLD AUTO: 1.88 K/UL (ref 1.82–7.42)
NEUTROPHILS NFR BLD: 64.8 % (ref 44–72)
NRBC # BLD AUTO: 0 K/UL
NRBC BLD-RTO: 0 /100 WBC (ref 0–0.2)
PLATELET # BLD AUTO: 102 K/UL (ref 164–446)
PMV BLD AUTO: 11 FL (ref 9–12.9)
POTASSIUM SERPL-SCNC: 4.2 MMOL/L (ref 3.6–5.5)
PROT SERPL-MCNC: 5.9 G/DL (ref 6–8.2)
RBC # BLD AUTO: 3.94 M/UL (ref 4.7–6.1)
SODIUM SERPL-SCNC: 140 MMOL/L (ref 135–145)
WBC # BLD AUTO: 2.9 K/UL (ref 4.8–10.8)

## 2023-06-05 PROCEDURE — 85025 COMPLETE CBC W/AUTO DIFF WBC: CPT

## 2023-06-05 PROCEDURE — 82378 CARCINOEMBRYONIC ANTIGEN: CPT

## 2023-06-05 PROCEDURE — 83735 ASSAY OF MAGNESIUM: CPT

## 2023-06-05 PROCEDURE — 36415 COLL VENOUS BLD VENIPUNCTURE: CPT

## 2023-06-05 PROCEDURE — 80053 COMPREHEN METABOLIC PANEL: CPT

## 2023-06-05 NOTE — PROGRESS NOTES
"Pharmacy Chemotherapy Calculations    Dx: mCRC  Cycle 2  Previous treatment = 5/26/23    Protocol: Panitumumab + Irinotecan (+ Trastuzumab)  Panitumumab  IV over 60 minutes on Day 1, followed by  - Dose reduction starting C1 to 3 mg/kg due to prior h/o rash with panitumumab  Irinotecan  IV over 30-90 minutes on Day 1   - Dose reduction starting C1 to 135 mg/m² d/t prior intolerance  - C2 dose further reduced to 126mg/m2 for tolerance  Trastuzumab 6 mg/kg IV over 90 minutes on Day 1 of Cycle 1  Trastuzumab 4 mg/kg IV over 30 minutes on Day 1 starting Cycle 2  14-day cycles until disease progression or unacceptable toxicity  NCCN Guidelines for Colon Cancer. V.1.2022.  Buddy MT, et al. Lancet Oncol. 2013;14(8):749-59.  Candace M, et al. J Clin Oncol. 2010;28(31):4706-13.  Leonel T, et al. Elisa Oncol. 2013;24(2):412-9.  NCCN Guidelines for Colon Cancer V.3.2022.  Carlos Enrique RODRIGUEZ, et al. Annals of Oncology. 2022;33:F860-416.      Allergies:  Compazine, Oxaliplatin, and Bee venom       /74   Pulse 79   Temp 36.3 °C (97.3 °F) (Temporal)   Resp 18   Ht 1.85 m (6' 0.84\")   Wt 92 kg (202 lb 13.2 oz)   SpO2 92%   BMI 26.88 kg/m²  Body surface area is 2.17 meters squared.    ECHO  2/10/23: LVEF 60%    All lab results 6/5/23 within treatment parameters.   DDI noted between Tukysa and irinotecan- per pt report he is NOT currently taking Tukysa.     Panitumumab 3 mg/kg x 92kg = 276mg   <10% difference, ok to treat with final dose = 300mg    Irinotecan 126mg/m² x 2.17m² = 273.4mg   <10% difference, OK to treat with final dose = 269.6mg    Trastuzumab 4mg/kg x 92kg = 368mg   <10% difference, OK to treat with final dose = 353mg    Shari Amador, PharmD  "

## 2023-06-06 ENCOUNTER — OUTPATIENT INFUSION SERVICES (OUTPATIENT)
Dept: ONCOLOGY | Facility: MEDICAL CENTER | Age: 58
DRG: 808 | End: 2023-06-06
Attending: INTERNAL MEDICINE
Payer: MEDICARE

## 2023-06-06 VITALS
SYSTOLIC BLOOD PRESSURE: 102 MMHG | BODY MASS INDEX: 26.88 KG/M2 | DIASTOLIC BLOOD PRESSURE: 74 MMHG | HEIGHT: 73 IN | OXYGEN SATURATION: 92 % | WEIGHT: 202.82 LBS | TEMPERATURE: 97.3 F | RESPIRATION RATE: 18 BRPM | HEART RATE: 79 BPM

## 2023-06-06 DIAGNOSIS — C18.9 STAGE IV CARCINOMA OF COLON (HCC): ICD-10-CM

## 2023-06-06 DIAGNOSIS — C78.7 METASTATIC COLON CANCER TO LIVER (HCC): ICD-10-CM

## 2023-06-06 DIAGNOSIS — C18.9 METASTATIC COLON CANCER TO LIVER (HCC): ICD-10-CM

## 2023-06-06 PROCEDURE — 96361 HYDRATE IV INFUSION ADD-ON: CPT

## 2023-06-06 PROCEDURE — 700111 HCHG RX REV CODE 636 W/ 250 OVERRIDE (IP): Mod: TB,UD | Performed by: INTERNAL MEDICINE

## 2023-06-06 PROCEDURE — 96417 CHEMO IV INFUS EACH ADDL SEQ: CPT

## 2023-06-06 PROCEDURE — 96415 CHEMO IV INFUSION ADDL HR: CPT

## 2023-06-06 PROCEDURE — 96413 CHEMO IV INFUSION 1 HR: CPT

## 2023-06-06 PROCEDURE — 700105 HCHG RX REV CODE 258: Mod: UD | Performed by: INTERNAL MEDICINE

## 2023-06-06 PROCEDURE — A4212 NON CORING NEEDLE OR STYLET: HCPCS

## 2023-06-06 PROCEDURE — 96375 TX/PRO/DX INJ NEW DRUG ADDON: CPT

## 2023-06-06 RX ORDER — SODIUM CHLORIDE 9 MG/ML
INJECTION, SOLUTION INTRAVENOUS CONTINUOUS
Status: DISCONTINUED | OUTPATIENT
Start: 2023-06-06 | End: 2023-06-06 | Stop reason: HOSPADM

## 2023-06-06 RX ADMIN — IRINOTECAN HYDROCHLORIDE 269.6 MG: 20 INJECTION, SOLUTION INTRAVENOUS at 11:16

## 2023-06-06 RX ADMIN — ONDANSETRON 16 MG: 2 INJECTION INTRAMUSCULAR; INTRAVENOUS at 08:55

## 2023-06-06 RX ADMIN — PANITUMUMAB 300 MG: 100 SOLUTION INTRAVENOUS at 09:23

## 2023-06-06 RX ADMIN — TRASTUZUMAB 353 MG: KIT at 10:12

## 2023-06-06 RX ADMIN — ATROPINE SULFATE 0.5 MG: 1 INJECTION, SOLUTION INTRAVENOUS at 10:56

## 2023-06-06 RX ADMIN — DEXAMETHASONE SODIUM PHOSPHATE 12 MG: 4 INJECTION, SOLUTION INTRA-ARTICULAR; INTRALESIONAL; INTRAMUSCULAR; INTRAVENOUS; SOFT TISSUE at 08:38

## 2023-06-06 RX ADMIN — SODIUM CHLORIDE: 9 INJECTION, SOLUTION INTRAVENOUS at 08:38

## 2023-06-06 RX ADMIN — HEPARIN 500 UNITS: 100 SYRINGE at 13:16

## 2023-06-06 ASSESSMENT — FIBROSIS 4 INDEX: FIB4 SCORE: 5.49

## 2023-06-06 NOTE — PROGRESS NOTES
Chemotherapy Verification - PRIMARY RN    D1C2    Height = 185cm  Weight = 92kg  BSA = 2.17m^2       Medication: panitumumab (Vectibix)  Dose: 3mg/kg  Calculated Dose: 276mg                              Medication: irinotecan (CAMPTOSAR)  Dose: 126mg/m^2  Calculated Dose: 273.42mg                            Medication: trastuzumab-pkrb (Herzuma)  Dose: 4mg/kg  Calculated Dose: 368mg                                  I confirm this process was performed independently with the BSA and all final chemotherapy dosing calculations congruent.  Any discrepancies of 10% or greater have been addressed with the chemotherapy pharmacist. The resolution of the discrepancy has been documented in the EPIC progress notes.

## 2023-06-06 NOTE — PROGRESS NOTES
Gurmeet arrived ambulatory to [facility] for D1C2 Vectibix, Irinotecan, Trastuzumab treatment for metastatic colon cancer to liver. POC discussed with pt and he agrees with plan. Pt with call light in reach for safety. Pt verbalized understanding to call for needs/assist.    Labs reviewed from 6/5/2023, ok to proceed with treatment. Pt applied EMLA cream to port site PTA. Port accessed in sterile fashion, brisk blood return noted. Pt medicated per MAR. Pt tolerated treatment without s/s adverse reaction. Port with brisk blood return, flushed with NS then heparin. Port de-accessed, gauze dressing applied. Pt discharged to self care, [facility]. Pt's next appt confirmed 6/20/2023.

## 2023-06-06 NOTE — PROGRESS NOTES
Chemotherapy Verification - SECONDARY RN       Height = 185 cm  Weight = 92 kg  BSA = 2.17 m2       Medication: Vectibix  Dose: 3 mg/kg  Calculated Dose: 276 mg                             (In mg/m2, AUC, mg/kg)     Medication: Irinotecan  Dose: 126 mg/m2  Calculated Dose: 273.42 mg                             (In mg/m2, AUC, mg/kg)    Medication: Herzuma  Dose: 4 mg/kg  Calculated Dose: 368 mg                             (In mg/m2, AUC, mg/kg)    I confirm that this process was performed independently.

## 2023-06-09 ENCOUNTER — APPOINTMENT (OUTPATIENT)
Dept: RADIOLOGY | Facility: MEDICAL CENTER | Age: 58
DRG: 808 | End: 2023-06-09
Payer: MEDICARE

## 2023-06-09 ENCOUNTER — HOSPITAL ENCOUNTER (INPATIENT)
Facility: MEDICAL CENTER | Age: 58
LOS: 4 days | DRG: 808 | End: 2023-06-13
Attending: EMERGENCY MEDICINE | Admitting: STUDENT IN AN ORGANIZED HEALTH CARE EDUCATION/TRAINING PROGRAM
Payer: MEDICARE

## 2023-06-09 DIAGNOSIS — R50.81 NEUTROPENIC FEVER (HCC): ICD-10-CM

## 2023-06-09 DIAGNOSIS — R21 LOCALIZED RASH: ICD-10-CM

## 2023-06-09 DIAGNOSIS — R18.0 MALIGNANT ASCITES: ICD-10-CM

## 2023-06-09 DIAGNOSIS — C18.9 MALIGNANT NEOPLASM OF COLON, UNSPECIFIED PART OF COLON (HCC): ICD-10-CM

## 2023-06-09 DIAGNOSIS — R78.81 BACTEREMIA DUE TO GRAM-NEGATIVE BACTERIA: ICD-10-CM

## 2023-06-09 DIAGNOSIS — D70.9 NEUTROPENIC FEVER (HCC): ICD-10-CM

## 2023-06-09 PROBLEM — Z71.89 ACP (ADVANCE CARE PLANNING): Status: ACTIVE | Noted: 2023-03-27

## 2023-06-09 PROBLEM — D61.818 PANCYTOPENIA (HCC): Status: ACTIVE | Noted: 2023-06-09

## 2023-06-09 LAB
ALBUMIN SERPL BCP-MCNC: 3.2 G/DL (ref 3.2–4.9)
ALBUMIN/GLOB SERPL: 1.5 G/DL
ALP SERPL-CCNC: 240 U/L (ref 30–99)
ALT SERPL-CCNC: 40 U/L (ref 2–50)
ANION GAP SERPL CALC-SCNC: 11 MMOL/L (ref 7–16)
APTT PPP: 30.1 SEC (ref 24.7–36)
AST SERPL-CCNC: 48 U/L (ref 12–45)
BASOPHILS # BLD AUTO: 0 % (ref 0–1.8)
BASOPHILS # BLD: 0 K/UL (ref 0–0.12)
BILIRUB SERPL-MCNC: 2.4 MG/DL (ref 0.1–1.5)
BUN SERPL-MCNC: 22 MG/DL (ref 8–22)
CALCIUM ALBUM COR SERPL-MCNC: 8.7 MG/DL (ref 8.5–10.5)
CALCIUM SERPL-MCNC: 8.1 MG/DL (ref 8.5–10.5)
CHLORIDE SERPL-SCNC: 101 MMOL/L (ref 96–112)
CO2 SERPL-SCNC: 20 MMOL/L (ref 20–33)
CREAT SERPL-MCNC: 0.62 MG/DL (ref 0.5–1.4)
EOSINOPHIL # BLD AUTO: 0 K/UL (ref 0–0.51)
EOSINOPHIL NFR BLD: 0 % (ref 0–6.9)
ERYTHROCYTE [DISTWIDTH] IN BLOOD BY AUTOMATED COUNT: 51.8 FL (ref 35.9–50)
GFR SERPLBLD CREATININE-BSD FMLA CKD-EPI: 110 ML/MIN/1.73 M 2
GLOBULIN SER CALC-MCNC: 2.1 G/DL (ref 1.9–3.5)
GLUCOSE SERPL-MCNC: 128 MG/DL (ref 65–99)
HCT VFR BLD AUTO: 34.5 % (ref 42–52)
HGB BLD-MCNC: 11.4 G/DL (ref 14–18)
INR PPP: 1.53 (ref 0.87–1.13)
LACTATE SERPL-SCNC: 1.2 MMOL/L (ref 0.5–2)
LYMPHOCYTES # BLD AUTO: 0.11 K/UL (ref 1–4.8)
LYMPHOCYTES NFR BLD: 11 % (ref 22–41)
MANUAL DIFF BLD: NORMAL
MCH RBC QN AUTO: 31.9 PG (ref 27–33)
MCHC RBC AUTO-ENTMCNC: 33 G/DL (ref 32.3–36.5)
MCV RBC AUTO: 96.6 FL (ref 81.4–97.8)
MICROCYTES BLD QL SMEAR: ABNORMAL
MONOCYTES # BLD AUTO: 0.04 K/UL (ref 0–0.85)
MONOCYTES NFR BLD AUTO: 4 % (ref 0–13.4)
MORPHOLOGY BLD-IMP: NORMAL
NEUTROPHILS # BLD AUTO: 0.85 K/UL (ref 1.82–7.42)
NEUTROPHILS NFR BLD: 85 % (ref 44–72)
NRBC # BLD AUTO: 0 K/UL
NRBC BLD-RTO: 0 /100 WBC (ref 0–0.2)
PLATELET # BLD AUTO: 49 K/UL (ref 164–446)
PLATELET BLD QL SMEAR: NORMAL
PLATELETS.RETICULATED NFR BLD AUTO: 2.6 % (ref 0.6–13.1)
PMV BLD AUTO: 10.5 FL (ref 9–12.9)
POIKILOCYTOSIS BLD QL SMEAR: NORMAL
POTASSIUM SERPL-SCNC: 4 MMOL/L (ref 3.6–5.5)
PROT SERPL-MCNC: 5.3 G/DL (ref 6–8.2)
PROTHROMBIN TIME: 18.1 SEC (ref 12–14.6)
RBC # BLD AUTO: 3.57 M/UL (ref 4.7–6.1)
RBC BLD AUTO: PRESENT
SODIUM SERPL-SCNC: 132 MMOL/L (ref 135–145)
WBC # BLD AUTO: 1 K/UL (ref 4.8–10.8)

## 2023-06-09 PROCEDURE — 770004 HCHG ROOM/CARE - ONCOLOGY PRIVATE *

## 2023-06-09 PROCEDURE — 700105 HCHG RX REV CODE 258: Mod: UD | Performed by: EMERGENCY MEDICINE

## 2023-06-09 PROCEDURE — 85610 PROTHROMBIN TIME: CPT

## 2023-06-09 PROCEDURE — 85025 COMPLETE CBC W/AUTO DIFF WBC: CPT

## 2023-06-09 PROCEDURE — 99497 ADVNCD CARE PLAN 30 MIN: CPT | Performed by: STUDENT IN AN ORGANIZED HEALTH CARE EDUCATION/TRAINING PROGRAM

## 2023-06-09 PROCEDURE — 36415 COLL VENOUS BLD VENIPUNCTURE: CPT

## 2023-06-09 PROCEDURE — 700111 HCHG RX REV CODE 636 W/ 250 OVERRIDE (IP): Mod: JZ,UD | Performed by: EMERGENCY MEDICINE

## 2023-06-09 PROCEDURE — 700105 HCHG RX REV CODE 258: Mod: UD

## 2023-06-09 PROCEDURE — 87186 SC STD MICRODIL/AGAR DIL: CPT

## 2023-06-09 PROCEDURE — 99223 1ST HOSP IP/OBS HIGH 75: CPT | Mod: 25,AI | Performed by: STUDENT IN AN ORGANIZED HEALTH CARE EDUCATION/TRAINING PROGRAM

## 2023-06-09 PROCEDURE — 71045 X-RAY EXAM CHEST 1 VIEW: CPT

## 2023-06-09 PROCEDURE — 83605 ASSAY OF LACTIC ACID: CPT

## 2023-06-09 PROCEDURE — 87077 CULTURE AEROBIC IDENTIFY: CPT

## 2023-06-09 PROCEDURE — 700111 HCHG RX REV CODE 636 W/ 250 OVERRIDE (IP): Performed by: STUDENT IN AN ORGANIZED HEALTH CARE EDUCATION/TRAINING PROGRAM

## 2023-06-09 PROCEDURE — 80053 COMPREHEN METABOLIC PANEL: CPT

## 2023-06-09 PROCEDURE — 96365 THER/PROPH/DIAG IV INF INIT: CPT

## 2023-06-09 PROCEDURE — 99285 EMERGENCY DEPT VISIT HI MDM: CPT

## 2023-06-09 PROCEDURE — 85730 THROMBOPLASTIN TIME PARTIAL: CPT

## 2023-06-09 PROCEDURE — 700111 HCHG RX REV CODE 636 W/ 250 OVERRIDE (IP): Mod: UD

## 2023-06-09 PROCEDURE — 700105 HCHG RX REV CODE 258: Performed by: STUDENT IN AN ORGANIZED HEALTH CARE EDUCATION/TRAINING PROGRAM

## 2023-06-09 PROCEDURE — 96367 TX/PROPH/DG ADDL SEQ IV INF: CPT

## 2023-06-09 PROCEDURE — 85007 BL SMEAR W/DIFF WBC COUNT: CPT

## 2023-06-09 PROCEDURE — 85055 RETICULATED PLATELET ASSAY: CPT

## 2023-06-09 PROCEDURE — 87040 BLOOD CULTURE FOR BACTERIA: CPT

## 2023-06-09 RX ORDER — ONDANSETRON 2 MG/ML
4 INJECTION INTRAMUSCULAR; INTRAVENOUS EVERY 4 HOURS PRN
Status: DISCONTINUED | OUTPATIENT
Start: 2023-06-09 | End: 2023-06-13 | Stop reason: HOSPADM

## 2023-06-09 RX ORDER — CEFEPIME HYDROCHLORIDE 2 G/1
2 INJECTION, POWDER, FOR SOLUTION INTRAVENOUS ONCE
Status: DISCONTINUED | OUTPATIENT
Start: 2023-06-09 | End: 2023-06-09

## 2023-06-09 RX ORDER — SODIUM CHLORIDE 9 MG/ML
INJECTION, SOLUTION INTRAVENOUS CONTINUOUS
Status: ACTIVE | OUTPATIENT
Start: 2023-06-09 | End: 2023-06-10

## 2023-06-09 RX ORDER — DAPAGLIFLOZIN 10 MG/1
10 TABLET, FILM COATED ORAL DAILY
Status: DISCONTINUED | OUTPATIENT
Start: 2023-06-10 | End: 2023-06-13 | Stop reason: HOSPADM

## 2023-06-09 RX ORDER — ACETAMINOPHEN 325 MG/1
650 TABLET ORAL EVERY 6 HOURS PRN
Status: DISCONTINUED | OUTPATIENT
Start: 2023-06-09 | End: 2023-06-13 | Stop reason: HOSPADM

## 2023-06-09 RX ORDER — BACLOFEN 10 MG/1
10 TABLET ORAL 3 TIMES DAILY PRN
Status: DISCONTINUED | OUTPATIENT
Start: 2023-06-09 | End: 2023-06-13 | Stop reason: HOSPADM

## 2023-06-09 RX ADMIN — VANCOMYCIN HYDROCHLORIDE 2250 MG: 5 INJECTION, POWDER, LYOPHILIZED, FOR SOLUTION INTRAVENOUS at 17:00

## 2023-06-09 RX ADMIN — ONDANSETRON 4 MG: 2 INJECTION INTRAMUSCULAR; INTRAVENOUS at 20:02

## 2023-06-09 RX ADMIN — SODIUM CHLORIDE: 9 INJECTION, SOLUTION INTRAVENOUS at 20:07

## 2023-06-09 RX ADMIN — CEFEPIME 2 G: 2 INJECTION, POWDER, FOR SOLUTION INTRAVENOUS at 15:41

## 2023-06-09 RX ADMIN — CEFEPIME 2 G: 2 INJECTION, POWDER, FOR SOLUTION INTRAVENOUS at 22:19

## 2023-06-09 ASSESSMENT — LIFESTYLE VARIABLES
HOW MANY TIMES IN THE PAST YEAR HAVE YOU HAD 5 OR MORE DRINKS IN A DAY: 0
AVERAGE NUMBER OF DAYS PER WEEK YOU HAVE A DRINK CONTAINING ALCOHOL: 0
TOTAL SCORE: 0
TOTAL SCORE: 0
ON A TYPICAL DAY WHEN YOU DRINK ALCOHOL HOW MANY DRINKS DO YOU HAVE: 0
HAVE YOU EVER FELT YOU SHOULD CUT DOWN ON YOUR DRINKING: NO
DOES PATIENT WANT TO STOP DRINKING: NO
HAVE PEOPLE ANNOYED YOU BY CRITICIZING YOUR DRINKING: NO
TOTAL SCORE: 0
EVER HAD A DRINK FIRST THING IN THE MORNING TO STEADY YOUR NERVES TO GET RID OF A HANGOVER: NO
ALCOHOL_USE: NO
CONSUMPTION TOTAL: NEGATIVE
EVER FELT BAD OR GUILTY ABOUT YOUR DRINKING: NO

## 2023-06-09 ASSESSMENT — COGNITIVE AND FUNCTIONAL STATUS - GENERAL
WALKING IN HOSPITAL ROOM: A LITTLE
SUGGESTED CMS G CODE MODIFIER DAILY ACTIVITY: CH
MOVING TO AND FROM BED TO CHAIR: A LITTLE
MOVING FROM LYING ON BACK TO SITTING ON SIDE OF FLAT BED: A LITTLE
STANDING UP FROM CHAIR USING ARMS: A LITTLE
DAILY ACTIVITIY SCORE: 24
MOBILITY SCORE: 19
CLIMB 3 TO 5 STEPS WITH RAILING: A LITTLE
SUGGESTED CMS G CODE MODIFIER MOBILITY: CK

## 2023-06-09 ASSESSMENT — ENCOUNTER SYMPTOMS
PSYCHIATRIC NEGATIVE: 1
RESPIRATORY NEGATIVE: 1
CARDIOVASCULAR NEGATIVE: 1
GASTROINTESTINAL NEGATIVE: 1
MUSCULOSKELETAL NEGATIVE: 1
NEUROLOGICAL NEGATIVE: 1
EYES NEGATIVE: 1

## 2023-06-09 ASSESSMENT — PATIENT HEALTH QUESTIONNAIRE - PHQ9
1. LITTLE INTEREST OR PLEASURE IN DOING THINGS: NOT AT ALL
2. FEELING DOWN, DEPRESSED, IRRITABLE, OR HOPELESS: NOT AT ALL
SUM OF ALL RESPONSES TO PHQ9 QUESTIONS 1 AND 2: 0

## 2023-06-09 ASSESSMENT — FIBROSIS 4 INDEX: FIB4 SCORE: 5.49

## 2023-06-09 ASSESSMENT — PAIN DESCRIPTION - PAIN TYPE: TYPE: ACUTE PAIN

## 2023-06-09 NOTE — ED PROVIDER NOTES
ED Provider Note    CHIEF COMPLAINT  Chief Complaint   Patient presents with    Sent by MD     Sent by [facility] fever 102.4 F. 1000 mg Tylenol at home, temp 99.1 F on ER arrival. Hx stage 4 colorectal cancer with mets to liver. Current chemo patient, last session Tuesday.     Abdominal Pain     LLQ 6/10 pain new onset today. 9.5 L peritoneal fluid drained 6/2. Abdomen rounded.        EXTERNAL RECORDS REVIEWED  Reviewed outpatient based on labs for comparison.    HPI/ROS  LIMITATION TO HISTORY   Select: : None  OUTSIDE HISTORIAN(S):  None    Gurmeet Jo is a 58 y.o. male who presents to the emergency department for evaluation of abdominal pain and fever.  The patient has history of stage IV colorectal cancer.  He has had a fever since yesterday.  In general he just feels otherwise ill.  He feels like he feels rundown and is weak.  He has a history of ascites and had a paracentesis last week.  Denies any significant abdominal pain but feels like he is again accumulating fluids.  Has any chest pain cough shortness of breath fevers or chills.  He has lower abdominal pain earlier but this is resolved.  States like he feels like he was hungry.  He denies any urinary complaints or skin rash denies any other acute concerns or complaints.  Does have a history of neutropenia and now has a fever.    PAST MEDICAL HISTORY   has a past medical history of Bowel habit changes (06/10/2022), Cancer (HCC) (11/2018), Dental disorder (05/26/2022), Diabetes (HCC) (05/26/2022), Hiatus hernia syndrome, Indigestion, Pain, Pain (06/10/2022), Psychiatric problem (05/26/2022), Sleep apnea (06/10/2022), and Snoring.    SURGICAL HISTORY   has a past surgical history that includes colonoscopy (2015).    FAMILY HISTORY  Family History   Problem Relation Age of Onset    Hypertension Mother     Hypertension Father     Diabetes Father     Diabetes Brother        SOCIAL HISTORY  Social History  "    Tobacco Use    Smoking status: Former     Packs/day: 1.00     Years: 15.00     Pack years: 15.00     Types: Cigarettes     Quit date: 1998     Years since quittin.4    Smokeless tobacco: Never   Vaping Use    Vaping Use: Never used   Substance and Sexual Activity    Alcohol use: No    Drug use: Yes     Types: Inhaled, Marijuana     Comment: occ    Sexual activity: Not on file       CURRENT MEDICATIONS  Home Medications       Reviewed by Tomas Green R.N. (Registered Nurse) on 23 at 1440  Med List Status: Partial     Medication Last Dose Status   acetaminophen (TYLENOL) 500 MG Tab  Active   albuterol 108 (90 Base) MCG/ACT Aero Soln inhalation aerosol  Active   azithromycin (ZITHROMAX) 250 MG Tab  Active   baclofen (LIORESAL) 10 MG Tab  Active   Blood Glucose Monitoring Suppl Device  Active   Blood Glucose Test Strips  Active   Empagliflozin (JARDIANCE) 25 MG Tab  Active   glucose blood (TRUE METRIX BLOOD GLUCOSE TEST) strip  Active   heparin lock flush 100 unit/mL Solution  Active   HERZUMA 420 MG Recon Soln injection  Active   Lancets  Active   lidocaine-prilocaine (EMLA) 2.5-2.5 % Cream  Active   LORazepam (ATIVAN) 1 MG Tab  Active   nystatin (MYCOSTATIN) 187527 UNIT/ML Suspension  Active   ondansetron (ZOFRAN ODT) 8 MG TABLET DISPERSIBLE  Active   potassium chloride (MICRO-K) 10 MEQ capsule  Active   Sodium Chloride (NS) Solution  Active   therapeutic multivitamin-minerals (THERAGRAN-M) Tab  Active   TRUE METRIX BLOOD GLUCOSE TEST strip  Active   TUKYSA 150 MG Tab  Active   TUKYSA 50 MG Tab  Active   ZIRABEV 100 MG/4ML Solution injection  Active   ZIRABEV 400 MG/16ML Solution injection  Active                    ALLERGIES  Allergies   Allergen Reactions    Compazine Unspecified     tartive dyskenia    Oxaliplatin Unspecified     Back spasms    Bee Venom Swelling       PHYSICAL EXAM  VITAL SIGNS: /65   Pulse 75   Temp 37.3 °C (99.1 °F) (Oral)   Resp 18   Ht 1.88 m (6' 2\")   Wt 90.9 " kg (200 lb 6.4 oz)   SpO2 93%   BMI 25.73 kg/m²    Constitutional: Awake alert ill-appearing no acute cardiopulmonary distress.  HENT: Normocephalic, Atraumatic, Bilateral external ears normal, Oropharynx moist, No oral exudates, Nose normal.   Eyes: PERRL, EOMI, Conjunctiva pale  Neck: Normal range of motion, No tenderness, Supple, No stridor.   Cardiovascular: Normal heart rate, Normal rhythm, No murmurs, No rubs, No gallops.   Thorax & Lungs: Normal breath sounds, No respiratory distress,   Abdomen: Bowel sounds normal, distended, ascites.  No tenderness or peritonitis  Skin: Warm, Dry, No erythema, No rash.   Back: No tenderness, No CVA tenderness.   Musculoskeletal: Good range of motion in all major joints.  Mild symmetric edema  Neurologic: Alert,No focal deficits noted.   Psychiatric: Affect normal      DIAGNOSTIC STUDIES / PROCEDURES  Results for orders placed or performed during the hospital encounter of 06/09/23   Lactic acid (lactate)   Result Value Ref Range    Lactic Acid 1.2 0.5 - 2.0 mmol/L   CBC with Differential   Result Value Ref Range    WBC 1.0 (LL) 4.8 - 10.8 K/uL    RBC 3.57 (L) 4.70 - 6.10 M/uL    Hemoglobin 11.4 (L) 14.0 - 18.0 g/dL    Hematocrit 34.5 (L) 42.0 - 52.0 %    MCV 96.6 81.4 - 97.8 fL    MCH 31.9 27.0 - 33.0 pg    MCHC 33.0 32.3 - 36.5 g/dL    RDW 51.8 (H) 35.9 - 50.0 fL    Platelet Count 49 (L) 164 - 446 K/uL    MPV 10.5 9.0 - 12.9 fL    Neutrophils-Polys 85.00 (H) 44.00 - 72.00 %    Lymphocytes 11.00 (L) 22.00 - 41.00 %    Monocytes 4.00 0.00 - 13.40 %    Eosinophils 0.00 0.00 - 6.90 %    Basophils 0.00 0.00 - 1.80 %    Nucleated RBC 0.00 0.00 - 0.20 /100 WBC    Neutrophils (Absolute) 0.85 (L) 1.82 - 7.42 K/uL    Lymphs (Absolute) 0.11 (L) 1.00 - 4.80 K/uL    Monos (Absolute) 0.04 0.00 - 0.85 K/uL    Eos (Absolute) 0.00 0.00 - 0.51 K/uL    Baso (Absolute) 0.00 0.00 - 0.12 K/uL    NRBC (Absolute) 0.00 K/uL    Microcytosis 1+    Comp Metabolic Panel (CMP)   Result Value Ref Range     Sodium 132 (L) 135 - 145 mmol/L    Potassium 4.0 3.6 - 5.5 mmol/L    Chloride 101 96 - 112 mmol/L    Co2 20 20 - 33 mmol/L    Anion Gap 11.0 7.0 - 16.0    Glucose 128 (H) 65 - 99 mg/dL    Bun 22 8 - 22 mg/dL    Creatinine 0.62 0.50 - 1.40 mg/dL    Calcium 8.1 (L) 8.5 - 10.5 mg/dL    AST(SGOT) 48 (H) 12 - 45 U/L    ALT(SGPT) 40 2 - 50 U/L    Alkaline Phosphatase 240 (H) 30 - 99 U/L    Total Bilirubin 2.4 (H) 0.1 - 1.5 mg/dL    Albumin 3.2 3.2 - 4.9 g/dL    Total Protein 5.3 (L) 6.0 - 8.2 g/dL    Globulin 2.1 1.9 - 3.5 g/dL    A-G Ratio 1.5 g/dL   Prothrombin Time (INR)   Result Value Ref Range    PT 18.1 (H) 12.0 - 14.6 sec    INR 1.53 (H) 0.87 - 1.13   APTT (PTT)   Result Value Ref Range    APTT 30.1 24.7 - 36.0 sec   CORRECTED CALCIUM   Result Value Ref Range    Correct Calcium 8.7 8.5 - 10.5 mg/dL   ESTIMATED GFR   Result Value Ref Range    GFR (CKD-EPI) 110 >60 mL/min/1.73 m 2   DIFFERENTIAL MANUAL   Result Value Ref Range    Manual Diff Status PERFORMED    PERIPHERAL SMEAR REVIEW   Result Value Ref Range    Peripheral Smear Review see below    PLATELET ESTIMATE   Result Value Ref Range    Plt Estimation Decreased    MORPHOLOGY   Result Value Ref Range    RBC Morphology Present     Poikilocytosis 1+    IMMATURE PLT FRACTION   Result Value Ref Range    Imm. Plt Fraction 2.6 0.6 - 13.1 %      RADIOLOGY  I have independently interpreted the diagnostic imaging associated with this visit and am waiting the final reading from the radiologist.   My preliminary interpretation is as follows: Possible pneumonia left side otherwise clear.  Radiologist interpretation:     DX-CHEST-PORTABLE (1 VIEW)   Final Result      1.  Ill-defined opacity in the right midlung periphery. Early pneumonia or recent pneumonia should be considered in the appropriate clinical settings.   2.  Left basilar atelectasis. No significant effusions.               COURSE & MEDICAL DECISION MAKING    ED Observation Status? No; Patient does not meet  criteria for ED Observation.     INITIAL ASSESSMENT, COURSE AND PLAN  Care Narrative: \Patient presents to the emergency department with fever and a known history of cancer.  He was started on the septic protocol.    The patient has a document history of neutropenia and with history of fever at home this is neutropenic fever.    Septic protocol initiated antibiotics administered per pharmacy after pharmacy consult.    The patient is likely septic we will not give him a fluid bolus because he has edema and anasarca and ascites.    Received a fluid bolus with the vancomycin infusion and the fluids he is receiving with his antibiotics.    The patient does have ascites but does not have any abdominal pain or tenderness he is neutropenic I think clinically SBP is unlikely and I think a tap of this is a risk without necessarily reward.    Chest x-ray looks like he has a pneumonia.  Urinalysis is pending.    The patient will be hospitalized for neutropenic fever.  I will consult the hospitalist the patient be hospitalized for further work-up and treatment.  Case discussed with Dr. Mariscal and care is transferred at that time            ADDITIONAL PROBLEM LIST  Colon  Cancer, stage IV  Neutropenia  Fever      DISPOSITION AND DISCUSSIONS  I have discussed management of the patient with the following physicians and CECILIO's: [facility] hospitalist    Discussion of management with other Q or appropriate source(s): Pharmacist Chris    Escalation of care considered, and ultimately not performed: Considered paracentesis SBP however it is not indicated any tenderness.         Decision tools and prescription drugs considered including, but not limited to: Abscess protocol..    FINAL DIAGNOSIS  1. Neutropenic fever (HCC)    2. Malignant neoplasm of colon, unspecified part of colon (HCC)    3.  Suspected pneumonia       Electronically signed by: Sumeet Mansfield M.D., 6/9/2023 4:50 PM

## 2023-06-09 NOTE — ED TRIAGE NOTES
Gurmeet Jo  58 y.o. male  Chief Complaint   Patient presents with    Sent by MD     Sent by onc[facility] Pin[facility] for fever 102.4 F. 1000 mg Tylenol at home, temp 99.1 F on ER arrival. Hx stage 4 colorectal cancer with mets to liver. Current chemo patient, last session Tuesday.     Abdominal Pain     LLQ 6/10 pain new onset today. 9.5 L peritoneal fluid drained 6/2. Abdomen rounded.        Pt ambulatory to triage with steady gait for above complaint.     Pt is GCS 15, speaking in full sentences, follows commands and responds appropriately to questions. Resp are even and unlabored.     Neutropenic fever protocol ordered. Pt placed in draw room. Charge RN aware of patient. Pt educated on triage process. Pt encouraged to alert staff for any changes.     This RN masked and in appropriate PPE during encounter.     Vitals:    06/09/23 1423   BP: 108/59   Pulse: 86   Resp: 17   Temp: 37.3 °C (99.1 °F)   SpO2: 96%

## 2023-06-10 ENCOUNTER — APPOINTMENT (OUTPATIENT)
Dept: RADIOLOGY | Facility: MEDICAL CENTER | Age: 58
DRG: 808 | End: 2023-06-10
Attending: NURSE PRACTITIONER
Payer: MEDICARE

## 2023-06-10 PROBLEM — R18.0 MALIGNANT ASCITES: Status: ACTIVE | Noted: 2023-06-10

## 2023-06-10 PROBLEM — R78.81 BACTEREMIA DUE TO GRAM-NEGATIVE BACTERIA: Status: ACTIVE | Noted: 2023-06-09

## 2023-06-10 PROBLEM — R19.7 DIARRHEA: Status: ACTIVE | Noted: 2023-06-10

## 2023-06-10 LAB
ANION GAP SERPL CALC-SCNC: 10 MMOL/L (ref 7–16)
APPEARANCE UR: CLEAR
BASOPHILS # BLD AUTO: 0 % (ref 0–1.8)
BASOPHILS # BLD: 0 K/UL (ref 0–0.12)
BILIRUB UR QL STRIP.AUTO: NEGATIVE
BUN SERPL-MCNC: 19 MG/DL (ref 8–22)
C DIFF DNA SPEC QL NAA+PROBE: NEGATIVE
C DIFF TOX GENS STL QL NAA+PROBE: NEGATIVE
CALCIUM SERPL-MCNC: 7.6 MG/DL (ref 8.5–10.5)
CHLORIDE SERPL-SCNC: 102 MMOL/L (ref 96–112)
CO2 SERPL-SCNC: 19 MMOL/L (ref 20–33)
COLOR UR: YELLOW
CREAT SERPL-MCNC: 0.59 MG/DL (ref 0.5–1.4)
EOSINOPHIL # BLD AUTO: 0 K/UL (ref 0–0.51)
EOSINOPHIL NFR BLD: 0 % (ref 0–6.9)
ERYTHROCYTE [DISTWIDTH] IN BLOOD BY AUTOMATED COUNT: 51.9 FL (ref 35.9–50)
GFR SERPLBLD CREATININE-BSD FMLA CKD-EPI: 112 ML/MIN/1.73 M 2
GLUCOSE SERPL-MCNC: 110 MG/DL (ref 65–99)
GLUCOSE UR STRIP.AUTO-MCNC: >=1000 MG/DL
HCT VFR BLD AUTO: 32.1 % (ref 42–52)
HGB BLD-MCNC: 10.8 G/DL (ref 14–18)
KETONES UR STRIP.AUTO-MCNC: ABNORMAL MG/DL
LEUKOCYTE ESTERASE UR QL STRIP.AUTO: NEGATIVE
LYMPHOCYTES # BLD AUTO: 0.12 K/UL (ref 1–4.8)
LYMPHOCYTES NFR BLD: 23 % (ref 22–41)
MANUAL DIFF BLD: NORMAL
MCH RBC QN AUTO: 32.1 PG (ref 27–33)
MCHC RBC AUTO-ENTMCNC: 33.6 G/DL (ref 32.3–36.5)
MCV RBC AUTO: 95.5 FL (ref 81.4–97.8)
MICRO URNS: ABNORMAL
MONOCYTES # BLD AUTO: 0.02 K/UL (ref 0–0.85)
MONOCYTES NFR BLD AUTO: 4 % (ref 0–13.4)
MORPHOLOGY BLD-IMP: NORMAL
NEUTROPHILS # BLD AUTO: 0.37 K/UL (ref 1.82–7.42)
NEUTROPHILS NFR BLD: 73 % (ref 44–72)
NITRITE UR QL STRIP.AUTO: NEGATIVE
NRBC # BLD AUTO: 0 K/UL
NRBC BLD-RTO: 0 /100 WBC (ref 0–0.2)
PH UR STRIP.AUTO: 5 [PH] (ref 5–8)
PLATELET # BLD AUTO: 44 K/UL (ref 164–446)
PLATELET BLD QL SMEAR: NORMAL
PLATELETS.RETICULATED NFR BLD AUTO: 3.2 % (ref 0.6–13.1)
PMV BLD AUTO: 11.3 FL (ref 9–12.9)
POTASSIUM SERPL-SCNC: 4 MMOL/L (ref 3.6–5.5)
PROT UR QL STRIP: NEGATIVE MG/DL
RBC # BLD AUTO: 3.36 M/UL (ref 4.7–6.1)
RBC BLD AUTO: NORMAL
RBC UR QL AUTO: NEGATIVE
SCCMEC + MECA PNL NOSE NAA+PROBE: NEGATIVE
SODIUM SERPL-SCNC: 131 MMOL/L (ref 135–145)
SP GR UR STRIP.AUTO: 1.04
UROBILINOGEN UR STRIP.AUTO-MCNC: 0.2 MG/DL
WBC # BLD AUTO: 0.5 K/UL (ref 4.8–10.8)

## 2023-06-10 PROCEDURE — 87086 URINE CULTURE/COLONY COUNT: CPT

## 2023-06-10 PROCEDURE — 770004 HCHG ROOM/CARE - ONCOLOGY PRIVATE *

## 2023-06-10 PROCEDURE — 700105 HCHG RX REV CODE 258: Performed by: STUDENT IN AN ORGANIZED HEALTH CARE EDUCATION/TRAINING PROGRAM

## 2023-06-10 PROCEDURE — 700111 HCHG RX REV CODE 636 W/ 250 OVERRIDE (IP): Mod: JG | Performed by: NURSE PRACTITIONER

## 2023-06-10 PROCEDURE — 700111 HCHG RX REV CODE 636 W/ 250 OVERRIDE (IP): Performed by: STUDENT IN AN ORGANIZED HEALTH CARE EDUCATION/TRAINING PROGRAM

## 2023-06-10 PROCEDURE — 85055 RETICULATED PLATELET ASSAY: CPT

## 2023-06-10 PROCEDURE — 700102 HCHG RX REV CODE 250 W/ 637 OVERRIDE(OP): Performed by: STUDENT IN AN ORGANIZED HEALTH CARE EDUCATION/TRAINING PROGRAM

## 2023-06-10 PROCEDURE — 85007 BL SMEAR W/DIFF WBC COUNT: CPT

## 2023-06-10 PROCEDURE — 99233 SBSQ HOSP IP/OBS HIGH 50: CPT | Mod: FS | Performed by: INTERNAL MEDICINE

## 2023-06-10 PROCEDURE — 81003 URINALYSIS AUTO W/O SCOPE: CPT

## 2023-06-10 PROCEDURE — 80048 BASIC METABOLIC PNL TOTAL CA: CPT

## 2023-06-10 PROCEDURE — 700117 HCHG RX CONTRAST REV CODE 255: Performed by: NURSE PRACTITIONER

## 2023-06-10 PROCEDURE — 74177 CT ABD & PELVIS W/CONTRAST: CPT

## 2023-06-10 PROCEDURE — 85025 COMPLETE CBC W/AUTO DIFF WBC: CPT

## 2023-06-10 PROCEDURE — 87641 MR-STAPH DNA AMP PROBE: CPT

## 2023-06-10 PROCEDURE — A9270 NON-COVERED ITEM OR SERVICE: HCPCS | Performed by: STUDENT IN AN ORGANIZED HEALTH CARE EDUCATION/TRAINING PROGRAM

## 2023-06-10 PROCEDURE — 87493 C DIFF AMPLIFIED PROBE: CPT

## 2023-06-10 RX ADMIN — DAPAGLIFLOZIN 10 MG: 10 TABLET, FILM COATED ORAL at 05:38

## 2023-06-10 RX ADMIN — TBO-FILGRASTIM 480 MCG: 480 INJECTION, SOLUTION SUBCUTANEOUS at 13:35

## 2023-06-10 RX ADMIN — SODIUM CHLORIDE: 9 INJECTION, SOLUTION INTRAVENOUS at 11:31

## 2023-06-10 RX ADMIN — IOHEXOL 100 ML: 350 INJECTION, SOLUTION INTRAVENOUS at 17:15

## 2023-06-10 RX ADMIN — ACETAMINOPHEN 650 MG: 325 TABLET, FILM COATED ORAL at 13:35

## 2023-06-10 RX ADMIN — ACETAMINOPHEN 650 MG: 325 TABLET, FILM COATED ORAL at 20:05

## 2023-06-10 RX ADMIN — VANCOMYCIN HYDROCHLORIDE 1250 MG: 5 INJECTION, POWDER, LYOPHILIZED, FOR SOLUTION INTRAVENOUS at 00:31

## 2023-06-10 RX ADMIN — CEFEPIME 2 G: 2 INJECTION, POWDER, FOR SOLUTION INTRAVENOUS at 13:38

## 2023-06-10 RX ADMIN — CEFEPIME 2 G: 2 INJECTION, POWDER, FOR SOLUTION INTRAVENOUS at 05:37

## 2023-06-10 RX ADMIN — VANCOMYCIN HYDROCHLORIDE 1250 MG: 5 INJECTION, POWDER, LYOPHILIZED, FOR SOLUTION INTRAVENOUS at 09:06

## 2023-06-10 RX ADMIN — CEFEPIME 2 G: 2 INJECTION, POWDER, FOR SOLUTION INTRAVENOUS at 22:45

## 2023-06-10 ASSESSMENT — ENCOUNTER SYMPTOMS
VOMITING: 0
SHORTNESS OF BREATH: 0
FLANK PAIN: 0
MYALGIAS: 0
WEAKNESS: 0
DIAPHORESIS: 0
HEADACHES: 0
BACK PAIN: 0
WHEEZING: 0
DIARRHEA: 1
ROS GI COMMENTS: DISTENSION
FEVER: 0
ABDOMINAL PAIN: 1
NERVOUS/ANXIOUS: 0
HEARTBURN: 0
CONSTIPATION: 0
NAUSEA: 0
BLOOD IN STOOL: 0
DIZZINESS: 0
CHILLS: 0

## 2023-06-10 ASSESSMENT — FIBROSIS 4 INDEX: FIB4 SCORE: 10.18

## 2023-06-10 ASSESSMENT — PAIN DESCRIPTION - PAIN TYPE
TYPE: ACUTE PAIN
TYPE: ACUTE PAIN

## 2023-06-10 NOTE — PROGRESS NOTES
"Hospital Medicine Daily Progress Note    Date of Service  6/10/2023    Chief Complaint  Gurmeet Jo is a 58 y.o. male admitted 6/9/2023 with fever    Hospital Course  Gurmeet Jo is a 58 y.o. male who presented 6/9/2023 with a fever at home. Dr. Pinzon advised the patient to present to emergency department.     Patient has a history of stage IV colorectal cancer.  He is currently on chemotherapy, last received chemotherapy 3 days prior to admission.  He received his first therapeutic paracentesis 1 week ago.  He sees [facility] for oncology outpatient.     The patient checked his temperature at home and noted to have a fever.      \"In the ED, patient found to have normal vital signs.  Pertinent labs include white blood cell count 1.0, hemoglobin 11.4, platelet 49, alkaline phosphatase 240, total bilirubin 2.4.  Chest x-ray showing ill-defined opacity in the right midlung field.\"    Interval Problem Update  Patient is lying in bed, fully alert and oriented.  He is pleasant and cooperative oriented.  He continues to have 5/10 abdominal pain and distention.  He had 3 bouts of dark brown diarrhea overnight.  He denies bloody output.  He denies fevers, chills, nausea, dizziness, problems breathing and any other problems.   -Abdomen is semi-firm, tender, fluid wave  -C. difficile stool test pending  -Discussed with Dr. Pinzon, who agrees with consideration of therapeutic paracentesis possibly tomorrow.  Okay to give Granix daily for ANC over 1500  -Blood cultures positive: Gram-negative bacteremia  -ID consulted, recommends CT of abdomen/pelvis and repeat blood cultures in the morning.  Okay to discontinue vancomycin.  Continue cefepime.  They may be able to salvage chest port, however it may need to be removed.    I have discussed this patient's plan of care and discharge plan at IDT rounds today with Case Management, Nursing, Nursing leadership, and other members of the IDT team.  Discussed with Dr." Acleonto.    Consultants/Specialty  oncology    Code Status  DNAR/DNI    Disposition  The patient is not medically cleared for discharge to home or a post-acute facility.  Anticipate discharge to: home with close outpatient follow-up    I have placed the appropriate orders for post-discharge needs.    Review of Systems  Review of Systems   Constitutional:  Positive for malaise/fatigue. Negative for chills, diaphoresis and fever.   Respiratory:  Negative for shortness of breath and wheezing.    Cardiovascular:  Negative for chest pain and leg swelling.   Gastrointestinal:  Positive for abdominal pain and diarrhea. Negative for blood in stool, constipation, heartburn, melena, nausea and vomiting.        Distension   Genitourinary:  Negative for flank pain and hematuria.   Musculoskeletal:  Negative for back pain and myalgias.   Neurological:  Negative for dizziness, weakness and headaches.   Psychiatric/Behavioral:  The patient is not nervous/anxious.         Physical Exam  Temp:  [36.7 °C (98.1 °F)-37.3 °C (99.1 °F)] 36.9 °C (98.4 °F)  Pulse:  [74-89] 78  Resp:  [17-18] 18  BP: (103-124)/(57-70) 115/61  SpO2:  [93 %-98 %] 95 %    Physical Exam  Vitals and nursing note reviewed.   Constitutional:       General: He is awake.      Appearance: Normal appearance. He is normal weight. He is ill-appearing.   HENT:      Head: Normocephalic.      Nose: Nose normal.      Mouth/Throat:      Mouth: Mucous membranes are moist.   Cardiovascular:      Rate and Rhythm: Normal rate and regular rhythm.      Pulses: Normal pulses.   Pulmonary:      Effort: Pulmonary effort is normal.      Breath sounds: Normal breath sounds.   Abdominal:      General: Bowel sounds are normal. There is distension.      Palpations: There is fluid wave.      Tenderness: There is generalized abdominal tenderness.      Comments: Ascitic abdomen noted.  Hypertympanic.     Musculoskeletal:         General: No swelling or tenderness. Normal range of motion.       Cervical back: Normal range of motion.   Skin:     General: Skin is warm.   Neurological:      General: No focal deficit present.      Mental Status: He is alert and oriented to person, place, and time. Mental status is at baseline.   Psychiatric:         Attention and Perception: Attention normal.         Mood and Affect: Mood normal.         Behavior: Behavior normal. Behavior is cooperative.         Thought Content: Thought content normal.         Judgment: Judgment normal.         Fluids    Intake/Output Summary (Last 24 hours) at 6/10/2023 1342  Last data filed at 6/10/2023 1050  Gross per 24 hour   Intake 120 ml   Output --   Net 120 ml       Laboratory  Recent Labs     06/09/23  1447 06/10/23  0032   WBC 1.0* 0.5*   RBC 3.57* 3.36*   HEMOGLOBIN 11.4* 10.8*   HEMATOCRIT 34.5* 32.1*   MCV 96.6 95.5   MCH 31.9 32.1   MCHC 33.0 33.6   RDW 51.8* 51.9*   PLATELETCT 49* 44*   MPV 10.5 11.3     Recent Labs     06/09/23  1447 06/10/23  0032   SODIUM 132* 131*   POTASSIUM 4.0 4.0   CHLORIDE 101 102   CO2 20 19*   GLUCOSE 128* 110*   BUN 22 19   CREATININE 0.62 0.59   CALCIUM 8.1* 7.6*     Recent Labs     06/09/23 1447   APTT 30.1   INR 1.53*               Imaging  DX-CHEST-PORTABLE (1 VIEW)   Final Result      1.  Ill-defined opacity in the right midlung periphery. Early pneumonia or recent pneumonia should be considered in the appropriate clinical settings.   2.  Left basilar atelectasis. No significant effusions.         CT-ABDOMEN-PELVIS WITH & W/O    (Results Pending)        Assessment/Plan  * Bacteremia due to Gram-negative bacteria- (present on admission)  Assessment & Plan  Patient has history of stage IV colorectal cancer with metastasis to liver.  Currently on chemotherapy and found to be febrile at home today and was recommended to come to the ED for evaluation.  In the ER, found to have white blood cell count of 1 and chest x-ray showing a possible pneumonia in the right midlung field.  UA pending.   Patient has a history of ascites, however no abdominal pain, can refrain from diagnostic paracentesis for now.  Vancomycin and cefepime administered in the ED  ID consulted. OK to DC vancomycin, continue cefepime  CT abdomen/pelvis pending  Repeat blood cultures in a.m.  ID will attempt to salvage chest port    Febrile neutropenia (HCC)- (present on admission)  Assessment & Plan  Secondary to bacteremia    Metastatic colon cancer to liver (HCC)- (present on admission)  Assessment & Plan  Patient has known history of colorectal cancer with metastasis to the liver.  Following with oncology outpatient, last received chemotherapy 3 days ago.    Malignant ascites  Assessment & Plan  Therapeutic paracentesis 6/2  Consider repeating tomorrow    Diarrhea  Assessment & Plan  3 times overnight  C. difficile studies pending  May also be due to antibiotics    Pancytopenia (HCC)- (present on admission)  Assessment & Plan  Granix okay per oncology, continue daily for ANC <1500  Noted to have pancytopenia secondary to chemotherapy administration  Serial CBC      ACP (advance care planning)- (present on admission)  Assessment & Plan  I discussed advance care planning with the patient and  patient's family for at least 16 minutes, including diagnosis, prognosis, plan of care, risks and benefits of any therapies that could be offered, as well as alternatives including palliation and hospice, as appropriate.     DNR/DNI.       DM (diabetes mellitus) (HCC)- (present on admission)  Assessment & Plan  Continue Jardiance         VTE prophylaxis: SCDs/TEDs    I have performed a physical exam and reviewed and updated ROS and Plan today (6/10/2023). In review of yesterday's note (6/9/2023), there are no changes except as documented above.    My total time spent caring for the patient on the day of the encounter was 15 minutes.   This does not include time spent on separately billable procedures/tests. This time is exclusive of the time spent  with collaborating physicians.

## 2023-06-10 NOTE — CARE PLAN
The patient is Watcher - Medium risk of patient condition declining or worsening    Shift Goals  Clinical Goals: IV abx, remain afebrile  Patient Goals: Rest  Family Goals: rest    Progress made toward(s) clinical / shift goals:    Problem: Pain - Standard  Goal: Alleviation of pain or a reduction in pain to the patient’s comfort goal  Outcome: Progressing  Note: Pain assessed using 0-10 verbal pain scale, PRN Tylenol administered per MAR.      Problem: Knowledge Deficit - Standard  Goal: Patient and family/care givers will demonstrate understanding of plan of care, disease process/condition, diagnostic tests and medications  Outcome: Progressing  Note: Pt updated on plan of care, pt states understanding for plan of pending CT, results of blood cultures, and IV abx. All questions answered at this time.      Problem: Neutropenia Precautions  Goal: Neutropenic precautions will be implemented and maintained for patient protection  Outcome: Progressing  Note: Neutropenic precautions implemented.        Patient is not progressing towards the following goals: N/A

## 2023-06-10 NOTE — PROGRESS NOTES
4 Eyes Skin Assessment Completed by Rosa RN and LINDSAY Blackburn.    Head WDL  Ears WDL  Nose WDL  Mouth WDL  Neck WDL  Breast/Chest WDL  Shoulder Blades WDL  Spine WDL  (R) Arm/Elbow/Hand WDL  (L) Arm/Elbow/Hand WDL  Abdomen WDL  Groin WDL  Scrotum/Coccyx/Buttocks WDL  (R) Leg Edema 2+  (L) Leg Edema 2+  (R) Heel/Foot/Toe Redness and Blanching  (L) Heel/Foot/Toe Redness and Blanching          Devices In Places Blood Pressure Cuff, Pulse Ox, SCD's, and Central Line      Interventions In Place Pillows    Possible Skin Injury No    Pictures Uploaded Into Epic N/A  Wound Consult Placed N/A  RN Wound Prevention Protocol Ordered No

## 2023-06-10 NOTE — CARE PLAN
The patient is Watcher - Medium risk of patient condition declining or worsening    Shift Goals  Clinical Goals: administer abx, moniotr labs and vs  Patient Goals: rest  Family Goals: rest    Progress made toward(s) clinical / shift goals:    Problem: Pain - Standard  Goal: Alleviation of pain or a reduction in pain to the patient’s comfort goal  Outcome: Progressing  Note: Patient did not have any pain during my 12 hour shift.      Problem: Knowledge Deficit - Standard  Goal: Patient and family/care givers will demonstrate understanding of plan of care, disease process/condition, diagnostic tests and medications  Outcome: Progressing  Note: Education was given about plan of care, verbalized understanding.      Problem: Fall Risk  Goal: Patient will remain free from falls  Outcome: Progressing  Note: Patient calls appropriately, call light within reach, bed at lowest position.      Problem: Neutropenia Precautions  Goal: Neutropenic precautions will be implemented and maintained for patient protection  Outcome: Progressing  Note: Education was given to patient about neutropenic precaution, verbalized understanding.  Patient is Q4 vitals sings, monitoring labs.        Patient is not progressing towards the following goals:

## 2023-06-10 NOTE — ASSESSMENT & PLAN NOTE
Patient has history of stage IV colorectal cancer with metastasis to liver.  Currently on chemotherapy and found to be febrile at home today and was recommended to come to the ED for evaluation.  In the ER, found to have white blood cell count of 1 and chest x-ray showing a possible pneumonia in the right midlung field.  UA negative.  Vancomycin and cefepime administered in the ED  Gabo walker   ID consulted. OK to DC vancomycin, continue cefepime  CT abdomen/pelvis as above  Repeat blood cultures no growth to date  ID will attempt to salvage chest port  Diagnostic paracentesis results pending

## 2023-06-10 NOTE — HOSPITAL COURSE
"Gurmeet Jo is a 58 y.o. male who presented 6/9/2023 with a fever at home. Dr. Pinzon advised the patient to present to emergency department.     Patient has a history of stage IV colorectal cancer.  He is currently on chemotherapy, last received chemotherapy 3 days prior to admission.  He received his first therapeutic paracentesis 1 week ago.  He sees [facility] for oncology outpatient.     The patient checked his temperature at home and noted to have a fever.      \"In the ED, patient found to have normal vital signs.  Pertinent labs include white blood cell count 1.0, hemoglobin 11.4, platelet 49, alkaline phosphatase 240, total bilirubin 2.4.  Chest x-ray showing ill-defined opacity in the right midlung field.\"  "

## 2023-06-10 NOTE — ASSESSMENT & PLAN NOTE
Granix okay per oncology, continue daily for ANC <1500  Noted to have pancytopenia secondary to chemotherapy administration  Serial CBC

## 2023-06-10 NOTE — ASSESSMENT & PLAN NOTE
I discussed advance care planning with the patient and  patient's family for at least 16 minutes, including diagnosis, prognosis, plan of care, risks and benefits of any therapies that could be offered, as well as alternatives including palliation and hospice, as appropriate.     DNR/DNI.

## 2023-06-10 NOTE — PROGRESS NOTES
Pharmacy Vancomycin Kinetics Note for 6/9/2023     58 y.o. male on Vancomycin day # 1     Vancomycin Indication (AUC Dosing): Sepsis      Provider specified end date: 06/12/23    Active Antibiotics (From admission, onward)      Ordered     Ordering Provider       Fri Jun 9, 2023  6:30 PM    06/09/23 1830  vancomycin (VANCOCIN) 1,250 mg in  mL IVPB  (vancomycin (VANCOCIN) IV (LD + Maintenance))  EVERY 8 HOURS         Jamar Mariscal M.D.       Fri Jun 9, 2023  5:37 PM    06/09/23 1737  cefepime (Maxipime) 2 g in  mL IVPB  EVERY 8 HOURS         Jamar Mariscal M.D.    06/09/23 1737  MD Alert...Vancomycin per Pharmacy  PHARMACY TO DOSE        Question:  Indication(s) for vancomycin?  Answer:  Pneumonia    Jamar Mariscal M.D.       Fri Jun 9, 2023  2:51 PM    06/09/23 1451  vancomycin (VANCOCIN) 2,250 mg in  mL IVPB  (vancomycin (VANCOCIN) IV (LD + Maintenance))  ONCE         Sumeet Mansfield M.D.            Dosing Weight: 90.9 kg (200 lb 6.4 oz)      Admission History: Admitted on 6/9/2023 for Febrile neutropenia (HCC) [D70.9, R50.81]  Pertinent history: Patient with active cancer on chemotherapy who presents febrile and neutropenic with port in place. Empiric FN antibiotics initiated.    Allergies:     Compazine, Oxaliplatin, and Bee venom     Pertinent cultures to date:     Results       Procedure Component Value Units Date/Time    Blood Culture [[id number]] Collected: 06/09/23 1533    Order Status: Sent Specimen: Blood from Line Updated: 06/09/23 1601    Narrative:      Protective  Blood Cultures X2. Draw one blood culture from central line  and one blood culture peripherally. If no central line  present draw blood cultures times two peripherally    Blood Culture [[id number]] Collected: 06/09/23 1447    Order Status: Sent Specimen: Blood from Peripheral Updated: 06/09/23 1536    Narrative:      Protective  Blood Cultures X2. Draw one blood culture from central line  and one blood culture  "peripherally. If no central line  present draw blood cultures times two peripherally    Urinalysis [[id number]]     Order Status: Sent Specimen: Urine, Clean Catch     Urine Culture [[id number]]     Order Status: Sent Specimen: Urine, Clean Catch             Labs:     Estimated Creatinine Clearance: 149.2 mL/min (by C-G formula based on SCr of 0.62 mg/dL).  Recent Labs     23  1447   WBC 1.0*   NEUTSPOLYS 85.00*     Recent Labs     23  1447   BUN 22   CREATININE 0.62   ALBUMIN 3.2     No intake or output data in the 24 hours ending 23 1830   /67   Pulse 74   Temp 37.3 °C (99.1 °F) (Oral)   Resp 17   Ht 1.88 m (6' 2\")   Wt 90.9 kg (200 lb 6.4 oz)   SpO2 96%  Temp (24hrs), Av.3 °C (99.1 °F), Min:37.3 °C (99.1 °F), Max:37.3 °C (99.1 °F)      List concerns for Vancomycin clearance:     Nephrotoxic drugs    Pharmacokinetics: AUC DOSING    AUC kinetics:   Ke (hr ^-1): 0.1282 hr^-1  Half life: 5.41 hr  Clearance: 7.575  Estimated TDD: 3787.5  Estimated Dose: 1168  Estimated interval: 7.4      A/P:     -  Vancomycin dose: 1250mg q 8h    -  Next vancomycin level(s): Not currently ordered. Consider after 4th maintenance dose.       -  Predicted vancomycin AUC from initial AUC test calculator: 495 mg·hr/L    -  Comments: Please continue to monitor renal function, urine output and vancomycin levels for dosing adjustments. Please also follow cultures and signs of clinical cure for de-escalation of therapy.       Stephanie Maldonado, PharmD    "

## 2023-06-10 NOTE — ASSESSMENT & PLAN NOTE
Therapeutic paracentesis 6/2  Repeat paracentesis 6/12 with over 10L out. Cultures negative to date, cell count and cytology pending

## 2023-06-10 NOTE — H&P
Hospital Medicine History & Physical Note    Date of Service  6/9/2023    Primary Care Physician  Dunia Smith P.A.-C.    Consultants  None    Code Status  DNAR/DNI    Chief Complaint  Chief Complaint   Patient presents with    Sent by MD     [facility] by [facility] fever 102.4 F. 1000 mg Tylenol at home, temp 99.1 F on ER arrival. Hx stage 4 colorectal cancer with mets to liver. Current chemo patient, last session Tuesday.     Abdominal Pain     LLQ 6/10 pain new onset today. 9.5 L peritoneal fluid drained 6/2. Abdomen rounded.        History of Presenting Illness  Gurmeet Jo is a 58 y.o. male who presented 6/9/2023 with a fever at home.    Patient has a history of stage IV colorectal cancer.  He is currently on chemotherapy, last received chemotherapy 3 days ago.  He received his first paracentesis 1 week ago.  Is following with oncology outpatient.    Today patient was at home, and felt an abdominal discomfort that has since resolved.  He checked his temperature and noted to have a fever.  He called his oncologist, and was recommended to come to the ER for evaluation.    In the ED, patient found to have normal vital signs.  Pertinent labs include white blood cell count 1.0, hemoglobin 11.4, platelet 49, alkaline phosphatase 240, total bilirubin 2.4.  Chest x-ray showing ill-defined opacity in the right midlung field.    I discussed the plan of care with patient.    Review of Systems  Review of Systems   Constitutional:  Positive for malaise/fatigue.   HENT: Negative.     Eyes: Negative.    Respiratory: Negative.     Cardiovascular: Negative.    Gastrointestinal: Negative.    Genitourinary: Negative.    Musculoskeletal: Negative.    Skin: Negative.    Neurological: Negative.    Endo/Heme/Allergies: Negative.    Psychiatric/Behavioral: Negative.         Past Medical History   has a past medical history of Bowel habit changes (06/10/2022), Cancer (HCC) (11/2018), Dental  disorder (05/26/2022), Diabetes (HCC) (05/26/2022), Hiatus hernia syndrome, Indigestion, Pain, Pain (06/10/2022), Psychiatric problem (05/26/2022), Sleep apnea (06/10/2022), and Snoring.    Surgical History   has a past surgical history that includes colonoscopy (2015).     Family History  family history includes Diabetes in his brother and father; Hypertension in his father and mother.   Family history reviewed with patient. There is no family history that is pertinent to the chief complaint.     Social History   reports that he quit smoking about 25 years ago. His smoking use included cigarettes. He has a 15.00 pack-year smoking history. He has never used smokeless tobacco. He reports current drug use. Drugs: Inhaled and Marijuana. He reports that he does not drink alcohol.    Allergies  Allergies   Allergen Reactions    Compazine Unspecified     tartive dyskenia    Oxaliplatin Unspecified     Back spasms    Bee Venom Swelling       Medications  Prior to Admission Medications   Prescriptions Last Dose Informant Patient Reported? Taking?   Blood Glucose Monitoring Suppl Device  Patient No No   Sig: Meter: Dispense Device of Insurance Preference. Sig. Use as directed for blood sugar monitoring. #1. NR.   Blood Glucose Test Strips  Patient No No   Sig: Test strips order: Test strips for  meter. Sig: use bid and prn ssx high or low sugar #100 RF x 3   Empagliflozin (JARDIANCE) 25 MG Tab   No No   Sig: Take 25 mg by mouth every day.   HERZUMA 420 MG Recon Soln injection   Yes No   LORazepam (ATIVAN) 1 MG Tab  Patient Yes No   Sig: Take 1 mg by mouth 1 time a day as needed (nausea).   Lancets  Patient No No   Sig: Lancets order: Lancets for  meter. Sig: use bid and prn ssx high or low sugar. #100 RF x 3   Sodium Chloride (NS) Solution   Yes No   TRUE METRIX BLOOD GLUCOSE TEST strip   No No   Sig: USE AS DIRECTED TO CHECK BLOOD SUGAR B ID AND FOR HIGH OR SUGAR   TUKYSA 150 MG Tab   Yes No   Patient not taking: Reported  on 5/23/2023   TUKYSA 50 MG Tab   Yes No   Patient not taking: Reported on 5/23/2023   ZIRABEV 100 MG/4ML Solution injection   Yes No   Patient not taking: Reported on 5/23/2023   ZIRABEV 400 MG/16ML Solution injection   Yes No   Patient not taking: Reported on 5/23/2023   acetaminophen (TYLENOL) 500 MG Tab  Patient Yes No   Sig: Take 500-1,000 mg by mouth every 6 hours as needed.   albuterol 108 (90 Base) MCG/ACT Aero Soln inhalation aerosol   No No   Sig: Inhale 2 Puffs every four hours as needed for Shortness of Breath.   azithromycin (ZITHROMAX) 250 MG Tab   No No   Sig: Take two (2) tablets by mouth on first day then one (1) tablet a day until complete.   Patient not taking: Reported on 5/23/2023   baclofen (LIORESAL) 10 MG Tab   No No   Sig: Take 1 Tablet by mouth 3 times a day as needed (pain).   glucose blood (TRUE METRIX BLOOD GLUCOSE TEST) strip  Patient Yes No   Sig: TRUE METRIX BLOOD GLUCOSE TEST STRP   heparin lock flush 100 unit/mL Solution   Yes No   lidocaine-prilocaine (EMLA) 2.5-2.5 % Cream  Patient Yes No   nystatin (MYCOSTATIN) 020153 UNIT/ML Suspension   No No   Sig: SWISH AND SWALLOW 5 MILLILITERS PO QID   ondansetron (ZOFRAN ODT) 8 MG TABLET DISPERSIBLE  Patient Yes No   Sig: DISSOLVE ONE TABLET BY MOUTH EVERY 12 HOURS AS NEEDED FOR NAUSEA   potassium chloride (MICRO-K) 10 MEQ capsule   Yes No   Sig: 10 mEq every day.   therapeutic multivitamin-minerals (THERAGRAN-M) Tab  Patient Yes No   Sig: Take 1 Tab by mouth every day.      Facility-Administered Medications: None       Physical Exam  Temp:  [37.3 °C (99.1 °F)] 37.3 °C (99.1 °F)  Pulse:  [75-86] 75  Resp:  [17-18] 18  BP: (108-109)/(59-65) 109/65  SpO2:  [93 %-96 %] 93 %  Blood Pressure: 109/65   Temperature: 37.3 °C (99.1 °F)   Pulse: 75   Respiration: 18   Pulse Oximetry: 93 %       Physical Exam  Constitutional:       Appearance: Normal appearance. He is normal weight.   HENT:      Head: Normocephalic.      Nose: Nose normal.       Mouth/Throat:      Mouth: Mucous membranes are moist.   Eyes:      Pupils: Pupils are equal, round, and reactive to light.   Cardiovascular:      Rate and Rhythm: Normal rate and regular rhythm.      Pulses: Normal pulses.   Pulmonary:      Effort: Pulmonary effort is normal.      Breath sounds: Normal breath sounds.   Abdominal:      Comments: Ascitic abdomen noted.  Hypertympanic.  No tenderness upon palpation   Musculoskeletal:         General: Normal range of motion.      Cervical back: Neck supple.   Skin:     General: Skin is warm.   Neurological:      General: No focal deficit present.      Mental Status: He is alert and oriented to person, place, and time. Mental status is at baseline.   Psychiatric:         Mood and Affect: Mood normal.         Behavior: Behavior normal.         Thought Content: Thought content normal.         Judgment: Judgment normal.         Laboratory:  Recent Labs     06/09/23  1447   WBC 1.0*   RBC 3.57*   HEMOGLOBIN 11.4*   HEMATOCRIT 34.5*   MCV 96.6   MCH 31.9   MCHC 33.0   RDW 51.8*   PLATELETCT 49*   MPV 10.5     Recent Labs     06/09/23  1447   SODIUM 132*   POTASSIUM 4.0   CHLORIDE 101   CO2 20   GLUCOSE 128*   BUN 22   CREATININE 0.62   CALCIUM 8.1*     Recent Labs     06/09/23  1447   ALTSGPT 40   ASTSGOT 48*   ALKPHOSPHAT 240*   TBILIRUBIN 2.4*   GLUCOSE 128*     Recent Labs     06/09/23  1447   APTT 30.1   INR 1.53*     No results for input(s): NTPROBNP in the last 72 hours.      No results for input(s): TROPONINT in the last 72 hours.    Imaging:  DX-CHEST-PORTABLE (1 VIEW)   Final Result      1.  Ill-defined opacity in the right midlung periphery. Early pneumonia or recent pneumonia should be considered in the appropriate clinical settings.   2.  Left basilar atelectasis. No significant effusions.             X-Ray:  I have personally reviewed the images and compared with prior images.    Assessment/Plan:  Justification for Admission Status  I anticipate this patient will  "require at least two midnights for appropriate medical management, necessitating inpatient admission because patient has neutropenic fever, possibly pneumonia    Patient will need a Med/Surg bed on EMERGENCY service .  The need is secondary to neutropenic fever.    * Neutropenic fever (HCC)  Assessment & Plan  Spoke with ERP.  Patient has history of stage IV colorectal cancer with metastasis to liver.  Currently on chemotherapy and found to be febrile at home today and was recommended to come to the ED for evaluation.  In the ER, found to have white blood cell count of 1 and chest x-ray showing a possible pneumonia in the right midlung field.  UA pending.  Patient has a history of ascites, however no abdominal pain, can refrain from diagnostic paracentesis for now.  Vancomycin and cefepime have been administered in the ED, can continue these antibiotics.  Monitor for \"red man\" syndrome, rash, OLIVER from vancomycin administration.    Pancytopenia (HCC)  Assessment & Plan  Noted to have pancytopenia secondary to chemotherapy administration  Serial CBC      ACP (advance care planning)  Assessment & Plan  I discussed advance care planning with the patient and  patient's family for at least 16 minutes, including diagnosis, prognosis, plan of care, risks and benefits of any therapies that could be offered, as well as alternatives including palliation and hospice, as appropriate.     DNR/DNI.       DM (diabetes mellitus) (HCC)  Assessment & Plan  Continue Jardiance    Metastatic colon cancer to liver (HCC)- (present on admission)  Assessment & Plan  Patient has known history of colorectal cancer with metastasis to the liver.  Following with oncology outpatient, last received chemotherapy 3 days ago.        VTE prophylaxis: None due to thrombocytopenia  "

## 2023-06-10 NOTE — ASSESSMENT & PLAN NOTE
Patient has known history of colorectal cancer with metastasis to the liver.  Following with oncology outpatient, last received chemotherapy 3 days prior to admission.  Ascites cytology ordered

## 2023-06-11 ENCOUNTER — APPOINTMENT (OUTPATIENT)
Dept: RADIOLOGY | Facility: MEDICAL CENTER | Age: 58
DRG: 808 | End: 2023-06-11
Attending: NURSE PRACTITIONER
Payer: MEDICARE

## 2023-06-11 PROBLEM — J18.9 PNEUMONIA: Status: ACTIVE | Noted: 2023-06-11

## 2023-06-11 LAB
ALBUMIN FLD-MCNC: 0.6 G/DL
ALBUMIN SERPL BCP-MCNC: 2.8 G/DL (ref 3.2–4.9)
ALBUMIN/GLOB SERPL: 1.3 G/DL
ALP SERPL-CCNC: 195 U/L (ref 30–99)
ALT SERPL-CCNC: 39 U/L (ref 2–50)
ANION GAP SERPL CALC-SCNC: 10 MMOL/L (ref 7–16)
APPEARANCE FLD: NORMAL
AST SERPL-CCNC: 47 U/L (ref 12–45)
BASOPHILS # BLD AUTO: 0 % (ref 0–1.8)
BASOPHILS # BLD: 0 K/UL (ref 0–0.12)
BILIRUB CONJ SERPL-MCNC: 0.7 MG/DL (ref 0.1–0.5)
BILIRUB INDIRECT SERPL-MCNC: 1.1 MG/DL (ref 0–1)
BILIRUB SERPL-MCNC: 1.8 MG/DL (ref 0.1–1.5)
BODY FLD TYPE: NORMAL
BODY FLD TYPE: NORMAL
BUN SERPL-MCNC: 15 MG/DL (ref 8–22)
CALCIUM ALBUM COR SERPL-MCNC: 8.8 MG/DL (ref 8.5–10.5)
CALCIUM SERPL-MCNC: 7.8 MG/DL (ref 8.5–10.5)
CHLORIDE SERPL-SCNC: 101 MMOL/L (ref 96–112)
CO2 SERPL-SCNC: 20 MMOL/L (ref 20–33)
COLOR FLD: YELLOW
CREAT SERPL-MCNC: 0.49 MG/DL (ref 0.5–1.4)
CYTOLOGY REG CYTOL: NORMAL
EOSINOPHIL # BLD AUTO: 0.03 K/UL (ref 0–0.51)
EOSINOPHIL NFR BLD: 4 % (ref 0–6.9)
ERYTHROCYTE [DISTWIDTH] IN BLOOD BY AUTOMATED COUNT: 50.6 FL (ref 35.9–50)
GFR SERPLBLD CREATININE-BSD FMLA CKD-EPI: 118 ML/MIN/1.73 M 2
GLOBULIN SER CALC-MCNC: 2.2 G/DL (ref 1.9–3.5)
GLUCOSE SERPL-MCNC: 137 MG/DL (ref 65–99)
HCT VFR BLD AUTO: 31.7 % (ref 42–52)
HGB BLD-MCNC: 11 G/DL (ref 14–18)
LYMPHOCYTES # BLD AUTO: 0.12 K/UL (ref 1–4.8)
LYMPHOCYTES NFR BLD: 17 % (ref 22–41)
LYMPHOCYTES NFR FLD: 31 %
MAGNESIUM SERPL-MCNC: 2 MG/DL (ref 1.5–2.5)
MANUAL DIFF BLD: NORMAL
MCH RBC QN AUTO: 32.7 PG (ref 27–33)
MCHC RBC AUTO-ENTMCNC: 34.7 G/DL (ref 32.3–36.5)
MCV RBC AUTO: 94.3 FL (ref 81.4–97.8)
METAMYELOCYTES NFR BLD MANUAL: 1 %
MONOCYTES # BLD AUTO: 0.06 K/UL (ref 0–0.85)
MONOCYTES NFR BLD AUTO: 8 % (ref 0–13.4)
MONOS+MACROS NFR FLD MANUAL: 28 %
MORPHOLOGY BLD-IMP: NORMAL
NEUTROPHILS # BLD AUTO: 0.49 K/UL (ref 1.82–7.42)
NEUTROPHILS NFR BLD: 70 % (ref 44–72)
NEUTROPHILS NFR FLD: 41 %
NRBC # BLD AUTO: 0 K/UL
NRBC BLD-RTO: 0 /100 WBC (ref 0–0.2)
NUC CELL # FLD: 68 CELLS/UL
OVALOCYTES BLD QL SMEAR: NORMAL
PHOSPHATE SERPL-MCNC: 1.8 MG/DL (ref 2.5–4.5)
PLATELET # BLD AUTO: 35 K/UL (ref 164–446)
PLATELET BLD QL SMEAR: NORMAL
PLATELETS.RETICULATED NFR BLD AUTO: 6.4 % (ref 0.6–13.1)
PMV BLD AUTO: 11.8 FL (ref 9–12.9)
POIKILOCYTOSIS BLD QL SMEAR: NORMAL
POTASSIUM SERPL-SCNC: 4 MMOL/L (ref 3.6–5.5)
PROCALCITONIN SERPL-MCNC: 1.14 NG/ML
PROT FLD-MCNC: 1.3 G/DL
PROT SERPL-MCNC: 5 G/DL (ref 6–8.2)
RBC # BLD AUTO: 3.36 M/UL (ref 4.7–6.1)
RBC # FLD: <2000 CELLS/UL
RBC BLD AUTO: PRESENT
SODIUM SERPL-SCNC: 131 MMOL/L (ref 135–145)
WBC # BLD AUTO: 0.7 K/UL (ref 4.8–10.8)

## 2023-06-11 PROCEDURE — 87015 SPECIMEN INFECT AGNT CONCNTJ: CPT

## 2023-06-11 PROCEDURE — 88305 TISSUE EXAM BY PATHOLOGIST: CPT

## 2023-06-11 PROCEDURE — 83735 ASSAY OF MAGNESIUM: CPT

## 2023-06-11 PROCEDURE — 700111 HCHG RX REV CODE 636 W/ 250 OVERRIDE (IP): Performed by: STUDENT IN AN ORGANIZED HEALTH CARE EDUCATION/TRAINING PROGRAM

## 2023-06-11 PROCEDURE — 99223 1ST HOSP IP/OBS HIGH 75: CPT | Performed by: INTERNAL MEDICINE

## 2023-06-11 PROCEDURE — 0W9G3ZZ DRAINAGE OF PERITONEAL CAVITY, PERCUTANEOUS APPROACH: ICD-10-PCS | Performed by: RADIOLOGY

## 2023-06-11 PROCEDURE — 700111 HCHG RX REV CODE 636 W/ 250 OVERRIDE (IP): Mod: JG | Performed by: NURSE PRACTITIONER

## 2023-06-11 PROCEDURE — 80053 COMPREHEN METABOLIC PANEL: CPT

## 2023-06-11 PROCEDURE — 82248 BILIRUBIN DIRECT: CPT

## 2023-06-11 PROCEDURE — P9047 ALBUMIN (HUMAN), 25%, 50ML: HCPCS | Mod: JG | Performed by: NURSE PRACTITIONER

## 2023-06-11 PROCEDURE — 85007 BL SMEAR W/DIFF WBC COUNT: CPT

## 2023-06-11 PROCEDURE — 82042 OTHER SOURCE ALBUMIN QUAN EA: CPT

## 2023-06-11 PROCEDURE — 89051 BODY FLUID CELL COUNT: CPT

## 2023-06-11 PROCEDURE — 88112 CYTOPATH CELL ENHANCE TECH: CPT

## 2023-06-11 PROCEDURE — 700102 HCHG RX REV CODE 250 W/ 637 OVERRIDE(OP): Performed by: INTERNAL MEDICINE

## 2023-06-11 PROCEDURE — 87040 BLOOD CULTURE FOR BACTERIA: CPT

## 2023-06-11 PROCEDURE — 84100 ASSAY OF PHOSPHORUS: CPT

## 2023-06-11 PROCEDURE — 84145 PROCALCITONIN (PCT): CPT

## 2023-06-11 PROCEDURE — 85055 RETICULATED PLATELET ASSAY: CPT

## 2023-06-11 PROCEDURE — 36415 COLL VENOUS BLD VENIPUNCTURE: CPT

## 2023-06-11 PROCEDURE — 85025 COMPLETE CBC W/AUTO DIFF WBC: CPT

## 2023-06-11 PROCEDURE — 770004 HCHG ROOM/CARE - ONCOLOGY PRIVATE *

## 2023-06-11 PROCEDURE — A9270 NON-COVERED ITEM OR SERVICE: HCPCS | Performed by: STUDENT IN AN ORGANIZED HEALTH CARE EDUCATION/TRAINING PROGRAM

## 2023-06-11 PROCEDURE — 700105 HCHG RX REV CODE 258: Performed by: STUDENT IN AN ORGANIZED HEALTH CARE EDUCATION/TRAINING PROGRAM

## 2023-06-11 PROCEDURE — A9270 NON-COVERED ITEM OR SERVICE: HCPCS | Performed by: INTERNAL MEDICINE

## 2023-06-11 PROCEDURE — 84157 ASSAY OF PROTEIN OTHER: CPT

## 2023-06-11 PROCEDURE — 87205 SMEAR GRAM STAIN: CPT

## 2023-06-11 PROCEDURE — 99233 SBSQ HOSP IP/OBS HIGH 50: CPT | Mod: FS | Performed by: INTERNAL MEDICINE

## 2023-06-11 PROCEDURE — 49083 ABD PARACENTESIS W/IMAGING: CPT

## 2023-06-11 PROCEDURE — 700105 HCHG RX REV CODE 258: Performed by: NURSE PRACTITIONER

## 2023-06-11 PROCEDURE — 700102 HCHG RX REV CODE 250 W/ 637 OVERRIDE(OP): Performed by: STUDENT IN AN ORGANIZED HEALTH CARE EDUCATION/TRAINING PROGRAM

## 2023-06-11 PROCEDURE — 87070 CULTURE OTHR SPECIMN AEROBIC: CPT

## 2023-06-11 RX ORDER — LOPERAMIDE HYDROCHLORIDE 2 MG/1
2 CAPSULE ORAL 4 TIMES DAILY PRN
Status: DISCONTINUED | OUTPATIENT
Start: 2023-06-11 | End: 2023-06-13 | Stop reason: HOSPADM

## 2023-06-11 RX ORDER — ALBUMIN (HUMAN) 12.5 G/50ML
75 SOLUTION INTRAVENOUS ONCE
Status: COMPLETED | OUTPATIENT
Start: 2023-06-11 | End: 2023-06-11

## 2023-06-11 RX ADMIN — DAPAGLIFLOZIN 10 MG: 10 TABLET, FILM COATED ORAL at 05:50

## 2023-06-11 RX ADMIN — DIBASIC SODIUM PHOSPHATE, MONOBASIC POTASSIUM PHOSPHATE AND MONOBASIC SODIUM PHOSPHATE 500 MG: 852; 155; 130 TABLET ORAL at 17:11

## 2023-06-11 RX ADMIN — DIBASIC SODIUM PHOSPHATE, MONOBASIC POTASSIUM PHOSPHATE AND MONOBASIC SODIUM PHOSPHATE 500 MG: 852; 155; 130 TABLET ORAL at 12:37

## 2023-06-11 RX ADMIN — DIBASIC SODIUM PHOSPHATE, MONOBASIC POTASSIUM PHOSPHATE AND MONOBASIC SODIUM PHOSPHATE 500 MG: 852; 155; 130 TABLET ORAL at 09:33

## 2023-06-11 RX ADMIN — CEFEPIME 2 G: 2 INJECTION, POWDER, FOR SOLUTION INTRAVENOUS at 05:49

## 2023-06-11 RX ADMIN — ALBUMIN (HUMAN) 75 G: 5 SOLUTION INTRAVENOUS at 18:05

## 2023-06-11 RX ADMIN — TBO-FILGRASTIM 480 MCG: 480 INJECTION, SOLUTION SUBCUTANEOUS at 17:11

## 2023-06-11 RX ADMIN — CEFEPIME 2 G: 2 INJECTION, POWDER, FOR SOLUTION INTRAVENOUS at 21:39

## 2023-06-11 RX ADMIN — ONDANSETRON 4 MG: 2 INJECTION INTRAMUSCULAR; INTRAVENOUS at 20:36

## 2023-06-11 RX ADMIN — CEFEPIME 2 G: 2 INJECTION, POWDER, FOR SOLUTION INTRAVENOUS at 17:10

## 2023-06-11 ASSESSMENT — ENCOUNTER SYMPTOMS
FEVER: 1
SORE THROAT: 0
VOMITING: 0
FEVER: 0
ABDOMINAL PAIN: 1
SENSORY CHANGE: 0
WHEEZING: 0
HEADACHES: 0
NAUSEA: 1
CONSTIPATION: 0
NECK PAIN: 0
BLOOD IN STOOL: 0
HEMOPTYSIS: 0
SHORTNESS OF BREATH: 0
BACK PAIN: 0
FLANK PAIN: 0
WEAKNESS: 0
WEIGHT LOSS: 0
DIARRHEA: 1
CHILLS: 0
HEARTBURN: 0
DIZZINESS: 0
COUGH: 0
ROS GI COMMENTS: DISTENSION
MYALGIAS: 0
NAUSEA: 0
SPUTUM PRODUCTION: 0
NERVOUS/ANXIOUS: 0
DIAPHORESIS: 0

## 2023-06-11 ASSESSMENT — PAIN DESCRIPTION - PAIN TYPE: TYPE: ACUTE PAIN

## 2023-06-11 NOTE — CARE PLAN
The patient is Watcher - Medium risk of patient condition declining or worsening    Shift Goals  Clinical Goals: Pt will maintain a pain level of a 4 or less  Patient Goals: Pt will have a paracentesis  Family Goals: Pt will have a paracentesis    Progress made toward(s) clinical / shift goals:  Pain well controlled today. Pt states no overt pain but endorses marked pressure in his abdomen due to ascites. At paracentesis at this time.       Problem: Pain - Standard  Goal: Alleviation of pain or a reduction in pain to the patient’s comfort goal  Outcome: Progressing     Problem: Knowledge Deficit - Standard  Goal: Patient and family/care givers will demonstrate understanding of plan of care, disease process/condition, diagnostic tests and medications  Outcome: Progressing     Problem: Fall Risk  Goal: Patient will remain free from falls  Outcome: Progressing     Problem: Acute Care of the Chemotherapy Patient  Goal: Optimal Outcome for the Chemotherapy Patient  Outcome: Progressing     Problem: Neutropenia Precautions  Goal: Neutropenic precautions will be implemented and maintained for patient protection  Outcome: Progressing       Patient is not progressing towards the following goals:

## 2023-06-11 NOTE — PROGRESS NOTES
Pt alert and oriented x 4. Denies nausea or pain but does state pressure in his abdomen due to distention. Abdomen firm rounded and distended. Noted non-papular rash to abdomen which is itchy and appears to be petechia. Lotion applied and itching improved. NP notified and agreed with probable petechiae from distention. Pending paracentesis. Family at the bedside. Call light within reach. Hourly rounding in place.

## 2023-06-11 NOTE — PROGRESS NOTES
Oncology/Hematology Progress Note               Author: Selena Pinzon M.D. Date & Time created: 6/11/2023  1:13 PM   Diagnosis-history of colon cancer  Febrile neutropenia  Interval History:  Slightly better than yesterday.  Daughter at the bedside.  Positive cultures are consistent with infection by Providencia Rettgeri.    Review of Systems:  Review of Systems   Constitutional:  Positive for fever. Negative for malaise/fatigue and weight loss.   HENT:  Negative for hearing loss and sore throat.    Respiratory:  Negative for cough, hemoptysis and sputum production.    Gastrointestinal:  Positive for nausea. Negative for heartburn.   Genitourinary:  Negative for dysuria and hematuria.   Musculoskeletal:  Negative for myalgias and neck pain.   Neurological:  Negative for dizziness and sensory change.       Physical Exam:  Physical Exam  Constitutional:       Appearance: He is ill-appearing.   Cardiovascular:      Rate and Rhythm: Normal rate and regular rhythm.      Heart sounds: No murmur heard.  Pulmonary:      Effort: Pulmonary effort is normal. No respiratory distress.      Breath sounds: Normal breath sounds. No wheezing.   Abdominal:      General: There is distension.      Tenderness: There is no abdominal tenderness. There is no guarding.   Skin:     Coloration: Skin is pale. Skin is not jaundiced.   Neurological:      General: No focal deficit present.      Mental Status: He is alert and oriented to person, place, and time.         Labs:          Recent Labs     06/09/23  1447 06/10/23  0032 06/11/23  0106   SODIUM 132* 131* 131*   POTASSIUM 4.0 4.0 4.0   CHLORIDE 101 102 101   CO2 20 19* 20   BUN 22 19 15   CREATININE 0.62 0.59 0.49*   MAGNESIUM  --   --  2.0   PHOSPHORUS  --   --  1.8*   CALCIUM 8.1* 7.6* 7.8*     Recent Labs     06/09/23  1447 06/10/23  0032 06/11/23  0106   ALTSGPT 40  --  39   ASTSGOT 48*  --  47*   ALKPHOSPHAT 240*  --  195*   TBILIRUBIN 2.4*  --  1.8*   DBILIRUBIN  --   --  0.7*    GLUCOSE 128* 110* 137*     Recent Labs     237 06/10/23  0032 23  0106   RBC 3.57* 3.36* 3.36*   HEMOGLOBIN 11.4* 10.8* 11.0*   HEMATOCRIT 34.5* 32.1* 31.7*   PLATELETCT 49* 44* 35*   PROTHROMBTM 18.1*  --   --    APTT 30.1  --   --    INR 1.53*  --   --      Recent Labs     06/09/23  1447 06/10/23  0032 23  0106   WBC 1.0* 0.5* 0.7*   NEUTSPOLYS 85.00* 73.00* 70.00   LYMPHOCYTES 11.00* 23.00 17.00*   MONOCYTES 4.00 4.00 8.00   EOSINOPHILS 0.00 0.00 4.00   BASOPHILS 0.00 0.00 0.00   ASTSGOT 48*  --  47*   ALTSGPT 40  --  39   ALKPHOSPHAT 240*  --  195*   TBILIRUBIN 2.4*  --  1.8*     Recent Labs     06/09/23  1447 06/10/23  0032 06/11/23  0106   SODIUM 132* 131* 131*   POTASSIUM 4.0 4.0 4.0   CHLORIDE 101 102 101   CO2 20 19* 20   GLUCOSE 128* 110* 137*   BUN 22 19 15   CREATININE 0.62 0.59 0.49*   CALCIUM 8.1* 7.6* 7.8*     Hemodynamics:  Temp (24hrs), Av.6 °C (97.9 °F), Min:36.5 °C (97.7 °F), Max:36.8 °C (98.2 °F)  Temperature: 36.6 °C (97.9 °F)  Pulse  Av.4  Min: 65  Max: 89   Blood Pressure: 112/63     Respiratory:    Respiration: 17, Pulse Oximetry: 98 %        LLL Breath Sounds: Diminished  Fluids:    Intake/Output Summary (Last 24 hours) at 2023 1313  Last data filed at 2023 0933  Gross per 24 hour   Intake 250 ml   Output --   Net 250 ml        GI/Nutrition:  Orders Placed This Encounter   Procedures    Diet Order Diet: Regular     Standing Status:   Standing     Number of Occurrences:   1     Order Specific Question:   Diet:     Answer:   Regular [1]     Medical Decision Making, by Problem:  Active Hospital Problems    Diagnosis     *Bacteremia due to Gram-negative bacteria [R78.81]     Pneumonia [J18.9]     Diarrhea [R19.7]     Malignant ascites [R18.0]     Pancytopenia (HCC) [D61.818]     Febrile neutropenia (HCC) [D70.9, R50.81]     ACP (advance care planning) [Z71.89]     DM (diabetes mellitus) (HCC) [E11.9]     Metastatic colon cancer to liver (HCC) [C18.9,  C78.7]        Plan:  Colon cancer-currently on treatment with chemotherapy which probably caused his febrile neutropenia.  Febrile neutropenia-continue growth factor support continue antibiotics.  Fevers are dropping.  ID is on the case.  Continue antibiotics.  He is currently on cefepime I am  Ascites-likely malignant.  Will need a paracentesis.  Plan to send out cyclic fluid for culture and sensitivity and cell count.  Neutropenia-White cell count is 700 with a ANC of 490 continue neutropenic precautions in place.  Continue Granix.  Anemia-mildly anemic noted need for transfusions  Thrombocytopenia-likely related to his chemotherapy.  Thrombocytopenia is chronic  Will sign off. Reconsult PRN.  Quality-Core Measures

## 2023-06-11 NOTE — CARE PLAN
The patient is Watcher - Medium risk of patient condition declining or worsening    Shift Goals  Clinical Goals: administer IV abx, monitor labs ad vs, remain afebrile  Patient Goals: rest  Family Goals: n/a    Progress made toward(s) clinical / shift goals:    Problem: Pain - Standard  Goal: Alleviation of pain or a reduction in pain to the patient’s comfort goal  Outcome: Progressing  Note: Patient has pain once during my shift a 3 out on a scale of 0-10. Patient was given PRN tylenol, on reassessment patient score pain at a 1.      Problem: Knowledge Deficit - Standard  Goal: Patient and family/care givers will demonstrate understanding of plan of care, disease process/condition, diagnostic tests and medications  Outcome: Progressing  Note: Education was given about plan of care, verbalized understanding.      Problem: Fall Risk  Goal: Patient will remain free from falls  Outcome: Progressing  Note:   Patient calls appropriately, call light within reach, bed at lowest position.         Problem: Neutropenia Precautions  Goal: Neutropenic precautions will be implemented and maintained for patient protection  Outcome: Progressing  Note: Education was given to patient about neutropenic precaution, verbalized understanding.  Patient is Q4 vitals sings, monitoring labs.           Patient is not progressing towards the following goals:

## 2023-06-11 NOTE — PROGRESS NOTES
"Hospital Medicine Daily Progress Note    Date of Service  6/11/2023    Chief Complaint  Gurmeet Jo is a 58 y.o. male admitted 6/9/2023 with fever    Hospital Course  Gurmeet Jo is a 58 y.o. male who presented 6/9/2023 with a fever at home. Dr. Pinzon advised the patient to present to emergency department.     Patient has a history of stage IV colorectal cancer.  He is currently on chemotherapy, last received chemotherapy 3 days prior to admission.  He received his first therapeutic paracentesis 1 week ago.  He sees [facility] for oncology outpatient.     The patient checked his temperature at home and noted to have a fever.      \"In the ED, patient found to have normal vital signs.  Pertinent labs include white blood cell count 1.0, hemoglobin 11.4, platelet 49, alkaline phosphatase 240, total bilirubin 2.4.  Chest x-ray showing ill-defined opacity in the right midlung field.\"    Interval Problem Update  Patient is lying in bed, fully alert and oriented.  He is pleasant and cooperative.  He continues to have distention and discomfort, however is not tender on palpation.  He had another of dark brown diarrhea overnight.  He denies bloody output.  He denies fevers, chills, nausea, dizziness, problems breathing and any other problems.   -Abdomen is semi-firm, tender, fluid wave  -C. difficile stool test negative.  Okay to start Imodium as needed.  -Discussed with Dr. Pinzon and ID Dr. Parry who agree with diagnostic and therapeutic paracentesis  -Blood cultures: Providencia rettgeri   -CT of abdomen/pelvis shows interval improvement of hepatic metastases, trace bilateral pleural effusions  -repeat blood cultures pending.  Continue cefepime.  ID may be able to salvage chest port, however it may need to be removed.    I have discussed this patient's plan of care and discharge plan at IDT rounds today with Case Management, Nursing, Nursing leadership, and other members of the IDT team.  Discussed with Dr." Acleonto.    Consultants/Specialty  oncology    Code Status  DNAR/DNI    Disposition  The patient is not medically cleared for discharge to home or a post-acute facility.  Anticipate discharge to: home with close outpatient follow-up    I have placed the appropriate orders for post-discharge needs.    Review of Systems  Review of Systems   Constitutional:  Positive for malaise/fatigue. Negative for chills, diaphoresis and fever.   Respiratory:  Negative for shortness of breath and wheezing.    Cardiovascular:  Negative for chest pain and leg swelling.   Gastrointestinal:  Positive for abdominal pain and diarrhea. Negative for blood in stool, constipation, heartburn, melena, nausea and vomiting.        Distension   Genitourinary:  Negative for flank pain and hematuria.   Musculoskeletal:  Negative for back pain and myalgias.   Neurological:  Negative for dizziness, weakness and headaches.   Psychiatric/Behavioral:  The patient is not nervous/anxious.         Physical Exam  Temp:  [36.5 °C (97.7 °F)-36.9 °C (98.4 °F)] 36.6 °C (97.9 °F)  Pulse:  [65-78] 77  Resp:  [17-18] 17  BP: (109-120)/(61-88) 112/63  SpO2:  [95 %-98 %] 98 %    Physical Exam  Vitals and nursing note reviewed.   Constitutional:       General: He is awake.      Appearance: Normal appearance. He is normal weight. He is ill-appearing.   HENT:      Head: Normocephalic.      Nose: Nose normal.      Mouth/Throat:      Mouth: Mucous membranes are moist.   Cardiovascular:      Rate and Rhythm: Normal rate and regular rhythm.      Pulses: Normal pulses.   Pulmonary:      Effort: Pulmonary effort is normal.      Breath sounds: Normal breath sounds.   Abdominal:      General: Bowel sounds are normal. There is distension.      Palpations: There is fluid wave.      Tenderness: There is generalized abdominal tenderness.      Comments: Ascitic abdomen noted.  Hypertympanic.     Musculoskeletal:         General: No swelling or tenderness. Normal range of motion.       Cervical back: Normal range of motion.   Skin:     General: Skin is warm.   Neurological:      General: No focal deficit present.      Mental Status: He is alert and oriented to person, place, and time. Mental status is at baseline.   Psychiatric:         Attention and Perception: Attention normal.         Mood and Affect: Mood normal.         Behavior: Behavior normal. Behavior is cooperative.         Thought Content: Thought content normal.         Judgment: Judgment normal.     All systems reviewed and exam is unchanged from yesterday.      Fluids    Intake/Output Summary (Last 24 hours) at 6/11/2023 1025  Last data filed at 6/11/2023 0933  Gross per 24 hour   Intake 370 ml   Output --   Net 370 ml         Laboratory  Recent Labs     06/09/23  1447 06/10/23  0032 06/11/23  0106   WBC 1.0* 0.5* 0.7*   RBC 3.57* 3.36* 3.36*   HEMOGLOBIN 11.4* 10.8* 11.0*   HEMATOCRIT 34.5* 32.1* 31.7*   MCV 96.6 95.5 94.3   MCH 31.9 32.1 32.7   MCHC 33.0 33.6 34.7   RDW 51.8* 51.9* 50.6*   PLATELETCT 49* 44* 35*   MPV 10.5 11.3 11.8       Recent Labs     06/09/23  1447 06/10/23  0032 06/11/23  0106   SODIUM 132* 131* 131*   POTASSIUM 4.0 4.0 4.0   CHLORIDE 101 102 101   CO2 20 19* 20   GLUCOSE 128* 110* 137*   BUN 22 19 15   CREATININE 0.62 0.59 0.49*   CALCIUM 8.1* 7.6* 7.8*       Recent Labs     06/09/23  1447   APTT 30.1   INR 1.53*                 Imaging  CT-ABDOMEN-PELVIS WITH   Final Result      1.  There are bilobar hepatic metastasis with interval improvement and partial response to therapy.   2.  There are changes of the liver consistent with hepatocellular disease including associated splenomegaly and numerous varices in the upper abdomen consistent with portal hypertension.   3.  There is increase in the amount of ascites, moderate to large in amount.   4.  No enhancing fluid collection to suggest abscess.   5.  No bowel obstruction. Small hiatal hernia.   6.  Trace of dependent pleural effusion with dependent  atelectasis.      DX-CHEST-PORTABLE (1 VIEW)   Final Result      1.  Ill-defined opacity in the right midlung periphery. Early pneumonia or recent pneumonia should be considered in the appropriate clinical settings.   2.  Left basilar atelectasis. No significant effusions.         US-PARACENTESIS, ABD WITH IMAGING    (Results Pending)          Assessment/Plan  * Bacteremia due to Gram-negative bacteria- (present on admission)  Assessment & Plan  Patient has history of stage IV colorectal cancer with metastasis to liver.  Currently on chemotherapy and found to be febrile at home today and was recommended to come to the ED for evaluation.  In the ER, found to have white blood cell count of 1 and chest x-ray showing a possible pneumonia in the right midlung field.  UA negative.  Vancomycin and cefepime administered in the ED  Gabo rethomero   ID consulted. OK to DC vancomycin, continue cefepime  CT abdomen/pelvis as above  Repeat blood culture results pending  ID will attempt to salvage chest port  Diagnostic paracentesis pending    Febrile neutropenia (HCC)- (present on admission)  Assessment & Plan  Secondary to bacteremia    Metastatic colon cancer to liver (HCC)- (present on admission)  Assessment & Plan  Patient has known history of colorectal cancer with metastasis to the liver.  Following with oncology outpatient, last received chemotherapy 3 days ago.  Ascites cytology ordered    Pancytopenia (HCC)- (present on admission)  Assessment & Plan  Granix okay per oncology, continue daily for ANC <1500  Noted to have pancytopenia secondary to chemotherapy administration  Serial CBC      Pneumonia- (present on admission)  Assessment & Plan  Right middle lobe opacity on xray  asymptomatic  On cefepime  ID consulted    Malignant ascites- (present on admission)  Assessment & Plan  Therapeutic paracentesis 6/2  Repeat paracentesis pending with cultures and cell count and cytology    Diarrhea  Assessment &  Plan  likely etiology: antibiotics  C. difficile studies negative  Loperamide as needed    ACP (advance care planning)- (present on admission)  Assessment & Plan  I discussed advance care planning with the patient and  patient's family for at least 16 minutes, including diagnosis, prognosis, plan of care, risks and benefits of any therapies that could be offered, as well as alternatives including palliation and hospice, as appropriate.     DNR/DNI.       DM (diabetes mellitus) (HCC)- (present on admission)  Assessment & Plan  Well-managed with Jardiance         VTE prophylaxis: SCDs/TEDs    I have performed a physical exam and reviewed and updated ROS and Plan today (6/11/2023). In review of yesterday's note (6/10/2023), there are no changes except as documented above.    My total time spent caring for the patient on the day of the encounter was 17 minutes.   This does not include time spent on separately billable procedures/tests. This time is exclusive of the time spent with collaborating physicians.

## 2023-06-11 NOTE — CONSULTS
DATE OF SERVICE:  06/10/2023     Consultation was called by SAM Benedict     REASON FOR CONSULTATION:  1.  Febrile neutropenia with positive blood cultures.  2.  History of metastatic colorectal cancer with liver metastasis.     HISTORY OF PRESENT ILLNESS:  The patient is a very pleasant 58-year-old   gentleman with history of metastatic colon cancer with multiple lines of   therapy, most likely treated on irinotecan and Vectibix along with Herceptin.    Last scans had shown some disease progression.  Most recently, the patient   was found to have a positive blood culture.  He was also found to be   neutropenic and thrombocytopenic and he was admitted to the hospital.  Blood   cultures are positive with gram-negative rods.  The patient was also started   on treatment with cefepime and Granix.  He is still very fatigued, but   otherwise doing well.  He still has a slight temperature elevation.  He has   some ascites where he underwent a paracentesis not too long ago.     PAST MEDICAL HISTORY:  Obstructive sleep apnea, metastatic colon cancer,   hemorrhoids.     PAST SURGICAL HISTORY:  Port placement, biopsies, colonoscopy.     PERSONAL AND SOCIAL HISTORY:  Ex-smoker, .  Two children, social   drinker.     FAMILY HISTORY:  Not pertinent to this hospitalization.     REVIEW OF SYSTEMS:  GENERAL AND CONSTITUTIONAL:  Complaining of fever, fatigue and malaise.  HEAD AND NECK, EAR, NOSE AND THROAT:  Denies any headaches or any visual   symptoms.  RESPIRATORY:  Denies any cough or hemoptysis.  CARDIOVASCULAR:  No chest pain or palpitations.  GASTROINTESTINAL:  See history of present illness.  Has some abdominal   distention.  NEUROLOGICAL:  Denies any seizures or stroke.  PSYCHIATRIC:  Anxious, but denies any depression.  SKIN:  His rash has improved markedly as compared to before, but is still   minimally present.  No bleeding or bruising.     Rest of review of systems is negative per CMS/AMA criteria  unless as mentioned   in history of present or past illness.     PHYSICAL EXAMINATION:    GENERAL:  The patient is slightly lethargic, otherwise lying comfortably in   the bed.  Daughter is at the bedside.  VITAL SIGNS:  Afebrile, pulse 78, respiratory rate 18, /61.  HEENT:  Pupils are equal.  Oral mucosa reveals no thrush.  NECK:  Reveals no JVD or lymphadenopathy.  LUNGS:  Clear to auscultation with good bilateral air entry.  HEART:  Reveals no S3, S4.  ABDOMEN:  Reveals ascites.  Otherwise, there is no tenderness.  EXTREMITIES:  There is no edema of lower extremities.  SKIN:  Reveals minimal rash on the face, but no bleeding or bruising.  NEUROLOGIC:  Reveals power is equal bilaterally, although global weakness is   noted.  PSYCHIATRIC:  Anxiety, but no depression.     RADIOLOGICAL STUDIES:  Reviewed.     LABORATORY DATA:  Reviewed.     ASSESSMENT AND PLAN:    1.  Febrile neutropenia.  The patient is currently on Granix, which will be   continued.  He is also on cefepime.  The patient is clinically stable and will   continue to observe him.  2.  Neutropenia.  Continue Neupogen.  Continue with neutropenic precautions.  3.  Thrombocytopenia related to his chemotherapy, no active bleeding or   bruising was noted.  4.  Ascites.  He had underwent paracentesis recently, ascites is   reaccumulating.  If needed, we will get him another ascitic fluid paracentesis   done.  We will continue to follow him up.        ______________________________  MD CHUCKY Cope/MORIAH/CAROLA    DD:  06/10/2023 14:17  DT:  06/10/2023 16:54    Job#:  [id number]

## 2023-06-11 NOTE — CONSULTS
[facility] INPATIENT CONSULT NOTE     Date of Service: 6/11/2023    Consult Requested By: TIAN Collins    Reason for Consultation: Neutropenic fever, bacteremia    Chief Complaint: Fever    History of Present Illness:     Gurmeet Jo is a 58 y.o. male admitted 6/9/2023.  Patient with metastatic colon cancer on multiple courses of chemotherapy with disease progression, last received chemo approximately 3 days prior to this admission, underwent paracentesis about a week prior.  Patient felt a transient abdominal discomfort on the day of admission, was noted to be febrile to 102.4 at home thus oncology recommended presenting to the ER.  Patient afebrile here, white count of 1000, , creatinine 0.49, normal transaminases, T. bili 1.8, 2 out of 2 blood cultures 1 from port positive for gram-negative rods.  Granix given by oncology.  Chest x-ray with ill-defined right midlung peripheral opacity and left basilar atelectatic changes    Review of Systems:  All other systems reviewed and are negative expect as noted in HPI    Past Medical History:   Diagnosis Date    Bowel habit changes 06/10/2022    constipation intermittently    Cancer (HCC) 11/2018    Colon CA with Liver Mets    Dental disorder 05/26/2022    full upper and lower plates    Diabetes (HCC) 05/26/2022    medicated    Hiatus hernia syndrome     Indigestion     Pain     liver     Pain 06/10/2022    left shoulder pain    Psychiatric problem 05/26/2022    depression/anxiety    Sleep apnea 06/10/2022    no longer using CPAP    Snoring        Past Surgical History:   Procedure Laterality Date    COLONOSCOPY  2015       Family History   Problem Relation Age of Onset    Hypertension Mother     Hypertension Father     Diabetes Father     Diabetes Brother        Social History     Socioeconomic History    Marital status:      Spouse name: Not on file    Number of children: Not on file    Years of education: Not on file     Highest education level: Not on file   Occupational History    Not on file   Tobacco Use    Smoking status: Former     Packs/day: 1.00     Years: 15.00     Pack years: 15.00     Types: Cigarettes     Quit date: 1998     Years since quittin.4    Smokeless tobacco: Never   Vaping Use    Vaping Use: Never used   Substance and Sexual Activity    Alcohol use: No    Drug use: Yes     Types: Inhaled, Marijuana     Comment: occ    Sexual activity: Not on file   Other Topics Concern    Not on file   Social History Narrative    Not on file     Social Determinants of Health     Financial Resource Strain: Not on file   Food Insecurity: Not on file   Transportation Needs: Not on file   Physical Activity: Not on file   Stress: Not on file   Social Connections: Not on file   Intimate Partner Violence: Not on file   Housing Stability: Not on file       Allergies   Allergen Reactions    Compazine Unspecified     tartive dyskenia    Oxaliplatin Unspecified     Back spasms    Bee Venom Swelling       Medications:    Current Facility-Administered Medications:     phosphorus (K-Phos-Neutral) per tablet 500 mg, 2 Tablet, Oral, TID, Dequan Shin D.O.    [CANCELED] Special Contact Isolation, , , CONTINUOUS **AND** [COMPLETED] C Diff by PCR rflx Toxin, , , Once **AND** Pharmacy Consult Request, 1 Each, Other, PHARMACY TO DOSE, ALINE CollinsRNiyahNNiyah    tbo-filgrastim (GRANIX) injection 480 mcg, 5 mcg/kg, Subcutaneous, Daily-1400, ALINE CollinsRMADDY, 480 mcg at 06/10/23 1335    acetaminophen (Tylenol) tablet 650 mg, 650 mg, Oral, Q6HRS PRN, Jamar Mariscal M.D., 650 mg at 06/10/23 2005    cefepime (Maxipime) 2 g in  mL IVPB, 2 g, Intravenous, Q8HRS, Jamar Mariscal M.D., Stopped at 23 0619    baclofen (LIORESAL) tablet 10 mg, 10 mg, Oral, TID PRN, Jamar Mariscal M.D.    dapagliflozin propanediol (Farxiga) tablet 10 mg, 10 mg, Oral, DAILY, Jamar Mariscal M.D., 10 mg at 23 0550    ondansetron  "(ZOFRAN) syringe/vial injection 4 mg, 4 mg, Intravenous, Q4HRS PRN, Jamar Mariscal M.D., 4 mg at 23    Physical Exam:   Vital Signs: /67   Pulse 71   Temp 36.8 °C (98.2 °F) (Oral)   Resp 18   Ht 1.88 m (6' 2\")   Wt 90.7 kg (199 lb 15.3 oz)   SpO2 96%   BMI 25.67 kg/m²   Temp  Av.7 °C (98.1 °F)  Min: 36.3 °C (97.3 °F)  Max: 37.3 °C (99.1 °F)  Vital signs reviewed  Constitutional: Patient is oriented to person, place, and time. Appears ill but in no acute distress  Head: Atraumatic, normocephalic  Eyes: Conjunctivae normal  Mouth/Throat: Lips without lesions  Neck: Neck supple. No masses/lymphadenopathy  Cardiovascular: Normal rate. No pedal edema.  Pulmonary/Chest: No respiratory distress. Unlabored respiratory effort  Abdominal: Distended and firm, no tenderness or rebound but discomfort  Musculoskeletal: No joint tenderness, swelling, erythema, or restriction of motion noted.  Neurological: Alert and oriented to person, place, and time. No gross cranial nerve deficit. No focal neural deficit noted  Skin: Skin is warm and dry. No rashes or embolic phenomena noted on exposed skin  Psychiatric: Normal mood and affect. Behavior is normal.     LABS:  Recent Labs     06/09/23  1447 06/10/23  0032 06/11/23  0106   WBC 1.0* 0.5* 0.7*      Recent Labs     06/09/23  1447 06/10/23  0032 23  0106   HEMOGLOBIN 11.4* 10.8* 11.0*   HEMATOCRIT 34.5* 32.1* 31.7*   MCV 96.6 95.5 94.3   MCH 31.9 32.1 32.7   PLATELETCT 49* 44* 35*       Recent Labs     06/09/23  1447 06/10/23  0032 23  0106   SODIUM 132* 131* 131*   POTASSIUM 4.0 4.0 4.0   CHLORIDE 101 102 101   CO2 20 19* 20   CREATININE 0.62 0.59 0.49*        Recent Labs     23  0106   ALBUMIN 3.2 2.8*        MICRO:  No results found for: BLOODCULTU, BLDCULT, BCHOLD     Latest pertinent labs were reviewed    IMAGING STUDIES:  As above    Hospital Course/Assessment:   Gurmeet Jo is a 58 y.o. male patient with " progressive metastatic colon cancer on chemotherapy, admitted with neutropenic fever and found to have gram-negative bacteremia.  Chest x-ray with right middle lobe wedge-shaped opacity.    Pertinent Diagnoses:  Gram-negative bacteremia  Neutropenic fever  Community-acquired pneumonia  Ascites, abdominal discomfort  Port in place  Metastatic colon cancer  Immunosuppressed status    Plan:   -Organism identified as Providencia rettgeri, sensitivities pending.  Continue IV cefepime 2 g every 8 hours for now  -Primary team would like to salvage the port.  Decision regarding this would depend on clinical course, source of infection, persistence of bacteremia, susceptibility pattern of the organism and feasibility of lock therapy  -Repeat blood cultures x2   -CT abdomen and pelvis with liver mets and ascites, no obvious abscess  -Agree with paracentesis. Please also send for cell count and cultures    Disposition: Unable to determine at this time  Need for PICC line: Unable to determine at this time    Plan was discussed with the primary team, Nolan VARGAS.  This illness poses a threat to life    ID will follow. Please feel free to call with questions.    Ricardo Parry M.D.    Please note that this dictation was created using voice recognition software. I have worked with technical experts from [facility] to optimize the interface.  I have made every reasonable attempt to correct obvious errors, but there may be errors of grammar and possibly content that I did not discover before finalizing the note.

## 2023-06-12 PROBLEM — R21 LOCALIZED RASH: Status: ACTIVE | Noted: 2023-06-12

## 2023-06-12 PROBLEM — E83.39 HYPOPHOSPHATEMIA: Status: ACTIVE | Noted: 2023-06-12

## 2023-06-12 PROBLEM — B37.0 THRUSH, ORAL: Status: ACTIVE | Noted: 2023-06-12

## 2023-06-12 LAB
ALBUMIN SERPL BCP-MCNC: 3 G/DL (ref 3.2–4.9)
ALBUMIN/GLOB SERPL: 1.8 G/DL
ALP SERPL-CCNC: 163 U/L (ref 30–99)
ALT SERPL-CCNC: 35 U/L (ref 2–50)
ANION GAP SERPL CALC-SCNC: 10 MMOL/L (ref 7–16)
AST SERPL-CCNC: 50 U/L (ref 12–45)
BACTERIA BLD CULT: ABNORMAL
BACTERIA UR CULT: NORMAL
BASOPHILS # BLD AUTO: 0 % (ref 0–1.8)
BASOPHILS # BLD: 0 K/UL (ref 0–0.12)
BILIRUB SERPL-MCNC: 1.2 MG/DL (ref 0.1–1.5)
BUN SERPL-MCNC: 13 MG/DL (ref 8–22)
CALCIUM ALBUM COR SERPL-MCNC: 8.5 MG/DL (ref 8.5–10.5)
CALCIUM SERPL-MCNC: 7.7 MG/DL (ref 8.5–10.5)
CHLORIDE SERPL-SCNC: 104 MMOL/L (ref 96–112)
CO2 SERPL-SCNC: 20 MMOL/L (ref 20–33)
COMMENT NL1176: NORMAL
CREAT SERPL-MCNC: 0.35 MG/DL (ref 0.5–1.4)
EKG IMPRESSION: NORMAL
EOSINOPHIL # BLD AUTO: 0.1 K/UL (ref 0–0.51)
EOSINOPHIL NFR BLD: 6 % (ref 0–6.9)
ERYTHROCYTE [DISTWIDTH] IN BLOOD BY AUTOMATED COUNT: 48.4 FL (ref 35.9–50)
GFR SERPLBLD CREATININE-BSD FMLA CKD-EPI: 131 ML/MIN/1.73 M 2
GLOBULIN SER CALC-MCNC: 1.7 G/DL (ref 1.9–3.5)
GLUCOSE SERPL-MCNC: 129 MG/DL (ref 65–99)
GRAM STN SPEC: NORMAL
HCT VFR BLD AUTO: 26.1 % (ref 42–52)
HGB BLD-MCNC: 9.2 G/DL (ref 14–18)
LYMPHOCYTES # BLD AUTO: 0.29 K/UL (ref 1–4.8)
LYMPHOCYTES NFR BLD: 18 % (ref 22–41)
MAGNESIUM SERPL-MCNC: 1.9 MG/DL (ref 1.5–2.5)
MANUAL DIFF BLD: NORMAL
MCH RBC QN AUTO: 32.5 PG (ref 27–33)
MCHC RBC AUTO-ENTMCNC: 35.2 G/DL (ref 32.3–36.5)
MCV RBC AUTO: 92.2 FL (ref 81.4–97.8)
METAMYELOCYTES NFR BLD MANUAL: 1 %
MONOCYTES # BLD AUTO: 0.1 K/UL (ref 0–0.85)
MONOCYTES NFR BLD AUTO: 6 % (ref 0–13.4)
MORPHOLOGY BLD-IMP: NORMAL
MYELOCYTES NFR BLD MANUAL: 1 %
NEUTROPHILS # BLD AUTO: 1.09 K/UL (ref 1.82–7.42)
NEUTROPHILS NFR BLD: 66 % (ref 44–72)
NEUTS BAND NFR BLD MANUAL: 2 % (ref 0–10)
NRBC # BLD AUTO: 0 K/UL
NRBC BLD-RTO: 0 /100 WBC (ref 0–0.2)
OVALOCYTES BLD QL SMEAR: NORMAL
PHOSPHATE SERPL-MCNC: 1.7 MG/DL (ref 2.5–4.5)
PLATELET # BLD AUTO: 27 K/UL (ref 164–446)
PLATELET BLD QL SMEAR: NORMAL
PLATELETS.RETICULATED NFR BLD AUTO: 7 % (ref 0.6–13.1)
PMV BLD AUTO: 13 FL (ref 9–12.9)
POIKILOCYTOSIS BLD QL SMEAR: NORMAL
POTASSIUM SERPL-SCNC: 3 MMOL/L (ref 3.6–5.5)
PROT SERPL-MCNC: 4.7 G/DL (ref 6–8.2)
RBC # BLD AUTO: 2.83 M/UL (ref 4.7–6.1)
RBC BLD AUTO: PRESENT
SIGNIFICANT IND 70042: ABNORMAL
SIGNIFICANT IND 70042: ABNORMAL
SIGNIFICANT IND 70042: NORMAL
SIGNIFICANT IND 70042: NORMAL
SITE SITE: ABNORMAL
SITE SITE: ABNORMAL
SITE SITE: NORMAL
SITE SITE: NORMAL
SODIUM SERPL-SCNC: 134 MMOL/L (ref 135–145)
SOURCE SOURCE: ABNORMAL
SOURCE SOURCE: ABNORMAL
SOURCE SOURCE: NORMAL
SOURCE SOURCE: NORMAL
WBC # BLD AUTO: 1.6 K/UL (ref 4.8–10.8)

## 2023-06-12 PROCEDURE — 700105 HCHG RX REV CODE 258: Performed by: NURSE PRACTITIONER

## 2023-06-12 PROCEDURE — 80053 COMPREHEN METABOLIC PANEL: CPT

## 2023-06-12 PROCEDURE — 85055 RETICULATED PLATELET ASSAY: CPT

## 2023-06-12 PROCEDURE — 99233 SBSQ HOSP IP/OBS HIGH 50: CPT | Mod: FS | Performed by: INTERNAL MEDICINE

## 2023-06-12 PROCEDURE — 700102 HCHG RX REV CODE 250 W/ 637 OVERRIDE(OP): Performed by: STUDENT IN AN ORGANIZED HEALTH CARE EDUCATION/TRAINING PROGRAM

## 2023-06-12 PROCEDURE — 83735 ASSAY OF MAGNESIUM: CPT

## 2023-06-12 PROCEDURE — 93005 ELECTROCARDIOGRAM TRACING: CPT | Performed by: NURSE PRACTITIONER

## 2023-06-12 PROCEDURE — 85025 COMPLETE CBC W/AUTO DIFF WBC: CPT

## 2023-06-12 PROCEDURE — 770004 HCHG ROOM/CARE - ONCOLOGY PRIVATE *

## 2023-06-12 PROCEDURE — A9270 NON-COVERED ITEM OR SERVICE: HCPCS | Performed by: STUDENT IN AN ORGANIZED HEALTH CARE EDUCATION/TRAINING PROGRAM

## 2023-06-12 PROCEDURE — A9270 NON-COVERED ITEM OR SERVICE: HCPCS | Performed by: NURSE PRACTITIONER

## 2023-06-12 PROCEDURE — 700111 HCHG RX REV CODE 636 W/ 250 OVERRIDE (IP): Performed by: NURSE PRACTITIONER

## 2023-06-12 PROCEDURE — 700102 HCHG RX REV CODE 250 W/ 637 OVERRIDE(OP): Performed by: NURSE PRACTITIONER

## 2023-06-12 PROCEDURE — 99233 SBSQ HOSP IP/OBS HIGH 50: CPT | Performed by: INTERNAL MEDICINE

## 2023-06-12 PROCEDURE — 85007 BL SMEAR W/DIFF WBC COUNT: CPT

## 2023-06-12 PROCEDURE — 93010 ELECTROCARDIOGRAM REPORT: CPT | Performed by: INTERNAL MEDICINE

## 2023-06-12 PROCEDURE — 84100 ASSAY OF PHOSPHORUS: CPT

## 2023-06-12 RX ORDER — DIPHENHYDRAMINE HYDROCHLORIDE, ZINC ACETATE 2; .1 G/100G; G/100G
CREAM TOPICAL 4 TIMES DAILY PRN
Status: DISCONTINUED | OUTPATIENT
Start: 2023-06-12 | End: 2023-06-13 | Stop reason: HOSPADM

## 2023-06-12 RX ORDER — POTASSIUM CHLORIDE 20 MEQ/1
40 TABLET, EXTENDED RELEASE ORAL 2 TIMES DAILY
Status: COMPLETED | OUTPATIENT
Start: 2023-06-12 | End: 2023-06-12

## 2023-06-12 RX ADMIN — CEFEPIME 2 G: 2 INJECTION, POWDER, FOR SOLUTION INTRAVENOUS at 21:31

## 2023-06-12 RX ADMIN — DIBASIC SODIUM PHOSPHATE, MONOBASIC POTASSIUM PHOSPHATE AND MONOBASIC SODIUM PHOSPHATE 500 MG: 852; 155; 130 TABLET ORAL at 17:54

## 2023-06-12 RX ADMIN — POTASSIUM CHLORIDE 40 MEQ: 1500 TABLET, EXTENDED RELEASE ORAL at 09:30

## 2023-06-12 RX ADMIN — DIBASIC SODIUM PHOSPHATE, MONOBASIC POTASSIUM PHOSPHATE AND MONOBASIC SODIUM PHOSPHATE 500 MG: 852; 155; 130 TABLET ORAL at 09:30

## 2023-06-12 RX ADMIN — Medication: at 13:44

## 2023-06-12 RX ADMIN — NYSTATIN 500000 UNITS: 100000 SUSPENSION ORAL at 17:54

## 2023-06-12 RX ADMIN — CEFEPIME 2 G: 2 INJECTION, POWDER, FOR SOLUTION INTRAVENOUS at 05:07

## 2023-06-12 RX ADMIN — DIBASIC SODIUM PHOSPHATE, MONOBASIC POTASSIUM PHOSPHATE AND MONOBASIC SODIUM PHOSPHATE 500 MG: 852; 155; 130 TABLET ORAL at 13:44

## 2023-06-12 RX ADMIN — DAPAGLIFLOZIN 10 MG: 10 TABLET, FILM COATED ORAL at 05:07

## 2023-06-12 RX ADMIN — POTASSIUM CHLORIDE 40 MEQ: 1500 TABLET, EXTENDED RELEASE ORAL at 17:54

## 2023-06-12 RX ADMIN — Medication: at 21:29

## 2023-06-12 RX ADMIN — TBO-FILGRASTIM 480 MCG: 480 INJECTION, SOLUTION SUBCUTANEOUS at 13:44

## 2023-06-12 RX ADMIN — NYSTATIN 500000 UNITS: 100000 SUSPENSION ORAL at 21:28

## 2023-06-12 RX ADMIN — CEFEPIME 2 G: 2 INJECTION, POWDER, FOR SOLUTION INTRAVENOUS at 13:46

## 2023-06-12 ASSESSMENT — ENCOUNTER SYMPTOMS
BLOOD IN STOOL: 0
HEARTBURN: 0
VOMITING: 0
WEAKNESS: 0
NAUSEA: 0
MYALGIAS: 0
NERVOUS/ANXIOUS: 0
DIARRHEA: 0
DIARRHEA: 1
CHILLS: 0
HEADACHES: 0
FEVER: 0
DIAPHORESIS: 0
WEAKNESS: 1
SHORTNESS OF BREATH: 0
ABDOMINAL PAIN: 0
CONSTIPATION: 0
DIZZINESS: 0
BACK PAIN: 0
COUGH: 0
WHEEZING: 0
FLANK PAIN: 0

## 2023-06-12 ASSESSMENT — PAIN DESCRIPTION - PAIN TYPE
TYPE: ACUTE PAIN
TYPE: ACUTE PAIN

## 2023-06-12 NOTE — PROGRESS NOTES
"Hospital Medicine Daily Progress Note    Date of Service  6/12/2023    Chief Complaint  Gurmeet Jo is a 58 y.o. male admitted 6/9/2023 with fever    Hospital Course  Gurmeet Jo is a 58 y.o. male who presented 6/9/2023 with a fever at home. Dr. Pinzon advised the patient to present to emergency department.     Patient has a history of stage IV colorectal cancer.  He is currently on chemotherapy, last received chemotherapy 3 days prior to admission.  He received his first therapeutic paracentesis 1 week ago.  He sees [facility] for oncology outpatient.     The patient checked his temperature at home and noted to have a fever.      \"In the ED, patient found to have normal vital signs.  Pertinent labs include white blood cell count 1.0, hemoglobin 11.4, platelet 49, alkaline phosphatase 240, total bilirubin 2.4.  Chest x-ray showing ill-defined opacity in the right midlung field.\"    Interval Problem Update  Patient is lying in bed, fully alert and oriented.  He is pleasant and cooperative.  Family at bedside. The patient complains of feeling of heart racing and palpitations when he was woken up for labs this morning.  This spontaneously resolved.  Severe itching in his abdominal area.  Denies fevers, chills, nausea, dizziness, problems breathing and any other problems.   -EKG prn palpitations or heart racing  -Abdomen has mild red rash. Soft, nontender. Benadryl cream.  -diagnostic and therapeutic paracentesis yesterday. >10 L removed. Cultures negative to date. Awaiting cytology.  -ID following. Blood cultures: Providencia rettgeri  -CT of abdomen/pelvis shows interval improvement of hepatic metastases, trace bilateral pleural effusions  -repeat blood cultures 6/11 no growth to date.  Continue cefepime.  ID may be able to salvage chest port, however it may need to be removed.    I have discussed this patient's plan of care and discharge plan at IDT rounds today with Case Management, Nursing, Nursing leadership, " and other members of the IDT team.  Discussed with Dr. Shin.    Consultants/Specialty  oncology    Code Status  DNAR/DNI    Disposition  Medically Cleared  I have placed the appropriate orders for post-discharge needs.    Review of Systems  Review of Systems   Constitutional:  Negative for chills, diaphoresis, fever and malaise/fatigue.   Respiratory:  Negative for shortness of breath and wheezing.    Cardiovascular:  Negative for chest pain and leg swelling.   Gastrointestinal:  Positive for diarrhea. Negative for abdominal pain, blood in stool, constipation, heartburn, melena, nausea and vomiting.   Genitourinary:  Negative for flank pain and hematuria.   Musculoskeletal:  Negative for back pain and myalgias.   Neurological:  Negative for dizziness, weakness and headaches.   Psychiatric/Behavioral:  The patient is not nervous/anxious.         Physical Exam  Temp:  [36.3 °C (97.4 °F)-37.2 °C (99 °F)] 36.3 °C (97.4 °F)  Pulse:  [65-75] 73  Resp:  [16-19] 18  BP: ()/(54-72) 100/72  SpO2:  [96 %-99 %] 98 %    Physical Exam  Vitals and nursing note reviewed.   Constitutional:       General: He is awake.      Appearance: Normal appearance. He is normal weight. He is ill-appearing.   HENT:      Head: Normocephalic.      Nose: Nose normal.      Mouth/Throat:      Mouth: Mucous membranes are moist.   Cardiovascular:      Rate and Rhythm: Normal rate and regular rhythm.      Pulses: Normal pulses.   Pulmonary:      Effort: Pulmonary effort is normal.      Breath sounds: Normal breath sounds.   Abdominal:      General: Bowel sounds are normal. There is no distension.      Palpations: Abdomen is soft. There is fluid wave.      Tenderness: There is no abdominal tenderness.      Comments: Ascitic abdomen noted. Significantly improved since paracentesis   Musculoskeletal:         General: No swelling or tenderness. Normal range of motion.      Cervical back: Normal range of motion.   Skin:     General: Skin is warm.    Neurological:      General: No focal deficit present.      Mental Status: He is alert and oriented to person, place, and time. Mental status is at baseline.   Psychiatric:         Attention and Perception: Attention normal.         Mood and Affect: Mood normal.         Behavior: Behavior normal. Behavior is cooperative.         Thought Content: Thought content normal.         Judgment: Judgment normal.         Fluids  No intake or output data in the 24 hours ending 06/12/23 1222      Laboratory  Recent Labs     06/10/23  0032 06/11/23  0106 06/12/23  0012   WBC 0.5* 0.7* 1.6*   RBC 3.36* 3.36* 2.83*   HEMOGLOBIN 10.8* 11.0* 9.2*   HEMATOCRIT 32.1* 31.7* 26.1*   MCV 95.5 94.3 92.2   MCH 32.1 32.7 32.5   MCHC 33.6 34.7 35.2   RDW 51.9* 50.6* 48.4   PLATELETCT 44* 35* 27*   MPV 11.3 11.8 13.0*       Recent Labs     06/10/23  0032 06/11/23  0106 06/12/23  0012   SODIUM 131* 131* 134*   POTASSIUM 4.0 4.0 3.0*   CHLORIDE 102 101 104   CO2 19* 20 20   GLUCOSE 110* 137* 129*   BUN 19 15 13   CREATININE 0.59 0.49* 0.35*   CALCIUM 7.6* 7.8* 7.7*       Recent Labs     06/09/23  1447   APTT 30.1   INR 1.53*                 Imaging  US-PARACENTESIS, ABD WITH IMAGING   Final Result      1. Ultrasound-guided diagnostic and therapeutic paracentesis of the left lower quadrant of the abdominal wall.      2. 10,500 mL of fluid withdrawn.      CT-ABDOMEN-PELVIS WITH   Final Result      1.  There are bilobar hepatic metastasis with interval improvement and partial response to therapy.   2.  There are changes of the liver consistent with hepatocellular disease including associated splenomegaly and numerous varices in the upper abdomen consistent with portal hypertension.   3.  There is increase in the amount of ascites, moderate to large in amount.   4.  No enhancing fluid collection to suggest abscess.   5.  No bowel obstruction. Small hiatal hernia.   6.  Trace of dependent pleural effusion with dependent atelectasis.       DX-CHEST-PORTABLE (1 VIEW)   Final Result      1.  Ill-defined opacity in the right midlung periphery. Early pneumonia or recent pneumonia should be considered in the appropriate clinical settings.   2.  Left basilar atelectasis. No significant effusions.                Assessment/Plan  * Bacteremia due to Gram-negative bacteria- (present on admission)  Assessment & Plan  Patient has history of stage IV colorectal cancer with metastasis to liver.  Currently on chemotherapy and found to be febrile at home today and was recommended to come to the ED for evaluation.  In the ER, found to have white blood cell count of 1 and chest x-ray showing a possible pneumonia in the right midlung field.  UA negative.  Vancomycin and cefepime administered in the ED  Providencia rettgawilda   ID consulted. OK to DC vancomycin, continue cefepime  CT abdomen/pelvis as above  Repeat blood cultures no growth to date  ID will attempt to salvage chest port  Diagnostic paracentesis results pending    Malignant ascites- (present on admission)  Assessment & Plan  Therapeutic paracentesis 6/2  Repeat paracentesis 6/12 with over 10L out. Cultures negative to date, cell count and cytology pending    Febrile neutropenia (HCC)- (present on admission)  Assessment & Plan  Secondary to bacteremia    Metastatic colon cancer to liver (HCC)- (present on admission)  Assessment & Plan  Patient has known history of colorectal cancer with metastasis to the liver.  Following with oncology outpatient, last received chemotherapy 3 days prior to admission.  Ascites cytology ordered    Pancytopenia (HCC)- (present on admission)  Assessment & Plan  Granix okay per oncology, continue daily for ANC <1500  Noted to have pancytopenia secondary to chemotherapy administration  Serial CBC      Localized rash  Assessment & Plan  Abdomen  Possible from severe abdominal distension  Paracentesis completed 6/11  Benadryl cream    Pneumonia- (present on admission)  Assessment  & Plan  Right middle lobe opacity on xray  asymptomatic  On cefepime  ID following    Diarrhea  Assessment & Plan  likely etiology: antibiotics  C. difficile studies negative  Loperamide as needed    ACP (advance care planning)- (present on admission)  Assessment & Plan  I discussed advance care planning with the patient and  patient's family for at least 16 minutes, including diagnosis, prognosis, plan of care, risks and benefits of any therapies that could be offered, as well as alternatives including palliation and hospice, as appropriate.     DNR/DNI.       DM (diabetes mellitus) (Former[facility])- (present on admission)  Assessment & Plan  Well-managed with Jardiance         VTE prophylaxis: SCDs/TEDs and pharmacologic prophylaxis contraindicated due to thrombocytopenia    I have performed a physical exam and reviewed and updated ROS and Plan today (6/12/2023). In review of yesterday's note (6/11/2023), there are no changes except as documented above.    My total time spent caring for the patient on the day of the encounter was 17 minutes.   This does not include time spent on separately billable procedures/tests. This time is exclusive of the time spent with collaborating physicians.

## 2023-06-12 NOTE — CARE PLAN
The patient is Watcher - Medium risk of patient condition declining or worsening    Shift Goals  Clinical Goals: Complete Abx treatment, Monitor labs  Patient Goals: Rest  Family Goals: Pt will have a paracentesis    Progress made toward(s) clinical / shift goals:        Problem: Knowledge Deficit - Standard  Goal: Patient and family/care givers will demonstrate understanding of plan of care, disease process/condition, diagnostic tests and medications  Description: Target End Date:  1-3 days or as soon as patient condition allows    Document in Patient Education    1.  Patient and family/caregiver oriented to unit, equipment, visitation policy and means for communicating concern  2.  Complete/review Learning Assessment  3.  Assess knowledge level of disease process/condition, treatment plan, diagnostic tests and medications  4.  Explain disease process/condition, treatment plan, diagnostic tests and medications  Outcome: Progressing  Note: Patient understands the need for continued monitoring of labs as well as abx treatment.

## 2023-06-12 NOTE — DISCHARGE PLANNING
Met with pt and his children who were at the bedside. He reports being independent with ADLs and IADLs. But his children are able to help him if needed.     PCP is Dr. Dunia Smith   Pharmacy is [facility] in [place].     Care Transition Team Assessment    Information Source  Orientation Level: Oriented X4  Information Given By: Patient  Who is responsible for making decisions for patient? : Patient    Readmission Evaluation  Is this a readmission?: No    Elopement Risk  Legal Hold: No  Ambulatory or Self Mobile in Wheelchair: Yes  Disoriented: No  Psychiatric Symptoms: None  History of Wandering: No  Elopement this Admit: No  Vocalizing Wanting to Leave: No  Displays Behaviors, Body Language Wanting to Leave: No-Not at Risk for Elopement  Elopement Risk: Not at Risk for Elopement    Interdisciplinary Discharge Planning  Does Admitting Nurse Feel This Could be a Complex Discharge?: No  Primary Care Physician: Dunia Smith PAC  Lives with - Patient's Self Care Capacity: Adult Children  Patient or legal guardian wants to designate a caregiver: No  Support Systems: Family Member(s)  Housing / Facility: [address]  Do You Take your Prescribed Medications Regularly: Yes  Able to Return to Previous ADL's: Yes  Mobility Issues: No  Prior Services: Home-Independent  Patient Prefers to be Discharged to:: Home  Assistance Needed: No  Durable Medical Equipment: Not Applicable    Discharge Preparedness  What is your plan after discharge?: Home with help  What are your discharge supports?: Child  Prior Functional Level: Ambulatory, Drives Self, Independent with Activities of Daily Living, Independent with Medication Management  Difficulity with ADLs: None  Difficulity with IADLs: None    Functional Assesment  Prior Functional Level: Ambulatory, Drives Self, Independent with Activities of Daily Living, Independent with Medication Management    Finances  Financial Barriers to Discharge: No  Prescription Coverage: Yes    Vision /  Hearing Impairment  Vision Impairment : Yes  Right Eye Vision: Impaired, Wears Glasses  Left Eye Vision: Impaired, Wears Glasses  Hearing Impairment : No    Values / Beliefs / Concerns  Values / Beliefs Concerns : No    Advance Directive  Advance Directive?: None    Domestic Abuse  Have you ever been the victim of abuse or violence?: No  Physical Abuse or Sexual Abuse: No  Verbal Abuse or Emotional Abuse: No  Possible Abuse/Neglect Reported to:: Not Applicable    Psychological Assessment  History of Substance Abuse: None    Discharge Risks or Barriers  Discharge risks or barriers?: No  Patient risk factors: Other (comment)    Anticipated Discharge Information  Discharge Disposition: Discharged to home/self care (01)

## 2023-06-12 NOTE — DISCHARGE PLANNING
note:  Discussed with IDT and per MD, still waiting for cultures and ID will give recommendations. This will determine what pt will need for discharge.

## 2023-06-13 ENCOUNTER — APPOINTMENT (OUTPATIENT)
Dept: ONCOLOGY | Facility: MEDICAL CENTER | Age: 58
End: 2023-06-13
Payer: MEDICARE

## 2023-06-13 ENCOUNTER — DOCUMENTATION (OUTPATIENT)
Dept: INFECTIOUS DISEASES | Facility: MEDICAL CENTER | Age: 58
End: 2023-06-13
Payer: MEDICARE

## 2023-06-13 VITALS
RESPIRATION RATE: 17 BRPM | WEIGHT: 199.96 LBS | HEART RATE: 78 BPM | HEIGHT: 74 IN | OXYGEN SATURATION: 99 % | BODY MASS INDEX: 25.66 KG/M2 | DIASTOLIC BLOOD PRESSURE: 71 MMHG | TEMPERATURE: 97.5 F | SYSTOLIC BLOOD PRESSURE: 110 MMHG

## 2023-06-13 DIAGNOSIS — C78.7 METASTATIC COLON CANCER TO LIVER (HCC): ICD-10-CM

## 2023-06-13 DIAGNOSIS — C18.9 METASTATIC COLON CANCER TO LIVER (HCC): ICD-10-CM

## 2023-06-13 DIAGNOSIS — R78.81 BACTEREMIA DUE TO GRAM-NEGATIVE BACTERIA: ICD-10-CM

## 2023-06-13 PROBLEM — J18.9 PNEUMONIA: Status: RESOLVED | Noted: 2023-06-11 | Resolved: 2023-06-13

## 2023-06-13 PROBLEM — E83.39 HYPOPHOSPHATEMIA: Status: RESOLVED | Noted: 2023-06-12 | Resolved: 2023-06-13

## 2023-06-13 PROBLEM — D70.9 FEBRILE NEUTROPENIA (HCC): Status: RESOLVED | Noted: 2023-06-09 | Resolved: 2023-06-13

## 2023-06-13 PROBLEM — R21 LOCALIZED RASH: Status: RESOLVED | Noted: 2023-06-12 | Resolved: 2023-06-13

## 2023-06-13 PROBLEM — Z71.89 ACP (ADVANCE CARE PLANNING): Status: RESOLVED | Noted: 2023-03-27 | Resolved: 2023-06-13

## 2023-06-13 PROBLEM — R50.81 FEBRILE NEUTROPENIA (HCC): Status: RESOLVED | Noted: 2023-06-09 | Resolved: 2023-06-13

## 2023-06-13 LAB
ALBUMIN SERPL BCP-MCNC: 2.9 G/DL (ref 3.2–4.9)
ALBUMIN/GLOB SERPL: 1.8 G/DL
ALP SERPL-CCNC: 232 U/L (ref 30–99)
ALT SERPL-CCNC: 71 U/L (ref 2–50)
ANION GAP SERPL CALC-SCNC: 11 MMOL/L (ref 7–16)
AST SERPL-CCNC: 85 U/L (ref 12–45)
BASOPHILS # BLD AUTO: 0 % (ref 0–1.8)
BASOPHILS # BLD: 0 K/UL (ref 0–0.12)
BILIRUB SERPL-MCNC: 1.1 MG/DL (ref 0.1–1.5)
BUN SERPL-MCNC: 13 MG/DL (ref 8–22)
CALCIUM ALBUM COR SERPL-MCNC: 8.6 MG/DL (ref 8.5–10.5)
CALCIUM SERPL-MCNC: 7.7 MG/DL (ref 8.5–10.5)
CHLORIDE SERPL-SCNC: 105 MMOL/L (ref 96–112)
CO2 SERPL-SCNC: 19 MMOL/L (ref 20–33)
CREAT SERPL-MCNC: 0.39 MG/DL (ref 0.5–1.4)
EOSINOPHIL # BLD AUTO: 0.24 K/UL (ref 0–0.51)
EOSINOPHIL NFR BLD: 7 % (ref 0–6.9)
ERYTHROCYTE [DISTWIDTH] IN BLOOD BY AUTOMATED COUNT: 48.5 FL (ref 35.9–50)
GFR SERPLBLD CREATININE-BSD FMLA CKD-EPI: 127 ML/MIN/1.73 M 2
GLOBULIN SER CALC-MCNC: 1.6 G/DL (ref 1.9–3.5)
GLUCOSE SERPL-MCNC: 134 MG/DL (ref 65–99)
HCT VFR BLD AUTO: 30.1 % (ref 42–52)
HGB BLD-MCNC: 10.5 G/DL (ref 14–18)
LYMPHOCYTES # BLD AUTO: 0.51 K/UL (ref 1–4.8)
LYMPHOCYTES NFR BLD: 15 % (ref 22–41)
MANUAL DIFF BLD: NORMAL
MCH RBC QN AUTO: 31.7 PG (ref 27–33)
MCHC RBC AUTO-ENTMCNC: 34.9 G/DL (ref 32.3–36.5)
MCV RBC AUTO: 90.9 FL (ref 81.4–97.8)
MONOCYTES # BLD AUTO: 0.24 K/UL (ref 0–0.85)
MONOCYTES NFR BLD AUTO: 7 % (ref 0–13.4)
MORPHOLOGY BLD-IMP: NORMAL
NEUTROPHILS # BLD AUTO: 2.41 K/UL (ref 1.82–7.42)
NEUTROPHILS NFR BLD: 71 % (ref 44–72)
NRBC # BLD AUTO: 0 K/UL
NRBC BLD-RTO: 0 /100 WBC (ref 0–0.2)
OVALOCYTES BLD QL SMEAR: NORMAL
PLATELET # BLD AUTO: 30 K/UL (ref 164–446)
PLATELET BLD QL SMEAR: NORMAL
PLATELETS.RETICULATED NFR BLD AUTO: 8.9 % (ref 0.6–13.1)
PMV BLD AUTO: 13.1 FL (ref 9–12.9)
POIKILOCYTOSIS BLD QL SMEAR: NORMAL
POTASSIUM SERPL-SCNC: 3.4 MMOL/L (ref 3.6–5.5)
PROT SERPL-MCNC: 4.5 G/DL (ref 6–8.2)
RBC # BLD AUTO: 3.31 M/UL (ref 4.7–6.1)
RBC BLD AUTO: PRESENT
SODIUM SERPL-SCNC: 135 MMOL/L (ref 135–145)
WBC # BLD AUTO: 3.4 K/UL (ref 4.8–10.8)

## 2023-06-13 PROCEDURE — 80053 COMPREHEN METABOLIC PANEL: CPT

## 2023-06-13 PROCEDURE — 85025 COMPLETE CBC W/AUTO DIFF WBC: CPT

## 2023-06-13 PROCEDURE — A9270 NON-COVERED ITEM OR SERVICE: HCPCS | Performed by: NURSE PRACTITIONER

## 2023-06-13 PROCEDURE — 700105 HCHG RX REV CODE 258: Performed by: NURSE PRACTITIONER

## 2023-06-13 PROCEDURE — 700111 HCHG RX REV CODE 636 W/ 250 OVERRIDE (IP): Performed by: NURSE PRACTITIONER

## 2023-06-13 PROCEDURE — 700102 HCHG RX REV CODE 250 W/ 637 OVERRIDE(OP): Performed by: STUDENT IN AN ORGANIZED HEALTH CARE EDUCATION/TRAINING PROGRAM

## 2023-06-13 PROCEDURE — 700102 HCHG RX REV CODE 250 W/ 637 OVERRIDE(OP): Performed by: NURSE PRACTITIONER

## 2023-06-13 PROCEDURE — 85007 BL SMEAR W/DIFF WBC COUNT: CPT

## 2023-06-13 PROCEDURE — A9270 NON-COVERED ITEM OR SERVICE: HCPCS | Performed by: STUDENT IN AN ORGANIZED HEALTH CARE EDUCATION/TRAINING PROGRAM

## 2023-06-13 PROCEDURE — 99239 HOSP IP/OBS DSCHRG MGMT >30: CPT | Mod: FS | Performed by: NURSE PRACTITIONER

## 2023-06-13 PROCEDURE — 99233 SBSQ HOSP IP/OBS HIGH 50: CPT | Performed by: INTERNAL MEDICINE

## 2023-06-13 PROCEDURE — 85055 RETICULATED PLATELET ASSAY: CPT

## 2023-06-13 RX ORDER — DIPHENHYDRAMINE HYDROCHLORIDE, ZINC ACETATE 2; .1 G/100G; G/100G
30 CREAM TOPICAL 4 TIMES DAILY PRN
Qty: 1 EACH | Refills: 1 | Status: SHIPPED | OUTPATIENT
Start: 2023-06-13

## 2023-06-13 RX ORDER — CIPROFLOXACIN 500 MG/1
500 TABLET, FILM COATED ORAL 2 TIMES DAILY
Qty: 24 TABLET | Refills: 0 | Status: ACTIVE | OUTPATIENT
Start: 2023-06-13 | End: 2023-06-25

## 2023-06-13 RX ADMIN — CEFEPIME 2 G: 2 INJECTION, POWDER, FOR SOLUTION INTRAVENOUS at 05:25

## 2023-06-13 RX ADMIN — NYSTATIN 500000 UNITS: 100000 SUSPENSION ORAL at 09:41

## 2023-06-13 RX ADMIN — DAPAGLIFLOZIN 10 MG: 10 TABLET, FILM COATED ORAL at 05:24

## 2023-06-13 RX ADMIN — HEPARIN 500 UNITS: 100 SYRINGE at 14:37

## 2023-06-13 RX ADMIN — Medication: at 09:41

## 2023-06-13 ASSESSMENT — ENCOUNTER SYMPTOMS
CONSTIPATION: 0
NAUSEA: 0
DIARRHEA: 0
WEAKNESS: 1
MYALGIAS: 0
COUGH: 0
NERVOUS/ANXIOUS: 0
ABDOMINAL PAIN: 0
SHORTNESS OF BREATH: 0
VOMITING: 0

## 2023-06-13 ASSESSMENT — PAIN DESCRIPTION - PAIN TYPE: TYPE: ACUTE PAIN

## 2023-06-13 NOTE — PROGRESS NOTES
Infectious Disease Progress Note    Author: Stella Arias M.D. Date & Time of service: 2023  7:51 AM       Chief Complaint:  Neutropenic fever, bacteremia     Interval History:      Review of Systems:  Review of Systems   Respiratory:  Negative for cough and shortness of breath.    Gastrointestinal:  Negative for abdominal pain, constipation, diarrhea, nausea and vomiting.   Genitourinary:  Negative for dysuria.   Musculoskeletal:  Negative for myalgias.   Neurological:  Positive for weakness.   Psychiatric/Behavioral:  The patient is not nervous/anxious.        Hemodynamics:  Temp (24hrs), Av.8 °C (98.2 °F), Min:36.7 °C (98.1 °F), Max:36.8 °C (98.2 °F)  Temperature: 36.8 °C (98.2 °F)  Pulse  Av.6  Min: 65  Max: 89   Blood Pressure: 98/60       Physical Exam:  Physical Exam  Cardiovascular:      Rate and Rhythm: Normal rate and regular rhythm.      Heart sounds: Normal heart sounds.   Pulmonary:      Effort: Pulmonary effort is normal.      Breath sounds: Normal breath sounds.   Abdominal:      General: There is distension.      Palpations: Abdomen is soft.      Tenderness: There is no abdominal tenderness.   Musculoskeletal:         General: Normal range of motion.   Skin:     General: Skin is warm and dry.   Neurological:      General: No focal deficit present.      Mental Status: He is oriented to person, place, and time.   Psychiatric:         Mood and Affect: Mood normal.         Behavior: Behavior normal.         Meds:    Current Facility-Administered Medications:     diphenhydrAMINE-zinc acetate    nystatin    loperamide    cefepime    tbo-filgrastim    acetaminophen    baclofen    dapagliflozin propanediol    ondansetron    Labs:  Recent Labs     23  0106 23  0012 23  0032   WBC 0.7* 1.6* 3.4*   RBC 3.36* 2.83* 3.31*   HEMOGLOBIN 11.0* 9.2* 10.5*   HEMATOCRIT 31.7* 26.1* 30.1*   MCV 94.3 92.2 90.9   MCH 32.7 32.5 31.7   RDW 50.6* 48.4 48.5   PLATELETCT 35* 27* 30*   MPV  11.8 13.0* 13.1*   NEUTSPOLYS 70.00 66.00 71.00   LYMPHOCYTES 17.00* 18.00* 15.00*   MONOCYTES 8.00 6.00 7.00   EOSINOPHILS 4.00 6.00 7.00*   BASOPHILS 0.00 0.00 0.00   RBCMORPHOLO Present Present Present     Recent Labs     06/11/23  0106 06/12/23  0012 06/13/23  0032   SODIUM 131* 134* 135   POTASSIUM 4.0 3.0* 3.4*   CHLORIDE 101 104 105   CO2 20 20 19*   GLUCOSE 137* 129* 134*   BUN 15 13 13     Recent Labs     06/11/23  0106 06/11/23  1422 06/12/23  0012 06/13/23  0032   ALBUMIN 2.8* 0.6 3.0* 2.9*   TBILIRUBIN 1.8*  --  1.2 1.1   ALKPHOSPHAT 195*  --  163* 232*   TOTPROTEIN 5.0*  --  4.7* 4.5*   ALTSGPT 39  --  35 71*   ASTSGOT 47*  --  50* 85*   CREATININE 0.49*  --  0.35* 0.39*       Imaging:  US-PARACENTESIS, ABD WITH IMAGING    Result Date: 6/11/2023 6/11/2023 1:12 PM HISTORY/REASON FOR EXAM:  Ascites TECHNIQUE/EXAM DESCRIPTION: Ultrasound-guided paracentesis. COMPARISON:  None PROCEDURE:  Informed consent was obtained. A timeout was taken. Ascites was localized with real-time ultrasound. The left lower quadrant of the abdominal wall was prepared and draped in a sterile manner. Following local anesthesia with 1% lidocaine, a 5 [place] Yueh pigtail catheter was advanced into the peritoneal cavity with trocar technique. Ascites was drained. The patient tolerated the procedure well with no evidence of complication. FINDINGS: Ascites is present. Fluid was sent to the laboratory. Fluid character: Milky yellow     1. Ultrasound-guided diagnostic and therapeutic paracentesis of the left lower quadrant of the abdominal wall. 2. 10,500 mL of fluid withdrawn.    CT-ABDOMEN-PELVIS WITH    Result Date: 6/10/2023  6/10/2023 4:52 PM HISTORY/REASON FOR EXAM:  neutropenic fever with metastatic colorectal cancer to liver. TECHNIQUE/EXAM DESCRIPTION:   CT scan of the abdomen and pelvis with contrast. Contrast-enhanced helical scanning was obtained from the diaphragmatic domes through the pubic symphysis following the bolus  administration of nonionic contrast without complication. 100 mL of Omnipaque 350 nonionic contrast was administered without complication. Low dose optimization technique was utilized for this CT exam including automated exposure control and adjustment of the mA and/or kV according to patient size. COMPARISON: CT 5/4/2023 FINDINGS: Lower Chest: There is bibasilar dependent atelectasis. There is a calcified granuloma left lung base. There is a trace of dependent bilateral pleural fluid. Liver: Liver again demonstrates a posterior right hepatic lobe calcified metastasis metastasis versus posttreatment change which is stable. Other calcified metastasis also seen in the inferior right hepatic lobe. There is multifocal heterogeneous irregular hypodensity seen throughout the majority of the right lobe consistent with metastatic disease. A representative lesion in the posterior right hepatic lobe measures 2.6 x 2.5 cm (previously 2.9 x 3.2 cm). A representative posterior lateral segment left hepatic lobe lesion measures 2.1 x 2.0 cm (previously 2.7 x 2.4 cm). Spleen: Splenomegaly is unchanged. Pancreas: Unremarkable. Gallbladder: No calcified stones. Biliary: Gallbladder is contracted with a small dependent stone. Adrenal glands: Normal. Kidneys: Kidneys are unchanged with a left renal parapelvic cyst again seen. No follow up imaging recommended per ACR guidelines. Lymph nodes: Small upper abdominal and retroperitoneal lymph nodes do not appear significant changed. Vasculature: There is mild atherosclerosis. There are paraesophageal varices. There is a recanalized umbilical vein. Hepatic veins and portal veins are grossly patent. Splenic vein and SMV are patent. Bowel: There is a small hiatal hernia. Stomach is decompressed. No evidence of small bowel obstruction. Peritoneum: Interval increase in the ascites now considered moderate to large amount of ascites. Pelvis: Bladder is unremarkable. Musculoskeletal: Old injury  involving the inferior right scapula again noted. There are degenerative changes throughout the spine and pelvis. There is a right sacral sclerotic area, possibly bone island again seen.     1.  There are bilobar hepatic metastasis with interval improvement and partial response to therapy. 2.  There are changes of the liver consistent with hepatocellular disease including associated splenomegaly and numerous varices in the upper abdomen consistent with portal hypertension. 3.  There is increase in the amount of ascites, moderate to large in amount. 4.  No enhancing fluid collection to suggest abscess. 5.  No bowel obstruction. Small hiatal hernia. 6.  Trace of dependent pleural effusion with dependent atelectasis.    DX-CHEST-PORTABLE (1 VIEW)    Result Date: 6/9/2023 6/9/2023 3:27 PM HISTORY/REASON FOR EXAM:  Neutropenic Fever. TECHNIQUE/EXAM DESCRIPTION AND NUMBER OF VIEWS: Single portable view of the chest. COMPARISON:  4/5/2023 FINDINGS: LUNGS: Ill-defined opacity in the right midlung periphery. Left basilar atelectasis. No effusions. PNEUMOTHORAX: None. LINES AND TUBES: Right-sided chest port is well-positioned. MEDIASTINUM: No cardiomegaly. Atherosclerosis. MUSCULOSKELETAL STRUCTURES: No acute displaced fracture.     1.  Ill-defined opacity in the right midlung periphery. Early pneumonia or recent pneumonia should be considered in the appropriate clinical settings. 2.  Left basilar atelectasis. No significant effusions.     IR-PARACENTESIS, ABD W/O IMAGING    Result Date: 6/6/2023 6/2/2023 2:15 PM HISTORY/REASON FOR EXAM:  PARACENTESIS, THERAPEUTIC HISTORY/REASON FOR EXAM:  Symptomatic ascites TECHNIQUE/EXAM DESCRIPTION: Ultrasound-guided paracentesis. COMPARISON:  None PROCEDURE:  Informed consent was obtained. A timeout was taken. Ascites was localized with real-time ultrasound. The right lower quadrant of the abdominal wall was prepared and draped in a sterile manner. Following local anesthesia with 1%  lidocaine, a 5  [place]ammarinese Yueh catheter was advanced into the peritoneal cavity using real-time sonographic guidance with trocar technique. Ascites was drained. The patient tolerated the procedure well with no evidence of complication. FINDINGS: Ascites is present. Fluid was not sent to the laboratory. Fluid character: straw colored     1. Ultrasound-guided therapeutic paracentesis 2. 9570 mL of fluid withdrawn.      Micro:  Results       Procedure Component Value Units Date/Time    FLUID CULTURE W/GRAM STAIN [[id number]] Collected: 06/11/23 1422    Order Status: Completed Specimen: Body Fluid Updated: 06/12/23 1702     Significant Indicator NEG     Source BF     Site Peritoneal Fluid     Culture Result No growth at 24 hours.     Gram Stain Result Few WBCs.  No organisms seen.      Narrative:      Protective  Peritoneal Fluid    Urine Culture [[id number]] Collected: 06/10/23 0020    Order Status: Completed Specimen: Urine, Clean Catch Updated: 06/12/23 0713     Significant Indicator NEG     Source UR     Site URINE, CLEAN CATCH     Culture Result No growth at 48 hours.    Narrative:      Protective  Indication for culture:->Kidney transplant recipient,  Neutropenic patient, after urologic procedure/surgery or  Urinary tract obstruction with fever >100.4F  Protective    Blood Culture [[id number]]  (Abnormal) Collected: 06/09/23 1533    Order Status: Completed Specimen: Blood from Line Updated: 06/12/23 0635     Significant Indicator POS     Source BLD     Site LINE     Culture Result Growth detected by Bactec instrument. 06/10/2023  04:25      Providencia rettgeri  See previous culture for sensitivity report.      Narrative:      CALL  Rodriguez  CNU tel. [phone],  CALLED  CNU tel. [phone] 06/10/2023, 04:30, RB PERF. RESULTS CALLED TO:  [id number] RN  Protective  Blood Cultures X2. Draw one blood culture from central line  and one blood culture peripherally. If no central line  present draw blood cultures times two  peripherally  Port    Blood Culture [[id number]]  (Abnormal)  (Susceptibility) Collected: 06/09/23 1447    Order Status: Completed Specimen: Blood from Peripheral Updated: 06/12/23 0635     Significant Indicator POS     Source BLD     Site PERIPHERAL     Culture Result Growth detected by Bactec instrument. 06/10/2023  04:26      Providencia rettgeri    Narrative:      CALL  Rodriguez  CNU tel. [phone],  CALLED  CNU tel. [phone] 06/10/2023, 04:30, RB PERF. RESULTS CALLED TO:  [id number] RN  Protective  Blood Cultures X2. Draw one blood culture from central line  and one blood culture peripherally. If no central line  present draw blood cultures times two peripherally  Right AC    Susceptibility       Providencia rettgeri (1)       Antibiotic Interpretation Microscan   Method Status    Ampicillin Resistant <=8 mcg/mL LILLIAN Final    Ceftriaxone Sensitive <=1 mcg/mL LILLIAN Final    Cefazolin Resistant >16 mcg/mL LILLIAN Final    Ciprofloxacin Sensitive <=0.25 mcg/mL LILLIAN Final    Cefepime Sensitive <=2 mcg/mL LILLIAN Final    Cefuroxime Sensitive <=4 mcg/mL LILLIAN Final    Ampicillin/sulbactam Sensitive 8/4 mcg/mL LILLIAN Final    Ertapenem Sensitive <=0.5 mcg/mL LILLIAN Final    Tobramycin Sensitive <=2 mcg/mL LILLIAN Final    Gentamicin Sensitive <=2 mcg/mL LILLIAN Final    Minocycline Resistant 8 mcg/mL LILLIAN Final    Moxifloxacin Sensitive <=2 mcg/mL LILLIAN Final    Pip/Tazobactam Sensitive <=8 mcg/mL LILLIAN Final    Trimeth/Sulfa Sensitive <=0.5/9.5 mcg/mL LILLIAN Final    Tigecycline Resistant <=2 mcg/mL LILLIAN Final                       BLOOD CULTURE [[id number]] Collected: 06/11/23 0106    Order Status: Completed Specimen: Blood from Peripheral Updated: 06/12/23 0626     Significant Indicator NEG     Source BLD     Site PERIPHERAL     Culture Result No Growth  Note: Blood cultures are incubated for 5 days and  are monitored continuously.Positive blood cultures  are called to the RN and reported as soon as  they are identified.      Narrative:      Protective  Per  "Hospital Policy: Only change Specimen Src: to \"Line\" if  specified by physician order.  Left AC    BLOOD CULTURE [[id number]] Collected: 06/11/23 0106    Order Status: Completed Specimen: Blood from Peripheral Updated: 06/12/23 0626     Significant Indicator NEG     Source BLD     Site PERIPHERAL     Culture Result No Growth  Note: Blood cultures are incubated for 5 days and  are monitored continuously.Positive blood cultures  are called to the RN and reported as soon as  they are identified.      Narrative:      Protective  Per Hospital Policy: Only change Specimen Src: to \"Line\" if  specified by physician order.  Right AC    GRAM STAIN [[id number]] Collected: 06/11/23 1422    Order Status: Completed Specimen: Body Fluid Updated: 06/12/23 0600     Significant Indicator .     Source BF     Site Peritoneal Fluid     Gram Stain Result Few WBCs.  No organisms seen.      Narrative:      Protective  Peritoneal Fluid    Fluid Culture with Gram Stain [[id number]]     Order Status: No result Specimen: Body Fluid from Peritoneal Fluid     BLOOD CULTURE [[id number]]     Order Status: Canceled Specimen: Blood from Peripheral     BLOOD CULTURE [[id number]]     Order Status: Canceled Specimen: Blood from Peripheral     C Diff by PCR rflx Toxin [[id number]] Collected: 06/10/23 1006    Order Status: Completed Specimen: Stool Updated: 06/10/23 1331     C Diff by PCR Negative     Comment: C. difficile NOT detected by PCR.    Acute diarrhea Special Contact Precautions is required  until 48 hours after diarrhea has resolved, patient’s stool  has returned to baseline or until an infectious agent is  no longer suspected.  Treatment not indicated per guidelines.  Repeat testing not indicated within 7 days.          027-NAP1-BI Presumptive Negative     Comment: Presumptive 027/NAP1/BI target DNA sequences are NOT DETECTED.       Narrative:      Protective  Collected By: [id number] CHARLA CURRAN  Does this patient have risk factors for " C-diff?->No  Please provide reason for ordering C-Diff->Loose stool    MRSA By PCR (Amp) [[id number]] Collected: 06/10/23 0900    Order Status: Completed Specimen: Respirate from Nares Updated: 06/10/23 1132     MRSA by PCR Negative    Narrative:      Protective  Collected By: [id number] AKHIL THORNTON    C Diff by PCR rflx Toxin [[id number]]     Order Status: Canceled Specimen: Stool     Urinalysis [[id number]]  (Abnormal) Collected: 06/10/23 0020    Order Status: Completed Specimen: Urine, Clean Catch Updated: 06/10/23 0042     Color Yellow     Character Clear     Specific Gravity 1.039     Ph 5.0     Glucose >=1000 mg/dL      Ketones Trace mg/dL      Protein Negative mg/dL      Bilirubin Negative     Urobilinogen, Urine 0.2     Nitrite Negative     Leukocyte Esterase Negative     Occult Blood Negative     Micro Urine Req see below     Comment: Microscopic examination not performed when specimen is clear  and chemically negative for protein, blood, leukocyte esterase  and nitrite.         Narrative:      Protective  Indication for culture:->Kidney transplant recipient,  Neutropenic patient, after urologic procedure/surgery or  Urinary tract obstruction with fever >100.4F            Assessment:  Active Hospital Problems    Diagnosis     *Bacteremia due to Gram-negative bacteria [R78.81]     Localized rash [R21]     Hypophosphatemia [E83.39]     Thrush, oral [B37.0]     Pneumonia [J18.9]     Diarrhea [R19.7]     Malignant ascites [R18.0]     Pancytopenia (HCC) [D61.818]     Febrile neutropenia (HCC) [D70.9, R50.81]     ACP (advance care planning) [Z71.89]     DM (diabetes mellitus) (HCC) [E11.9]     Metastatic colon cancer to liver (HCC) [C18.9, C78.7]      Interval 24 hours:      AF, O2 RA  Labs reviewed to assess for clinical status, drug toxicity and organ function  Micro reviewed    Patient with some abdominal distention but no tenderness.  Otherwise no complaints.  Continued on antibiotics as below.    Hospital  Course/Assessment:   Gurmeet Jo is a 58 y.o. male patient with progressive metastatic colon cancer on chemotherapy, admitted with neutropenic fever and found to have gram-negative bacteremia.  Chest x-ray with right middle lobe wedge-shaped opacity.     Pertinent Diagnoses:  Gram-negative bacteremia  Neutropenic fever  Community-acquired pneumonia  Ascites, abdominal discomfort  Port in place  -Patient on chemotherapy via port every 2 weeks   Metastatic colon cancer  Immunosuppressed status     Plan:   -Organism identified as Providencia rettgeri, mixed sensitivity pattern -the patient has a bacteremia in the setting of a port which we are attempting to salvage, metastatic colon cancer and is immune compromised due to liver disease and with neutropenia-Providencia has possible inducible amp C resistance -given the factors above will continue IV cefepime 2 g every 8 hours -anticipate a 2-week antibiotic course from first negative blood cx (end 6/25/23) on discharge ok to transition to ciprofloxacin 500 mg BID to complete the course   -Follow-up repeat blood cultures, no growth to date  -Will also recommend surveillance blood culture 1 week after stopping antibiotics - ordered   - If pt has any recurrent positive blood cultures with the same organism (providencia) the port will need to be removed   -Primary team would like to salvage the port.  Decision regarding this would depend on clinical course, source of infection, persistence of bacteremia, susceptibility pattern of the organism and feasibility of lock therapy  -F/up repeat blood cultures x2 - NGTD   -CT abdomen and pelvis with liver mets and ascites, no obvious abscess  -Paracentesis on 6/11, fluid studies sent and cell counts not consistent with SBP,   - Follow-up peritoneal fluid cultures- NGTD  - EKG on 6/12 with no QTc prolongation  -Discussed fluoroquinolone management with the patient: medication should not be taken with multi vitamins.  Monitor  blood sugars closely if diabetic. Discuss medication with health care professional if new joint or tendon pain. Seek medical attention if experience multiple watery bowel movements.        Disposition: ok for discharge from ID perspective     Need for PICC line: Not anticipated, patient with port in place     Plan was discussed with the primary team, Dr. Whitten.       ID will sign off. Please feel free to call with questions.

## 2023-06-13 NOTE — DISCHARGE SUMMARY
"Discharge Summary    CHIEF COMPLAINT ON ADMISSION  Chief Complaint   Patient presents with    Sent by MD     Sent by oncologist Dr. Pinzon from [facility] for fever 102.4 F. 1000 mg Tylenol at home, temp 99.1 F on ER arrival. Hx stage 4 colorectal cancer with mets to liver. Current chemo patient, last session Tuesday.     Abdominal Pain     LLQ 6/10 pain new onset today. 9.5 L peritoneal fluid drained 6/2. Abdomen rounded.        Reason for Admission  Sent by MD     Admission Date  6/9/2023    CODE STATUS  DNAR/DNI    HPI & HOSPITAL COURSE  Gurmeet Jo is a 58 y.o. male who presented 6/9/2023 with a fever at home. Dr. Pinzon advised the patient to present to emergency department.     Patient has a history of stage IV colorectal cancer.  He is currently on chemotherapy, last received chemotherapy 3 days prior to admission.  He received his first therapeutic paracentesis 1 week ago.  He sees [facility] for oncology outpatient.     The patient checked his temperature at home and noted to have a fever.      \"In the ED, patient found to have normal vital signs.  Pertinent labs include white blood cell count 1.0, hemoglobin 11.4, platelet 49, alkaline phosphatase 240, total bilirubin 2.4.  Chest x-ray showing ill-defined opacity in the right midlung field.\"    While in the hospital patient was found to have Providencia rettgeri bacteremia cultured in his abdominal ascites fluid.  Patient was placed on intravenous cefepime and bacteremia resolved.  He is hemodynamically stable and afebrile.  Blood cultures have been negative thus far and port is hopeful to be salvaged.  ID was consulted and has recommended outpatient ciprofloxacin for 12 days with repeat blood culture in 1 week which they have ordered.  Patient has also been recommended for Pleurx catheter placement for malignant ascites which are developing within 3 to 4 days.  This was discussed with oncology Dr. Ross who agreed to plan of care, therefore " patient is set up with outpatient referral for interventional radiology for Pleurx catheter placement.  We will follow-up with Dr. Ross for appointment on Thursday.  Antibiotics have been sent to his preferred pharmacy in [place], [place].     Therefore, he is discharged in fair and stable condition to home with close outpatient follow-up.    The patient met 2-midnight criteria for an inpatient stay at the time of discharge.    Discharge Date  6/13/23    FOLLOW UP ITEMS POST DISCHARGE  Follow-up with Dr. Ross as scheduled  Interventional radiology follow-up has been ordered    DISCHARGE DIAGNOSES  Principal Problem (Resolved):    Bacteremia due to Gram-negative bacteria (POA: Yes)  Active Problems:    Metastatic colon cancer to liver (HCC) (POA: Yes)    DM (diabetes mellitus) (HCC) (POA: Yes)    Pancytopenia (HCC) (POA: Yes)    Diarrhea (POA: No)    Malignant ascites (POA: Yes)    Thrush, oral (POA: Unknown)  Resolved Problems:    ACP (advance care planning) (POA: Yes)    Febrile neutropenia (HCC) (POA: Yes)    Pneumonia (POA: Yes)    Localized rash (POA: Unknown)    Hypophosphatemia (POA: Unknown)      FOLLOW UP  Future Appointments   Date Time Provider Department Center   6/20/2023  7:00 AM [facility] [address]   7/4/2023  7:00 AM [facility] [address]   7/18/2023  8:00 AM [facility] [address]   8/1/2023  8:00 AM [facility] [address]   9/11/2023  8:10 AM T[facility]     No follow-up provider specified.    MEDICATIONS ON DISCHARGE     Medication List        START taking these medications        Instructions   ciprofloxacin 500 MG Tabs  Commonly known as: CIPRO   Take 1 Tablet by mouth 2 times a day for 12 days.  Dose: 500 mg     diphenhydrAMINE-zinc acetate cream  Commonly known as: BENADRYL ITCH   Apply 30 g topically 4 times a day as needed (itching).  Dose: 30 g            CONTINUE taking these medications         Instructions   acetaminophen 500 MG Tabs  Commonly known as: TYLENOL   Take 500-1,000 mg by mouth every 6 hours as needed.  Dose: 500-1,000 mg     albuterol 108 (90 Base) MCG/ACT Aers inhalation aerosol   Doctor's comments: Give albuterol that is patient or insurance preference please  Inhale 2 Puffs every four hours as needed for Shortness of Breath.  Dose: 2 Puff     baclofen 10 MG Tabs  Commonly known as: LIORESAL   Take 1 Tablet by mouth 3 times a day as needed (pain).  Dose: 10 mg     * Blood Glucose Monitoring Suppl Tanisha   Meter: Dispense Device of Insurance Preference. Sig. Use as directed for blood sugar monitoring. #1. NR.     * Blood Glucose Test Strips   Test strips order: Test strips for  meter. Sig: use bid and prn ssx high or low sugar #100 RF x 3     heparin lock flush 100 unit/mL Soln      Herzuma 420 MG Solr injection  Generic drug: trastuzumab-pkrb      Jardiance 25 MG Tabs  Generic drug: Empagliflozin   Take 25 mg by mouth every day.  Dose: 25 mg     Lancets   Lancets order: Lancets for  meter. Sig: use bid and prn ssx high or low sugar. #100 RF x 3     lidocaine-prilocaine 2.5-2.5 % Crea  Commonly known as: EMLA      LORazepam 1 MG Tabs  Commonly known as: ATIVAN   Take 1 mg by mouth 1 time a day as needed (nausea).  Dose: 1 mg     NS Soln      nystatin 186862 UNIT/ML Susp  Commonly known as: MYCOSTATIN   SWISH AND SWALLOW 5 MILLILITERS PO QID     ondansetron 8 MG Tbdp  Commonly known as: ZOFRAN ODT   DISSOLVE ONE TABLET BY MOUTH EVERY 12 HOURS AS NEEDED FOR NAUSEA     potassium chloride 10 MEQ capsule  Commonly known as: MICRO-K   10 mEq every day.  Dose: 10 mEq     therapeutic multivitamin-minerals Tabs   Take 1 Tab by mouth every day.  Dose: 1 Tablet     * True Metrix Blood Glucose Test strip  Generic drug: glucose blood   TRUE METRIX BLOOD GLUCOSE TEST STRP     * True Metrix Blood Glucose Test strip  Generic drug: glucose blood   USE AS DIRECTED TO CHECK BLOOD SUGAR B ID AND FOR HIGH OR  SUGAR           * This list has 4 medication(s) that are the same as other medications prescribed for you. Read the directions carefully, and ask your doctor or other care provider to review them with you.                STOP taking these medications      azithromycin 250 MG Tabs  Commonly known as: ZITHROMAX     Tukysa 150 MG Tabs  Generic drug: Tucatinib     Tukysa 50 MG Tabs  Generic drug: Tucatinib     Zirabev 100 MG/4ML Soln injection  Generic drug: bevacizumab-bvzr     Zirabev 400 MG/16ML Soln injection  Generic drug: bevacizumab-bvzr              Allergies  Allergies   Allergen Reactions    Compazine Unspecified     tartive dyskenia    Oxaliplatin Unspecified     Back spasms    Bee Venom Swelling       DIET  Orders Placed This Encounter   Procedures    Diet Order Diet: Regular     Standing Status:   Standing     Number of Occurrences:   1     Order Specific Question:   Diet:     Answer:   Regular [1]       ACTIVITY  As tolerated.  Weight bearing as tolerated    CONSULTATIONS  Infectious disease and heme-onc    PROCEDURES  Paracentesis    LABORATORY  Lab Results   Component Value Date    SODIUM 135 06/13/2023    POTASSIUM 3.4 (L) 06/13/2023    CHLORIDE 105 06/13/2023    CO2 19 (L) 06/13/2023    GLUCOSE 134 (H) 06/13/2023    BUN 13 06/13/2023    CREATININE 0.39 (L) 06/13/2023        Lab Results   Component Value Date    WBC 3.4 (L) 06/13/2023    HEMOGLOBIN 10.5 (L) 06/13/2023    HEMATOCRIT 30.1 (L) 06/13/2023    PLATELETCT 30 (L) 06/13/2023        Total time of the discharge process was 35 minutes.

## 2023-06-13 NOTE — CARE PLAN
The patient is Stable - Low risk of patient condition declining or worsening    Shift Goals  Clinical Goals: Replete electrolytes. Control itching.  Patient Goals: Rest. Itching control  Family Goals: Not present    Progress made toward(s) clinical / shift goals:  Electrolyte supplements administered and tolerated well. Benedryl topical applied to abdomen for itching; patient reports relief of symptoms.     Problem: Knowledge Deficit - Standard  Goal: Patient and family/care givers will demonstrate understanding of plan of care, disease process/condition, diagnostic tests and medications  Outcome: Progressing  Note: Discussed POC with patient and family. ID specific questions sent to ID physician; came to bedside.        Patient is not progressing towards the following goals: NA

## 2023-06-13 NOTE — CARE PLAN
The patient is Watcher - Medium risk of patient condition declining or worsening    Shift Goals  Clinical Goals: Monitor labs, Complete ABX  Patient Goals: Rest  Family Goals: Not present    Progress made toward(s) clinical / shift goals:        Problem: Knowledge Deficit - Standard  Goal: Patient and family/care givers will demonstrate understanding of plan of care, disease process/condition, diagnostic tests and medications  Description: Target End Date:  1-3 days or as soon as patient condition allows    Document in Patient Education    1.  Patient and family/caregiver oriented to unit, equipment, visitation policy and means for communicating concern  2.  Complete/review Learning Assessment  3.  Assess knowledge level of disease process/condition, treatment plan, diagnostic tests and medications  4.  Explain disease process/condition, treatment plan, diagnostic tests and medications  Outcome: Progressing  Note: Patient understands the need for continued monitoring of labs and completion of abx treatment.

## 2023-06-14 ENCOUNTER — PATIENT OUTREACH (OUTPATIENT)
Dept: HEALTH INFORMATION MANAGEMENT | Facility: OTHER | Age: 58
End: 2023-06-14
Payer: MEDICARE

## 2023-06-14 DIAGNOSIS — Z91.030 BEE STING ALLERGY: ICD-10-CM

## 2023-06-14 LAB
BACTERIA FLD AEROBE CULT: NORMAL
GRAM STN SPEC: NORMAL
SIGNIFICANT IND 70042: NORMAL
SITE SITE: NORMAL
SOURCE SOURCE: NORMAL

## 2023-06-14 NOTE — PROGRESS NOTES
He can take the hydroxyzine at home and I refilled the nystatin.  I don't have a history of an epi-pen----what is this for?

## 2023-06-14 NOTE — PROGRESS NOTES
Transitional Care Management    Discharge Questions  Discharge Date: 06/13/23  Outreach call: Date 06/14/23  Time: 10:18 AM   Now that you are home, how are you feeling?  Good  Did you receive any new prescriptions? Yes, Were you able to get them filled?  Yes   Pharmacy   Do you have any questions about your current medications or new medications (Review Med Rec)?  NO  Do you have a follow up appointment scheduled with your PCP?  Yes Date/Time - 06/21/23 @ 1600  Any issues or paperwork you wish to discuss with your PCP? No  Does this patient qualify for the CCM program?  No    Transitional Care  Number of attempts: 1  Current or previous attempts competed within two business days of discharge?  Yes  Provided education regarding treatment plan, medications, self-management, ADLs?  Yes  Has patient completed an Advanced Directive?  No  Care Manager phone number provided? No  Is there anything else I can help you with?  No    Discharge Summary   Chief Complaint:  FEVER   Admitting Dx:  GRAM NEG BACTEREMIA   Discharge Dx:  GRAM NEG BACTEREMIA    Notes:  PARACENTESIS INPATIENT 9.5 L    Subjective:     Gurmeet Jo is a 58 y.o. male who presents for Hospital Follow-up.    HPI:   Recently hospitalized for sepsis.  Patient developed an infection at the port.  He has been in contact with ID who will be obtaining further cultures after he has finished abx.  He was admitted for 4 days.  Potassium was low at discharge. He states that he is having parencentesis weekly.      Patient does have metastatic colon cancer to liver.  He does have pancytopenia, type 2 diabetes, hyperlipidemia and vitamin D deficiency and is due for routine lab work.  He does indicate that he will be traveling to [place] shortly to be involved in a trial.  He is also needing a refill of his lidocaine prilocaine cream for support.  Patient indicates that he is doing well since being out of the hospital and has no concerns or complaints at this time.  He  is continuing to take the Cipro at this time.    Current medicines (including reconciliation performed today)  Current Outpatient Medications   Medication Sig Dispense Refill    EPINEPHrine (EPIPEN) 0.3 MG/0.3ML Solution Auto-injector solution for injection       furosemide (LASIX) 20 MG Tab Take 20 mg by mouth every day.      lidocaine-prilocaine (EMLA) 2.5-2.5 % Cream Apply to affected area as needed every 2 weeks. 30 g 2    ciprofloxacin (CIPRO) 500 MG Tab Take 1 Tablet by mouth 2 times a day for 12 days. 24 Tablet 0    nystatin (MYCOSTATIN) 959697 UNIT/ML Suspension SWISH AND SWALLOW 5 ML BY MOUTH FOUR TIMES DAILY 473 mL 0    baclofen (LIORESAL) 10 MG Tab Take 1 Tablet by mouth 3 times a day as needed (pain). 90 Tablet 0    HERZUMA 420 MG Recon Soln injection       Sodium Chloride (NS) Solution       Empagliflozin (JARDIANCE) 25 MG Tab Take 25 mg by mouth every day. 90 Tablet 1    TRUE METRIX BLOOD GLUCOSE TEST strip USE AS DIRECTED TO CHECK BLOOD SUGAR B ID AND FOR HIGH OR SUGAR 400 Strip 2    potassium chloride (MICRO-K) 10 MEQ capsule 10 mEq every day.      glucose blood (TRUE METRIX BLOOD GLUCOSE TEST) strip TRUE METRIX BLOOD GLUCOSE TEST STRP      ondansetron (ZOFRAN ODT) 8 MG TABLET DISPERSIBLE DISSOLVE ONE TABLET BY MOUTH EVERY 12 HOURS AS NEEDED FOR NAUSEA      Lancets Lancets order: Lancets for  meter. Sig: use bid and prn ssx high or low sugar. #100 RF x 3 100 Each 0    Blood Glucose Test Strips Test strips order: Test strips for  meter. Sig: use bid and prn ssx high or low sugar #100 RF x 3 100 Each 0    Blood Glucose Monitoring Suppl Device Meter: Dispense Device of Insurance Preference. Sig. Use as directed for blood sugar monitoring. #1. NR. 1 Device 0    acetaminophen (TYLENOL) 500 MG Tab Take 500-1,000 mg by mouth every 6 hours as needed.      LORazepam (ATIVAN) 1 MG Tab Take 1 mg by mouth 1 time a day as needed (nausea).      diphenhydrAMINE-zinc acetate (BENADRYL ITCH) cream Apply 30 g  "topically 4 times a day as needed (itching). (Patient not taking: Reported on 2023) 1 Each 1    albuterol 108 (90 Base) MCG/ACT Aero Soln inhalation aerosol Inhale 2 Puffs every four hours as needed for Shortness of Breath. (Patient not taking: Reported on 2023) 18 g 2    heparin lock flush 100 unit/mL Solution  (Patient not taking: Reported on 2023)      therapeutic multivitamin-minerals (THERAGRAN-M) Tab Take 1 Tab by mouth every day. (Patient not taking: Reported on 2023)       No current facility-administered medications for this visit.       Allergies:   Compazine, Oxaliplatin, and Bee venom    Social History     Tobacco Use    Smoking status: Former     Packs/day: 1.00     Years: 15.00     Pack years: 15.00     Types: Cigarettes     Quit date: 1998     Years since quittin.4    Smokeless tobacco: Never   Vaping Use    Vaping Use: Never used   Substance Use Topics    Alcohol use: No    Drug use: Yes     Types: Inhaled, Marijuana     Comment: occ       ROS:  Denies any symptoms unless previously indicated.  He does experience nausea but the zofran helps.      Objective:     Vitals:    23 1542   BP: 94/62   Pulse: 68   Resp: 14   Temp: 36.4 °C (97.6 °F)   TempSrc: Temporal   SpO2: 98%   Weight: 87.1 kg (192 lb)   Height: 1.88 m (6' 2\")     Body mass index is 24.65 kg/m².    Physical Exam:  Physical Exam  Constitutional:       Appearance: Normal appearance.   HENT:      Head: Normocephalic and atraumatic.   Eyes:      Pupils: Pupils are equal, round, and reactive to light.   Cardiovascular:      Rate and Rhythm: Normal rate and regular rhythm.      Heart sounds: Normal heart sounds. No murmur heard.     No friction rub. No gallop.   Pulmonary:      Effort: Pulmonary effort is normal.      Breath sounds: Normal breath sounds.   Abdominal:      General: There is distension.   Musculoskeletal:      Cervical back: Neck supple.   Skin:     General: Skin is warm and dry.   Neurological: "      General: No focal deficit present.      Mental Status: He is alert and oriented to person, place, and time.      Cranial Nerves: No cranial nerve deficit.   Psychiatric:         Mood and Affect: Mood normal.         Behavior: Behavior normal.         Thought Content: Thought content normal.         Judgment: Judgment normal.          Assessment and Plan:   1. Sepsis without acute organ dysfunction, due to unspecified organism (HCC)    2. Metastatic colon cancer to liver (HCC)  - lidocaine-prilocaine (EMLA) 2.5-2.5 % Cream; Apply to affected area as needed every 2 weeks.  Dispense: 30 g; Refill: 2    3. Other iron deficiency anemia  - CBC WITH DIFFERENTIAL; Future    4. Pancytopenia (HCC)    5. Type 2 diabetes mellitus with other specified complication, without long-term current use of insulin (HCC)  - Comp Metabolic Panel; Future  - HEMOGLOBIN A1C; Future    6. Hyperlipidemia, unspecified hyperlipidemia type  - Lipid Profile; Future    7. Vitamin D deficiency  - VITAMIN D,25 HYDROXY (DEFICIENCY); Future    Other orders  - EPINEPHrine (EPIPEN) 0.3 MG/0.3ML Solution Auto-injector solution for injection  - furosemide (LASIX) 20 MG Tab; Take 20 mg by mouth every day.      - Chart and discharge summary were reviewed.   - Hospitalization and results reviewed with patient.   - Medications reviewed including instructions regarding high risk medications, dosing and side effects.  - Recommended Services: No services needed at this time  - Advance directive/POLST on file?  No     Follow-up:No follow-ups on file.    Face-to-face transitional care management services with MODERATE (today's visit is within 14 days post discharge & LACE+ score of 28-58) medical decision complexity were provided.     LACE+ Historical Score Over Time (0-28: Low, 29-58: Medium, 59+: High): 77

## 2023-06-14 NOTE — DOCUMENTATION QUERY
[facility]                                                                       Query Response Note      PATIENT:               EVIE CHAKRABORTY  ACCT #:                  [id number]  MRN:                     [id number]  :                      1965  ADMIT DATE:       2023 2:44 PM  DISCH DATE:        2023 2:50 PM  RESPONDING  PROVIDER #:        [id number]           QUERY TEXT:    Pneumonia is documented in the Medical Record. Please specify the type of pneumonia and/or causative organism:    The patient's Clinical Indicators include:  Progress Notes:  Neutropenic fever secondary to pneumonia requiring broad-spectrum antibiotics and Granix.  Pneumonia: RML opacity on CXR.  Asymptomatic     Blood Culture (x1): Providencia rettgeri   Risk Factors: Providencia rettgeri bacteremia, immunocompromised   Treatment: cefepime, Vanco, IVF, CXR    Thank you,  Jael Gaytan RN, BSN  Clinical   Connect via [employer]  Options provided:   -- Gram Negative Pneumonia   -- Simple Pneumonia   -- Other type of Pneumonia, please specify   -- Unable to determine      Query created by: Jael Gaytan on 2023 11:51 AM    RESPONSE TEXT:    Simple Pneumonia          Electronically signed by:  RENATO GARCIA MD 2023 7:02 AM

## 2023-06-14 NOTE — TELEPHONE ENCOUNTER
Was the patient seen in the last year in this department? Yes    Does patient have an active prescription for medications requested? Yes    Received Request Via: Pharmacy    No results displayed because visit has over 200 results.      Hospital Outpatient Visit on 06/05/2023   Component Date Value    WBC 06/05/2023 2.9 (L)     RBC 06/05/2023 3.94 (L)     Hemoglobin 06/05/2023 12.4 (L)     Hematocrit 06/05/2023 38.8 (L)     MCV 06/05/2023 98.5 (H)     MCH 06/05/2023 31.5     MCHC 06/05/2023 32.0 (L)     RDW 06/05/2023 51.6 (H)     Platelet Count 06/05/2023 102 (L)     MPV 06/05/2023 11.0     Neutrophils-Polys 06/05/2023 64.80     Lymphocytes 06/05/2023 15.90 (L)     Monocytes 06/05/2023 11.40     Eosinophils 06/05/2023 6.20     Basophils 06/05/2023 1.40     Immature Granulocytes 06/05/2023 0.30     Nucleated RBC 06/05/2023 0.00     Neutrophils (Absolute) 06/05/2023 1.88     Lymphs (Absolute) 06/05/2023 0.46 (L)     Monos (Absolute) 06/05/2023 0.33     Eos (Absolute) 06/05/2023 0.18     Baso (Absolute) 06/05/2023 0.04     Immature Granulocytes (a* 06/05/2023 0.01     NRBC (Absolute) 06/05/2023 0.00     Magnesium 06/05/2023 2.1     Carcinoembryonic Antigen 06/05/2023 51.2 (H)     Sodium 06/05/2023 140     Potassium 06/05/2023 4.2     Chloride 06/05/2023 107     Co2 06/05/2023 24     Anion Gap 06/05/2023 9.0     Glucose 06/05/2023 121 (H)     Bun 06/05/2023 17     Creatinine 06/05/2023 0.52     Calcium 06/05/2023 8.9     AST(SGOT) 06/05/2023 63 (H)     ALT(SGPT) 06/05/2023 44     Alkaline Phosphatase 06/05/2023 282 (H)     Total Bilirubin 06/05/2023 1.2     Albumin 06/05/2023 3.6     Total Protein 06/05/2023 5.9 (L)     Globulin 06/05/2023 2.3     A-G Ratio 06/05/2023 1.6     Correct Calcium 06/05/2023 9.2     GFR (CKD-EPI) 06/05/2023 116    Hospital Outpatient Visit on 05/22/2023   Component Date Value    WBC 05/22/2023 4.1 (L)     RBC 05/22/2023 4.06 (L)     Hemoglobin 05/22/2023 13.1 (L)     Hematocrit 05/22/2023  40.1 (L)     MCV 05/22/2023 98.8 (H)     MCH 05/22/2023 32.3     MCHC 05/22/2023 32.7     RDW 05/22/2023 53.1 (H)     Platelet Count 05/22/2023 96 (L)     MPV 05/22/2023 11.1     Neutrophils-Polys 05/22/2023 69.50     Lymphocytes 05/22/2023 12.70 (L)     Monocytes 05/22/2023 11.50     Eosinophils 05/22/2023 5.10     Basophils 05/22/2023 1.00     Immature Granulocytes 05/22/2023 0.20     Nucleated RBC 05/22/2023 0.00     Neutrophils (Absolute) 05/22/2023 2.84     Lymphs (Absolute) 05/22/2023 0.52 (L)     Monos (Absolute) 05/22/2023 0.47     Eos (Absolute) 05/22/2023 0.21     Baso (Absolute) 05/22/2023 0.04     Immature Granulocytes (a* 05/22/2023 0.01     NRBC (Absolute) 05/22/2023 0.00     Magnesium 05/22/2023 2.0     Sodium 05/22/2023 139     Potassium 05/22/2023 3.9     Chloride 05/22/2023 107     Co2 05/22/2023 22     Anion Gap 05/22/2023 10.0     Glucose 05/22/2023 94     Bun 05/22/2023 12     Creatinine 05/22/2023 0.58     Calcium 05/22/2023 8.7     AST(SGOT) 05/22/2023 74 (H)     ALT(SGPT) 05/22/2023 34     Alkaline Phosphatase 05/22/2023 313 (H)     Total Bilirubin 05/22/2023 1.8 (H)     Albumin 05/22/2023 3.5     Total Protein 05/22/2023 6.5     Globulin 05/22/2023 3.0     A-G Ratio 05/22/2023 1.2     Carcinoembryonic Antigen 05/22/2023 95.0 (H)     Plt Estimation 05/22/2023 Decreased     RBC Morphology 05/22/2023 Present     Poikilocytosis 05/22/2023 1+     Ovalocytes 05/22/2023 1+     Peripheral Smear Review 05/22/2023 see below     Imm. Plt Fraction 05/22/2023 4.3     Comments-Diff 05/22/2023 see below     Correct Calcium 05/22/2023 9.1     GFR (CKD-EPI) 05/22/2023 112    Outpatient Infusion Services on 05/02/2023   Component Date Value    WBC 05/02/2023 1.4 (LL)     RBC 05/02/2023 3.72 (L)     Hemoglobin 05/02/2023 12.2 (L)     Hematocrit 05/02/2023 36.6 (L)     MCV 05/02/2023 98.4 (H)     MCH 05/02/2023 32.8     MCHC 05/02/2023 33.3 (L)     RDW 05/02/2023 56.0 (H)     Platelet Count 05/02/2023 80 (L)      MPV 05/02/2023 10.3     Neutrophils-Polys 05/02/2023 33.60 (L)     Lymphocytes 05/02/2023 24.30     Monocytes 05/02/2023 32.10 (H)     Eosinophils 05/02/2023 9.30 (H)     Basophils 05/02/2023 0.70     Immature Granulocytes 05/02/2023 0.00     Nucleated RBC 05/02/2023 0.00     Neutrophils (Absolute) 05/02/2023 0.47 (LL)     Lymphs (Absolute) 05/02/2023 0.34 (L)     Monos (Absolute) 05/02/2023 0.45     Eos (Absolute) 05/02/2023 0.13     Baso (Absolute) 05/02/2023 0.01     Immature Granulocytes (a* 05/02/2023 0.00     NRBC (Absolute) 05/02/2023 0.00     Sodium 05/02/2023 137     Potassium 05/02/2023 4.0     Chloride 05/02/2023 105     Co2 05/02/2023 22     Anion Gap 05/02/2023 10.0     Glucose 05/02/2023 131 (H)     Bun 05/02/2023 8     Creatinine 05/02/2023 0.57     Calcium 05/02/2023 8.5     AST(SGOT) 05/02/2023 74 (H)     ALT(SGPT) 05/02/2023 39     Alkaline Phosphatase 05/02/2023 284 (H)     Total Bilirubin 05/02/2023 1.8 (H)     Albumin 05/02/2023 3.3     Total Protein 05/02/2023 6.1     Globulin 05/02/2023 2.8     A-G Ratio 05/02/2023 1.2     Carcinoembryonic Antigen 05/02/2023 55.2 (H)     Magnesium 05/02/2023 1.8     Correct Calcium 05/02/2023 9.1     GFR (CKD-EPI) 05/02/2023 113    Outpatient Infusion Services on 04/11/2023   Component Date Value    WBC 04/11/2023 4.2 (L)     RBC 04/11/2023 3.71 (L)     Hemoglobin 04/11/2023 12.2 (L)     Hematocrit 04/11/2023 36.4 (L)     MCV 04/11/2023 98.1 (H)     MCH 04/11/2023 32.9     MCHC 04/11/2023 33.5 (L)     RDW 04/11/2023 54.5 (H)     Platelet Count 04/11/2023 72 (L)     MPV 04/11/2023 10.6     Neutrophils-Polys 04/11/2023 78.00 (H)     Lymphocytes 04/11/2023 10.50 (L)     Monocytes 04/11/2023 7.40     Eosinophils 04/11/2023 2.90     Basophils 04/11/2023 0.50     Immature Granulocytes 04/11/2023 0.70     Nucleated RBC 04/11/2023 0.00     Neutrophils (Absolute) 04/11/2023 3.26     Lymphs (Absolute) 04/11/2023 0.44 (L)     Monos (Absolute) 04/11/2023 0.31     Eos  (Absolute) 04/11/2023 0.12     Baso (Absolute) 04/11/2023 0.02     Immature Granulocytes (a* 04/11/2023 0.03     NRBC (Absolute) 04/11/2023 0.00     Sodium 04/11/2023 139     Potassium 04/11/2023 3.7     Chloride 04/11/2023 106     Co2 04/11/2023 22     Anion Gap 04/11/2023 11.0     Glucose 04/11/2023 161 (H)     Bun 04/11/2023 12     Creatinine 04/11/2023 0.48 (L)     Calcium 04/11/2023 8.3 (L)     AST(SGOT) 04/11/2023 63 (H)     ALT(SGPT) 04/11/2023 32     Alkaline Phosphatase 04/11/2023 285 (H)     Total Bilirubin 04/11/2023 1.8 (H)     Albumin 04/11/2023 3.2     Total Protein 04/11/2023 6.2     Globulin 04/11/2023 3.0     A-G Ratio 04/11/2023 1.1     Carcinoembryonic Antigen 04/11/2023 44.7 (H)     Magnesium 04/11/2023 1.9     Correct Calcium 04/11/2023 8.9     GFR (CKD-EPI) 04/11/2023 119    Office Visit on 04/05/2023   Component Date Value    POC Group A Strep, PCR 04/05/2023 Not Detected     SARS-CoV-2 by PCR 04/05/2023 Negative     Influenza virus A RNA 04/05/2023 Negative     Influenza virus B, PCR 04/05/2023 Negative     RSV, PCR 04/05/2023 Negative    Outpatient Infusion Services on 04/04/2023   Component Date Value    WBC 04/04/2023 1.4 (LL)     RBC 04/04/2023 3.99 (L)     Hemoglobin 04/04/2023 13.2 (L)     Hematocrit 04/04/2023 38.8 (L)     MCV 04/04/2023 97.2     MCH 04/04/2023 33.1 (H)     MCHC 04/04/2023 34.0     RDW 04/04/2023 54.8 (H)     Platelet Count 04/04/2023 67 (L)     MPV 04/04/2023 10.7     Neutrophils-Polys 04/04/2023 17.80 (L)     Lymphocytes 04/04/2023 30.50     Monocytes 04/04/2023 33.90 (H)     Eosinophils 04/04/2023 11.90 (H)     Basophils 04/04/2023 1.70     Nucleated RBC 04/04/2023 0.00     Neutrophils (Absolute) 04/04/2023 0.31 (LL)     Lymphs (Absolute) 04/04/2023 0.43 (L)     Monos (Absolute) 04/04/2023 0.47     Eos (Absolute) 04/04/2023 0.17     Baso (Absolute) 04/04/2023 0.02     NRBC (Absolute) 04/04/2023 0.00     Anisocytosis 04/04/2023 1+     Macrocytosis 04/04/2023 1+      Microcytosis 04/04/2023 1+     Sodium 04/04/2023 139     Potassium 04/04/2023 3.7     Chloride 04/04/2023 107     Co2 04/04/2023 23     Anion Gap 04/04/2023 9.0     Glucose 04/04/2023 174 (H)     Bun 04/04/2023 11     Creatinine 04/04/2023 0.64     Calcium 04/04/2023 8.5     AST(SGOT) 04/04/2023 46 (H)     ALT(SGPT) 04/04/2023 28     Alkaline Phosphatase 04/04/2023 247 (H)     Total Bilirubin 04/04/2023 2.0 (H)     Albumin 04/04/2023 3.3     Total Protein 04/04/2023 6.1     Globulin 04/04/2023 2.8     A-G Ratio 04/04/2023 1.2     Carcinoembryonic Antigen 04/04/2023 32.1 (H)     Magnesium 04/04/2023 1.8     Correct Calcium 04/04/2023 9.1     GFR (CKD-EPI) 04/04/2023 109     Bands-Stabs 04/04/2023 4.20     Manual Diff Status 04/04/2023 PERFORMED     Peripheral Smear Review 04/04/2023 see below     Plt Estimation 04/04/2023 Decreased     RBC Morphology 04/04/2023 Present     Polychromia 04/04/2023 1+     Poikilocytosis 04/04/2023 1+     Ovalocytes 04/04/2023 1+    Hospital Outpatient Visit on 03/27/2023   Component Date Value    APTIMA Media Type 03/27/2023 Aptima Tube     Specimen Source 03/27/2023 Male urethral     T. vaginalis by TMA 03/27/2023 Negative    Outpatient Infusion Services on 03/14/2023   Component Date Value    WBC 03/14/2023 3.3 (L)     RBC 03/14/2023 3.80 (L)     Hemoglobin 03/14/2023 12.7 (L)     Hematocrit 03/14/2023 37.8 (L)     MCV 03/14/2023 99.5 (H)     MCH 03/14/2023 33.4 (H)     MCHC 03/14/2023 33.6 (L)     RDW 03/14/2023 55.8 (H)     Platelet Count 03/14/2023 78 (L)     MPV 03/14/2023 10.1     Neutrophils-Polys 03/14/2023 68.00     Lymphocytes 03/14/2023 13.90 (L)     Monocytes 03/14/2023 10.90     Eosinophils 03/14/2023 6.00     Basophils 03/14/2023 0.90     Immature Granulocytes 03/14/2023 0.30     Nucleated RBC 03/14/2023 0.00     Neutrophils (Absolute) 03/14/2023 2.25     Lymphs (Absolute) 03/14/2023 0.46 (L)     Monos (Absolute) 03/14/2023 0.36     Eos (Absolute) 03/14/2023 0.20     Baso  (Absolute) 03/14/2023 0.03     Immature Granulocytes (a* 03/14/2023 0.01     NRBC (Absolute) 03/14/2023 0.00     Sodium 03/14/2023 140     Potassium 03/14/2023 3.8     Chloride 03/14/2023 109     Co2 03/14/2023 21     Anion Gap 03/14/2023 10.0     Glucose 03/14/2023 107 (H)     Bun 03/14/2023 11     Creatinine 03/14/2023 0.63     Calcium 03/14/2023 8.8     AST(SGOT) 03/14/2023 66 (H)     ALT(SGPT) 03/14/2023 43     Alkaline Phosphatase 03/14/2023 326 (H)     Total Bilirubin 03/14/2023 1.9 (H)     Albumin 03/14/2023 3.3     Total Protein 03/14/2023 6.0     Globulin 03/14/2023 2.7     A-G Ratio 03/14/2023 1.2     Carcinoembryonic Antigen 03/14/2023 34.8 (H)     Magnesium 03/14/2023 1.7     Correct Calcium 03/14/2023 9.4     GFR (CKD-EPI) 03/14/2023 110    Hospital Outpatient Visit on 03/01/2023   Component Date Value    25-Hydroxy   Vitamin D 25 03/01/2023 31     TSH 03/01/2023 1.780     Creatinine, Urine 03/01/2023 84.51     Microalbumin, Urine Rand* 03/01/2023 <1.2     Micro Alb Creat Ratio 03/01/2023 see below     Cholesterol,Tot 03/01/2023 140     Triglycerides 03/01/2023 69     HDL 03/01/2023 61     LDL 03/01/2023 65     Free T-4 03/01/2023 1.31     Sodium 03/01/2023 137     Potassium 03/01/2023 4.3     Chloride 03/01/2023 104     Co2 03/01/2023 23     Anion Gap 03/01/2023 10.0     Glucose 03/01/2023 96     Bun 03/01/2023 12     Creatinine 03/01/2023 0.64     Calcium 03/01/2023 8.9     AST(SGOT) 03/01/2023 74 (H)     ALT(SGPT) 03/01/2023 44     Alkaline Phosphatase 03/01/2023 308 (H)     Total Bilirubin 03/01/2023 3.2 (H)     Albumin 03/01/2023 3.5     Total Protein 03/01/2023 6.4     Globulin 03/01/2023 2.9     A-G Ratio 03/01/2023 1.2     Fructosamine 03/01/2023 304 (H)     Fasting Status 03/01/2023 Fasting     Correct Calcium 03/01/2023 9.3     GFR (CKD-EPI) 03/01/2023 109    There may be more visits with results that are not included.   ]

## 2023-06-14 NOTE — PROGRESS NOTES
Transitional Care Management    Discharge Questions  Discharge Date: 06/13/23  Outreach call: Date 06/14/23  Time: 10:18 AM   Now that you are home, how are you feeling?  Good  Did you receive any new prescriptions? Yes, Were you able to get them filled?  Yes   Pharmacy   Do you have any questions about your current medications or new medications (Review Med Rec)?  NO  Do you have a follow up appointment scheduled with your PCP?  Yes Date/Time - 06/21/23 @ 1600  Any issues or paperwork you wish to discuss with your PCP? No  Does this patient qualify for the CCM program?  No    Transitional Care  Number of attempts: 1  Current or previous attempts competed within two business days of discharge?  Yes  Provided education regarding treatment plan, medications, self-management, ADLs?  Yes  Has patient completed an Advanced Directive?  No  Care Manager phone number provided? No  Is there anything else I can help you with?  No    Discharge Summary   Chief Complaint:  FEVER   Admitting Dx:  GRAM NEG BACTEREMIA   Discharge Dx:  GRAM NEG BACTEREMIA    Notes:  PARACENTESIS INPATIENT 9.5 L

## 2023-06-15 RX ORDER — EPINEPHRINE 0.3 MG/.3ML
0.3 INJECTION SUBCUTANEOUS ONCE
Qty: 1 EACH | Refills: 3 | Status: SHIPPED | OUTPATIENT
Start: 2023-06-15 | End: 2023-06-15

## 2023-06-16 ENCOUNTER — PATIENT OUTREACH (OUTPATIENT)
Dept: HEALTH INFORMATION MANAGEMENT | Facility: OTHER | Age: 58
End: 2023-06-16
Payer: MEDICARE

## 2023-06-16 DIAGNOSIS — I10 HYPERTENSION, UNSPECIFIED TYPE: ICD-10-CM

## 2023-06-16 LAB
BACTERIA BLD CULT: NORMAL
BACTERIA BLD CULT: NORMAL
SIGNIFICANT IND 70042: NORMAL
SIGNIFICANT IND 70042: NORMAL
SITE SITE: NORMAL
SITE SITE: NORMAL
SOURCE SOURCE: NORMAL
SOURCE SOURCE: NORMAL

## 2023-06-16 NOTE — PROGRESS NOTES
06/16/23 @ 5112. Spoke with patient via telephone and informed patient that APRN refilled Epi-Pen. APRN directed patient to take the hydroxyzine for itch instead benadryl. Patient verbalized understanding.

## 2023-06-19 ENCOUNTER — HOSPITAL ENCOUNTER (OUTPATIENT)
Facility: MEDICAL CENTER | Age: 58
End: 2023-06-19
Attending: RADIOLOGY | Admitting: RADIOLOGY
Payer: MEDICARE

## 2023-06-19 ENCOUNTER — HOSPITAL ENCOUNTER (OUTPATIENT)
Dept: RADIOLOGY | Facility: MEDICAL CENTER | Age: 58
End: 2023-06-19
Attending: INTERNAL MEDICINE
Payer: MEDICARE

## 2023-06-19 VITALS
SYSTOLIC BLOOD PRESSURE: 101 MMHG | HEIGHT: 74 IN | WEIGHT: 200 LBS | BODY MASS INDEX: 25.67 KG/M2 | TEMPERATURE: 98.5 F | DIASTOLIC BLOOD PRESSURE: 57 MMHG | HEART RATE: 69 BPM | RESPIRATION RATE: 20 BRPM | OXYGEN SATURATION: 98 %

## 2023-06-19 DIAGNOSIS — Z15.01: ICD-10-CM

## 2023-06-19 DIAGNOSIS — C18.9: ICD-10-CM

## 2023-06-19 DIAGNOSIS — C78.7 SECONDARY MALIGNANT NEOPLASM OF LIVER (HCC): ICD-10-CM

## 2023-06-19 PROCEDURE — P9047 ALBUMIN (HUMAN), 25%, 50ML: HCPCS | Mod: JG,UD | Performed by: RADIOLOGY

## 2023-06-19 PROCEDURE — 49083 ABD PARACENTESIS W/IMAGING: CPT

## 2023-06-19 PROCEDURE — 160025 RECOVERY II MINUTES (STATS)

## 2023-06-19 PROCEDURE — 700111 HCHG RX REV CODE 636 W/ 250 OVERRIDE (IP): Mod: UD | Performed by: RADIOLOGY

## 2023-06-19 RX ORDER — ALBUMIN (HUMAN) 12.5 G/50ML
75 SOLUTION INTRAVENOUS ONCE
Status: DISCONTINUED | OUTPATIENT
Start: 2023-06-19 | End: 2023-06-19

## 2023-06-19 RX ORDER — ALBUMIN (HUMAN) 12.5 G/50ML
75 SOLUTION INTRAVENOUS ONCE
Status: COMPLETED | OUTPATIENT
Start: 2023-06-19 | End: 2023-06-19

## 2023-06-19 RX ORDER — ALBUMIN (HUMAN) 12.5 G/50ML
75 SOLUTION INTRAVENOUS ONCE
Status: CANCELLED | OUTPATIENT
Start: 2023-06-19 | End: 2023-06-19

## 2023-06-19 RX ADMIN — ALBUMIN (HUMAN) 75 G: 12.5 SOLUTION INTRAVENOUS at 16:30

## 2023-06-19 RX ADMIN — HEPARIN 500 UNITS: 100 SYRINGE at 18:33

## 2023-06-19 ASSESSMENT — FIBROSIS 4 INDEX: FIB4 SCORE: 19.84

## 2023-06-19 ASSESSMENT — PAIN DESCRIPTION - PAIN TYPE
TYPE: SURGICAL PAIN

## 2023-06-19 NOTE — OR NURSING
1638 - Received report from Josie MONTOYA. Albumin is currently running. Patient denies pain or nausea at this time. Drinking juice without complications. Right lateral paracentesis site CDI with Band-Aid.    1648 - Discharge instructions discussed with with patient and significant other. All questions answered. Verbalized understanding.     1720 - Patient resting comfortably no needs at this time.    1833 - Albumin finished infusing. Port was heparin locked and deaccessed.    1835 - Pt escorted out of department in by ambulation with all belongings. Discharged home to responsible adult.

## 2023-06-19 NOTE — OR NURSING
1540 assumed care of patient. Patient brought up by [employer]panda / [employer] [facility], complains of no pain at this time. Awaiting on orders from Dr. Gorman.     1555 Port accessed.    1630 Albumin started.    1638 Report given to Arielle MONTOYA.

## 2023-06-21 ENCOUNTER — OFFICE VISIT (OUTPATIENT)
Dept: MEDICAL GROUP | Facility: CLINIC | Age: 58
End: 2023-06-21
Payer: MEDICARE

## 2023-06-21 VITALS
SYSTOLIC BLOOD PRESSURE: 94 MMHG | RESPIRATION RATE: 14 BRPM | DIASTOLIC BLOOD PRESSURE: 62 MMHG | WEIGHT: 192 LBS | TEMPERATURE: 97.6 F | HEIGHT: 74 IN | BODY MASS INDEX: 24.64 KG/M2 | HEART RATE: 68 BPM | OXYGEN SATURATION: 98 %

## 2023-06-21 DIAGNOSIS — E11.69 TYPE 2 DIABETES MELLITUS WITH OTHER SPECIFIED COMPLICATION, WITHOUT LONG-TERM CURRENT USE OF INSULIN (HCC): ICD-10-CM

## 2023-06-21 DIAGNOSIS — D61.818 PANCYTOPENIA (HCC): ICD-10-CM

## 2023-06-21 DIAGNOSIS — C18.9 METASTATIC COLON CANCER TO LIVER (HCC): ICD-10-CM

## 2023-06-21 DIAGNOSIS — E78.5 HYPERLIPIDEMIA, UNSPECIFIED HYPERLIPIDEMIA TYPE: ICD-10-CM

## 2023-06-21 DIAGNOSIS — E55.9 VITAMIN D DEFICIENCY: ICD-10-CM

## 2023-06-21 DIAGNOSIS — A41.9 SEPSIS WITHOUT ACUTE ORGAN DYSFUNCTION, DUE TO UNSPECIFIED ORGANISM (HCC): ICD-10-CM

## 2023-06-21 DIAGNOSIS — D50.8 OTHER IRON DEFICIENCY ANEMIA: ICD-10-CM

## 2023-06-21 DIAGNOSIS — C78.7 METASTATIC COLON CANCER TO LIVER (HCC): ICD-10-CM

## 2023-06-21 PROCEDURE — 3074F SYST BP LT 130 MM HG: CPT | Performed by: PHYSICIAN ASSISTANT

## 2023-06-21 PROCEDURE — 99495 TRANSJ CARE MGMT MOD F2F 14D: CPT | Performed by: PHYSICIAN ASSISTANT

## 2023-06-21 PROCEDURE — 3078F DIAST BP <80 MM HG: CPT | Performed by: PHYSICIAN ASSISTANT

## 2023-06-21 RX ORDER — LIDOCAINE AND PRILOCAINE 25; 25 MG/G; MG/G
CREAM TOPICAL
Qty: 30 G | Refills: 2 | Status: SHIPPED | OUTPATIENT
Start: 2023-06-21

## 2023-06-21 RX ORDER — EPINEPHRINE 0.3 MG/.3ML
INJECTION SUBCUTANEOUS
COMMUNITY
Start: 2023-06-15

## 2023-06-21 RX ORDER — FUROSEMIDE 20 MG/1
20 TABLET ORAL
COMMUNITY
Start: 2023-05-30

## 2023-06-21 ASSESSMENT — FIBROSIS 4 INDEX: FIB4 SCORE: 19.84

## 2023-06-23 ENCOUNTER — HOSPITAL ENCOUNTER (OUTPATIENT)
Dept: RADIOLOGY | Facility: MEDICAL CENTER | Age: 58
End: 2023-06-23
Attending: INTERNAL MEDICINE
Payer: MEDICARE

## 2023-06-23 DIAGNOSIS — C18.9: ICD-10-CM

## 2023-06-23 DIAGNOSIS — C78.7 SECONDARY MALIGNANT NEOPLASM OF LIVER (HCC): ICD-10-CM

## 2023-06-23 DIAGNOSIS — Z15.01: ICD-10-CM

## 2023-06-23 LAB
BODY FLD TYPE: NORMAL
GRAM STN SPEC: NORMAL
SIGNIFICANT IND 70042: NORMAL
SITE SITE: NORMAL
SOURCE SOURCE: NORMAL
TRIGL FLD-MCNC: 517 MG/DL

## 2023-06-23 PROCEDURE — P9047 ALBUMIN (HUMAN), 25%, 50ML: HCPCS | Mod: JG,UD | Performed by: STUDENT IN AN ORGANIZED HEALTH CARE EDUCATION/TRAINING PROGRAM

## 2023-06-23 PROCEDURE — 700111 HCHG RX REV CODE 636 W/ 250 OVERRIDE (IP): Mod: JG,UD | Performed by: STUDENT IN AN ORGANIZED HEALTH CARE EDUCATION/TRAINING PROGRAM

## 2023-06-23 PROCEDURE — 84478 ASSAY OF TRIGLYCERIDES: CPT

## 2023-06-23 PROCEDURE — 87070 CULTURE OTHR SPECIMN AEROBIC: CPT

## 2023-06-23 PROCEDURE — 700111 HCHG RX REV CODE 636 W/ 250 OVERRIDE (IP): Mod: UD

## 2023-06-23 PROCEDURE — 87205 SMEAR GRAM STAIN: CPT

## 2023-06-23 PROCEDURE — 49083 ABD PARACENTESIS W/IMAGING: CPT

## 2023-06-23 RX ORDER — ALBUMIN (HUMAN) 12.5 G/50ML
50 SOLUTION INTRAVENOUS ONCE
Status: COMPLETED | OUTPATIENT
Start: 2023-06-23 | End: 2023-06-23

## 2023-06-23 RX ORDER — ALBUMIN (HUMAN) 12.5 G/50ML
SOLUTION INTRAVENOUS
Status: COMPLETED
Start: 2023-06-23 | End: 2023-06-23

## 2023-06-23 RX ORDER — ALBUMIN (HUMAN) 12.5 G/50ML
62.5 SOLUTION INTRAVENOUS ONCE
Status: DISCONTINUED | OUTPATIENT
Start: 2023-06-23 | End: 2023-06-23

## 2023-06-23 RX ORDER — LIDOCAINE HYDROCHLORIDE 10 MG/ML
INJECTION, SOLUTION EPIDURAL; INFILTRATION; INTRACAUDAL; PERINEURAL
Status: COMPLETED
Start: 2023-06-23 | End: 2023-06-23

## 2023-06-23 RX ADMIN — LIDOCAINE HYDROCHLORIDE: 10 INJECTION, SOLUTION EPIDURAL; INFILTRATION; INTRACAUDAL at 12:52

## 2023-06-23 RX ADMIN — HEPARIN 500 UNITS: 100 SYRINGE at 14:54

## 2023-06-23 RX ADMIN — ALBUMIN (HUMAN) 50 G: 5 SOLUTION INTRAVENOUS at 13:15

## 2023-06-23 NOTE — PROGRESS NOTES
US guided therapeutic paracentesis done by Dr. Crenshaw;(no H&P required as this is a NON SEDATION procedure) RLQ access site; dressing CDI; 6000mL obtained and 6000 mL discarded. 10mL sent to lab see orders. Pt tolerated the procedure well; pt hemodynamically stable pre/intra/post procedure. Rt upper chest port accessed, albumin 4 bottles given per protocol, Port de-  accessed and hep locked upon completion of albumin administration. All questions and concerns answered prior to d/c. Patient provided with all appropriate education for procedure. Pt d/c home.

## 2023-06-28 ENCOUNTER — TELEPHONE (OUTPATIENT)
Dept: MEDICAL GROUP | Facility: CLINIC | Age: 58
End: 2023-06-28
Payer: MEDICARE

## 2023-06-28 NOTE — TELEPHONE ENCOUNTER
Patient will need to have a follow up appointment to discuss his most recent paracentesis results.

## 2023-06-30 ENCOUNTER — HOSPITAL ENCOUNTER (OUTPATIENT)
Dept: RADIOLOGY | Facility: MEDICAL CENTER | Age: 58
End: 2023-06-30
Attending: INTERNAL MEDICINE
Payer: MEDICARE

## 2023-06-30 DIAGNOSIS — C78.7 SECONDARY MALIGNANT NEOPLASM OF LIVER (HCC): ICD-10-CM

## 2023-06-30 DIAGNOSIS — C18.9: ICD-10-CM

## 2023-06-30 DIAGNOSIS — Z15.01: ICD-10-CM

## 2023-06-30 PROCEDURE — 49083 ABD PARACENTESIS W/IMAGING: CPT

## 2023-06-30 PROCEDURE — P9047 ALBUMIN (HUMAN), 25%, 50ML: HCPCS | Mod: JG,UD

## 2023-06-30 PROCEDURE — 700111 HCHG RX REV CODE 636 W/ 250 OVERRIDE (IP): Mod: JG,UD

## 2023-06-30 RX ORDER — ALBUMIN (HUMAN) 12.5 G/50ML
SOLUTION INTRAVENOUS
Status: COMPLETED
Start: 2023-06-30 | End: 2023-06-30

## 2023-06-30 RX ADMIN — ALBUMIN (HUMAN) 50 G: 5 SOLUTION INTRAVENOUS at 13:00

## 2023-06-30 ASSESSMENT — PAIN DESCRIPTION - PAIN TYPE: TYPE: ACUTE PAIN

## 2023-06-30 NOTE — PROGRESS NOTES
US guided therapeutic paracentesis done by Dr. Gonzalez;(no H&P required as this is a NON SEDATION procedure) RLQ access site; dressing CDI; 9070 mL obtained and discarded. Pt tolerated the procedure well; pt hemodynamically stable pre/intra/post procedure. IV started, albumin 50g given per protocol, IV d/c'ed upon completion of albumin administration. All questions and concerns answered prior to d/c. Patient provided with all appropriate education for procedure. Pt d/c home.

## 2023-07-01 RX ORDER — LOPERAMIDE HYDROCHLORIDE 2 MG/1
4 CAPSULE ORAL PRN
OUTPATIENT
Start: 2023-07-11

## 2023-07-01 RX ORDER — SODIUM CHLORIDE 9 MG/ML
INJECTION, SOLUTION INTRAVENOUS CONTINUOUS
OUTPATIENT
Start: 2023-07-11

## 2023-07-01 RX ORDER — 0.9 % SODIUM CHLORIDE 0.9 %
10 VIAL (ML) INJECTION PRN
OUTPATIENT
Start: 2023-07-10

## 2023-07-01 RX ORDER — 0.9 % SODIUM CHLORIDE 0.9 %
10 VIAL (ML) INJECTION PRN
OUTPATIENT
Start: 2023-07-11

## 2023-07-01 RX ORDER — METHYLPREDNISOLONE SODIUM SUCCINATE 125 MG/2ML
125 INJECTION, POWDER, LYOPHILIZED, FOR SOLUTION INTRAMUSCULAR; INTRAVENOUS PRN
OUTPATIENT
Start: 2023-07-11

## 2023-07-01 RX ORDER — 0.9 % SODIUM CHLORIDE 0.9 %
VIAL (ML) INJECTION PRN
OUTPATIENT
Start: 2023-07-10

## 2023-07-01 RX ORDER — ONDANSETRON 2 MG/ML
4 INJECTION INTRAMUSCULAR; INTRAVENOUS PRN
OUTPATIENT
Start: 2023-07-11

## 2023-07-01 RX ORDER — LOPERAMIDE HYDROCHLORIDE 2 MG/1
2 CAPSULE ORAL
OUTPATIENT
Start: 2023-07-11

## 2023-07-01 RX ORDER — 0.9 % SODIUM CHLORIDE 0.9 %
VIAL (ML) INJECTION PRN
OUTPATIENT
Start: 2023-07-11

## 2023-07-01 RX ORDER — EPINEPHRINE 1 MG/ML(1)
0.5 AMPUL (ML) INJECTION PRN
OUTPATIENT
Start: 2023-07-11

## 2023-07-01 RX ORDER — 0.9 % SODIUM CHLORIDE 0.9 %
3 VIAL (ML) INJECTION PRN
OUTPATIENT
Start: 2023-07-10

## 2023-07-01 RX ORDER — DIPHENHYDRAMINE HYDROCHLORIDE 50 MG/ML
50 INJECTION INTRAMUSCULAR; INTRAVENOUS PRN
OUTPATIENT
Start: 2023-07-11

## 2023-07-01 RX ORDER — 0.9 % SODIUM CHLORIDE 0.9 %
3 VIAL (ML) INJECTION PRN
OUTPATIENT
Start: 2023-07-11

## 2023-07-01 RX ORDER — PROCHLORPERAZINE MALEATE 10 MG
10 TABLET ORAL EVERY 6 HOURS PRN
OUTPATIENT
Start: 2023-07-11

## 2023-07-01 RX ORDER — ONDANSETRON 8 MG/1
8 TABLET, ORALLY DISINTEGRATING ORAL PRN
OUTPATIENT
Start: 2023-07-11

## 2023-07-03 ENCOUNTER — HOSPITAL ENCOUNTER (OUTPATIENT)
Dept: LAB | Facility: MEDICAL CENTER | Age: 58
End: 2023-07-03
Attending: PHYSICIAN ASSISTANT
Payer: MEDICARE

## 2023-07-03 ENCOUNTER — HOSPITAL ENCOUNTER (OUTPATIENT)
Dept: LAB | Facility: MEDICAL CENTER | Age: 58
End: 2023-07-03
Attending: INTERNAL MEDICINE
Payer: MEDICARE

## 2023-07-03 ENCOUNTER — APPOINTMENT (OUTPATIENT)
Dept: RADIOLOGY | Facility: MEDICAL CENTER | Age: 58
End: 2023-07-03
Attending: INTERNAL MEDICINE
Payer: MEDICARE

## 2023-07-03 DIAGNOSIS — E55.9 VITAMIN D DEFICIENCY: ICD-10-CM

## 2023-07-03 DIAGNOSIS — D50.8 OTHER IRON DEFICIENCY ANEMIA: ICD-10-CM

## 2023-07-03 DIAGNOSIS — E78.5 HYPERLIPIDEMIA, UNSPECIFIED HYPERLIPIDEMIA TYPE: ICD-10-CM

## 2023-07-03 DIAGNOSIS — R78.81 BACTEREMIA DUE TO GRAM-NEGATIVE BACTERIA: ICD-10-CM

## 2023-07-03 DIAGNOSIS — E11.69 TYPE 2 DIABETES MELLITUS WITH OTHER SPECIFIED COMPLICATION, WITHOUT LONG-TERM CURRENT USE OF INSULIN (HCC): ICD-10-CM

## 2023-07-03 LAB
25(OH)D3 SERPL-MCNC: 18 NG/ML (ref 30–100)
ALBUMIN SERPL BCP-MCNC: 3.7 G/DL (ref 3.2–4.9)
ALBUMIN/GLOB SERPL: 1.4 G/DL
ALP SERPL-CCNC: 289 U/L (ref 30–99)
ALT SERPL-CCNC: 31 U/L (ref 2–50)
ANION GAP SERPL CALC-SCNC: 11 MMOL/L (ref 7–16)
AST SERPL-CCNC: 60 U/L (ref 12–45)
BASOPHILS # BLD AUTO: 0.8 % (ref 0–1.8)
BASOPHILS # BLD: 0.06 K/UL (ref 0–0.12)
BILIRUB SERPL-MCNC: 1.2 MG/DL (ref 0.1–1.5)
BUN SERPL-MCNC: 14 MG/DL (ref 8–22)
CALCIUM ALBUM COR SERPL-MCNC: 9.2 MG/DL (ref 8.5–10.5)
CALCIUM SERPL-MCNC: 9 MG/DL (ref 8.5–10.5)
CHLORIDE SERPL-SCNC: 101 MMOL/L (ref 96–112)
CHOLEST SERPL-MCNC: 116 MG/DL (ref 100–199)
CO2 SERPL-SCNC: 24 MMOL/L (ref 20–33)
CREAT SERPL-MCNC: 0.73 MG/DL (ref 0.5–1.4)
EOSINOPHIL # BLD AUTO: 0.24 K/UL (ref 0–0.51)
EOSINOPHIL NFR BLD: 3.3 % (ref 0–6.9)
ERYTHROCYTE [DISTWIDTH] IN BLOOD BY AUTOMATED COUNT: 57.1 FL (ref 35.9–50)
EST. AVERAGE GLUCOSE BLD GHB EST-MCNC: 85 MG/DL
FASTING STATUS PATIENT QL REPORTED: NORMAL
GFR SERPLBLD CREATININE-BSD FMLA CKD-EPI: 105 ML/MIN/1.73 M 2
GLOBULIN SER CALC-MCNC: 2.6 G/DL (ref 1.9–3.5)
GLUCOSE SERPL-MCNC: 101 MG/DL (ref 65–99)
HBA1C MFR BLD: 4.6 % (ref 4–5.6)
HCT VFR BLD AUTO: 41.7 % (ref 42–52)
HDLC SERPL-MCNC: 37 MG/DL
HGB BLD-MCNC: 13.4 G/DL (ref 14–18)
IMM GRANULOCYTES # BLD AUTO: 0.04 K/UL (ref 0–0.11)
IMM GRANULOCYTES NFR BLD AUTO: 0.5 % (ref 0–0.9)
LDLC SERPL CALC-MCNC: 68 MG/DL
LYMPHOCYTES # BLD AUTO: 0.61 K/UL (ref 1–4.8)
LYMPHOCYTES NFR BLD: 8.3 % (ref 22–41)
MCH RBC QN AUTO: 31.4 PG (ref 27–33)
MCHC RBC AUTO-ENTMCNC: 32.1 G/DL (ref 32.3–36.5)
MCV RBC AUTO: 97.7 FL (ref 81.4–97.8)
MONOCYTES # BLD AUTO: 0.72 K/UL (ref 0–0.85)
MONOCYTES NFR BLD AUTO: 9.8 % (ref 0–13.4)
NEUTROPHILS # BLD AUTO: 5.66 K/UL (ref 1.82–7.42)
NEUTROPHILS NFR BLD: 77.3 % (ref 44–72)
NRBC # BLD AUTO: 0 K/UL
NRBC BLD-RTO: 0 /100 WBC (ref 0–0.2)
PLATELET # BLD AUTO: 126 K/UL (ref 164–446)
PMV BLD AUTO: 12 FL (ref 9–12.9)
POTASSIUM SERPL-SCNC: 3.8 MMOL/L (ref 3.6–5.5)
PROT SERPL-MCNC: 6.3 G/DL (ref 6–8.2)
RBC # BLD AUTO: 4.27 M/UL (ref 4.7–6.1)
SODIUM SERPL-SCNC: 136 MMOL/L (ref 135–145)
TRIGL SERPL-MCNC: 57 MG/DL (ref 0–149)
WBC # BLD AUTO: 7.3 K/UL (ref 4.8–10.8)

## 2023-07-03 PROCEDURE — 80061 LIPID PANEL: CPT

## 2023-07-03 PROCEDURE — 36415 COLL VENOUS BLD VENIPUNCTURE: CPT

## 2023-07-03 PROCEDURE — 87040 BLOOD CULTURE FOR BACTERIA: CPT

## 2023-07-03 PROCEDURE — 83036 HEMOGLOBIN GLYCOSYLATED A1C: CPT | Mod: GA

## 2023-07-03 PROCEDURE — 82306 VITAMIN D 25 HYDROXY: CPT

## 2023-07-03 PROCEDURE — 85025 COMPLETE CBC W/AUTO DIFF WBC: CPT

## 2023-07-03 PROCEDURE — 80053 COMPREHEN METABOLIC PANEL: CPT

## 2023-07-04 ENCOUNTER — HOSPITAL ENCOUNTER (EMERGENCY)
Facility: MEDICAL CENTER | Age: 58
End: 2023-07-05
Attending: EMERGENCY MEDICINE
Payer: MEDICARE

## 2023-07-04 DIAGNOSIS — R18.8 OTHER ASCITES: Primary | ICD-10-CM

## 2023-07-04 DIAGNOSIS — R10.84 GENERALIZED ABDOMINAL PAIN: ICD-10-CM

## 2023-07-04 DIAGNOSIS — Z98.890 HISTORY OF ABDOMINAL PARACENTESIS: ICD-10-CM

## 2023-07-04 LAB
BASOPHILS # BLD AUTO: 0.6 % (ref 0–1.8)
BASOPHILS # BLD: 0.06 K/UL (ref 0–0.12)
EOSINOPHIL # BLD AUTO: 0.23 K/UL (ref 0–0.51)
EOSINOPHIL NFR BLD: 2.3 % (ref 0–6.9)
ERYTHROCYTE [DISTWIDTH] IN BLOOD BY AUTOMATED COUNT: 56.9 FL (ref 35.9–50)
HCT VFR BLD AUTO: 43.6 % (ref 42–52)
HGB BLD-MCNC: 14.7 G/DL (ref 14–18)
IMM GRANULOCYTES # BLD AUTO: 0.08 K/UL (ref 0–0.11)
IMM GRANULOCYTES NFR BLD AUTO: 0.8 % (ref 0–0.9)
LYMPHOCYTES # BLD AUTO: 0.87 K/UL (ref 1–4.8)
LYMPHOCYTES NFR BLD: 8.9 % (ref 22–41)
MCH RBC QN AUTO: 32 PG (ref 27–33)
MCHC RBC AUTO-ENTMCNC: 33.7 G/DL (ref 32.3–36.5)
MCV RBC AUTO: 94.8 FL (ref 81.4–97.8)
MONOCYTES # BLD AUTO: 1.18 K/UL (ref 0–0.85)
MONOCYTES NFR BLD AUTO: 12.1 % (ref 0–13.4)
NEUTROPHILS # BLD AUTO: 7.37 K/UL (ref 1.82–7.42)
NEUTROPHILS NFR BLD: 75.3 % (ref 44–72)
NRBC # BLD AUTO: 0 K/UL
NRBC BLD-RTO: 0 /100 WBC (ref 0–0.2)
PLATELET # BLD AUTO: 159 K/UL (ref 164–446)
PMV BLD AUTO: 10.6 FL (ref 9–12.9)
RBC # BLD AUTO: 4.6 M/UL (ref 4.7–6.1)
WBC # BLD AUTO: 9.8 K/UL (ref 4.8–10.8)

## 2023-07-04 PROCEDURE — 99284 EMERGENCY DEPT VISIT MOD MDM: CPT | Mod: 25

## 2023-07-04 PROCEDURE — 36415 COLL VENOUS BLD VENIPUNCTURE: CPT

## 2023-07-04 PROCEDURE — 49082 ABD PARACENTESIS: CPT

## 2023-07-04 PROCEDURE — 83690 ASSAY OF LIPASE: CPT

## 2023-07-04 PROCEDURE — 85025 COMPLETE CBC W/AUTO DIFF WBC: CPT

## 2023-07-04 PROCEDURE — 87040 BLOOD CULTURE FOR BACTERIA: CPT

## 2023-07-04 PROCEDURE — 80053 COMPREHEN METABOLIC PANEL: CPT

## 2023-07-04 ASSESSMENT — FIBROSIS 4 INDEX: FIB4 SCORE: 5.05

## 2023-07-05 VITALS
BODY MASS INDEX: 24.39 KG/M2 | HEIGHT: 74 IN | HEART RATE: 76 BPM | RESPIRATION RATE: 16 BRPM | OXYGEN SATURATION: 95 % | SYSTOLIC BLOOD PRESSURE: 109 MMHG | WEIGHT: 190.04 LBS | TEMPERATURE: 98 F | DIASTOLIC BLOOD PRESSURE: 69 MMHG

## 2023-07-05 LAB
ALBUMIN SERPL BCP-MCNC: 3.3 G/DL (ref 3.2–4.9)
ALBUMIN/GLOB SERPL: 1.1 G/DL
ALP SERPL-CCNC: 323 U/L (ref 30–99)
ALT SERPL-CCNC: 31 U/L (ref 2–50)
ANION GAP SERPL CALC-SCNC: 13 MMOL/L (ref 7–16)
AST SERPL-CCNC: 62 U/L (ref 12–45)
BILIRUB SERPL-MCNC: 1.3 MG/DL (ref 0.1–1.5)
BUN SERPL-MCNC: 15 MG/DL (ref 8–22)
CALCIUM ALBUM COR SERPL-MCNC: 9.5 MG/DL (ref 8.5–10.5)
CALCIUM SERPL-MCNC: 8.9 MG/DL (ref 8.5–10.5)
CHLORIDE SERPL-SCNC: 100 MMOL/L (ref 96–112)
CO2 SERPL-SCNC: 20 MMOL/L (ref 20–33)
CREAT SERPL-MCNC: 0.66 MG/DL (ref 0.5–1.4)
GFR SERPLBLD CREATININE-BSD FMLA CKD-EPI: 108 ML/MIN/1.73 M 2
GLOBULIN SER CALC-MCNC: 2.9 G/DL (ref 1.9–3.5)
GLUCOSE SERPL-MCNC: 130 MG/DL (ref 65–99)
LIPASE SERPL-CCNC: 230 U/L (ref 11–82)
POTASSIUM SERPL-SCNC: 3.9 MMOL/L (ref 3.6–5.5)
PROT SERPL-MCNC: 6.2 G/DL (ref 6–8.2)
SODIUM SERPL-SCNC: 133 MMOL/L (ref 135–145)

## 2023-07-05 ASSESSMENT — PAIN DESCRIPTION - PAIN TYPE: TYPE: ACUTE PAIN

## 2023-07-05 NOTE — ED NOTES
Pt discharged to home. Discharge paperwork provided. Education provided by ERP.  Pt was given follow up instructions  Pt verbalized understanding of all instructions for discharge. Patient ambulatory, alert and oriented x 4, out of ER with all belongings and steady gait. ?

## 2023-07-05 NOTE — ED PROVIDER NOTES
ED Provider Note    Scribed for Jamar Cid by Susana Husain. 7/4/2023  10:54 PM    Primary care provider: Dunia Smith P.A.-C.  Means of arrival: Walk-In  History obtained from: Patient  History limited by: None    CHIEF COMPLAINT  Chief Complaint   Patient presents with    Abdominal Swelling     Increasing abdominal swelling over the past 4-5 days. Associated with generalized abdominal pain.        EXTERNAL RECORDS REVIEWED  Inpatient Notes patient was admitted on 19 June of this year for paracentesis    HPI/ROS  LIMITATION TO HISTORY   Select: : None  OUTSIDE HISTORIAN(S):  None    HPI  Gurmeet Jo is a 58 y.o. male who presents to the Emergency Department for abdominal swelling onset about 5 days ago. The patient reports that the swelling has been increasing in severity with time, and it is associated with general pain to his abdomen. He notes he has a history of colorectal cancer with mets to his lever and his last chemotherapy was in June. He notes frequent paracentesis with the most recent being on 6/30/23. He usually gets them every 6 days however he recently took a trip to [place]. He denies any chest pain, nausea, or vomiting, associated with the swelling. He is expecting a shunt placement soon. Patient denies eating any salty foods lately. There are no known alleviating or exacerbating factors.        REVIEW OF SYSTEMS  As above, all other systems reviewed and are negative.   See HPI for further details.     PAST MEDICAL HISTORY   has a past medical history of Bowel habit changes (06/10/2022), Cancer (HCC) (11/2018), Dental disorder (05/26/2022), Diabetes (HCC) (05/26/2022), Hiatus hernia syndrome, Indigestion, Pain, Pain (06/10/2022), Psychiatric problem (05/26/2022), Sleep apnea (06/10/2022), and Snoring.  SURGICAL HISTORY   has a past surgical history that includes colonoscopy (2015).  SOCIAL HISTORY  Social History     Tobacco Use    Smoking status: Former     Packs/day: 1.00     Years:  15.00     Pack years: 15.00     Types: Cigarettes     Quit date: 1998     Years since quittin.5    Smokeless tobacco: Never   Vaping Use    Vaping Use: Never used   Substance Use Topics    Alcohol use: No    Drug use: Yes     Types: Inhaled, Marijuana     Comment: occ      Social History     Substance and Sexual Activity   Drug Use Yes    Types: Inhaled, Marijuana    Comment: occ     FAMILY HISTORY  Family History   Problem Relation Age of Onset    Hypertension Mother     Hypertension Father     Diabetes Father     Diabetes Brother      CURRENT MEDICATIONS  Home Medications       Reviewed by Sarah Sears R.N. (Registered Nurse) on 23 at 2219  Med List Status: Partial     Medication Last Dose Status   acetaminophen (TYLENOL) 500 MG Tab  Active   albuterol 108 (90 Base) MCG/ACT Aero Soln inhalation aerosol  Active   baclofen (LIORESAL) 10 MG Tab  Active   Blood Glucose Monitoring Suppl Device  Active   Blood Glucose Test Strips  Active   diphenhydrAMINE-zinc acetate (BENADRYL ITCH) cream  Active   Empagliflozin (JARDIANCE) 25 MG Tab  Active   EPINEPHrine (EPIPEN) 0.3 MG/0.3ML Solution Auto-injector solution for injection  Active   furosemide (LASIX) 20 MG Tab  Active   glucose blood (TRUE METRIX BLOOD GLUCOSE TEST) strip  Active   heparin lock flush 100 unit/mL Solution  Active   HERZUMA 420 MG Recon Soln injection  Active   Lancets  Active   lidocaine-prilocaine (EMLA) 2.5-2.5 % Cream  Active   LORazepam (ATIVAN) 1 MG Tab  Active   nystatin (MYCOSTATIN) 158648 UNIT/ML Suspension  Active   ondansetron (ZOFRAN ODT) 8 MG TABLET DISPERSIBLE  Active   potassium chloride (MICRO-K) 10 MEQ capsule  Active   Sodium Chloride (NS) Solution  Active   therapeutic multivitamin-minerals (THERAGRAN-M) Tab  Active   TRUE METRIX BLOOD GLUCOSE TEST strip  Active                  ALLERGIES  Allergies   Allergen Reactions    Compazine Unspecified     tartive dyskenia    Oxaliplatin Unspecified     Back spasms    Bee  Venom Swelling       PHYSICAL EXAM    VITAL SIGNS:   Vitals:    07/04/23 2355 07/05/23 0003 07/05/23 0022 07/05/23 0023   BP: 115/75 112/74  109/69   Pulse: 77 78 71 76   Resp: 20  19 16   Temp:    36.7 °C (98 °F)   TempSrc:    Temporal   SpO2: 95% 94% 95% 95%   Weight:       Height:           Vitals: My interpretation: normotensive, not tachycardic, afebrile, not hypoxic    Reinterpretation of vitals: Unchanged unremarkable    PE:   Gen: sitting comfortably, speaking clearly, appears in no acute distress   ENT: Mucous membranes moist, posterior pharynx clear, uvula midline, nares patent bilaterally   Neck: Supple, FROM  Pulmonary: Lungs are clear to auscultation bilaterally. No tachypnea  CV:  RRR, no murmur appreciated, pulses 2+ in both upper and lower extremities  Abdomen: Significant abdominal distention, caput medusae, soft, NT, no rebound/guarding  : no CVA or suprapubic tenderness   Neuro: A&Ox4 (person, place, time, situation), speech fluent, gait steady, no focal deficits appreciated  Skin: No rash or lesions.  No pallor or jaundice.  No cyanosis.  Warm and dry.     DIAGNOSTIC STUDIES / PROCEDURES    LABS  Results for orders placed or performed during the hospital encounter of 07/04/23   CBC WITH DIFFERENTIAL   Result Value Ref Range    WBC 9.8 4.8 - 10.8 K/uL    RBC 4.60 (L) 4.70 - 6.10 M/uL    Hemoglobin 14.7 14.0 - 18.0 g/dL    Hematocrit 43.6 42.0 - 52.0 %    MCV 94.8 81.4 - 97.8 fL    MCH 32.0 27.0 - 33.0 pg    MCHC 33.7 32.3 - 36.5 g/dL    RDW 56.9 (H) 35.9 - 50.0 fL    Platelet Count 159 (L) 164 - 446 K/uL    MPV 10.6 9.0 - 12.9 fL    Neutrophils-Polys 75.30 (H) 44.00 - 72.00 %    Lymphocytes 8.90 (L) 22.00 - 41.00 %    Monocytes 12.10 0.00 - 13.40 %    Eosinophils 2.30 0.00 - 6.90 %    Basophils 0.60 0.00 - 1.80 %    Immature Granulocytes 0.80 0.00 - 0.90 %    Nucleated RBC 0.00 0.00 - 0.20 /100 WBC    Neutrophils (Absolute) 7.37 1.82 - 7.42 K/uL    Lymphs (Absolute) 0.87 (L) 1.00 - 4.80 K/uL     Monos (Absolute) 1.18 (H) 0.00 - 0.85 K/uL    Eos (Absolute) 0.23 0.00 - 0.51 K/uL    Baso (Absolute) 0.06 0.00 - 0.12 K/uL    Immature Granulocytes (abs) 0.08 0.00 - 0.11 K/uL    NRBC (Absolute) 0.00 K/uL   COMP METABOLIC PANEL   Result Value Ref Range    Sodium 133 (L) 135 - 145 mmol/L    Potassium 3.9 3.6 - 5.5 mmol/L    Chloride 100 96 - 112 mmol/L    Co2 20 20 - 33 mmol/L    Anion Gap 13.0 7.0 - 16.0    Glucose 130 (H) 65 - 99 mg/dL    Bun 15 8 - 22 mg/dL    Creatinine 0.66 0.50 - 1.40 mg/dL    Calcium 8.9 8.5 - 10.5 mg/dL    AST(SGOT) 62 (H) 12 - 45 U/L    ALT(SGPT) 31 2 - 50 U/L    Alkaline Phosphatase 323 (H) 30 - 99 U/L    Total Bilirubin 1.3 0.1 - 1.5 mg/dL    Albumin 3.3 3.2 - 4.9 g/dL    Total Protein 6.2 6.0 - 8.2 g/dL    Globulin 2.9 1.9 - 3.5 g/dL    A-G Ratio 1.1 g/dL   LIPASE   Result Value Ref Range    Lipase 230 (H) 11 - 82 U/L   ESTIMATED GFR   Result Value Ref Range    GFR (CKD-EPI) 108 >60 mL/min/1.73 m 2   CORRECTED CALCIUM   Result Value Ref Range    Correct Calcium 9.5 8.5 - 10.5 mg/dL      All labs reviewed by me. Labs were compared to prior labs if they were available. Significant for no leukocytosis, no anemia, normal electrolytes, normal renal function, mild AST predominant transaminitis, normal bilirubin, lipase moderately elevated at 230.    Paracentesis Procedure Note  Indication: Ascites  Consent: The patient was counseled regarding the procedure, it's indications, risks, potential complications and alternatives and any questions were answered. Consent was obtained.  Procedure: The patient was placed in the supine position with the head of the bed slightly elevated and the appropriate landmarks were identified. The skin over the puncture site in the left lower quadrant region was draped in a sterile fashion. Local anesthesia was obtained by infiltration using 1% Lidocaine without epinephrine. A paracentesis catheter was then advanced into the abdominal cavity over a needle and the  needle was withdrawn. Fluid was returned which was chylous.  A total volume of 7 liters was withdrawn which was not sent to the lab as this was a therapeutic, not a diagnostic procedure. The catheter was then withdrawn and a sterile dressing was placed over the site.   The patient tolerated the procedure well.  Complications: None      Ultrasound Guidance procedure note  Indication: Paracentesis  Consent: The patient was counseled regarding the procedure, it's indications, risks, potential complications and alternatives and any questions were answered. Consent was obtained.  Ultrasound on my independent interpretation shows large volume ascites present.  Complications: None     COURSE & MEDICAL DECISION MAKING  Nursing notes, VS, PMSFHx, labs, imaging, EKG reviewed in chart.    Ddx: Cirrhosis, malignancy, metastatic disease, ascites fluid    MDM: 10:54 PM Gurmeet Jo is a 58 y.o. male who presented with acute, severe abdominal swelling and pain secondary to needing a paracentesis.  Patient unfortunately is colorectal metastatic cancer throughout the abdomen involving the liver among other areas of the abdomen.  He has been undergoing routine paracentesis.  However he reaccumulated fluid quickly after his last paracentesis and came in tonight with difficulty breathing when lying flat due to abdominal girth from large volume ascites.  Asking for paracentesis.  Denies any fevers, nausea or vomiting.  Vital signs are unremarkable and he is afebrile.  Physical exam shows obvious abdominal distention and some generalized tenderness, signs symptoms consistent with ascites large volume requiring paracentesis.  Patient was consented and underwent procedure under ultrasound guidance, see procedure notes.  Patient had laboratory work-up here per protocol drawn in triage.  All labs reviewed by me. Labs were compared to prior labs if they were available. Significant for no leukocytosis, no anemia, normal electrolytes,  normal renal function, mild AST predominant transaminitis, normal bilirubin, lipase moderately elevated at 230.  He has no epigastric tenderness after the procedure.  I do not think he has acute pancreatitis and I think this is likely elevated lipase that is probably chronic secondary to his metastatic abdominal cancer.  Patient will follow-up in 48 hours for repeat lipase with PCP and come back sooner if you develop severe epigastric pain.  His vital signs remain normal after paracentesis was done, he is ambulating unassisted in no acute distress, he is grateful for having the procedure done here.  No complications.  No fluid was sent for laboratory evaluation as this is a therapeutic process, nondiagnostic and patient has this done almost on a weekly basis.  He verbalized understand strict return precautions and close outpatient follow-up plan and is amenable.      ADDITIONAL PROBLEM LIST AND DISPOSITION    I have discussed management of the patient with the following physicians and CECILIO's: None    Discussion of management with other QHP or appropriate source(s): None     Escalation of care considered, and ultimately not performed:acute inpatient care management, however at this time, the patient is most appropriate for outpatient management    Barriers to care at this time, including but not limited to:  None .     FINAL IMPRESSION  1. Other ascites Acute   2. Generalized abdominal pain Acute   3. History of abdominal paracentesis Acute      2. Paracentesis with Ultrasound Guidance procedure as noted above     Susana BOATENG (Scribe), am scribing for, and in the presence of, Jamar Cid.    Electronically signed by: Susana Husain (Gile), 7/4/2023    IJamar personally performed the services described in this documentation, as scribed by Susana Husain in my presence, and it is both accurate and complete.    The note accurately reflects work and decisions made by me.  Jamar Cid  7/5/2023   1:00 AM

## 2023-07-05 NOTE — DISCHARGE INSTRUCTIONS
Please note that your lipase was elevated.  Lipase is an enzyme that comes from her pancreas.  It is likely elevated secondary to your metastatic cancer in your belly but I do want you to follow-up with your primary doctor so they can trend and follow.  If you have epigastric abdominal pain, come back to the ED and we will repeat it to make sure you do not have acute pancreatitis.  Follow-up with your PCP regarding your further evaluations for paracentesis.  If you have any worsening symptoms, please return to the ED.  Thank for coming in today.

## 2023-07-05 NOTE — ED TRIAGE NOTES
"Chief Complaint   Patient presents with    Abdominal Swelling     Increasing abdominal swelling over the past 4-5 days. Associated with generalized abdominal pain.      Pt ambulatory to triage with above complaints. Pt has history of colorectal cancer with mets to liver, last chemo in June. Pt gets frequent paracentesis, most recent 6/30. Pt denies chest pain and shortness of breath, only endorses generalized abdominal pressure. Pt is breathing with even, unlabored respirations in triage.     Protocol ordered. Pt declining phlebotomy draw for labs and requesting port be accessed in room. Pt educated on triage process, placed in family room, and instructed to inform staff of any changes.     /85   Pulse (!) 104   Temp 36.2 °C (97.2 °F) (Temporal)   Resp 16   Ht 1.88 m (6' 2\")   Wt 86.2 kg (190 lb 0.6 oz)   SpO2 92%   BMI 24.40 kg/m²       "

## 2023-07-08 LAB
BACTERIA BLD CULT: NORMAL
SIGNIFICANT IND 70042: NORMAL
SITE SITE: NORMAL
SOURCE SOURCE: NORMAL

## 2023-07-11 ENCOUNTER — APPOINTMENT (OUTPATIENT)
Dept: ONCOLOGY | Facility: MEDICAL CENTER | Age: 58
End: 2023-07-11
Attending: INTERNAL MEDICINE
Payer: MEDICARE

## 2024-10-03 NOTE — PROGRESS NOTES
Pt ambulatory to [facility] for C3 D5 5FU CADD pump disconnect.  Pump still has approx 40 minutes left to infuse, pt states he will wait for it to be finished.  CADD pump finished infusing.  Pump stopped with 0ml remaining in reservoir, 84.4 mLs administered.  Disconnected pump, Port flushed per protocol with brisk blood return noted, heparinized, de-accessed and gauze dressing applied.  Pump cleaned and placed in pharmacy drawer.  Confirmed pt's next appt. Pt discharged home in [facility].   Pt reevaluated @ 1445 after transfer back from ICU    Still reporting diffuse abdominal pain. Not yet passing flatus.   Feels well enough to start walking around. Discussed importance of ambulation.   Continue to monitor pain control overnight. Rogers to be removed.   Further management per OB team.     Orestes Bai, DO  OB/GYN PGY-3  10/03/24
